# Patient Record
Sex: MALE | Race: BLACK OR AFRICAN AMERICAN | NOT HISPANIC OR LATINO | Employment: OTHER | URBAN - METROPOLITAN AREA
[De-identification: names, ages, dates, MRNs, and addresses within clinical notes are randomized per-mention and may not be internally consistent; named-entity substitution may affect disease eponyms.]

---

## 2018-05-07 ENCOUNTER — HOSPITAL ENCOUNTER (INPATIENT)
Facility: HOSPITAL | Age: 73
LOS: 7 days | Discharge: NURSING, REHAB OR LONG-TERM CARE | DRG: 066 | End: 2018-05-14
Attending: SURGERY | Admitting: INTERNAL MEDICINE
Payer: MEDICARE

## 2018-05-07 DIAGNOSIS — N40.0 BENIGN PROSTATIC HYPERPLASIA, UNSPECIFIED WHETHER LOWER URINARY TRACT SYMPTOMS PRESENT: ICD-10-CM

## 2018-05-07 DIAGNOSIS — F10.10 ETOH ABUSE: ICD-10-CM

## 2018-05-07 DIAGNOSIS — R50.9 FEVER, UNSPECIFIED FEVER CAUSE: ICD-10-CM

## 2018-05-07 DIAGNOSIS — I63.9 STROKE: ICD-10-CM

## 2018-05-07 DIAGNOSIS — D64.9 NORMOCYTIC ANEMIA: ICD-10-CM

## 2018-05-07 DIAGNOSIS — I63.9 CEREBROVASCULAR ACCIDENT (CVA): ICD-10-CM

## 2018-05-07 DIAGNOSIS — I63.9 CEREBROVASCULAR ACCIDENT (CVA), UNSPECIFIED MECHANISM: Primary | ICD-10-CM

## 2018-05-07 DIAGNOSIS — I10 ESSENTIAL HYPERTENSION: ICD-10-CM

## 2018-05-07 LAB
ALBUMIN SERPL BCP-MCNC: 4.4 G/DL
ALP SERPL-CCNC: 71 U/L
ALT SERPL W/O P-5'-P-CCNC: 27 U/L
AMMONIA BLD-SCNC: 11 UMOL/L
ANION GAP SERPL CALC-SCNC: 13 MMOL/L
AST SERPL-CCNC: 27 U/L
BASOPHILS # BLD AUTO: 0.02 K/UL
BASOPHILS NFR BLD: 0.2 %
BILIRUB SERPL-MCNC: 0.7 MG/DL
BUN SERPL-MCNC: 19 MG/DL
CALCIUM SERPL-MCNC: 9.6 MG/DL
CHLORIDE SERPL-SCNC: 99 MMOL/L
CHOLEST SERPL-MCNC: 177 MG/DL
CHOLEST/HDLC SERPL: 2.3 {RATIO}
CO2 SERPL-SCNC: 28 MMOL/L
CREAT SERPL-MCNC: 1.22 MG/DL
DIFFERENTIAL METHOD: ABNORMAL
EOSINOPHIL # BLD AUTO: 0 K/UL
EOSINOPHIL NFR BLD: 0.2 %
ERYTHROCYTE [DISTWIDTH] IN BLOOD BY AUTOMATED COUNT: 12.5 %
EST. GFR  (AFRICAN AMERICAN): >60 ML/MIN/1.73 M^2
EST. GFR  (NON AFRICAN AMERICAN): 59.7 ML/MIN/1.73 M^2
ETHANOL SERPL-MCNC: <10 MG/DL
GLUCOSE SERPL-MCNC: 113 MG/DL
HCT VFR BLD AUTO: 37.6 %
HDLC SERPL-MCNC: 76 MG/DL
HDLC SERPL: 42.9 %
HGB BLD-MCNC: 13.3 G/DL
INR PPP: 1.1
LDLC SERPL CALC-MCNC: 89 MG/DL
LYMPHOCYTES # BLD AUTO: 1.2 K/UL
LYMPHOCYTES NFR BLD: 13.3 %
MCH RBC QN AUTO: 31.7 PG
MCHC RBC AUTO-ENTMCNC: 35.4 G/DL
MCV RBC AUTO: 90 FL
MONOCYTES # BLD AUTO: 0.8 K/UL
MONOCYTES NFR BLD: 8.8 %
NEUTROPHILS # BLD AUTO: 6.8 K/UL
NEUTROPHILS NFR BLD: 77.3 %
NONHDLC SERPL-MCNC: 101 MG/DL
PLATELET # BLD AUTO: 232 K/UL
PMV BLD AUTO: 10.6 FL
POCT GLUCOSE: 108 MG/DL (ref 70–110)
POCT GLUCOSE: 98 MG/DL (ref 70–110)
POTASSIUM SERPL-SCNC: 3.7 MMOL/L
PROT SERPL-MCNC: 8.9 G/DL
PROTHROMBIN TIME: 11.7 SEC
RBC # BLD AUTO: 4.19 M/UL
SODIUM SERPL-SCNC: 140 MMOL/L
TRIGL SERPL-MCNC: 60 MG/DL
TSH SERPL DL<=0.005 MIU/L-ACNC: 0.94 UIU/ML
WBC # BLD AUTO: 8.79 K/UL

## 2018-05-07 PROCEDURE — 99285 EMERGENCY DEPT VISIT HI MDM: CPT | Mod: 25

## 2018-05-07 PROCEDURE — 82962 GLUCOSE BLOOD TEST: CPT

## 2018-05-07 PROCEDURE — 93010 ELECTROCARDIOGRAM REPORT: CPT | Mod: ,,, | Performed by: INTERNAL MEDICINE

## 2018-05-07 PROCEDURE — 25000003 PHARM REV CODE 250: Performed by: SURGERY

## 2018-05-07 PROCEDURE — 80061 LIPID PANEL: CPT

## 2018-05-07 PROCEDURE — 11000001 HC ACUTE MED/SURG PRIVATE ROOM

## 2018-05-07 PROCEDURE — 82140 ASSAY OF AMMONIA: CPT

## 2018-05-07 PROCEDURE — 27000221 HC OXYGEN, UP TO 24 HOURS

## 2018-05-07 PROCEDURE — 94760 N-INVAS EAR/PLS OXIMETRY 1: CPT

## 2018-05-07 PROCEDURE — 85025 COMPLETE CBC W/AUTO DIFF WBC: CPT

## 2018-05-07 PROCEDURE — G0378 HOSPITAL OBSERVATION PER HR: HCPCS

## 2018-05-07 PROCEDURE — 80320 DRUG SCREEN QUANTALCOHOLS: CPT

## 2018-05-07 PROCEDURE — 80053 COMPREHEN METABOLIC PANEL: CPT

## 2018-05-07 PROCEDURE — 93005 ELECTROCARDIOGRAM TRACING: CPT

## 2018-05-07 PROCEDURE — 85610 PROTHROMBIN TIME: CPT

## 2018-05-07 PROCEDURE — 25000003 PHARM REV CODE 250: Performed by: INTERNAL MEDICINE

## 2018-05-07 PROCEDURE — 84443 ASSAY THYROID STIM HORMONE: CPT

## 2018-05-07 PROCEDURE — G0425 INPT/ED TELECONSULT30: HCPCS | Mod: GT,,, | Performed by: PSYCHIATRY & NEUROLOGY

## 2018-05-07 RX ORDER — NIFEDIPINE 60 MG/1
30 TABLET, EXTENDED RELEASE ORAL DAILY
Status: ON HOLD | COMMUNITY
End: 2021-08-16 | Stop reason: SDUPTHER

## 2018-05-07 RX ORDER — HYDROCHLOROTHIAZIDE 25 MG/1
25 TABLET ORAL DAILY
Status: ON HOLD | COMMUNITY
End: 2018-05-14

## 2018-05-07 RX ORDER — CLOPIDOGREL BISULFATE 75 MG/1
300 TABLET ORAL ONCE
Status: COMPLETED | OUTPATIENT
Start: 2018-05-07 | End: 2018-05-07

## 2018-05-07 RX ORDER — TAMSULOSIN HYDROCHLORIDE 0.4 MG/1
0.4 CAPSULE ORAL 2 TIMES DAILY
COMMUNITY
End: 2022-06-20

## 2018-05-07 RX ORDER — FINASTERIDE 5 MG/1
5 TABLET, FILM COATED ORAL DAILY
COMMUNITY

## 2018-05-07 RX ORDER — LABETALOL HYDROCHLORIDE 5 MG/ML
10 INJECTION, SOLUTION INTRAVENOUS ONCE
Status: COMPLETED | OUTPATIENT
Start: 2018-05-07 | End: 2018-05-07

## 2018-05-07 RX ORDER — SODIUM CHLORIDE 9 MG/ML
INJECTION, SOLUTION INTRAVENOUS CONTINUOUS
Status: DISCONTINUED | OUTPATIENT
Start: 2018-05-07 | End: 2018-05-09

## 2018-05-07 RX ORDER — ASPIRIN 325 MG
325 TABLET, DELAYED RELEASE (ENTERIC COATED) ORAL
Status: COMPLETED | OUTPATIENT
Start: 2018-05-07 | End: 2018-05-07

## 2018-05-07 RX ORDER — GUAIFENESIN/PHENYLPROPANOLAMIN
500 EXPECTORANT ORAL DAILY
COMMUNITY
End: 2021-09-24 | Stop reason: ALTCHOICE

## 2018-05-07 RX ADMIN — LABETALOL HYDROCHLORIDE 10 MG: 5 INJECTION, SOLUTION INTRAVENOUS at 09:05

## 2018-05-07 RX ADMIN — CLOPIDOGREL BISULFATE 300 MG: 75 TABLET ORAL at 10:05

## 2018-05-07 RX ADMIN — ASPIRIN 325 MG: 325 TABLET, DELAYED RELEASE ORAL at 02:05

## 2018-05-07 RX ADMIN — SODIUM CHLORIDE: 0.9 INJECTION, SOLUTION INTRAVENOUS at 09:05

## 2018-05-07 NOTE — ED NOTES
Pt gave me permission to give his daughter, Bre Hernandez, any information over the phone. # [phone]

## 2018-05-07 NOTE — ED PROVIDER NOTES
Encounter Date: 5/7/2018       History     Chief Complaint   Patient presents with    Weakness     daughter states that yesterday evening 7pm weakness and slurred speech. pt states he feels bad    Aphasia     Patient was brought in by the daughter because he was acting inappropriate and she felt he had slurred speech.  The symptoms started at 7 PM last night.  She states that he likes to drink alcohol and she attributed some of his symptoms when she saw him last night due to alcohol impairment.  She states that he has trouble with slurred speech.  And he has trouble with balance.  He does not complain of any headaches or he is a little bit unsteady in his gait but does not have any arm or leg weakness.  He does not have a facial droop.  The symptoms still occurred this morning shows the daughter brought him to the hospital.  He is known to the Hospital system and there are no old records to review.  She does state he has high blood pressure and problems with his prostate gland and urinary retention      The history is provided by the patient.   Neurologic Problem   The primary symptoms include dizziness and speech change. The symptoms began yesterday. The episode lasted 17 hours. The symptoms are unchanged. The neurological symptoms are diffuse.   Change in speech began 12 - 24 hours ago. The speech change is unchanged.     Review of patient's allergies indicates:  No Known Allergies  No past medical history on file.  No past surgical history on file.  No family history on file.  Social History   Substance Use Topics    Smoking status: Not on file    Smokeless tobacco: Not on file    Alcohol use Not on file     Review of Systems   Constitutional: Negative.    HENT: Negative.    Eyes: Negative.    Respiratory: Negative.    Cardiovascular: Negative.  Negative for chest pain.   Gastrointestinal: Negative.    Endocrine: Negative.    Genitourinary: Negative.    Musculoskeletal: Negative.    Skin: Negative for color  change.   Allergic/Immunologic: Negative.    Neurological: Positive for dizziness, speech change, speech difficulty and light-headedness.   Psychiatric/Behavioral: Negative.    All other systems reviewed and are negative.      Physical Exam     Initial Vitals [05/07/18 1242]   BP Pulse Resp Temp SpO2   (!) 208/103 86 20 97.8 °F (36.6 °C) 100 %      MAP       138         Physical Exam    Nursing note and vitals reviewed.  Constitutional: He appears well-developed and well-nourished.   HENT:   Head: Normocephalic.   Eyes: Conjunctivae are normal.   Neck: Normal range of motion. Neck supple.   Cardiovascular: Normal rate, regular rhythm, normal heart sounds and intact distal pulses.   Pulmonary/Chest: Breath sounds normal.   Musculoskeletal: Normal range of motion.   Neurological: He is alert and oriented to person, place, and time.   No facial droop.  minimal left arm drift.  Good  no speech difficulty   Skin: Skin is warm and dry.   Psychiatric: He has a normal mood and affect.         ED Course   Procedures  Labs Reviewed   CBC W/ AUTO DIFFERENTIAL   COMPREHENSIVE METABOLIC PANEL   PROTIME-INR   TSH   LIPID PANEL   ALCOHOL,MEDICAL (ETHANOL)   AMMONIA   POCT GLUCOSE     EKG Readings: (Independently Interpreted)   Remote Q waves.  LVH          Medical Decision Making:   Initial Assessment:   Weakness and slurred speech ×18 hours  ED Management:  Patient was brought in by daughter with 18 hour history of slurred speech and a left arm drift and a code stroke was called.  The patient was given aspirin and telemetry stroke was activated CAT scan was done workup shows a small right acute internal capsule/caudate infarct with no evidence of any hemorrhage.  There is no indication for thrombolysis.  Neurology recommended observation for 24 hours and a workup.                      Clinical Impression:   The primary encounter diagnosis was Cerebrovascular accident (CVA), unspecified mechanism. Diagnoses of Stroke and  Essential hypertension were also pertinent to this visit.                           PATEL Saeed III, MD  05/07/18 0939

## 2018-05-07 NOTE — SUBJECTIVE & OBJECTIVE
Woke up with symptoms?: no  Last known normal: Last Known Normal Date: 05/06/18 Last Known Normal Time: 1900    Recent bleeding noted: no  Does the patient take any Blood Thinners? no  Medications: None      Past Medical History: hypertension and ETOH use    Past Surgical History: no major surgeries within the last 2 weeks    Family History: no relevant history    Social History: drinking    Allergies:   No known drug allergies    Review of Systems   Constitutional: Negative for chills, diaphoresis and fever.   HENT: Negative for hearing loss, tinnitus and trouble swallowing.    Eyes: Negative for photophobia and visual disturbance.   Respiratory: Negative for chest tightness and shortness of breath.    Cardiovascular: Negative for chest pain and palpitations.   Gastrointestinal: Negative for abdominal pain and vomiting.   Endocrine: Negative for cold intolerance and polyuria.   Genitourinary: Negative for hematuria.   Musculoskeletal: Negative for arthralgias, neck pain and neck stiffness.   Skin: Negative for rash.   Neurological: Positive for speech difficulty and weakness. Negative for dizziness, numbness and headaches.   Hematological: Does not bruise/bleed easily.   Psychiatric/Behavioral: Negative for agitation, behavioral problems and confusion.     Objective:   Vitals: Blood pressure (!) 208/103, pulse 80, temperature 97.8 °F (36.6 °C), resp. rate 20, weight 77.1 kg (170 lb), SpO2 99 %. BP: 175/91, Respiratory Rate: 16 and Heart Rate: 88    CT READ: Yes  Abnormal CT hypodensity right putamen consistent with acute to early subacute infarct.     Physical Exam   Constitutional: He is oriented to person, place, and time. He appears well-developed.   HENT:   Head: Normocephalic and atraumatic.   Eyes: Pupils are equal, round, and reactive to light.   Neck: Normal range of motion. Neck supple.   Cardiovascular: Normal rate and regular rhythm.    Pulmonary/Chest: Effort normal. No respiratory distress.    Abdominal: Soft. He exhibits no mass.   Genitourinary:   Genitourinary Comments: No performed   Musculoskeletal: Normal range of motion. He exhibits no deformity.   Neurological: He is alert and oriented to person, place, and time. He displays normal reflexes. No cranial nerve deficit or sensory deficit. He exhibits normal muscle tone. Coordination normal.   Skin: No rash noted. He is not diaphoretic. No erythema.   Psychiatric: He has a normal mood and affect. His behavior is normal.

## 2018-05-07 NOTE — CONSULTS
[facility]  Vascular Neurology  Comprehensive Stroke Center  Tele-Consultation Note      Consults    Consulting Provider: Spoke Physician:: dr tod sage  Current Providers  No providers found    Patient Location: [facility]  Spoke hospital nurse at bedside with patient assisting consultant.     Patient information was obtained from patient and daughter.       Assessment/Plan:   71 y/o with HTN poorly adherent to treatment, ETOH use, brought in by family due to acute onset slurred speech, poor balance leaning to the left, and behavioral changes.  NIHSS 2, CT head with hypodensity right putamen suggestive acute to early subacute infarct.  Patient is out of the window for treatment with iv alteplase. No LVO suspected.  Recommend admission to the hospital for stroke work up: MRI/MRA brain, MRA neck, TTE, lipid profile, hemoglobin A1c.   PT, speech, and neurology consult.  ASA, atorvastatin.      STROKE DOCUMENTATION     Acute Stroke Times:   Acute Stroke Times   Last Known Normal Date: 05/06/18  Last Known Normal Time: 1900  Symptom Onset Date: 05/06/18  Symptom Onset Time: 1900  Stroke Team Called Date: 05/07/18  Stroke Team Called Time: 1346  Stroke Team Arrival Date: 05/07/18  Stroke Team Arrival Time: 1346  CT Interpretation Time: 1344  Decision to Treat Time for Alteplase:  (No iv alteplase candidate)  Decision to Treat Time for IR:  (No IR candidate)    NIH Scale:  Interval: baseline (upon arrival/admit)  1a. Level Of Consciousness: 0-->Alert: keenly responsive  1b. LOC Questions: 0-->Answers both questions correctly  1c. LOC Commands: 0-->Performs both tasks correctly  2. Best Gaze: 0-->Normal  3. Visual: 0-->No visual loss  4. Facial Palsy: 0-->Normal symmetrical movements  5a. Motor Arm, Left: 1-->Drift: limb holds 90 (or 45) degrees, but drifts down before full 10 seconds: does not hit bed or other support  5b. Motor Arm, Right:  0-->No drift: limb holds 90 (or 45) degrees for full 10 secs  6a. Motor Leg, Left: 0-->No drift: leg holds 30 degree position for full 5 secs  6b. Motor Leg, Right: 0-->No drift: leg holds 30 degree position for full 5 secs  7. Limb Ataxia: 0-->Absent  8. Sensory: 0-->Normal: no sensory loss  9. Best Language: 0-->No aphasia: normal  10. Dysarthria: 1-->Mild-to-moderate dysarthria: patient slurs at least some words and, at worst, can be understood with some difficulty  11. Extinction and Inattention (formerly Neglect): 0-->No abnormality  Total (NIH Stroke Scale): 2     Modified Kathryn Score: 0  Elkin Coma Scale:15   ABCD2 Score:    [id number] Score:   HAS -BLED Score:   ICH Score:   Hunt & Hernandez Classification:       Diagnoses: acute right subcortical infarct.  No new Assessment & Plan notes have been filed under this hospital service since the last note was generated.  Service: Vascular Neurology      Blood pressure (!) 208/103, pulse 80, temperature 97.8 °F (36.6 °C), resp. rate 20, weight 77.1 kg (170 lb), SpO2 99 %.  Alteplase Eligible?: No  Alteplase Recommendation: Alteplase not recommended due to Outside of treatment window   Possible Interventional Revascularization Candidate? No; No significant neurological deficit    Disposition Recommendation: admit to inpatient      Subjective:     History of Present Illness: 73 y/o with HTN poorly adherent to treatment, ETOH use, brought in by family due to acute onset slurred speech, poor balance leaning to the left, and behavioral changes. Never had similar symptoms before.  Daughter first noticed these changes around 7 pm last night and remain unchanged.  No notes on file      Woke up with symptoms?: no  Last known normal: Last Known Normal Date: 05/06/18 Last Known Normal Time: 1900    Recent bleeding noted: no  Does the patient take any Blood Thinners? no  Medications: None      Past Medical History: hypertension and ETOH use    Past Surgical History: no major  surgeries within the last 2 weeks    Family History: no relevant history    Social History: drinking    Allergies:   No known drug allergies    Review of Systems   Constitutional: Negative for chills, diaphoresis and fever.   HENT: Negative for hearing loss, tinnitus and trouble swallowing.    Eyes: Negative for photophobia and visual disturbance.   Respiratory: Negative for chest tightness and shortness of breath.    Cardiovascular: Negative for chest pain and palpitations.   Gastrointestinal: Negative for abdominal pain and vomiting.   Endocrine: Negative for cold intolerance and polyuria.   Genitourinary: Negative for hematuria.   Musculoskeletal: Negative for arthralgias, neck pain and neck stiffness.   Skin: Negative for rash.   Neurological: Positive for speech difficulty and weakness. Negative for dizziness, numbness and headaches.   Hematological: Does not bruise/bleed easily.   Psychiatric/Behavioral: Negative for agitation, behavioral problems and confusion.     Objective:   Vitals: Blood pressure (!) 208/103, pulse 80, temperature 97.8 °F (36.6 °C), resp. rate 20, weight 77.1 kg (170 lb), SpO2 99 %. BP: 175/91, Respiratory Rate: 16 and Heart Rate: 88    CT READ: Yes  Abnormal CT hypodensity right putamen consistent with acute to early subacute infarct.     Physical Exam   Constitutional: He is oriented to person, place, and time. He appears well-developed.   HENT:   Head: Normocephalic and atraumatic.   Eyes: Pupils are equal, round, and reactive to light.   Neck: Normal range of motion. Neck supple.   Cardiovascular: Normal rate and regular rhythm.    Pulmonary/Chest: Effort normal. No respiratory distress.   Abdominal: Soft. He exhibits no mass.   Genitourinary:   Genitourinary Comments: No performed   Musculoskeletal: Normal range of motion. He exhibits no deformity.   Neurological: He is alert and oriented to person, place, and time. He displays normal reflexes. No cranial nerve deficit or sensory  deficit. He exhibits normal muscle tone. Coordination normal.   Skin: No rash noted. He is not diaphoretic. No erythema.   Psychiatric: He has a normal mood and affect. His behavior is normal.             Recommended the emergency room physician to have a brief discussion with the patient and/or family if available regarding the risks and benefits of treatment, and to briefly document the occurrence of that discussion in his clinical encounter note.     The attending portion of this evaluation, treatment, and documentation was performed per Joselito Lara MD via audiovisual.    Billing code:  (non-intervention mild to moderate stroke, TIA, some mimics)    · This patient has a critical neurological condition/illness, with some potential for high morbidity and mortality.  · There is a moderate probability for acute neurological change leading to clinical and possibly life-threatening deterioration requiring highest level of physician preparedness for urgent intervention.  · Care was coordinated with other physicians involved in the patient's care.  · Radiologic studies and laboratory data were reviewed and interpreted, and plan of care was re-assessed based on the results.  · Diagnosis, treatment options and prognosis may have been discussed with the patient and/or family members or caregiver.      Consult End Time: 1:57 pm    Joselito Lara MD  Comprehensive Stroke Center  Vascular Neurology   [facility]

## 2018-05-07 NOTE — NURSING
Received report from IGNACIA Krishnan at [facility] ED. Awaiting arrival of patient to Room 404. ETA of patient arrival is approximately an hour.

## 2018-05-08 DIAGNOSIS — I61.9 CVA (CEREBROVASCULAR ACCIDENT DUE TO INTRACEREBRAL HEMORRHAGE): Primary | ICD-10-CM

## 2018-05-08 PROBLEM — I10 HTN (HYPERTENSION): Status: ACTIVE | Noted: 2018-05-08

## 2018-05-08 PROBLEM — F10.10 ETOH ABUSE: Status: ACTIVE | Noted: 2018-05-08

## 2018-05-08 PROBLEM — N40.0 BPH (BENIGN PROSTATIC HYPERPLASIA): Status: ACTIVE | Noted: 2018-05-08

## 2018-05-08 PROBLEM — D64.9 NORMOCYTIC ANEMIA: Status: ACTIVE | Noted: 2018-05-08

## 2018-05-08 LAB
APTT BLDCRRT: 29.2 SEC
BACTERIA #/AREA URNS HPF: ABNORMAL /HPF
BILIRUB UR QL STRIP: NEGATIVE
CK MB SERPL-MCNC: 1.6 NG/ML
CK MB SERPL-RTO: 1 %
CK SERPL-CCNC: 163 U/L
CLARITY UR: CLEAR
COLOR UR: YELLOW
DIASTOLIC DYSFUNCTION: YES
ESTIMATED AVG GLUCOSE: 103 MG/DL
FERRITIN SERPL-MCNC: 887 NG/ML
FOLATE SERPL-MCNC: 6.2 NG/ML
GLUCOSE UR QL STRIP: NEGATIVE
HBA1C MFR BLD HPLC: 5.2 %
HGB UR QL STRIP: ABNORMAL
INR PPP: 1
IRON SERPL-MCNC: 67 UG/DL
KETONES UR QL STRIP: NEGATIVE
LEUKOCYTE ESTERASE UR QL STRIP: NEGATIVE
MICROSCOPIC COMMENT: ABNORMAL
NITRITE UR QL STRIP: NEGATIVE
PH UR STRIP: 5 [PH] (ref 5–8)
PROT UR QL STRIP: NEGATIVE
PROTHROMBIN TIME: 10.5 SEC
RBC #/AREA URNS HPF: 9 /HPF (ref 0–4)
RETIRED EF AND QEF - SEE NOTES: 55 (ref 55–65)
SATURATED IRON: 25 %
SP GR UR STRIP: 1.02 (ref 1–1.03)
SQUAMOUS #/AREA URNS HPF: 1 /HPF
TOTAL IRON BINDING CAPACITY: 272 UG/DL
TRANSFERRIN SERPL-MCNC: 184 MG/DL
TRICUSPID VALVE REGURGITATION: ABNORMAL
TROPONIN I SERPL DL<=0.01 NG/ML-MCNC: 0.02 NG/ML
URN SPEC COLLECT METH UR: ABNORMAL
UROBILINOGEN UR STRIP-ACNC: NEGATIVE EU/DL
VIT B12 SERPL-MCNC: 341 PG/ML
WBC #/AREA URNS HPF: 1 /HPF (ref 0–5)

## 2018-05-08 PROCEDURE — 63600175 PHARM REV CODE 636 W HCPCS: Performed by: INTERNAL MEDICINE

## 2018-05-08 PROCEDURE — A4216 STERILE WATER/SALINE, 10 ML: HCPCS | Performed by: STUDENT IN AN ORGANIZED HEALTH CARE EDUCATION/TRAINING PROGRAM

## 2018-05-08 PROCEDURE — 82746 ASSAY OF FOLIC ACID SERUM: CPT

## 2018-05-08 PROCEDURE — 25000003 PHARM REV CODE 250: Performed by: STUDENT IN AN ORGANIZED HEALTH CARE EDUCATION/TRAINING PROGRAM

## 2018-05-08 PROCEDURE — G8987 SELF CARE CURRENT STATUS: HCPCS | Mod: CK

## 2018-05-08 PROCEDURE — 83036 HEMOGLOBIN GLYCOSYLATED A1C: CPT

## 2018-05-08 PROCEDURE — 86703 HIV-1/HIV-2 1 RESULT ANTBDY: CPT

## 2018-05-08 PROCEDURE — 84165 PROTEIN E-PHORESIS SERUM: CPT

## 2018-05-08 PROCEDURE — 25000003 PHARM REV CODE 250: Performed by: INTERNAL MEDICINE

## 2018-05-08 PROCEDURE — 97161 PT EVAL LOW COMPLEX 20 MIN: CPT

## 2018-05-08 PROCEDURE — 25500020 PHARM REV CODE 255: Performed by: INTERNAL MEDICINE

## 2018-05-08 PROCEDURE — G9162 LANG EXPRESS CURRENT STATUS: HCPCS | Mod: CJ

## 2018-05-08 PROCEDURE — 83540 ASSAY OF IRON: CPT

## 2018-05-08 PROCEDURE — 86334 IMMUNOFIX E-PHORESIS SERUM: CPT

## 2018-05-08 PROCEDURE — 97802 MEDICAL NUTRITION INDIV IN: CPT

## 2018-05-08 PROCEDURE — 82728 ASSAY OF FERRITIN: CPT

## 2018-05-08 PROCEDURE — 82607 VITAMIN B-12: CPT

## 2018-05-08 PROCEDURE — 97165 OT EVAL LOW COMPLEX 30 MIN: CPT

## 2018-05-08 PROCEDURE — G9163 LANG EXPRESS GOAL STATUS: HCPCS | Mod: CI

## 2018-05-08 PROCEDURE — A4216 STERILE WATER/SALINE, 10 ML: HCPCS | Performed by: INTERNAL MEDICINE

## 2018-05-08 PROCEDURE — 96374 THER/PROPH/DIAG INJ IV PUSH: CPT

## 2018-05-08 PROCEDURE — 36415 COLL VENOUS BLD VENIPUNCTURE: CPT

## 2018-05-08 PROCEDURE — G8978 MOBILITY CURRENT STATUS: HCPCS | Mod: CK

## 2018-05-08 PROCEDURE — 82550 ASSAY OF CK (CPK): CPT

## 2018-05-08 PROCEDURE — 97530 THERAPEUTIC ACTIVITIES: CPT

## 2018-05-08 PROCEDURE — G8979 MOBILITY GOAL STATUS: HCPCS | Mod: CJ

## 2018-05-08 PROCEDURE — 82553 CREATINE MB FRACTION: CPT

## 2018-05-08 PROCEDURE — 86334 IMMUNOFIX E-PHORESIS SERUM: CPT | Mod: 26,,, | Performed by: PATHOLOGY

## 2018-05-08 PROCEDURE — 85610 PROTHROMBIN TIME: CPT

## 2018-05-08 PROCEDURE — 81000 URINALYSIS NONAUTO W/SCOPE: CPT

## 2018-05-08 PROCEDURE — 92523 SPEECH SOUND LANG COMPREHEN: CPT

## 2018-05-08 PROCEDURE — 11000001 HC ACUTE MED/SURG PRIVATE ROOM

## 2018-05-08 PROCEDURE — 84484 ASSAY OF TROPONIN QUANT: CPT

## 2018-05-08 PROCEDURE — 85730 THROMBOPLASTIN TIME PARTIAL: CPT

## 2018-05-08 PROCEDURE — 94761 N-INVAS EAR/PLS OXIMETRY MLT: CPT

## 2018-05-08 PROCEDURE — G8988 SELF CARE GOAL STATUS: HCPCS | Mod: CI

## 2018-05-08 PROCEDURE — 92610 EVALUATE SWALLOWING FUNCTION: CPT

## 2018-05-08 PROCEDURE — 80074 ACUTE HEPATITIS PANEL: CPT

## 2018-05-08 PROCEDURE — 97535 SELF CARE MNGMENT TRAINING: CPT

## 2018-05-08 PROCEDURE — 84165 PROTEIN E-PHORESIS SERUM: CPT | Mod: 26,,, | Performed by: PATHOLOGY

## 2018-05-08 RX ORDER — SODIUM CHLORIDE 0.9 % (FLUSH) 0.9 %
3 SYRINGE (ML) INJECTION EVERY 8 HOURS
Status: DISCONTINUED | OUTPATIENT
Start: 2018-05-08 | End: 2018-05-14 | Stop reason: HOSPADM

## 2018-05-08 RX ORDER — HYDROCHLOROTHIAZIDE 25 MG/1
12.5 TABLET ORAL DAILY
Qty: 30 TABLET | Refills: 5 | Status: SHIPPED | OUTPATIENT
Start: 2018-05-08 | End: 2018-05-14 | Stop reason: HOSPADM

## 2018-05-08 RX ORDER — ENOXAPARIN SODIUM 100 MG/ML
40 INJECTION SUBCUTANEOUS EVERY 24 HOURS
Status: DISCONTINUED | OUTPATIENT
Start: 2018-05-08 | End: 2018-05-14 | Stop reason: HOSPADM

## 2018-05-08 RX ORDER — LABETALOL HYDROCHLORIDE 5 MG/ML
10 INJECTION, SOLUTION INTRAVENOUS
Status: DISCONTINUED | OUTPATIENT
Start: 2018-05-08 | End: 2018-05-14 | Stop reason: HOSPADM

## 2018-05-08 RX ORDER — CLOPIDOGREL BISULFATE 75 MG/1
75 TABLET ORAL DAILY
Qty: 30 TABLET | Refills: 11 | Status: ON HOLD | OUTPATIENT
Start: 2018-05-09 | End: 2021-08-16

## 2018-05-08 RX ORDER — SODIUM CHLORIDE 0.9 % (FLUSH) 0.9 %
3 SYRINGE (ML) INJECTION EVERY 8 HOURS
Status: DISCONTINUED | OUTPATIENT
Start: 2018-05-08 | End: 2018-05-10

## 2018-05-08 RX ORDER — ATORVASTATIN CALCIUM 40 MG/1
40 TABLET, FILM COATED ORAL DAILY
Qty: 90 TABLET | Refills: 3 | Status: SHIPPED | OUTPATIENT
Start: 2018-05-09 | End: 2018-05-14 | Stop reason: HOSPADM

## 2018-05-08 RX ORDER — CLOPIDOGREL BISULFATE 75 MG/1
75 TABLET ORAL DAILY
Status: DISCONTINUED | OUTPATIENT
Start: 2018-05-08 | End: 2018-05-14 | Stop reason: HOSPADM

## 2018-05-08 RX ORDER — HYDROCHLOROTHIAZIDE 12.5 MG/1
12.5 TABLET ORAL DAILY
Status: DISCONTINUED | OUTPATIENT
Start: 2018-05-08 | End: 2018-05-14 | Stop reason: HOSPADM

## 2018-05-08 RX ORDER — ASPIRIN 81 MG/1
81 TABLET ORAL DAILY
Refills: 0 | COMMUNITY
Start: 2018-05-09 | End: 2021-09-14

## 2018-05-08 RX ORDER — ATORVASTATIN CALCIUM 40 MG/1
40 TABLET, FILM COATED ORAL DAILY
Status: DISCONTINUED | OUTPATIENT
Start: 2018-05-08 | End: 2018-05-09

## 2018-05-08 RX ORDER — LABETALOL HYDROCHLORIDE 5 MG/ML
10 INJECTION, SOLUTION INTRAVENOUS
Status: DISCONTINUED | OUTPATIENT
Start: 2018-05-08 | End: 2018-05-09

## 2018-05-08 RX ORDER — ASPIRIN 81 MG/1
81 TABLET ORAL DAILY
Status: DISCONTINUED | OUTPATIENT
Start: 2018-05-08 | End: 2018-05-14 | Stop reason: HOSPADM

## 2018-05-08 RX ADMIN — CLOPIDOGREL BISULFATE 75 MG: 75 TABLET ORAL at 08:05

## 2018-05-08 RX ADMIN — IOHEXOL 100 ML: 350 INJECTION, SOLUTION INTRAVENOUS at 10:05

## 2018-05-08 RX ADMIN — ASPIRIN 81 MG: 81 TABLET, COATED ORAL at 08:05

## 2018-05-08 RX ADMIN — ATORVASTATIN CALCIUM 40 MG: 40 TABLET, FILM COATED ORAL at 08:05

## 2018-05-08 RX ADMIN — HYDROCHLOROTHIAZIDE 12.5 MG: 12.5 TABLET ORAL at 05:05

## 2018-05-08 RX ADMIN — SODIUM CHLORIDE, PRESERVATIVE FREE 3 ML: 5 INJECTION INTRAVENOUS at 05:05

## 2018-05-08 RX ADMIN — ENOXAPARIN SODIUM 40 MG: 100 INJECTION SUBCUTANEOUS at 05:05

## 2018-05-08 NOTE — PLAN OF CARE
Problem: SLP Goal  Goal: SLP Goal  Short Term Goals:  1. Pt will participate in BSS to determine least restrictive diet.- MET 5/8  2. Pt will participate in informal speech-lang eval to r/o cognitive-linguistic deficits.- MET 5/8  3. Pt will complete mental manipulation tasks (i.e word finding, categorical, etc.) with 80% acc given min assist for 2 consecutive sessions.  4. Pt will recall 3/3 given items with 3-5 min delay with min assist for 2 consecutive sessions.  5. Pt will answer questions related to given short story with 80% acc, ind'ly for 2 consecutive session.  *Further goals pending pt's progress*  Outcome: Ongoing (interventions implemented as appropriate)  5/8:  Pt participated in clinical swallow eval and informal speech-language eval this PM. Pt tolerated thin liquids, puree, and hard solid textures with no overt s/s of aspiration, at bedside. However, pt demonstrated mild cognitive-linguistic impairment c/b word finding issues (paraphasisas noted), difficulty with short term memory, and mild-moderate dysarthria c/b slurred, quick rate of speech, judged to be ~70-75% intelligible, which is not pt's baseline. SLP recs: pt will participate in cognitive-linguistic tx to address cognitive-linguistic deficits. SLP will continue to follow. Pt would benefit from ST services at next level of care.  TORIBIO Ndiaye., CF-SLP  Speech-Language Pathologist

## 2018-05-08 NOTE — NURSING
Pt passed RO w/ flying colors.  [facility] ordered NPO for possible mooring procedures.     BP on admission 203/91, highest during assessment 235/102,  Dr Flores ordered 10 mg labetalol IVP. Initial drop 196/95 via automatic cuff.  At 2300 BP via manual was 180/90.  U medicine awear and does not want to drop pressure too low..      Tele: NSR, HR 70 80, No alarms.     Bed in lowest position, wheels clocked, non skid socks worn, bed alarm set, family at bed side. Personal items and call light with in reach.

## 2018-05-08 NOTE — PLAN OF CARE
Problem: Patient Care Overview  Goal: Plan of Care Review  Outcome: Ongoing (interventions implemented as appropriate)  Recommendation/Intervention:   1. Change diet to Cardiac low sodium/cholesterol   -If PO intake <50%, Boost BID  2. RD to monitor     Goals:  Meet 85% EEN  Nutrition Goal Status: new

## 2018-05-08 NOTE — PT/OT/SLP EVAL
Occupational Therapy   Evaluation & tx    Name: Haris Hernandez  MRN: [id number]  Admitting Diagnosis:  Cerebrovascular accident (CVA)      Recommendations:     Discharge Recommendations: home health OT  Discharge Equipment Recommendations:  bath bench  Barriers to discharge:  None    History:     Occupational Profile:  Living Environment: w/ dgtr in SSH w/ 0 ALLY; T/S combo  Previous level of function: (I) w/o DME incl standing tub t/f's & sitting tub bath  Roles and Routines: imbibes QD  Equipment Owned:  raised toilet  Assistance upon Discharge: S/A PRN    Past Medical History:   Diagnosis Date    Coronary artery disease     Enlarged prostate without lower urinary tract symptoms (luts)     BPH    Enlarged prostate without lower urinary tract symptoms (luts)     BPH    Hypertension        Past Surgical History:   Procedure Laterality Date    APPENDECTOMY      EYE SURGERY         Subjective     Chief Complaint: slurred speech & L lean  Patient/Family stated goals: return to PLOF  Communicated with: nsg prior to session.  Pain/Comfort:  · Pain Rating 1: 0/10  · Pain Rating Post-Intervention 1: 0/10    Patients cultural, spiritual, Sabianist conflicts given the current situation:      Objective:     Patient found with: peripheral IV    General Precautions: Standard, fall   Orthopedic Precautions:N/A   Braces: N/A     Occupational Performance:    Bed Mobility:    · Patient completed Supine to Sit with stand by assistance  · Patient completed Sit to Supine with stand by assistance    Functional Mobility/Transfers:  · Patient completed Bed <> Chair Transfer using Step Transfer technique with minimum assistance with rolling walker  · Functional Mobility: sidestep L via RW w/ Min A for DME manip    Activities of Daily Living:  · LB Dressing: moderate assistance overall; SBA to doff & Max A to don via LE cross at EOB    Cognitive/Visual Perceptual:  A+O x 4  Decreased proprioception L U>LE  Delayed verbal/motor  "processing    Physical Exam:  R UE WFL at 4/5  L UE slight decrease at shldr at 4-/5    Sit balance; F to F+  Stand balance;  F- to F    Patient left up in chair with nsg present    [facility] 6 Click:  [facility] Total Score: 16    Treatment & Education:  **Pt w/ reported fall from standing at toilet while turning to R w/in 30 min prior to OT eval/tx    OT eval & tx completed this date. Pt lives w/ dgtr in SSH w/ 0STE & T/S combo. PLOF: (I) w/o DME for all fxnl tasks incl standing tub showers & sitting baths. Currently, pt demo's delayed processing & decreased L U>LE proprioception, coordination, strentgth & fxnl use. Pt perf sup-->EOB w/ SBA; standing w/ RW w/ SBA; sidestepping L & t/f-->WC w/ Min A for RW manip. Edu/tx re: general safety & visual compensation techs & HEP. Pt/dgtr verbalized understanding.      Assessment:   Pt presents w/ decreased overall endurance/conditioning, balance/mobility & coordination w/ subsequent decline in (I)/safety w/ BADLs, fxnl mobility & fxnl t/f's. OT 5x/wk to increase phys/fxnl status & maximize potential to achieve established goals for d/c-->HHOT & TTB.    Haris DELICIA Hernandez is a 72 y.o. male with a medical diagnosis of Cerebrovascular accident (CVA).  He presents with ..  Performance deficits affecting function are weakness, impaired endurance, impaired sensation, gait instability, impaired functional mobilty, impaired self care skills, impaired balance, impaired cognition, decreased lower extremity function, decreased upper extremity function, decreased coordination, decreased safety awareness, impaired fine motor.      Rehab Prognosis:  good; patient would benefit from acute skilled OT services to address these deficits and reach maximum level of function.         Clinical Decision Makin.  OT Low:  "Pt evaluation falls under low complexity for evaluation coding due to performance deficits noted in 1-3 areas as stated above and 0 co-morbities affecting current functional status. " "Data obtained from problem focused assessments. No modifications or assistance was required for completion of evaluation. Only brief occupational profile and history review completed."     Plan:     Patient to be seen 5 x/week to address the above listed problems via self-care/home management, therapeutic activities, therapeutic exercises  · Plan of Care Expires: 06/08/18  · Plan of Care Reviewed with: patient, daughter    This Plan of care has been discussed with the patient who was involved in its development and understands and is in agreement with the identified goals and treatment plan    GOALS:    Occupational Therapy Goals        Problem: Occupational Therapy Goal    Goal Priority Disciplines Outcome Interventions   Occupational Therapy Goal     OT, PT/OT     Description:  Goals to be met by: 06/08/18     Patient will increase functional independence with ADLs by performing:    LE Dressing with Supervision.  Grooming while standing at sink with Supervision.  Toileting from toilet with Supervision for hygiene and clothing management.   Toilet transfer to toilet with Supervision.  Increased functional strength to WFL for ADLs.                      Time Tracking:     OT Date of Treatment: 05/08/18  OT Start Time: 0914  OT Stop Time: 0954  OT Total Time (min): 40 min    Billable Minutes:Evaluation 10  Therapeutic Activity 30  Total Time 40    LEANNE Cortez  5/8/2018    "

## 2018-05-08 NOTE — H&P
[facility] History and Physical - Resident Note    Admitting Team: Team A  Attending Physician: Dr. Gao  Resident: Dr. Ravindra Flores  Interns: Dr. Alvarez and Dr. Gracie Rivera    Date of Admit: 5/7/2018    Chief Complaint     Gait disturbance x 1 day    Subjective:      History of Present Illness:  Haris Hernandez is a 72 y.o.  male with a PMHx of BPH, HTN and Alcohol abuse who was in his USOH (independent ADLs, able to walk without difficulty) until yesterday at around 7pm his daughter noticed he was leaning towards his left side when walking and slurring his speech. She states that he is a daily drinker and thought this was attributed to his drinking although this morning when he woke up, he continued to have the same symptoms that had no improved so brought him to the ED. Per patient, his slurred speech and gait disturbance have improved. He endorses left hand numbness but denies weakness, sensation changes, headache, vision changes, CP, SOB, dizziness, weakness, fevers, sick contacts, ingestion of foreign substance, shaking body, urinary incontinence, head trauma, LOC, chills, weight loss, dysuria, abdominal pain. Denies previous episode.     ROS: 2 weeks yellow productive cough         Past Medical History:  HTN  Alcohol abuse    Past Surgical History:  Cataract surgery    Allergies:  No Known Allergies    Home Medications:  Prior to Admission medications    Medication Sig Start Date End Date Taking? Authorizing Provider   finasteride (PROSCAR) 5 mg tablet Take 5 mg by mouth once daily.   Yes Historical Provider, MD   hydroCHLOROthiazide (HYDRODIURIL) 25 MG tablet Take 25 mg by mouth once daily.   Yes Historical Provider, MD   NIFEdipine (ADALAT CC) 60 MG TbSR Take 30 mg by mouth once daily.   Yes Historical Provider, MD   saw palmetto 500 MG capsule Take 500 mg by mouth once daily.   Yes Historical Provider, MD   tamsulosin (FLOMAX) 0.4 mg Cp24 Take 0.4 mg by mouth once daily.   Yes Historical Provider,  "MD       Family History:  Dad: stroke  Mother: MI    Social History:  Social History   Substance Use Topics    Smoking status: Not on file    Smokeless tobacco: Not on file    Alcohol use Not on file     Daily drinker. Last drink 5 pm yesterday. Drinks 1/2 pint of liquor and 4 beers daily for 6 years. Denies smoking although previous 1 ppd for 10 years. Denies illicit drug use.  Denies WD or seizures.     Review of Systems:  Pertinent items are noted in HPI. All other systems are reviewed and are negative.    Health Maintaince :   Primary Care Physician: Dr. Spears  Immunizations:   TDap is not up to date, .  Influenza is not up to date, .  Pneumovax is not up to date, .  Cancer Screening:  Colonoscopy: is not up to date. Refuses     Objective:   Last 24 Hour Vital Signs:  BP  Min: 175/91  Max: 235/102  Temp  Av.3 °F (36.8 °C)  Min: 97.3 °F (36.3 °C)  Max: 99.1 °F (37.3 °C)  Pulse  Av.8  Min: 73  Max: 90  Resp  Av  Min: 12  Max: 20  SpO2  Av.4 %  Min: 97 %  Max: 100 %  Height  Av' 11.5" (181.6 cm)  Min: 5' 11.5" (181.6 cm)  Max: 5' 11.5" (181.6 cm)  Weight  Av.5 kg (162 lb 0.8 oz)  Min: 69.9 kg (154 lb 1.6 oz)  Max: 77.1 kg (170 lb)  Body mass index is 21.19 kg/m².  No intake/output data recorded.    Physical Examination:  Constitutional: He is oriented to person, place, and time. He appears well-developed.   Head: Normocephalic and atraumatic.   Eyes: Pupils are equal, round, and reactive to light.   Neck: Normal range of motion. Neck supple.   Cardiovascular: Normal rate and regular rhythm.  No murmurs, rubs or gallops   Pulmonary/Chest: Effort normal. No respiratory distress. CTA b/l. No wheezes, rhales, rhonci   Abdominal: Soft. Non-tender. BS +. He exhibits no mass.   Musculoskeletal: Normal range of motion. He exhibits no deformity.   Neurological: He is alert and oriented to person, place, and time.   Reflexes: 2+ patellar on R and 3+ L patellar.   Hypoglossal nn. Deficit with " tongue deviated to right side.   No nystagmus. Able to speak, swallow, hear equally. Able to close eyes tightly. Speech mildly slurred.  Sensory deficit with decreased sensation to light touch on Left Lower face. Smile asymmetric with left lower facial weakness present.   LE 5/5 muscle strength. UE 5/5 muscle strength although R>L bilaterally.   Gait not assessed. Pronator drift on left UE.   Skin: No rash noted. He is not diaphoretic. No erythema.   Psychiatric: He has a normal mood and affect. His behavior is normal.     Laboratory:  Most Recent Data:  CBC: Lab Results   Component Value Date    WBC 8.79 05/07/2018    HGB 13.3 (L) 05/07/2018    HCT 37.6 (L) 05/07/2018     05/07/2018    MCV 90 05/07/2018    RDW 12.5 05/07/2018      seen  BMP: Lab Results   Component Value Date     05/07/2018    K 3.7 05/07/2018    CL 99 05/07/2018    CO2 28 05/07/2018    BUN 19 05/07/2018    CREATININE 1.22 05/07/2018     (H) 05/07/2018    CALCIUM 9.6 05/07/2018     LFTs: Lab Results   Component Value Date    PROT 8.9 (H) 05/07/2018    ALBUMIN 4.4 05/07/2018    BILITOT 0.7 05/07/2018    AST 27 05/07/2018    ALKPHOS 71 05/07/2018    ALT 27 05/07/2018     Coags:   Lab Results   Component Value Date    INR 1.1 05/07/2018     FLP: Lab Results   Component Value Date    CHOL 177 05/07/2018    HDL 76 (H) 05/07/2018    LDLCALC 89.0 05/07/2018    TRIG 60 05/07/2018    CHOLHDL 42.9 05/07/2018     DM: Lab Results   Component Value Date    LDLCALC 89.0 05/07/2018    CREATININE 1.22 05/07/2018     Thyroid: Lab Results   Component Value Date    TSH 0.939 05/07/2018     Anemia: No results found for: IRON, TIBC, FERRITIN, [id number], FOLATE  Cardiac: No results found for: TROPONINI, CKTOTAL, CKMB, BNP  Urinalysis: No results found for: LABURIN, COLORU, CLARITYU, SPECGRAV, LABSPEC, NITRITE, PROTEINUR, GLUCOSEU, KETONESU, UROBILINOGEN, BILIRUBINUR, BLOODU, RBCU, WBCUA    Trended Lab Data:    Recent Labs  Lab 05/07/18  1249   WBC  8.79   HGB 13.3*   HCT 37.6*      MCV 90   RDW 12.5      K 3.7   CL 99   CO2 28   BUN 19   CREATININE 1.22   *   PROT 8.9*   ALBUMIN 4.4   BILITOT 0.7   AST 27   ALKPHOS 71   ALT 27       Trended Cardiac Data:  No results for input(s): TROPONINI, CKTOTAL, CKMB, BNP in the last 168 hours.    Microbiology Data:  Not applicable       Other Results:  EKG (my interpretation): Normal sinus rhythm, HR 80  Voltage criteria for left ventricular hypertrophy  Anteroseptal infarct ,age undetermined    Radiology:  Imaging Results          CT Head Without Contrast (Final result)  Result time 05/07/18 13:13:06    Final result by Steve Dempsey MD (05/07/18 13:13:06)                 Impression:      No acute hemorrhage.  Low-density zone right caudate nucleus/anterior internal capsule with considerations including acute to subacute lacunar type infarct.  Several chronic lacunar type infarcts.    Chronic right cerebellar encephalomalacia.    Mild atrophy with white matter degeneration.      Electronically signed by: Steve Dempsey MD  Date:    05/07/2018  Time:    13:13             Narrative:    EXAMINATION:  CT HEAD WITHOUT CONTRAST    CLINICAL HISTORY:  Stroke; acute CVA.    TECHNIQUE:  Standard noncontrast CT of the brain.    All CT scans at this facility use dose modulation, iterative reconstruction and/or weight based dosing when appropriate to reduce radiation dose to as low as reasonably achievable.    COMPARISON:  None.    FINDINGS:  The ventricles are mildly enlarged.  There are mild white matter changes consistent with small vessel ischemic degeneration.    Intracranial vascular calcification is noted.    Focal area of encephalomalacia in the right cerebellum probably represents an old infarct.    Small chronic appearing left thalamic and left basal ganglia lacunar infarcts.    Ill-defined low-density area in the right caudate and anterior internal capsule region could represent an acute to  subacute infarct.  Please correlate with clinical exam.    No acute hemorrhage.                               X-Ray Chest AP Portable (Final result)  Result time 05/07/18 12:58:47    Final result by Odin Rosario MD (05/07/18 12:58:47)                 Impression:      No acute process seen.      Electronically signed by: Odin Rosario MD  Date:    05/07/2018  Time:    12:58             Narrative:    EXAMINATION:  XR CHEST AP PORTABLE    CLINICAL HISTORY:  Stroke;    FINDINGS:  Single view of the chest.    Cardiac silhouette is normal.  The lungs demonstrate no evidence of active disease.  No evidence of pleural effusion or pneumothorax.  Bones appear intact.                                     Assessment:     Haris Hernandez is a 72 y.o. male with:  Patient Active Problem List    Diagnosis Date Noted    Cerebrovascular accident (CVA) 05/07/2018        Plan:     Acute/Subacute Right Caudate/Anterior Internal Capsule Lacunar CVA  -CT head: Low-density zone right caudate nucleus/anterior internal capsule with considerations including acute to subacute lacunar type infarct.  Several chronic lacunar type infarcts.  -NIHSS 2  -[facility] neuro Dr. Lara evaluated the patient. No tpa. No LVO suspected. Recs per note.   -EKG with NSR and HR 80s, LVH, q waves in anterior leads v1-v3  -LP WNL  -Will get a1c   -MRI/MRA brain, MRA neck, TTE pending   -PT, speech, and neurology consult.  -Begin ASA, plavix load, atorvastatin.    HTN  -BP on admission 208/103  -On home nefedipime 60 and HCTZ 25  will hold   -Will monitor and allow 24 hr HTN >185/110    Normocytic Anemia  -H/H 13.3/37.6, MCV 90  -No s/s of bleeding  -Will get iron profile     Protein Gap (4.5)  -Will need workup  -HIV/hep panel/SPEP/UPEP  -UA    Alcohol Abuse  -Daily drinker: 4 beers and 1/2 pint of liquor last drink yesterday 5pm. Denies WD or seizures  -YOSVANY <10  -Will monitor     BPH  -Cont home finasteride and tamsulosin   -Stable  -Follow up PCP      Dispo:  PT/OT/ST eval  Diet: NPO until ST  Code: Full  DVT: lovenox     Code Status:     Full     Sharmila Alvarez  [facility] Internal Medicine HO-1  [facility] Internal Medicine Service    [facility] Medicine Hospitalist Pager numbers:   [facility] Hospitalist Medicine Team A (Sima/Reed): [phone]  [facility] Hospitalist Medicine Team B (Kirt/Malaika):  [phone]

## 2018-05-08 NOTE — PLAN OF CARE
Problem: Physical Therapy Goal  Goal: Physical Therapy Goal  Goals to be met by: 2018     Patient will increase functional independence with mobility by performin) Sup<>sit mod I with HOB flat and no use of bed rails  2)Sit <>stand Mod I with RW  3) Ambulate at least 150 feet with Mod I with RW demonstrating good gait stability   4) Pt to turn during ambulation safely with RW Mod I and no gait instability.   5) Bed <>chair mod I with RW.    Outcome: Ongoing (interventions implemented as appropriate)  PT orders received, initial evaluation complete PT to follow.

## 2018-05-08 NOTE — PT/OT/SLP EVAL
Speech Language Pathology Evaluation  Cognitive/Bedside Swallow    Patient Name:  Haris Hernandez   MRN:  [id number]  Admitting Diagnosis: Cerebrovascular accident (CVA)    Recommendations:                  General Recommendations:  Speech/language therapy and Cognitive-linguistic therapy  Diet recommendations:  Regular, Thin   Aspiration Precautions: Standard aspiration precautions   General Precautions: Standard, fall  Communication strategies:  provide increased time to answer    History:     Past Medical History:   Diagnosis Date    Coronary artery disease     Enlarged prostate without lower urinary tract symptoms (luts)     BPH    Enlarged prostate without lower urinary tract symptoms (luts)     BPH    Hypertension        Past Surgical History:   Procedure Laterality Date    APPENDECTOMY      EYE SURGERY       History of Present Illness:  Haris Hernandez is a 72 y.o.  male with a PMHx of BPH, HTN and Alcohol abuse who was in his USOH (independent ADLs, able to walk without difficulty) until yesterday at around 7pm his daughter noticed he was leaning towards his left side when walking and slurring his speech. She states that he is a daily drinker and thought this was attributed to his drinking although this morning when he woke up, he continued to have the same symptoms that had no improved so brought him to the ED. Per patient, his slurred speech and gait disturbance have improved. He endorses left hand numbness but denies weakness, sensation changes, headache, vision changes, CP, SOB, dizziness, weakness, fevers, sick contacts, ingestion of foreign substance, shaking body, urinary incontinence, head trauma, LOC, chills, weight loss, dysuria, abdominal pain. Denies previous episode.      ROS: 2 weeks yellow productive cough     Social History: Patient lives with pt's son and daughter and his grand-children at his house.    Prior Intubation HX:  N/A    Modified Barium Swallow: None on file    Chest X-Rays: No  "acute process seen.    Prior diet: Per pt, regular/thin liquids po diet.    Subjective     SLP consulted for clinical swallow eval and informal speech-language eval. SLP confirmed with RN prior to entry. Pt awake, alert, and oriented x4 with pt's daughter at bedside. Pt agreed to participate in evals with SLP.     Patient goals: "I'm hungry, please get me some food" per pt     Pain/Comfort:  Pain Rating 1: 0/10    Objective:   Pt tolerated thin liquids, puree, and hard solid textures with no overt s/s of aspiration, at bedside. However, pt demonstrated mild cognitive-linguistic impairment c/b word finding issues (paraphasias noted), difficulty with short term memory, and mild-moderate dysarthria c/b slurred, quick rate of speech, judged to be ~75% intelligible, which is not pt's baseline.     Cognitive Status:    Arousal/Alertness: Appropriate response to stimuli  Attention: No obvious deficits observed- pt WFL  Orientation: Oriented x4  Memory:   -Immediate Recall - pt able to immediately recall 3-7 # digit spans, ind'ly  -Delayed Memory Recall SLP provided pt with 3 unrelated words for pt to recall during time delay tasks. During 1 min delay task, pt was able to independently recall 2/3 given items. Pt required min cuing (catergory) from SLP to recall final item. During 3 min delay task, pt was able to independently recall 2/3 given items. Pt observed with paraphasia when attempting to recall final item. Pt obsevred to self-correct and stated "wait, no". Pt able to recall final item with mod cuing (categroy and binary choice) from SLP .  -Long term recall --pt able to recall extensive autobiographical info ind'ly  Problem Solving:  -Sequencing --good, pt provided correct sequencing during meal prep task and during sequencing task for putting currency in order from least to greatest.   -Solutions-- good, pt provided appropriate solutions during simple problem solving tasks       Receptive Language:   Comprehension: "      WFL  Questions Simple yes/no -100% acc  Complex yes/no - 90% acc  Commands  One step -100% acc  two step basic commands -100% acc  multistep basic commands -100% acc  Object identifications 10 in Fo 10  Conversation - L    Pragmatics:    inconsistent eye contact -, turn taking -WFL and topic maintenance -WFL    Expressive Language:  Verbal:    Automatic Speech  Counting -good, pt stated #s1-20 timely and appropriately and Days of the week -good, pt stated DASHA timely and appropriately  Initiation -- fair, pt observed with difficulty, intermittently, when initiating responses 2/2 word finding issues  Repetition Words -pt able to repeat simple words without difficulty and Phrases -pt able to repeat simple phrases without difficulty  Naming Confrontation -- pt able to name items within room and body parts with 100% acc. However, during 1 min time constraint when told to name as many animals as possible, pt only named 10. Norm during givem task is 15-20 items given 1 min time constraint.   and Single word responsive naming -good, pt with 100% acc during limited task  Sentence formulation -pt observed with word finding difficulty, intermittently. Pt also observed with paraphasias throughout session (ex: parrot for piano, slu for shoe)        Motor Speech:  Dysarthria : mild-moderate c/b slurred, quick rate of speech  Intelligibility : judged to be ~75% intelligible    Voice:   Intensity -low volume    Visual-Spatial:  DNT    Reading:   DNT     Written Expression:   DNT    Oral Musculature Evaluation  Oral Musculature: WFL  Dentition: present and adequate  Mucosal Quality: good  Mandibular Strength and Mobility: WFL  Oral Labial Strength and Mobility: WFL  Lingual Strength and Mobility: WFL  Buccal Strength and Mobility: WFL  Volitional Swallow:  (timely upon palpation)  Voice Prior to PO Intake:  (slurred, quick rate of speech observed)    Bedside Swallow Eval:   Consistencies Assessed:  · Thin liquids -via cup x4,  straw x3  · Puree -(apple sauce) x1  · Solids -(debbie cracker) x2     Oral Phase:   · WFL    Pharyngeal Phase:   · no overt clinical signs/symptoms of aspiration  · no overt clinical signs/symptoms of pharyngeal dysphagia    Compensatory Strategies  · None    Treatment: SLP educated pt and pt's daughter on role of SLP, BSS, diet recs/swallow precautions, speech-language-cognition (SLC) eval/results and pt's POC. Pt's daughter inquired about pt's needs regarding ST services, due to SLC eval results and if pt would receive ST services at next level of care. SLP answered all questions/concerns presented by pt's daughter. Pt and pt's daughter acknowledged and confirmed understanding.     Assessment:     Haris Hernandez is a 72 y.o. male with an SLP diagnosis of Dysarthria and Cognitive-Linguistic Impairment.  He presents with mild cognitive-linguistic impairment c/b word finding issues (paraphasias noted), difficulty with short term memory, and mild-moderate dysarthria c/b slurred, quick rate of speech, judged to be ~75% intelligible, which is not pt's baseline.   SLP recs: Pt is safe for regula/thin liquids with universal swallow precautions. Pt will participate in cognitive-linguistic tx to address cognitive-linguistic deficits. SLP will continue to follow. Pt would benefit from ST services at next level of care.      Goals:    SLP Goals        Problem: SLP Goal    Goal Priority Disciplines Outcome   SLP Goal     SLP Ongoing (interventions implemented as appropriate)   Description:  Short Term Goals:  1. Pt will participate in BSS to determine least restrictive diet.- MET 5/8  2. Pt will participate in informal speech-lang eval to r/o cognitive-linguistic deficits.- MET 5/8  3. Pt will complete mental manipulation tasks (i.e word finding, categorical, etc.) with 80% acc given min assist for 2 consecutive sessions.  4. Pt will recall 3/3 given items with 3-5 min delay with min assist for 2 consecutive sessions.  5. Pt  will answer questions related to given short story with 80% acc, ind'ly for 2 consecutive session.  *Further goals pending pt's progress*                    Plan:     · Patient to be seen:  3 x/week   · Plan of Care expires:  06/07/18  · Plan of Care reviewed with:  patient, daughter (MD team and RN Safia)   · SLP Follow-Up:  Yes       Discharge recommendations:  Discharge Facility/Level Of Care Needs: home health speech therapy     Time Tracking:     SLP Treatment Date:   05/08/18  Speech Start Time:  1212  Speech Stop Time:  1246     Speech Total Time (min):  34 min    Billable Minutes: Eval 15 , Eval Swallow and Oral Function 8 and Seld Care/Home Management Training 11    TORIBIO Ndiaye., CF-SLP  Speech-Language Pathologist   5/8/2018

## 2018-05-08 NOTE — PT/OT/SLP EVAL
Physical Therapy Evaluation    Patient Name:  Haris Hernandez   MRN:  [id number]    Recommendations:     Discharge Recommendations:  nursing facility, skilled   Discharge Equipment Recommendations: Pt would benefit from SNF placement. If pt/family refuses SNF pt would require 24/7 supervision and the following DME: bedside commode, bath bench, walker, rolling, other  Barriers to discharge: Pt requires 24/7 supervision     Assessment:     Haris Hernandez is a 72 y.o. male admitted with a medical diagnosis of Cerebrovascular accident (CVA).  He presents with the following impairments/functional limitations:  weakness, impaired endurance, impaired self care skills, impaired functional mobilty, decreased coordination, impaired balance, gait instability, decreased safety awareness, impaired coordination. Pt requires increased assistance compared to baseline, pt with impaired balance requiring assistance to prevent fall. Pt would benefit from continued skilled acute care PT services as well as SNF placement upon hospital discharge to improve current impairments and return safely to a more independent functional mobility level and decrease caregiver burden of care.      Rehab Prognosis:  good; patient would benefit from acute skilled PT services to address these deficits and reach maximum level of function.      Recent Surgery: * No surgery found *      Plan:     During this hospitalization, patient to be seen 6 x/week to address the above listed problems via gait training, therapeutic activities, therapeutic exercises  · Plan of Care Expires:  06/08/18   Plan of Care Reviewed with: patient, daughter    Subjective     Communicated with IGNACIA Baig prior to session.  Patient found supine with HOB elevated and daughter in the room upon PT entry to room, agreeable to evaluation.      Chief Complaint: get up  Patient comments/goals: return to PLOF  Pain/Comfort:  · Pain Rating 1: 0/10  · Pain Rating Post-Intervention 1:  0/10    Patients cultural, spiritual, Latter-day conflicts given the current situation: None Verbalized to PT    Living Environment:  Pt's daughter lives with him in a H with NSTE, tub/shower and elevated toilet seat. Pt denies using/owning DME. Pt denies any recent falls; however, pt's daughter endorses 2 near falls over the last 4 months.  Prior to admission, patients level of function was independent, including driving.  Patient has the following equipment: raised toilet.  DME owned (not currently used): none.  Upon discharge, patient will have assistance from daughter.    Objective:     Patient found with: bed alarm, peripheral IV     General Precautions: Standard, fall   Orthopedic Precautions:N/A   Braces: N/A     Exams:  · Cognitive Exam:  Patient is oriented to Person, Place, Time and Situation and follows 100% of all  Commands. Delayed response to answer questions as well as slurred speech.  · Gross Motor Coordination:  Impaired   · Postural Exam:  Patient presented with the following abnormalities:    · -       Rounded shoulders  · -       Forward head  · Sensation:    · -       Intact  · RLE ROM: WFL except lacks 10* knee extension   · RLE Strength: Grossly 4/5  · LLE ROM: WFL except lacks 10*  · LLE Strength: 4/5, hip flexion 3+/5 knee flexion     Functional Mobility:  · Bed Mobility:     · Scooting: contact guard assistance  · Supine to Sit: contact guard assistance  · Sit to Supine: contact guard assistance  · Transfers:     · Sit to Stand:  contact guard assistance and Min A to prevent LOB once in standing  with no AD  · Gait: ~25 with RW and Min A and Max Verbal cueing   · Balance: Fair -    AM-PAC 6 CLICK MOBILITY  Total Score:17       Therapeutic Activities and Exercises:   -Sit <>stand X2 trials, pt does not require any assistance for lifting; however, when pt comes to stand pt is unsteady and requires increased assistance to prevent fall.   -PT educated pt and gave verbal cueing to decrease  gait speed while turning to prevent fall and improve gait stability.     Patient left HOB elevated with all lines intact, call button in reach, bed alarm on, IGNACIA Baig notified and daughter  present.    GOALS:    Physical Therapy Goals        Problem: Physical Therapy Goal    Goal Priority Disciplines Outcome Goal Variances Interventions   Physical Therapy Goal     PT/OT, PT Ongoing (interventions implemented as appropriate)     Description:  Goals to be met by: 2018     Patient will increase functional independence with mobility by performin) Sup<>sit mod I with HOB flat and no use of bed rails  2)Sit <>stand Mod I with RW  3) Ambulate at least 150 feet with Mod I with RW demonstrating good gait stability   4) Pt to turn during ambulation safely with RW Mod I and no gait instability.   5) Bed <>chair mod I with RW.                      History:     Past Medical History:   Diagnosis Date    Coronary artery disease     Enlarged prostate without lower urinary tract symptoms (luts)     BPH    Enlarged prostate without lower urinary tract symptoms (luts)     BPH    Hypertension        Past Surgical History:   Procedure Laterality Date    APPENDECTOMY      EYE SURGERY         Clinical Decision Making:     History  Co-morbidities and personal factors that may impact the plan of care Examination  Body Structures and Functions, activity limitations and participation restrictions that may impact the plan of care Clinical Presentation   Decision Making/ Complexity Score   Co-morbidities:   [] Time since onset of injury / illness / exacerbation  [] Status of current condition  [x]Patient's cognitive status and safety concerns    [] Multiple Medical Problems (see med hx)  Personal Factors:   [] Patient's age  [] Prior Level of function   [] Patient's home situation (environment and family support)  [] Patient's level of motivation  [] Expected progression of patient      HISTORY:(criteria)    [] 19277 - no  personal factors/history    [x] 33494 - has 1-2 personal factor/comorbidity     [] 80372 - has >3 personal factor/comorbidity     Body Regions:  [] Objective examination findings  [] Head     []  Neck  [] Trunk   [] Upper Extremity  [x] Lower Extremity    Body Systems:  [] For communication ability, affect, cognition, language, and learning style: the assessment of the ability to make needs known, consciousness, orientation (person, place, and time), expected emotional /behavioral responses, and learning preferences (eg, learning barriers, education  needs)  [] For the neuromuscular system: a general assessment of gross coordinated movement (eg, balance, gait, locomotion, transfers, and transitions) and motor function  (motor control and motor learning)  [x] For the musculoskeletal system: the assessment of gross symmetry, gross range of motion, gross strength, height, and weight  [] For the integumentary system: the assessment of pliability(texture), presence of scar formation, skin color, and skin integrity  [] For cardiovascular/pulmonary system: the assessment of heart rate, respiratory rate, blood pressure, and edema     Activity limitations:    [x] Patient's cognitive status and saf ety concerns          [] Status of current condition      [] Weight bearing restriction  [] Cardiopulmunary Restriction    Participation Restrictions:   [] Goals and goal agreement with the patient     [] Rehab potential (prognosis) and probable outcome      Examination of Body System: (criteria)    [] 69489 - addressing 1-2 elements    [x] 61285 - addressing a total of 3 or more elements     [] 48735 -  Addressing a total of 4 or more elements         Clinical Presentation: (criteria)  Stable - 73286     On examination of body system using standardized tests and measures patient presents with 1-2 elements from any of the following: body structures and functions, activity limitations, and/or participation restrictions.  Leading to  a clinical presentation that is considered stable and/or uncomplicated                              Clinical Decision Making  (Eval Complexity):  Low- 30171     Time Tracking:     PT Received On: 05/08/18  PT Start Time: 1444     PT Stop Time: 1504  PT Total Time (min): 20 min     Billable Minutes: Evaluation 20      Abelino Luevano, PT, DPT  05/08/2018

## 2018-05-08 NOTE — PLAN OF CARE
Patient was getting up from the toilet and slip and fell to the floor. His head was not hit, I was standing at an arms length and his daughter was also present. Doctors and charge nurse notified.

## 2018-05-08 NOTE — PLAN OF CARE
Problem: Occupational Therapy Goal  Goal: Occupational Therapy Goal  Goals to be met by: 06/08/18     Patient will increase functional independence with ADLs by performing:    LE Dressing with Supervision.  Grooming while standing at sink with Supervision.  Toileting from toilet with Supervision for hygiene and clothing management.   Toilet transfer to toilet with Supervision.  Increased functional strength to WFL for ADLs.    Outcome: Ongoing (interventions implemented as appropriate)  **Pt w/ reported fall from standing at toilet while turning to R w/in 30 min prior to OT eval/tx    OT eval & tx completed this date. Pt lives w/ dgtr in [place] w/ 0STE & T/S combo. PLOF: (I) w/o DME for all fxnl tasks incl standing tub showers & sitting baths. Currently, pt demo's delayed processing & decreased L U>LE proprioception, coordination, strentgth & fxnl use. Pt perf sup-->EOB w/ SBA; standing w/ RW w/ SBA; sidestepping L & t/f-->WC w/ Min A for RW manip. Edu/tx re: general safety & visual compensation techs & HEP. Pt/dgtr verbalized understanding.    Pt presents w/ decreased overall endurance/conditioning, balance/mobility & coordination w/ subsequent decline in (I)/safety w/ BADLs, fxnl mobility & fxnl t/f's. OT 5x/wk to increase phys/fxnl status & maximize potential to achieve established goals for d/c-->SNF & TTB.

## 2018-05-08 NOTE — PLAN OF CARE
Problem: Patient Care Overview  Goal: Plan of Care Review  Outcome: Ongoing (interventions implemented as appropriate)   05/08/18 1520   Coping/Psychosocial   Plan Of Care Reviewed With patient;daughter   Patient awake, alert and oriented. Daughter at the bedside and attentive to the patient. Patient has delayed responses but answers appropriately. Patient is a fall risk, bed alarm on, call light within reach and daughter at the bedside.

## 2018-05-08 NOTE — PLAN OF CARE
rounded on patient. Daughter Bre at bedside [phone]. Daughter and her 3 kids, brother, 3 dogs all live with her father.    Daughter states she does not work and will help her father at home. She will provide transportation home at discharge.     left contact info on white board and dtr given discharge brochure.

## 2018-05-08 NOTE — PLAN OF CARE
Pt AAOX3  Daughter at bedside.  Prior to admission patient Indepndent with ADL's. Lives at home with daughter  No dme or home health.    PCP is Dr. Chandler Call [phone]    PT/OT/ST pending recommendations       05/08/18 1230   Discharge Assessment   Assessment Type Discharge Planning Assessment   Confirmed/corrected address and phone number on facesheet? Yes   Assessment information obtained from? Patient;Caregiver   Prior to hospitilization cognitive status: Alert/Oriented   Prior to hospitalization functional status: Independent   Current cognitive status: Alert/Oriented   Current Functional Status: Independent;Assistive Equipment   Lives With child(fan), adult   Able to Return to Prior Arrangements unable to determine at this time (comments)   Is patient able to care for self after discharge? Unable to determine at this time (comments)   Patient's perception of discharge disposition home or selfcare   Readmission Within The Last 30 Days no previous admission in last 30 days   Patient currently being followed by outpatient case management? No   Equipment Currently Used at Home none   Do you have any problems affording any of your prescribed medications? No   Is the patient taking medications as prescribed? yes   Does the patient have transportation home? Yes   Transportation Available family or friend will provide   Discharge Plan A Home;Home with family;Home Health   Discharge Plan B Rehab   Patient/Family In Agreement With Plan yes     Suzanne Dodd, RN, CCM, CMSRN  RN Transition Navigator  [phone]

## 2018-05-08 NOTE — PT/OT/SLP PROGRESS
Physical Therapy  Eval Attempt    Patient Name:  Haris Hernandez   MRN:  [id number]    Patient not seen today secondary to pt KRISTI for testing. Will follow-up as available.    Katharina Delaney, PT   5/8/2018

## 2018-05-08 NOTE — CONSULTS
"  [facility]  Adult Nutrition  Consult Note    SUMMARY     Recommendations    Recommendation/Intervention:   1. Change diet to Cardiac low sodium/cholesterol   -If PO intake <50%, Boost BID  2. RD to monitor    Goals:  Meet 85% EEN  Nutrition Goal Status: new    Reason for Assessment  Reason for Assessment: consult  Diagnosis:  (CVA)  Relevant Medical History: HTN, CAD  Interdisciplinary Rounds: did not attend  General Information Comments: Pt caregiver reports that the pt consumed 100% of his meals and denies N/V/C/D. Pt doesn't follow any particular diet, but he takes Saw Palmetto for HTN.   Nutrition Discharge Planning: Cardiac Diet    Nutrition Risk Screen  Nutrition Risk Screen: no indicators present    Nutrition/Diet History  Do you have any cultural, spiritual, Anabaptism conflicts, given your current situation?:  (none)    Anthropometrics  Temp: 98.5 °F (36.9 °C)  Height Method: Stated  Height: 5' 11.5" (181.6 cm)  Height (inches): 71.5 in  Weight Method: Bed Scale  Weight: 69.9 kg (154 lb 1.6 oz)  Weight (lb): 154.1 lb  Ideal Body Weight (IBW), Male: 175 lb  % Ideal Body Weight, Male (lb): 88.06 lb  BMI (Calculated): 21.2  BMI Grade: 18.5-24.9 - normal    Lab/Procedures/Meds  Pertinent Labs Reviewed: reviewed  Pertinent Medications Reviewed: reviewed  Pertinent Medications Comments: Aspirin, Atorvastain    Physical Findings/Assessment  Overall Physical Appearance: nourished  Skin: intact (Scooby- 20)    Estimated/Assessed Needs  Weight Used For Calorie Calculations: 69.9 kg (154 lb 1.6 oz)  Energy Calorie Requirements (kcal): 6986-0393 kcal (21-25 kcal)  Protein Requirements: 70-84g (1-1.2g/kg)  Weight Used For Protein Calculations: 69.9 kg (154 lb 1.6 oz)  RDA Method (mL): 1500    Nutrition Prescription Ordered  Current Diet Order: Regular    Evaluation of Received Nutrient/Fluid Intake  I/O: 0/1  Energy Calories Required: meeting needs  Protein Required: meeting needs  Fluid Required: " meeting needs  Tolerance: tolerating  % Intake of Estimated Energy Needs:100  % Meal Intake:100    Nutrition Risk  1x/wk    Assessment and Plan  No nutrition dx at this time    Monitor and Evaluation  Food and Nutrient Intake: energy intake, food and beverage intake  Food and Nutrient Adminstration: diet order  Knowledge/Beliefs/Attitudes: food and nutrition knowledge/skill, beliefs and attitudes  Physical Activity and Function: nutrition-related ADLs and IADLs  Anthropometric Measurements: weight  Biochemical Data, Medical Tests and Procedures: electrolyte and renal panel  Nutrition-Focused Physical Findings: overall appearance     Nutrition Follow-Up  RD Follow-up?: Yes    I certify that I, Ramonita Tee RD, directed the dietetic intern in service delivery and guided them using my skilled judgment. As the cosigning dietitian, I have reviewed the dietetic interns documentation and am responsible for the treatment, assessment, and plan.    Naomi Lopez, Dietetic Intern

## 2018-05-08 NOTE — PROGRESS NOTES
"[facility] Internal Medicine Resident HO-1 Progress Note    Subjective:      Haris Hernandez is a 72 y.o.  male who is being followed by the [facility] IM service at [facility] for acute CVA.    Overnight, denies vision changes, CP, SOB, dizziness, weakness. HTN this am.      Objective:   Last 24 Hour Vital Signs:  BP  Min: 175/91  Max: 235/102  Temp  Av.3 °F (36.8 °C)  Min: 97.3 °F (36.3 °C)  Max: 99.1 °F (37.3 °C)  Pulse  Av.5  Min: 69  Max: 90  Resp  Av.2  Min: 12  Max: 20  SpO2  Av.1 %  Min: 97 %  Max: 100 %  Height  Av' 11.5" (181.6 cm)  Min: 5' 11.5" (181.6 cm)  Max: 5' 11.5" (181.6 cm)  Weight  Av.5 kg (162 lb 0.8 oz)  Min: 69.9 kg (154 lb 1.6 oz)  Max: 77.1 kg (170 lb)  No intake/output data recorded.    Physical Examination:  Constitutional: He is oriented to person, place, and time. He appears well-developed.   Head: Normocephalic and atraumatic.   Eyes: Pupils are equal, round, and reactive to light.   Neck: Normal range of motion. Neck supple.   Cardiovascular: Normal rate and regular rhythm.  No murmurs, rubs or gallops   Pulmonary/Chest: Effort normal. No respiratory distress. CTA b/l. No wheezes, rhales, rhonci   Abdominal: Soft. Non-tender. BS +. He exhibits no mass.   Musculoskeletal: Normal range of motion. He exhibits no deformity.   Neurological: He is alert and oriented to person, place, and time.   Reflexes: 2+ patellar on R and 3+ L patellar.   Hypoglossal nn. Deficit with tongue deviated to right side.   No nystagmus. Able to speak, swallow, hear equally. Able to close eyes tightly. Speech mildly slurred.  Sensory deficit with decreased sensation to light touch on Left Lower face. Smile asymmetric with left lower facial weakness present.   LE 5/5 muscle strength. UE 5/5 muscle strength although R>L bilaterally.   Gait not assessed. Pronator drift on left UE.   Skin: No rash noted. He is not diaphoretic. No erythema.   Psychiatric: He has a normal mood and " affect. His behavior is normal.     Laboratory:  Laboratory Data Reviewed: yes  Pertinent Findings:  CMP and CBC pending    Microbiology Data Reviewed: yes  Pertinent Findings:  n/a    Other Results:  EKG (my interpretation): no new     Radiology Data Reviewed: yes  Pertinent Findings:  Pending, no new     Current Medications:     Infusions:   sodium chloride 0.9% 50 mL/hr at 05/07/18 2138        Scheduled:   aspirin  81 mg Oral Daily    atorvastatin  40 mg Oral Daily    clopidogrel  75 mg Oral Daily    enoxaparin  40 mg Subcutaneous Daily    sodium chloride 0.9%  3 mL Intravenous Q8H        PRN:  labetalol, pneumoc 13-lorena conj-dip cr(PF)    Antibiotics and Day Number of Therapy:  None    Lines and Day Number of Therapy:  L PIV    Assessment:     Haris Hernandez is a 72 y.o.male with  Patient Active Problem List    Diagnosis Date Noted    Cerebrovascular accident (CVA) 05/07/2018        Plan:     Acute/Subacute Right Caudate/Anterior Internal Capsule Lacunar CVA  -CT head: Low-density zone right caudate nucleus/anterior internal capsule with considerations including acute to subacute lacunar type infarct.  Several chronic lacunar type infarcts.  -NIHSS 2  -[facility] neuro Dr. Lara evaluated the patient. No tpa. No LVO suspected. Recs per note.   -EKG with NSR and HR 80s, LVH, q waves in anterior leads v1-v3  -LP WNL  -a1c 5.2  -MRI/MRA brain, CTA head/ neck, TTE pending   -PT, speech, and neurology consult.  -Begin ASA, plavix load, atorvastatin.    CKD II  -gfr 60, cr 1.22     HTN  -BP on admission 208/103  -On home nefedipime 60 and HCTZ 25  will hold   -given 10mg IV labetolol   -Will monitor and allow 24 hr HTN >185/110     Normocytic Anemia  -H/H 13.3/37.6, MCV 90  -No s/s of bleeding  -Will get iron profile      Protein Gap (4.5)  -Will need workup  -HIV/hep panel/SPEP/UPEP  -UA     Alcohol Abuse  -Daily drinker: 4 beers and 1/2 pint of liquor last drink yesterday 5pm. Denies WD or seizures  -YOSVANY  <10  -Will monitor      BPH  -Cont home finasteride and tamsulosin   -Stable  -Follow up PCP        Dispo: PT/OT/ST eval  Diet: NPO until ST  Code: Full  DVT: akshat Alvarez  [facility] Internal Medicine HO-1  U IM Service Team 2    [facility] Medicine Hospitalist Pager numbers:   [facility] Hospitalist Medicine Team A (Sima/Reed): [phone]  [facility] Hospitalist Medicine Team B (Kirt/Malaika):  [phone]

## 2018-05-08 NOTE — PROGRESS NOTES
Informed by MD that patient need SNF placement and patient and daughter agrees. PASRR signed. Gave patient and daughter SNF preference list.     They chose [facility] and [facility] in [facility]. Informed SW.    Suzanne Dodd, RN, CCM, CMSRN  RN Transition Navigator  [phone]

## 2018-05-09 LAB
ALBUMIN SERPL BCP-MCNC: 3.2 G/DL
ALBUMIN SERPL BCP-MCNC: 3.2 G/DL
ALBUMIN SERPL ELPH-MCNC: 3.72 G/DL
ALP SERPL-CCNC: 56 U/L
ALP SERPL-CCNC: 56 U/L
ALPHA1 GLOB SERPL ELPH-MCNC: 0.31 G/DL
ALPHA2 GLOB SERPL ELPH-MCNC: 0.76 G/DL
ALT SERPL W/O P-5'-P-CCNC: 13 U/L
ALT SERPL W/O P-5'-P-CCNC: 13 U/L
ANION GAP SERPL CALC-SCNC: 8 MMOL/L
ANION GAP SERPL CALC-SCNC: 8 MMOL/L
APTT BLDCRRT: 32.3 SEC
AST SERPL-CCNC: 17 U/L
AST SERPL-CCNC: 17 U/L
B-GLOBULIN SERPL ELPH-MCNC: 0.77 G/DL
BASOPHILS # BLD AUTO: 0.01 K/UL
BASOPHILS # BLD AUTO: 0.01 K/UL
BASOPHILS NFR BLD: 0.1 %
BASOPHILS NFR BLD: 0.1 %
BILIRUB SERPL-MCNC: 0.6 MG/DL
BILIRUB SERPL-MCNC: 0.6 MG/DL
BUN SERPL-MCNC: 14 MG/DL
BUN SERPL-MCNC: 14 MG/DL
CALCIUM SERPL-MCNC: 9.1 MG/DL
CALCIUM SERPL-MCNC: 9.1 MG/DL
CHLORIDE SERPL-SCNC: 103 MMOL/L
CHLORIDE SERPL-SCNC: 103 MMOL/L
CK MB SERPL-MCNC: 1.6 NG/ML
CK MB SERPL-RTO: 0.9 %
CK SERPL-CCNC: 173 U/L
CO2 SERPL-SCNC: 25 MMOL/L
CO2 SERPL-SCNC: 25 MMOL/L
CREAT SERPL-MCNC: 1.2 MG/DL
CREAT SERPL-MCNC: 1.2 MG/DL
DIFFERENTIAL METHOD: ABNORMAL
DIFFERENTIAL METHOD: ABNORMAL
EOSINOPHIL # BLD AUTO: 0 K/UL
EOSINOPHIL # BLD AUTO: 0 K/UL
EOSINOPHIL NFR BLD: 0.4 %
EOSINOPHIL NFR BLD: 0.4 %
ERYTHROCYTE [DISTWIDTH] IN BLOOD BY AUTOMATED COUNT: 13 %
ERYTHROCYTE [DISTWIDTH] IN BLOOD BY AUTOMATED COUNT: 13 %
EST. GFR  (AFRICAN AMERICAN): >60 ML/MIN/1.73 M^2
EST. GFR  (AFRICAN AMERICAN): >60 ML/MIN/1.73 M^2
EST. GFR  (NON AFRICAN AMERICAN): >60 ML/MIN/1.73 M^2
EST. GFR  (NON AFRICAN AMERICAN): >60 ML/MIN/1.73 M^2
GAMMA GLOB SERPL ELPH-MCNC: 2.44 G/DL
GLUCOSE SERPL-MCNC: 104 MG/DL
GLUCOSE SERPL-MCNC: 104 MG/DL
HAV IGM SERPL QL IA: NEGATIVE
HBV CORE IGM SERPL QL IA: NEGATIVE
HBV SURFACE AG SERPL QL IA: NEGATIVE
HCT VFR BLD AUTO: 34.6 %
HCT VFR BLD AUTO: 34.6 %
HCV AB SERPL QL IA: NEGATIVE
HGB BLD-MCNC: 12.1 G/DL
HGB BLD-MCNC: 12.1 G/DL
HIV 1+2 AB+HIV1 P24 AG SERPL QL IA: NEGATIVE
INR PPP: 1
LYMPHOCYTES # BLD AUTO: 1.5 K/UL
LYMPHOCYTES # BLD AUTO: 1.5 K/UL
LYMPHOCYTES NFR BLD: 17.9 %
LYMPHOCYTES NFR BLD: 17.9 %
MAGNESIUM SERPL-MCNC: 1.7 MG/DL
MAGNESIUM SERPL-MCNC: 1.7 MG/DL
MCH RBC QN AUTO: 31.6 PG
MCH RBC QN AUTO: 31.6 PG
MCHC RBC AUTO-ENTMCNC: 35 G/DL
MCHC RBC AUTO-ENTMCNC: 35 G/DL
MCV RBC AUTO: 90 FL
MCV RBC AUTO: 90 FL
MONOCYTES # BLD AUTO: 0.8 K/UL
MONOCYTES # BLD AUTO: 0.8 K/UL
MONOCYTES NFR BLD: 9.5 %
MONOCYTES NFR BLD: 9.5 %
NEUTROPHILS # BLD AUTO: 5.9 K/UL
NEUTROPHILS # BLD AUTO: 5.9 K/UL
NEUTROPHILS NFR BLD: 71.9 %
NEUTROPHILS NFR BLD: 71.9 %
PATHOLOGIST INTERPRETATION SPE: NORMAL
PHOSPHATE SERPL-MCNC: 3 MG/DL
PHOSPHATE SERPL-MCNC: 3 MG/DL
PLATELET # BLD AUTO: 187 K/UL
PLATELET # BLD AUTO: 187 K/UL
PMV BLD AUTO: 9.8 FL
PMV BLD AUTO: 9.8 FL
POTASSIUM SERPL-SCNC: 3.6 MMOL/L
POTASSIUM SERPL-SCNC: 3.6 MMOL/L
PROT SERPL-MCNC: 7.9 G/DL
PROT SERPL-MCNC: 7.9 G/DL
PROT SERPL-MCNC: 8 G/DL
PROT UR-MCNC: 22 MG/DL
PROTHROMBIN TIME: 10.3 SEC
RBC # BLD AUTO: 3.83 M/UL
RBC # BLD AUTO: 3.83 M/UL
SODIUM SERPL-SCNC: 136 MMOL/L
SODIUM SERPL-SCNC: 136 MMOL/L
TROPONIN I SERPL DL<=0.01 NG/ML-MCNC: 0.03 NG/ML
WBC # BLD AUTO: 8.22 K/UL
WBC # BLD AUTO: 8.22 K/UL

## 2018-05-09 PROCEDURE — 97116 GAIT TRAINING THERAPY: CPT

## 2018-05-09 PROCEDURE — A4216 STERILE WATER/SALINE, 10 ML: HCPCS | Performed by: INTERNAL MEDICINE

## 2018-05-09 PROCEDURE — 84100 ASSAY OF PHOSPHORUS: CPT

## 2018-05-09 PROCEDURE — 85730 THROMBOPLASTIN TIME PARTIAL: CPT

## 2018-05-09 PROCEDURE — 84484 ASSAY OF TROPONIN QUANT: CPT

## 2018-05-09 PROCEDURE — 85025 COMPLETE CBC W/AUTO DIFF WBC: CPT

## 2018-05-09 PROCEDURE — 36415 COLL VENOUS BLD VENIPUNCTURE: CPT

## 2018-05-09 PROCEDURE — 83735 ASSAY OF MAGNESIUM: CPT

## 2018-05-09 PROCEDURE — 63600175 PHARM REV CODE 636 W HCPCS: Performed by: INTERNAL MEDICINE

## 2018-05-09 PROCEDURE — 25000003 PHARM REV CODE 250: Performed by: STUDENT IN AN ORGANIZED HEALTH CARE EDUCATION/TRAINING PROGRAM

## 2018-05-09 PROCEDURE — 86335 IMMUNFIX E-PHORSIS/URINE/CSF: CPT | Mod: 26,,, | Performed by: PATHOLOGY

## 2018-05-09 PROCEDURE — 11000001 HC ACUTE MED/SURG PRIVATE ROOM

## 2018-05-09 PROCEDURE — 97530 THERAPEUTIC ACTIVITIES: CPT

## 2018-05-09 PROCEDURE — 97110 THERAPEUTIC EXERCISES: CPT

## 2018-05-09 PROCEDURE — 82553 CREATINE MB FRACTION: CPT

## 2018-05-09 PROCEDURE — G0427 INPT/ED TELECONSULT70: HCPCS | Mod: GT,,, | Performed by: PSYCHIATRY & NEUROLOGY

## 2018-05-09 PROCEDURE — 80053 COMPREHEN METABOLIC PANEL: CPT

## 2018-05-09 PROCEDURE — 84166 PROTEIN E-PHORESIS/URINE/CSF: CPT

## 2018-05-09 PROCEDURE — 97535 SELF CARE MNGMENT TRAINING: CPT

## 2018-05-09 PROCEDURE — 84156 ASSAY OF PROTEIN URINE: CPT

## 2018-05-09 PROCEDURE — A4216 STERILE WATER/SALINE, 10 ML: HCPCS | Performed by: STUDENT IN AN ORGANIZED HEALTH CARE EDUCATION/TRAINING PROGRAM

## 2018-05-09 PROCEDURE — 94761 N-INVAS EAR/PLS OXIMETRY MLT: CPT

## 2018-05-09 PROCEDURE — 84166 PROTEIN E-PHORESIS/URINE/CSF: CPT | Mod: 26,,, | Performed by: PATHOLOGY

## 2018-05-09 PROCEDURE — 86335 IMMUNFIX E-PHORSIS/URINE/CSF: CPT

## 2018-05-09 PROCEDURE — 25000003 PHARM REV CODE 250: Performed by: INTERNAL MEDICINE

## 2018-05-09 PROCEDURE — 85610 PROTHROMBIN TIME: CPT

## 2018-05-09 PROCEDURE — 82550 ASSAY OF CK (CPK): CPT

## 2018-05-09 PROCEDURE — 25000003 PHARM REV CODE 250: Performed by: SURGERY

## 2018-05-09 RX ORDER — MAGNESIUM SULFATE 1 G/100ML
1 INJECTION INTRAVENOUS ONCE
Status: COMPLETED | OUTPATIENT
Start: 2018-05-09 | End: 2018-05-09

## 2018-05-09 RX ORDER — POTASSIUM CHLORIDE 20 MEQ/1
20 TABLET, EXTENDED RELEASE ORAL EVERY 4 HOURS
Status: COMPLETED | OUTPATIENT
Start: 2018-05-09 | End: 2018-05-09

## 2018-05-09 RX ORDER — BENZONATATE 100 MG/1
100 CAPSULE ORAL 3 TIMES DAILY PRN
Status: DISCONTINUED | OUTPATIENT
Start: 2018-05-09 | End: 2018-05-14 | Stop reason: HOSPADM

## 2018-05-09 RX ORDER — ATORVASTATIN CALCIUM 40 MG/1
80 TABLET, FILM COATED ORAL DAILY
Status: DISCONTINUED | OUTPATIENT
Start: 2018-05-10 | End: 2018-05-14 | Stop reason: HOSPADM

## 2018-05-09 RX ADMIN — BENZONATATE 100 MG: 100 CAPSULE, LIQUID FILLED ORAL at 05:05

## 2018-05-09 RX ADMIN — MAGNESIUM SULFATE 1 G: 1 INJECTION INTRAVENOUS at 05:05

## 2018-05-09 RX ADMIN — ASPIRIN 81 MG: 81 TABLET, COATED ORAL at 09:05

## 2018-05-09 RX ADMIN — ATORVASTATIN CALCIUM 40 MG: 40 TABLET, FILM COATED ORAL at 09:05

## 2018-05-09 RX ADMIN — SODIUM CHLORIDE, PRESERVATIVE FREE 3 ML: 5 INJECTION INTRAVENOUS at 09:05

## 2018-05-09 RX ADMIN — SODIUM CHLORIDE, PRESERVATIVE FREE 3 ML: 5 INJECTION INTRAVENOUS at 05:05

## 2018-05-09 RX ADMIN — POTASSIUM CHLORIDE 20 MEQ: 20 TABLET, EXTENDED RELEASE ORAL at 09:05

## 2018-05-09 RX ADMIN — POTASSIUM CHLORIDE 20 MEQ: 20 TABLET, EXTENDED RELEASE ORAL at 05:05

## 2018-05-09 RX ADMIN — SODIUM CHLORIDE: 0.9 INJECTION, SOLUTION INTRAVENOUS at 12:05

## 2018-05-09 RX ADMIN — HYDROCHLOROTHIAZIDE 12.5 MG: 12.5 TABLET ORAL at 09:05

## 2018-05-09 RX ADMIN — CLOPIDOGREL BISULFATE 75 MG: 75 TABLET ORAL at 09:05

## 2018-05-09 RX ADMIN — ENOXAPARIN SODIUM 40 MG: 100 INJECTION SUBCUTANEOUS at 05:05

## 2018-05-09 NOTE — ASSESSMENT & PLAN NOTE
Signs of intracranial atherosclerosis on CTA  Antithrombotics for secondary stroke prevention: Antiplatelets: Aspirin: 81 mg daily  Clopidogrel: 75 mg daily    Statins for secondary stroke prevention and hyperlipidemia, if present:   Statins: Atorvastatin- 80 mg daily    Aggressive risk factor modification: HTN     Rehab efforts: PT/OT/SLP to evaluate and treat    Diagnostics ordered/pending: None     VTE prophylaxis: Heparin 5000 units SQ every 8 hours    BP parameters: Infarct: Stroke is 3 days old, plan to start controlling BP better, will target SBP<200 for 24h, then SBP<180

## 2018-05-09 NOTE — PLAN OF CARE
Therapy is now recommending Inpatient Rehab.     notified patient and daughter of the update and to obtain IP Rehab preferences.  provided [facility] list of [place] radius to daughter.    Patient's daughter would like  to send info to:  1) [facility]  2) [facility]    Daughter prefers [facility] because it is closer to her father's home.

## 2018-05-09 NOTE — PLAN OF CARE
SAUNDRA working on SNF placement       05/09/18 1018   Discharge Reassessment   Assessment Type Discharge Planning Reassessment   Provided patient/caregiver education on the expected discharge date and the discharge plan Yes   Discharge Plan A Skilled Nursing Facility   Discharge Plan B Home;Home with family;Home Health     Suzanne Dodd, RN, CCM, CMSRN  RN Transition Navigator  [phone]

## 2018-05-09 NOTE — PT/OT/SLP PROGRESS
Occupational Therapy   Treatment    Name: Haris Hernandez  MRN: [id number]  Admitting Diagnosis:  Cerebrovascular accident (CVA)       Recommendations:     Discharge Recommendations: rehabilitation facility (D/C recs change discussed with LOTR; Patient better suited and appropriate for IPR)  Discharge Equipment Recommendations:   (Defer to IPR)  Barriers to discharge:  None    Subjective     Communicated with: Safia villagran prior to session.  Pain/Comfort:  · Pain Rating 1: 0/10  · Pain Rating Post-Intervention 1: 0/10    Patients cultural, spiritual, Jewish conflicts given the current situation:  (none reported)    Objective:     Patient found with: bed alarm, peripheral IV, telemetry    General Precautions: Standard, fall   Orthopedic Precautions:N/A   Braces: N/A     Bed Mobility:    · Patient completed Scooting/Bridging with stand by assistance  · Patient completed Supine to Sit with contact guard assistance  · Patient completed Sit to Supine with contact guard assistance     Functional Mobility/Transfers:  · Patient completed Sit <> Stand Transfer with contact guard assistance  with  rolling walker     Activities of Daily Living:  · Grooming: contact guard assistance and minimum assistance      Patient left HOB elevated with all lines intact, call button in reach, bed alarm on, RN notified and daughter present    [facility] 6 Click:  [facility] Total Score: 16    Treatment & Education:  Patient with bed mob as noted above. Used RW to ambulate to sink with CGA and difficulty pushing with L hand (decreased  on L side as well). Patient completed oral care and facial hygiene with CGA/Min (A) 2* increased FMC and dexterity difficulties with items. Patient did attempt to use LUE to bring cup of water to mouth, but with increased difficulty noted. From EOB, patient completed FMC/dexterity activities - some dysmetria and decreased fluidity of motion noted. Patient able to complete reaching activities crossing midline.  Denies diplopia or vision changes. Patient and family educated on post-acute therapy needs.  Education:    Assessment:   Patient presents with decreased safety awareness, minimal insight into deficits, balance deficits, FMC and dexterity deficits.  Patient veering to L side and decreased awareness of L side limbs during ambulation and activities. Despite patient's co-morbidities patient was living in community setting functioning at independent level for ADLs, and mobility prior to this event.  There is an expectation of returning to prior level of function to maintain independence thus avoiding readmission.  Patient's clinical condition meets full Inpatient Rehab (IPR) criteria; including the ability to actively participate in 3 hours of therapy.  A lower level of care(SNF) cannot provide the interdisciplinary treatment approach needed.     Haris Hernandez is a 72 y.o. male with a medical diagnosis of Cerebrovascular accident (CVA). Performance deficits affecting function are weakness, impaired endurance, impaired sensation, impaired self care skills, impaired functional mobilty, impaired balance, impaired cognition, decreased safety awareness, impaired fine motor, impaired coordination, decreased upper extremity function, decreased lower extremity function, gait instability.      Rehab Prognosis:  Excellent; patient would benefit from acute skilled OT services to address these deficits and reach maximum level of function.       Plan:     Patient to be seen 5 x/week to address the above listed problems via self-care/home management, therapeutic activities, therapeutic exercises  · Plan of Care Expires: 06/08/18  · Plan of Care Reviewed with: patient, daughter    This Plan of care has been discussed with the patient who was involved in its development and understands and is in agreement with the identified goals and treatment plan    GOALS:    Occupational Therapy Goals        Problem: Occupational Therapy Goal     Goal Priority Disciplines Outcome Interventions   Occupational Therapy Goal     OT, PT/OT Ongoing (interventions implemented as appropriate)    Description:  Goals to be met by: 06/08/18     Patient will increase functional independence with ADLs by performing:    LE Dressing with Supervision.  Grooming while standing at sink with Supervision.  Toileting from toilet with Supervision for hygiene and clothing management.   Toilet transfer to toilet with Supervision.  Increased functional strength to WFL for ADLs.                      Time Tracking:     OT Date of Treatment: 05/09/18  OT Start Time: 1504  OT Stop Time: 1545  OT Total Time (min): 41 min    Billable Minutes:Self Care/Home Management 15  Therapeutic Activity 26    HARPREET Beard/JACOB  5/9/2018

## 2018-05-09 NOTE — PLAN OF CARE
made phone contact with long term access services and completed a level of care eligibility tool (LOCET) for nursing facility admission approval. Then faxed level one pasrr screen and determination form to the office of aging and adult services for nursing facility admission approval.    Referral for SNF sent to [facility] and [facility] via right care.     left message for admission coordinator at [facility] and then spoke to Mayank admissions coordinator at [facility]. She will come out and evaluate the patient today after lunch.    1:00- Admissions coordinator Mayank here to evaluate patient for SNf placement.

## 2018-05-09 NOTE — NURSING
Stroke Vascular Consult done via Telemedicine with Dr. Encinas.  Patient's daughter at bedside as well.  See recommendations per Dr. Encinas's consult.  Suzanne Dodd notified of patient needing follow up with Vascular Neurology in 4-6 weeks.

## 2018-05-09 NOTE — SUBJECTIVE & OBJECTIVE
"  Woke up with symptoms?: no  Last known normal: Last Known Normal Date: 05/06/18 Last Known Normal Time: 1900    Recent bleeding noted: no  Does the patient take any Blood Thinners? no  Medications: No relevant medications      Past Medical History: hypertension    Past Surgical History: no relevant surgical history    Family History: no relevant history    Social History: drinking    Allergies:   No relevant allergies    Review of Systems   Constitutional: Positive for fatigue.   HENT: Negative.    Eyes: Negative.    Respiratory: Negative.    Cardiovascular: Negative.    Gastrointestinal: Negative.    Endocrine: Negative.    Genitourinary: Negative.    Musculoskeletal: Negative.    Allergic/Immunologic: Negative.    Neurological: Positive for speech difficulty, weakness and numbness.   Psychiatric/Behavioral: Negative.      Objective:   Vitals: Blood pressure (!) 194/93, pulse 95, temperature 97.6 °F (36.4 °C), temperature source Oral, resp. rate 16, height 5' 11.5" (1.816 m), weight 69.9 kg (154 lb 1.6 oz), SpO2 98 %. BP: 192/100, Respiratory Rate: 14 and Heart Rate: 78    CT READ: Yes  No hemmorhage. No mass effect. No early infarct signs.     Physical Exam   Constitutional: He is oriented to person, place, and time. He appears well-developed and well-nourished.   HENT:   Head: Normocephalic and atraumatic.   Eyes: EOM are normal. Pupils are equal, round, and reactive to light.   Neck: Normal range of motion.   Cardiovascular: Normal rate and regular rhythm.    Pulmonary/Chest: Effort normal.   Musculoskeletal: Normal range of motion.   Neurological: He is alert and oriented to person, place, and time.         "

## 2018-05-09 NOTE — HPI
72 year old male with PMH significant for HTN and alcohol abuse who was brought to the hospital because of slurred speech and vascular neurology was consulted because of acute stroke seen on MRI brain.  The patient was doing fine until Sunday evening when he started to have slurred speech that the family attributed to drinking. Next day he woke up with the same slurred speech and the family also noticed that he is leaning toward the left side.   He denied any previous similar complaints.

## 2018-05-09 NOTE — PT/OT/SLP PROGRESS
Physical Therapy Treatment    Patient Name:  Haris Hernandez   MRN:  [id number]    Recommendations:     Discharge Recommendations:   Change from SNF to In patient rehab.   Discharge Equipment Recommendations: bedside commode, bath bench, walker, rolling   Barriers to discharge: None    Assessment:     Haris Hernandez is a 72 y.o. male admitted with a medical diagnosis of Cerebrovascular accident (CVA).  He presents with the following impairments/functional limitations:  weakness, gait instability, decreased lower extremity function, decreased upper extremity function, impaired self care skills, impaired functional mobilty, impaired balance, decreased safety awareness, abnormal tone, impaired coordination  Patient able to demonstrate supine <> sit <> stand with CG assist. Gait with RW able to maintain LUE on RW and min assist/ verbal cues  with turns and step lengths.    Rehab Prognosis:  Good; patient would benefit from acute skilled PT services to address these deficits and reach maximum level of function.      Recent Surgery: * No surgery found *      Plan:     During this hospitalization, patient to be seen 6 x/week to address the above listed problems via gait training, therapeutic activities, therapeutic exercises  · Plan of Care Expires:  06/08/18   Plan of Care Reviewed with: patient, daughter    Subjective     Communicated with primary nurse prior to session.  Patient found supine upon PT entry to room, agreeable to treatment.      Chief Complaint: weakness  Patient comments/goals: go home  Pain/Comfort:  · Pain Rating 1: 0/10  · Pain Rating Post-Intervention 1: 0/10    Patients cultural, spiritual, Cheondoism conflicts given the current situation: None Verbalized to PT    Objective:     Patient found with: bed alarm, peripheral IV     General Precautions: Standard, fall   Orthopedic Precautions:N/A   Braces: N/A     Functional Mobility:  · Bed Mobility:     · Supine to Sit: stand by assistance  · Sit to  Supine: contact guard assistance  · Transfers:     · Sit to Stand:  contact guard assistance with no AD  · Gait: ~ 50 ft with min/CG assist   · Balance: fair with RW      AM-PAC 6 CLICK MOBILITY  Turning over in bed (including adjusting bedclothes, sheets and blankets)?: 4  Sitting down on and standing up from a chair with arms (e.g., wheelchair, bedside commode, etc.): 3  Moving from lying on back to sitting on the side of the bed?: 4  Moving to and from a bed to a chair (including a wheelchair)?: 3  Need to walk in hospital room?: 3  Climbing 3-5 steps with a railing?: 3  Total Score: 20       Therapeutic Activities and Exercises:   worked with patient on sit to stand from higher surfaces, rotation trunk in PNF patterns sitting EOB, and supine bridging with and without shift    Patient left HOB elevated with all lines intact, call button in reach and family present..    GOALS:    Physical Therapy Goals        Problem: Physical Therapy Goal    Goal Priority Disciplines Outcome Goal Variances Interventions   Physical Therapy Goal     PT/OT, PT Ongoing (interventions implemented as appropriate)     Description:  Goals to be met by: 2018     Patient will increase functional independence with mobility by performin) Sup<>sit mod I with HOB flat and no use of bed rails  2)Sit <>stand Mod I with RW  3) Ambulate at least 150 feet with Mod I with RW demonstrating good gait stability   4) Pt to turn during ambulation safely with RW Mod I and no gait instability.   5) Bed <>chair mod I with RW.                      Time Tracking:     PT Received On: 18  PT Start Time: 1000     PT Stop Time: 1040  PT Total Time (min): 40 min     Billable Minutes: Gait Training 15 and Therapeutic Exercise 25    Treatment Type: Treatment  PT/PTA: PT           Terrell Aguilar, PT  2018

## 2018-05-09 NOTE — PLAN OF CARE
Problem: Occupational Therapy Goal  Goal: Occupational Therapy Goal  Goals to be met by: 06/08/18     Patient will increase functional independence with ADLs by performing:    LE Dressing with Supervision.  Grooming while standing at sink with Supervision.  Toileting from toilet with Supervision for hygiene and clothing management.   Toilet transfer to toilet with Supervision.  Increased functional strength to WFL for ADLs.     Outcome: Ongoing (interventions implemented as appropriate)  Patient presents with decreased safety awareness, minimal insight into deficits, balance deficits, FMC and dexterity deficits.  Patient veering to L side and decreased awareness of L side limbs during ambulation and activities. Despite patient's co-morbidities patient was living in community setting functioning at independent level for ADLs, and mobility prior to this event.  There is an expectation of returning to prior level of function to maintain independence thus avoiding readmission.  Patient's clinical condition meets full Inpatient Rehab (IPR) criteria; including the ability to actively participate in 3 hours of therapy.  A lower level of care(SNF) cannot provide the interdisciplinary treatment approach needed.

## 2018-05-09 NOTE — PT/OT/SLP PROGRESS
Speech Language Pathology  Attempted Visits    [place] DELICIA Hernandez  MRN: [id number]    SLP made three attempts to see pt today. Patient not seen today secondary to pt unavailable. Initially, pt working with PT this AM. This afternoon pt and pt's daughter meeting with Telemedicine regarding stoke consult, and pt is now receiving bath from pt's daughter, who asked to defer ST services till later. SLP will follow-up next date.    TORIBIO Ndiaye., CF-SLP  Speech-Language Pathologist   5/9/2018

## 2018-05-09 NOTE — PLAN OF CARE
Problem: Patient Care Overview  Goal: Plan of Care Review  Outcome: Ongoing (interventions implemented as appropriate)  Plan of care reviewed with patient. Patient SR on continuous cardiac monitoring, no true red alarms noted. Neuro's checked Q4H. Patient awake, alert, orientated to person and place, but disorietated to time. Denies pain. Fall precautions explained and maintained. Patient advised to use call light for assistance, patient verbalizes understanding. Will continue to monitor.

## 2018-05-09 NOTE — CONSULTS
[facility]  Vascular Neurology  Comprehensive Stroke Center  Tele-Consultation Note      Consults    Consulting Provider: Spoke Physician:: dr tod sage  Current Providers  No providers found    Patient Location: [facility]  Spoke hospital nurse at bedside with patient assisting consultant.     Patient information was obtained from patient and relative(s).       Assessment/Plan:     STROKE DOCUMENTATION     Acute Stroke Times:   Acute Stroke Times   Last Known Normal Date: 05/06/18  Last Known Normal Time: 1900  Symptom Onset Date: 05/06/18  Symptom Onset Time: 1900  Stroke Team Called Date: 05/07/18  Stroke Team Called Time: 1346  Stroke Team Arrival Date: 05/07/18  Stroke Team Arrival Time: 1346  CT Interpretation Time: 1344  Decision to Treat Time for Alteplase:  (No iv alteplase candidate)  Decision to Treat Time for IR:  (No IR candidate)    NIH Scale:  1a. Level Of Consciousness: 0-->Alert: keenly responsive  1b. LOC Questions: 0-->Answers both questions correctly  1c. LOC Commands: 0-->Performs both tasks correctly  2. Best Gaze: 0-->Normal  3. Visual: 0-->No visual loss  4. Facial Palsy: 0-->Normal symmetrical movements  5a. Motor Arm, Left: 1-->Drift: limb holds 90 (or 45) degrees, but drifts down before full 10 seconds: does not hit bed or other support  5b. Motor Arm, Right: 0-->No drift: limb holds 90 (or 45) degrees for full 10 secs  6a. Motor Leg, Left: 1-->Drift: leg falls by the end of the 5-sec period but does not hit bed  6b. Motor Leg, Right: 0-->No drift: leg holds 30 degree position for full 5 secs  7. Limb Ataxia: 0-->Absent  8. Sensory: 1-->Mild-to-moderate sensory loss: patient feels pinprick is less sharp or is dull on the affected side: or there is a loss of superficial pain with pinprick, but patient is aware of being touched  9. Best Language: 0-->No aphasia: normal  10. Dysarthria: 1-->Mild-to-moderate dysarthria: patient  "slurs at least some words and, at worst, can be understood with some difficulty  11. Extinction and Inattention (formerly Neglect): 0-->No abnormality  Total (NIH Stroke Scale): 4     Modified [place] Score: 1  [place] Coma Scale:    ABCD2 Score:    [id number] Score:   HAS -BLED Score:   ICH Score:   Hunt & Hernandez Classification:       Diagnoses:   * Cerebrovascular accident (CVA)    Signs of intracranial atherosclerosis on CTA  Antithrombotics for secondary stroke prevention: Antiplatelets: Aspirin: 81 mg daily  Clopidogrel: 75 mg daily    Statins for secondary stroke prevention and hyperlipidemia, if present:   Statins: Atorvastatin- 80 mg daily    Aggressive risk factor modification: HTN     Rehab efforts: PT/OT/SLP to evaluate and treat    Diagnostics ordered/pending: None     VTE prophylaxis: Heparin 5000 units SQ every 8 hours    BP parameters: Infarct: Stroke is 3 days old, plan to start controlling BP better, will target SBP<200 for 24h, then SBP<180                Blood pressure (!) 194/93, pulse 95, temperature 97.6 °F (36.4 °C), temperature source Oral, resp. rate 16, height 5' 11.5" (1.816 m), weight 69.9 kg (154 lb 1.6 oz), SpO2 98 %.  Alteplase Eligible?: No  Alteplase Recommendation: Alteplase not recommended due to Outside of treatment window   Possible Interventional Revascularization Candidate? No; No large vessel occlusion    Disposition Recommendation: do not transfer      Subjective:     History of Present Illness:  72 year old male with PMH significant for HTN and alcohol abuse who was brought to the hospital because of slurred speech and vascular neurology was consulted because of acute stroke seen on MRI brain.  The patient was doing fine until Sunday evening when he started to have slurred speech that the family attributed to drinking. Next day he woke up with the same slurred speech and the family also noticed that he is leaning toward the left side.   He denied any previous similar " "complaints.       Woke up with symptoms?: no  Last known normal: Last Known Normal Date: 05/06/18 Last Known Normal Time: 1900    Recent bleeding noted: no  Does the patient take any Blood Thinners? no  Medications: No relevant medications      Past Medical History: hypertension    Past Surgical History: no relevant surgical history    Family History: no relevant history    Social History: drinking    Allergies:   No relevant allergies    Review of Systems   Constitutional: Positive for fatigue.   HENT: Negative.    Eyes: Negative.    Respiratory: Negative.    Cardiovascular: Negative.    Gastrointestinal: Negative.    Endocrine: Negative.    Genitourinary: Negative.    Musculoskeletal: Negative.    Allergic/Immunologic: Negative.    Neurological: Positive for speech difficulty, weakness and numbness.   Psychiatric/Behavioral: Negative.      Objective:   Vitals: Blood pressure (!) 194/93, pulse 95, temperature 97.6 °F (36.4 °C), temperature source Oral, resp. rate 16, height 5' 11.5" (1.816 m), weight 69.9 kg (154 lb 1.6 oz), SpO2 98 %. BP: 192/100, Respiratory Rate: 14 and Heart Rate: 78    CT READ: Yes  No hemmorhage. No mass effect. No early infarct signs.     Physical Exam   Constitutional: He is oriented to person, place, and time. He appears well-developed and well-nourished.   HENT:   Head: Normocephalic and atraumatic.   Eyes: EOM are normal. Pupils are equal, round, and reactive to light.   Neck: Normal range of motion.   Cardiovascular: Normal rate and regular rhythm.    Pulmonary/Chest: Effort normal.   Musculoskeletal: Normal range of motion.   Neurological: He is alert and oriented to person, place, and time.             Recommended the emergency room physician to have a brief discussion with the patient and/or family if available regarding the risks and benefits of treatment, and to briefly document the occurrence of that discussion in his clinical encounter note.     The attending portion of this " evaluation, treatment, and documentation was performed per Mary Encinas MD via audiovisual.    Billing code:  (moderate to severe stroke, large areas of edema, some mimics)    · This patient has a critical neurological condition/illness, with high morbidity and mortality.  · There is a high probability for acute neurological change leading to clinical and possibly life-threatening deterioration requiring highest level of physician preparedness for urgent intervention.  · Care was coordinated with other physicians involved in the patient's care.  · Radiologic studies and laboratory data were reviewed and interpreted, and plan of care was re-assessed based on the results.  · Diagnosis, treatment options and prognosis may have been discussed with the patient and/or family members or caregiver.  · Further advanced medical management and further evaluation is warranted for his care.      Consult End Time: 1:19 PM     Mary Encinas MD  Comprehensive Stroke Center  Vascular Neurology   [facility]

## 2018-05-09 NOTE — PLAN OF CARE
Problem: Physical Therapy Goal  Goal: Physical Therapy Goal  Goals to be met by: 2018     Patient will increase functional independence with mobility by performin) Sup<>sit mod I with HOB flat and no use of bed rails  2)Sit <>stand Mod I with RW  3) Ambulate at least 150 feet with Mod I with RW demonstrating good gait stability   4) Pt to turn during ambulation safely with RW Mod I and no gait instability.   5) Bed <>chair mod I with RW.     Outcome: Ongoing (interventions implemented as appropriate)  Recommendation initially SNF however wonder if patient better served by Higher level of care  DME TBD by facility

## 2018-05-09 NOTE — PROGRESS NOTES
[facility] Internal Medicine Resident HO-II Progress Note    Subjective:     Pt doing well this AM. Endorses continued weakness but no worsening weakness.  Denies vision changes, CP, SOB, dizziness, weakness     Objective:   Last 24 Hour Vital Signs:  BP  Min: 168/83  Max: 207/98  Temp  Av °F (37.2 °C)  Min: 98.4 °F (36.9 °C)  Max: 99.6 °F (37.6 °C)  Pulse  Av.8  Min: 66  Max: 93  Resp  Av.2  Min: 16  Max: 20  SpO2  Av.3 %  Min: 97 %  Max: 98 %  I/O last 3 completed shifts:  In: 1580 [P.O.:180; I.V.:1400]  Out: 793 [Urine:793]    Physical Examination:  Constitutional: He is oriented to person, place, and time. He appears well-developed.   Head: Normocephalic and atraumatic.   Eyes: Pupils are equal, round, and reactive to light.   Neck: Normal range of motion. Neck supple.   Cardiovascular: Normal rate and regular rhythm.  No murmurs, rubs or gallops   Pulmonary/Chest: Effort normal. No respiratory distress. CTA b/l. No wheezes, rhales, rhonci   Abdominal: Soft. Non-tender. BS +. He exhibits no mass.   Musculoskeletal: Normal range of motion. He exhibits no deformity.   Neurological: He is alert and oriented to person, place, and time.   Reflexes: 2+ patellar on R and 3+ L patellar.   Hypoglossal nn. Deficit with tongue deviated to right side.   No nystagmus. Able to speak, swallow, hear equally. Able to close eyes tightly. Speech mildly slurred.  Sensory deficit with decreased sensation to light touch on Left Lower face. Smile asymmetric with left lower facial weakness present.   LE 5/5 muscle strength. UE 5/5 muscle strength although R>L bilaterally.   Gait not assessed. Pronator drift on left UE.   Skin: No rash noted. He is not diaphoretic. No erythema.   Psychiatric: He has a normal mood and affect. His behavior is normal.     Laboratory:  Laboratory Data Reviewed: yes  Pertinent Findings:    Recent Labs  Lab 18  1249 18  0402   WBC 8.79 8.22  8.22   HGB 13.3* 12.1*  12.1*   HCT 37.6*  34.6*  34.6*    187  187   MCV 90 90  90   RDW 12.5 13.0  13.0    136  136   K 3.7 3.6  3.6   CL 99 103  103   CO2 28 25  25   BUN 19 14  14   CREATININE 1.22 1.2  1.2   * 104  104   PROT 8.9* 7.9  7.9   ALBUMIN 4.4 3.2*  3.2*   BILITOT 0.7 0.6  0.6   AST 27 17  17   ALKPHOS 71 56  56   ALT 27 13  13       Microbiology Data Reviewed: yes  Pertinent Findings:  n/a    Other Results:  EKG (my interpretation): no new     Radiology Data Reviewed: yes  Pertinent Findings:  MRI  1. Acute infarction involving the right basal ganglia and corona radiata.  2. Small remote lacunar infarcts involving the left basal ganglia, left thalamus, and right cerebellar hemisphere.    Current Medications:     Infusions:   sodium chloride 0.9% 50 mL/hr at 05/09/18 0006        Scheduled:   aspirin  81 mg Oral Daily    atorvastatin  40 mg Oral Daily    clopidogrel  75 mg Oral Daily    enoxaparin  40 mg Subcutaneous Daily    hydroCHLOROthiazide  12.5 mg Oral Daily    sodium chloride 0.9%  3 mL Intravenous Q8H    sodium chloride 0.9%  3 mL Intravenous Q8H        PRN:  labetalol, labetalol, pneumoc 13-lorena conj-dip cr(PF)    Antibiotics and Day Number of Therapy:  None    Lines and Day Number of Therapy:  L PIV    Assessment:     Haris Hernandez is a 72 y.o.male with  Patient Active Problem List    Diagnosis Date Noted    BPH (benign prostatic hyperplasia) 05/08/2018    ETOH abuse 05/08/2018    HTN (hypertension) 05/08/2018    Normocytic anemia 05/08/2018    Cerebrovascular accident (CVA) 05/07/2018        Plan:     Acute/Subacute Right Caudate/Anterior Internal Capsule Lacunar CVA  -CT head: Low-density zone right caudate nucleus/anterior internal capsule with considerations including acute to subacute lacunar type infarct.  Several chronic lacunar type infarcts.  -MRI confirms stroke  -[facility] neuro Dr. Lara evaluated the patient. No tpa. No LVO suspected. Recs per note.   -EKG with NSR and HR  80s, LVH, q waves in anterior leads v1-v3  -LP WNL  -a1c 5.2  -DAPT + statin  -PT/OT recommending SNF  SW consulted    CKD II  -gfr 60, cr 1.22     HTN  -BP on admission 208/103  -On home nefedipime 60 and HCTZ 25  will hold   -given 10mg IV labetolol   -Will monitor and allow 24 hr HTN >185/110     Normocytic Anemia  -H/H 13.3/37.6, MCV 90  -No s/s of bleeding  -Will get iron profile      Protein Gap (4.5)  -Will need workup  -HIV/hep panel/SPEP/UPEP  -UA     Alcohol Abuse  -Daily drinker: 4 beers and 1/2 pint of liquor last drink yesterday 5pm. Denies WD or seizures  -YOSVANY <10  -Will monitor      BPH  -Cont home finasteride and tamsulosin   -Stable  -Follow up PCP        Dispo: PT/OT/ST eval  Diet: NPO until ST  Code: Full  DVT: akshat Flores  [facility] Internal Medicine HO-II  U IM Service Team A    [facility] Medicine Hospitalist Pager numbers:   [facility] Hospitalist Medicine Team A (Sima/Reed): [phone]  [facility] Hospitalist Medicine Team B (Kirt/Malaika):  [phone]

## 2018-05-09 NOTE — PLAN OF CARE
Did assessment on patient. Went over plan of care. Bed in low position, call light in reach.Saftey measure taken.Daughter at the bedside

## 2018-05-10 LAB
ALBUMIN SERPL BCP-MCNC: 3.1 G/DL
ALBUMIN SERPL BCP-MCNC: 3.1 G/DL
ALP SERPL-CCNC: 55 U/L
ALP SERPL-CCNC: 55 U/L
ALT SERPL W/O P-5'-P-CCNC: 12 U/L
ALT SERPL W/O P-5'-P-CCNC: 12 U/L
ANION GAP SERPL CALC-SCNC: 8 MMOL/L
ANION GAP SERPL CALC-SCNC: 8 MMOL/L
AST SERPL-CCNC: 17 U/L
AST SERPL-CCNC: 17 U/L
BACTERIA #/AREA URNS HPF: NORMAL /HPF
BASOPHILS # BLD AUTO: 0.01 K/UL
BASOPHILS # BLD AUTO: 0.01 K/UL
BASOPHILS NFR BLD: 0.1 %
BASOPHILS NFR BLD: 0.1 %
BILIRUB SERPL-MCNC: 0.7 MG/DL
BILIRUB SERPL-MCNC: 0.7 MG/DL
BILIRUB UR QL STRIP: NEGATIVE
BUN SERPL-MCNC: 11 MG/DL
BUN SERPL-MCNC: 11 MG/DL
CALCIUM SERPL-MCNC: 9.3 MG/DL
CALCIUM SERPL-MCNC: 9.3 MG/DL
CHLORIDE SERPL-SCNC: 101 MMOL/L
CHLORIDE SERPL-SCNC: 101 MMOL/L
CLARITY UR: CLEAR
CO2 SERPL-SCNC: 24 MMOL/L
CO2 SERPL-SCNC: 24 MMOL/L
COLOR UR: ABNORMAL
CREAT SERPL-MCNC: 1.2 MG/DL
CREAT SERPL-MCNC: 1.2 MG/DL
DIFFERENTIAL METHOD: ABNORMAL
DIFFERENTIAL METHOD: ABNORMAL
EOSINOPHIL # BLD AUTO: 0 K/UL
EOSINOPHIL # BLD AUTO: 0 K/UL
EOSINOPHIL NFR BLD: 0.4 %
EOSINOPHIL NFR BLD: 0.4 %
ERYTHROCYTE [DISTWIDTH] IN BLOOD BY AUTOMATED COUNT: 12.9 %
ERYTHROCYTE [DISTWIDTH] IN BLOOD BY AUTOMATED COUNT: 12.9 %
EST. GFR  (AFRICAN AMERICAN): >60 ML/MIN/1.73 M^2
EST. GFR  (AFRICAN AMERICAN): >60 ML/MIN/1.73 M^2
EST. GFR  (NON AFRICAN AMERICAN): >60 ML/MIN/1.73 M^2
EST. GFR  (NON AFRICAN AMERICAN): >60 ML/MIN/1.73 M^2
GLUCOSE SERPL-MCNC: 115 MG/DL
GLUCOSE SERPL-MCNC: 115 MG/DL
GLUCOSE UR QL STRIP: NEGATIVE
HCT VFR BLD AUTO: 33.7 %
HCT VFR BLD AUTO: 33.7 %
HGB BLD-MCNC: 11.9 G/DL
HGB BLD-MCNC: 11.9 G/DL
HGB UR QL STRIP: NEGATIVE
HYALINE CASTS #/AREA URNS LPF: 0 /LPF
INTERPRETATION SERPL IFE-IMP: NORMAL
KETONES UR QL STRIP: NEGATIVE
LEUKOCYTE ESTERASE UR QL STRIP: NEGATIVE
LYMPHOCYTES # BLD AUTO: 1.4 K/UL
LYMPHOCYTES # BLD AUTO: 1.4 K/UL
LYMPHOCYTES NFR BLD: 16.2 %
LYMPHOCYTES NFR BLD: 16.2 %
MCH RBC QN AUTO: 31.9 PG
MCH RBC QN AUTO: 31.9 PG
MCHC RBC AUTO-ENTMCNC: 35.3 G/DL
MCHC RBC AUTO-ENTMCNC: 35.3 G/DL
MCV RBC AUTO: 90 FL
MCV RBC AUTO: 90 FL
MICROSCOPIC COMMENT: NORMAL
MONOCYTES # BLD AUTO: 1.1 K/UL
MONOCYTES # BLD AUTO: 1.1 K/UL
MONOCYTES NFR BLD: 13.1 %
MONOCYTES NFR BLD: 13.1 %
NEUTROPHILS # BLD AUTO: 5.8 K/UL
NEUTROPHILS # BLD AUTO: 5.8 K/UL
NEUTROPHILS NFR BLD: 70 %
NEUTROPHILS NFR BLD: 70 %
NITRITE UR QL STRIP: NEGATIVE
PATHOLOGIST INTERPRETATION IFE: NORMAL
PH UR STRIP: 6 [PH] (ref 5–8)
PLATELET # BLD AUTO: 194 K/UL
PLATELET # BLD AUTO: 194 K/UL
PMV BLD AUTO: 10.5 FL
PMV BLD AUTO: 10.5 FL
POTASSIUM SERPL-SCNC: 4.3 MMOL/L
POTASSIUM SERPL-SCNC: 4.3 MMOL/L
PROCALCITONIN SERPL IA-MCNC: 0.16 NG/ML
PROT PATTERN UR ELPH-IMP: NORMAL
PROT SERPL-MCNC: 7.8 G/DL
PROT SERPL-MCNC: 7.8 G/DL
PROT UR QL STRIP: ABNORMAL
RBC # BLD AUTO: 3.73 M/UL
RBC # BLD AUTO: 3.73 M/UL
RBC #/AREA URNS HPF: 2 /HPF (ref 0–4)
SODIUM SERPL-SCNC: 133 MMOL/L
SODIUM SERPL-SCNC: 133 MMOL/L
SP GR UR STRIP: 1.02 (ref 1–1.03)
URN SPEC COLLECT METH UR: ABNORMAL
UROBILINOGEN UR STRIP-ACNC: ABNORMAL EU/DL
WBC # BLD AUTO: 8.34 K/UL
WBC # BLD AUTO: 8.34 K/UL
WBC #/AREA URNS HPF: 3 /HPF (ref 0–5)

## 2018-05-10 PROCEDURE — 25000003 PHARM REV CODE 250: Performed by: INTERNAL MEDICINE

## 2018-05-10 PROCEDURE — 87040 BLOOD CULTURE FOR BACTERIA: CPT | Mod: 59

## 2018-05-10 PROCEDURE — 25000003 PHARM REV CODE 250: Performed by: STUDENT IN AN ORGANIZED HEALTH CARE EDUCATION/TRAINING PROGRAM

## 2018-05-10 PROCEDURE — 92507 TX SP LANG VOICE COMM INDIV: CPT

## 2018-05-10 PROCEDURE — 87086 URINE CULTURE/COLONY COUNT: CPT

## 2018-05-10 PROCEDURE — 97535 SELF CARE MNGMENT TRAINING: CPT

## 2018-05-10 PROCEDURE — 80053 COMPREHEN METABOLIC PANEL: CPT

## 2018-05-10 PROCEDURE — A4216 STERILE WATER/SALINE, 10 ML: HCPCS | Performed by: INTERNAL MEDICINE

## 2018-05-10 PROCEDURE — 85025 COMPLETE CBC W/AUTO DIFF WBC: CPT

## 2018-05-10 PROCEDURE — 94761 N-INVAS EAR/PLS OXIMETRY MLT: CPT

## 2018-05-10 PROCEDURE — 97116 GAIT TRAINING THERAPY: CPT

## 2018-05-10 PROCEDURE — 81000 URINALYSIS NONAUTO W/SCOPE: CPT

## 2018-05-10 PROCEDURE — 84145 PROCALCITONIN (PCT): CPT

## 2018-05-10 PROCEDURE — 63600175 PHARM REV CODE 636 W HCPCS: Performed by: INTERNAL MEDICINE

## 2018-05-10 PROCEDURE — 11000001 HC ACUTE MED/SURG PRIVATE ROOM

## 2018-05-10 PROCEDURE — A4216 STERILE WATER/SALINE, 10 ML: HCPCS | Performed by: STUDENT IN AN ORGANIZED HEALTH CARE EDUCATION/TRAINING PROGRAM

## 2018-05-10 PROCEDURE — 36415 COLL VENOUS BLD VENIPUNCTURE: CPT

## 2018-05-10 RX ORDER — DOCUSATE SODIUM 100 MG/1
100 CAPSULE, LIQUID FILLED ORAL 2 TIMES DAILY
Status: DISCONTINUED | OUTPATIENT
Start: 2018-05-10 | End: 2018-05-14 | Stop reason: HOSPADM

## 2018-05-10 RX ORDER — MOXIFLOXACIN HYDROCHLORIDE 400 MG/1
400 TABLET ORAL DAILY
Status: DISCONTINUED | OUTPATIENT
Start: 2018-05-10 | End: 2018-05-11

## 2018-05-10 RX ORDER — LORAZEPAM 1 MG/1
1 TABLET ORAL EVERY 4 HOURS PRN
Status: DISCONTINUED | OUTPATIENT
Start: 2018-05-10 | End: 2018-05-11

## 2018-05-10 RX ORDER — DIAZEPAM 5 MG/1
5 TABLET ORAL EVERY 6 HOURS
Status: DISCONTINUED | OUTPATIENT
Start: 2018-05-11 | End: 2018-05-10

## 2018-05-10 RX ORDER — DIAZEPAM 5 MG/1
10 TABLET ORAL EVERY 6 HOURS
Status: DISCONTINUED | OUTPATIENT
Start: 2018-05-10 | End: 2018-05-10

## 2018-05-10 RX ADMIN — SODIUM CHLORIDE, PRESERVATIVE FREE 3 ML: 5 INJECTION INTRAVENOUS at 05:05

## 2018-05-10 RX ADMIN — ENOXAPARIN SODIUM 40 MG: 100 INJECTION SUBCUTANEOUS at 04:05

## 2018-05-10 RX ADMIN — MOXIFLOXACIN HYDROCHLORIDE 400 MG: 400 TABLET, FILM COATED ORAL at 12:05

## 2018-05-10 RX ADMIN — ATORVASTATIN CALCIUM 80 MG: 40 TABLET, FILM COATED ORAL at 09:05

## 2018-05-10 RX ADMIN — LORAZEPAM 1 MG: 1 TABLET ORAL at 07:05

## 2018-05-10 RX ADMIN — BENZONATATE 100 MG: 100 CAPSULE, LIQUID FILLED ORAL at 01:05

## 2018-05-10 RX ADMIN — BENZONATATE 100 MG: 100 CAPSULE, LIQUID FILLED ORAL at 12:05

## 2018-05-10 RX ADMIN — BENZONATATE 100 MG: 100 CAPSULE, LIQUID FILLED ORAL at 09:05

## 2018-05-10 RX ADMIN — CLOPIDOGREL BISULFATE 75 MG: 75 TABLET ORAL at 09:05

## 2018-05-10 RX ADMIN — ASPIRIN 81 MG: 81 TABLET, COATED ORAL at 09:05

## 2018-05-10 RX ADMIN — DOCUSATE SODIUM 100 MG: 100 CAPSULE, LIQUID FILLED ORAL at 09:05

## 2018-05-10 RX ADMIN — SODIUM CHLORIDE, PRESERVATIVE FREE 3 ML: 5 INJECTION INTRAVENOUS at 09:05

## 2018-05-10 RX ADMIN — SODIUM CHLORIDE, PRESERVATIVE FREE 3 ML: 5 INJECTION INTRAVENOUS at 02:05

## 2018-05-10 RX ADMIN — HYDROCHLOROTHIAZIDE 12.5 MG: 12.5 TABLET ORAL at 09:05

## 2018-05-10 NOTE — PLAN OF CARE
Problem: Patient Care Overview  Goal: Plan of Care Review  Outcome: Ongoing (interventions implemented as appropriate)  Plan of care reviewed with patient. Patient SR on continuous cardiac monitoring, no true red alarms noted. Patient is AAOx3. Neuro checks monitored Q4H. Denies pain. Fall precautions explained and maintained. Patient advised to use call light for assistance, patient verbalizes complete understanding. Will continue to monitor.

## 2018-05-10 NOTE — PT/OT/SLP PROGRESS
Physical Therapy Treatment    Patient Name:  Haris Hernandez   MRN:  [id number]    Recommendations:     Discharge Recommendations:  rehabilitation facility   Discharge Equipment Recommendations: none   Barriers to discharge: None    Assessment:     Haris Hernandez is a 72 y.o. male admitted with a medical diagnosis of Cerebrovascular accident (CVA).  He presents with the following impairments/functional limitations:  weakness, gait instability, decreased upper extremity function, decreased ROM, decreased lower extremity function, impaired balance, impaired endurance, impaired coordination, impaired sensation, impaired functional mobilty, impaired self care skills, abnormal tone Patient less alert and interactive this session .    Rehab Prognosis:  good; patient would benefit from acute skilled PT services to address these deficits and reach maximum level of function.      Recent Surgery: * No surgery found *      Plan:     During this hospitalization, patient to be seen 6 x/week to address the above listed problems via gait training, therapeutic activities, therapeutic exercises  · Plan of Care Expires:  06/08/18   Plan of Care Reviewed with: patient, son    Subjective     Communicated with primary nurse prior to session.  Patient found supine upon PT entry to room, agreeable to treatment.      Chief Complaint: fatigue  Patient comments/goals: go home  Pain/Comfort:  · Pain Rating 1: 0/10  · Pain Rating Post-Intervention 1: 0/10    Patients cultural, spiritual, Spiritism conflicts given the current situation: None Verbalized to PT    Objective:     Patient found with: bed alarm, telemetry     General Precautions: Standard, fall   Orthopedic Precautions:N/A   Braces: N/A     Functional Mobility:  · Bed Mobility:     · Supine to Sit: minimum assistance and moderate assistance  · Sit to Supine: minimum assistance  · Transfers:     · Sit to Stand:  moderate assistance with no AD  · Balance: Poor static  standing      AM-PAC 6 CLICK MOBILITY  Turning over in bed (including adjusting bedclothes, sheets and blankets)?: 3  Sitting down on and standing up from a chair with arms (e.g., wheelchair, bedside commode, etc.): 3  Moving from lying on back to sitting on the side of the bed?: 3  Moving to and from a bed to a chair (including a wheelchair)?: 3  Need to walk in hospital room?: 2  Climbing 3-5 steps with a railing?: 2  Total Score: 16       Therapeutic Activities and Exercises:   Worked at EOB on sit <> stand and scooting. Standing worked on elongation of L trunk to help facilitate L knee ext    Patient left HOB elevated with bed alarm on and son present..    GOALS:    Physical Therapy Goals        Problem: Physical Therapy Goal    Goal Priority Disciplines Outcome Goal Variances Interventions   Physical Therapy Goal     PT/OT, PT Ongoing (interventions implemented as appropriate)     Description:  Goals to be met by: 2018     Patient will increase functional independence with mobility by performin) Sup<>sit mod I with HOB flat and no use of bed rails  2)Sit <>stand Mod I with RW  3) Ambulate at least 150 feet with Mod I with RW demonstrating good gait stability   4) Pt to turn during ambulation safely with RW Mod I and no gait instability.   5) Bed <>chair mod I with RW.                      Time Tracking:     PT Received On: 05/10/18  PT Start Time: 1330     PT Stop Time: 1400  PT Total Time (min): 30 min     Billable Minutes: Gait Training 15    Treatment Type: Treatment  PT/PTA: PT           Terrell Aguilar, PT  05/10/2018

## 2018-05-10 NOTE — PLAN OF CARE
Problem: Physical Therapy Goal  Goal: Physical Therapy Goal  Goals to be met by: 2018     Patient will increase functional independence with mobility by performin) Sup<>sit mod I with HOB flat and no use of bed rails  2)Sit <>stand Mod I with RW  3) Ambulate at least 150 feet with Mod I with RW demonstrating good gait stability   4) Pt to turn during ambulation safely with RW Mod I and no gait instability.   5) Bed <>chair mod I with RW.     Outcome: Ongoing (interventions implemented as appropriate)  Patient not as alert this pm nursing and physician aware attributing cause medication   Weakness LLE and lower trunk with gait unable to use RW safetly  Recommend IPR

## 2018-05-10 NOTE — PLAN OF CARE
made phone contact with admissions coordinator at [facility] Hermelinda [phone]. She informed the patient's info was reviewed with her medical director and she will submit to [facility] for authorization.    12:25-  received call from Elaina- [facility] liason to inform she met with patient and his daughter and their first preference is now [facility]. Elaina stated she will submit to [facility] for insurance auth.     placed call to pt's daughter Bre to confirm this information. She stated after speaking with her brother, they have now decided [facility] as first preference to keep with within the [facility] system.  will continue to follow.

## 2018-05-10 NOTE — PROGRESS NOTES
[facility] Internal Medicine Resident HO-II Progress Note    Subjective:     Low grade fever overnight. Diaphoresis with reported mild tremors. Concern for EtOH as last drink ~72 hours prior. Ativan given.  Denies vision changes, CP, SOB, dizziness, weakness     Objective:   Last 24 Hour Vital Signs:  BP  Min: 165/84  Max: 194/93  Temp  Av.2 °F (37.3 °C)  Min: 97.6 °F (36.4 °C)  Max: 100.7 °F (38.2 °C)  Pulse  Av.6  Min: 73  Max: 97  Resp  Av.7  Min: 16  Max: 20  SpO2  Av.8 %  Min: 97 %  Max: 99 %  I/O last 3 completed shifts:  In: 1255 [P.O.:855; I.V.:400]  Out: 743 [Urine:743]    Physical Examination:  Constitutional: He is oriented to person, place, and time. He appears well-developed.   Head: Normocephalic and atraumatic.   Eyes: Pupils are equal, round, and reactive to light.   Neck: Normal range of motion. Neck supple.   Cardiovascular: Normal rate and regular rhythm.  No murmurs, rubs or gallops   Pulmonary/Chest: Effort normal. No respiratory distress. CTA b/l. No wheezes, rhales, rhonci   Abdominal: Soft. Non-tender. BS +. He exhibits no mass.   Musculoskeletal: Normal range of motion. He exhibits no deformity.   Neurological: He is alert and oriented to person, place, and time.   Reflexes: 2+ patellar on R and 3+ L patellar.   Hypoglossal nn. Deficit with tongue deviated to right side.   No nystagmus. Able to speak, swallow, hear equally. Able to close eyes tightly. Speech mildly slurred.  Sensory deficit with decreased sensation to light touch on Left Lower face. Smile asymmetric with left lower facial weakness present.   LE 5/5 muscle strength. UE 5/5 muscle strength although R>L bilaterally.   Gait not assessed. Pronator drift on left UE.   Skin: No rash noted. He is not diaphoretic. No erythema.   Psychiatric: He has a normal mood and affect. His behavior is normal.     Laboratory:  Laboratory Data Reviewed: yes  Pertinent Findings:    Recent Labs  Lab 18  1249 18  3412  05/10/18  0328   WBC 8.79 8.22  8.22 8.34  8.34   HGB 13.3* 12.1*  12.1* 11.9*  11.9*   HCT 37.6* 34.6*  34.6* 33.7*  33.7*    187  187 194  194   MCV 90 90  90 90  90   RDW 12.5 13.0  13.0 12.9  12.9    136  136 133*  133*   K 3.7 3.6  3.6 4.3  4.3   CL 99 103  103 101  101   CO2 28 25  25 24  24   BUN 19 14  14 11  11   CREATININE 1.22 1.2  1.2 1.2  1.2   * 104  104 115*  115*   PROT 8.9* 7.9  7.9 7.8  7.8   ALBUMIN 4.4 3.2*  3.2* 3.1*  3.1*   BILITOT 0.7 0.6  0.6 0.7  0.7   AST 27 17  17 17  17   ALKPHOS 71 56  56 55  55   ALT 27 13  13 12  12       Microbiology Data Reviewed: yes  Pertinent Findings:  n/a    Other Results:  EKG (my interpretation): no new     Radiology Data Reviewed: yes  Pertinent Findings:  MRI  1. Acute infarction involving the right basal ganglia and corona radiata.  2. Small remote lacunar infarcts involving the left basal ganglia, left thalamus, and right cerebellar hemisphere.    Current Medications:     Infusions:       Scheduled:   aspirin  81 mg Oral Daily    atorvastatin  80 mg Oral Daily    cefTRIAXone (ROCEPHIN) IM WITH LIDOCAINE  1 g Intramuscular Once    clopidogrel  75 mg Oral Daily    diazePAM  10 mg Oral Q6H    [START ON 5/11/2018] diazePAM  5 mg Oral Q6H    enoxaparin  40 mg Subcutaneous Daily    hydroCHLOROthiazide  12.5 mg Oral Daily    sodium chloride 0.9%  3 mL Intravenous Q8H    sodium chloride 0.9%  3 mL Intravenous Q8H        PRN:  benzonatate, labetalol, LORazepam, pneumoc 13-lorena conj-dip cr(PF)    Antibiotics and Day Number of Therapy:  None    Lines and Day Number of Therapy:  L PIV    Assessment:     Haris Hernandez is a 72 y.o.male with  Patient Active Problem List    Diagnosis Date Noted    BPH (benign prostatic hyperplasia) 05/08/2018    ETOH abuse 05/08/2018    HTN (hypertension) 05/08/2018    Normocytic anemia 05/08/2018    Cerebrovascular accident (CVA) 05/07/2018        Plan:      Acute/Subacute Right Caudate/Anterior Internal Capsule Lacunar CVA  -CT head: Low-density zone right caudate nucleus/anterior internal capsule with considerations including acute to subacute lacunar type infarct.  Several chronic lacunar type infarcts.  -MRI confirms stroke  -[facility] neuro Dr. Lara evaluated the patient. No tpa. No LVO suspected. Recs per note.   -EKG with NSR and HR 80s, LVH, q waves in anterior leads v1-v3  -LP WNL  -a1c 5.2  -DAPT + statin  -PT/OT recommending IPR  SW consulted    CAP  -pt with productive cough and low-grade fever  -procal pending  -moxi    CKD II  -gfr 60, cr 1.22  -stable     HTN  -BP on admission 208/103  -On home nefedipime 60 and HCTZ 25  will hold   -given 10mg IV labetolol   -Will monitor and allow 24 hr HTN >185/110     Normocytic Anemia  -H/H 13.3/37.6, MCV 90  -No s/s of bleeding  -Will get iron profile      Protein Gap (4.5)  -Will need workup  -HIV/hep panel/SPEP/UPEP  -UA     Alcohol Abuse  -Daily drinker: 4 beers and 1/2 pint of liquor last drink yesterday 5pm. Denies WD or seizures  -YOSVANY <10  -concern for withdrawal on 5/10  started valium taper     BPH  -Cont home finasteride and tamsulosin   -Stable  -Follow up PCP        Dispo: PT/OT/ST eval  Diet: NPO until ST  Code: Full  DVT: lovenox     Kartik Flores  \[facility]\"" Internal Medicine HO-II  [facility] IM Service Team A    \[facility]\"" Medicine Hospitalist Pager numbers:   [facility] Hospitalist Medicine Team A (Sima/Reed): [phone]  [facility] Hospitalist Medicine Team B (Kirt/Malaika):  [phone]

## 2018-05-10 NOTE — PT/OT/SLP PROGRESS
"Speech Language Pathology Treatment    Patient Name:  Haris Hernandez   MRN:  [id number]  Admitting Diagnosis: Cerebrovascular accident (CVA)    Recommendations:                 General Recommendations:  Speech/language therapy and Cognitive-linguistic therapy  Diet recommendations:  Regular, Liquid Diet Level: Thin   Aspiration Precautions: Standard aspiration precautions   General Precautions: Standard, fall  Communication strategies:  provide increased time to answer    Subjective     Pt seen this AM for cognitive-linguistic tx. SLP confirmed with RN prior to entry. Pt asleep upon SLP entry. Pt woke with min-mod cuing from SLP. However, pt observed to be lethargic throughout session and required mod-max cuing from SLP to attend to given tasks. RN reported to SLP after session that pt was given ativan at 7 AM.     Patient goals: Pt's daughter stated "He didn't sleep well last night, he was up all night"     Pain/Comfort:  · Pain Rating 1: 0/10    Objective:     Has the patient been evaluated by SLP for swallowing?   Yes  Keep patient NPO? No   Current Respiratory Status: room air      Pt participated cognitive-linguistic exercises. Pt completed convergent naming task with 70% acc and required mod cuing (repetitions and binary choices) from SLP. Pt completed simple categorical naming task with 100% acc with min cuing (repetitions). Pt also answered related questions from given short story with 90% acc, however pt required mod cuing (binary choices and repetitions). During delayed memory task, pt given 3 items to recall after 3 min time delay. Pt required mod-max (multiple category cues and binary choice) cuing from SLP to recall 3/3 items. Pt also demonstrated reduced eye contact and a greater delay in initiating responses today.    Pt demonstrated moderate-severe dysarthria, intelligibility judged to be ~50-60% during today's session, as compared to previous session. SLP required multiple repetitions from pt to " understand pt's wants/needs. Pt observed to be more lethargic as compared to previous session too, SLP suspects recent dose of Ativan impacted pt's performance today. Pt also observed with L corner drooling and L sided facial weakness observed during cued smile.     Assessment:     Haris Hernandez is a 72 y.o. male with an SLP diagnosis of Dysarthria and Cognitive-Linguistic Impairment.  He presents with a greater delay in initiation to responses, reduced eye contact, and required increased cuing from SLP as compared to initial session, as well moderate-severe dysarthria during today's session. SLP suspects recent dose of Ativan impacted pt's performance today. SLP will continue to follow. SLP notified IGNACIA Crump of results/recs.  SLP recs: Pt would benefit from skilled ST services at next level of care.        Goals:    SLP Goals        Problem: SLP Goal    Goal Priority Disciplines Outcome   SLP Goal     SLP Ongoing (interventions implemented as appropriate)   Description:  Short Term Goals:  1. Pt will participate in BSS to determine least restrictive diet.- MET 5/8  2. Pt will participate in informal speech-lang eval to r/o cognitive-linguistic deficits.- MET 5/8  3. Pt will complete mental manipulation tasks (i.e word finding, categorical, etc.) with 80% acc given min assist for 2 consecutive sessions.  4. Pt will recall 3/3 given items with 3-5 min delay with min assist for 2 consecutive sessions.  5. Pt will answer questions related to given short story with 80% acc, ind'ly for 2 consecutive session.  *Further goals pending pt's progress*                    Plan:     · Patient to be seen:  3 x/week   · Plan of Care expires:  06/07/18  · Plan of Care reviewed with:  patient, daughter (IGNACIA Crump)   · SLP Follow-Up:  Yes       Discharge recommendations:  rehabilitation facility     Time Tracking:     SLP Treatment Date:   05/10/18  Speech Start Time:  0845  Speech Stop Time:  0903     Speech Total Time (min):  18  min    Billable Minutes: Speech Therapy Individual 18    ANIYA Ndiaye, -SLP  Speech-Language Pathologist   5/10/2018

## 2018-05-10 NOTE — PLAN OF CARE
Patient spiked another fever and urine cultures and CXR was ordered. Patient's sister is at the bedside and mentioned that Mr. Hernandez is a frequent drinker, drinking at least a six pack a day along with hard alcohol. Patient is diaphoretic, although exhibits no tremors and denies any complaints. Will start Ativan 1mg q4hPRN. If patient has a fever on next vital check, will continue with dose of Rocephin.    Charlotte Carlin MD  [facility] Internal Medicine, PGY-1

## 2018-05-10 NOTE — PLAN OF CARE
Problem: Occupational Therapy Goal  Goal: Occupational Therapy Goal  Goals to be met by: 06/08/18     Patient will increase functional independence with ADLs by performing:    LE Dressing with Supervision.  Grooming while standing at sink with Supervision.  Toileting from toilet with Supervision for hygiene and clothing management.   Toilet transfer to toilet with Supervision.  Increased functional strength to WFL for ADLs.     Outcome: Ongoing (interventions implemented as appropriate)  Pt requiring increased assist this date from previous sessions. Nsg aware and attributes to medications. Heavily L lateral lean with decreased strength in LUE/LE and postural control requiring increased assist. Pt unable to safely ambulate this date with RW. Requiring maximal assist for participation in ADLs as well. Pt will require IP rehab at d/c

## 2018-05-10 NOTE — PLAN OF CARE
received call from [employer] reviewer Felisa. She will void SNF request and now submit this case to her medical director and request for IP Rehab auth. STACEY Palacios informed.

## 2018-05-10 NOTE — PLAN OF CARE
Problem: SLP Goal  Goal: SLP Goal  Short Term Goals:  1. Pt will participate in BSS to determine least restrictive diet.- MET 5/8  2. Pt will participate in informal speech-lang eval to r/o cognitive-linguistic deficits.- MET 5/8  3. Pt will complete mental manipulation tasks (i.e word finding, categorical, etc.) with 80% acc given min assist for 2 consecutive sessions.  4. Pt will recall 3/3 given items with 3-5 min delay with min assist for 2 consecutive sessions.  5. Pt will answer questions related to given short story with 80% acc, ind'ly for 2 consecutive session.  *Further goals pending pt's progress*   Outcome: Ongoing (interventions implemented as appropriate)  Pt seen this AM for cognitive-linguistic tx. Pt observed to be lethargic throughout session and required mod-max cuing from SLP. RN reported to SLP after session that pt was given ativan at 7 AM. SLP recs: Pt would benefit from skilled ST services at next level of care.  TORIBIO Ndiaye., CF-SLP  Speech-Language Pathologist

## 2018-05-10 NOTE — PLAN OF CARE
PT/OT evaluated patient yesterday and recommended IP rehab- MD put in IP rehab consult yesterday and SW spoke with family and obtained IP rehab preferences.    Per telestroke consult- patient will need vascular neurology followup in 4-6 weeks. Will obtain followup appt.       05/10/18 1212   Discharge Reassessment   Assessment Type Discharge Planning Reassessment   Provided patient/caregiver education on the expected discharge date and the discharge plan Yes   Discharge Plan A Rehab   Discharge Plan B Skilled Nursing Facility     Suzanne Dodd RN, CCM, CMSRN  RN Transition Navigator  [phone]

## 2018-05-10 NOTE — PLAN OF CARE
Temperature 100.5 F noted, patient still in NAD without any complaints. Blood cultures ordered x2. Will continue to monitor.    Charlotte Carlin MD  [facility] Internal Medicine, PGY-1

## 2018-05-10 NOTE — PT/OT/SLP PROGRESS
Occupational Therapy   Treatment    Name: Haris Hernandez  MRN: [id number]  Admitting Diagnosis:  Cerebrovascular accident (CVA)       Recommendations:     Discharge Recommendations: rehabilitation facility  Discharge Equipment Recommendations:   (per rehab)  Barriers to discharge:  None    Subjective     Communicated with: nsg prior to session.  Pain/Comfort:  · Pain Rating 1: 0/10    Patients cultural, spiritual, Druze conflicts given the current situation:  (none reported)    Objective:     Patient found with:      General Precautions: Standard, fall   Orthopedic Precautions:N/A   Braces: N/A     Occupational Performance:    Bed Mobility:    · Patient completed Scooting/Bridging with contact guard assistance and moderate assistance  · Patient completed Sit to Supine with minimum assistance     Functional Mobility/Transfers:  · Patient completed Sit <> Stand Transfer with moderate assistance  with  rolling walker   · Functional Mobility: Mod A/ Max A with RW    Activities of Daily Living:  · LB Dressing: maximal assistance    · Toileting: maximal assistance      Patient left supine with all lines intact, call button in reach, bed alarm on, nsg notified and son  present    [facility] 6 Click:  [facility] Total Score: 13    Treatment & Education:  OT entering session this date 2/2 pt's increased lethargy/decreased strength and coordination this date 2/2 medications   Pt with decreased strength on L side demonstrating significant L lateral leaning/trunk flexion/knee flexion/and decreased UE use   Pt unable to safely ambulate to restroom and/or perform functional mobility this date  Requiring maximal verbal and tactile/facilitation cues on L side this date  Standing toileting performed with urinal requiring maximal/total assist as pt requiring max a for balance as well as clothing management, placement of urinal and hygiene- maximal L lateral leaning during standing- unable to hold  of L hand on RW and L knee buckling    Static standing performed EOB post toileting with PT performing tactile facilitation to trunk for extension and midline positioning   WB'ing and step facilitation/pre gait trng while EOB   Seated scooting EOB to HOB with WB'ing to BLE/UEs - initially mod A (x 4) progressing to CGA- Min A x 1 trial   Assist to doff shoes- max A   Education:    Assessment:     Haris Hernandez is a 72 y.o. male with a medical diagnosis of Cerebrovascular accident (CVA).  He presents with L sided deficits .  Performance deficits affecting function are weakness, gait instability, impaired balance, impaired endurance, decreased lower extremity function, impaired coordination, decreased upper extremity function, impaired self care skills, impaired functional mobilty, decreased coordination, decreased safety awareness, impaired fine motor.  Pt requiring increased assist this date from previous sessions. Nsg aware and attributes to medications. Heavily L lateral lean with decreased strength in LUE/LE and postural control requiring increased assist. Pt unable to safely ambulate this date with RW. Requiring maximal assist for participation in ADLs as well. Pt will require IP rehab at d/c     Rehab Prognosis:  good; patient would benefit from acute skilled OT services to address these deficits and reach maximum level of function.       Plan:     Patient to be seen 5 x/week to address the above listed problems via self-care/home management, therapeutic activities, therapeutic exercises  · Plan of Care Expires: 06/08/18  · Plan of Care Reviewed with: patient, son    This Plan of care has been discussed with the patient who was involved in its development and understands and is in agreement with the identified goals and treatment plan    GOALS:    Occupational Therapy Goals        Problem: Occupational Therapy Goal    Goal Priority Disciplines Outcome Interventions   Occupational Therapy Goal     OT, PT/OT Ongoing (interventions implemented as  appropriate)    Description:  Goals to be met by: 06/08/18     Patient will increase functional independence with ADLs by performing:    LE Dressing with Supervision.  Grooming while standing at sink with Supervision.  Toileting from toilet with Supervision for hygiene and clothing management.   Toilet transfer to toilet with Supervision.  Increased functional strength to WFL for ADLs.                      Time Tracking:     OT Date of Treatment: 05/10/18  OT Start Time: 1342  OT Stop Time: 1400  OT Total Time (min): 18 min    Billable Minutes:Therapeutic Activity 15 min co treatment with PT     Jeniffer Deshpande, OT  5/10/2018

## 2018-05-10 NOTE — PLAN OF CARE
Problem: Patient Care Overview  Goal: Plan of Care Review  Outcome: Ongoing (interventions implemented as appropriate)  Safety maintained throughout this shift, saline lock to left hand, tele monitor, slurred speech with delayed response, left sided weakness throughout the day but improvement in the late evening with lifting the left arm, afebrile, urine collected straight cath, Lovenox given SQ, no signs of actue distress @ present, pt in stable condition, will continue to monitor

## 2018-05-10 NOTE — PLAN OF CARE
Temperature of 100.6 F noted, patient in NAD without any complaints. Will continue to monitor at this time.    Charlotte Carlin MD  [facility], HO-1

## 2018-05-11 PROCEDURE — 11000001 HC ACUTE MED/SURG PRIVATE ROOM

## 2018-05-11 PROCEDURE — 25000003 PHARM REV CODE 250: Performed by: INTERNAL MEDICINE

## 2018-05-11 PROCEDURE — 92526 ORAL FUNCTION THERAPY: CPT

## 2018-05-11 PROCEDURE — 97112 NEUROMUSCULAR REEDUCATION: CPT

## 2018-05-11 PROCEDURE — 94761 N-INVAS EAR/PLS OXIMETRY MLT: CPT

## 2018-05-11 PROCEDURE — 25000003 PHARM REV CODE 250: Performed by: STUDENT IN AN ORGANIZED HEALTH CARE EDUCATION/TRAINING PROGRAM

## 2018-05-11 PROCEDURE — 97535 SELF CARE MNGMENT TRAINING: CPT

## 2018-05-11 PROCEDURE — A4216 STERILE WATER/SALINE, 10 ML: HCPCS | Performed by: INTERNAL MEDICINE

## 2018-05-11 PROCEDURE — 97530 THERAPEUTIC ACTIVITIES: CPT

## 2018-05-11 PROCEDURE — 63600175 PHARM REV CODE 636 W HCPCS: Performed by: INTERNAL MEDICINE

## 2018-05-11 PROCEDURE — 92611 MOTION FLUOROSCOPY/SWALLOW: CPT

## 2018-05-11 RX ORDER — LACTULOSE 10 G/15ML
20 SOLUTION ORAL EVERY 6 HOURS PRN
Status: COMPLETED | OUTPATIENT
Start: 2018-05-11 | End: 2018-05-12

## 2018-05-11 RX ORDER — MOXIFLOXACIN HYDROCHLORIDE 400 MG/1
400 TABLET ORAL DAILY
Status: DISCONTINUED | OUTPATIENT
Start: 2018-05-11 | End: 2018-05-12

## 2018-05-11 RX ORDER — NIFEDIPINE 30 MG/1
30 TABLET, EXTENDED RELEASE ORAL DAILY
Status: DISCONTINUED | OUTPATIENT
Start: 2018-05-11 | End: 2018-05-14 | Stop reason: HOSPADM

## 2018-05-11 RX ADMIN — DOCUSATE SODIUM 100 MG: 100 CAPSULE, LIQUID FILLED ORAL at 08:05

## 2018-05-11 RX ADMIN — ASPIRIN 81 MG: 81 TABLET, COATED ORAL at 08:05

## 2018-05-11 RX ADMIN — SODIUM CHLORIDE, PRESERVATIVE FREE 3 ML: 5 INJECTION INTRAVENOUS at 05:05

## 2018-05-11 RX ADMIN — SODIUM CHLORIDE, PRESERVATIVE FREE 3 ML: 5 INJECTION INTRAVENOUS at 09:05

## 2018-05-11 RX ADMIN — LACTULOSE 20 G: 20 SOLUTION ORAL at 09:05

## 2018-05-11 RX ADMIN — NIFEDIPINE 30 MG: 30 TABLET, FILM COATED, EXTENDED RELEASE ORAL at 08:05

## 2018-05-11 RX ADMIN — ENOXAPARIN SODIUM 40 MG: 100 INJECTION SUBCUTANEOUS at 05:05

## 2018-05-11 RX ADMIN — ATORVASTATIN CALCIUM 80 MG: 40 TABLET, FILM COATED ORAL at 08:05

## 2018-05-11 RX ADMIN — HYDROCHLOROTHIAZIDE 12.5 MG: 12.5 TABLET ORAL at 08:05

## 2018-05-11 RX ADMIN — DOCUSATE SODIUM 100 MG: 100 CAPSULE, LIQUID FILLED ORAL at 09:05

## 2018-05-11 RX ADMIN — MOXIFLOXACIN HYDROCHLORIDE 400 MG: 400 TABLET, FILM COATED ORAL at 08:05

## 2018-05-11 RX ADMIN — CLOPIDOGREL BISULFATE 75 MG: 75 TABLET ORAL at 08:05

## 2018-05-11 NOTE — PLAN OF CARE
[facility] denied IP Rehab.  informed Dr. Rivera and pt's daughter. Copy of denial letter placed in his chart and provided to daughter.    Dr. Gao and team in room patient speaking to patient's daughter. Fast Appeal initiated by pt's daughter. Dr. Gao will contact [facility] to complete appeal.

## 2018-05-11 NOTE — PT/OT/SLP PROGRESS
Speech Language Pathology Treatment    Patient Name:  Haris Hernandez   MRN:  [id number]   [id number]/[id number] A    Admitting Diagnosis: Cerebrovascular accident (CVA)     Recommendations:                 General Recommendations:  Dysphagia therapy, Speech language evaluation, Cognitive-linguistic evaluation and Modified barium swallow study  Diet recommendations:  NPO, Liquid Diet Level: NPO MBSS completed. See report for recommendations  Aspiration Precautions: Alternate means of nutrition/hydration and Strict aspiration precautions   General Precautions: Standard, fall, aphasia, aspiration  Communication strategies:  none    Subjective     Patient awake and cooperative. Daughter present in room.   Patient goals: to leave hospital    CXR 5/10: Interval left lower lobe airspace disease concerning for developing aspiration or pneumonia.    Pain/Comfort:  ·  no pain reported    Objective:     Has the patient been evaluated by SLP for swallowing?   Yes  Keep patient NPO? Yes   Current Respiratory Status: room air      Upon SLP entry, patient presents with a productive cough. Patient's daughter reports decreased speech intelligibility. Given reports of increased overall weakness, chest xray with concern for developing aspiration, and increased word finding difficulty, SLP reassessed swallowing. Patient assessed with: tsp sips of water x2, cup sips of water x3, cup sips of nectar thick water x5, tsp bites of pudding x2, bites of debbie cracker x2. Patient with an immediate cough and multiple effortful swallows with thin liquids. No noted overt s/s of aspiration with nectar thick liquids, puree, or solids. Patient impulsive with solid trials taking large bites at a time. Transport entered room to bring patient to CT. Discussed MBSS with MD, and MD in agreement with MBSS. MBSS completed. See full report for results and recommendations.       Assessment:     Haris Hernandez is a 72 y.o. male with an SLP diagnosis of Dysphagia and  Dysarthria.  MBSS completed. See MBSS report for findings and recommendations.     Goals:    SLP Goals        Problem: SLP Goal    Goal Priority Disciplines Outcome   SLP Goal     SLP Ongoing (interventions implemented as appropriate)   Description:  Short Term Goals:  1. Pt will participate in BSS to determine least restrictive diet.- MET 5/8  2. Pt will participate in informal speech-lang eval to r/o cognitive-linguistic deficits.- MET 5/8  3. Pt will complete mental manipulation tasks (i.e word finding, categorical, etc.) with 80% acc given min assist for 2 consecutive sessions.  4. Pt will recall 3/3 given items with 3-5 min delay with min assist for 2 consecutive sessions.  5. Pt will answer questions related to given short story with 80% acc, ind'ly for 2 consecutive session.  *Further goals pending pt's progress*                    Plan:     · Patient to be seen:  3 x/week   · Plan of Care expires:  06/07/18  · Plan of Care reviewed with:  patient, daughter (IGNACIA Crump)   · SLP Follow-Up:  Yes       Discharge recommendations:  rehabilitation facility     Time Tracking:     SLP Treatment Date:   05/11/18  Speech Start Time:  1312  Speech Stop Time:  1335     Speech Total Time (min):  23 min    Billable Minutes: Treatment Swallowing Dysfunction 23    SANTOS Szymanski, CCC-SLP   Spectra: [phone]  05/11/2018

## 2018-05-11 NOTE — PLAN OF CARE
Problem: Occupational Therapy Goal  Goal: Occupational Therapy Goal  Goals to be met by: 06/08/18     Patient will increase functional independence with ADLs by performing:    LE Dressing with Supervision.  Grooming while standing at sink with Supervision.  Toileting from toilet with Supervision for hygiene and clothing management.   Toilet transfer to toilet with Supervision.  Increased functional strength to WFL for ADLs.     Outcome: Ongoing (interventions implemented as appropriate)  Patient with worsening balance, increased unilateral (L) weakness, increased L facial droop, and slight tone noted in L bicep. PTA, patient was living in community setting functioning at independent level for ADLs, and mobility prior to this event.  There is an expectation of returning to prior level of function to maintain independence thus avoiding readmission.  Patient's clinical condition meets full Inpatient Rehab (IPR) criteria; including the ability to actively participate in 3 hours of therapy.  A lower level of care(SNF) cannot provide the interdisciplinary treatment approach needed.

## 2018-05-11 NOTE — PLAN OF CARE
Problem: Patient Care Overview  Goal: Plan of Care Review  Outcome: Ongoing (interventions implemented as appropriate)  AAO x 4. NaD. No true red alarms on tele. No c/o pain. Patient verbalized understanding the plan of care. Fall precautions maintained. Room near nursing station. Daughter remains at bed side. Bed alarm set, bed in low and locked position, side rails up x 3, call light within reach. Continue to monitor

## 2018-05-11 NOTE — PROGRESS NOTES
[facility] Resident HO-I Progress Note    Subjective:     Afebrile. Pending inpatient rehab placement. Denies vision changes, CP, SOB, dizziness.     Objective:   Last 24 Hour Vital Signs:  BP  Min: 146/75  Max: 179/80  Temp  Av.7 °F (36.5 °C)  Min: 96.2 °F (35.7 °C)  Max: 98.5 °F (36.9 °C)  Pulse  Av.3  Min: 71  Max: 108  Resp  Av  Min: 18  Max: 20  SpO2  Av.1 %  Min: 95 %  Max: 98 %  I/O last 3 completed shifts:  In: 605 [P.O.:605]  Out: 1001 [Urine:1001]    Physical Examination:  Constitutional: He is oriented to person, place, and time. He appears well-developed.   Head: Normocephalic and atraumatic.   Eyes: Pupils are equal, round, and reactive to light.   Neck: Normal range of motion. Neck supple.   Cardiovascular: Normal rate and regular rhythm.  No murmurs, rubs or gallops   Pulmonary/Chest: Effort normal. No respiratory distress. CTA b/l. No wheezes, rhales, rhonci   Abdominal: Soft. Non-tender. BS +. He exhibits no mass.   Musculoskeletal: Normal range of motion. He exhibits no deformity.   Neurological: He is alert and oriented to person, place, and time.   Reflexes: 2+ patellar on R and 3+ L patellar.   Hypoglossal nn. Deficit with tongue deviated to right side.   No nystagmus. Able to speak, swallow, hear equally. Able to close eyes tightly. Speech mildly slurred.  Sensory deficit with decreased sensation to light touch on Left Lower face. Smile asymmetric with left lower facial weakness present.   LE 5/5 muscle strength. UE 5/5 muscle strength although R>L bilaterally.   Gait not assessed. Pronator drift on left UE.   Skin: No rash noted. He is not diaphoretic. No erythema.   Psychiatric: He has a normal mood and affect. His behavior is normal.     Laboratory:  Laboratory Data Reviewed: yes  Pertinent Findings:    Recent Labs  Lab 18  1249 18  0402 05/10/18  0328   WBC 8.79 8.22  8.22 8.34  8.34   HGB 13.3* 12.1*  12.1* 11.9*  11.9*   HCT 37.6* 34.6*   34.6* 33.7*  33.7*    187  187 194  194   MCV 90 90  90 90  90   RDW 12.5 13.0  13.0 12.9  12.9    136  136 133*  133*   K 3.7 3.6  3.6 4.3  4.3   CL 99 103  103 101  101   CO2 28 25  25 24  24   BUN 19 14  14 11  11   CREATININE 1.22 1.2  1.2 1.2  1.2   * 104  104 115*  115*   PROT 8.9* 7.9  7.9 7.8  7.8   ALBUMIN 4.4 3.2*  3.2* 3.1*  3.1*   BILITOT 0.7 0.6  0.6 0.7  0.7   AST 27 17  17 17  17   ALKPHOS 71 56  56 55  55   ALT 27 13  13 12  12       Microbiology Data Reviewed: yes  Pertinent Findings:  n/a    Other Results:  EKG (my interpretation): no new     Radiology Data Reviewed: yes  Pertinent Findings:  MRI  1. Acute infarction involving the right basal ganglia and corona radiata.  2. Small remote lacunar infarcts involving the left basal ganglia, left thalamus, and right cerebellar hemisphere.    Current Medications:     Infusions:       Scheduled:   aspirin  81 mg Oral Daily    atorvastatin  80 mg Oral Daily    cefTRIAXone (ROCEPHIN) IM WITH LIDOCAINE  1 g Intramuscular Once    clopidogrel  75 mg Oral Daily    docusate sodium  100 mg Oral BID    enoxaparin  40 mg Subcutaneous Daily    hydroCHLOROthiazide  12.5 mg Oral Daily    moxifloxacin  400 mg Oral Daily    sodium chloride 0.9%  3 mL Intravenous Q8H        PRN:  benzonatate, labetalol, LORazepam, pneumoc 13-lorena conj-dip cr(PF)    Antibiotics and Day Number of Therapy:  Rec'd ceftriaxone X 1   Moxi started 5/10 -present     Lines and Day Number of Therapy:  L PIV    Assessment:     Haris Hernandez is a 72 y.o.male with  Patient Active Problem List    Diagnosis Date Noted    BPH (benign prostatic hyperplasia) 05/08/2018    ETOH abuse 05/08/2018    HTN (hypertension) 05/08/2018    Normocytic anemia 05/08/2018    Cerebrovascular accident (CVA) 05/07/2018        Plan:     Acute/Subacute Right Caudate/Anterior Internal Capsule Lacunar CVA  -CT head: Low-density zone right caudate  nucleus/anterior internal capsule with considerations including acute to subacute lacunar type infarct.  Several chronic lacunar type infarcts.  -MRI confirms stroke  -[facility] neuro Dr. Lara evaluated the patient. No tpa. No LVO suspected. Recs per note.   -EKG with NSR and HR 80s, LVH, q waves in anterior leads v1-v3  -LP WNL  -a1c 5.2  -DAPT + statin, BP allowed 160-180, need tight BP control once discharged.  -PT/OT recommending IPR  SW consulted    CAP  -pt with productive cough and low-grade fever, new airspace disease LLL  -procal wnl, however will continue abx with new CXR findings, Moxifloxacin 400 qd for 7 days.     CKD II  -gfr 60, cr 1.22  -stable     HTN  -BP on admission 208/103  -On home nefedipime 60 and HCTZ 25  initially held  -given 10mg IV labetolol for high bp,  24 hr permissive HTN   - adding back home BP meds for tighter control now out from acute cva.      Anemia of chronic disease   -H/H 13.3/37.6, MCV 90  -No s/s of bleeding  -Iron profile/ ferritin/ vitamin studies c/w AoCD.      Protein Gap (4.5)  -HIV/hep panel/SPEP/UPEP significant only for paraproteinemia  -UA with slight amnt protein, no m-spike or abnormal ratios      Alcohol Abuse  -Daily drinker: 4 beers and 1/2 pint of liquor last drink yesterday 5pm. Denies WD or seizures  -YOSVANY <10  -concern for withdrawal on 5/10  overnight team started valium taper, but this was stopped after fever and diaphoresis attributed to pneumonia. No signs withdrawal since.      BPH  -Cont home finasteride and tamsulosin   -Stable  -Follow up PCP        Dispo: inpatient rehab.   Diet: soft, cardiac   Code: Full  DVT: lovenox     Gracie Rivera  [facility] HO-I  U IM Service Team A    [facility] Medicine Hospitalist Pager numbers:   [facility] Hospitalist Medicine Team A (Sima/Reed): [phone]  [facility] Hospitalist Medicine Team B (Kirt/Malaika):  [phone]

## 2018-05-11 NOTE — PT/OT/SLP PROGRESS
Occupational Therapy   Treatment    Name: Haris Hernandez  MRN: [id number]  Admitting Diagnosis:  Cerebrovascular accident (CVA)       Recommendations:     Discharge Recommendations: rehabilitation facility  Discharge Equipment Recommendations:   (Defer to IPR)  Barriers to discharge:  None    Subjective     Communicated with: nurseAlisia prior to session.  Pain/Comfort:  · Pain Rating 1: 0/10  · Pain Rating Post-Intervention 1: 0/10    Patients cultural, spiritual, Hoahaoism conflicts given the current situation:  (none reported)    Objective:     Patient found with: bed alarm, telemetry    General Precautions: Standard, fall, aphasia   Orthopedic Precautions:N/A   Braces: N/A     Bed Mobility:    · Patient completed Scooting/Bridging with contact guard assistance  · Patient completed Supine to Sit with contact guard assistance and with side rail  · Patient completed Sit to Supine with contact guard assistance and for LLE     Functional Mobility/Transfers:  · Patient completed Sit <> Stand Transfer with minimum assistance  with  rolling walker     Activities of Daily Living:  · N/A    Patient left HOB elevated with all lines intact, call button in reach, bed alarm on, RN and MD team notified and daughter present    [facility] 6 Click:  [facility] Total Score: 14    Treatment & Education:  Bed mob as noted above. Patient demonstrated increased L lateral lean that improved slightly with WBing into R palm and forearm, with dynamic reaching with LUE across midline. Patient noted to have increased facial droop on L side, with increased drooling as well. Patient required Min (A) to stand from EOB and (A) to place LUE on RW . Patient with narrow DAVION during stance and required (A) to move L foot laterally to widen DAVION. Patient with increased difficulty picking up L foot to step forward - able to ambulate ~2-3 ft FWD/BWD using RW with Min-Mod (A) to facilitate upright and decrease L lateral lean. Patient with increased unilateral  weakness noted to L side (compared to Wednesday's date). Decreased fluidity, coordination and strength in LUE during reaching tasks. Patient was able to scoot laterally to HOB from seated position with CGA and VCs. Patient educated on looking to L side and increasing attention to person's face when speaking to someone (during conversation, patient was speaking to MULLINS on L side, but looking to R side).   Education:    Assessment:   Patient with worsening balance, increased unilateral (L) weakness, increased L facial droop, and slight tone noted in L bicep. PTA, patient was living in community setting functioning at independent level for ADLs, and mobility prior to this event.  There is an expectation of returning to prior level of function to maintain independence thus avoiding readmission.  Patient's clinical condition meets full Inpatient Rehab (IPR) criteria; including the ability to actively participate in 3 hours of therapy.  A lower level of care(SNF) cannot provide the interdisciplinary treatment approach needed.     Haris Hernandez is a 72 y.o. male with a medical diagnosis of Cerebrovascular accident (CVA).  Performance deficits affecting function are weakness, impaired endurance, impaired self care skills, impaired functional mobilty, gait instability, impaired balance, decreased upper extremity function, decreased lower extremity function, decreased safety awareness, impaired cognition, decreased coordination, impaired fine motor, impaired coordination, abnormal tone.      Rehab Prognosis:  Good; patient would benefit from acute skilled OT services to address these deficits and reach maximum level of function.       Plan:     Patient to be seen 5 x/week to address the above listed problems via self-care/home management, therapeutic activities, therapeutic exercises  · Plan of Care Expires: 06/08/18  · Plan of Care Reviewed with: patient, daughter    This Plan of care has been discussed with the patient who  was involved in its development and understands and is in agreement with the identified goals and treatment plan    GOALS:    Occupational Therapy Goals        Problem: Occupational Therapy Goal    Goal Priority Disciplines Outcome Interventions   Occupational Therapy Goal     OT, PT/OT Ongoing (interventions implemented as appropriate)    Description:  Goals to be met by: 06/08/18     Patient will increase functional independence with ADLs by performing:    LE Dressing with Supervision.  Grooming while standing at sink with Supervision.  Toileting from toilet with Supervision for hygiene and clothing management.   Toilet transfer to toilet with Supervision.  Increased functional strength to WFL for ADLs.                      Time Tracking:     OT Date of Treatment: 05/11/18  OT Start Time: 1008  OT Stop Time: 1101  OT Total Time (min): 53 min    Billable Minutes:Therapeutic Activity 23  Neuromuscular Re-education 30    HARPREET Beard/JACOB  5/11/2018

## 2018-05-11 NOTE — PLAN OF CARE
placed call to Kristin- [facility] liason to find out if patient can admit to rehab on tomorrow (Saturday). Elaina reported she had to check with her admissions director and follow up.    Elaina had to check with [employer] reviewer Felisa and find out if a single case agreement can be done in order to admit this patient.   asked Elaina to follow up with  because if their facility cannot get agreement, we would have to proceed with family's second choice ([facility]).

## 2018-05-11 NOTE — PLAN OF CARE
Problem: Patient Care Overview  Goal: Plan of Care Review  Outcome: Ongoing (interventions implemented as appropriate)  Pt safety maintained. Bed in low and locked position. NSR on monitor. Neuro checks complete q4 hours. L side facial droop and L sided weakness remains present. CT of head done. Barium  swallow done. Pt no on a puree diet with nectar think liquids. No acute distress noted. Will continue to monitor.

## 2018-05-11 NOTE — PROCEDURES
Modified Barium Swallow    Patient Name:  Haris Hernandez   MRN:  [id number]   [id number]/[id number] A    Recommendations:     Recommendations:                General Recommendations:  Dysphagia therapy, Speech/language therapy and Cognitive-linguistic therapy  Diet recommendations:  NPO, NPO consider alterative means of nutrition/hydration/medication  If team/patient/family wishes to continue with oral diet, this SLP feels safest possible diet to be Puree textures/ NECTAR Thick Liquids and strict aspiration precautions with p.o. intake. Please note even with these altered consistencies and precautions, pt does remain at moderate-high risk for aspiration  · Aspiration Precautions:   1 bite/sip at a time, Alternating bites/sips, Assistance with meals, Assistance with thickening liquids, Check for pocketing/oral residue, Frequent oral care, HOB to 90 degrees, Meds crushed in puree, Monitor for s/s of  aspiration, No straws, Remain upright 30 minutes post meal, Small bites/sips and Strict aspiration precautions   General Precautions: Standard, fall, aspiration  Communication strategies:  none    Referral     Reason for Referral  Patient was referred for a Modified Barium Swallow Study to assess the efficiency of his/her swallow function, rule out aspiration and make recommendations regarding safe dietary consistencies, effective compensatory strategies, and safe eating environment.     Diagnosis: Cerebrovascular accident (CVA)       History:     Past Medical History:   Diagnosis Date    Coronary artery disease     Enlarged prostate without lower urinary tract symptoms (luts)     BPH    Enlarged prostate without lower urinary tract symptoms (luts)     BPH    Hypertension      CXR 5/10/2018: Interval left lower lobe airspace disease concerning for developing aspiration or pneumonia.    Communicated with RN prior to MBSS who reported noted choking/coughing with medications.RN reported daughter insisting cough was unrelated to  "swallowing.     Patient's daughter reporting noted difficulties at home with swallow, frequent choking episodes, and noted increased coughing since being in the hospital. She reported the patient often gets choked, however, she believes it is 2/2 eating too much too quickly.      Objective:     Current Respiratory Status: 05/11/18    Alert: yes    Cooperative: yes    Follows Directions: yes    Visualization  · Patient was seen in the lateral view    Oral Peripheral Examination  · Oral Musculature: Left side asymmetry present  · Dentition: present and adequate  · Mucosal Quality: good  · Mandibular Strength and Mobility: WFL  · Oral Labial Strength and Mobility: WFL  · Lingual Strength and Mobility: WFL  · Buccal Strength and Mobility: WFL  · Volitional Swallow:  Decreased laryngeal elevation upon palpation  · Voice Prior to PO Intake: slurred speech    Consistencies Assessed  · Thin tsp x1, cup sip x3  · Nectar thick cup sip x3  · Honey thick cup sip x1, tsp sip x1  · Puree tsp bite x3     Oral Preparation/Oral Phase  · prolonged A-P transfer  · Decreased base of tongue mobility  · Premature spillage       Pharyngeal Phase   Patient presents with severe pharyngeal dysphagia c/b decreased BOT retraction, pharyngeal wall constriction, and hyolaryngeal elevation/excursion resulting in penetration to the chords with thin liquids with an inconsistent cough reflex and silent consistent penetration above the chords with nectar, honey, and puree consistencies with likely aspiration after the swallow of penetrated material. Shoulders obstructing adequate view of trachea.     Head turn to the left, multiple swallows, chin tuck, and small sips were not effective in preventing penetration.     Thin:   · Premature spillage to the vallecula prior to the initiation of the swallow. (Patient's daughter states, "Is it supposed to pool in his throat before he swallows?)  · Severe piriform sinus residue after the swallow which patient " is unable to clear with cued swallows.   · Penetration to the chords with very small cup sip of thin liquid with a cough which reduced but did not clear penetrated material from airway, cued coughs elicited  · Continued consistent penetration with all further trials despite swallowing strategies    Nectar:   · Premature spillage to the vallecula prior to the initiation of the swallow  · Mild-moderate piriform sinus residue after the swallow which patient is unable to clear with cued swallows   · Consistent silent penetration with all nectar thick liquid trials with possible aspiration, however, shoulders obstructing view    Honey:   · Noted lingual pumping  · Premature spillage along BOT and to the vallecula prior to the initiation of the swallow  · Mild piriform sinus residue after the swallow  · Consistent silent penetration with all trials with possible aspiration, however, shoulders obstructing view    Puree:  · Noted lingual pumping  · Premature spillage along BOT and to the vallecula prior to the initiation of the swallow  · Mild piriform sinus residue after the swallow  · Consistent silent penetration with all trials with possible aspiration, however, shoulders obstructing view    Cervical Esophageal Phase  · UES appeared to accommodate all bolus types without stasis or retrograde movement observed     Assessment:     Impressions  ·  Patient presents with severe oral pharyngeal dysphagia c/b impaired oral motor strength and coordination, BOT retraction, pharyngeal wall constriction, and hyolaryngeal elevation/excursion resulting in penetration to the chords with thin liquids with an inconsistent cough reflex and silent consistent penetration above the chords with nectar, honey, and puree consistencies with likely aspiration after the swallow of penetrated material. Shoulders obstructing adequate view of trachea.     Prognosis: Good    Barriers:  · None    Plan  Patient would benefit from continued Dysphagia  "therapy in Inpatient Rehabilitation.     Education  Results were discussed with patient.  SLP provided extensive patient and family (daughter) education on MBSS results, swallowing anatomy, SLP recommendations, risk to aspirate all po intake, recommendation for alternative means vs. safest diet with continued risk, SLP role, s/s and risks of aspiraiton, safe swallow precautions, and POC. All questions and concerns addressed. Patient's daughter agitated with MBSS being completed right before discharge. SLP provided education on recommendation 2/2 concern for silent aspiration given recent chest x-ray, noted decreased speech intelligibility, increased overall weakness, and noted difficulties with liquids at the bedside. Patient's daughter verbalized understanding of all discussed. Patient states, "Just explain it to [my daughter]."  SLP communicated results and recommendations with team. MD/patient/family requesting safest diet recommendations.         Goals:    SLP Goals        Problem: SLP Goal    Goal Priority Disciplines Outcome   SLP Goal     SLP Ongoing (interventions implemented as appropriate)   Description:  Updated Short Term Goals:   1. Pt will tolerate pureed diet and nectar thick liquids with no overt s/s of aspiration.   2. Pt will demonstrate/verbalize 3 safe swallowing precautions with no cues.   3. Pt will complete dysphagia therapy exercises x15 each min cues.   4. Pt will participate in a repeat MBSS in 2-4 weeks as warranted per MD and SLP.   5. Pt will complete mental manipulation tasks (i.e word finding, categorical, etc.) with 80% acc given min assist for 2 consecutive sessions.  6. Pt will recall 3/3 given items with 3-5 min delay with min assist for 2 consecutive sessions.  7. Pt will answer questions related to given short story with 80% acc, ind'ly for 2 consecutive session.    Short Term Goals:  1. Pt will participate in BSS to determine least restrictive diet.- MET 5/8  2. Pt will " participate in informal speech-lang eval to r/o cognitive-linguistic deficits.- MET 5/8  3. Pt will complete mental manipulation tasks (i.e word finding, categorical, etc.) with 80% acc given min assist for 2 consecutive sessions. -ongoing  4. Pt will recall 3/3 given items with 3-5 min delay with min assist for 2 consecutive sessions. -ongoing  5. Pt will answer questions related to given short story with 80% acc, ind'ly for 2 consecutive session. -ongoing                      Plan:   · Patient to be seen:  Therapy Frequency: 3 x/week   · Plan of Care expires:  06/07/18  · Plan of Care reviewed with:  patient, daughter (IGNACIA Crump)        Discharge recommendations:  rehabilitation facility       Time Tracking:   SLP Treatment Date:   05/11/18  Speech Start Time:  1350  Speech Stop Time:  1420     Speech Total Time (min):  30 min    SANTOS Szymanski, CCC-SLP  05/11/2018

## 2018-05-11 NOTE — PT/OT/SLP PROGRESS
Physical Therapy      Patient Name:  Haris Hernandez   MRN:  [id number]    2 attempts made.  Pt was with speech therapy on first attempt and off unit in a CAT scan on second attempt.    Nimo Crane, PTA

## 2018-05-11 NOTE — PLAN OF CARE
spoke with Felisa Ventura reviewer she reported Peer to peer was completed and approval for  Rehab was pending results of CT scan. Felisa will follow up with  once she hears from her medical director.     spoke with Hermelinda ([phone])- admissions coordinator for [facility] to find out if medically ready could patient can admit to their facility on tomorrow (Saturday). Hermelinda reported patient would not be able to admit tomorrow, but would be able to admit on Sunday 5/13. Due to the holiday, a doctor would not be available until then.    Hermelinda reported the on call clinical person (Vlad Crow [phone]) working this weekend will be available.    Update: Hermelinda admissions coordinator at [facility] called to inform  patient cannot admit on Sunday as originally planned. Pending medical clearance and insurance approval patient will be able to admit  on Monday 5/14/18.

## 2018-05-11 NOTE — PLAN OF CARE
Problem: SLP Goal  Goal: SLP Goal  Updated Short Term Goals:   1. Pt will tolerate pureed diet and nectar thick liquids with no overt s/s of aspiration.   2. Pt will demonstrate/verbalize 3 safe swallowing precautions with no cues.   3. Pt will complete dysphagia therapy exercises x15 each min cues.   4. Pt will participate in a repeat MBSS in 2-4 weeks as warranted per MD and SLP.   5. Pt will complete mental manipulation tasks (i.e word finding, categorical, etc.) with 80% acc given min assist for 2 consecutive sessions.  6. Pt will recall 3/3 given items with 3-5 min delay with min assist for 2 consecutive sessions.  7. Pt will answer questions related to given short story with 80% acc, ind'ly for 2 consecutive session.    Short Term Goals:  1. Pt will participate in BSS to determine least restrictive diet.- MET 5/8  2. Pt will participate in informal speech-lang eval to r/o cognitive-linguistic deficits.- MET 5/8  3. Pt will complete mental manipulation tasks (i.e word finding, categorical, etc.) with 80% acc given min assist for 2 consecutive sessions. -ongoing  4. Pt will recall 3/3 given items with 3-5 min delay with min assist for 2 consecutive sessions. -ongoing  5. Pt will answer questions related to given short story with 80% acc, ind'ly for 2 consecutive session. -ongoing    Outcome: Ongoing (interventions implemented as appropriate)  MBSS completed. Patient presented with silent penetration with all consistencies trialed with likely aspiration after the swallow. Patient's shoulders obstructing adequate view of trachea. Inconsistent cough reflex. Patient is at a moderate-high risk to aspirate all po intake. Recommendations: NPO with consideration for alternative means of nutrition/hydration/medication vs. safest diet with risk to aspirate. Safest diet recommendations: Pureed diet, nectar thick liquids, small bites/sips 1 at a time, excellent and frequent oral care, alternate bites and sips, upright for  all po intake and 30 minutes after po intake, strict precautions. ST will continue to follow.   ANIYA Iniguez, CCC-SLP  Spectra: [phone]  05/11/2018

## 2018-05-12 LAB
BACTERIA UR CULT: NO GROWTH
PROCALCITONIN SERPL IA-MCNC: 0.2 NG/ML

## 2018-05-12 PROCEDURE — A4216 STERILE WATER/SALINE, 10 ML: HCPCS | Performed by: INTERNAL MEDICINE

## 2018-05-12 PROCEDURE — 11000001 HC ACUTE MED/SURG PRIVATE ROOM

## 2018-05-12 PROCEDURE — 97116 GAIT TRAINING THERAPY: CPT

## 2018-05-12 PROCEDURE — 25000003 PHARM REV CODE 250: Performed by: INTERNAL MEDICINE

## 2018-05-12 PROCEDURE — 84145 PROCALCITONIN (PCT): CPT

## 2018-05-12 PROCEDURE — 94761 N-INVAS EAR/PLS OXIMETRY MLT: CPT

## 2018-05-12 PROCEDURE — 97535 SELF CARE MNGMENT TRAINING: CPT

## 2018-05-12 PROCEDURE — 92526 ORAL FUNCTION THERAPY: CPT

## 2018-05-12 PROCEDURE — 25000003 PHARM REV CODE 250: Performed by: STUDENT IN AN ORGANIZED HEALTH CARE EDUCATION/TRAINING PROGRAM

## 2018-05-12 PROCEDURE — 97530 THERAPEUTIC ACTIVITIES: CPT

## 2018-05-12 PROCEDURE — 63600175 PHARM REV CODE 636 W HCPCS: Performed by: INTERNAL MEDICINE

## 2018-05-12 PROCEDURE — 36415 COLL VENOUS BLD VENIPUNCTURE: CPT

## 2018-05-12 RX ADMIN — ENOXAPARIN SODIUM 40 MG: 100 INJECTION SUBCUTANEOUS at 05:05

## 2018-05-12 RX ADMIN — SODIUM CHLORIDE, PRESERVATIVE FREE 3 ML: 5 INJECTION INTRAVENOUS at 10:05

## 2018-05-12 RX ADMIN — LACTULOSE 20 G: 20 SOLUTION ORAL at 08:05

## 2018-05-12 RX ADMIN — MOXIFLOXACIN HYDROCHLORIDE 400 MG: 400 TABLET, FILM COATED ORAL at 08:05

## 2018-05-12 RX ADMIN — DOCUSATE SODIUM 100 MG: 100 CAPSULE, LIQUID FILLED ORAL at 08:05

## 2018-05-12 RX ADMIN — SODIUM CHLORIDE, PRESERVATIVE FREE 3 ML: 5 INJECTION INTRAVENOUS at 06:05

## 2018-05-12 RX ADMIN — ATORVASTATIN CALCIUM 80 MG: 40 TABLET, FILM COATED ORAL at 08:05

## 2018-05-12 RX ADMIN — CLOPIDOGREL BISULFATE 75 MG: 75 TABLET ORAL at 08:05

## 2018-05-12 RX ADMIN — NIFEDIPINE 30 MG: 30 TABLET, FILM COATED, EXTENDED RELEASE ORAL at 08:05

## 2018-05-12 RX ADMIN — ASPIRIN 81 MG: 81 TABLET, COATED ORAL at 08:05

## 2018-05-12 RX ADMIN — HYDROCHLOROTHIAZIDE 12.5 MG: 12.5 TABLET ORAL at 08:05

## 2018-05-12 RX ADMIN — SODIUM CHLORIDE, PRESERVATIVE FREE 3 ML: 5 INJECTION INTRAVENOUS at 02:05

## 2018-05-12 NOTE — PROGRESS NOTES
[place] contacted Vlad Crow [phone], [facility]    Ms Crow stated that they were not expecting pt to discharge until Monday and their MDs will not be available to admit pt until then. TN informed Dr. Gao's team

## 2018-05-12 NOTE — PLAN OF CARE
Problem: Physical Therapy Goal  Goal: Physical Therapy Goal  Goals to be met by: 2018     Patient will increase functional independence with mobility by performin) Sup<>sit mod I with HOB flat and no use of bed rails  2)Sit <>stand Mod I with RW  3) Ambulate at least 150 feet with Mod I with RW demonstrating good gait stability   4) Pt to turn during ambulation safely with RW Mod I and no gait instability.   5) Bed <>chair mod I with RW.     Outcome: Ongoing (interventions implemented as appropriate)  Patient progressing.  Remains appropriate for inpatient rehab.

## 2018-05-12 NOTE — PROGRESS NOTES
[facility] Internal Medicine Resident HO-II Progress Note    Subjective:     Failed MBSS yesterday but worked with ST and cleared for pureed diet. Pt without complaints this AM and tolerating pureed diet. Denies cp, sob, abd pain, n/v. Voiding without difficulty.    Objective:   Last 24 Hour Vital Signs:  BP  Min: 132/69  Max: 157/75  Temp  Av.3 °F (36.8 °C)  Min: 97.7 °F (36.5 °C)  Max: 98.8 °F (37.1 °C)  Pulse  Av.1  Min: 66  Max: 89  Resp  Av.3  Min: 16  Max: 18  SpO2  Av.7 %  Min: 93 %  Max: 98 %  I/O last 3 completed shifts:  In: 690 [P.O.:690]  Out: 900 [Urine:900]    Physical Examination:  Constitutional: He is oriented to person, place, and time. He appears well-developed.   Head: Normocephalic and atraumatic.   Eyes: Pupils are equal, round, and reactive to light.   Neck: Normal range of motion. Neck supple.   Cardiovascular: Normal rate and regular rhythm.  No murmurs, rubs or gallops   Pulmonary/Chest: Effort normal. No respiratory distress. CTA b/l. No wheezes, rhales, rhonci   Abdominal: Soft. Non-tender. BS +. He exhibits no mass.   Musculoskeletal: Normal range of motion. He exhibits no deformity.   Neurological: He is alert and oriented to person, place, and time.   Reflexes: 2+ patellar on R and 3+ L patellar.   Hypoglossal nn. Deficit with tongue deviated to right side.   No nystagmus. Able to speak, swallow, hear equally. Able to close eyes tightly. Speech mildly slurred.  Sensory deficit with decreased sensation to light touch on Left Lower face. Smile asymmetric with left lower facial weakness present.   LLE and LUE 4-/5 muscle strength. RUE and RLE 5/5 muscle strength  Gait not assessed. Pronator drift on left UE.   Skin: No rash noted. He is not diaphoretic. No erythema.   Psychiatric: He has a normal mood and affect. His behavior is normal.     Laboratory:  Laboratory Data Reviewed: yes  Pertinent Findings:    Recent Labs  Lab 18  1249 18  0402 05/10/18  0328   WBC  8.79 8.22  8.22 8.34  8.34   HGB 13.3* 12.1*  12.1* 11.9*  11.9*   HCT 37.6* 34.6*  34.6* 33.7*  33.7*    187  187 194  194   MCV 90 90  90 90  90   RDW 12.5 13.0  13.0 12.9  12.9    136  136 133*  133*   K 3.7 3.6  3.6 4.3  4.3   CL 99 103  103 101  101   CO2 28 25  25 24  24   BUN 19 14  14 11  11   CREATININE 1.22 1.2  1.2 1.2  1.2   * 104  104 115*  115*   PROT 8.9* 7.9  7.9 7.8  7.8   ALBUMIN 4.4 3.2*  3.2* 3.1*  3.1*   BILITOT 0.7 0.6  0.6 0.7  0.7   AST 27 17  17 17  17   ALKPHOS 71 56  56 55  55   ALT 27 13  13 12  12       Microbiology Data Reviewed: yes  Pertinent Findings:  n/a    Other Results:  EKG (my interpretation): no new     Radiology Data Reviewed: yes  Pertinent Findings:  MRI  1. Acute infarction involving the right basal ganglia and corona radiata.  2. Small remote lacunar infarcts involving the left basal ganglia, left thalamus, and right cerebellar hemisphere.    Current Medications:     Infusions:       Scheduled:   aspirin  81 mg Oral Daily    atorvastatin  80 mg Oral Daily    cefTRIAXone (ROCEPHIN) IM WITH LIDOCAINE  1 g Intramuscular Once    clopidogrel  75 mg Oral Daily    docusate sodium  100 mg Oral BID    enoxaparin  40 mg Subcutaneous Daily    hydroCHLOROthiazide  12.5 mg Oral Daily    moxifloxacin  400 mg Oral Daily    NIFEdipine  30 mg Oral Daily    sodium chloride 0.9%  3 mL Intravenous Q8H        PRN:  benzonatate, labetalol, lactulose, pneumoc 13-lorena conj-dip cr(PF)    Antibiotics and Day Number of Therapy:  Rec'd ceftriaxone X 1   Moxi started 5/10 -present     Lines and Day Number of Therapy:  L PIV    Assessment:     Haris Hernandez is a 72 y.o.male with  Patient Active Problem List    Diagnosis Date Noted    BPH (benign prostatic hyperplasia) 05/08/2018    ETOH abuse 05/08/2018    HTN (hypertension) 05/08/2018    Normocytic anemia 05/08/2018    Cerebrovascular accident (CVA) 05/07/2018        Plan:      Acute/Subacute Right Caudate/Anterior Internal Capsule Lacunar CVA  -CT head: Low-density zone right caudate nucleus/anterior internal capsule with considerations including acute to subacute lacunar type infarct.  Several chronic lacunar type infarcts.  -MRI confirms stroke  -[facility] neuro Dr. Lara evaluated the patient. No tpa. No LVO suspected. Recs per note.   -EKG with NSR and HR 80s, LVH, q waves in anterior leads v1-v3  -LP WNL  -a1c 5.2  -DAPT + statin, BP allowed 160-180, need tight BP control once discharged.  -PT/OT recommending IPR and approved  plan for transfer on 5/14    CAP  -pt with productive cough and low-grade fever, new airspace disease LLL  -procal wnl, however will continue abx with new CXR findings, Moxifloxacin 400 qd for 7 days.     CKD II  -gfr 60, cr 1.22  -stable     HTN  -BP on admission 208/103  -On home nefedipime 60 and HCTZ 25  initially held  -given 10mg IV labetolol for high bp,  24 hr permissive HTN   - adding back home BP meds for tighter control now out from acute cva.      Anemia of chronic disease   -H/H 13.3/37.6, MCV 90  -No s/s of bleeding  -Iron profile/ ferritin/ vitamin studies c/w AoCD.      Protein Gap (4.5)  -HIV/hep panel/SPEP/UPEP significant only for paraproteinemia  -UA with slight amnt protein, no m-spike or abnormal ratios      Alcohol Abuse  -Daily drinker: 4 beers and 1/2 pint of liquor last drink yesterday 5pm. Denies WD or seizures  -YOSVANY <10  -concern for withdrawal on 5/10  overnight team started valium taper, but this was stopped after fever and diaphoresis attributed to pneumonia. No signs withdrawal since.      BPH  -Cont home finasteride and tamsulosin   -Stable  -Follow up PCP        Dispo: inpatient rehab on 5/14  Diet: soft, cardiac   Code: Full  DVT: akshat Flores  [facility] Internal Medicine HO-II  [facility] IM Service Team A    [facility] Medicine Hospitalist Pager numbers:   [facility] Hospitalist Medicine Team A (Sima/Reed): [phone]  [facility]  Hospitalist Medicine Team B (Kirt/Malaika):  [phone]

## 2018-05-12 NOTE — PT/OT/SLP PROGRESS
Speech Language Pathology Treatment    Patient Name:  Haris Hernandez   MRN:  [id number]  Admitting Diagnosis: Cerebrovascular accident (CVA)    Recommendations:                 General Recommendations:  Dysphagia therapy, Speech/language therapy and Cognitive-linguistic therapy  Diet recommendations:  Puree, Liquid Diet Level: Nectar Thick   Aspiration Precautions: 1 bite/sip at a time, Alternating bites/sips, Assistance with meals and Assistance with thickening liquids, Feed only when awake/alert, HOB to 90 degrees, Meds crushed in puree, Monitor for s/s of aspiration, Remain upright 30 minutes post meal, Small bites/sips and Strict aspiration precautions   General Precautions: Standard, aspiration, fall    Subjective     Pt awake with dtr at bedside--pureed tray arriving as SLP entered room. Pt exhibited severe oral-pharyngeal dysphagia c/b impaired oral motor strength and coordination, BOT retraction, pharyngeal wall constriction, and hyolaryngeal elevation/excursion resulting in penetration to the chords with thin liquids with an inconsistent cough reflex and silent consistent penetration above the chords with nectar, honey, and puree consistencies with likely aspiration after the swallow of penetrated material. Shoulders obstructing adequate view of trachea. It was recommend that pt remain NPO with alternative means of nutrition/hydration/medication 2/2 HIGH risk of aspiration. Pt and pt's family refusing alternative means of nutrition and insisting pt be placed on diet despite high risk of aspiration.    Pain/Comfort:  · Pain Rating 1: 0/10    Objective:     Has the patient been evaluated by SLP for swallowing?   Yes  Keep patient NPO? No   Current Respiratory Status: room air      SLP provided extensive education to pt and pt's dtr re: results of MBSS, pt's HIGH risk of aspiration, diet recs, swallow precautions, and thickening liquids. Pt and pt's dtr requiring max cues/encouragement to understand pt's risk of  aspiration and dysphagia. SLP stressed importance of swallow precautions to be utilized during meals to optimize safety with PO intake.    SLP introduced dysphagia exercises targeting BOT retraction (effortful swallow, articulation exercises including word-ending /k/ and /g/ sounds) and hyolaryngeal elevation/excursion exercises (falsetto and mendelsohn maneuver). Printed handouts provided to pt and pt's dtr re: dysphagia exercises and instructions. Pt required MAX cues and encouragement to participate in indirect dysphagia tx. Notable difficulty noted with all exercises 2/2 dcr oral musculature and laryngeal musculature strength.    Assessment:     Haris Hernandez is a 72 y.o. male presents with severe oral pharyngeal dysphagia c/b impaired oral motor strength and coordination, BOT retraction, pharyngeal wall constriction, and hyolaryngeal elevation/excursion resulting in penetration to the chords with thin liquids with an inconsistent cough reflex and silent consistent penetration above the chords with nectar, honey, and puree consistencies with likely aspiration after the swallow of penetrated material. Pt may have small bites of puree consistencies and small sips of nectar thick liquids with 1:1 assist and STRICT adherence to standard swallow precautions. ST will f/u for ongoing direct and indirect dysphagia tx and for ongoing education to pt and pt's family.    Goals:    SLP Goals        Problem: SLP Goal    Goal Priority Disciplines Outcome   SLP Goal     SLP Ongoing (interventions implemented as appropriate)   Description:  Updated Short Term Goals:   1. Pt will tolerate pureed diet and nectar thick liquids with no overt s/s of aspiration. --ongoing  2. Pt will demonstrate/verbalize 3 safe swallowing precautions with no cues.   3. Pt will complete dysphagia therapy exercises x15 each min cues. --ongoing  4. Pt will participate in a repeat MBSS in 2-4 weeks as warranted per MD and SLP. --MET  5. Pt will  complete mental manipulation tasks (i.e word finding, categorical, etc.) with 80% acc given min assist for 2 consecutive sessions.  6. Pt will recall 3/3 given items with 3-5 min delay with min assist for 2 consecutive sessions.  7. Pt will answer questions related to given short story with 80% acc, ind'ly for 2 consecutive session.    Short Term Goals:  1. Pt will participate in BSS to determine least restrictive diet.- MET 5/8  2. Pt will participate in informal speech-lang eval to r/o cognitive-linguistic deficits.- MET 5/8  3. Pt will complete mental manipulation tasks (i.e word finding, categorical, etc.) with 80% acc given min assist for 2 consecutive sessions. -ongoing  4. Pt will recall 3/3 given items with 3-5 min delay with min assist for 2 consecutive sessions. -ongoing  5. Pt will answer questions related to given short story with 80% acc, ind'ly for 2 consecutive session. -ongoing                       Plan:     · Patient to be seen:  3 x/week   · Plan of Care expires:  06/07/18  · Plan of Care reviewed with:  patient, daughter, other (see comments) (IGNACIA Cain)   · SLP Follow-Up:  Yes       Discharge recommendations:  rehabilitation facility   Barriers to Discharge:  None    Time Tracking:     SLP Treatment Date:   05/12/18  Speech Start Time:  1125  Speech Stop Time:  1145     Speech Total Time (min):  20 min    Billable Minutes: Treatment Swallowing Dysfunction 10 minutes and Seld Care/Home Management Training 10 minutes    Carine Acosta CCC-SLP  05/12/2018

## 2018-05-12 NOTE — PLAN OF CARE
Problem: SLP Goal  Goal: SLP Goal  Updated Short Term Goals:   1. Pt will tolerate pureed diet and nectar thick liquids with no overt s/s of aspiration. --ongoing  2. Pt will demonstrate/verbalize 3 safe swallowing precautions with no cues.   3. Pt will complete dysphagia therapy exercises x15 each min cues. --ongoing  4. Pt will participate in a repeat MBSS in 2-4 weeks as warranted per MD and SLP. --MET  5. Pt will complete mental manipulation tasks (i.e word finding, categorical, etc.) with 80% acc given min assist for 2 consecutive sessions.  6. Pt will recall 3/3 given items with 3-5 min delay with min assist for 2 consecutive sessions.  7. Pt will answer questions related to given short story with 80% acc, ind'ly for 2 consecutive session.    Short Term Goals:  1. Pt will participate in BSS to determine least restrictive diet.- MET 5/8  2. Pt will participate in informal speech-lang eval to r/o cognitive-linguistic deficits.- MET 5/8  3. Pt will complete mental manipulation tasks (i.e word finding, categorical, etc.) with 80% acc given min assist for 2 consecutive sessions. -ongoing  4. Pt will recall 3/3 given items with 3-5 min delay with min assist for 2 consecutive sessions. -ongoing  5. Pt will answer questions related to given short story with 80% acc, ind'ly for 2 consecutive session. -ongoing     Outcome: Ongoing (interventions implemented as appropriate)  Progressing towards goals. Pt and pt's family require ONGOING EXTENSIVE education re: risk of aspiration, pt's dysphagia, swallow precautions, and dysphagia exercises. ST to follow.

## 2018-05-12 NOTE — PLAN OF CARE
Problem: Patient Care Overview  Goal: Plan of Care Review  Outcome: Ongoing (interventions implemented as appropriate)  No fevers. Remains with left sided weakness.  Neuro checks unchanged.  Fall precautions maintained.  Bed alarm on.  Telemetry with NSR/ST.  Family at bedside.  Will continue with plan of care.

## 2018-05-12 NOTE — PT/OT/SLP PROGRESS
Physical Therapy Treatment    Patient Name:  Haris Hernandez   MRN:  [id number]    Recommendations:     Discharge Recommendations:  rehabilitation facility   Discharge Equipment Recommendations:  (defer to IPR)   Barriers to discharge: Decreased caregiver support    Assessment:     Haris Hernandez is a 72 y.o. male admitted with a medical diagnosis of Cerebrovascular accident (CVA).  He presents with the following impairments/functional limitations:  weakness, impaired endurance, gait instability, impaired functional mobilty, impaired self care skills, impaired balance, decreased upper extremity function, decreased lower extremity function, decreased coordination, decreased safety awareness, impaired fine motor, impaired coordination pt is making good progress and should continue to do so .    Rehab Prognosis:  Good; patient would benefit from acute skilled PT services to address these deficits and reach maximum level of function.      Recent Surgery: * No surgery found *      Plan:     During this hospitalization, patient to be seen 6 x/week to address the above listed problems via gait training, therapeutic activities, therapeutic exercises  · Plan of Care Expires:  06/08/18   Plan of Care Reviewed with: patient, family    Subjective     Communicated with nsg prior to session.  Patient found supine in bed upon PT entry to room, agreeable to treatment.      Chief Complaint: no c/o  Patient comments/goals: IPR  Pain/Comfort:  · Pain Rating 1: 0/10    Patients cultural, spiritual, Episcopal conflicts given the current situation: none    Objective:     Patient found with: bed alarm, telemetry     General Precautions: Standard, aspiration, fall   Orthopedic Precautions:N/A   Braces: N/A     Functional Mobility:  · Bed Mobility:     · Rolling Left:  stand by assistance with VC for reaching for BR  · Scooting: stand by assistance to EOB in sitting and to HOB in supine  · Bridging: stand by assistance  · Supine to Sit:  minimum assistance with VC for use of BR and bodymechanics  · Sit to Supine: contact guard assistance  · Transfers:     · Sit to Stand:  contact guard assistance with RW and VC for hand placemetn and safety and forward weight shift over DAVION  · Gait: 100' with RW and CGA/Min A.  Pt requires max VC for increased step length and sequencing with RW.  requires VC/TC for increased trunk ext and Min A with weight shift R  · Balance: Fair+/FAir sit, Fair/Fair- stand      AM-PAC 6 CLICK MOBILITY  Turning over in bed (including adjusting bedclothes, sheets and blankets)?: 3  Sitting down on and standing up from a chair with arms (e.g., wheelchair, bedside commode, etc.): 3  Moving from lying on back to sitting on the side of the bed?: 3  Moving to and from a bed to a chair (including a wheelchair)?: 3  Need to walk in hospital room?: 3  Climbing 3-5 steps with a railing?: 2  Total Score: 17       Therapeutic Activities and Exercises:   Bed mobility as above.  Pt sat at EOB with SBA and doffed 2nd gown with Max A.  Gait tr as above. Pt performed Press-ups from EOB with SBA and VC for forward weight shift over DAVION x 10 reps.  Pt doffed socks and shoes with min A and VC and Min A for balance.  max A to doff gown with SBA for balance.      Patient left supine with call button in reach, bed alarm on, nsg notified and family present..    GOALS:    Physical Therapy Goals        Problem: Physical Therapy Goal    Goal Priority Disciplines Outcome Goal Variances Interventions   Physical Therapy Goal     PT/OT, PT Ongoing (interventions implemented as appropriate)     Description:  Goals to be met by: 2018     Patient will increase functional independence with mobility by performin) Sup<>sit mod I with HOB flat and no use of bed rails  2)Sit <>stand Mod I with RW  3) Ambulate at least 150 feet with Mod I with RW demonstrating good gait stability   4) Pt to turn during ambulation safely with RW Mod I and no gait instability.    5) Bed <>chair mod I with RW.                      Time Tracking:     PT Received On: 05/12/18  PT Start Time: 1309     PT Stop Time: 1339  PT Total Time (min): 30 min     Billable Minutes: Gait Training 15 and Therapeutic Activity 15    Treatment Type: Treatment  PT/PTA: PT     PTA Visit Number: 0     Betzy Buckner, PT  05/12/2018

## 2018-05-13 PROCEDURE — A4216 STERILE WATER/SALINE, 10 ML: HCPCS | Performed by: INTERNAL MEDICINE

## 2018-05-13 PROCEDURE — 25000003 PHARM REV CODE 250: Performed by: INTERNAL MEDICINE

## 2018-05-13 PROCEDURE — 63600175 PHARM REV CODE 636 W HCPCS: Performed by: INTERNAL MEDICINE

## 2018-05-13 PROCEDURE — 92526 ORAL FUNCTION THERAPY: CPT

## 2018-05-13 PROCEDURE — 11000001 HC ACUTE MED/SURG PRIVATE ROOM

## 2018-05-13 PROCEDURE — 25000003 PHARM REV CODE 250: Performed by: STUDENT IN AN ORGANIZED HEALTH CARE EDUCATION/TRAINING PROGRAM

## 2018-05-13 PROCEDURE — 94761 N-INVAS EAR/PLS OXIMETRY MLT: CPT

## 2018-05-13 RX ADMIN — HYDROCHLOROTHIAZIDE 12.5 MG: 12.5 TABLET ORAL at 08:05

## 2018-05-13 RX ADMIN — SODIUM CHLORIDE, PRESERVATIVE FREE 3 ML: 5 INJECTION INTRAVENOUS at 09:05

## 2018-05-13 RX ADMIN — SODIUM CHLORIDE, PRESERVATIVE FREE 3 ML: 5 INJECTION INTRAVENOUS at 05:05

## 2018-05-13 RX ADMIN — CLOPIDOGREL BISULFATE 75 MG: 75 TABLET ORAL at 08:05

## 2018-05-13 RX ADMIN — NIFEDIPINE 30 MG: 30 TABLET, FILM COATED, EXTENDED RELEASE ORAL at 08:05

## 2018-05-13 RX ADMIN — SODIUM CHLORIDE, PRESERVATIVE FREE 3 ML: 5 INJECTION INTRAVENOUS at 02:05

## 2018-05-13 RX ADMIN — ATORVASTATIN CALCIUM 80 MG: 40 TABLET, FILM COATED ORAL at 08:05

## 2018-05-13 RX ADMIN — DOCUSATE SODIUM 100 MG: 100 CAPSULE, LIQUID FILLED ORAL at 08:05

## 2018-05-13 RX ADMIN — ASPIRIN 81 MG: 81 TABLET, COATED ORAL at 08:05

## 2018-05-13 RX ADMIN — ENOXAPARIN SODIUM 40 MG: 100 INJECTION SUBCUTANEOUS at 05:05

## 2018-05-13 NOTE — PLAN OF CARE
Problem: Patient Care Overview  Goal: Plan of Care Review  Outcome: Ongoing (interventions implemented as appropriate)  Neuro checks no change. Pt on tele monitor SR HR 70's no red alarms noted. Safety measures maintained. Bed is in the lowest position and locked. Call bell is within reach. Instructed pt to call for assistance. Pt verbalized understanding. Will continue to monitor.

## 2018-05-13 NOTE — PT/OT/SLP PROGRESS
"Speech Language Pathology Treatment    Patient Name:  Haris Hernandez   MRN:  [id number]  Admitting Diagnosis: Cerebrovascular accident (CVA)    Recommendations:                 General Recommendations:  Dysphagia therapy and Cognitive-linguistic therapy  Diet recommendations:  Puree, Liquid Diet Level: Nectar Thick   Aspiration Precautions: 1 bite/sip at a time, Alternating bites/sips, Assistance with meals and Assistance with thickening liquids, HOB to 90 degrees and Meds crushed in puree   General Precautions: Standard, aspiration, fall  Communication strategies:  none    Subjective     "I'm all right."   Patient goals: to eat safely    Pain/Comfort:  · Pain Rating 1: 0/10    Objective:     Has the patient been evaluated by SLP for swallowing?   Yes  Keep patient NPO? No   Current Respiratory Status: room air      Pt seen bedside for direct dysphagia therapy.     Assessment:     Haris Hernandez is a 72 y.o. male with an SLP diagnosis of Dysphagia.  He presents with severe oropharyngeal dysphagia. Per MBSS on 5/11/18, pt exhibited severe oral-pharyngeal dysphagia c/b impaired oral motor strength and coordination, BOT retraction, pharyngeal wall constriction, and hyolaryngeal elevation/excursion resulting in penetration to the chords with thin liquids with an inconsistent cough reflex and silent consistent penetration above the chords with nectar, honey, and puree consistencies with likely aspiration after the swallow of penetrated material. Shoulders obstructing adequate view of trachea. It was recommend that pt remain NPO with alternative means of nutrition/hydration/medication 2/2 HIGH risk of aspiration. Pt and pt's family refusing alternative means of nutrition and insisting pt be placed on diet despite high risk of aspiration. Puree with nectar thick was recommended as pt and family refusing alternate means of nutrition. This session ST assisted pt with lunch tray of puree with nectar thick. ST placed puree on " utensil and pt fed self while alternating bites with sips of liquid thickened to nectar consistency by ST. Pt utilized compensatory strategies of small bites/sips with min verbal cues from ST. ST modeled 3 therapeutic exercises for improved BOT strength. Pt returned demonstration at 15 reps each with mod cues.     Goals:    SLP Goals        Problem: SLP Goal    Goal Priority Disciplines Outcome   SLP Goal     SLP Ongoing (interventions implemented as appropriate)   Description:  Updated Short Term Goals:   1. Pt will tolerate pureed diet and nectar thick liquids with no overt s/s of aspiration. --ongoing 5/13/18  2. Pt will demonstrate/verbalize 3 safe swallowing precautions with no cues.   3. Pt will complete dysphagia therapy exercises x15 each min cues. --ongoing 5/13/18  4. Pt will participate in a repeat MBSS in 2-4 weeks as warranted per MD and SLP. --MET  5. Pt will complete mental manipulation tasks (i.e word finding, categorical, etc.) with 80% acc given min assist for 2 consecutive sessions.  6. Pt will recall 3/3 given items with 3-5 min delay with min assist for 2 consecutive sessions.  7. Pt will answer questions related to given short story with 80% acc, ind'ly for 2 consecutive session.    Short Term Goals:  1. Pt will participate in BSS to determine least restrictive diet.- MET 5/8  2. Pt will participate in informal speech-lang eval to r/o cognitive-linguistic deficits.- MET 5/8  3. Pt will complete mental manipulation tasks (i.e word finding, categorical, etc.) with 80% acc given min assist for 2 consecutive sessions. -ongoing  4. Pt will recall 3/3 given items with 3-5 min delay with min assist for 2 consecutive sessions. -ongoing  5. Pt will answer questions related to given short story with 80% acc, ind'ly for 2 consecutive session. -ongoing                        Plan:     · Patient to be seen:  3 x/week   · Plan of Care expires:  06/07/18  · Plan of Care reviewed with:  patient, daughter,  other (see comments) (IGNACIA Cain)   · SLP Follow-Up:  Yes       Discharge recommendations:  rehabilitation facility   Barriers to Discharge:  None    Time Tracking:     SLP Treatment Date:   05/13/18  Speech Start Time:  1155  Speech Stop Time:  1220     Speech Total Time (min):  25 min    Billable Minutes: Treatment Swallowing Dysfunction 25    Amada Mo CCC-SLP  05/13/2018

## 2018-05-13 NOTE — PLAN OF CARE
Problem: Patient Care Overview  Goal: Plan of Care Review  Outcome: Revised  Pt stable. NO distress noted. POC reviewed with pt. Pt verbalized understanding. Pt remains SR on the monitor. NO true red alarms noted. Pt denied pain. Neuro checks Q4. Po Lactulose administered for constipation. 1 BM noted throughout the night. Fall precautions maintained. Bed in lowest position, call light in reach and bed alarm on.

## 2018-05-13 NOTE — PROGRESS NOTES
[facility] Internal Medicine Resident [facility] Progress Note    Subjective:     No complaints this morning. Tolerating pureed diet. No chest pain, shortness of breath, nausea/vomiting, fever/chills or lightheadeness.    Objective:   Last 24 Hour Vital Signs:  BP  Min: 134/67  Max: 177/83  Temp  Av.9 °F (36.6 °C)  Min: 96.7 °F (35.9 °C)  Max: 98.4 °F (36.9 °C)  Pulse  Av.4  Min: 64  Max: 97  Resp  Av.7  Min: 16  Max: 18  SpO2  Av.6 %  Min: 97 %  Max: 98 %  I/O last 3 completed shifts:  In: 885 [P.O.:885]  Out: -     Physical Examination:  Constitutional: He is oriented to person, place, and time. He appears well-developed.   Head: Normocephalic and atraumatic.   Eyes: Pupils are equal, round, and reactive to light.   Neck: Normal range of motion. Neck supple.   Cardiovascular: Normal rate and regular rhythm.  No murmurs, rubs or gallops   Pulmonary/Chest: Effort normal. No respiratory distress. CTA b/l. No wheezes, rhales, rhonci   Abdominal: Soft. Non-tender. BS +. He exhibits no mass.   Musculoskeletal: Normal range of motion. He exhibits no deformity.   Neurological: He is alert and oriented to person, place, and time.   Reflexes: 2+ patellar on R and 3+ L patellar.   Hypoglossal nn. Deficit with tongue deviated to right side.   No nystagmus. Able to speak, swallow, hear equally. Able to close eyes tightly. Speech mildly slurred.  Sensory deficit with decreased sensation to light touch on Left Lower face. Smile asymmetric with left lower facial weakness present.   LLE and LUE 4-/5 muscle strength. RUE and RLE 5/5 muscle strength  Gait not assessed. Pronator drift on left UE.   Skin: No rash noted. He is not diaphoretic. No erythema.   Psychiatric: He has a normal mood and affect. His behavior is normal.     Laboratory:  Laboratory Data Reviewed: yes  Pertinent Findings:    Recent Labs  Lab 18  1249 18  0402 05/10/18  0328   WBC 8.79 8.22  8.22 8.34  8.34   HGB 13.3* 12.1*  12.1* 11.9*   11.9*   HCT 37.6* 34.6*  34.6* 33.7*  33.7*    187  187 194  194   MCV 90 90  90 90  90   RDW 12.5 13.0  13.0 12.9  12.9    136  136 133*  133*   K 3.7 3.6  3.6 4.3  4.3   CL 99 103  103 101  101   CO2 28 25  25 24  24   BUN 19 14  14 11  11   CREATININE 1.22 1.2  1.2 1.2  1.2   * 104  104 115*  115*   PROT 8.9* 7.9  7.9 7.8  7.8   ALBUMIN 4.4 3.2*  3.2* 3.1*  3.1*   BILITOT 0.7 0.6  0.6 0.7  0.7   AST 27 17  17 17  17   ALKPHOS 71 56  56 55  55   ALT 27 13  13 12  12       Microbiology Data Reviewed: yes  Pertinent Findings:  n/a    Other Results:  EKG (my interpretation): no new     Radiology Data Reviewed: yes  Pertinent Findings:  MRI  1. Acute infarction involving the right basal ganglia and corona radiata.  2. Small remote lacunar infarcts involving the left basal ganglia, left thalamus, and right cerebellar hemisphere.    Current Medications:     Infusions:       Scheduled:   aspirin  81 mg Oral Daily    atorvastatin  80 mg Oral Daily    cefTRIAXone (ROCEPHIN) IM WITH LIDOCAINE  1 g Intramuscular Once    clopidogrel  75 mg Oral Daily    docusate sodium  100 mg Oral BID    enoxaparin  40 mg Subcutaneous Daily    hydroCHLOROthiazide  12.5 mg Oral Daily    NIFEdipine  30 mg Oral Daily    sodium chloride 0.9%  3 mL Intravenous Q8H        PRN:  benzonatate, labetalol, pneumoc 13-lorena conj-dip cr(PF)    Antibiotics and Day Number of Therapy:  Rec'd ceftriaxone X 1   Moxi started 5/10 -5/12    Lines and Day Number of Therapy:  L PIV    Assessment:     Haris Hernandez is a 72 y.o.male with  Patient Active Problem List    Diagnosis Date Noted    BPH (benign prostatic hyperplasia) 05/08/2018    ETOH abuse 05/08/2018    HTN (hypertension) 05/08/2018    Normocytic anemia 05/08/2018    Cerebrovascular accident (CVA) 05/07/2018        Plan:     Acute/Subacute Right Caudate/Anterior Internal Capsule Lacunar CVA  -CT head: Low-density zone right caudate  nucleus/anterior internal capsule with considerations including acute to subacute lacunar type infarct.  Several chronic lacunar type infarcts.  -MRI confirms stroke  -[facility] neuro Dr. Lara evaluated the patient. No tpa. No LVO suspected. Recs per note.   -EKG with NSR and HR 80s, LVH, q waves in anterior leads v1-v3  -LP WNL  -a1c 5.2  -DAPT + statin, BP allowed 160-180, need tight BP control once discharged.  -PT/OT recommending IPR and approved  plan for transfer on 5/14    CAP  -pt with productive cough and low-grade fever, new airspace disease LLL  -procal wnl, however started on moxifloxacin due to CXR findings. Repeat procal 48hrs later also negative. Moxiflox discontinued.      CKD II  -gfr 60, cr 1.22  -stable     HTN  -BP on admission 208/103  -On home nefedipime 60 and HCTZ 25  initially held  -given 10mg IV labetolol for high bp,  24 hr permissive HTN   - adding back home BP meds for tighter control now out from acute cva.      Anemia of chronic disease   -H/H 13.3/37.6, MCV 90  -No s/s of bleeding  -Iron profile/ ferritin/ vitamin studies c/w AoCD.      Protein Gap (4.5)  -HIV/hep panel/SPEP/UPEP significant only for paraproteinemia  -UA with slight amnt protein, no m-spike or abnormal ratios      Alcohol Abuse  -Daily drinker: 4 beers and 1/2 pint of liquor last drink yesterday 5pm. Denies WD or seizures  -YOSVANY <10  -concern for withdrawal on 5/10  overnight team started valium taper, but this was stopped after fever and diaphoresis attributed to likely aspiration pneumonitis. No signs withdrawal since.      BPH  -Cont home finasteride and tamsulosin   -Stable  -Follow up PCP        Dispo: inpatient rehab on 5/14  Diet: soft, cardiac   Code: Full  DVT: carolx     Larry Newby  [facility] Internal Medicine HO-1  [facility] IM Service Team A    [facility] Medicine Hospitalist Pager numbers:   [facility] Hospitalist Medicine Team A (Sima/Reed): [phone]  [facility] Hospitalist Medicine Team B (Kirt/Malaika):   [phone]

## 2018-05-14 VITALS
BODY MASS INDEX: 20.88 KG/M2 | DIASTOLIC BLOOD PRESSURE: 76 MMHG | HEIGHT: 72 IN | WEIGHT: 154.13 LBS | RESPIRATION RATE: 16 BRPM | SYSTOLIC BLOOD PRESSURE: 143 MMHG | TEMPERATURE: 98 F | HEART RATE: 79 BPM | OXYGEN SATURATION: 95 %

## 2018-05-14 LAB
INTERPRETATION UR IFE-IMP: NORMAL
PATHOLOGIST INTERPRETATION UIFE: NORMAL
PATHOLOGIST INTERPRETATION UPE: NORMAL

## 2018-05-14 PROCEDURE — 94761 N-INVAS EAR/PLS OXIMETRY MLT: CPT

## 2018-05-14 PROCEDURE — 92526 ORAL FUNCTION THERAPY: CPT

## 2018-05-14 PROCEDURE — 25000003 PHARM REV CODE 250: Performed by: INTERNAL MEDICINE

## 2018-05-14 PROCEDURE — 25000003 PHARM REV CODE 250: Performed by: STUDENT IN AN ORGANIZED HEALTH CARE EDUCATION/TRAINING PROGRAM

## 2018-05-14 PROCEDURE — 92507 TX SP LANG VOICE COMM INDIV: CPT

## 2018-05-14 PROCEDURE — 97535 SELF CARE MNGMENT TRAINING: CPT

## 2018-05-14 PROCEDURE — 97530 THERAPEUTIC ACTIVITIES: CPT

## 2018-05-14 RX ORDER — BENZONATATE 100 MG/1
100 CAPSULE ORAL 3 TIMES DAILY PRN
Qty: 30 CAPSULE | Refills: 0 | Status: SHIPPED | OUTPATIENT
Start: 2018-05-14 | End: 2018-05-24

## 2018-05-14 RX ORDER — ATORVASTATIN CALCIUM 80 MG/1
80 TABLET, FILM COATED ORAL DAILY
Qty: 90 TABLET | Refills: 3 | Status: SHIPPED | OUTPATIENT
Start: 2018-05-15 | End: 2023-06-07 | Stop reason: SDUPTHER

## 2018-05-14 RX ORDER — DOCUSATE SODIUM 100 MG/1
100 CAPSULE, LIQUID FILLED ORAL 2 TIMES DAILY
Refills: 0 | COMMUNITY
Start: 2018-05-14

## 2018-05-14 RX ORDER — HYDROCHLOROTHIAZIDE 25 MG/1
12.5 TABLET ORAL DAILY
Qty: 30 TABLET | Refills: 6 | Status: ON HOLD | OUTPATIENT
Start: 2018-05-14 | End: 2021-08-16 | Stop reason: HOSPADM

## 2018-05-14 RX ORDER — FOLIC ACID 1 MG/1
1 TABLET ORAL DAILY
Qty: 90 TABLET | Refills: 3 | Status: SHIPPED | OUTPATIENT
Start: 2018-05-14 | End: 2022-12-22 | Stop reason: SDUPTHER

## 2018-05-14 RX ORDER — LANOLIN ALCOHOL/MO/W.PET/CERES
100 CREAM (GRAM) TOPICAL DAILY
Status: ON HOLD | COMMUNITY
Start: 2018-05-14 | End: 2021-08-23 | Stop reason: HOSPADM

## 2018-05-14 RX ADMIN — ASPIRIN 81 MG: 81 TABLET, COATED ORAL at 08:05

## 2018-05-14 RX ADMIN — NIFEDIPINE 30 MG: 30 TABLET, FILM COATED, EXTENDED RELEASE ORAL at 08:05

## 2018-05-14 RX ADMIN — CLOPIDOGREL BISULFATE 75 MG: 75 TABLET ORAL at 08:05

## 2018-05-14 RX ADMIN — HYDROCHLOROTHIAZIDE 12.5 MG: 12.5 TABLET ORAL at 08:05

## 2018-05-14 RX ADMIN — ATORVASTATIN CALCIUM 80 MG: 40 TABLET, FILM COATED ORAL at 08:05

## 2018-05-14 RX ADMIN — DOCUSATE SODIUM 100 MG: 100 CAPSULE, LIQUID FILLED ORAL at 08:05

## 2018-05-14 NOTE — PLAN OF CARE
SW making dc arrangements    Went over followup appts with daughter.    Discharge rounds on patient. Discussed followup appointments, blue discharge folder, discharge nurse will go over home medications and reasons for medications and final discharge instructions. All patient/caregiver questions answered. Patient verbalized understanding.         05/14/18 1511   Final Note   Assessment Type Final Discharge Note   Discharge Disposition Rehab   Hospital Follow Up  Appt(s) scheduled? Yes   Discharge plans and expectations educations in teach back method with documentation complete? Yes   Right Care Referral Info   Post Acute Recommendation IRF   Referral Type IRF   Facility Name [facility] rehab     Suzanne Dodd, RN, CCM, CMSRN  RN Transition Navigator  [phone]

## 2018-05-14 NOTE — PT/OT/SLP PROGRESS
Occupational Therapy   Treatment    Name: Haris Hernandez  MRN: [id number]  Admitting Diagnosis:  Cerebrovascular accident (CVA)       Recommendations:     Discharge Recommendations: rehabilitation facility  Discharge Equipment Recommendations:   (Defer to IPR)  Barriers to discharge:  None    Subjective     Communicated with: Syl villagran prior to session.  Pain/Comfort:  · Pain Rating 1: 0/10  · Pain Rating Post-Intervention 1: 0/10    Patients cultural, spiritual, Confucianism conflicts given the current situation:  (none reported)    Objective:     Patient found with: bed alarm, telemetry, SCD (knee-high KARY hose)    General Precautions: Standard, aspiration, fall, pureed diet, nectar thick   Orthopedic Precautions:N/A   Braces: N/A     Bed Mobility:    · Patient completed Scooting/Bridging with stand by assistance  · Patient completed Supine to Sit with contact guard assistance, with side rail and mod VCs for technique  · Patient completed Sit to Supine with stand by assistance     Functional Mobility/Transfers:  · Patient completed Sit <> Stand Transfer with contact guard assistance and minimum assistance  with  rolling walker     Activities of Daily Living:  · Grooming: contact guard assistance      Patient left HOB elevated with all lines intact, call button in reach, bed alarm on, RN notified and daughter present    [facility] 6 Click:  [facility] Total Score: 16    Treatment & Education:  Patient with bed mob as noted above - requires VCs for technique. Patient able to stand and ambulate to sink using RW with CGA-Min (A). Requires increased VCs to manage L hand on RW. At sink, patient able to stand statically with CGA and VCs occasionally to reorient to midline. Using B hands, but especially L to wring washcloths for strengthening. Patient able to use LUE to wash face, head, neck and ears. Patient returned to EOB and completed FMC/dexterity activities.   Education:    Assessment:   Patient with improved mobility and  balance this date (as compared to JAMES's previous visit on Friday). Despite patient's co-morbidities, patient was living in community setting functioning at independent level for ADLs, and mobility prior to this event.  There is an expectation of returning to prior level of function to maintain independence thus avoiding readmission.  Patient's clinical condition meets full Inpatient Rehab (IPR) criteria; including the ability to actively participate in 3 hours of therapy.  A lower level of care(SNF) cannot provide the interdisciplinary treatment approach needed.     Haris Hernandez is a 72 y.o. male with a medical diagnosis of Cerebrovascular accident (CVA).  Performance deficits affecting function are weakness, impaired endurance, impaired self care skills, impaired functional mobilty, gait instability, impaired balance, decreased coordination, impaired cognition, decreased safety awareness, impaired fine motor, impaired coordination, decreased upper extremity function, decreased lower extremity function, abnormal tone.      Rehab Prognosis:  Good+; patient would benefit from acute skilled OT services to address these deficits and reach maximum level of function.       Plan:     Patient to be seen 5 x/week to address the above listed problems via self-care/home management, therapeutic activities, therapeutic exercises  · Plan of Care Expires: 06/08/18  · Plan of Care Reviewed with: patient, daughter    This Plan of care has been discussed with the patient who was involved in its development and understands and is in agreement with the identified goals and treatment plan    GOALS:    Occupational Therapy Goals        Problem: Occupational Therapy Goal    Goal Priority Disciplines Outcome Interventions   Occupational Therapy Goal     OT, PT/OT Ongoing (interventions implemented as appropriate)    Description:  Goals to be met by: 06/08/18     Patient will increase functional independence with ADLs by performing:    JESUS  Dressing with Supervision.  Grooming while standing at sink with Supervision.  Toileting from toilet with Supervision for hygiene and clothing management.   Toilet transfer to toilet with Supervision.  Increased functional strength to WFL for ADLs.                      Time Tracking:     OT Date of Treatment: 05/14/18  OT Start Time: 0900  OT Stop Time: 0940  OT Total Time (min): 40 min    Billable Minutes:Self Care/Home Management 25  Therapeutic Activity 15    BRYCE Beard  5/14/2018

## 2018-05-14 NOTE — PT/OT/SLP PROGRESS
"Speech Language Pathology Treatment    Patient Name:  Haris Hernandez   MRN:  [id number]  Admitting Diagnosis: Cerebrovascular accident (CVA)    Recommendations:                 General Recommendations:  Dysphagia therapy and Speech/language therapy  Diet recommendations:  Puree, Liquid Diet Level: Nectar Thick   Aspiration Precautions: 1 bite/sip at a time, Alternating bites/sips, Assistance with thickening liquids, Assistance with meals and Assistance with thickening liquids, Feed only when awake/alert, Frequent oral care, HOB to 90 degrees, Meds crushed in puree, No straws, Remain upright 30 minutes post meal, Small bites/sips and Standard aspiration precautions   General Precautions: Standard, fall, aspiration, nectar thick, pureed diet  Communication strategies:  Pt with dysarthric speech, encourage him to over articulate and talk slowly    Subjective     SLP communicated with RN re: treatment. Pt reported no issues. He states his daughter is at her doctor's appt.   Patient goals: "i ate ok this am."     Pain/Comfort:  · Pain Rating 1: 0/10    Objective:     Has the patient been evaluated by SLP for swallowing?   Yes  Keep patient NPO? No   Current Respiratory Status: room air      SWALLOWING: Pt seen at bedside for dysphagia and dysarthria remediation.   Pt is alert and awake, oriented x4. Pt reports no issues except awaiting DC to rehab facility. He reports he dislikes pudding texture and is asking for some other food like applesauce.   Pt is able to recall swallow precautions, aware that he needs thickened liquids at bedside.   SLP updated whiteboard with swallow precautions to include strict diet modification.   Pt completed BOT, laryngeal lift, /k,g/ word drills, and lingual resistance ex with tongue depressor x15 with mod ability. Pt does not appear to be aware of occasional wet voice present after trials of nectar thick liquids.   Pt completed OME x20 targeting oral motor weakness with mod ability. Pt " benefitted from visual and verbal cues to increase accuracy of oral motor movements. Pt did utilize handouts to help him with accuracy and increase comprehension of exercises.       SPEECH PRODUCTION: Pt completed speech intell drills with fair accuracy, pt requires education to continue to utilize SOS strategies during connected speech tasks. Pt with approx. 75% speech intell to a known listener.        Assessment:     Haris Hernandez is a 72 y.o. 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  He is making progress with all goals. Cont to encourage aggressive PT/OT and ST services to address all deficits. Pt is a good candidate for inpatient rehab.     Goals:    SLP Goals        Problem: SLP Goal    Goal Priority Disciplines Outcome   SLP Goal     SLP Ongoing (interventions implemented as appropriate)   Description:  Updated Short Term Goals:   1. Pt will tolerate pureed diet and nectar thick liquids with no overt s/s of aspiration. --ongoing 5/13/18  2. Pt will demonstrate/verbalize 3 safe swallowing precautions with no cues.   3. Pt will complete dysphagia therapy exercises x15 each min cues. --ongoing 5/13/18  4. Pt will participate in a repeat MBSS in 2-4 weeks as warranted per MD and SLP. --MET  5. Pt will complete mental manipulation tasks (i.e word finding, categorical, etc.) with 80% acc given min assist for 2 consecutive sessions.  6. Pt will recall 3/3 given items with 3-5 min delay with min assist for 2 consecutive sessions.  7. Pt will answer questions related to given short story with 80% acc, ind'ly for 2 consecutive session.    Short Term Goals:  1. Pt will participate in BSS to determine least restrictive diet.- MET 5/8  2. Pt will participate in informal speech-lang eval to r/o cognitive-linguistic deficits.- MET 5/8  3. Pt will complete mental manipulation tasks (i.e word finding, categorical, etc.) with 80% acc given min assist for 2 consecutive sessions.  -ongoing  4. Pt will recall 3/3 given items with 3-5 min delay with min assist for 2 consecutive sessions. -ongoing  5. Pt will answer questions related to given short story with 80% acc, ind'ly for 2 consecutive session. -ongoing                        Plan:     · Patient to be seen:  3 x/week   · Plan of Care expires:  06/07/18  · Plan of Care reviewed with:  patient (RN)   · SLP Follow-Up:  Yes       Discharge recommendations:  rehabilitation facility       Time Tracking:     SLP Treatment Date:   05/14/18  Speech Start Time:  1020  Speech Stop Time:  1040     Speech Total Time (min):  20 min    Billable Minutes: Speech Therapy Individual 10 and Treatment Swallowing Dysfunction 10    SANTOS Degroot, CCC-SLP  05/14/2018

## 2018-05-14 NOTE — PLAN OF CARE
Problem: SLP Goal  Goal: SLP Goal  Updated Short Term Goals:   1. Pt will tolerate pureed diet and nectar thick liquids with no overt s/s of aspiration. --ongoing 5/13/18  2. Pt will demonstrate/verbalize 3 safe swallowing precautions with no cues.   3. Pt will complete dysphagia therapy exercises x15 each min cues. --ongoing 5/13/18  4. Pt will participate in a repeat MBSS in 2-4 weeks as warranted per MD and SLP. --MET  5. Pt will complete mental manipulation tasks (i.e word finding, categorical, etc.) with 80% acc given min assist for 2 consecutive sessions.  6. Pt will recall 3/3 given items with 3-5 min delay with min assist for 2 consecutive sessions.  7. Pt will answer questions related to given short story with 80% acc, ind'ly for 2 consecutive session.    Short Term Goals:  1. Pt will participate in BSS to determine least restrictive diet.- MET 5/8  2. Pt will participate in informal speech-lang eval to r/o cognitive-linguistic deficits.- MET 5/8  3. Pt will complete mental manipulation tasks (i.e word finding, categorical, etc.) with 80% acc given min assist for 2 consecutive sessions. -ongoing  4. Pt will recall 3/3 given items with 3-5 min delay with min assist for 2 consecutive sessions. -ongoing  5. Pt will answer questions related to given short story with 80% acc, ind'ly for 2 consecutive session. -ongoing       Outcome: Ongoing (interventions implemented as appropriate)  Pt progressing in therapy. Pt participated in PO trials of pureed and nectar thick liquids with SLP supervision. Pt required mod cues to completed indirect dysphagia tx. Pt will benefit from continued ST services to address dysarthria and dysphagia remediation.

## 2018-05-14 NOTE — PLAN OF CARE
sent facility transfer order and medication list to Hermelinda in admissions at [facility].     informed bedside nurse Syl patient will be discharging today to IP Rehab. Report can be called to [phone].     arranged wheelchair van transportation with [facility]  scheduled to arrive within the hour.    Packet placed at nurse's station.

## 2018-05-14 NOTE — DISCHARGE SUMMARY
"[facility] Internal Medicine Discharge Summary    Primary Team: [facility] IM Team A  Attending Physician: Dr. Gao  Resident: Dr. Flores  Intern: Dr. Rivera    Date of Admit: 5/7/2018  Date of Discharge: 5/14/2018    Discharge to: [facility] inpatient rehab  Condition: stable     Discharge Diagnoses     Patient Active Problem List   Diagnosis    Cerebrovascular accident (CVA)    BPH (benign prostatic hyperplasia)    ETOH abuse    HTN (hypertension)    Normocytic anemia       Consultants and Procedures     Consultants:  Vascular neurology    Procedures:   Modified Barium Swallow     Brief History of Present Illness   Haris Hernandez is a 72 y.o.  male with a PMHx of BPH, HTN and Alcohol abuse who was in his USOH (independent ADLs, able to walk without difficulty) until yesterday at around 7pm his daughter noticed he was leaning towards his left side when walking and slurring his speech. She states that he is a daily drinker and thought this was attributed to his drinking although this morning when he woke up, he continued to have the same symptoms that had no improved so brought him to the ED. Per patient, his slurred speech and gait disturbance have improved. He endorses left hand numbness but denies weakness, sensation changes, headache, vision changes, CP, SOB, dizziness, weakness, fevers, sick contacts, ingestion of foreign substance, shaking body, urinary incontinence, head trauma, LOC, chills, weight loss, dysuria, abdominal pain. Denies previous episode.      ROS: 2 weeks yellow productive cough     For the full HPI please refer to the History & Physical from this admission.    Physical exam   /71 (BP Location: Left arm, Patient Position: Lying)   Pulse 86   Temp 98 °F (36.7 °C) (Oral)   Resp 16   Ht 5' 11.5" (1.816 m)   Wt 69.9 kg (154 lb 1.6 oz)   SpO2 98%   BMI 21.19 kg/m²     Constitutional: He is oriented to person, place, and time. He appears well-developed.   Head: Normocephalic and atraumatic. "   Eyes: Pupils are equal, round, and reactive to light.   Neck: Normal range of motion. Neck supple.   Cardiovascular: Normal rate and regular rhythm.  No murmurs, rubs or gallops   Pulmonary/Chest: Effort normal. No respiratory distress. CTA b/l. No wheezes, rhales, rhonci   Abdominal: Soft. Non-tender. BS +. He exhibits no mass.   Musculoskeletal: Normal range of motion. He exhibits no deformity.   Neurological: He is alert and oriented to person, place, and time.   Reflexes: 2+ patellar on R and 3+ L patellar.   Hypoglossal nn. Deficit with tongue deviated to right side.   No nystagmus. Able to speak, swallow, hear equally. Able to close eyes tightly. Speech mildly slurred.  Sensory deficit with decreased sensation to light touch on Left Lower face. Smile asymmetric with left lower facial weakness present.   LLE and LUE 4-/5 muscle strength. RUE and RLE 5/5 muscle strength  Gait not assessed. Pronator drift on left UE.   Skin: No rash noted. He is not diaphoretic. No erythema.   Psychiatric: He has a normal mood and affect. His behavior is normal.   Hospital Course By Problem with Pertinent Findings     Acute/Subacute Right Caudate/Anterior Internal Capsule Lacunar CVA  -CT head: Low-density zone right caudate nucleus/anterior internal capsule with considerations including acute to subacute lacunar type infarct.  Several chronic lacunar type infarcts. MRI confirms stroke  -[facility] neuro Dr. Lara evaluated the patient. No tpa. No LVO suspected. Recs per note.   -EKG with NSR and HR 80s, LVH, q waves in anterior leads v1-v3  -LP WNL, a1c 5.2, DAPT for 21 days, then plavix daily, high dose statin, need tight BP control <140 systolic once discharged.  -PT/OT recommending IPR and approved  plan for transfer on 5/14  -Speech recommends NPO, patient/family desire to continue PO diet, so recommend puree textures/ NECTAR Thick Liquids and strict aspiration precautions with p.o. Intake, continue speech therapy for improved  swallowing.     CAP  -pt with productive cough and low-grade fever, new airspace disease LLL  -procal wnl, however started on moxifloxacin due to CXR findings. Repeat procal 48hrs later also negative. Moxiflox discontinued.       CKD II  -gfr 60, cr 1.22  -stable     HTN  -BP on admission 208/103  -On home nefedipime 60 and HCTZ 25  initially held  -given 10mg IV labetolol for high bp,  24 hr permissive HTN   - adding back home BP meds for tighter control now out from acute cva.      Anemia of chronic disease   -H/H 13.3/37.6, MCV 90  -No s/s of bleeding  -Iron profile/ ferritin/ vitamin studies c/w AoCD.      Protein Gap (4.5)  -HIV/hep panel/SPEP/UPEP significant only for paraproteinemia  -UA with slight amnt protein, no m-spike or abnormal ratios      Alcohol Abuse  -Daily drinker: 4 beers and 1/2 pint of liquor last drink yesterday 5pm. Denies WD or seizures  -BAC <10  -concern for withdrawal on 5/10  overnight team started valium taper, but this was stopped after fever and diaphoresis attributed to likely aspiration pneumonitis. No signs withdrawal since.      BPH  -Cont home finasteride and tamsulosin   -Stable  -Follow up PCP    Discharge Medications        START taking these medications     Details   aspirin (ECOTRIN) 81 MG EC tablet Take 1 tablet (81 mg total) by mouth once daily.  Refills: 0       atorvastatin (LIPITOR) 80 MG tablet Take 1 tablet (80 mg total) by mouth once daily.  Qty: 90 tablet, Refills: 3       benzonatate (TESSALON) 100 MG capsule Take 1 capsule (100 mg total) by mouth 3 (three) times daily as needed for Cough.  Qty: 30 capsule, Refills: 0       clopidogrel (PLAVIX) 75 mg tablet Take 1 tablet (75 mg total) by mouth once daily.  Qty: 30 tablet, Refills: 11       docusate sodium (COLACE) 100 MG capsule Take 1 capsule (100 mg total) by mouth 2 (two) times daily.  Refills: 0               CONTINUE these medications which have CHANGED     Details   hydroCHLOROthiazide (HYDRODIURIL) 25 MG  tablet Take 0.5 tablets (12.5 mg total) by mouth once daily.  Qty: 30 tablet, Refills: 6           CONTINUE these medications which have NOT CHANGED     Details   finasteride (PROSCAR) 5 mg tablet Take 5 mg by mouth once daily.       NIFEdipine (ADALAT CC) 60 MG TbSR Take 30 mg by mouth once daily.       saw palmetto 500 MG capsule Take 500 mg by mouth once daily.       tamsulosin (FLOMAX) 0.4 mg Cp24 Take 0.4 mg by mouth once daily.               Discharge Information:   Diet:  Cardiac, puree, nectar thick     Physical Activity:  As tolerated     Instructions:  1. Take all medications as prescribed  2. Keep all follow-up appointments  3. Return to the hospital or call your primary care physicians if any worsening symptoms such as new or worsening focal weakness occur.    Follow-Up Appointments:  PCP, Neurology.    Gracie Rivera M.D.  [facility], HO-[facility]

## 2018-05-14 NOTE — PLAN OF CARE
05/14/18 0956   Medicare Message   Important Message from Medicare regarding Discharge Appeal Rights Given to patient/caregiver;Explained to patient/caregiver;Signed/date by patient/caregiver   Date IMM was signed 05/14/18   Time IMM was signed 0956

## 2018-05-14 NOTE — PLAN OF CARE
Problem: Occupational Therapy Goal  Goal: Occupational Therapy Goal  Goals to be met by: 06/08/18     Patient will increase functional independence with ADLs by performing:    LE Dressing with Supervision.  Grooming while standing at sink with Supervision.  Toileting from toilet with Supervision for hygiene and clothing management.   Toilet transfer to toilet with Supervision.  Increased functional strength to WFL for ADLs.     Outcome: Ongoing (interventions implemented as appropriate)  Patient with improved mobility and balance this date (as compared to JAMES's previous visit on Friday). Despite patient's co-morbidities, patient was living in community setting functioning at independent level for ADLs, and mobility prior to this event.  There is an expectation of returning to prior level of function to maintain independence thus avoiding readmission.  Patient's clinical condition meets full Inpatient Rehab (IPR) criteria; including the ability to actively participate in 3 hours of therapy.  A lower level of care(SNF) cannot provide the interdisciplinary treatment approach needed.

## 2018-05-14 NOTE — PROGRESS NOTES
Follow-up With  Details  Why  Contact Info   KIANA Call MD      [address]  [place] [address]  [phone]   [facility]  Go on 5/14/2018  IP Rehab  [address] [facility] [address]  [phone]   Larry Thornton MD  On 6/20/2018  320pm  [address]  [facility] [address]  [phone]

## 2018-05-14 NOTE — PLAN OF CARE
[facility]    FACILITY TRANSFER ORDERS      Patient Name: Haris Hernandez  YOB: 1945    PCP: CHE Call MD   PCP Address: [address] [place] [address] [address] / [place] [address]  PCP Phone Number: [phone]  PCP Fax: [phone]    Encounter Date: 05/14/2018    [place]    Vital Signs:  Routine    Diagnoses:   Active Hospital Problems    Diagnosis  POA    *Cerebrovascular accident (CVA) [I63.9]  Yes    BPH (benign prostatic hyperplasia) [N40.0]  Yes    ETOH abuse [F10.10]  Yes    HTN (hypertension) [I10]  Yes    Normocytic anemia [D64.9]  Yes      Resolved Hospital Problems    Diagnosis Date Resolved POA   No resolved problems to display.       Allergies:Review of patient's allergies indicates:  No Known Allergies    Diet: pureed diet nectar thick    Activities: Activity as tolerated    Nursing: per unit protocol      Labs: routine    CONSULTS:    Physical Therapy to evaluate and treat. , Occupational Therapy to evaluate and treat. and Speech Therapy to evaluate and treat for Swallowing.    MISCELLANEOUS CARE:  n/A    WOUND CARE ORDERS  None    Medications: Review discharge medications with patient and family and provide education.      Current Discharge Medication List      START taking these medications    Details   aspirin (ECOTRIN) 81 MG EC tablet Take 1 tablet (81 mg total) by mouth once daily.  Refills: 0      atorvastatin (LIPITOR) 80 MG tablet Take 1 tablet (80 mg total) by mouth once daily.  Qty: 90 tablet, Refills: 3      benzonatate (TESSALON) 100 MG capsule Take 1 capsule (100 mg total) by mouth 3 (three) times daily as needed for Cough.  Qty: 30 capsule, Refills: 0      clopidogrel (PLAVIX) 75 mg tablet Take 1 tablet (75 mg total) by mouth once daily.  Qty: 30 tablet, Refills: 11      docusate sodium (COLACE) 100 MG capsule Take 1 capsule (100 mg total) by mouth 2 (two) times daily.  Refills: 0      folic acid (FOLVITE) 1 MG tablet Take 1 tablet (1 mg total)  by mouth once daily.  Qty: 90 tablet, Refills: 3      thiamine 100 MG tablet Take 1 tablet (100 mg total) by mouth once daily.         CONTINUE these medications which have CHANGED    Details   hydroCHLOROthiazide (HYDRODIURIL) 25 MG tablet Take 0.5 tablets (12.5 mg total) by mouth once daily.  Qty: 30 tablet, Refills: 6         CONTINUE these medications which have NOT CHANGED    Details   finasteride (PROSCAR) 5 mg tablet Take 5 mg by mouth once daily.      NIFEdipine (ADALAT CC) 60 MG TbSR Take 30 mg by mouth once daily.      saw palmetto 500 MG capsule Take 500 mg by mouth once daily.      tamsulosin (FLOMAX) 0.4 mg Cp24 Take 0.4 mg by mouth once daily.          _________________________________  Gracie Rivera MD  05/14/2018

## 2018-05-14 NOTE — PLAN OF CARE
Problem: Stroke (Ischemic) (Adult)  Intervention: Promote/Optimize Sensory/Motor Ability  Calm/quiet environment promoted for patient relaxation

## 2018-05-15 LAB
BACTERIA BLD CULT: NORMAL
BACTERIA BLD CULT: NORMAL

## 2018-05-15 NOTE — PT/OT/SLP DISCHARGE
Physical Therapy Discharge Summary    Name: Haris Hernandez  MRN: [id number]   Principal Problem: Cerebrovascular accident (CVA)     Patient Discharged from acute Physical Therapy on 2018.  Please refer to prior PT noted date on 2018 for functional status.     Assessment:     Patient appropriate for care in another setting.    Objective:     GOALS:    Physical Therapy Goals     Not on file          Multidisciplinary Problems (Resolved)        Problem: Physical Therapy Goal    Goal Priority Disciplines Outcome Goal Variances Interventions   Physical Therapy Goal   (Resolved)     PT/OT, PT Outcome(s) achieved     Description:  Goals to be met by: 2018     Patient will increase functional independence with mobility by performin) Sup<>sit mod I with HOB flat and no use of bed rails  2)Sit <>stand Mod I with RW  3) Ambulate at least 150 feet with Mod I with RW demonstrating good gait stability   4) Pt to turn during ambulation safely with RW Mod I and no gait instability.   5) Bed <>chair mod I with RW.                      Reasons for Discontinuation of Therapy Services  Transfer to alternate level of care.      Plan:     Patient Discharged to: Inpatient Rehab.    Terrell Aguilar, PT  5/15/2018

## 2018-05-16 NOTE — PHYSICIAN QUERY
PT Name: Haris Hernandez  MR #: [id number]     Physician Query Form - Documentation Clarification      CDS: Belle Paulson RN, CCDS       Contact information: [email or web address]  This form is a permanent document in the medical record.     Query Date: May 16, 2018    By submitting this query, we are merely seeking further clarification of documentation. Please utilize your independent clinical judgment when addressing the question(s) below.    The Medical record reflects the following:    Supporting Clinical Findings Location in Medical Record   Concern for withdrawal on 5/10  overnight team started valium taper, but this was stopped after fever and diaphoresis attributed to likely aspiration pneumonitis.    Severe Oropharyngeal Dysphagia.  ...likely aspiration after the swallow of penetrated material...It was recommend that pt remain NPO with alternative means of nutrition/hydration/medication 2/2 HIGH risk of aspiration    Interval left lower lobe airspace disease concerning for developing aspiration or pneumonia.   5/14-D/C Summary        5/13-SLP PN          5/10-CXR   CAP  -pt with productive cough and low-grade fever, new airspace disease LLL  -procal wnl, however started on moxifloxacin due to CXR findings. Repeat procal 48hrs later also negative. Moxiflox discontinued.      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 Intake, continue speech therapy for improved swallowing.   ETOH abuse   5/14-D/C Summary            5/14-D/C Summary                                                                    Doctor, Please specify diagnosis or diagnoses associated with above clinical findings.    Please clarify Pneumonia diagnosis.    Provider Use Only     [   ] CAP     [   ] Aspiration Pneumonia     [ X  ] Pneumonia ruled out     [    ] Other clarification (please specify)____________________________                                                                                                          [   ] Clinically undetermined

## 2018-05-30 DIAGNOSIS — I63.9 CVA (CEREBRAL VASCULAR ACCIDENT): Primary | ICD-10-CM

## 2018-06-05 ENCOUNTER — CLINICAL SUPPORT (OUTPATIENT)
Dept: REHABILITATION | Facility: HOSPITAL | Age: 73
End: 2018-06-05
Payer: MEDICARE

## 2018-06-05 DIAGNOSIS — R47.1 DYSARTHRIA: ICD-10-CM

## 2018-06-05 DIAGNOSIS — R13.12 OROPHARYNGEAL DYSPHAGIA: ICD-10-CM

## 2018-06-05 PROCEDURE — G9158 MOTOR SPEECH D/C STATUS: HCPCS | Mod: CK,PO

## 2018-06-05 PROCEDURE — 92522 EVALUATE SPEECH PRODUCTION: CPT | Mod: PO

## 2018-06-05 PROCEDURE — G8999 MOTOR SPEECH CURRENT STATUS: HCPCS | Mod: CK,PO

## 2018-06-05 PROCEDURE — G8997 SWALLOW GOAL STATUS: HCPCS | Mod: CJ,PO

## 2018-06-05 PROCEDURE — G9186 MOTOR SPEECH GOAL STATUS: HCPCS | Mod: CJ,PO

## 2018-06-05 PROCEDURE — 92610 EVALUATE SWALLOWING FUNCTION: CPT | Mod: PO

## 2018-06-05 PROCEDURE — G8996 SWALLOW CURRENT STATUS: HCPCS | Mod: CK,PO

## 2018-06-05 NOTE — PLAN OF CARE
"Outpatient Neurological Rehabilitation  Speech and Language Therapy Evaluation    Date: 6/5/2018   Start Time:  1000  Stop Time:  1100    Name: Haris Hernandez   MRN: [id number]    Therapy Diagnosis:    Physician: Edilson Bishop MD  Physician Orders: amb referral speech therapy  Medical Diagnosis: CVA, brainstem    Visit #:  1  Visits Authorized: 50  Date of Evaluation:  06/05/2018  Insurance Authorization Period: 05/30/2018-12/31/2018  Plan of Care Certification:    06/05/2018-07/31/2018     Procedure Min.   Motor Speech Evaluation    20    Swallowing and Oral Function Evaluation    40   Total Minutes: 60  Total Untimed Units: 2  Charges Billed/# of units: 2    Precautions:Standard and aspiration  Subjective   Date of Onset: 05/07/2018  History of Current Condition:   Pt reported limited information regarding neurological incident. He was able to report he was not taking his medication as prescribed. His daughter was present to provide all necessary information. Pt's daughter stated she noticed late in the evening that her father was "not looking good." She stated he demonstrated slurred speech and a slight stutter when talking, however, her brother convinced her that it was due to their father's "drinking," so she did not bring him to the ED. When symptoms lasted until the next morning, she brought him to the [facility] ED in [place]. He was transferred to [facility]. There he was dx with a CVA in the brainstem. He had a MBSS completed on 05/11/18, which recommended NPO with alternate means of nutrition, but if family did not agree to alternate means the safest diet would be pureeds with NTL. Pt was d/c to [email or web address] on this diet. Pt's daughter stated he was in IP for 2 weeks before d/c. Pt began using chin tuck with head turn to the left maneuver during po trials in IP.   Past Medical History: Haris Hernandez  has a past medical history of Coronary artery disease; Enlarged prostate " "without lower urinary tract symptoms (luts); Enlarged prostate without lower urinary tract symptoms (luts); and Hypertension.  Haris Hernandez  has a past surgical history that includes Appendectomy and Eye surgery.  Medical Hx and Allergies:  Haris has a current medication list which includes the following prescription(s): aspirin, atorvastatin, clopidogrel, docusate sodium, finasteride, folic acid, hydrochlorothiazide, nifedipine, saw palmetto, tamsulosin, and thiamine. Review of patient's allergies indicates:  No Known Allergies  Imaging: MBSS completed 05/11/18, CT 05/07/18, MRI 05/8/18, CT 05/11/18  Prior Therapy:  Acute ST, OT, PT and IP ST, OT PT at [facility]  Social History:   Pt lives with son daughter and grandchildren in his home.  Prior Level of Function: Independent  Current Level of Function: Assistance - with meals and ADLs   Pain:   0/10  Pain Location / Description: n/a  Nutrition:  Pureed, Nectar Thick Liquids  Patient Therapy Goals:  "better my speech" " my eating habits"  Objective   Oral Motor Exam:    Oral Musculature: Overall mild-moderately impaired  Structure Abnormalities: WFL  Dentition: Present and adequate   Secretion Management: WFL  Mucosal Quality: WFL   Mandibular Strength and Mobility: WFL  Rest: WFL   Lateralization: WFL  Protrusion: WFL  Retraction:  WFL  Involuntary Movement: absent   Oral Labial Strength and Mobility: mild, Pt.'s ability to produce plosives was minimally impaired. Left orofacial weakness noted.    Rest: Mild left labial weakness noted during at rest   Pucker: WFL  Retraction: Mod left labial weakness noted   Alternating Pucker / Retraction: coordination appeared labored and slow  Involuntary Movement: absent   Lingual Strength and Mobility: moderate, left side lingual weakness noted by deviation   Rest: WFL, no involuntary movements or fasciculations seen   Protrustion: Deviation to the right despite noted left sided weakness during tongue " resistance  Retraction: decreased base of tongue retraction  Lateralization: labored and slow, jaw movements noted to compensate for decreased lingual ROM  Involuntary Movement: absent   Velar Elevation: WFL   Rest: WFL  Symmetry: WFL  Elevation: WFL  Sustained Elevation: WFL  Involuntary Movement: absent   Buccal Strength and Mobility: WFL  Volitional Cough: WFL  Volitional Swallow: upon palpation of pt. Neck, decreased hyolaryngeal elevation/excursion noted   Voice Prior to PO Intake: clear    Speech Intelligibility:   The French[place] Dysarthria Assessment-2nd Edition was administered to Mr. Hernandez to assess his motor speech skills. This test assesses the patient's reflexes, respiration, lips, palate, laryngeal function, tongue, and overall intelligibility. Results are described in the following tables:    Reflexes Respiration Lips Palate Laryngeal Tongue Intelligibility   Normal for Age  X  X      Mild Abnormality     X     Abnormality obvious but can perform task with reasonable approximation X  X   X X   Some production of task poor in quality, unable to sustain, or extremely labored          Unable to undertake task/ movement/ sound            Description: Mr. Hernandez presents with moderate dysarthria c/b the following characteristics. Pt. Presented with left orofacial weakness with noted asymmetrical lips at rest, and increased asymmetry during retraction. Pt was unable to perform adequate lip seal consistently for plosives. Decreased coordination seen with alternating lip movements during structured tasks and conversational speech. Mr. Hernandez was able to sustain vowel phonation for ~10-15 sec with interrupted phonation by intermittent breaks in phonation. He demonstrated difficulty completing changes in volume and even progression. Pt's voice is mostly effective however pitch breaks observed at times with low vocal quality. Minimal lingual deviation seen at rest however when protruded, lingual deviation to the  right was clearly present. Lingual ROM and coordination was decreased. During spontaneous speech occasional mispronunciation of consonants was present due to inaccurate lingual movements. No abnormalities or difficulty was noted with respiration or palate ROM in all contexts. At the word level pt was judged to be 70% intelligible to an unfamiliar listener. At the sentence level pt was judged to be 50% intelligible. During conversational speech pt was judged to be 75% intelligible. Increase in intelligibility at conversational level possibility of listener to  on context clues.     Awareness / Strategy Use:   Pt demonstrates adequate awareness of motor speech impairment. Pt was able to use strategies to improve intelligibility with moderate cues. Strategies introduced included: SOS, speak slowly, over articulate, and speak loud.     Impact of Motor Speech Impairment on Functioning:   Due to mod motor speech impairments, pt is unable to participate in social interactions as previously. He also is required to repeat himself frequently which causes much frustration.   Safety Risks: Pt's speech is not clear enough to understand by untrained listeners during urgent events.     Diadochokinetic (DDK) rates for rapid repetition of 1 - 3 syllable utterances were not WNL.     Swallowing:   Pt seen for a bedside swallow study. Pt presents with     THIN:-ice chip x1, 1/2 tsp    NECTAR: 2 self regulated cup sips with no compensatory strategies, 4 self regulated cup sips utilizing chin tuck and head turned to the left     PUREE:- 1 small tsp bite of applesauce, 1 regular tsp of applesauce (chin tuck and head turn utilized for trials)     DENTAL SOFT:- tsp bites of drained peaches x3 (chin tuck and head turn utilized for trials)     Oral Phase: No anterior spillage noted, however, decreased strength and coordination noted to control bolus in oral cavity to complete compensatory strategies. Daughter stated pt exhibits  "pocketing of food in left buccal cavity but was not seen with PO trials. Decreased A-P transport time with increased mastication observed with drained peaches.   Pharyngeal Phase: No overt signs or symptoms of aspiration observed with trials of ice chip or 1/2 tsp of thin. Pt overtly coughed 2/4 trials of nectar thick liquids with delayed throat clear noted other 2/4 trials. No signs or symptoms of aspiration observed with any trials of puree or mechanical soft. SLP unclear whether throat clears are due to immediate swallow of NTL or residue from previous trials. MBSS completed 5/11/18 stated the following: "inconsistent cough reflex and silent consistent penetration above the chords with thin, nectar, honey, and puree consistencies with likely aspiration after the swallow of penetrated material," therefore, aspiration at the bed side cannot be ruled out. Head turn and chin tuck strategies appear to be difficult for pt to coordinate prior to swallow reflex. SLP to continue trials of techniques in therapy.     Impressions: Pt presents with severe oropharyngeal dysphagia. Recommendations include 1 bite/sip at a time, Alternating bites/sips, Assistance with meals, Assistance with thickening liquids, Check for pocketing/oral residue, Frequent oral care, eat at 90 degrees, Meds crushed in puree, Monitor for s/s of aspiration, No straws, Remain upright 30 minutes post meal, Small bites/sips and Strict aspiration precautions. Chin tuck with head turn maneuver for all PO trials.       Functional Communication Measure (FCM):   Severity Modifier for Medicare G-Code:  Swallowing  Current status:  FCM:  Level 4   - CK  Projected status:  FCM:   Level 5   - CJ  Discharge status:  n/a     Motor Speech  Current status: FCM:  Level 4   - CK   Projected status:  FCM:   Level 5   - CJ   Discharge status:  FCM:   Level 4   - CK       Assessment   Wishram presents to [facility] s/p " medical diagnosis of  CVA of brainstem.  Demonstrates impairments including limitations as described in the problem list. He presents with moderate dysarthria c/b left orofacial weakness, decreased lingual ROM and strength, and inconsistent consonant production. Pt also presents with severe oropharyngeal dysphagia c/b the above impairments. Positive prognostic factors include family support and length of time since incident. Negative prognostic factors include patient motivation and insight to dysphagia deficits. Barriers to progress include his insight to dysphagia deficits. Patient will benefit from skilled, outpatient neurological rehabilitation speech therapy.  Rehab Potential: good  Environmental Concerns/Cultural/Spiritual/Developmental/Educational Needs: Pt's spiritual, cultural and educational needs considered and pt agreeable to plan of care and goals.    Short Term Goals (4 weeks):   1. Pt will complete OMEs 10-15x each 2-3x per session to increase lingual, labial, and buccal strength and coordination given supervision.  2. Pt will utilize motor speech strategies given min A during speech activities.   3. Pt will read bi-syllabic words with 90% acc given min cues to utilize clear speech strategies across 2 consecutive sessions.   4. Pt will repeat 5-7 word sentences given min cues to utilize clear speech strategies across 2 consecutive sessions.   5. Patient will independently demonstrate adequate use of chin tuck, head turn to L compensatory strategies to eliminate s/s of aspiration with consistency on 8/10 NTL trials.   6. Pt will perform effortful swallow maneuver to improve pharyngeal clearance and tongue base retraction 45-60x per session with pureed.   7. Pt will perform modified mendhelson maneuver to improve hyolaryngeal elevation and excursion 45-60x per session with pureed followed by an effortful, dry swallow.   8. Pt will complete tongue pull backs with resistance to improve tongue base  retraction 45-60x per session.   9. Pt will complete chin tuck against resistance (CTAR) repetitions with 60-90x per session.  10. Pt will complete CTAR hold for 1 minute, 5x per session.    Long Term Goals (8 weeks):   1.Pt will maintain adequate hydration/nutrition with optimum safety and efficiency of swallowing function on PO intake without overt s/s of aspiration for the highest appropriate diet level.   2. Pt will utilize compensatory strategies with optimum safety and efficiency of swallowing function on PO intake without overt s/s of aspiration for the highest diet level.  3. Pt will develop functional and intelligible speech and utilize compensatory strategies through the use of adequate labial and lingual function, increased articulatory precision and speech prosody.       Plan   Plan of Care Certification Period: 06/05/2018-07/31/2018  Recommended Treatment Plan:  Patient will participate in the [facility] rehabilitation program for speech therapy 2 times per week to address his  Motor Speech and Swallow deficits, to educate patient and their family, and to participate in a home exercise program.    Other Recommendations: Follow PCP    Therapist's Name:   TORIBIO Lomax., CCC-SLP  Speech-Language Pathologist    Date: 6/5/2018

## 2018-06-06 ENCOUNTER — CLINICAL SUPPORT (OUTPATIENT)
Dept: REHABILITATION | Facility: HOSPITAL | Age: 73
End: 2018-06-06
Payer: MEDICARE

## 2018-06-06 DIAGNOSIS — R29.898 WEAKNESS OF LEFT UPPER EXTREMITY: ICD-10-CM

## 2018-06-06 DIAGNOSIS — I69.398 LACK OF COORDINATION AS LATE EFFECT OF CEREBROVASCULAR ACCIDENT (CVA): ICD-10-CM

## 2018-06-06 DIAGNOSIS — R27.9 LACK OF COORDINATION AS LATE EFFECT OF CEREBROVASCULAR ACCIDENT (CVA): ICD-10-CM

## 2018-06-06 PROCEDURE — 97110 THERAPEUTIC EXERCISES: CPT | Mod: PO

## 2018-06-06 PROCEDURE — G8988 SELF CARE GOAL STATUS: HCPCS | Mod: CJ,PO

## 2018-06-06 PROCEDURE — 97166 OT EVAL MOD COMPLEX 45 MIN: CPT | Mod: PO

## 2018-06-06 PROCEDURE — G8987 SELF CARE CURRENT STATUS: HCPCS | Mod: CK,PO

## 2018-06-06 NOTE — PATIENT INSTRUCTIONS
[facility]  OCCUPATIONAL THERAPY  HOME EXERCISE PROGRAM       Perform the following 10 repetitions, 1-2x/day    Resisted    Squeeze putty using thumb and all fingers.        Resisted Digit Extension   With thumb and all fingers in center of putty donut, stretch out.      Resisted Lateral/Key Pinch  Squeeze between thumb and side of index finger.      Resisted 3-Jaw and 2pt Pinch   Pinch putty between tip of thumb and tip of index/long fingers. Pinch putty between tip of thumb and tip of index.     LEANNE Gong  Occupational Therapist

## 2018-06-06 NOTE — PLAN OF CARE
"TIME RECORD    Date: 06/12/2018    Start Time:  3:00  Stop Time:  3:55    PROCEDURES:    TIMED  Procedure Min.   TE 10             UNTIMED  Procedure Min.   IE 45         Total Timed Minutes:  10  Total Timed Units:  1  Total Untimed Units:  1  Charges Billed/# of units:  1IE, 1TE    Medicare:  Today: 119.70  Total: 119.70    OCCUPATIONAL THERAPY INITIAL EVALUATION & PLAN OF TREATMENT    Patient Name: Haris Hernandez  Physician Name:  Dr. Edilson Bishop  Primary Diagnosis:  CVA  Treatment Diagnosis:  CVA, L sided weakness  Onset Date:  5/7/18  Eval Date:  6/6/18  Certification Period:  6/6/18 to 8/6/18  Past Medical History:   Past Medical History:   Diagnosis Date    Coronary artery disease     Enlarged prostate without lower urinary tract symptoms (luts)     BPH    Enlarged prostate without lower urinary tract symptoms (luts)     BPH    Hypertension      Past Surgical History:   Procedure Laterality Date    APPENDECTOMY      EYE SURGERY         Precautions:  Aspiration, fall risk  History of Current Condition:   Pt reported limited information regarding neurological incident. He was able to report he was not taking his medication as prescribed. His daughter was present to provide all necessary information. Pt's daughter stated she noticed late in the evening that her father was "not looking good." She stated he demonstrated slurred speech and a slight stutter when talking, however, her brother convinced her that it was due to their father's "drinking," so she did not bring him to the ED. When symptoms lasted until the next morning, she brought him to the [facility] ED in [place]. He was transferred to [facility]. There he was dx with a CVA in the brainstem.  Pt's daughter stated he was in IP for 2 weeks before d/c.     Prior Therapy:  Acute ST, OT, PT and IP ST, OT PT at [facility]  Signs of Abuse: no  Medications: Haris Hernandez has a current medication list which includes the following " "prescription(s): aspirin, atorvastatin, clopidogrel, docusate sodium, finasteride, folic acid, hydrochlorothiazide, nifedipine, saw palmetto, tamsulosin, and thiamine.  Nutrition:  On mechanical soft, nectar thick liquids  Prior Level of Function: Independent  Social History:  Pt lives with son, daughter, and grandchildren in his home  Place of Residence (steps/adaptations/DME):  Elevated toilet, BSC, TTB, grab bars in tub, RW  Functional Deficits Leading to Referral/Nature of Injury:  CVA, decreased independence with ADLs/IADLs  Patient Therapy Goals:  To become stronger and more independent  Hand dominance: Right  X-Rays/Tests: MRI on 5/8/18 indicates: 1. Acute infarction involving the right basal ganglia and corona radiata.  2. Small remote lacunar infarcts involving the left basal ganglia, left thalamus, and right cerebellar hemisphere.    CT on 5/11/18 indicates: Evolving right basal ganglia infarct appears grossly stable from MRI 05/08/2018 allowing for variation in modality.  Multiple small scattered remote infarcts.    Subjective:  Pt daughter present for portion of eval. She reports he fell yesterday while in the bathroom and trying to transfer. Pt states "I would like to do things myself." Pt daughter states "can you show him how to get dressed."     Pain:  Pt denied pain this date.     Objective:    Cognitive Exam  Oriented: Person, Place, Time and Situation  Behaviors: Follows one-step commands  Follows Commands/attention: Follows one-step commands  Communication: dysarthria  Memory: No Deficits noted  Safety awareness/insight to disability: impaired  Coping skills/emotional control: Appropriate to situation, fairly blunt affect    Visual/perceptual:  Tracking: intact  Saccades: intact  Acuity: impaired, wears reading glasses, daughter reports pt may be having difficulty with vision; cataracts in R eye  R/L discrimination: intact  Visual field: appears grossly intact  Motor Planning Praxis: " "intact  Comments:     Physical Exam:    Postural examination/scapula alignment: Affected scapula depressed and Slouched posture  Joint integrity: no subluxation noted  Skin integrity: warm, intact  Edema: no edema noted    Palpation: denied pain upon palpation of shoulder    Sensation: [place] denies numbness and tingling in L UE, light touch intact    Range of Motion:   Shoulder Right  Left     AROM MMT AROM MMT   flexion WFL 5/5 130 5/5   extension WFL 5/5 WFL 5/5   abduction WFL 5/5 135 4/5   adduction WFL 5/5 WFL NT   Internal rotation WFL 5/5 WFL 3+/5   ER at 90° abd WFL 5/5 WFL 3+/5   ER at 0° abd WFL 5/5 WFL 5/5   Elbow Flex WFL 5/5 WFL 5/5   Elbow Ext WFL 5/5 WFL 5/5   Pronation WFL 5/5 WFL 5/5   Supination WFL 5/5 WFL 5/5   Wrist Flex WFL 5/5 WFL 5/5   Wrist Ext WFL 5/5 WFL 5/5   RD WFL 5/5 WFL 5/5   UD WFL 5/5 WFL 5/5       Fist: full composite fist and finger ext; full opposition noted     Strength in lbs (position II):  R) 80  L) 35    Fine motor coordination: opposition and finger to nose test L delayed and impaired   9 hole peg test: (all in hand)   R) 1'02" with 8 drops   L) 2'44" with 8 drops and trying to assist with R hand    Gross motor coordination: impaired    Tone:  Modified Krishna Scale:   0 - No increase in muscle tone      Functional Mobility:  Supine to sit: Mod I  Sit to supine: Mod I  Transfers to bed: supervision  Transfers to toilet: supervision   Car transfers: supervision    ADL's:  Feeding: set-up, assist with cutting food  Grooming: Mod I  Hygiene: Mod I  UB Dressing: mod A  LB Dressing: min to mod A depending  Toileting: Mod I  Bathing: supervision, min A with drying back    IADL's:  Homecare: D  Cooking: D  Laundry: D  Yard work: D  Use of telephone: did not report  Money management: D  Medication management: D      Test: Patient scored 54% impairment on FOTO demonstrating Pt's functional ability with upper extremity.     Treatment included: OT evaluation, the following " exercises (HEP) were instructed and Haris was able to demonstrate them prior to the end of the session. HEP are as follows: see patient instructions in chart. Educated on safety, light hand strengthening with red theraputty, and FM coordination activities to practice at home.       Assessment  This 72 y.o. male referred to Outpatient Occupational Therapy with diagnosis of CVA  Encounter Diagnoses   Name Primary?   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 Patient can benefit from Occupational Therapy services for PROM, AAROM, AROM, Theraputic exercises, joint mobs, home exercise program provied with written instructions, ice, Ice massage, strengthening, Theraband Ex, UBE and pulley ex, ther activitiies in order to maximize painfree functional use of  left UE. . The following goals were discussed with the patient and he is in agreement with them as to be addressed in the treatment plan.       History Examination Decision Making Complexity Score   Occupational Profile: Did an expanded chart review. Pt lives with his family. PLOF independent       Medical and Therapy History:     Comorbidities that may affect POC include: hx of ETOH abuse, impaired mobility          Moderate   Performance Deficits   Nondominant UE affected    Physical  Decreased function of Left UE, Decreased ROM, Decreased strength, Inability to perform work/tasks, Inability to perform leisure activiites and Inability to perform self care tasks     Cognitive  Impaired safety      Psychosocial:    Pt unable to perform the following ADLs/IADLs, fishing all of which were a part of pt's habits, roles, routines, interpersonal interactions prior to injury/surgey     high Foto score:54% impairment    Pt has several treatment options including  IASTM, cupping, soft tissue mobs, joint mobs, therex, therapeutic activity, education,  "endurance training, modalities etc.      Discussed goals and Pt in agreement with goals.    Issued HEP at eval and pt able to perform all with minimal verbal and tactile cues provided by OT. Pt able to complete all without c/o and demonstrating good technique    Moderate Moderate     Rehab Potential: good    Goals to be met in 4 weeks: (7/6/18)  1) Initiate Hep   2) Pt will require no more than min A for UB dressing  3) Pt will require no more than min A with LE dressing  4) Pt will require no more than mod I with bathing  5) Pt will demonstrate improved strength by at least 1/2 grade MMT of shoulder    Goals to be met by discharge:  1) Independent with HEP  2) Pt will require no more than supervision for UB dressing  3) Pt will require no more than supervision with LE dressing  4) Pt will demonstrate L UE strength WFL for performing ADLs/IADLs  5) Pt will demonstrate improved FM skills AEB performing 9 hole peg test in less than 2" with no more than 2 drops  5) Patient will be able to achieve less than or equal to 30% impairment on FOTO  demonstrating overall improved functional ability with upper extremity.     Plan  Recommended Treatment Plan (2 times per week for 8 weeks): Therapeutic Exercise, Functional Activities, Patient Education, Home Exercise Program, ADL Training, Electrical Stimulation/TENS/Interferential, Moist Heat/Ice, Sensory/Neuromuscular Reeducation and Manual Therapy      Therapist's Name: LEANNE Gong   Date: 06/6/2018    I CERTIFY THE NEED FOR THESE SERVICES FURNISHED UNDER THIS PLAN OF TREATMENT AND WHILE UNDER MY CARE    Physician's comments: ________________________________________________________________________________________________________________________________________________      Physician's Name: ___________________________________    "

## 2018-06-11 PROBLEM — R27.9 LACK OF COORDINATION AS LATE EFFECT OF CEREBROVASCULAR ACCIDENT (CVA): Status: ACTIVE | Noted: 2018-06-11

## 2018-06-11 PROBLEM — I69.398 LACK OF COORDINATION AS LATE EFFECT OF CEREBROVASCULAR ACCIDENT (CVA): Status: ACTIVE | Noted: 2018-06-11

## 2018-06-12 ENCOUNTER — CLINICAL SUPPORT (OUTPATIENT)
Dept: REHABILITATION | Facility: HOSPITAL | Age: 73
End: 2018-06-12
Attending: INTERNAL MEDICINE
Payer: MEDICARE

## 2018-06-12 DIAGNOSIS — R26.89 BALANCE PROBLEM: ICD-10-CM

## 2018-06-12 DIAGNOSIS — I69.398 LACK OF COORDINATION AS LATE EFFECT OF CEREBROVASCULAR ACCIDENT (CVA): ICD-10-CM

## 2018-06-12 DIAGNOSIS — R29.898 WEAKNESS OF BOTH LOWER EXTREMITIES: ICD-10-CM

## 2018-06-12 DIAGNOSIS — R29.898 WEAKNESS OF LEFT UPPER EXTREMITY: ICD-10-CM

## 2018-06-12 DIAGNOSIS — M62.9 HAMSTRING TIGHTNESS OF BOTH LOWER EXTREMITIES: ICD-10-CM

## 2018-06-12 DIAGNOSIS — R26.81 UNSTEADY GAIT: ICD-10-CM

## 2018-06-12 DIAGNOSIS — R27.9 LACK OF COORDINATION AS LATE EFFECT OF CEREBROVASCULAR ACCIDENT (CVA): ICD-10-CM

## 2018-06-12 PROCEDURE — G8978 MOBILITY CURRENT STATUS: HCPCS | Mod: CK,PO

## 2018-06-12 PROCEDURE — G8979 MOBILITY GOAL STATUS: HCPCS | Mod: CJ,PO

## 2018-06-12 PROCEDURE — 97112 NEUROMUSCULAR REEDUCATION: CPT | Mod: PO

## 2018-06-12 PROCEDURE — 97530 THERAPEUTIC ACTIVITIES: CPT | Mod: PO

## 2018-06-12 PROCEDURE — 97161 PT EVAL LOW COMPLEX 20 MIN: CPT | Mod: PO

## 2018-06-12 PROCEDURE — 97116 GAIT TRAINING THERAPY: CPT | Mod: PO,59

## 2018-06-12 NOTE — PROGRESS NOTES
"DAILY TREATMENT NOTE    DATE: 6/12/2018    Start Time:  2:00  Stop Time:  2:50  Visit: 2    PROCEDURES:    TIMED  Procedure Min.   NMR 35   TE    TA 15             UNTIMED  Procedure Min.             Total Timed Minutes:  50  Total Timed Units:  3  Total Untimed Units:  0  Charges Billed/# of units:  2NMR, 1TA      Progress/Current Status    Subjective:     Patient ID: Haris Hernandez is a 72 y.o. male.  Diagnosis:   1. 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 Weakness of left upper extremity       Pain: 0 /10  Pt states "I'm ok. "  Pt with no new c/o. Reports compliance with HEP.     Objective:     Pt seen by OT this session. Treatment consisted of the following:      Date: 6/12/18    Visit: 2    g-code: 2/10   UBE L2.5, 3' forward, 2' back   Tband- red    -rows 2 x 10 reps   -biceps curls 2 x 10 reps   -triceps ext 2 x 10 reps   Wall slides 10 reps with SBA   Theraputty - red    -gross  20 reps   -molding 2 min   -log rolls with pinching 2 sets   -lateral key pinch 10 reps   Digiflex, red, 3# 15 reps   FM coordination, task addressing FM skills, translation, and manual dexterity with pompoms 2 sets, 10 pom poms   Pinch strengthening with red clothespin, picking up pom poms 10 reps   FM coordination task with coins and inserting them into slot, addressing in hand manipulation, translation, and FM skills 10 coins, 6 drops       Assessment:     Pt tolerated tx fairly well this date. Pt denied pain throughout session. Cont impaired use of L hand. Cont to present with impaired GM and FM coordination, L UE weakness, decreased independence with ADLs, and decreased functional use of L UE. Pt progressing fairly well towards goals. Pt would cont to benefit from skilled OT services to maximize functional use of L UE.     Patient Education/Response:     Cont HEP and FM coordination practice activities.    Plans and Goals:     Cont OT poc 2x/week for 8 weeks during certification period " "6/6/18 to 8/6/18 in pursuit of established goals.    Goals to be met in 4 weeks: (7/6/18)  1) Initiate Hep   2) Pt will require no more than min A for UB dressing  3) Pt will require no more than min A with LE dressing  4) Pt will require no more than mod I with bathing  5) Pt will demonstrate improved strength by at least 1/2 grade MMT of shoulder     Goals to be met by discharge:  1) Independent with HEP  2) Pt will require no more than supervision for UB dressing  3) Pt will require no more than supervision with LE dressing  4) Pt will demonstrate L UE strength WFL for performing ADLs/IADLs  5) Pt will demonstrate improved FM skills AEB performing 9 hole peg test in less than 2" with no more than 2 drops  5) Patient will be able to achieve less than or equal to 30% impairment on FOTO  demonstrating overall improved functional ability with upper extremity.      LEANNE Gong    "

## 2018-06-12 NOTE — PLAN OF CARE
[facility]  Physical Therapy Initial Evaluation    Name: Haris Hernandez  Clinic Number: [id number]    Therapy Diagnosis:   Encounter Diagnoses   Name Primary?    Unsteady gait     Weakness of both lower extremities     Hamstring tightness of both lower extremities     Balance problem      Physician: Edilson Bishop MD    Physician Orders: PT Eval and Treat   Medical Diagnosis: CVA  Evaluation Date: 6/12/2018  Authorization Period Expiration: 12/31/18  Plan of Care Certification Period: 06/12/18 to 08/12/18  Visit # / Visits authorized: 1/ 50    Time In: 1:00  Time Out: 2:00  Total Billable Time: 60 minutes    Precautions: Standard and Fall    Subjective   Date of onset: 05/07/18  History of current condition - Haris reports: that since his stroke he is having trouble tying his shoes, having trouble with balance one fall and two almost falls getting up from a chair, trouble with getting up from his bed, difficulty getting up from his favorite chair(recliner), and feels his leg gets tired with prolonged standing. His daughterer reports that he often leaves the walker behind while moving around the home and he will hold it off the ground when walking with it.       Past Medical History:   Diagnosis Date    Coronary artery disease     Enlarged prostate without lower urinary tract symptoms (luts)     BPH    Enlarged prostate without lower urinary tract symptoms (luts)     BPH    Hypertension      Haris Hernandez  has a past surgical history that includes Appendectomy and Eye surgery.    Haris has a current medication list which includes the following prescription(s): aspirin, atorvastatin, clopidogrel, docusate sodium, finasteride, folic acid, hydrochlorothiazide, nifedipine, saw palmetto, tamsulosin, and thiamine.    Review of patient's allergies indicates:  No Known Allergies     Imaging, MRI studies, CT scan films:   COMPARISON:  MRI 05/08/2018.    FINDINGS:  Intracranial  compartment:    Ventricles and sulci are stable in size, without evidence of hydrocephalus.    There is a known acute infarct in the right basal ganglia.  This appears grossly stable in distribution from the recent MRI, allowing for variation in modality.  No significant mass effect or associated hemorrhage.    Small remote infarcts in the left basal ganglia, left thalamus, hermann and right cerebellum.  No new infarct identified.  No parenchymal mass or hemorrhage.    No new extra-axial blood or fluid collections.    Skull/extracranial contents (limited evaluation):    No fracture. Mastoid air cells and paranasal sinuses are essentially clear.   Impression       Evolving right basal ganglia infarct appears grossly stable from MRI 05/08/2018 allowing for variation in modality.    Multiple small scattered remote infarcts.    No new infarct or hemorrhage.     Prior Therapy: Inpatient rehab  Social History: single story home, lives with their family  Occupation: retired  Prior Level of Function: independent  Current Level of Function: independent    Pain:  Patient denies pain at this time.     Pts goals: To be completely independently have better control over balance improved quality of life    Objective     Observation: Patient is an adult male in no apparent distress at this time. He is ambulating with RW, often holding the back of it off the ground with a shuffling gait pattern. Increased reliance on UEs for sit to stand transfers from various surfaces around the home.       Posture: rounded shoulders and forward head      Gait: Patient ambulating with RW, holding walker in the air using shuffling gait pattern.     Strength:  Hip Left Right   Flexion 3+/5 4+/5   Abduction 3/5 4+/5   Extension 3-/5 3/5     Knee Left Right   Extension 4/5 5/5   Flexion 4/5 5/5     Ankle Left Right   Dorsiflexion 5/5 5/5   Plantarflexion 5/5 5/5       Special Tests:  Chair to stand 10 reps with hands, 0 reps without hands. (age group  norm 12-17)  TUG 17.95 seconds (9.2 seconds age group norm)  Dynamic Gait Index - assessed 06/12/18  1. Gait level surface -  2  2. Change in gait speed - 1  3. Gait with horizontal head turns - 2  4. Gait with vertical head turns - 2  5. Gait and pivot turn - 2  6. Step over obstacle - 1  7. Step around obstacle - 3  8. Steps - 1  Total 14/24 (increased risk for falls)    Joint Mobility: N/A    Palpation: N/A    Sensation: light touch intact    Flexibility: SLR L 50*, R 50*      CMS Impairment/Limitation/Restriction for FOTO CVA Survey    Therapist reviewed FOTO scores for Haris Hernandez on 6/12/2018.   FOTO documents entered into [employer] - see Media section.    Limitation Score: 54%  Category: Mobility    Current : CK = at least 40% but < 60% impaired, limited or restricted  Goal: CJ = at least 20% but < 40% impaired, limited or restricted  Discharge: N/A at this time         TREATMENT   Treatment Time In: 1:50  Treatment Time Out: 2:00  Total Treatment time separate from Evaluation time:10    Haris participated in gait training to improve functional mobility and safety for 10  minutes, including:  Use of NBQC with SBA, verbal and tactile     Home Exercises and Patient Education Provided    Education provided re: Patient and daughter were educated that he is to continue to use the RW outside of the clinic but PT would begin working with him on using the cane to improve his functional mobility.    Written Home Exercises Provided: None at this time.   Haris demonstrated fair  understanding of the education provided.     See EMR under patient instructions for exercises given.   Assessment   Haris is a 72 y.o. male referred to outpatient Physical Therapy with a medical diagnosis of CVA. Pt presents with an unsteady gait, balance issues, and lower extremity weakness that limits his ability to perform his usual ADLs, ambulate without an AD, ambulate for a prolonged distance, and increases his reliance on UE leverage for  transfers. When ambulating with his RW he has a shuffling gait pattern and holds the back of the walker off the ground. His scores on DGI, Chair to stand, and TUG all indicate he is at risk for falls. His current score on FOTO places him in the 40%<60% impaired, limited, or restricted category.     Pt prognosis is Fair.   Pt will benefit from skilled outpatient Physical Therapy to address the deficits stated above and in the chart below, provide pt/family education, and to maximize pt's level of independence.     Plan of care discussed with patient: Yes  Pt's spiritual, cultural and educational needs considered and patient is agreeable to the plan of care and goals as stated below:     Anticipated Barriers for therapy: Patient's questionable compliance with medical advice    Medical Necessity is demonstrated by the following  History  Co-morbidities and personal factors that may impact the plan of care Co-morbidities:   history of CVA and history of ETOH abuse    Personal Factors:   no deficits     moderate   Examination  Body Structures and Functions, activity limitations and participation restrictions that may impact the plan of care Body Regions:   lower extremities    Body Systems:    strength  gait  transfers  flexibility    Participation Restrictions:   Difficulty with transfers, balance, prolonged ambulation, and performing usual ADLs.     Activity limitations:   Learning and applying knowledge  listening    General Tasks and Commands  no deficits    Communication  no deficits    Mobility  walking    Self care  dressing    Domestic Life  doing house work (cleaning house, washing dishes, laundry)    Interactions/Relationships  no deficits    Life Areas  no deficits    Community and Social Life  recreation and leisure         low   Clinical Presentation evolving clinical presentation with changing clinical characteristics low   Decision Making/ Complexity Score: low     Goals:  Short Term Goals: 4 weeks   1.  This patient will be independent with a basic HEP.   2. This patient will increase B LE strength by 1 grade in order to perform sit to stand without UE leverage.   3. This patient will be able to ambulate 100 feet with least restrictive AD with no LOB using a normal gait pattern.   4. This patient will score 12 seconds or less on TUG to decrease his fall risk with ADLs.   5. Patient will be able to achieve greater than or equal to 60 on the FOTO CVA placing patient in 40%<60% impaired, limited, or restricted category demonstrating overall improved functional ability with lower extremity.   Long Term Goals: 8 weeks   1. This patient will be independent with an updated HEP.   2. This patient will increase B LE strength to 5/5 in order to ascend/descend steps with a reciprocal gait.   3. This patient will be able to ambulate 300 feet with least restrictive AD with no LOB using a normal gait pattern.   4. This patient will score 9 seconds or less on TUG to decrease his fall risk with ADLs.   5. Patient will be able to achieve greater than or equal to 75 on the FOTO CVA placing patient in 20%<40% impaired, limited, or restricted category demonstrating overall improved functional ability with lower extremity.     Plan   Certification Period/Plan of care expiration: 6/12/2018 to 08/12/18.    Outpatient Physical Therapy 2 times weekly for 8 weeks to include the following interventions: Gait Training, Manual Therapy, Moist Heat/ Ice, Neuromuscular Re-ed, Patient Education and Therapeutic Exercise.     Esthela Robles, PT

## 2018-06-14 ENCOUNTER — CLINICAL SUPPORT (OUTPATIENT)
Dept: REHABILITATION | Facility: HOSPITAL | Age: 73
End: 2018-06-14
Attending: INTERNAL MEDICINE
Payer: MEDICARE

## 2018-06-14 DIAGNOSIS — I69.398 LACK OF COORDINATION AS LATE EFFECT OF CEREBROVASCULAR ACCIDENT (CVA): ICD-10-CM

## 2018-06-14 DIAGNOSIS — R29.898 WEAKNESS OF LEFT UPPER EXTREMITY: ICD-10-CM

## 2018-06-14 DIAGNOSIS — R27.9 LACK OF COORDINATION AS LATE EFFECT OF CEREBROVASCULAR ACCIDENT (CVA): ICD-10-CM

## 2018-06-14 DIAGNOSIS — R26.89 BALANCE PROBLEM: ICD-10-CM

## 2018-06-14 DIAGNOSIS — R29.898 WEAKNESS OF BOTH LOWER EXTREMITIES: ICD-10-CM

## 2018-06-14 DIAGNOSIS — R26.81 UNSTEADY GAIT: ICD-10-CM

## 2018-06-14 DIAGNOSIS — M62.9 HAMSTRING TIGHTNESS OF BOTH LOWER EXTREMITIES: ICD-10-CM

## 2018-06-14 PROCEDURE — 97530 THERAPEUTIC ACTIVITIES: CPT | Mod: PO

## 2018-06-14 PROCEDURE — 97110 THERAPEUTIC EXERCISES: CPT | Mod: PO

## 2018-06-14 PROCEDURE — 97116 GAIT TRAINING THERAPY: CPT | Mod: PO

## 2018-06-14 PROCEDURE — 97112 NEUROMUSCULAR REEDUCATION: CPT | Mod: PO

## 2018-06-14 NOTE — PROGRESS NOTES
"  Physical Therapy Daily Treatment Note     Name: Haris Hernandez  Clinic Number: [id number]    Therapy Diagnosis:   Encounter Diagnoses   Name Primary?    Unsteady gait     Weakness of both lower extremities     Hamstring tightness of both lower extremities     Balance problem      Physician: Edilson Bishop MD    Visit Date: 6/14/2018  Physician Orders: PT Eval and Treat   Medical Diagnosis: CVA  Evaluation Date: 6/12/2018  Authorization Period Expiration: 12/31/18  Plan of Care Certification Period: 06/12/18 to 08/12/18  Visit # / Visits authorized: 2/ 50  Time In: 4:00  Time Out: 4:50  TIMED  Procedure Min.   TE 30   TE sup 10NC   GT 10             UNTIMED  Procedure Min.             Total Billable Time: 40 minutes    Precautions: Standard and Fall    Subjective      Pt reports: he was compliant with home exercise program given last session. Patient's daughter reports he has not been using the RW at home and has been walking without it.   Response to previous treatment:No change   Functional change: N/A at this time.     Pain: 0/10  Location: N/A    Objective     Haris received therapeutic exercises to develop strength and flexibility for 10 minutes supervised by PT and 30 minutes 1:1 with PT including:    Date  06/14/18   VISIT 2/50   Gcode 07/12/18  2/10   FOTO 2/5   Cap Visit   Cap Total 90.62  203.48   POC exp 08/12/18   FTF 07/12/18   MT    Long Sit HS 10 x 10"   Gastroc Str. --   Bike --   Bridge 1 x 10   SAQ 1 x 10   SLR 1 x 15   Hip Abd 2 x 10 RTB   Hip Add 10 x 3" w/ balll       LAQs 1 x 10 x 1#   Seated Hip Flex 1 x 10 x 1#   HS curl 2 x 10 RTB   Min squat 1 x 10   Calf raise 1 x 10   Hip Abd --   Hip Flex --   Hip Ext --   HS Curls --   Knee Ext --   Step Ups --   Step Downs --   Gait See below   CP --   Initials DG     Haris participated in gait training to improve functional mobility and safety for 10  minutes, including:  SBA with verbal and tactile cues with NBQC indoors 120 feet 2x.    Home " Exercises Provided and Patient Education Provided     Education provided:   - Patient educated again on importance of him using his RW at home until his balance improves. He was also educated to perform his HEP twice a day to his tolerance.     Written Home Exercises Provided: Handouts of today's new exercises for his HEP.  Exercises were reviewed and Haris was able to demonstrate them prior to the end of the session.  Haris demonstrated fair  understanding of the education provided.     Pt received a written copy of exercises to perform at home.   See EMR under patient instructions for exercises given.     Haris demonstrated fair  understanding of the education provided.     Assessment     Patient was able to begin making progress towards his goals as he was able to perform all of today's activities with no increase in symptoms prior to leaving the clinic. He required frequent cues to move through his full available ROM and to keep the NBQC closer to his body during gait training. He did have 2 small LOB while ambulating with NBQC that he used a stepping strategy to maintain his balance.   Haris is progressing well towards his goals.   Pt prognosis is Fair.     Pt will continue to benefit from skilled outpatient physical therapy to address the deficits listed in the problem list box on initial evaluation, provide pt/family education and to maximize pt's level of independence in the home and community environment.     Pt's spiritual, cultural and educational needs considered and pt agreeable to plan of care and goals.    Anticipated barriers to physical therapy: Patient's compliance with using the RW at home.     Goals:   Short Term Goals: 4 weeks   1. This patient will be independent with a basic HEP.   2. This patient will increase B LE strength by 1 grade in order to perform sit to stand without UE leverage.   3. This patient will be able to ambulate 100 feet with least restrictive AD with no LOB using a  normal gait pattern.   4. This patient will score 12 seconds or less on TUG to decrease his fall risk with ADLs.   5. Patient will be able to achieve greater than or equal to 60 on the FOTO CVA placing patient in 40%<60% impaired, limited, or restricted category demonstrating overall improved functional ability with lower extremity.   Long Term Goals: 8 weeks   1. This patient will be independent with an updated HEP.   2. This patient will increase B LE strength to 5/5 in order to ascend/descend steps with a reciprocal gait.   3. This patient will be able to ambulate 300 feet with least restrictive AD with no LOB using a normal gait pattern.   4. This patient will score 9 seconds or less on TUG to decrease his fall risk with ADLs.   5. Patient will be able to achieve greater than or equal to 75 on the FOTO CVA placing patient in 20%<40% impaired, limited, or restricted category demonstrating overall improved functional ability with lower extremity.        Plan     Continue with the plan of care and progress as the patient tolerates.     Esthela Robles, PT

## 2018-06-14 NOTE — PATIENT INSTRUCTIONS
Supine: Leg Stretch With Strap (Intermediate)        Lie on back with one knee bent, foot flat on floor. Hook strap around other foot. Straighten knee. Raise leg further toward its maximal range. Hold 10 seconds. Relax leg completely down to floor.   Repeat 10 times per session. Do 2 sessions per day.    Strengthening: Hip Adduction - Isometric        With ball or folded pillow between knees, squeeze knees together. Hold 3 seconds.  Repeat 10 times per set. Do 3 sets per session. Do 2 sessions per day.     Strengthening: Straight Leg Raise (Phase 1)        Tighten muscles on front of right thigh, then lift leg, keeping knee locked.   Repeat 10 times per set. Do 3 sets per session. Do 2 sessions per day.      Strengthening: Hip Abduction (Side-Lying)        Tighten muscles on front of left thigh, then lift leg, keeping knee locked.   Repeat 10 times per set. Do 3 sets per session. Do 2 sessions per day.       Hamstring Curl: Resisted (Sitting)        Facing anchor with tubing on right ankle, leg straight out, bend knee.  Repeat 10 times per set. Do 3 sets per session. Do 2 sessions per day.      Strengthening: Hip Abductor - Resisted        With band looped around both legs above knees, push thighs apart.  Repeat 10 times per set. Do 3 sets per session. Do 2 sessions per day.       Knee Extension: Resisted (Sitting)        With band looped around right ankle and under other foot, straighten leg with ankle loop. Keep other leg bent to increase resistance.  Repeat 10 times per set. Do 3 sets per session. Do 2 sessions per day.     Functional Quadriceps: Chair Squat        Keeping feet flat on floor, shoulder width apart, squat as low as is comfortable. Use support as necessary.  Repeat 10 times per set. Do 3 sets per session. Do 2 sessions per day.

## 2018-06-14 NOTE — PROGRESS NOTES
"DAILY TREATMENT NOTE    DATE: 6/14/2018    Start Time:  3:05  Stop Time:  3:55  Visit: 3    PROCEDURES:    TIMED  Procedure Min.   NMR 35   TE    TA 15             UNTIMED  Procedure Min.             Total Timed Minutes:  50  Total Timed Units:  3  Total Untimed Units:  0  Charges Billed/# of units:  2NMR, 1TA    Medicare:   Today: 108.84      Progress/Current Status    Subjective:     Patient ID: Haris Hernandez is a 72 y.o. male.  Diagnosis:   1. 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 Weakness of left upper extremity       Pain: 0 /10  Pt states "I'm ok. No pain "  Pt with no new c/o. Reports compliance with HEP.     Objective:     Pt seen by OT this session. Treatment consisted of the following:      Date: 6/14/18    Visit: 3    g-code: 3/10   UBE L3.5, 3' forward, 2' back   Supine dowel FF 10 reps   Scapular stabilization rotations in supine, shld @90* FF 12 reps, CW/CCW   Tband- red    -rows 2 x 10 reps   -ext 2 x 10 reps   -biceps curls 2 x 10 reps   -triceps ext 2 x 10 reps   Wall slides 10 reps with SBA   Theraputty - red    -gross  20 reps   -molding 2 min   -log rolls with pinching 2 sets   -lateral key pinch 10 reps   Digiflex, red, 3# 15 reps   FM coordination, task addressing FM skills, translation, and manual dexterity with pompoms 2 sets, 10 pom poms   Pinch strengthening with red clothespin, picking up pom poms 10 reps   FM coordination task with coins and inserting them into slot, addressing in hand manipulation, translation, and FM skills 10 coins, 6 drops       Assessment:     Pt tolerated tx fairly well this date. Pt denied pain throughout session. Cont impaired use of L hand. Cont to present with impaired GM and FM coordination, L UE weakness, decreased independence with ADLs, and decreased functional use of L UE. Pt progressing fairly well towards goals. Pt would cont to benefit from skilled OT services to maximize functional use of L UE.     Patient " "Education/Response:     Cont HEP and FM coordination practice activities.    Plans and Goals:     Cont OT poc 2x/week for 8 weeks during certification period 6/6/18 to 8/6/18 in pursuit of established goals.    Goals to be met in 4 weeks: (7/6/18)  1) Initiate Hep   2) Pt will require no more than min A for UB dressing  3) Pt will require no more than min A with LE dressing  4) Pt will require no more than mod I with bathing  5) Pt will demonstrate improved strength by at least 1/2 grade MMT of shoulder     Goals to be met by discharge:  1) Independent with HEP  2) Pt will require no more than supervision for UB dressing  3) Pt will require no more than supervision with LE dressing  4) Pt will demonstrate L UE strength WFL for performing ADLs/IADLs  5) Pt will demonstrate improved FM skills AEB performing 9 hole peg test in less than 2" with no more than 2 drops  5) Patient will be able to achieve less than or equal to 30% impairment on FOTO  demonstrating overall improved functional ability with upper extremity.      LEANNE Gong    "

## 2018-06-18 PROBLEM — M62.9 HAMSTRING TIGHTNESS OF BOTH LOWER EXTREMITIES: Status: ACTIVE | Noted: 2018-06-18

## 2018-06-18 PROBLEM — R29.898 WEAKNESS OF BOTH LOWER EXTREMITIES: Status: ACTIVE | Noted: 2018-06-18

## 2018-06-18 PROBLEM — R26.89 BALANCE PROBLEM: Status: ACTIVE | Noted: 2018-06-18

## 2018-06-18 PROBLEM — R26.81 UNSTEADY GAIT: Status: ACTIVE | Noted: 2018-06-18

## 2018-06-19 ENCOUNTER — CLINICAL SUPPORT (OUTPATIENT)
Dept: REHABILITATION | Facility: HOSPITAL | Age: 73
End: 2018-06-19
Attending: INTERNAL MEDICINE
Payer: MEDICARE

## 2018-06-19 DIAGNOSIS — R13.12 OROPHARYNGEAL DYSPHAGIA: ICD-10-CM

## 2018-06-19 DIAGNOSIS — R47.1 DYSARTHRIA: ICD-10-CM

## 2018-06-19 PROCEDURE — 92526 ORAL FUNCTION THERAPY: CPT | Mod: PO

## 2018-06-19 NOTE — PATIENT INSTRUCTIONS
Darvin:  Goal: The goal of this activity is to keep the opening of your esophagus open longer by holding your larynx, or voice box, in a raised position.     Directions  · Place your finger on your larynx.  · Swallow normally, and feel your larynx rise up during the swallow.  · On your next swallow, feel your larynx rise up and hold it at its highest point for 3 seconds. Release and repeat as instructed by your therapist.  · Keep your larynx in a raised position by squeezing the muscles of your throat and tongue.  ·   Explanation: During the swallowing process, your larynx rises up while your epiglottis folds down to keep food or fluid moving toward the esophagus. During this activity, the larynx rises up to keep the epiglottis closed over the entrance to your airway. When you hold your larynx in a raised position, it allows the esophagus to stay open longer to let food or fluid pass through to the stomach.      Chin Tuck Against Resistance (CTAR):  Goal: to strengthen the suprahyoid muscles  Directions:   · Place small resistance ball between chin and chest  · Press chin into ball on chest for 30 repetitions  · Press chin into ball on chest and hold for 1 minute      Tongue Pull Backs / Yawn-Sign  Goal: to strengthen the base of tongue muscles  Directions:   · Pull tongue back as far as possible with tongue tip down  · Sigh immediately after pulling tongue back      Complete each exercise 45x unless otherwise stated 3x per day.     Exercise descriptions from:   Home Exercise Program. (n.d.). Retrieved October 10, 2017, from [email or web address]

## 2018-06-19 NOTE — PROGRESS NOTES
[facility]patient [facility]   Speech and Language Therapy Daily Note  Date:  6/19/2018     Start Time:  1600  Stop Time:  1645    Name: Haris Hernandez   MRN: [id number]   Therapy Diagnosis: No diagnosis found.Physician: Edilson Bishop MD  Physician Orders: amb referral to speech therapy  Medical Diagnosis: CVA    Visit #:  2  Visits Authorized: 50  Date of Evaluation:  06/05/2018  Insurance Authorization Period: 05/30/2018-12/31/2018  Plan of Care Certification:    06/05/2018-07/31/2018    G-CODE   3 /10    Precautions: Standard and aspiration  History:   Current Medical History: Pt presents to the [facility] with markedly impaired swallowing and moderately impaired motor speech skills secondary to the medical diagnosis of CVA.  Subjective:   Pt reports: He has been well at home and using head turn and chin tuck strategies at home to improve swallowing.   Response to previous treatment: first follow-up session   Pain Scale:  0/10 on VAS currently.   Pain Location: n/a  Objective:     UNTIMED  Procedure Min.   Dysphagia Therapy    45   Total Minutes: 45  Total Untimed Units: 1  Charges Billed/# of units: 1    Short Term Goals: (4 weeks) Current Progress:   1. Pt will complete OMEs 10-15x each 2-3x per session to increase lingual, labial, and buccal strength and coordination given supervision. Not assessed during today's session.   2. Pt will utilize motor speech strategies given min A during speech activities. Motor speech strategies (SOS) were discussed during today's session.   3. Pt will read bi-syllabic words with 90% acc given min cues to utilize clear speech strategies across 2 consecutive sessions.  Not assessed during today's session.   4. Pt will repeat 5-7 word sentences given min cues to utilize clear speech strategies across 2 consecutive sessions.  Not assessed during today's session.   5. Patient will independently demonstrate adequate use of  chin tuck, head turn to L compensatory strategies to eliminate s/s of aspiration with consistency on 8/10 NTL trials.  -Pt indly demonstrated adequate use of chin tuck, head turn L with thinner NTL 6/10 trials.  -Pt with coughing noted 4/10 trials with thinner NTL.  -Pt indly demonstrated adequate use of chin tuck, head turn L with a true NTL 5/5 trials with no overt s/s of aspiration or penetration.    6. Pt will perform effortful swallow maneuver to improve pharyngeal clearance and tongue base retraction 45-60x per session with pureed.  Pt performed effortful swallow maneuver x15 with NTL.   7. Pt will perform modified mendhelson maneuver to improve hyolaryngeal elevation and excursion 45-60x per session with pureed followed by an effortful, dry swallow.  Pt performed modified mendhelson maneuver x20 with a dry, effortful swallow.   8. Pt will complete tongue pull backs with resistance and lingual resistance exercises to improve tongue base retraction 45-60x per session.  Pt completed lingual resistance exercises anterior x25, laterally x30.   9. Pt will complete chin tuck against resistance (CTAR) repetitions with 60-90x per session. Pt completed CTAR repetitions x60.   10. Pt will complete CTAR hold for 1 minute, 5x per session. Pt completed CTAR hold for 1 minutes, 2x.       Patient Education/Response:   Pt educated on swallowing mechanisms and function and what each exercise is targeting. Pt verbalized understanding.   Home Program: Pt given swallowing exercises to complete at home  Assessment:   Haris is progressing well towards his goals. Pt presented with increased speech clarity. Current goals remain appropriate. Goals to be updated as necessary.   Pt prognosis is Good. Pt will continue to benefit from skilled outpatient speech and language therapy to address the deficits listed in the problem list on initial evaluation, provide pt/family education and to maximize pt's level of independence in the home  "and community environment.     Medical necessity is demonstrated by the following IMPAIRMENTS:  Improve swallowing to provide adequate nutrition and hydration at the least constrictive diet.   Barriers to Therapy: insight  Pt's spiritual, cultural and educational needs considered and pt agreeable to plan of care and goals.    Functional Communication Measure (FCM):   Severity Modifier for Medicare G-Code:  Swallowing  Current status:  FCM:  Level 4   - CK  Projected status:  FCM:   Level 5   - CJ  Discharge status:  n/a      Motor Speech  Current status: FCM:  Level 4   - CK   Projected status:  FCM:   Level 5   - CJ   Discharge status:  FCM:   Level 4   - CK   Plan:   Continue POC with focus on dysphagia and motor speech.     DYLON Carr.    Clinician  Speech-Language Pathology    "I TORIBIO Lomax., CCC-SLP certify that I was present in the room directing the student and service delivery and guiding them using my skilled judgement. As the co-signing therapist, I have reviewed the student's documentation, and am responsible for the treatment, assessment and plan."     TORIBIO Lomax., CCC-SLP  Speech-Language Pathologist  [phone]  6/19/2018           "

## 2018-06-21 ENCOUNTER — CLINICAL SUPPORT (OUTPATIENT)
Dept: REHABILITATION | Facility: HOSPITAL | Age: 73
End: 2018-06-21
Attending: INTERNAL MEDICINE
Payer: MEDICARE

## 2018-06-21 DIAGNOSIS — M62.9 HAMSTRING TIGHTNESS OF BOTH LOWER EXTREMITIES: ICD-10-CM

## 2018-06-21 DIAGNOSIS — I69.398 LACK OF COORDINATION AS LATE EFFECT OF CEREBROVASCULAR ACCIDENT (CVA): ICD-10-CM

## 2018-06-21 DIAGNOSIS — R29.898 WEAKNESS OF LEFT UPPER EXTREMITY: ICD-10-CM

## 2018-06-21 DIAGNOSIS — R47.1 DYSARTHRIA: ICD-10-CM

## 2018-06-21 DIAGNOSIS — R29.898 WEAKNESS OF BOTH LOWER EXTREMITIES: ICD-10-CM

## 2018-06-21 DIAGNOSIS — R26.89 BALANCE PROBLEM: ICD-10-CM

## 2018-06-21 DIAGNOSIS — R13.12 OROPHARYNGEAL DYSPHAGIA: ICD-10-CM

## 2018-06-21 DIAGNOSIS — R26.81 UNSTEADY GAIT: ICD-10-CM

## 2018-06-21 DIAGNOSIS — R27.9 LACK OF COORDINATION AS LATE EFFECT OF CEREBROVASCULAR ACCIDENT (CVA): ICD-10-CM

## 2018-06-21 PROCEDURE — 97116 GAIT TRAINING THERAPY: CPT | Mod: PO,59

## 2018-06-21 PROCEDURE — 92526 ORAL FUNCTION THERAPY: CPT | Mod: PO

## 2018-06-21 PROCEDURE — 97110 THERAPEUTIC EXERCISES: CPT | Mod: PO,59

## 2018-06-21 PROCEDURE — 97530 THERAPEUTIC ACTIVITIES: CPT | Mod: 59,PO

## 2018-06-21 PROCEDURE — 97112 NEUROMUSCULAR REEDUCATION: CPT | Mod: PO

## 2018-06-21 PROCEDURE — 97110 THERAPEUTIC EXERCISES: CPT | Mod: PO

## 2018-06-21 NOTE — PROGRESS NOTES
Outpatient [facility] Rehabilitation   Speech and Language Therapy Daily Note  Date:  6/21/2018     Start Time:  0800  Stop Time:  0845    Name: Haris Hernandez   MRN: [id number]   Therapy Diagnosis: No diagnosis found.Physician: Edilson Bishop MD  Physician Orders: amb referral to speech therapy  Medical Diagnosis: CVA    Visit #:  3  Visits Authorized: 50  Date of Evaluation:  06/05/2018  Insurance Authorization Period: 05/30/2018-12/31/2018  Plan of Care Certification:    06/05/2018-07/31/2018    G-CODE   3 /10    Precautions: Standard and aspiration  History:   Current Medical History: Pt presents to the [facility] with markedly impaired swallowing and moderately impaired motor speech skills secondary to the medical diagnosis of CVA.  Subjective:   Pt reports: He has been well at home and using head turn and chin tuck strategies at home to improve swallowing. He feels that he has been coughing less often.   Response to previous treatment: first follow-up session   Pain Scale:  0/10 on VAS currently.   Pain Location: n/a  Objective:     UNTIMED  Procedure Min.   Dysphagia Therapy    45   Total Minutes: 45  Total Untimed Units: 1  Charges Billed/# of units: 1    Short Term Goals: (4 weeks) Current Progress:   1. Pt will complete OMEs 10-15x each 2-3x per session to increase lingual, labial, and buccal strength and coordination given supervision. Not assessed during today's session.   2. Pt will utilize motor speech strategies given min A during speech activities. Not assessed during today's session.   3. Pt will read bi-syllabic words with 90% acc given min cues to utilize clear speech strategies across 2 consecutive sessions.  Not assessed during today's session.   4. Pt will repeat 5-7 word sentences given min cues to utilize clear speech strategies across 2 consecutive sessions.  Not assessed during today's session.   5. Patient will independently demonstrate  adequate use of chin tuck, head turn to L compensatory strategies to eliminate s/s of aspiration with consistency on 8/10 NTL trials.  -Pt indly demonstrated adequate use of chin tuck, head turn L with true NTL 10/10 trials.  -Pt with coughing noted 0/10 trials with  NTL.  -Pt indly demonstrated adequate use of chin tuck, head turn L with no overt s/s of aspiration or penetration.    6. Pt will perform effortful swallow maneuver to improve pharyngeal clearance and tongue base retraction 45-60x per session with pureed.  Pt performed effortful swallow maneuver x10 with thick puree.   7. Pt will perform modified mendhelson maneuver to improve hyolaryngeal elevation and excursion 45-60x per session with pureed followed by an effortful, dry swallow.  Not assessed during today's session.   8. Pt will complete tongue pull backs with resistance and lingual resistance exercises to improve tongue base retraction 45-60x per session.  Pt completed lingual resistance exercises anterior x30, laterally x30.  Pt completed tongue pull backs against resistance x20.   9. Pt will complete chin tuck against resistance (CTAR) repetitions with 60-90x per session. Pt completed CTAR repetitions x60.   10. Pt will complete CTAR hold for 1 minute, 5x per session. Pt completed CTAR hold for 1 minutes, 1x.       Patient Education/Response:   Pt educated on swallowing mechanisms and function and what each exercise is targeting. Pt verbalized understanding.   Home Program: Pt given swallowing exercises to complete at home  Assessment:   Haris is progressing well towards his goals. Pt presented with increased speech clarity. Pt utilized swallow exercises independently after review from SLP at beginning of session. Current goals remain appropriate. Goals to be updated as necessary.   Pt prognosis is Good. Pt will continue to benefit from skilled outpatient speech and language therapy to address the deficits listed in the problem list on initial  "evaluation, provide pt/family education and to maximize pt's level of independence in the home and community environment.     Medical necessity is demonstrated by the following IMPAIRMENTS:  Improve swallowing to provide adequate nutrition and hydration at the least constrictive diet.   Barriers to Therapy: insight  Pt's spiritual, cultural and educational needs considered and pt agreeable to plan of care and goals.    Functional Communication Measure (FCM):   Severity Modifier for Medicare G-Code:  Swallowing  Current status:  FCM:  Level 4   - CK  Projected status:  FCM:   Level 5   - CJ  Discharge status:  n/a      Motor Speech  Current status: FCM:  Level 4   - CK   Projected status:  FCM:   Level 5   - CJ   Discharge status:  FCM:   Level 4   - CK   Plan:   Continue POC with focus on dysphagia and motor speech.     DYLON Carr.    Clinician  Speech-Language Pathology    "TORIBIO Gustafson., CCC-SLP certify that I was present in the room directing the student and service delivery and guiding them using my skilled judgement. As the co-signing therapist, I have reviewed the student's documentation, and am responsible for the treatment, assessment and plan."     TORIBIO Lomax., CCC-SLP  Speech-Language Pathologist  [phone]  6/21/2018         "

## 2018-06-21 NOTE — PROGRESS NOTES
"  Physical Therapy Daily Treatment Note     Name: Haris Hernandez  Clinic Number: [id number]    Therapy Diagnosis:   Encounter Diagnoses   Name Primary?    Unsteady gait     Weakness of both lower extremities     Hamstring tightness of both lower extremities     Balance problem      Physician: Edilson Bishop MD    Visit Date: 6/21/2018  Physician Orders: PT Eval and Treat   Medical Diagnosis: CVA  Evaluation Date: 6/12/2018  Authorization Period Expiration: 12/31/18  Plan of Care Certification Period: 06/12/18 to 08/12/18  Visit # / Visits authorized: 3/ 50    Time In: 10:00  Time Out: 10:45    TIMED    Procedure Min.   TE 30       GT 15             UNTIMED  Procedure Min.             Total Billable Time: 45 minutes    Precautions: Standard and Fall    Subjective      Pt reports: he was compliant with home exercise program given last session.    Response to previous treatment: No change   Functional change: N/A at this time.     Pain: 0/10  Location: N/A    Objective     Haris received therapeutic exercises to develop strength and flexibility for 30 minutes including:    Date  06/21/18 06/14/18   VISIT 3/50 2/50   Gcode 07/12/18  3/10 07/12/18  2/10   FOTO 3/5 2/5   Cap Visit   Cap Total 90.62  294.10 90.62  203.48   POC exp 08/12/18 08/12/18   FTF 07/12/18 07/12/18        MT     Table:     Long Sit HS 10 x 10" 10 x 10"   Gastroc Str. -- --   Bike -- --   Bridge 1 x 15 1 x 10   SAQ 1 x 15 1 x 10   SLR 2 x 10 1 x 15   Hip Abd 2 x 10 RTB 2 x 10 RTB   Hip Add 15 x 3" w/ ball 10 x 3" w/ balll   Seated:     LAQs 2 x 10 x 1# 1 x 10 x 1#   Seated Hip Flex 2 x 10 x 1# 1 x 10 x 1#   HS curl 2 x 10 RTB 2 x 10 RTB   Standing:     Min squat 1 x 10 1 x 10   Calf raise 1 x 10 1 x 10   Hip Abd -- --   Hip Flex -- --   Hip Ext -- --   HS Curls -- --   Knee Ext -- --   Step Ups -- --   Step Downs -- --   Gait See below See below   CP -- --        Initials GWA 1/6 DG     Haris participated in gait training to improve functional " mobility and safety for 15 minutes, including:  SBA with verbal and tactile cues with NBQC indoors 120 feet 2x and outdoors 194 feet x 1.    Home Exercises Provided and Patient Education Provided     Education provided:   - Patient educated again on importance of him using his RW at home until his balance improves. He was also educated to perform his HEP twice a day to his tolerance.     Written Home Exercises Provided: continue with his HEP.  Exercises were reviewed and Haris was able to demonstrate them prior to the end of the session.  Haris demonstrated fair  understanding of the education provided.     Pt received a written copy of exercises to perform at home.   See EMR under patient instructions for exercises given.     Haris demonstrated fair  understanding of the education provided.     Assessment     Patient requires frequent cues to move through his full available ROM and to keep the NBQC closer to his body during gait training. He did have 1 small LOB while ambulating with NBQC that he used a stepping strategy to maintain his balance.  Patient completed his therapy along with today's progressions with no increase in symptoms prior to leaving the clinic.     Haris is progressing well towards his goals.   Pt prognosis is Fair.     Pt will continue to benefit from skilled outpatient physical therapy to address the deficits listed in the problem list box on initial evaluation, provide pt/family education and to maximize pt's level of independence in the home and community environment.     Pt's spiritual, cultural and educational needs considered and pt agreeable to plan of care and goals.    Anticipated barriers to physical therapy: Patient's compliance with using the RW at home.     Goals:   Short Term Goals: 4 weeks   1. This patient will be independent with a basic HEP.   2. This patient will increase B LE strength by 1 grade in order to perform sit to stand without UE leverage.   3. This patient will  be able to ambulate 100 feet with least restrictive AD with no LOB using a normal gait pattern.   4. This patient will score 12 seconds or less on TUG to decrease his fall risk with ADLs.   5. Patient will be able to achieve greater than or equal to 60 on the FOTO CVA placing patient in 40%<60% impaired, limited, or restricted category demonstrating overall improved functional ability with lower extremity.     Long Term Goals: 8 weeks   1. This patient will be independent with an updated HEP.   2. This patient will increase B LE strength to 5/5 in order to ascend/descend steps with a reciprocal gait.   3. This patient will be able to ambulate 300 feet with least restrictive AD with no LOB using a normal gait pattern.   4. This patient will score 9 seconds or less on TUG to decrease his fall risk with ADLs.   5. Patient will be able to achieve greater than or equal to 75 on the FOTO CVA placing patient in 20%<40% impaired, limited, or restricted category demonstrating overall improved functional ability with lower extremity.        Plan     Continue with the plan of care and progress as the patient tolerates.     Evan Cantrell, PTA

## 2018-06-21 NOTE — PROGRESS NOTES
"DAILY TREATMENT NOTE    DATE: 6/21/2018    Start Time:  9:00  Stop Time:  9:55  Visit: 4    PROCEDURES:    TIMED  Procedure Min.   NMR 30   TE 10   TA 15             UNTIMED  Procedure Min.             Total Timed Minutes:  55  Total Timed Units:  4  Total Untimed Units:  0  Charges Billed/# of units:  2NMR, 1TA, 1TE    Medicare:   Today: 139.16  Total: 476.54      Progress/Current Status    Subjective:     Patient ID: Haris Hernandez is a 72 y.o. male.  Diagnosis:   1. 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 Weakness of left upper extremity       Pain: 0 /10  Pt states "I feel pretty good today"  Pt with no new c/o. Reports compliance with HEP.     Objective:     Pt seen by OT this session. Treatment consisted of the following:      Date: 6/21/18    Visit: 4    g-code: 4/10   UBE L4, 3' forward, 2' back   Supine dowel FF, 3# 2 x 10 reps   Spine, dowel, chest press, 3# 2 x 10 reps   Scapular stabilization rotations in supine, shld @90* FF 12 reps, CW/CCW   Tband- red    -rows, standing 2 x 10 reps   -ext, standing 2 x 10 reps   -biceps curls 2 x 10 reps   -triceps ext 2 x 10 reps   Wall slides 10 reps with SBA   Theraputty - red    -gross  10 reps   -molding 2 min   -log rolls with pinching 2 sets   -lateral key pinch 10 reps   PHG, 25# 20 reps   Pinch strengthening with green, 4# clothespin, picking up pom poms 20 reps   FM coordination task with coins and inserting them into slot, addressing in hand manipulation, translation, and FM skills 10 coins, 2 drops   Flexbar, red, frowns and smiles 10 reps each   Wrist dexerciser, with 1# wrist weight 3 reps, performed slowly and required mod cues       Assessment:     Pt tolerated tx fairly well this date. Pt denied pain throughout session. Cont impaired use of L hand, but improved FM skills noted with tasks this date. Good tolerance of progression of ex. Cont to present with impaired GM and FM coordination, L UE weakness, " "decreased independence with ADLs, and decreased functional use of L UE. Pt progressing fairly well towards goals. Pt would cont to benefit from skilled OT services to maximize functional use of L UE.     Patient Education/Response:     Cont HEP and FM coordination practice activities.    Plans and Goals:     Cont OT poc 2x/week for 8 weeks during certification period 6/6/18 to 8/6/18 in pursuit of established goals.    Goals to be met in 4 weeks: (7/6/18)  1) Initiate Hep   2) Pt will require no more than min A for UB dressing  3) Pt will require no more than min A with LE dressing  4) Pt will require no more than mod I with bathing  5) Pt will demonstrate improved strength by at least 1/2 grade MMT of shoulder     Goals to be met by discharge:  1) Independent with HEP  2) Pt will require no more than supervision for UB dressing  3) Pt will require no more than supervision with LE dressing  4) Pt will demonstrate L UE strength WFL for performing ADLs/IADLs  5) Pt will demonstrate improved FM skills AEB performing 9 hole peg test in less than 2" with no more than 2 drops  5) Patient will be able to achieve less than or equal to 30% impairment on FOTO  demonstrating overall improved functional ability with upper extremity.      LEANNE Gong    "

## 2018-06-26 ENCOUNTER — CLINICAL SUPPORT (OUTPATIENT)
Dept: REHABILITATION | Facility: HOSPITAL | Age: 73
End: 2018-06-26
Attending: INTERNAL MEDICINE
Payer: MEDICARE

## 2018-06-26 DIAGNOSIS — R26.81 UNSTEADY GAIT: ICD-10-CM

## 2018-06-26 DIAGNOSIS — R27.9 LACK OF COORDINATION AS LATE EFFECT OF CEREBROVASCULAR ACCIDENT (CVA): ICD-10-CM

## 2018-06-26 DIAGNOSIS — R47.1 DYSARTHRIA: ICD-10-CM

## 2018-06-26 DIAGNOSIS — R29.898 WEAKNESS OF LEFT UPPER EXTREMITY: ICD-10-CM

## 2018-06-26 DIAGNOSIS — I69.398 LACK OF COORDINATION AS LATE EFFECT OF CEREBROVASCULAR ACCIDENT (CVA): ICD-10-CM

## 2018-06-26 DIAGNOSIS — R29.898 WEAKNESS OF BOTH LOWER EXTREMITIES: ICD-10-CM

## 2018-06-26 DIAGNOSIS — M62.9 HAMSTRING TIGHTNESS OF BOTH LOWER EXTREMITIES: ICD-10-CM

## 2018-06-26 DIAGNOSIS — R26.89 BALANCE PROBLEM: ICD-10-CM

## 2018-06-26 DIAGNOSIS — R13.12 OROPHARYNGEAL DYSPHAGIA: ICD-10-CM

## 2018-06-26 PROCEDURE — 97110 THERAPEUTIC EXERCISES: CPT | Mod: PO

## 2018-06-26 PROCEDURE — 97112 NEUROMUSCULAR REEDUCATION: CPT | Mod: PO,59

## 2018-06-26 PROCEDURE — 92526 ORAL FUNCTION THERAPY: CPT | Mod: PO

## 2018-06-26 PROCEDURE — 97116 GAIT TRAINING THERAPY: CPT | Mod: PO

## 2018-06-26 NOTE — PROGRESS NOTES
"  Physical Therapy Daily Treatment Note     Name: Haris Hernandez  Clinic Number: [id number]    Therapy Diagnosis:   Encounter Diagnoses   Name Primary?    Unsteady gait     Weakness of both lower extremities     Hamstring tightness of both lower extremities     Balance problem      Physician: Edilson Bishop MD    Visit Date: 6/26/2018  Physician Orders: PT Eval and Treat   Medical Diagnosis: CVA  Evaluation Date: 6/12/2018  Authorization Period Expiration: 12/31/18  Plan of Care Certification Period: 06/12/18 to 08/12/18  Visit # / Visits authorized: 4/ 50    Time In: 2:55  Time Out: 3:40    TIMED    Procedure Min.   TE 30       GT 15             UNTIMED  Procedure Min.             Total Billable Time: 45 minutes    Precautions: Standard and Fall    Subjective      Pt reports: he was compliant with home exercise program given last session.    Response to previous treatment: No change   Functional change: N/A at this time.     Pain: 0/10  Location: N/A    Objective     Haris received therapeutic exercises to develop strength and flexibility for 30 minutes including:    Date  06/26/18 06/21/18 06/14/18   VISIT 4/50 3/50 2/50   Gcode 07/12/18  4/10 07/12/18  3/10 07/12/18  2/10   FOTO 4/5 3/5 2/5   Cap Visit   Cap Total 90.62  384.72 90.62  294.10 90.62  203.48   POC exp 08/12/18 08/12/18 08/12/18   FTF 07/12/18 07/12/18 07/12/18         MT --     Table:      Long Sit HS 10 x 10" 10 x 10" 10 x 10"   Gastroc Str. -- -- --   Bike -- -- --   Bridge 1 x 20 1 x 15 1 x 10   SAQ 1 x 20 1 x 15 1 x 10   SLR 2 x 10 2 x 10 1 x 15   Hip Abd 3 x 10 RTB 2 x 10 RTB 2 x 10 RTB   Hip Add 20 x 3" w/ ball 15 x 3" w/ ball 10 x 3" w/ balll   Seated:      LAQs 3 x 10 x 1# 2 x 10 x 1# 1 x 10 x 1#   Seated Hip Flex 2 x 10 x 1# 2 x 10 x 1# 1 x 10 x 1#   HS curl 3 x 10 RTB 2 x 10 RTB 2 x 10 RTB   Standing:      Min squat 1 x 15 1 x 10 1 x 10   Calf raise 1 x 15 1 x 10 1 x 10   Hip Abd -- -- --   Hip Flex -- -- --   Hip Ext -- -- --   HS " Curls -- -- --   Knee Ext -- -- --   Step Ups L1  1 x 10 ea -- --   Step Downs -- -- --   Gait See below See below See below   CP -- -- --         Initials GWA 2/6 GWA 1/6 DG     Haris participated in gait training to improve functional mobility and safety for 15 minutes, including:  SBA with verbal and tactile cues with NBQC indoors 121 feet 2 x and outdoors 200 feet x 1.  Side stepping in parallel bars x 4 and tandem walking in parallel bars x 4 with SBA.    Home Exercises Provided and Patient Education Provided     Education provided:   - Patient educated again on importance of him using his RW at home until his balance improves. He was also educated to perform his HEP twice a day to his tolerance.     Written Home Exercises Provided: continue with his HEP.  Exercises were reviewed and Haris was able to demonstrate them prior to the end of the session.  Haris demonstrated fair  understanding of the education provided.     Pt received a written copy of exercises to perform at home.   See EMR under patient instructions for exercises given.     Haris demonstrated fair  understanding of the education provided.     Assessment     Patient requires frequent cues to move through his full available ROM and to keep the NBQC closer to his body during gait training. He did have 1 small LOB while ambulating with NBQC that he used a stepping strategy to maintain his balance.  Patient completed his therapy along with new exercises added and today's progressions with no increase in symptoms prior to leaving the clinic.     Haris is progressing well towards his goals.   Pt prognosis is Fair.     Pt will continue to benefit from skilled outpatient physical therapy to address the deficits listed in the problem list box on initial evaluation, provide pt/family education and to maximize pt's level of independence in the home and community environment.     Pt's spiritual, cultural and educational needs considered and pt agreeable  to plan of care and goals.    Anticipated barriers to physical therapy: Patient's compliance with using the RW at home.     Goals:   Short Term Goals: 4 weeks   1. This patient will be independent with a basic HEP.   2. This patient will increase B LE strength by 1 grade in order to perform sit to stand without UE leverage.   3. This patient will be able to ambulate 100 feet with least restrictive AD with no LOB using a normal gait pattern.   4. This patient will score 12 seconds or less on TUG to decrease his fall risk with ADLs.   5. Patient will be able to achieve greater than or equal to 60 on the FOTO CVA placing patient in 40%<60% impaired, limited, or restricted category demonstrating overall improved functional ability with lower extremity.     Long Term Goals: 8 weeks   1. This patient will be independent with an updated HEP.   2. This patient will increase B LE strength to 5/5 in order to ascend/descend steps with a reciprocal gait.   3. This patient will be able to ambulate 300 feet with least restrictive AD with no LOB using a normal gait pattern.   4. This patient will score 9 seconds or less on TUG to decrease his fall risk with ADLs.   5. Patient will be able to achieve greater than or equal to 75 on the FOTO CVA placing patient in 20%<40% impaired, limited, or restricted category demonstrating overall improved functional ability with lower extremity.        Plan     Continue with the plan of care and progress as the patient tolerates.     Evan Cantrell, PTA

## 2018-06-26 NOTE — PROGRESS NOTES
[facility]   Speech and Language Therapy Daily Note  Date:  6/26/2018     Start Time:  1600  Stop Time:  1645    Name: Haris Hernandez   MRN: [id number]   Therapy Diagnosis: No diagnosis found.Physician: Edilson Bishop MD  Physician Orders: amb referral to speech therapy  Medical Diagnosis: CVA    Visit #:  4  Visits Authorized: 50  Date of Evaluation:  06/05/2018  Insurance Authorization Period: 05/30/2018-12/31/2018  Plan of Care Certification:    06/05/2018-07/31/2018    G-CODE   4 /10    Precautions: Standard and aspiration  History:   Current Medical History: Pt presents to the [facility] with markedly impaired swallowing and moderately impaired motor speech skills secondary to the medical diagnosis of CVA.  Subjective:   Pt reports: He has been well at home and using head turn and chin tuck strategies at home to improve swallowing. He feels that he has been coughing less often when using NTL. Pt reports that when using thin liquid (i.e., water) due to his mouth feeling dry, he will cough on occasion.   Response to previous treatment: first follow-up session   Pain Scale:  0/10 on VAS currently.   Pain Location: n/a  Objective:     UNTIMED  Procedure Min.   Dysphagia Therapy    45   Total Minutes: 45  Total Untimed Units: 1  Charges Billed/# of units: 1    Short Term Goals: (4 weeks) Current Progress:   1. Pt will complete OMEs 10-15x each 2-3x per session to increase lingual, labial, and buccal strength and coordination given supervision. Not assessed during today's session.   2. Pt will utilize motor speech strategies given min A during speech activities. Not assessed during today's session.   3. Pt will read bi-syllabic words with 90% acc given min cues to utilize clear speech strategies across 2 consecutive sessions.  Not assessed during today's session.   4. Pt will repeat 5-7 word sentences given min cues to utilize clear speech  strategies across 2 consecutive sessions.  Not assessed during today's session.   5. Patient will independently demonstrate adequate use of chin tuck, head turn to L compensatory strategies to eliminate s/s of aspiration with consistency on 8/10 NTL trials.  -Pt indly demonstrated adequate use of chin tuck, head turn L with true NTL 22/25 trials.  -Pt with coughing noted 8/25 trials with  NTL.  -Pt indly demonstrated adequate use of chin tuck, head turn L, however overt s/s of aspiration or penetration (delayed cough and wet vocal quality) were noted. Pt demonstrated additional dry swallow or cough after elicitation from SLP to clear wet vocal quality.   6. Pt will perform effortful swallow maneuver to improve pharyngeal clearance and tongue base retraction 45-60x per session with pureed.  Not assessed during today's session.   7. Pt will perform modified mendhelson maneuver to improve hyolaryngeal elevation and excursion 45-60x per session with pureed followed by an effortful, dry swallow.  Not assessed during today's session.   8. Pt will complete tongue pull backs with resistance and lingual resistance exercises to improve tongue base retraction 45-60x per session.  Pt completed lingual resistance exercises anterior x30, laterally x30.  Pt completed tongue pull backs against resistance x20.   9. Pt will complete chin tuck against resistance (CTAR) repetitions with 60-90x per session. Pt completed CTAR repetitions x90.   10. Pt will complete CTAR hold for 1 minute, 5x per session. Pt completed CTAR hold for 1 minutes, x4.       Patient Education/Response:   Pt educated on swallowing mechanisms and function and what each exercise is targeting. Pt educated on good oral care. Pt verbalized understanding.   Home Program: Pt given swallowing exercises to complete at home  Assessment:   Haris is progressing well towards his goals. Pt presented with increased speech clarity. Pt utilized swallow exercises independently  after review from SLP at beginning of session. Current goals remain appropriate. Goals to be updated as necessary.   Pt prognosis is Good. Pt will continue to benefit from skilled outpatient speech and language therapy to address the deficits listed in the problem list on initial evaluation, provide pt/family education and to maximize pt's level of independence in the home and community environment.     Medical necessity is demonstrated by the following IMPAIRMENTS:  Improve swallowing to provide adequate nutrition and hydration at the least constrictive diet.   Barriers to Therapy: insight  Pt's spiritual, cultural and educational needs considered and pt agreeable to plan of care and goals.    Functional Communication Measure (FCM):   Severity Modifier for Medicare G-Code:  Swallowing  Current status:  FCM:  Level 4   - CK  Projected status:  FCM:   Level 5   - CJ  Discharge status:  n/a      Motor Speech  Current status: FCM:  Level 4   - CK   Projected status:  FCM:   Level 5   - CJ   Discharge status:  FCM:   Level 4   - CK   Plan:   Continue POC with focus on dysphagia and motor speech.     DYLON Carr.    Clinician  Speech-Language Pathology  6/26/2018

## 2018-06-26 NOTE — PROGRESS NOTES
"DAILY TREATMENT NOTE    DATE: 6/26/2018    Start Time:  2:00  Stop Time:  2:50  Visit: 5    PROCEDURES:    TIMED  Procedure Min.   NMR 35 (20 min 1:1, 15 min supervised)   TE 15   TA              UNTIMED  Procedure Min.             Total Timed Minutes:  55  Total Timed Units:  4  Total Untimed Units:  0  Charges Billed/# of units:  1NMR, 1TE    Medicare:   Today: 64.79  Total: 541.33      Progress/Current Status    Subjective:     Patient ID: Haris Hernandez is a 72 y.o. male.  Diagnosis:   1. 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 Weakness of left upper extremity       Pain: 0 /10  Pt states "I feel good."  Pt with no new c/o. Reports compliance with HEP.     Objective:     Pt seen by OT this session. Treatment consisted of the following:      Date: 6/26/18    Visit: 5    g-code: 5/10   UBE L4, 3' forward, 2' back   Supine dowel FF, 3# 2 x 10 reps   Spine, dowel, chest press, 3# 2 x 10 reps   Scapular stabilization rotations in supine, shld @90* FF 12 reps, CW/CCW   Tband- red    -rows, standing 2 x 10 reps   -ext, standing 2 x 10 reps   -biceps curls 2 x 10 reps   -triceps ext 2 x 10 reps   Wall slides 10 reps with SBA (deferred today)   Theraputty - red    -gross  10 reps   -molding 2 min   -log rolls with pinching 2 sets   -lateral key pinch 10 reps   PHG, 35# 20 reps   Pinch strengthening with green, 4# clothespin, picking up pom poms 30 reps   FM coordination task with coins and inserting them into slot, addressing in hand manipulation, translation, and FM skills 10 coins, 2 drops   Flexbar, red, frowns and smiles 10 reps each   Wrist dexerciser, with 1# wrist weight Not today       Assessment:     Pt tolerated tx fairly well this date. Pt denied pain throughout session. Cont impaired use of L hand, but improved FM skills noted with tasks this date.  Cont to present with impaired GM and FM coordination, L UE weakness, decreased independence with ADLs, and decreased " "functional use of L UE. Pt progressing fairly well towards goals. Pt would cont to benefit from skilled OT services to maximize functional use of L UE.     Patient Education/Response:     Cont HEP and FM coordination practice activities.    Plans and Goals:     Cont OT poc 2x/week for 8 weeks during certification period 6/6/18 to 8/6/18 in pursuit of established goals. FOTO next visit    Goals to be met in 4 weeks: (7/6/18)  1) Initiate Hep   2) Pt will require no more than min A for UB dressing  3) Pt will require no more than min A with LE dressing  4) Pt will require no more than mod I with bathing  5) Pt will demonstrate improved strength by at least 1/2 grade MMT of shoulder     Goals to be met by discharge:  1) Independent with HEP  2) Pt will require no more than supervision for UB dressing  3) Pt will require no more than supervision with LE dressing  4) Pt will demonstrate L UE strength WFL for performing ADLs/IADLs  5) Pt will demonstrate improved FM skills AEB performing 9 hole peg test in less than 2" with no more than 2 drops  5) Patient will be able to achieve less than or equal to 30% impairment on FOTO  demonstrating overall improved functional ability with upper extremity.      LEANNE Gong    "

## 2018-06-28 ENCOUNTER — CLINICAL SUPPORT (OUTPATIENT)
Dept: REHABILITATION | Facility: HOSPITAL | Age: 73
End: 2018-06-28
Attending: INTERNAL MEDICINE
Payer: MEDICARE

## 2018-06-28 DIAGNOSIS — R27.9 LACK OF COORDINATION AS LATE EFFECT OF CEREBROVASCULAR ACCIDENT (CVA): ICD-10-CM

## 2018-06-28 DIAGNOSIS — R29.898 WEAKNESS OF BOTH LOWER EXTREMITIES: ICD-10-CM

## 2018-06-28 DIAGNOSIS — R29.898 WEAKNESS OF LEFT UPPER EXTREMITY: ICD-10-CM

## 2018-06-28 DIAGNOSIS — R47.1 DYSARTHRIA: ICD-10-CM

## 2018-06-28 DIAGNOSIS — R26.81 UNSTEADY GAIT: ICD-10-CM

## 2018-06-28 DIAGNOSIS — M62.9 HAMSTRING TIGHTNESS OF BOTH LOWER EXTREMITIES: ICD-10-CM

## 2018-06-28 DIAGNOSIS — I69.398 LACK OF COORDINATION AS LATE EFFECT OF CEREBROVASCULAR ACCIDENT (CVA): ICD-10-CM

## 2018-06-28 DIAGNOSIS — R26.89 BALANCE PROBLEM: ICD-10-CM

## 2018-06-28 DIAGNOSIS — R13.12 OROPHARYNGEAL DYSPHAGIA: ICD-10-CM

## 2018-06-28 PROCEDURE — 92526 ORAL FUNCTION THERAPY: CPT | Mod: PO

## 2018-06-28 PROCEDURE — 97116 GAIT TRAINING THERAPY: CPT | Mod: PO

## 2018-06-28 PROCEDURE — 97110 THERAPEUTIC EXERCISES: CPT | Mod: PO

## 2018-06-28 PROCEDURE — 97110 THERAPEUTIC EXERCISES: CPT | Mod: PO,59

## 2018-06-28 PROCEDURE — 97112 NEUROMUSCULAR REEDUCATION: CPT | Mod: PO,59

## 2018-06-28 NOTE — PROGRESS NOTES
"  Physical Therapy Daily Treatment Note     Name: Haris Hernandez  Clinic Number: [id number]    Therapy Diagnosis:   Encounter Diagnoses   Name Primary?    Unsteady gait     Weakness of both lower extremities     Hamstring tightness of both lower extremities     Balance problem      Physician: Edilson Bishop MD    Visit Date: 6/28/2018  Physician Orders: PT Eval and Treat   Medical Diagnosis: CVA  Evaluation Date: 6/12/2018  Authorization Period Expiration: 12/31/18  Plan of Care Certification Period: 06/12/18 to 08/12/18  Visit # / Visits authorized: 5/ 50    Time In: 2:00  Time Out: 2:45    TIMED    Procedure Min.   TE 35       GT 10             UNTIMED  Procedure Min.             Total Billable Time: 45 minutes    Precautions: Standard and Fall    Subjective      Pt reports: he was compliant with home exercise program somewhat, he doesn't do them every day.   Response to previous treatment: No change   Functional change: N/A at this time.     Pain: 0/10  Location: N/A    Objective     Haris received therapeutic exercises to develop strength and flexibility for 30 minutes including:    Date  06/28/18 06/26/18 06/21/18 06/14/18   VISIT 5/50 4/50 3/50 2/50   Gcode 07/12/18  5/10 07/12/18  4/10 07/12/18  3/10 07/12/18  2/10   FOTO 5/5 4/5 3/5 2/5   Cap Visit   Cap Total 90.62  475.34 90.62  384.72 90.62  294.10 90.62  203.48   POC exp 08/12/18 08/12/18 08/12/18 08/12/18   FTF 07/12/18 07/12/18 07/12/18 07/12/18          MT -- --     Table:       Long Sit HS 10 x 10" 10 x 10" 10 x 10" 10 x 10"   Gastroc Str. -- -- -- --   Bike -- -- -- --   Bridge 1 x 30 1 x 20 1 x 15 1 x 10   SAQ 2 x 10 x 1# 1 x 20 1 x 15 1 x 10   SLR 2 x 10 x 1# 2 x 10 2 x 10 1 x 15   Hip Abd SL 1 x 10 3 x 10 RTB 2 x 10 RTB 2 x 10 RTB   Hip Add SL 1 x 10 20 x 3" w/ ball 15 x 3" w/ ball 10 x 3" w/ balll   Seated:       LAQs 2 x 10 x 1.5# 3 x 10 x 1# 2 x 10 x 1# 1 x 10 x 1#   Seated Hip Flex 2 x 10 x 1.5# 2 x 10 x 1# 2 x 10 x 1# 1 x 10 x 1#   HS " curl 2 x 10 GTB 3 x 10 RTB 2 x 10 RTB 2 x 10 RTB   Standing:       Min squat 1 x 20 1 x 15 1 x 10 1 x 10   Calf raise 1 x 20 1 x 15 1 x 10 1 x 10   Hip Abd 1 x 15 -- -- --   Hip Flex 1 x 15 -- -- --   Hip Ext 1 x 15 -- -- --   HS Curls -- -- -- --          Step Ups L1 1 x 15 L1  1 x 10 ea -- --   Step Downs  -- -- --   Gait See below See below See below See below   CP  -- -- --          Initials DG GWA 2/6 GWA 1/6 DG     FOTO CVA 62, G code CJ, 20%<40%    Haris participated in gait training to improve functional mobility and safety for 15 minutes, including:  SBA with verbal and tactile cues with NBQC outdoors 363 feet x 1.  Side stepping in parallel bars x 4, crossovers in parallel bars x 4, and tandem walking in parallel bars x 4 with SBA.    Home Exercises Provided and Patient Education Provided     Education provided:   - Patient educated again on importance of him using his RW at home until his balance improves. He was also educated to perform his HEP twice a day to his tolerance.     Written Home Exercises Provided: continue with his HEP.  Exercises were reviewed and Haris was able to demonstrate them prior to the end of the session.  Haris demonstrated fair  understanding of the education provided.     Pt received a written copy of exercises to perform at home.   See EMR under patient instructions for exercises given.     Haris demonstrated fair  understanding of the education provided.     Assessment   Patient required two cues for placement of NBQC when ambulating. He also required frequent cues for correct technique with side lying exercises. He was able to ambulate outdoors with NBQC and SBA, he did have one small LOB that he corrected through ankle strategies. He performed all of today's progressions and new exercises with no increase in symptoms prior to leaving the clinic. His current score on FOTO CVA places him in the 20%<40% impaired, limited, or restricted category.     Haris is progressing  well towards his goals.   Pt prognosis is Fair.     Pt will continue to benefit from skilled outpatient physical therapy to address the deficits listed in the problem list box on initial evaluation, provide pt/family education and to maximize pt's level of independence in the home and community environment.     Pt's spiritual, cultural and educational needs considered and pt agreeable to plan of care and goals.    Anticipated barriers to physical therapy: Patient's compliance with using the RW at home.     Goals:   Short Term Goals: 4 weeks   1. This patient will be independent with a basic HEP.   2. This patient will increase B LE strength by 1 grade in order to perform sit to stand without UE leverage.   3. This patient will be able to ambulate 100 feet with least restrictive AD with no LOB using a normal gait pattern.   4. This patient will score 12 seconds or less on TUG to decrease his fall risk with ADLs.   5. Patient will be able to achieve greater than or equal to 60 on the FOTO CVA placing patient in 40%<60% impaired, limited, or restricted category demonstrating overall improved functional ability with lower extremity.     Long Term Goals: 8 weeks   1. This patient will be independent with an updated HEP.   2. This patient will increase B LE strength to 5/5 in order to ascend/descend steps with a reciprocal gait.   3. This patient will be able to ambulate 300 feet with least restrictive AD with no LOB using a normal gait pattern.   4. This patient will score 9 seconds or less on TUG to decrease his fall risk with ADLs.   5. Patient will be able to achieve greater than or equal to 75 on the FOTO CVA placing patient in 20%<40% impaired, limited, or restricted category demonstrating overall improved functional ability with lower extremity.        Plan     Continue with the plan of care and progress as the patient tolerates.     Esthela Robles, PT

## 2018-06-28 NOTE — PROGRESS NOTES
"DAILY TREATMENT NOTE    DATE: 6/28/2018    Start Time:  3:00  Stop Time:  3;55  Visit: 6    PROCEDURES:    TIMED  Procedure Min.   NMR 35 (15 min 1:1, 20 min supervised)   TE 20   TA              UNTIMED  Procedure Min.             Total Timed Minutes:  55  Total Timed Units:  4  Total Untimed Units:  0  Charges Billed/# of units:  1NMR, 1TE    Medicare:   Today: 64.79  Total: 606.12      Progress/Current Status    Subjective:     Patient ID: Haris Hernandez is a 72 y.o. male.  Diagnosis:   No diagnosis found.  Pain: 0 /10  Pt states "I feel good."  Pt with no new c/o. Reports compliance with HEP.     Objective:     Pt seen by OT this session. Treatment consisted of the following:      Date: 6/28/18    Visit: 6    g-code: 6/10   UBE L5, 3' forward, 2' back   Supine dowel FF, 3# 2 x 10 reps   Spine, dowel, chest press, 3# 2 x 10 reps   Scapular stabilization rotations in supine, shld @90* FF 12 reps, CW/CCW (not today)   Tband- red    -rows, seated 2 x 10 reps   -ext, seated 2 x 10 reps   -biceps curls 2 x 10 reps   -triceps ext 2 x 10 reps   Wall slides 10 reps with SBA (deferred today, pt unable to fully follow directions for task)   Theraputty - red    -gross  10 reps   -molding 2 min   -log rolls with pinching 2 sets   -lateral key pinch 10 reps   PHG, 35# 20 reps   Pinch strengthening with green, 4# clothespin, picking up pom poms 30 reps   FM coordination task with coins and inserting them into slot, addressing in hand manipulation, translation, and FM skills 10 coins, multiple drops   Flexbar, red, frowns and smiles (sup/pro) 10 reps each   Wrist dexerciser, with 1# wrist weight 5 reps       Assessment:     Pt tolerated tx well this date. Pt denied pain throughout session. Cont impaired use of L hand.  GM coordination cont to be impaired and pt requires mod cuing to slow down with ex. Cont to present with impaired GM and FM coordination, L UE weakness, decreased independence with ADLs, and decreased " "functional use of L UE. Pt progressing fairly well towards goals. Pt would cont to benefit from skilled OT services to maximize functional use of L UE.     Patient Education/Response:     Cont HEP and FM coordination practice activities.    Plans and Goals:     Cont OT poc 2x/week for 8 weeks during certification period 6/6/18 to 8/6/18 in pursuit of established goals.     Goals to be met in 4 weeks: (7/6/18)  1) Initiate Hep   2) Pt will require no more than min A for UB dressing  3) Pt will require no more than min A with LE dressing  4) Pt will require no more than mod I with bathing  5) Pt will demonstrate improved strength by at least 1/2 grade MMT of shoulder     Goals to be met by discharge:  1) Independent with HEP  2) Pt will require no more than supervision for UB dressing  3) Pt will require no more than supervision with LE dressing  4) Pt will demonstrate L UE strength WFL for performing ADLs/IADLs  5) Pt will demonstrate improved FM skills AEB performing 9 hole peg test in less than 2" with no more than 2 drops  5) Patient will be able to achieve less than or equal to 30% impairment on FOTO  demonstrating overall improved functional ability with upper extremity.      LEANNE Gong    "

## 2018-06-28 NOTE — PROGRESS NOTES
"Outpatient [facility]rological Rehabilitation   Speech and Language Therapy Daily Note  Date:  6/28/2018     Start Time:  1555  Stop Time:  1640    Name: Haris Hernandez   MRN: [id number]   Therapy Diagnosis: No diagnosis found.Physician: Edilson Bishop MD  Physician Orders: amb referral to speech therapy  Medical Diagnosis: CVA    Visit #:  5  Visits Authorized: 50  Date of Evaluation:  06/05/2018  Insurance Authorization Period: 05/30/2018-12/31/2018  Plan of Care Certification:    06/05/2018-07/31/2018    G-CODE  5 /10    Precautions: Standard and aspiration  History:   Current Medical History: Pt presents to the [facility] with markedly impaired swallowing and moderately impaired motor speech skills secondary to the medical diagnosis of CVA.  Subjective:   Pt reports: He has been well at home and using head turn and chin tuck strategies at home to improve swallowing. He feels that he has been coughing less often when using NTL and the chin tuck, head turn maneuver. Pt reports that when using thin liquid (i.e., water) due to his mouth feeling dry, he will cough on occasion.   Response to previous treatment: good; "I've been doing my exercises"  Pain Scale:  0/10 on VAS currently.   Pain Location: n/a  Objective:     UNTIMED  Procedure Min.   Dysphagia Therapy    45   Total Minutes: 45  Total Untimed Units: 1  Charges Billed/# of units: 1    Short Term Goals: (4 weeks) Current Progress:   1. Pt will complete OMEs 10-15x each 2-3x per session to increase lingual, labial, and buccal strength and coordination given supervision. Pt completed OMEs 10x each 2x in today's session to increase lingual, labial, and buccal strength and coordination given supervision.    2. Pt will utilize motor speech strategies given min A during speech activities. Not formally addressed.   3. Pt will read bi-syllabic words with 90% acc given min cues to utilize clear speech strategies across 2 " consecutive sessions.  Not formally addressed.   4. Pt will repeat 5-7 word sentences given min cues to utilize clear speech strategies across 2 consecutive sessions.  Not formally addressed.   5. Patient will independently demonstrate adequate use of chin tuck, head turn to L compensatory strategies to eliminate s/s of aspiration with consistency on 8/10 NTL trials.  -Pt indly demonstrated adequate use of chin tuck, head turn L with true NTL 45/50 trials.  -Pt with coughing noted 13/45 trials with  NTL.  -Pt indly demonstrated adequate use of chin tuck, head turn L, however overt s/s of aspiration or penetration (delayed cough, wet vocal quality, and delayed throat clearing) were noted. Pt demonstrated additional dry swallow or cough after elicitation from SLP to clear wet vocal quality.   -Pt demonstrated increased difficulty when eliciting secondary swallow.   6. Pt will perform effortful swallow maneuver to improve pharyngeal clearance and tongue base retraction 45-60x per session with pureed.  Not formally addressed.   7. Pt will perform modified mendhelson maneuver to improve hyolaryngeal elevation and excursion 45-60x per session with pureed followed by an effortful, dry swallow.  Not formally addressed.   8. Pt will complete tongue pull backs with resistance and lingual resistance exercises to improve tongue base retraction 45-60x per session.  Not formally addressed.   9. Pt will complete chin tuck against resistance (CTAR) repetitions with 60-90x per session. Pt completed CTAR repetitions x60.   10. Pt will complete CTAR hold for 1 minute, 5x per session. Pt completed CTAR hold for 1 minutes, x3.       Patient Education/Response:   Pt educated on swallowing mechanisms and function and what each exercise is targeting. Pt educated on good oral care. Pt verbalized understanding.   Home Program: Pt given swallowing exercises to complete at home  Assessment:   Haris is progressing well towards his goals. Pt  presented with increased speech clarity. Pt utilized swallow exercises independently after review from SLP at beginning of session. Pt demonstrated difficulty eliciting a secondary, dry swallow. Increased s/s of aspiration or penetration were noted on NLT today by SLP including throat clears and delayed cough. Current goals remain appropriate. Goals to be updated as necessary.   Pt prognosis is Good. Pt will continue to benefit from skilled outpatient speech and language therapy to address the deficits listed in the problem list on initial evaluation, provide pt/family education and to maximize pt's level of independence in the home and community environment.     Medical necessity is demonstrated by the following IMPAIRMENTS:  Improve swallowing to provide adequate nutrition and hydration at the least constrictive diet.   Barriers to Therapy: insight  Pt's spiritual, cultural and educational needs considered and pt agreeable to plan of care and goals.    Functional Communication Measure (FCM):   Severity Modifier for Medicare G-Code:  Swallowing  Current status:  FCM:  Level 4   - CK  Projected status:  FCM:   Level 5   - CJ  Discharge status:  n/a      Motor Speech  Current status: FCM:  Level 4   - CK   Projected status:  FCM:   Level 5   - CJ   Discharge status:  FCM:   Level 4   - CK   Plan:   Continue POC with focus on dysphagia and motor speech.     DYLON Carr.    Clinician  Speech-Language Pathology  6/28/2018

## 2018-07-03 ENCOUNTER — CLINICAL SUPPORT (OUTPATIENT)
Dept: REHABILITATION | Facility: HOSPITAL | Age: 73
End: 2018-07-03
Attending: INTERNAL MEDICINE
Payer: MEDICARE

## 2018-07-03 DIAGNOSIS — R29.898 WEAKNESS OF LEFT UPPER EXTREMITY: ICD-10-CM

## 2018-07-03 DIAGNOSIS — R27.9 LACK OF COORDINATION AS LATE EFFECT OF CEREBROVASCULAR ACCIDENT (CVA): ICD-10-CM

## 2018-07-03 DIAGNOSIS — R13.12 OROPHARYNGEAL DYSPHAGIA: ICD-10-CM

## 2018-07-03 DIAGNOSIS — R26.89 BALANCE PROBLEM: ICD-10-CM

## 2018-07-03 DIAGNOSIS — R29.898 WEAKNESS OF BOTH LOWER EXTREMITIES: ICD-10-CM

## 2018-07-03 DIAGNOSIS — R26.81 UNSTEADY GAIT: ICD-10-CM

## 2018-07-03 DIAGNOSIS — I69.398 LACK OF COORDINATION AS LATE EFFECT OF CEREBROVASCULAR ACCIDENT (CVA): ICD-10-CM

## 2018-07-03 DIAGNOSIS — M62.9 HAMSTRING TIGHTNESS OF BOTH LOWER EXTREMITIES: ICD-10-CM

## 2018-07-03 DIAGNOSIS — R47.1 DYSARTHRIA: ICD-10-CM

## 2018-07-03 PROCEDURE — 97112 NEUROMUSCULAR REEDUCATION: CPT | Mod: PO

## 2018-07-03 PROCEDURE — 97530 THERAPEUTIC ACTIVITIES: CPT | Mod: PO

## 2018-07-03 PROCEDURE — 92526 ORAL FUNCTION THERAPY: CPT | Mod: PO

## 2018-07-03 PROCEDURE — 97112 NEUROMUSCULAR REEDUCATION: CPT | Mod: PO,59

## 2018-07-03 PROCEDURE — 97110 THERAPEUTIC EXERCISES: CPT | Mod: PO,59

## 2018-07-03 NOTE — PROGRESS NOTES
"  Physical Therapy Daily Treatment Note     Name: Haris Hernandez  Clinic Number: [id number]    Therapy Diagnosis:   Encounter Diagnoses   Name Primary?    Unsteady gait     Weakness of both lower extremities     Hamstring tightness of both lower extremities     Balance problem      Physician: Edilson Bishop MD    Visit Date: 7/3/2018  Physician Orders: PT Eval and Treat   Medical Diagnosis: CVA  Evaluation Date: 6/12/2018  Authorization Period Expiration: 12/31/18  Plan of Care Certification Period: 06/12/18 to 08/12/18  Visit # / Visits authorized: 5/ 50    Time In: 2:00  Time Out: 3:00    TIMED    Procedure Min.   TE 15   TE sup 30NC   NMR 15             UNTIMED  Procedure Min.             Total Billable Time: 30 minutes    Precautions: Standard and Fall    Subjective      Pt reports: he was compliant with home exercise program, he doesn't have his walker today because he thought his granddalloydher meng tit in the car with them but she didn't. He has been walking without it at home. Sometimes gets off balance when going too fast.   Response to previous treatment: No change   Functional change: Has been walking without the walker at home      Pain: 0/10  Location: N/A    Objective     Haris received therapeutic exercises to develop strength and flexibility for 30 minutes supervised by PT and 15 minutes 1:1 with PT including:    Date  07/03/18 06/28/18 06/26/18 06/21/18 06/14/18   VISIT 6/50 5/50 4/50 3/50 2/50   Gcode 07/12/18  6/10 07/12/18  5/10 07/12/18  4/10 07/12/18  3/10 07/12/18  2/10   FOTO -- 5/5 4/5 3/5 2/5   Cap Visit   Cap Total  90.62  475.34 90.62  384.72 90.62  294.10 90.62  203.48   POC exp 08/12/18 08/12/18 08/12/18 08/12/18 08/12/18   FTF 07/12/18 07/12/18 07/12/18 07/12/18 07/12/18           MT -- -- --     Table:        Long Sit HS 10 x 10" 10 x 10" 10 x 10" 10 x 10" 10 x 10"   Gastroc Str.  -- -- -- --   Bike  -- -- -- --   Bridge SL 1 x 10 ea 1 x 30 1 x 20 1 x 15 1 x 10   SAQ 2 x 10 x " "1.5# 2 x 10 x 1# 1 x 20 1 x 15 1 x 10   SLR 2 x 10 x 1.5# 2 x 10 x 1# 2 x 10 2 x 10 1 x 15   Hip Abd SL 2 x 10 SL 1 x 10 3 x 10 RTB 2 x 10 RTB 2 x 10 RTB   Hip Add SL 2 x 10 SL 1 x 10 20 x 3" w/ ball 15 x 3" w/ ball 10 x 3" w/ balll   Seated:        LAQs 2 x 10 x 1.5# 2 x 10 x 1.5# 3 x 10 x 1# 2 x 10 x 1# 1 x 10 x 1#   Seated Hip Flex 2 x `10 x 1.5# 2 x 10 x 1.5# 2 x 10 x 1# 2 x 10 x 1# 1 x 10 x 1#   HS curl 2 x 10 GTB 2 x 10 GTB 3 x 10 RTB 2 x 10 RTB 2 x 10 RTB   Standing:        Min squat 1 x 25 1 x 20 1 x 15 1 x 10 1 x 10   Calf raise 1 x 25 1 x 20 1 x 15 1 x 10 1 x 10   Hip Abd oot 1 x 15 -- -- --   Hip Flex oot 1 x 15 -- -- --   Hip Ext oot 1 x 15 -- -- --   HS Curls -- -- -- -- --           Step Ups L1 2 x 10 L1 1 x 15 L1  1 x 10 ea -- --   Step Downs --  -- -- --   Gait Held today See below See below See below See below   CP   -- -- --           Initials DG DG GWA 2/6 GWA 1/6 DG     FOTO CVA 62, G code CJ, 20%<40%    [place] participated in neuromuscular re-education activities to improve: Balance for 15 minutes. The following activities were included:  Side stepping outside parallel bars 4 x 15 ft, crossovers outside parallel bars 4 x 15 ft, and tandem walking in parallel bars x 4 with SBA, single leg balance 2 x 30" inside parallel bars, ambulating with head turns 2 x 30 feet, and static standing with feet together and turning head vertical and horizontal 2 x 30" each.    Home Exercises Provided and Patient Education Provided     Education provided:   - Patient educated again on importance of him using his RW at home until his balance improves. He was also educated to perform his HEP twice a day to his tolerance.     Written Home Exercises Provided: continue with his HEP.  Exercises were reviewed and Haris was able to demonstrate them prior to the end of the session.  Haris demonstrated fair  understanding of the education provided.     Pt received a written copy of exercises to perform at home.   See EMR " under patient instructions for exercises given.     Haris demonstrated fair  understanding of the education provided.     Assessment   Patient continues to require frequent cues with side lying exercises for correct technique and to avoid using momentum. Patient was able to ambulate inside the clinic without an AD with no LOB. He performed all of today's progressions and new exercises with no increase in symptoms prior to leaving the clinic.     Haris is progressing well towards his goals.   Pt prognosis is Fair.     Pt will continue to benefit from skilled outpatient physical therapy to address the deficits listed in the problem list box on initial evaluation, provide pt/family education and to maximize pt's level of independence in the home and community environment.     Pt's spiritual, cultural and educational needs considered and pt agreeable to plan of care and goals.    Anticipated barriers to physical therapy: Patient's compliance with using the RW at home.     Goals:   Short Term Goals: 4 weeks   1. This patient will be independent with a basic HEP.   2. This patient will increase B LE strength by 1 grade in order to perform sit to stand without UE leverage.   3. This patient will be able to ambulate 100 feet with least restrictive AD with no LOB using a normal gait pattern.   4. This patient will score 12 seconds or less on TUG to decrease his fall risk with ADLs.   5. Patient will be able to achieve greater than or equal to 60 on the FOTO CVA placing patient in 40%<60% impaired, limited, or restricted category demonstrating overall improved functional ability with lower extremity.     Long Term Goals: 8 weeks   1. This patient will be independent with an updated HEP.   2. This patient will increase B LE strength to 5/5 in order to ascend/descend steps with a reciprocal gait.   3. This patient will be able to ambulate 300 feet with least restrictive AD with no LOB using a normal gait pattern.   4. This  patient will score 9 seconds or less on TUG to decrease his fall risk with ADLs.   5. Patient will be able to achieve greater than or equal to 75 on the FOTO CVA placing patient in 20%<40% impaired, limited, or restricted category demonstrating overall improved functional ability with lower extremity.        Plan     Continue with the plan of care and progress as the patient tolerates.     Esthela Robles, PT

## 2018-07-03 NOTE — PROGRESS NOTES
"DAILY TREATMENT NOTE    DATE: 7/3/2018    Start Time:  1:10  Stop Time:  2:00  Visit: 7    PROCEDURES:    TIMED  Procedure Min.   NMR 15    TE 20 (supervised)   TA 15             UNTIMED  Procedure Min.             Total Timed Minutes:  50  Total Timed Units:  3  Total Untimed Units:  0  Charges Billed/# of units:  1NMR, 1TA    Medicare:   Today: 74.37  Total: 680.49      Progress/Current Status    Subjective:     Patient ID: Haris Hernandez is a 72 y.o. male.  Diagnosis:   1. 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 Weakness of left upper extremity       Pain: 0 /10  Pt states "I feel good."  Pt with no new c/o. Reports compliance with HEP. Pt arrived today without cane or walker. Educated pt on safety with ambulating and encouraged use of AD if recommended by the PT.    Objective:     Pt seen by OT this session. Treatment consisted of the following:      Date: 7/3/18    Visit: 7    g-code: 7/10   UBE L5.5, 3' forward, 2' back   Supine dowel FF, 3# 2 x 10 reps   Spine, dowel, chest press, 3# 2 x 10 reps   Scapular stabilization rotations in supine, shld @90* FF 12 reps, CW/CCW (not today)   Tband- red    -rows, seated 2 x 10 reps   -ext, seated 2 x 10 reps   -biceps curls 2 x 10 reps   -triceps ext 2 x 10 reps   Wall slides 10 reps with SBA (deferred today)   Theraputty - red    -gross  10 reps   -molding 2 min   -log rolls with pinching 2 sets   -lateral key pinch 10 reps   PHG, 35# 20 reps   Pinch strengthening with green, 4# clothespin, picking up pom poms 30 reps   FM coordination task with coins and inserting them into slot, addressing in hand manipulation, translation, and FM skills 10 coins,    Flexbar, red, frowns and smiles (sup/pro) 10 reps each   Wrist dexerciser, with 1# wrist weight 5 reps       Assessment:     Pt tolerated tx well this date. Pt denied pain throughout session.  GM coordination cont to be impaired and pt requires mod cuing to slow down with ex. " "Cont to present with impaired GM and FM coordination, L UE weakness, decreased independence with ADLs, and decreased functional use of L UE. Pt progressing fairly well towards goals. Pt would cont to benefit from skilled OT services to maximize functional use of L UE.     Patient Education/Response:     Cont HEP and FM coordination practice activities.    Plans and Goals:     Cont OT poc 2x/week for 8 weeks during certification period 6/6/18 to 8/6/18 in pursuit of established goals. Take measurements next session.     Goals to be met in 4 weeks: (7/6/18)  1) Initiate Hep   2) Pt will require no more than min A for UB dressing  3) Pt will require no more than min A with LE dressing  4) Pt will require no more than mod I with bathing  5) Pt will demonstrate improved strength by at least 1/2 grade MMT of shoulder     Goals to be met by discharge:  1) Independent with HEP  2) Pt will require no more than supervision for UB dressing  3) Pt will require no more than supervision with LE dressing  4) Pt will demonstrate L UE strength WFL for performing ADLs/IADLs  5) Pt will demonstrate improved FM skills AEB performing 9 hole peg test in less than 2" with no more than 2 drops  5) Patient will be able to achieve less than or equal to 30% impairment on FOTO  demonstrating overall improved functional ability with upper extremity.      LEANNE Gong    "

## 2018-07-03 NOTE — PROGRESS NOTES
Outpatient Neurological Rehabilitation   Speech and Language Therapy Daily Note  Date:  7/3/2018     Start Time:  1455  Stop Time:  1545    Name: Haris eHrnandez   MRN: [id number]   Therapy Diagnosis: No diagnosis found.Physician: Edilson Bishop MD  Physician Orders: amb referral to speech therapy  Medical Diagnosis: CVA    Visit #:  6  Visits Authorized: 50  Date of Evaluation:  06/05/2018  Insurance Authorization Period: 05/30/2018-12/31/2018  Plan of Care Certification:    06/05/2018-07/31/2018    G-CODE  6 /10    Precautions: Standard and aspiration  History:   Current Medical History: Pt presents to the [facility] with markedly impaired swallowing and moderately impaired motor speech skills secondary to the medical diagnosis of CVA.  Subjective:   Pt reports: He has been doing his exercises at home. Pt reports that he feels that the exercises are helping.  Response to previous treatment: good  Pain Scale:  0/10 on VAS currently.   Pain Location: n/a  Objective:     UNTIMED  Procedure Min.   Dysphagia Therapy    50   Total Minutes: 50  Total Untimed Units: 1  Charges Billed/# of units: 1    Short Term Goals: (4 weeks) Current Progress:   1. Pt will complete OMEs 10-15x each 2-3x per session to increase lingual, labial, and buccal strength and coordination given supervision. Not formally addressed.   2. Pt will utilize motor speech strategies given min A during speech activities. Not formally addressed.   3. Pt will read bi-syllabic words with 90% acc given min cues to utilize clear speech strategies across 2 consecutive sessions.  Not formally addressed.   4. Pt will repeat 5-7 word sentences given min cues to utilize clear speech strategies across 2 consecutive sessions.  Not formally addressed.   5. Patient will independently demonstrate adequate use of chin tuck, head turn to L compensatory strategies to eliminate s/s of aspiration with consistency on 8/10 NTL  trials.  -Pt indly demonstrated adequate use of  head turn L with true NTL 40/40 trials.  -Pt with coughing noted 4/40 trials with  NTL.  -Pt indly demonstrated adequate use of chin tuck, head turn L, however overt s/s of aspiration or penetration (delayed cough, wet vocal quality, and delayed throat clearing) were noted. Pt demonstrated 2 additional dry swallows after elicitation from SLP to clear wet vocal quality.    6. Pt will perform effortful swallow maneuver to improve pharyngeal clearance and tongue base retraction 45-60x per session with pureed.  Pt performed effortful swallow maneuver to improve pharyngeal clearance and tongue base retraction 10x with puree.   Pt demonstrated throat clear x2 and wet vocal quality x3 after initial swallow and secondary, dry swallow. SLP elicited second dry swallow to clear wet vocal quality.   7. Pt will perform modified mendhelson maneuver to improve hyolaryngeal elevation and excursion 45-60x per session with pureed followed by an effortful, dry swallow.  Not formally addressed.   8. Pt will complete tongue pull backs with resistance and lingual resistance exercises to improve tongue base retraction 45-60x per session.  Pt completed tongue pull backs with resistance and lingual resistance exercises to improve tongue base retraction 15x.   9. Pt will complete chin tuck against resistance (CTAR) repetitions with 60-90x per session. Pt completed CTAR repetitions x90.   10. Pt will complete CTAR hold for 1 minute, 5x per session. Pt completed CTAR hold for 1 minutes, x3.       Patient Education/Response:   Pt educated on swallowing mechanisms and function and what each exercise is targeting. Pt educated on good oral care. Pt verbalized understanding.   Home Program: Pt given swallowing exercises to complete at home  Assessment:   Haris is progressing well towards his goals. Pt presented with increased speech clarity. Pt utilized swallow exercises independently after review  from SLP at beginning of session. Pt demonstrated difficulty eliciting a secondary, dry swallow. Increased s/s of aspiration or penetration were noted on NLT today by SLP including throat clears and delayed cough. Pt demonstrated decreased overt s/s of aspiration when utilizing L head turn without chin tuck maneuver. Current goals remain appropriate. Goals to be updated as necessary.   Pt prognosis is Good. Pt will continue to benefit from skilled outpatient speech and language therapy to address the deficits listed in the problem list on initial evaluation, provide pt/family education and to maximize pt's level of independence in the home and community environment.     Medical necessity is demonstrated by the following IMPAIRMENTS:  Improve swallowing to provide adequate nutrition and hydration at the least constrictive diet.   Barriers to Therapy: insight  Pt's spiritual, cultural and educational needs considered and pt agreeable to plan of care and goals.    Functional Communication Measure (FCM):   Severity Modifier for Medicare G-Code:  Swallowing  Current status:  FCM:  Level 4   - CK  Projected status:  FCM:   Level 5   - CJ  Discharge status:  n/a      Motor Speech  Current status: FCM:  Level 4   - CK   Projected status:  FCM:   Level 5   - CJ   Discharge status:  FCM:   Level 4   - CK   Plan:   Continue POC with focus on dysphagia and motor speech.     DYLON Carr.    Clinician  Speech-Language Pathology  7/3/2018

## 2018-07-05 ENCOUNTER — TELEPHONE (OUTPATIENT)
Dept: REHABILITATION | Facility: HOSPITAL | Age: 73
End: 2018-07-05

## 2018-07-10 ENCOUNTER — TELEPHONE (OUTPATIENT)
Dept: REHABILITATION | Facility: HOSPITAL | Age: 73
End: 2018-07-10

## 2018-07-17 ENCOUNTER — DOCUMENTATION ONLY (OUTPATIENT)
Dept: REHABILITATION | Facility: HOSPITAL | Age: 73
End: 2018-07-17

## 2018-07-17 ENCOUNTER — OFFICE VISIT (OUTPATIENT)
Dept: NEUROLOGY | Facility: CLINIC | Age: 73
End: 2018-07-17
Payer: MEDICARE

## 2018-07-17 VITALS
HEIGHT: 72 IN | WEIGHT: 144.81 LBS | SYSTOLIC BLOOD PRESSURE: 172 MMHG | HEART RATE: 58 BPM | BODY MASS INDEX: 19.61 KG/M2 | DIASTOLIC BLOOD PRESSURE: 75 MMHG

## 2018-07-17 DIAGNOSIS — R26.81 UNSTEADY GAIT: ICD-10-CM

## 2018-07-17 DIAGNOSIS — R29.898 WEAKNESS OF BOTH LOWER EXTREMITIES: ICD-10-CM

## 2018-07-17 DIAGNOSIS — R26.89 BALANCE PROBLEM: ICD-10-CM

## 2018-07-17 DIAGNOSIS — I63.311 CEREBROVASCULAR ACCIDENT (CVA) DUE TO THROMBOSIS OF RIGHT MIDDLE CEREBRAL ARTERY: Primary | ICD-10-CM

## 2018-07-17 DIAGNOSIS — I67.848 OTHER CEREBROVASCULAR VASOSPASM AND VASOCONSTRICTION: ICD-10-CM

## 2018-07-17 DIAGNOSIS — M62.9 HAMSTRING TIGHTNESS OF BOTH LOWER EXTREMITIES: ICD-10-CM

## 2018-07-17 PROCEDURE — 99999 PR PBB SHADOW E&M-EST. PATIENT-LVL IV: CPT | Mod: PBBFAC,,, | Performed by: PSYCHIATRY & NEUROLOGY

## 2018-07-17 PROCEDURE — 99214 OFFICE O/P EST MOD 30 MIN: CPT | Mod: S$GLB,,, | Performed by: PSYCHIATRY & NEUROLOGY

## 2018-07-17 PROCEDURE — 3077F SYST BP >= 140 MM HG: CPT | Mod: CPTII,S$GLB,, | Performed by: PSYCHIATRY & NEUROLOGY

## 2018-07-17 PROCEDURE — 3078F DIAST BP <80 MM HG: CPT | Mod: CPTII,S$GLB,, | Performed by: PSYCHIATRY & NEUROLOGY

## 2018-07-17 NOTE — PROGRESS NOTES
Face to Face PTA Conference performed with Evan Cantrell, PTA regarding patient's current status, overall progress, and plan of care    Face to Face PTA Conference performed with Esthela Robles, PT regarding patient's current status, overall progress, and plan of care    Evan Cantrell  7/17/2018

## 2018-07-17 NOTE — PROGRESS NOTES
"  Haris Hernandez is a 72 y.o. year old male that  presents for stroke follow up. He is accompany by his family.    HPI:      73 y/o with HTN poorly adherent to treatment, CAD, ETOH use, enlarged prostate, and history of R BG and CR infarct.  As per tele stroke consultation performed by myself, patient was " brought in by family due to acute onset slurred speech, poor balance leaning to the left, and behavioral changes.  NIHSS 2, CT head with hypodensity right putamen suggestive acute to early subacute infarct. Patient is out of the window for treatment with iv alteplase. No LVO suspected".  Patient was transferred to [facility] where he had a brain MRI that demonstrated an acute infarction involving the right basal ganglia and corona radiata.  Remaining of his stroke work up consisted of MRA brain that showed irregularity of the basilar artery compatible with atherosclerotic disease with mild moderate narrowing proximally, high-grade stenosis or occlusion left mid P2 segment of the PCA, high-grade stenosis origin left superior cerebellar artery, as well as moderate narrowing in the right mid V4 segment of the vertebral artery.  CTA neck: atherosclerotic plaquing of the carotid bifurcations and proximal ICAs with less than 50% proximal ICA stenosis by NASCET criteria.  There is questioned carotid web within the left proximal ICA versus artifact from vessel tortuosity.  TTE: normal EF, no wall motion abnormality, LA normal size.  Lipid panel: cholesterol 177. Triglycerides 60, HDL 76, LDL 89  He is getting PT/OT/speech therapy twice a week and feels that he is still making gradual progress and for the most part is able to function independently.  No recurrence of stroke like symptoms, post stroke seizures, fatigue, depression, anxiety, or cognitive impairment, although daughter reports some personality changes.   Decreased appetite with resulting significant weight loss.  Some imbalance has cause few falls.  Denies " HA, vertigo, double vision, visual disturbances, or trouble swallowing.  On aspirin 81 mg , Plavix 75 mg, and atorvastatin 40 mg daily without reported side effects.    Past Medical History:   Diagnosis Date    Coronary artery disease     Enlarged prostate without lower urinary tract symptoms (luts)     BPH    Enlarged prostate without lower urinary tract symptoms (luts)     BPH    Hypertension      Social History     Social History    Marital status:      Spouse name: N/A    Number of children: N/A    Years of education: N/A     Occupational History    Not on file.     Social History Main Topics    Smoking status: Never Smoker    Smokeless tobacco: Not on file    Alcohol use 61.2 oz/week     50 Cans of beer, 52 Shots of liquor per week      Comment: drinks a six pk a day & 1/2 pint of crown q Day    Drug use: No    Sexual activity: No     Other Topics Concern    Not on file     Social History Narrative    No narrative on file     Past Surgical History:   Procedure Laterality Date    APPENDECTOMY      EYE SURGERY       No family history on file.        Review of Systems  General ROS: negative for chills, fever or weight loss  Psychological ROS: negative for hallucination, depression or suicidal ideation  Ophthalmic ROS: negative for blurry vision, photophobia or eye pain  ENT ROS: negative for epistaxis, sore throat or rhinorrhea  Respiratory ROS: no cough, shortness of breath, or wheezing  Cardiovascular ROS: no chest pain or dyspnea on exertion  Gastrointestinal ROS: no abdominal pain, change in bowel habits, or black/ bloody stools  Genito-Urinary ROS: no dysuria, trouble voiding, or hematuria  Musculoskeletal ROS: negative for gait disturbance or muscular weakness  Neurological ROS: no syncope or seizures; no ataxia  Dermatological ROS: negative for pruritis, rash and jaundice      Physical Exam:  There were no vitals taken for this visit.  General appearance: alert, cooperative, no  distress  Constitutional:Oriented to person, place, and time.appears well-developed and well-nourished.   HEENT: Normocephalic, atraumatic, neck symmetrical, no nasal discharge   Eyes: conjunctivae/corneas clear, PERRL, EOM's intact  Lungs: clear to auscultation bilaterally, no dullness to percussion bilaterally  Heart: regular rate and rhythm without rub; no displacement of the PMI   Abdomen: soft, non-tender; bowel sounds normoactive; no organomegaly  Extremities: extremities symmetric; no clubbing, cyanosis, or edema  Integument: Skin color, texture, turgor normal; no rashes; hair distrubution normal  Neurologic:   Mental status: alert and awake, oriented x 4, comprehension, naming, and repetition intact. No right to left confusion. Performs serial 7's without difficulty .No dysarthria.  CN 2-12: pupils 4 mm bilaterally, reactive to light. Fundi without papilledema. Visual fields full to confrontation. EOM full without nystagmus. Face sensation normal in all distributions. Face symmetric. Hearing grossly intact. Palate elevates well. Tongue midline without atrophy or fasciculations.  Motor: mild R sided weakness  Sensory: slightly diminished L side  DTR's: 2+ all over.  Plantars: no tested.  Coordination: finger to nose and heel-knee-shin intact.  Gait: no ataxia or bradykinesia but circumduct L leg.  Psychiatric: no pressured speech; normal affect; no evidence of impaired cognition     LABS:    Complete Blood Count  Lab Results   Component Value Date    RBC 3.73 (L) 05/10/2018    RBC 3.73 (L) 05/10/2018    HGB 11.9 (L) 05/10/2018    HGB 11.9 (L) 05/10/2018    HCT 33.7 (L) 05/10/2018    HCT 33.7 (L) 05/10/2018    MCV 90 05/10/2018    MCV 90 05/10/2018    MCH 31.9 (H) 05/10/2018    MCH 31.9 (H) 05/10/2018    MCHC 35.3 05/10/2018    MCHC 35.3 05/10/2018    RDW 12.9 05/10/2018    RDW 12.9 05/10/2018     05/10/2018     05/10/2018    MPV 10.5 05/10/2018    MPV 10.5 05/10/2018    GRAN 5.8 05/10/2018     GRAN 70.0 05/10/2018    GRAN 5.8 05/10/2018    GRAN 70.0 05/10/2018    LYMPH 1.4 05/10/2018    LYMPH 16.2 (L) 05/10/2018    LYMPH 1.4 05/10/2018    LYMPH 16.2 (L) 05/10/2018    MONO 1.1 (H) 05/10/2018    MONO 13.1 05/10/2018    MONO 1.1 (H) 05/10/2018    MONO 13.1 05/10/2018    EOS 0.0 05/10/2018    EOS 0.0 05/10/2018    BASO 0.01 05/10/2018    BASO 0.01 05/10/2018    EOSINOPHIL 0.4 05/10/2018    EOSINOPHIL 0.4 05/10/2018    BASOPHIL 0.1 05/10/2018    BASOPHIL 0.1 05/10/2018    DIFFMETHOD Automated 05/10/2018    DIFFMETHOD Automated 05/10/2018       Comprehensive Metabolic Panel  Lab Results   Component Value Date     (H) 05/10/2018     (H) 05/10/2018    BUN 11 05/10/2018    BUN 11 05/10/2018    CREATININE 1.2 05/10/2018    CREATININE 1.2 05/10/2018     (L) 05/10/2018     (L) 05/10/2018    K 4.3 05/10/2018    K 4.3 05/10/2018     05/10/2018     05/10/2018    PROT 7.8 05/10/2018    PROT 7.8 05/10/2018    ALBUMIN 3.1 (L) 05/10/2018    ALBUMIN 3.1 (L) 05/10/2018    BILITOT 0.7 05/10/2018    BILITOT 0.7 05/10/2018    AST 17 05/10/2018    AST 17 05/10/2018    ALKPHOS 55 05/10/2018    ALKPHOS 55 05/10/2018    CO2 24 05/10/2018    CO2 24 05/10/2018    ALT 12 05/10/2018    ALT 12 05/10/2018    ANIONGAP 8 05/10/2018    ANIONGAP 8 05/10/2018    EGFRNONAA >60 05/10/2018    EGFRNONAA >60 05/10/2018    ESTGFRAFRICA >60 05/10/2018    ESTGFRAFRICA >60 05/10/2018       TSH  Lab Results   Component Value Date    TSH 0.939 05/07/2018         Assessment: 73 y/o with HTN poorly adherent to treatment, CAD, ETOH use, enlarged prostate, and history of R BG and CR infarct.  Doing relatively well, able to function independently and perform all his ADL without assistance.  CTA head with high-grade stenosis or occlusion left mid P2 segment of the PCA, high-grade stenosis origin left superior cerebellar artery, as well as moderate narrowing in the right mid V4 segment of the vertebral artery.  Location of  the stroke suggestive of likely small vessel disease, however size of the stroke no quite typical for small vessel. No LA enlargement on TTE, but wonder if embolism could be a potential etiology.  Will request a 30 day event monitor.  In the meantime, continue DAPT and atorvastatin.    No diagnosis found.  There were no encounter diagnoses.      Plan:  1)  R BG and CR infarct: continue DAPT, atorvastatin, strict stroke risk factors control.  Requested 30 day event monitor.  2) High grade intracranial stenosis, continue DAPT and high intensity statin.  3) HTN, managed by IM, goal /80  4) CAD, as per IM and cardiology  5) ETOH use  6) Enlarged prostate, as per PCP and urology.  7) Imbalance, falls: continue PT  No orders of the defined types were placed in this encounter.          Joselito Lara MD

## 2018-07-19 ENCOUNTER — CLINICAL SUPPORT (OUTPATIENT)
Dept: REHABILITATION | Facility: HOSPITAL | Age: 73
End: 2018-07-19
Attending: INTERNAL MEDICINE
Payer: MEDICARE

## 2018-07-19 DIAGNOSIS — R29.898 WEAKNESS OF BOTH LOWER EXTREMITIES: ICD-10-CM

## 2018-07-19 DIAGNOSIS — M62.9 HAMSTRING TIGHTNESS OF BOTH LOWER EXTREMITIES: ICD-10-CM

## 2018-07-19 DIAGNOSIS — R47.1 DYSARTHRIA: ICD-10-CM

## 2018-07-19 DIAGNOSIS — R26.89 BALANCE PROBLEM: ICD-10-CM

## 2018-07-19 DIAGNOSIS — R26.81 UNSTEADY GAIT: ICD-10-CM

## 2018-07-19 DIAGNOSIS — R13.12 OROPHARYNGEAL DYSPHAGIA: ICD-10-CM

## 2018-07-19 PROCEDURE — 97110 THERAPEUTIC EXERCISES: CPT | Mod: PO,59

## 2018-07-19 PROCEDURE — 97112 NEUROMUSCULAR REEDUCATION: CPT | Mod: PO,59

## 2018-07-19 PROCEDURE — 92507 TX SP LANG VOICE COMM INDIV: CPT | Mod: PO

## 2018-07-19 NOTE — PROGRESS NOTES
"  Physical Therapy Daily Treatment Note     Name: Haris Hernandez  Clinic Number: [id number]    Therapy Diagnosis:   Encounter Diagnoses   Name Primary?    Unsteady gait     Weakness of both lower extremities     Hamstring tightness of both lower extremities     Balance problem      Physician: Edilson Bishop MD    Visit Date: 7/19/2018  Physician Orders: PT Eval and Treat   Medical Diagnosis: CVA  Evaluation Date: 6/12/2018  Authorization Period Expiration: 12/31/18  Plan of Care Certification Period: 06/12/18 to 08/12/18  Visit # / Visits authorized: 7/ 50    Time In: 2:00  Time Out: 2:50    TIMED    Procedure Min.   TE 40       NMR 10             UNTIMED  Procedure Min.             Total Billable Time: 50 minutes    Precautions: Standard and Fall    Subjective      Pt reports: he was not compliant with home exercise program for PT but has been doing his HEP for ST. He denies any falls at this time. Patient's daughter reported after his visit that he has had two falls in the bathroom over the past few weeks.   Response to previous treatment: No change   Functional change: Has been walking without the walker at home, has been going for walks in the neighborhood with and without the walker.    Pain: 0/10  Location: N/A    Objective     Haris received therapeutic exercises to develop strength and flexibility for 40 minutes 1:1 with PT including:    Date  07/19/18 07/03/18 06/28/18 06/26/18 06/21/18 06/14/18   VISIT 7/50 6/50 5/50 4/50 3/50 2/50   Gcode 07/12/18  1/10 07/12/18  6/10 07/12/18  5/10 07/12/18  4/10 07/12/18  3/10 07/12/18  2/10   FOTO -- -- 5/5 4/5 3/5 2/5   Cap Visit   Cap Total 125.43  665.56 64.79  540.13 90.62  475.34 90.62  384.72 90.62  294.10 90.62  203.48   POC exp 08/12/18 08/12/18 08/12/18 08/12/18 08/12/18 08/12/18   FTF 08/17/18 07/12/18 07/12/18 07/12/18 07/12/18 07/12/18            MT -- -- -- --     Table:         Long Sit HS 10 x 10" 10 x 10" 10 x 10" 10 x 10" 10 x 10" 10 x 10" " "  Gastroc Str.   -- -- -- --   Bike   -- -- -- --   Bridge SL 1 x 15 SL 1 x 10 ea 1 x 30 1 x 20 1 x 15 1 x 10   SAQ 3 x 10 x 1.5# 2 x 10 x 1.5# 2 x 10 x 1# 1 x 20 1 x 15 1 x 10   SLR 3 x 10 x 1.5# 2 x 10 x 1.5# 2 x 10 x 1# 2 x 10 2 x 10 1 x 15   Hip Abd 3 x 10 SL SL 2 x 10 SL 1 x 10 3 x 10 RTB 2 x 10 RTB 2 x 10 RTB   Hip Add 3 x 10 SL SL 2 x 10 SL 1 x 10 20 x 3" w/ ball 15 x 3" w/ ball 10 x 3" w/ balll   Seated:         LAQs 3 x 10 x 1.5# 2 x 10 x 1.5# 2 x 10 x 1.5# 3 x 10 x 1# 2 x 10 x 1# 1 x 10 x 1#   Seated Hip Flex 3 x 10 x 1.5# 2 x 10 x 1.5# 2 x 10 x 1.5# 2 x 10 x 1# 2 x 10 x 1# 1 x 10 x 1#   HS curl 3 x 10 GTB 2 x 10 GTB 2 x 10 GTB 3 x 10 RTB 2 x 10 RTB 2 x 10 RTB   Standing:         Min squat 1 x 10 on airex 1 x 25 1 x 20 1 x 15 1 x 10 1 x 10   Calf raise oot 1 x 25 1 x 20 1 x 15 1 x 10 1 x 10   Hip Abd oot oot 1 x 15 -- -- --   Hip Flex oot oot 1 x 15 -- -- --   Hip Ext oot oot 1 x 15 -- -- --   HS Curls -- -- -- -- -- --            Step Ups oot L1 2 x 10 L1 1 x 15 L1  1 x 10 ea -- --   Step Downs -- --  -- -- --   Gait Held today Held today See below See below See below See below   CP    -- -- --            Initials DG DG DG GWA 2/6 GWA 1/6 DG     FOTO CVA 79, G code CJ, 20%<40%    [place] participated in neuromuscular re-education activities to improve: Balance for 10 minutes. The following activities were included:  Side stepping outside parallel bars 4 x 15 ft, crossovers outside parallel bars 4 x 15 ft, and tandem walking outside parallel bars x 4 with SBA, single leg balance 2 x 30" inside parallel bars, static standing on airex pad feet hip width apart 2 x 30".    Home Exercises Provided and Patient Education Provided     Education provided:   - Patient educated again on importance of him using his RW at home until his balance improves. He was also educated to perform his HEP twice a day to his tolerance.   - patient's daughter was educated that the patient's insurance will not pay for a cane since the " insurance company paid for the walker.     Written Home Exercises Provided: continue with his HEP.  Exercises were reviewed and Haris was able to demonstrate them prior to the end of the session.  Haris demonstrated fair  understanding of the education provided.     Pt received a written copy of exercises to perform at home.   See EMR under patient instructions for exercises given.     Hairs demonstrated fair  understanding of the education provided.     Assessment   Patient continues to require frequent cues with all exercises to avoid substitutions and to avoid using momentum with his exercises. When performing static balance activities he demonstrated ankle strategies but has to use a stepping strategy twice with dynamic balance activities. He performed all of today's progressions and new exercises with no increase in symptoms prior to leaving the clinic.     Haris is progressing well towards his goals.   Pt prognosis is Fair.     Pt will continue to benefit from skilled outpatient physical therapy to address the deficits listed in the problem list box on initial evaluation, provide pt/family education and to maximize pt's level of independence in the home and community environment.     Pt's spiritual, cultural and educational needs considered and pt agreeable to plan of care and goals.    Anticipated barriers to physical therapy: Patient's compliance with using the RW at home.     Goals:   Short Term Goals: 4 weeks   1. This patient will be independent with a basic HEP.   2. This patient will increase B LE strength by 1 grade in order to perform sit to stand without UE leverage.   3. This patient will be able to ambulate 100 feet with least restrictive AD with no LOB using a normal gait pattern.   4. This patient will score 12 seconds or less on TUG to decrease his fall risk with ADLs.   5. Patient will be able to achieve greater than or equal to 60 on the FOTO CVA placing patient in 40%<60% impaired,  limited, or restricted category demonstrating overall improved functional ability with lower extremity.     Long Term Goals: 8 weeks   1. This patient will be independent with an updated HEP.   2. This patient will increase B LE strength to 5/5 in order to ascend/descend steps with a reciprocal gait.   3. This patient will be able to ambulate 300 feet with least restrictive AD with no LOB using a normal gait pattern.   4. This patient will score 9 seconds or less on TUG to decrease his fall risk with ADLs.   5. Patient will be able to achieve greater than or equal to 75 on the FOTO CVA placing patient in 20%<40% impaired, limited, or restricted category demonstrating overall improved functional ability with lower extremity.        Plan     Continue with the plan of care and progress as the patient tolerates.     Esthela Robles, PT

## 2018-07-19 NOTE — PROGRESS NOTES
[facility]   Speech and Language Therapy Daily Note  Date:  7/19/2018     Start Time:  1500  Stop Time:  1545    Name: Haris Hernandez   MRN: [id number]   Therapy Diagnosis: No diagnosis found.Physician: Edilson Bishop MD  Physician Orders: amb referral to speech therapy  Medical Diagnosis: CVA    Visit #:  7  Visits Authorized: 50  Date of Evaluation:  06/05/2018  Insurance Authorization Period: 05/30/2018-12/31/2018  Plan of Care Certification:    06/05/2018-07/31/2018    G-CODE  7 /10    Precautions: Standard and aspiration  History:   Current Medical History: Pt presents to the [facility] with markedly impaired swallowing and moderately impaired motor speech skills secondary to the medical diagnosis of CVA.  Subjective:   Pt reports: He has been doing his exercises at home. Pt reports that he feels that the exercises are helping.  Response to previous treatment: good  Pain Scale:  0/10 on VAS currently.   Pain Location: n/a  Objective:     UNTIMED  Procedure Min.   Dysphagia Therapy    45   Total Minutes: 45  Total Untimed Units: 1  Charges Billed/# of units: 1    Short Term Goals: (4 weeks) Current Progress:   1. Pt will complete OMEs 10-15x each 2-3x per session to increase lingual, labial, and buccal strength and coordination given supervision. Not formally addressed.   2. Pt will utilize motor speech strategies given min A during speech activities. Not formally addressed.   3. Pt will read bi-syllabic words with 90% acc given min cues to utilize clear speech strategies across 2 consecutive sessions.  Not formally addressed.   4. Pt will repeat 5-7 word sentences given min cues to utilize clear speech strategies across 2 consecutive sessions.  Not formally addressed.   5. Patient will independently demonstrate adequate use of chin tuck, head turn to L compensatory strategies to eliminate s/s of aspiration with consistency on 8/10 NTL  trials.  -Pt indly demonstrated adequate use of  head turn L with true NTL 25/30 trials.  -Pt with coughing noted 9x trials with  NTL.  -Pt indly demonstrated adequate use of head turn L, however overt s/s of aspiration or penetration (delayed cough, wet vocal quality, and delayed throat clearing) were noted. Pt demonstrated 2 additional dry swallows after elicitation from SLP to clear wet vocal quality.    6. Pt will perform effortful swallow maneuver to improve pharyngeal clearance and tongue base retraction 45-60x per session with pureed.  Pt performed effortful swallow maneuver to improve pharyngeal clearance and tongue base retraction 20x with NTL.   Pt demonstrated throat clear x2 and wet vocal quality x3 after initial swallow and secondary, dry swallow. SLP elicited second dry swallow to clear wet vocal quality.   7. Pt will perform modified mendhelson maneuver to improve hyolaryngeal elevation and excursion 45-60x per session with pureed followed by an effortful, dry swallow.  Pt performed modified mendhelson maneuver 15x.    8. Pt will complete tongue pull backs with resistance and lingual resistance exercises to improve tongue base retraction 45-60x per session.  Pt completed tongue pull backs with resistance and lingual resistance exercises to improve tongue base retraction 15x.   9. Pt will complete chin tuck against resistance (CTAR) repetitions with 60-90x per session. Pt completed CTAR repetitions x60.   10. Pt will complete CTAR hold for 1 minute, 5x per session. Pt completed CTAR hold for 1 minutes, x3.       Patient Education/Response:   Pt educated on swallowing mechanisms and function and what each exercise is targeting. Pt educated on good oral care. Pt verbalized understanding.   Home Program: Pt given swallowing exercises to complete at home  Assessment:   Haris is progressing well towards his goals. Pt presented with increased speech clarity. Pt demonstrated difficulty eliciting a secondary,  "dry swallow. Increased s/s of aspiration or penetration were noted on NLT today by SLP including throat clears and delayed cough. Pt demonstrated decreased overt s/s of aspiration when utilizing L head turn without chin tuck maneuver. SLP observed "nose running" during trials of NTL. Current goals remain appropriate. Goals to be updated as necessary.   Pt prognosis is Good. Pt will continue to benefit from skilled outpatient speech and language therapy to address the deficits listed in the problem list on initial evaluation, provide pt/family education and to maximize pt's level of independence in the home and community environment.     Medical necessity is demonstrated by the following IMPAIRMENTS:  Improve swallowing to provide adequate nutrition and hydration at the least constrictive diet.   Barriers to Therapy: insight  Pt's spiritual, cultural and educational needs considered and pt agreeable to plan of care and goals.    Functional Communication Measure (FCM):   Severity Modifier for Medicare G-Code:  Swallowing  Current status:  FCM:  Level 4   - CK  Projected status:  FCM:   Level 5   - CJ  Discharge status:  n/a      Motor Speech  Current status: FCM:  Level 4   - CK   Projected status:  FCM:   Level 5   - CJ   Discharge status:  FCM:   Level 4   - CK   Plan:   Continue POC with focus on dysphagia and motor speech.     DYLON Carr.    Clinician  Speech-Language Pathology  7/19/2018     "I TORIBIO Lomax., CCC-SLPcertify that I was present in the room directing the student and service delivery and guiding them using my skilled judgement. As the co-signing therapist, I have reviewed the student's documentation, and am responsible for the treatment, assessment and plan."     ANIYA Lomax, CCC-SLP  Speech-Language Pathologist  07/19/2018    "

## 2018-07-24 ENCOUNTER — CLINICAL SUPPORT (OUTPATIENT)
Dept: REHABILITATION | Facility: HOSPITAL | Age: 73
End: 2018-07-24
Attending: INTERNAL MEDICINE
Payer: MEDICARE

## 2018-07-24 DIAGNOSIS — R47.1 DYSARTHRIA: ICD-10-CM

## 2018-07-24 DIAGNOSIS — R13.12 OROPHARYNGEAL DYSPHAGIA: ICD-10-CM

## 2018-07-24 DIAGNOSIS — M62.9 HAMSTRING TIGHTNESS OF BOTH LOWER EXTREMITIES: ICD-10-CM

## 2018-07-24 DIAGNOSIS — R26.81 UNSTEADY GAIT: ICD-10-CM

## 2018-07-24 DIAGNOSIS — R29.898 WEAKNESS OF BOTH LOWER EXTREMITIES: ICD-10-CM

## 2018-07-24 DIAGNOSIS — R26.89 BALANCE PROBLEM: ICD-10-CM

## 2018-07-24 PROCEDURE — 92526 ORAL FUNCTION THERAPY: CPT | Mod: PO

## 2018-07-24 PROCEDURE — 97110 THERAPEUTIC EXERCISES: CPT | Mod: PO

## 2018-07-24 PROCEDURE — 97112 NEUROMUSCULAR REEDUCATION: CPT | Mod: PO

## 2018-07-24 NOTE — PROGRESS NOTES
"  Physical Therapy Daily Treatment Note     Name: Haris Hernandez  Clinic Number: [id number]    Therapy Diagnosis:   Encounter Diagnoses   Name Primary?    Unsteady gait     Weakness of both lower extremities     Hamstring tightness of both lower extremities     Balance problem      Physician: Edilson Bishop MD    Visit Date: 7/24/2018  Physician Orders: PT Eval and Treat   Medical Diagnosis: CVA  Evaluation Date: 6/12/2018  Authorization Period Expiration: 12/31/18  Plan of Care Certification Period: 06/12/18 to 08/12/18  Visit # / Visits authorized: 7/ 50    Time In: 2:00  Time Out: 2:50    TIMED    Procedure Min.   TE 15   TE sup 25 NC   NMR 10             UNTIMED  Procedure Min.             Total Billable Time: 25 minutes    Precautions: Standard and Fall    Subjective      Pt reports: he was not compliant with home exercise program for PT but has been doing his HEP for ST. He denies any falls at this time.   Response to previous treatment: No change   Functional change: Has been walking without the walker at home, has been going for walks in the neighborhood with and without the walker.    Pain: 0/10  Location: N/A    Objective     Haris received therapeutic exercises to develop strength and flexibility for 15 minutes 1:1 with PTA including:    Date  07/24/18 07/19/18 07/03/18 06/28/18 06/26/18 06/21/18 06/14/18   VISIT 8/50 7/50 6/50 5/50 4/50 3/50 2/50   Gcode 08/19/18  2/10 07/12/18  1/10 07/12/18  6/10 07/12/18  5/10 07/12/18  4/10 07/12/18  3/10 07/12/18  2/10   FOTO -- -- -- 5/5 4/5 3/5 2/5   Cap Visit   Cap Total 64.79  721.35 125.43  665.56 64.79  540.13 90.62  475.34 90.62  384.72 90.62  294.10 90.62  203.48   POC exp 08/12/18 08/12/18 08/12/18 08/12/18 08/12/18 08/12/18 08/12/18   FTF 08/17/18 08/17/18 07/12/18 07/12/18 07/12/18 07/12/18 07/12/18             MT -- -- -- -- --     Table:          Long Sit HS 10 x 10" 10 x 10" 10 x 10" 10 x 10" 10 x 10" 10 x 10" 10 x 10"   Gastroc Str. --   -- -- -- " "--   Bike --   -- -- -- --   Bridge SL  1 x 15 SL 1 x 15 SL 1 x 10 ea 1 x 30 1 x 20 1 x 15 1 x 10   SAQ 3 x 10 x 1.5# 3 x 10 x 1.5# 2 x 10 x 1.5# 2 x 10 x 1# 1 x 20 1 x 15 1 x 10   SLR 3 x 10 x 1.5# 3 x 10 x 1.5# 2 x 10 x 1.5# 2 x 10 x 1# 2 x 10 2 x 10 1 x 15   Hip Abd 2 x 10 x 1# 3 x 10 SL SL 2 x 10 SL 1 x 10 3 x 10 RTB 2 x 10 RTB 2 x 10 RTB   Hip Add 2 x 10 x 1# 3 x 10 SL SL 2 x 10 SL 1 x 10 20 x 3" w/ ball 15 x 3" w/ ball 10 x 3" w/ balll   Seated:          LAQs 3 x 10 x 1.5# 3 x 10 x 1.5# 2 x 10 x 1.5# 2 x 10 x 1.5# 3 x 10 x 1# 2 x 10 x 1# 1 x 10 x 1#   Seated Hip Flex 3 x 10 x 1.5# 3 x 10 x 1.5# 2 x 10 x 1.5# 2 x 10 x 1.5# 2 x 10 x 1# 2 x 10 x 1# 1 x 10 x 1#   HS curl 3 x 10 GTB 3 x 10 GTB 2 x 10 GTB 2 x 10 GTB 3 x 10 RTB 2 x 10 RTB 2 x 10 RTB   Standing:          Min squat 1 x 15 on airex 1 x 10 on airex 1 x 25 1 x 20 1 x 15 1 x 10 1 x 10   Calf raise 3 x 10 oot 1 x 25 1 x 20 1 x 15 1 x 10 1 x 10   Hip Abd 2 x 10 oot oot 1 x 15 -- -- --   Hip Flex 2 x 10 oot oot 1 x 15 -- -- --   Hip Ext 2 x 10 oot oot 1 x 15 -- -- --   HS Curls -- -- -- -- -- -- --             Step Ups L2  1 x 10 ea oot L1 2 x 10 L1 1 x 15 L1  1 x 10 ea -- --   Step Downs -- -- --  -- -- --   Gait See below Held today Held today See below See below See below See below   CP     -- -- --             Initials GWA 1/6 FLORA HINES A 2/6 GWA 1/6 FLORA Carballo participated in neuromuscular re-education activities to improve: Balance for 10 minutes. The following activities were included:  Side stepping outside parallel bars 4 x 15 ft, crossovers outside parallel bars 4 x 15 ft, and tandem walking outside parallel bars x 4 with SBA, single leg balance 2 x 30" inside parallel bars, static standing on airex pad feet hip width apart 2 x 30".    Home Exercises Provided and Patient Education Provided     Education provided:   - Patient educated again on importance of him using his RW at home until his balance improves. He was also educated to perform his " HEP twice a day to his tolerance.   - patient's daughter was educated that the patient's insurance will not pay for a cane since the insurance company paid for the walker.     Written Home Exercises Provided: continue with his HEP.  Exercises were reviewed and Haris was able to demonstrate them prior to the end of the session.  Haris demonstrated fair  understanding of the education provided.     Pt received a written copy of exercises to perform at home.   See EMR under patient instructions for exercises given.     Haris demonstrated fair  understanding of the education provided.     Assessment   Patient continues to require frequent cues with all exercises to avoid substitutions and to avoid using momentum with his exercises.  When performing static balance activities he demonstrated ankle strategies but has to use a stepping strategy twice with dynamic balance activities.  Patient completed his therapy along with today's progressions and addition of 1# to hip abduction and hip adduction with no increase in symptoms prior to leaving the clinic.     Haris is progressing well towards his goals.   Pt prognosis is Fair.     Pt will continue to benefit from skilled outpatient physical therapy to address the deficits listed in the problem list box on initial evaluation, provide pt/family education and to maximize pt's level of independence in the home and community environment.     Pt's spiritual, cultural and educational needs considered and pt agreeable to plan of care and goals.    Anticipated barriers to physical therapy: Patient's compliance with using the RW at home.     Goals:   Short Term Goals: 4 weeks   1. This patient will be independent with a basic HEP.   2. This patient will increase B LE strength by 1 grade in order to perform sit to stand without UE leverage.   3. This patient will be able to ambulate 100 feet with least restrictive AD with no LOB using a normal gait pattern.   4. This patient will  score 12 seconds or less on TUG to decrease his fall risk with ADLs.   5. Patient will be able to achieve greater than or equal to 60 on the FOTO CVA placing patient in 40%<60% impaired, limited, or restricted category demonstrating overall improved functional ability with lower extremity.     Long Term Goals: 8 weeks   1. This patient will be independent with an updated HEP.   2. This patient will increase B LE strength to 5/5 in order to ascend/descend steps with a reciprocal gait.   3. This patient will be able to ambulate 300 feet with least restrictive AD with no LOB using a normal gait pattern.   4. This patient will score 9 seconds or less on TUG to decrease his fall risk with ADLs.   5. Patient will be able to achieve greater than or equal to 75 on the FOTO CVA placing patient in 20%<40% impaired, limited, or restricted category demonstrating overall improved functional ability with lower extremity.        Plan     Continue with the plan of care and progress as the patient tolerates.     Evan Cantrell, PTA

## 2018-07-24 NOTE — PROGRESS NOTES
Outpatient Neurological Rehabilitation   Speech and Language Therapy Daily Note  Date:  7/24/2018     Start Time:  1500  Stop Time:  1545    Name: Haris Hernandez   MRN: [id number]   Therapy Diagnosis:   Encounter Diagnoses   Name Primary?    Dysarthria     Oropharyngeal dysphagia    Physician: Edilson Bishop MD  Physician Orders: amb referral to speech therapy  Medical Diagnosis: CVA    Visit #:  8  Visits Authorized: 50  Date of Evaluation:  06/05/2018  Insurance Authorization Period: 05/30/2018-12/31/2018  Plan of Care Certification:    06/05/2018-07/31/2018    G-CODE  8 /10    Precautions: Standard and aspiration  History:   Current Medical History: Pt presents to the [facility] with markedly impaired swallowing and moderately impaired motor speech skills secondary to the medical diagnosis of CVA.  Subjective:   Pt reports: He has been doing his exercises at home. Pt stated he does well with drinking liquids when he remembers to utilize L head turn.  Response to previous treatment: good  Pain Scale:  0/10 on VAS currently.   Pain Location: n/a  Objective:     UNTIMED  Procedure Min.   Dysphagia Therapy    45   Total Minutes: 45  Total Untimed Units: 1  Charges Billed/# of units: 1    Short Term Goals: (4 weeks) Current Progress:   1. Pt will complete OMEs 10-15x each 2-3x per session to increase lingual, labial, and buccal strength and coordination given supervision. Not formally addressed.   2. Pt will utilize motor speech strategies given min A during speech activities. Not formally addressed.   3. Pt will read bi-syllabic words with 90% acc given min cues to utilize clear speech strategies across 2 consecutive sessions.  Not formally addressed.   4. Pt will repeat 5-7 word sentences given min cues to utilize clear speech strategies across 2 consecutive sessions.  Not formally addressed.   5. Patient will independently demonstrate adequate use of chin tuck, head  turn to L compensatory strategies to eliminate s/s of aspiration with consistency on 8/10 NTL trials.  -Pt indly demonstrated adequate use of  head turn L with true NTL 28/30 trials.  -Pt with coughing noted 2x trials with  NTL.  -Pt indly demonstrated adequate use of head turn L, however overt s/s of aspiration or penetration (immediate cough, wet vocal quality). Pt demonstrated 2 additional dry swallows after elicitation from SLP to suspected residue.   6. Pt will perform effortful swallow maneuver to improve pharyngeal clearance and tongue base retraction 45-60x per session with pureed.  Pt performed effortful swallow maneuver to improve pharyngeal clearance and tongue base retraction 30x with NTL.   Pt demonstrated immediate cough after swallow x1 and wet vocal qulaity x1.   Pt completed 2 dry swallows following po trials ind'ly.    7. Pt will perform modified mendhelson maneuver to improve hyolaryngeal elevation and excursion 45-60x per session with pureed followed by an effortful, dry swallow.  Pt performed modified mendhelson maneuver 15x.    8. Pt will complete tongue pull backs with resistance and lingual resistance exercises to improve tongue base retraction 45-60x per session.  Pt completed tongue pull backs with resistance and lingual resistance exercises to improve tongue base retraction 15x.   9. Pt will complete chin tuck against resistance (CTAR) repetitions with 60-90x per session. Pt completed CTAR repetitions x60.   10. Pt will complete CTAR hold for 1 minute, 5x per session. Pt completed CTAR hold for 1 minutes, x2.       Patient Education/Response:   Pt educated on swallowing mechanisms and function and what each exercise is targeting. Pt educated on good oral care. Pt verbalized understanding.   Home Program: Pt given swallowing exercises to complete at home  Assessment:   Haris is progressing well towards his goals. Pt presented with increased speech clarity. Pt demonstrated difficulty  "eliciting a secondary, dry swallow. Increased s/s of aspiration or penetration were noted on NLT today by SLP including throat clears and delayed cough. Pt demonstrated decreased overt s/s of aspiration when utilizing L head turn without chin tuck maneuver. SLP observed "nose running" during trials of NTL. Current goals remain appropriate. Goals to be updated as necessary.   Pt prognosis is Good. Pt will continue to benefit from skilled outpatient speech and language therapy to address the deficits listed in the problem list on initial evaluation, provide pt/family education and to maximize pt's level of independence in the home and community environment.     Medical necessity is demonstrated by the following IMPAIRMENTS:  Improve swallowing to provide adequate nutrition and hydration at the least constrictive diet.   Barriers to Therapy: insight  Pt's spiritual, cultural and educational needs considered and pt agreeable to plan of care and goals.    Functional Communication Measure (FCM):   Severity Modifier for Medicare G-Code:  Swallowing  Current status:  FCM:  Level 4   - CK  Projected status:  FCM:   Level 5   - CJ  Discharge status:  n/a      Motor Speech  Current status: FCM:  Level 4   - CK   Projected status:  FCM:   Level 5   - CJ   Discharge status:  FCM:   Level 4   - CK   Plan:   Continue POC with focus on dysphagia and motor speech.       TORIBIO Lomax., CCC-SLP  Speech-Language Pathologist  07/24/2018  "

## 2018-07-26 ENCOUNTER — CLINICAL SUPPORT (OUTPATIENT)
Dept: REHABILITATION | Facility: HOSPITAL | Age: 73
End: 2018-07-26
Attending: INTERNAL MEDICINE
Payer: MEDICARE

## 2018-07-26 DIAGNOSIS — I69.398 LACK OF COORDINATION AS LATE EFFECT OF CEREBROVASCULAR ACCIDENT (CVA): ICD-10-CM

## 2018-07-26 DIAGNOSIS — R27.9 LACK OF COORDINATION AS LATE EFFECT OF CEREBROVASCULAR ACCIDENT (CVA): ICD-10-CM

## 2018-07-26 DIAGNOSIS — M62.9 HAMSTRING TIGHTNESS OF BOTH LOWER EXTREMITIES: ICD-10-CM

## 2018-07-26 DIAGNOSIS — R29.898 WEAKNESS OF LEFT UPPER EXTREMITY: ICD-10-CM

## 2018-07-26 DIAGNOSIS — R26.81 UNSTEADY GAIT: ICD-10-CM

## 2018-07-26 DIAGNOSIS — R29.898 WEAKNESS OF BOTH LOWER EXTREMITIES: ICD-10-CM

## 2018-07-26 DIAGNOSIS — R26.89 BALANCE PROBLEM: ICD-10-CM

## 2018-07-26 PROCEDURE — 97530 THERAPEUTIC ACTIVITIES: CPT | Mod: PO

## 2018-07-26 PROCEDURE — 97110 THERAPEUTIC EXERCISES: CPT | Mod: PO

## 2018-07-26 PROCEDURE — 97112 NEUROMUSCULAR REEDUCATION: CPT | Mod: PO

## 2018-07-26 NOTE — PROGRESS NOTES
"  Physical Therapy Daily Treatment Note     Name: Haris Hernandez  Clinic Number: [id number]    Therapy Diagnosis:   Encounter Diagnoses   Name Primary?    Unsteady gait     Weakness of both lower extremities     Hamstring tightness of both lower extremities     Balance problem      Physician: Edilson Bishop MD    Visit Date: 7/26/2018  Physician Orders: PT Eval and Treat   Medical Diagnosis: CVA  Evaluation Date: 6/12/2018  Authorization Period Expiration: 12/31/18  Plan of Care Certification Period: 06/12/18 to 08/12/18  Visit # / Visits authorized: 9/ 50    Time In: 2:00  Time Out: 2:55    TIMED    Procedure Min.   TE 40       NMR 15             UNTIMED  Procedure Min.             Total Billable Time: 55 minutes    Precautions: Standard and Fall    Subjective      Pt reports: he was compliant with home exercise program for PT and he is trying to walk more during his day.    Response to previous treatment: No change   Functional change: Has been walking without the walker at home, has been going for walks in the neighborhood with and without the walker.    Pain: 0/10  Location: N/A    Objective     Haris received therapeutic exercises to develop strength and flexibility for 140minutes 1:1 with PT including:    Date  07/26/18 07/24/18 07/19/18 07/03/18 06/28/18 06/26/18 06/21/18 06/14/18   VISIT 9/50 8/50 7/50 6/50 5/50 4/50 3/50 2/50   Gcode 08/19/18  3/10 08/19/18  2/10 07/12/18  1/10 07/12/18  6/10 07/12/18  5/10 07/12/18  4/10 07/12/18  3/10 07/12/18  2/10   FOTO -- -- -- -- 5/5 4/5 3/5 2/5   Cap Visit   Cap Total 125.43  846.78 64.79  721.35 125.43  665.56 64.79  540.13 90.62  475.34 90.62  384.72 90.62  294.10 90.62  203.48   POC exp 08/12/18 08/12/18 08/12/18 08/12/18 08/12/18 08/12/18 08/12/18 08/12/18   FTF 08/17/18 08/17/18 08/17/18 07/12/18 07/12/18 07/12/18 07/12/18 07/12/18              MT  -- -- -- -- --     Table:           Long Sit HS 10 x 10" 10 x 10" 10 x 10" 10 x 10" 10 x 10" 10 x 10" 10 x " "10" 10 x 10"   Gastroc Str.  --   -- -- -- --   Bike  --   -- -- -- --   Bridge SL 1 x 15 SL  1 x 15 SL 1 x 15 SL 1 x 10 ea 1 x 30 1 x 20 1 x 15 1 x 10   SAQ 2 x 10 x 2# 3 x 10 x 1.5# 3 x 10 x 1.5# 2 x 10 x 1.5# 2 x 10 x 1# 1 x 20 1 x 15 1 x 10   SLR 3 x 10 x 1.5# 3 x 10 x 1.5# 3 x 10 x 1.5# 2 x 10 x 1.5# 2 x 10 x 1# 2 x 10 2 x 10 1 x 15   Hip Abd 3 x 10 x 1# 2 x 10 x 1# 3 x 10 SL SL 2 x 10 SL 1 x 10 3 x 10 RTB 2 x 10 RTB 2 x 10 RTB   Hip Add 3 x 10 x 1# 2 x 10 x 1# 3 x 10 SL SL 2 x 10 SL 1 x 10 20 x 3" w/ ball 15 x 3" w/ ball 10 x 3" w/ balll   Prone HS curl 1 x 10 x 2#          Seated:           LAQs 3 x 10 x 2# 3 x 10 x 1.5# 3 x 10 x 1.5# 2 x 10 x 1.5# 2 x 10 x 1.5# 3 x 10 x 1# 2 x 10 x 1# 1 x 10 x 1#   Seated Hip Flex 3 x 10 x 2# 3 x 10 x 1.5# 3 x 10 x 1.5# 2 x 10 x 1.5# 2 x 10 x 1.5# 2 x 10 x 1# 2 x 10 x 1# 1 x 10 x 1#   HS curl -- 3 x 10 GTB 3 x 10 GTB 2 x 10 GTB 2 x 10 GTB 3 x 10 RTB 2 x 10 RTB 2 x 10 RTB   Standing:           Min squat 1 x 15 on airex 1 x 15 on airex 1 x 10 on airex 1 x 25 1 x 20 1 x 15 1 x 10 1 x 10   Calf raise 1 x 15 on airex 3 x 10 oot 1 x 25 1 x 20 1 x 15 1 x 10 1 x 10   Hip Abd 2 x 10 2 x 10 oot oot 1 x 15 -- -- --   Hip Flex 2 x 10 2 x 10 oot oot 1 x 15 -- -- --   Hip Ext 2  x10 2 x 10 oot oot 1 x 15 -- -- --   HS Curls -- -- -- -- -- -- -- --   lunges 1 x 5          Step Ups L2 1 x 10 L2  1 x 10 ea oot L1 2 x 10 L1 1 x 15 L1  1 x 10 ea -- --   Step Downs L1 1 x 10 -- -- --  -- -- --   Gait See below See below Held today Held today See below See below See below See below   CP      -- -- --              Initials  [facility] 1/6 [facility] 2/6 [facility] 1/6 [facility]       [place] participated in neuromuscular re-education activities to improve: Balance for 15 minutes. The following activities were included:  Side stepping outside parallel bars 4 x 15 ft, crossovers outside parallel bars 4 x 15 ft, and tandem walking outside parallel bars x 4 with SBA, weaving around 6 cones various distances apart " "forward and lateral 4x, single leg balance 2 x 30" inside parallel bars, static standing on airex pad feet hip width apart 2 x 30", ball toss walking x 10 tosses 2x, .    Home Exercises Provided and Patient Education Provided     Education provided:   - Patient educated again on importance of him using his RW at home until his balance improves. He was also educated to perform his HEP twice a day to his tolerance.   - patient's daughter was educated that the patient's insurance will not pay for a cane since the insurance company paid for the walker.     Written Home Exercises Provided: continue with his HEP.  Exercises were reviewed and Haris was able to demonstrate them prior to the end of the session.  Haris demonstrated fair  understanding of the education provided.     Pt received a written copy of exercises to perform at home.   See EMR under patient instructions for exercises given.     Haris demonstrated fair  understanding of the education provided.     Assessment   Patient continues to require cues to perform his exercises correctly and to avoid substitutions with all exercises. When performing dynamic balance activities he had to use a stepping strategy once to maintain his balance with crossovers, otherwise he demonstrates ankle strategies. He was able to perform all of today's progressions with no increase in symptoms prior to leaving the clinic.      Haris is progressing well towards his goals.   Pt prognosis is Fair.     Pt will continue to benefit from skilled outpatient physical therapy to address the deficits listed in the problem list box on initial evaluation, provide pt/family education and to maximize pt's level of independence in the home and community environment.     Pt's spiritual, cultural and educational needs considered and pt agreeable to plan of care and goals.    Anticipated barriers to physical therapy: Patient's compliance with using the RW at home.     Goals:   Short Term Goals: " 4 weeks   1. This patient will be independent with a basic HEP.   2. This patient will increase B LE strength by 1 grade in order to perform sit to stand without UE leverage.   3. This patient will be able to ambulate 100 feet with least restrictive AD with no LOB using a normal gait pattern.   4. This patient will score 12 seconds or less on TUG to decrease his fall risk with ADLs.   5. Patient will be able to achieve greater than or equal to 60 on the FOTO CVA placing patient in 40%<60% impaired, limited, or restricted category demonstrating overall improved functional ability with lower extremity.     Long Term Goals: 8 weeks   1. This patient will be independent with an updated HEP.   2. This patient will increase B LE strength to 5/5 in order to ascend/descend steps with a reciprocal gait.   3. This patient will be able to ambulate 300 feet with least restrictive AD with no LOB using a normal gait pattern.   4. This patient will score 9 seconds or less on TUG to decrease his fall risk with ADLs.   5. Patient will be able to achieve greater than or equal to 75 on the FOTO CVA placing patient in 20%<40% impaired, limited, or restricted category demonstrating overall improved functional ability with lower extremity.        Plan     Continue with the plan of care and progress as the patient tolerates.     Esthela Robles, PT

## 2018-07-26 NOTE — PROGRESS NOTES
"DAILY TREATMENT NOTE    DATE: 7/26/2018    Start Time:  1:10  Stop Time:  1:55  Visit: 8    PROCEDURES:    TIMED  Procedure Min.   NMR 10   TE 20    TA 15             UNTIMED  Procedure Min.             Total Timed Minutes:  45  Total Timed Units:  3  Total Untimed Units:  0  Charges Billed/# of units:  1NMR, 1TA, 1TE    Medicare:   Today: 104.69  Total: 785.18      Progress/Current Status    Subjective:     Patient ID: Haris Hernandez is a 72 y.o. male.  Diagnosis:   1. 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 Weakness of left upper extremity       Pain: 0 /10  Pt states "I feel good."  Pt with no new c/o. Reports compliance with HEP. Pt arrived today with walker.     Objective:     Pt seen by OT this session. Treatment consisted of the following:      Date: 7/26/18    Visit: 8    g-code: 8/10   UBE L5.5, 3' forward   Supine dowel FF, 3# 2 x 10 reps (not today)   Spine, dowel, chest press, 3# 2 x 10 reps (not today)   Scapular stabilization rotations in supine, shld @90* FF 12 reps, CW/CCW (not today)   Tband- red    -rows, seated 2 x 10 reps   -ext, seated 2 x 10 reps   -biceps curls 2 x 10 reps   -triceps ext 2 x 10 reps   Wall slides 10 reps with SBA (deferred today)   Theraputty - red    -gross  10 reps   -molding 2 min   -log rolls with pinching 2 sets   -lateral key pinch 10 reps   PHG, 55# 20 reps   Pinch strengthening with green, 4# clothespin, picking up pom poms 30 reps   FM coordination task with coins and inserting them into slot, addressing in hand manipulation, translation, and FM skills 10 coins,    Flexbar, green, frowns and smiles (sup/pro) 10 reps each   Wrist dexerciser, with 1# wrist weight 5 reps       REassessed as follows:  Range of Motion:   Shoulder Left       AROM MMT   flexion 130 (=) 5/5   extension WFL 5/5   abduction 135 (=) 4/5 (=)   adduction WFL NT   Internal rotation WFL 5/5 (=)   ER at 90° abd WFL 5/5 (=)   ER at 0° abd WFL 5/5   Elbow Flex " "WFL 5/5   Elbow Ext WFL 5/5   Pronation WFL 5/5   Supination WFL 5/5   Wrist Flex WFL 5/5   Wrist Ext WFL 5/5   RD WFL 5/5   UD WFL 5/5          Strength in lbs (position II):  R) 80  L) 40 (+5)     Fine motor coordination: opposition and finger to nose test L delayed and impaired              9 hole peg test: (all in hand)              R) 1'02" with 8 drops              L) 2'08" with 3 drops (36" faster and 5 fewer drops than at eval)      Assessment:     Pt tolerated tx well this date. Pt has not been seen since 7/3/18. Measurements taken this date, and demonstrates improvement in UE strength,  strength, and FM coordination.  Pt denied pain throughout session.  GM coordination cont to be impaired and pt requires mod cuing to slow down with ex. Cont to present with impaired GM and FM coordination, L UE weakness, decreased independence with ADLs, and decreased functional use of L UE. Pt progressing fairly well towards goals. Pt would cont to benefit from skilled OT services to maximize functional use of L UE.     Patient Education/Response:     Cont HEP and FM coordination practice activities.    Plans and Goals:     Cont OT poc 2x/week for 8 weeks during certification period 6/6/18 to 8/6/18 in pursuit of established goals.    Goals to be met in 4 weeks: (7/6/18)  1) Initiate Hep   2) Pt will require no more than min A for UB dressing  3) Pt will require no more than min A with LE dressing  4) Pt will require no more than mod I with bathing  5) Pt will demonstrate improved strength by at least 1/2 grade MMT of shoulder     Goals to be met by discharge:  1) Independent with HEP  2) Pt will require no more than supervision for UB dressing  3) Pt will require no more than supervision with LE dressing  4) Pt will demonstrate L UE strength WFL for performing ADLs/IADLs  5) Pt will demonstrate improved FM skills AEB performing 9 hole peg test in less than 2" with no more than 2 drops  5) Patient will be able to " achieve less than or equal to 30% impairment on FOTO  demonstrating overall improved functional ability with upper extremity.      LEANNE Gong

## 2020-08-12 ENCOUNTER — OFFICE VISIT (OUTPATIENT)
Dept: NEUROLOGY | Facility: CLINIC | Age: 75
End: 2020-08-12
Payer: MEDICARE

## 2020-08-12 VITALS
BODY MASS INDEX: 22.78 KG/M2 | DIASTOLIC BLOOD PRESSURE: 77 MMHG | WEIGHT: 168.19 LBS | SYSTOLIC BLOOD PRESSURE: 192 MMHG | HEIGHT: 72 IN | HEART RATE: 74 BPM

## 2020-08-12 DIAGNOSIS — I63.89 OTHER CEREBRAL INFARCTION: ICD-10-CM

## 2020-08-12 PROCEDURE — 1101F PT FALLS ASSESS-DOCD LE1/YR: CPT | Mod: CPTII,S$GLB,, | Performed by: PSYCHIATRY & NEUROLOGY

## 2020-08-12 PROCEDURE — 1126F AMNT PAIN NOTED NONE PRSNT: CPT | Mod: S$GLB,,, | Performed by: PSYCHIATRY & NEUROLOGY

## 2020-08-12 PROCEDURE — 3078F PR MOST RECENT DIASTOLIC BLOOD PRESSURE < 80 MM HG: ICD-10-PCS | Mod: CPTII,S$GLB,, | Performed by: PSYCHIATRY & NEUROLOGY

## 2020-08-12 PROCEDURE — 3008F PR BODY MASS INDEX (BMI) DOCUMENTED: ICD-10-PCS | Mod: CPTII,S$GLB,, | Performed by: PSYCHIATRY & NEUROLOGY

## 2020-08-12 PROCEDURE — 1101F PR PT FALLS ASSESS DOC 0-1 FALLS W/OUT INJ PAST YR: ICD-10-PCS | Mod: CPTII,S$GLB,, | Performed by: PSYCHIATRY & NEUROLOGY

## 2020-08-12 PROCEDURE — 1159F MED LIST DOCD IN RCRD: CPT | Mod: S$GLB,,, | Performed by: PSYCHIATRY & NEUROLOGY

## 2020-08-12 PROCEDURE — 3077F PR MOST RECENT SYSTOLIC BLOOD PRESSURE >= 140 MM HG: ICD-10-PCS | Mod: CPTII,S$GLB,, | Performed by: PSYCHIATRY & NEUROLOGY

## 2020-08-12 PROCEDURE — 1126F PR PAIN SEVERITY QUANTIFIED, NO PAIN PRESENT: ICD-10-PCS | Mod: S$GLB,,, | Performed by: PSYCHIATRY & NEUROLOGY

## 2020-08-12 PROCEDURE — 3008F BODY MASS INDEX DOCD: CPT | Mod: CPTII,S$GLB,, | Performed by: PSYCHIATRY & NEUROLOGY

## 2020-08-12 PROCEDURE — 3077F SYST BP >= 140 MM HG: CPT | Mod: CPTII,S$GLB,, | Performed by: PSYCHIATRY & NEUROLOGY

## 2020-08-12 PROCEDURE — 99214 PR OFFICE/OUTPT VISIT, EST, LEVL IV, 30-39 MIN: ICD-10-PCS | Mod: S$GLB,,, | Performed by: PSYCHIATRY & NEUROLOGY

## 2020-08-12 PROCEDURE — 99214 OFFICE O/P EST MOD 30 MIN: CPT | Mod: S$GLB,,, | Performed by: PSYCHIATRY & NEUROLOGY

## 2020-08-12 PROCEDURE — 3078F DIAST BP <80 MM HG: CPT | Mod: CPTII,S$GLB,, | Performed by: PSYCHIATRY & NEUROLOGY

## 2020-08-12 PROCEDURE — 99999 PR PBB SHADOW E&M-EST. PATIENT-LVL III: ICD-10-PCS | Mod: PBBFAC,,, | Performed by: PSYCHIATRY & NEUROLOGY

## 2020-08-12 PROCEDURE — 1159F PR MEDICATION LIST DOCUMENTED IN MEDICAL RECORD: ICD-10-PCS | Mod: S$GLB,,, | Performed by: PSYCHIATRY & NEUROLOGY

## 2020-08-12 PROCEDURE — 99999 PR PBB SHADOW E&M-EST. PATIENT-LVL III: CPT | Mod: PBBFAC,,, | Performed by: PSYCHIATRY & NEUROLOGY

## 2020-08-12 NOTE — PROGRESS NOTES
"Haris Hernandez is a 74 y.o. year old male that  presents for stroke follow up. He is accompany by his daughter.  Chief Complaint   Patient presents with    Follow-up   .     HPI:  Mr Hernandez returns to the office for follow up. He was last seen 7/17/2018.  He is a 75 y/o with HTN poorly adherent to treatment, CAD, ETOH abuse, enlarged prostate, and history of R BG and CR infarct without residual deficits.  Patient reports no recurrence of stroke like symptoms and indicated that at this time he is fully functional and independent.  Daughter said that she has been noticing some changes in personality and " slowness" processing important information. Likewise, she is concerned with the fact that her father is drinking excessively and wants to resume driving.  Of importance, she denies observing cognitive changes, gait impairment, falls, or spells concerning for seizures.  He remains on ASA + Plavix and atorvastatin.  It is worth noting that he did not complete 30 day event monitor requested at the time of his initial visit.  Today, he denies HA, vertigo, double vision, falls, slurred speech, language or vision impairment. Nonetheless, he said that he sometimes drags his L leg and still has mild trouble swallowing.         Past Medical History:   Diagnosis Date    Coronary artery disease     Enlarged prostate without lower urinary tract symptoms (luts)     BPH    Enlarged prostate without lower urinary tract symptoms (luts)     BPH    Hypertension      Social History     Socioeconomic History    Marital status:      Spouse name: Not on file    Number of children: Not on file    Years of education: Not on file    Highest education level: Not on file   Occupational History    Not on file   Social Needs    Financial resource strain: Not on file    Food insecurity     Worry: Not on file     Inability: Not on file    Transportation needs     Medical: Not on file     Non-medical: Not on file   Tobacco Use "    Smoking status: Never Smoker   Substance and Sexual Activity    Alcohol use: Yes     Alcohol/week: 102.0 standard drinks     Types: 50 Cans of beer, 52 Shots of liquor per week     Comment: drinks a six pk a day & 1/2 pint of crown q Day    Drug use: No    Sexual activity: Never   Lifestyle    Physical activity     Days per week: Not on file     Minutes per session: Not on file    Stress: Not on file   Relationships    Social connections     Talks on phone: Not on file     Gets together: Not on file     Attends Gnosticist service: Not on file     Active member of club or organization: Not on file     Attends meetings of clubs or organizations: Not on file     Relationship status: Not on file   Other Topics Concern    Not on file   Social History Narrative    Not on file     Past Surgical History:   Procedure Laterality Date    APPENDECTOMY      EYE SURGERY       No family history on file.        Review of Systems  General ROS: negative for chills, fever or weight loss  Psychological ROS: negative for hallucination, depression or suicidal ideation  Ophthalmic ROS: negative for blurry vision, photophobia or eye pain  ENT ROS: negative for epistaxis, sore throat or rhinorrhea  Respiratory ROS: no cough, shortness of breath, or wheezing  Cardiovascular ROS: no chest pain or dyspnea on exertion  Gastrointestinal ROS: no abdominal pain, change in bowel habits, or black/ bloody stools  Genito-Urinary ROS: no dysuria, trouble voiding, or hematuria  Musculoskeletal ROS: negative for gait disturbance or muscular weakness  Neurological ROS: no syncope or seizures; no ataxia  Dermatological ROS: negative for pruritis, rash and jaundice      Physical Exam:  BP (!) 192/77   Pulse 74   Ht 6' (1.829 m)   Wt 76.3 kg (168 lb 3.4 oz)   BMI 22.81 kg/m²   General appearance: alert, cooperative, no distress  Constitutional:Oriented to person, place, and time.appears well-developed and well-nourished.   HEENT:  Normocephalic, atraumatic, neck symmetrical, no nasal discharge   Eyes: conjunctivae/corneas clear, PERRL, EOM's intact  Lungs: clear to auscultation bilaterally, no dullness to percussion bilaterally  Heart: regular rate and rhythm without rub; no displacement of the PMI   Abdomen: soft, non-tender; bowel sounds normoactive; no organomegaly  Extremities: extremities symmetric; no clubbing, cyanosis, or edema  Integument: Skin color, texture, turgor normal; no rashes; hair distrubution normal  Neurologic:   Mental status: alert and awake, oriented x 4, comprehension, naming, and repetition intact. No right to left confusion. Performs serial 7's without difficulty .No dysarthria.  CN 2-12: pupils 4 mm bilaterally, reactive to light. Fundi without papilledema. Visual fields full to confrontation. EOM full without nystagmus. Face sensation normal in all distributions. Face symmetric. Hearing grossly intact. Palate elevates well. Tongue midline without atrophy or fasciculations.  Motor: 5/5 all over  Sensory: intact in all modalities.  DTR's: 2+ all over.  Plantars: no tested.  Coordination: finger to nose and heel-knee-shin intact.  Gait: no ataxia or bradykinesia    LABS:    Complete Blood Count  Lab Results   Component Value Date    RBC 3.73 (L) 05/10/2018    RBC 3.73 (L) 05/10/2018    HGB 11.9 (L) 05/10/2018    HGB 11.9 (L) 05/10/2018    HCT 33.7 (L) 05/10/2018    HCT 33.7 (L) 05/10/2018    MCV 90 05/10/2018    MCV 90 05/10/2018    MCH 31.9 (H) 05/10/2018    MCH 31.9 (H) 05/10/2018    MCHC 35.3 05/10/2018    MCHC 35.3 05/10/2018    RDW 12.9 05/10/2018    RDW 12.9 05/10/2018     05/10/2018     05/10/2018    MPV 10.5 05/10/2018    MPV 10.5 05/10/2018    GRAN 5.8 05/10/2018    GRAN 70.0 05/10/2018    GRAN 5.8 05/10/2018    GRAN 70.0 05/10/2018    LYMPH 1.4 05/10/2018    LYMPH 16.2 (L) 05/10/2018    LYMPH 1.4 05/10/2018    LYMPH 16.2 (L) 05/10/2018    MONO 1.1 (H) 05/10/2018    MONO 13.1 05/10/2018    MONO  1.1 (H) 05/10/2018    MONO 13.1 05/10/2018    EOS 0.0 05/10/2018    EOS 0.0 05/10/2018    BASO 0.01 05/10/2018    BASO 0.01 05/10/2018    EOSINOPHIL 0.4 05/10/2018    EOSINOPHIL 0.4 05/10/2018    BASOPHIL 0.1 05/10/2018    BASOPHIL 0.1 05/10/2018    DIFFMETHOD Automated 05/10/2018    DIFFMETHOD Automated 05/10/2018       Comprehensive Metabolic Panel  Lab Results   Component Value Date     (H) 05/10/2018     (H) 05/10/2018    BUN 11 05/10/2018    BUN 11 05/10/2018    CREATININE 1.2 05/10/2018    CREATININE 1.2 05/10/2018     (L) 05/10/2018     (L) 05/10/2018    K 4.3 05/10/2018    K 4.3 05/10/2018     05/10/2018     05/10/2018    PROT 7.8 05/10/2018    PROT 7.8 05/10/2018    ALBUMIN 3.1 (L) 05/10/2018    ALBUMIN 3.1 (L) 05/10/2018    BILITOT 0.7 05/10/2018    BILITOT 0.7 05/10/2018    AST 17 05/10/2018    AST 17 05/10/2018    ALKPHOS 55 05/10/2018    ALKPHOS 55 05/10/2018    CO2 24 05/10/2018    CO2 24 05/10/2018    ALT 12 05/10/2018    ALT 12 05/10/2018    ANIONGAP 8 05/10/2018    ANIONGAP 8 05/10/2018    EGFRNONAA >60 05/10/2018    EGFRNONAA >60 05/10/2018    ESTGFRAFRICA >60 05/10/2018    ESTGFRAFRICA >60 05/10/2018       TSH  Lab Results   Component Value Date    TSH 0.939 05/07/2018         Assessment: 75 y/o with HTN poorly adherent to treatment, CAD, ETOH abuse, enlarged prostate, and history of R BG and CR infarct without residual deficits, presents for post stroke follow. New concerns of trouble swallowing, dragging L leg,  personality changes.   NIHSS 0, Modify [place] 1.        ICD-10-CM ICD-9-CM    1. Other cerebral infarction  I63.89 434.91 MRI Brain Without Contrast     The encounter diagnosis was Other cerebral infarction.      Plan:  1) History of R BG and CR infarct without residual deficits. No need to stay on DAPT at this time, thus recommended monotherapy with ASA. Continue Atorvastatin and stroke risk factors control.  2) Mild dysphagia and L leg draging,  unclear if residual, thus will get MRI brain.  3) HTN, as per PCP, goal BP ,140/90  4) CAD  5) ETOH abuse: had and ample conversation regarding the detrimental effects of ETOH and advised enrolling AA program  6) Enlarged prostate    Orders Placed This Encounter   Procedures    MRI Brain Without Contrast           Joselito Lara MD

## 2020-08-21 ENCOUNTER — HOSPITAL ENCOUNTER (OUTPATIENT)
Dept: RADIOLOGY | Facility: HOSPITAL | Age: 75
Discharge: HOME | End: 2020-08-21
Attending: PSYCHIATRY & NEUROLOGY
Payer: MEDICARE

## 2020-08-21 DIAGNOSIS — I63.89 OTHER CEREBRAL INFARCTION: ICD-10-CM

## 2020-08-21 PROCEDURE — 70551 MRI BRAIN STEM W/O DYE: CPT | Mod: 26,,, | Performed by: RADIOLOGY

## 2020-08-21 PROCEDURE — 70551 MRI BRAIN STEM W/O DYE: CPT | Mod: TC

## 2020-08-21 PROCEDURE — 70551 MRI BRAIN WITHOUT CONTRAST: ICD-10-PCS | Mod: 26,,, | Performed by: RADIOLOGY

## 2020-08-28 NOTE — PLAN OF CARE
Problem: Patient Care Overview  Goal: Plan of Care Review  Outcome: Ongoing (interventions implemented as appropriate)  Plan of care reviewed. Pt verbalized understanding. Neuro checks are same. On tele SR HR 70-90's no red alarms noted. Safety measures maintained. Bed is in the lowest position and locked. Call bell is within reach. Instructed pt to call for assistance. Pt verbalized understanding. Will continue to monitor.          · Currently on vancomycin/cefepime/flagyl  · ID following and managing antibiotics  · Awaiting sensitivities, hopeful de-escalation of antibiotics today  · Repeat blood cultures today

## 2021-06-16 ENCOUNTER — TELEPHONE (OUTPATIENT)
Dept: NEUROLOGY | Facility: CLINIC | Age: 76
End: 2021-06-16

## 2021-08-13 ENCOUNTER — HOSPITAL ENCOUNTER (INPATIENT)
Facility: HOSPITAL | Age: 76
LOS: 4 days | Discharge: HOME | DRG: 683 | End: 2021-08-17
Attending: EMERGENCY MEDICINE | Admitting: INTERNAL MEDICINE
Payer: MEDICARE

## 2021-08-13 DIAGNOSIS — D63.1 ANEMIA DUE TO STAGE 5 CHRONIC KIDNEY DISEASE, NOT ON CHRONIC DIALYSIS: ICD-10-CM

## 2021-08-13 DIAGNOSIS — N17.1 ACUTE RENAL FAILURE WITH ACUTE CORTICAL NECROSIS: ICD-10-CM

## 2021-08-13 DIAGNOSIS — R89.9 ABNORMAL LABORATORY TEST: ICD-10-CM

## 2021-08-13 DIAGNOSIS — I10 UNCONTROLLED HYPERTENSION: Primary | ICD-10-CM

## 2021-08-13 DIAGNOSIS — C90.00 KAPPA LIGHT CHAIN MYELOMA: ICD-10-CM

## 2021-08-13 DIAGNOSIS — N18.5 ANEMIA DUE TO STAGE 5 CHRONIC KIDNEY DISEASE, NOT ON CHRONIC DIALYSIS: ICD-10-CM

## 2021-08-13 DIAGNOSIS — I16.0 HYPERTENSIVE URGENCY: ICD-10-CM

## 2021-08-13 DIAGNOSIS — Z86.73 HISTORY OF CVA (CEREBROVASCULAR ACCIDENT): ICD-10-CM

## 2021-08-13 DIAGNOSIS — N40.0 BENIGN PROSTATIC HYPERPLASIA, UNSPECIFIED WHETHER LOWER URINARY TRACT SYMPTOMS PRESENT: ICD-10-CM

## 2021-08-13 DIAGNOSIS — D64.9 NORMOCYTIC ANEMIA: ICD-10-CM

## 2021-08-13 DIAGNOSIS — N17.9 ARF (ACUTE RENAL FAILURE): ICD-10-CM

## 2021-08-13 DIAGNOSIS — R79.89 ELEVATED TROPONIN: ICD-10-CM

## 2021-08-13 DIAGNOSIS — E87.5 HYPERKALEMIA: ICD-10-CM

## 2021-08-13 LAB
ALBUMIN SERPL BCP-MCNC: 4.2 G/DL (ref 3.5–5.2)
ALP SERPL-CCNC: 58 U/L (ref 55–135)
ALT SERPL W/O P-5'-P-CCNC: 13 U/L (ref 10–44)
ANION GAP SERPL CALC-SCNC: 11 MMOL/L (ref 8–16)
AST SERPL-CCNC: 16 U/L (ref 10–40)
BACTERIA #/AREA URNS HPF: ABNORMAL /HPF
BASOPHILS # BLD AUTO: 0.03 K/UL (ref 0–0.2)
BASOPHILS NFR BLD: 0.4 % (ref 0–1.9)
BILIRUB SERPL-MCNC: 0.3 MG/DL (ref 0.1–1)
BILIRUB UR QL STRIP: NEGATIVE
BUN SERPL-MCNC: 65 MG/DL (ref 8–23)
CALCIUM SERPL-MCNC: 9.6 MG/DL (ref 8.7–10.5)
CHLORIDE SERPL-SCNC: 106 MMOL/L (ref 95–110)
CHOLEST SERPL-MCNC: 112 MG/DL (ref 120–199)
CHOLEST/HDLC SERPL: 2.6 {RATIO} (ref 2–5)
CLARITY UR: ABNORMAL
CO2 SERPL-SCNC: 18 MMOL/L (ref 23–29)
COLOR UR: YELLOW
CREAT SERPL-MCNC: 6.6 MG/DL (ref 0.5–1.4)
CTP QC/QA: YES
DIFFERENTIAL METHOD: ABNORMAL
EOSINOPHIL # BLD AUTO: 0.1 K/UL (ref 0–0.5)
EOSINOPHIL NFR BLD: 1 % (ref 0–8)
ERYTHROCYTE [DISTWIDTH] IN BLOOD BY AUTOMATED COUNT: 12.8 % (ref 11.5–14.5)
EST. GFR  (AFRICAN AMERICAN): 9 ML/MIN/1.73 M^2
EST. GFR  (NON AFRICAN AMERICAN): 8 ML/MIN/1.73 M^2
ESTIMATED AVG GLUCOSE: 114 MG/DL (ref 68–131)
FERRITIN SERPL-MCNC: 895 NG/ML (ref 20–300)
GLUCOSE SERPL-MCNC: 105 MG/DL (ref 70–110)
GLUCOSE UR QL STRIP: NEGATIVE
HBA1C MFR BLD: 5.6 % (ref 4–5.6)
HCT VFR BLD AUTO: 23.8 % (ref 40–54)
HDLC SERPL-MCNC: 43 MG/DL (ref 40–75)
HDLC SERPL: 38.4 % (ref 20–50)
HGB BLD-MCNC: 8.3 G/DL (ref 14–18)
HGB UR QL STRIP: ABNORMAL
HYALINE CASTS #/AREA URNS LPF: 3 /LPF
IMM GRANULOCYTES # BLD AUTO: 0.04 K/UL (ref 0–0.04)
IMM GRANULOCYTES NFR BLD AUTO: 0.6 % (ref 0–0.5)
KETONES UR QL STRIP: NEGATIVE
LDLC SERPL CALC-MCNC: 55.6 MG/DL (ref 63–159)
LEUKOCYTE ESTERASE UR QL STRIP: NEGATIVE
LYMPHOCYTES # BLD AUTO: 1.8 K/UL (ref 1–4.8)
LYMPHOCYTES NFR BLD: 27.4 % (ref 18–48)
MAGNESIUM SERPL-MCNC: 1.8 MG/DL (ref 1.6–2.6)
MCH RBC QN AUTO: 30.1 PG (ref 27–31)
MCHC RBC AUTO-ENTMCNC: 34.9 G/DL (ref 32–36)
MCV RBC AUTO: 86 FL (ref 82–98)
MICROSCOPIC COMMENT: ABNORMAL
MONOCYTES # BLD AUTO: 0.5 K/UL (ref 0.3–1)
MONOCYTES NFR BLD: 7.6 % (ref 4–15)
NEUTROPHILS # BLD AUTO: 4.2 K/UL (ref 1.8–7.7)
NEUTROPHILS NFR BLD: 63 % (ref 38–73)
NITRITE UR QL STRIP: NEGATIVE
NONHDLC SERPL-MCNC: 69 MG/DL
NRBC BLD-RTO: 0 /100 WBC
PH UR STRIP: 6 [PH] (ref 5–8)
PLATELET # BLD AUTO: 212 K/UL (ref 150–450)
PMV BLD AUTO: 10.3 FL (ref 9.2–12.9)
POTASSIUM SERPL-SCNC: 4.5 MMOL/L (ref 3.5–5.1)
PROT SERPL-MCNC: 9 G/DL (ref 6–8.4)
PROT UR QL STRIP: ABNORMAL
RBC # BLD AUTO: 2.76 M/UL (ref 4.6–6.2)
RBC #/AREA URNS HPF: 1 /HPF (ref 0–4)
SARS-COV-2 RDRP RESP QL NAA+PROBE: NEGATIVE
SODIUM SERPL-SCNC: 135 MMOL/L (ref 136–145)
SP GR UR STRIP: 1.01 (ref 1–1.03)
SQUAMOUS #/AREA URNS HPF: 0 /HPF
TRIGL SERPL-MCNC: 67 MG/DL (ref 30–150)
TROPONIN I SERPL DL<=0.01 NG/ML-MCNC: 0.08 NG/ML (ref 0–0.03)
TSH SERPL DL<=0.005 MIU/L-ACNC: 1.96 UIU/ML (ref 0.4–4)
UNIDENT CRYS URNS QL MICRO: 1
URN SPEC COLLECT METH UR: ABNORMAL
UROBILINOGEN UR STRIP-ACNC: NEGATIVE EU/DL
WBC # BLD AUTO: 6.68 K/UL (ref 3.9–12.7)
WBC #/AREA URNS HPF: 3 /HPF (ref 0–5)
YEAST URNS QL MICRO: ABNORMAL

## 2021-08-13 PROCEDURE — 99285 EMERGENCY DEPT VISIT HI MDM: CPT | Mod: 25

## 2021-08-13 PROCEDURE — 84153 ASSAY OF PSA TOTAL: CPT | Performed by: STUDENT IN AN ORGANIZED HEALTH CARE EDUCATION/TRAINING PROGRAM

## 2021-08-13 PROCEDURE — 81000 URINALYSIS NONAUTO W/SCOPE: CPT | Performed by: PHYSICIAN ASSISTANT

## 2021-08-13 PROCEDURE — 96375 TX/PRO/DX INJ NEW DRUG ADDON: CPT

## 2021-08-13 PROCEDURE — 93010 ELECTROCARDIOGRAM REPORT: CPT | Mod: ,,, | Performed by: INTERNAL MEDICINE

## 2021-08-13 PROCEDURE — 25000242 PHARM REV CODE 250 ALT 637 W/ HCPCS: Performed by: EMERGENCY MEDICINE

## 2021-08-13 PROCEDURE — 84484 ASSAY OF TROPONIN QUANT: CPT

## 2021-08-13 PROCEDURE — 63600175 PHARM REV CODE 636 W HCPCS: Performed by: EMERGENCY MEDICINE

## 2021-08-13 PROCEDURE — U0002 COVID-19 LAB TEST NON-CDC: HCPCS | Performed by: EMERGENCY MEDICINE

## 2021-08-13 PROCEDURE — 93010 EKG 12-LEAD: ICD-10-PCS | Mod: ,,, | Performed by: INTERNAL MEDICINE

## 2021-08-13 PROCEDURE — 93005 ELECTROCARDIOGRAM TRACING: CPT

## 2021-08-13 PROCEDURE — 84466 ASSAY OF TRANSFERRIN: CPT | Performed by: STUDENT IN AN ORGANIZED HEALTH CARE EDUCATION/TRAINING PROGRAM

## 2021-08-13 PROCEDURE — 80053 COMPREHEN METABOLIC PANEL: CPT | Performed by: PHYSICIAN ASSISTANT

## 2021-08-13 PROCEDURE — 84154 ASSAY OF PSA FREE: CPT | Performed by: STUDENT IN AN ORGANIZED HEALTH CARE EDUCATION/TRAINING PROGRAM

## 2021-08-13 PROCEDURE — 36415 COLL VENOUS BLD VENIPUNCTURE: CPT

## 2021-08-13 PROCEDURE — 83735 ASSAY OF MAGNESIUM: CPT | Performed by: PHYSICIAN ASSISTANT

## 2021-08-13 PROCEDURE — 84443 ASSAY THYROID STIM HORMONE: CPT | Performed by: STUDENT IN AN ORGANIZED HEALTH CARE EDUCATION/TRAINING PROGRAM

## 2021-08-13 PROCEDURE — 96361 HYDRATE IV INFUSION ADD-ON: CPT

## 2021-08-13 PROCEDURE — 82728 ASSAY OF FERRITIN: CPT | Performed by: STUDENT IN AN ORGANIZED HEALTH CARE EDUCATION/TRAINING PROGRAM

## 2021-08-13 PROCEDURE — 82306 VITAMIN D 25 HYDROXY: CPT | Performed by: STUDENT IN AN ORGANIZED HEALTH CARE EDUCATION/TRAINING PROGRAM

## 2021-08-13 PROCEDURE — 83540 ASSAY OF IRON: CPT | Performed by: STUDENT IN AN ORGANIZED HEALTH CARE EDUCATION/TRAINING PROGRAM

## 2021-08-13 PROCEDURE — 94640 AIRWAY INHALATION TREATMENT: CPT

## 2021-08-13 PROCEDURE — 25000003 PHARM REV CODE 250: Performed by: EMERGENCY MEDICINE

## 2021-08-13 PROCEDURE — 96365 THER/PROPH/DIAG IV INF INIT: CPT

## 2021-08-13 PROCEDURE — 85025 COMPLETE CBC W/AUTO DIFF WBC: CPT | Performed by: PHYSICIAN ASSISTANT

## 2021-08-13 PROCEDURE — 94760 N-INVAS EAR/PLS OXIMETRY 1: CPT

## 2021-08-13 PROCEDURE — 82746 ASSAY OF FOLIC ACID SERUM: CPT | Performed by: STUDENT IN AN ORGANIZED HEALTH CARE EDUCATION/TRAINING PROGRAM

## 2021-08-13 PROCEDURE — 82607 VITAMIN B-12: CPT | Performed by: STUDENT IN AN ORGANIZED HEALTH CARE EDUCATION/TRAINING PROGRAM

## 2021-08-13 PROCEDURE — 83036 HEMOGLOBIN GLYCOSYLATED A1C: CPT | Performed by: STUDENT IN AN ORGANIZED HEALTH CARE EDUCATION/TRAINING PROGRAM

## 2021-08-13 PROCEDURE — 80061 LIPID PANEL: CPT | Performed by: STUDENT IN AN ORGANIZED HEALTH CARE EDUCATION/TRAINING PROGRAM

## 2021-08-13 PROCEDURE — 11000001 HC ACUTE MED/SURG PRIVATE ROOM

## 2021-08-13 RX ORDER — HEPARIN SODIUM 5000 [USP'U]/ML
5000 INJECTION, SOLUTION INTRAVENOUS; SUBCUTANEOUS EVERY 8 HOURS
Status: DISCONTINUED | OUTPATIENT
Start: 2021-08-14 | End: 2021-08-17 | Stop reason: HOSPADM

## 2021-08-13 RX ORDER — GLUCAGON 1 MG
1 KIT INJECTION
Status: DISCONTINUED | OUTPATIENT
Start: 2021-08-13 | End: 2021-08-17 | Stop reason: HOSPADM

## 2021-08-13 RX ORDER — SODIUM CHLORIDE 0.9 % (FLUSH) 0.9 %
10 SYRINGE (ML) INJECTION
Status: DISCONTINUED | OUTPATIENT
Start: 2021-08-13 | End: 2021-08-17 | Stop reason: HOSPADM

## 2021-08-13 RX ORDER — CLOPIDOGREL BISULFATE 75 MG/1
75 TABLET ORAL DAILY
Status: DISCONTINUED | OUTPATIENT
Start: 2021-08-14 | End: 2021-08-15

## 2021-08-13 RX ORDER — THIAMINE HCL 100 MG
100 TABLET ORAL DAILY
Status: DISCONTINUED | OUTPATIENT
Start: 2021-08-14 | End: 2021-08-17 | Stop reason: HOSPADM

## 2021-08-13 RX ORDER — FOLIC ACID 1 MG/1
1 TABLET ORAL DAILY
Status: DISCONTINUED | OUTPATIENT
Start: 2021-08-14 | End: 2021-08-17 | Stop reason: HOSPADM

## 2021-08-13 RX ORDER — ALBUTEROL SULFATE 2.5 MG/.5ML
15 SOLUTION RESPIRATORY (INHALATION)
Status: COMPLETED | OUTPATIENT
Start: 2021-08-13 | End: 2021-08-13

## 2021-08-13 RX ORDER — DOCUSATE SODIUM 100 MG/1
100 CAPSULE, LIQUID FILLED ORAL 2 TIMES DAILY
Status: DISCONTINUED | OUTPATIENT
Start: 2021-08-13 | End: 2021-08-17 | Stop reason: HOSPADM

## 2021-08-13 RX ORDER — IBUPROFEN 200 MG
16 TABLET ORAL
Status: DISCONTINUED | OUTPATIENT
Start: 2021-08-13 | End: 2021-08-17 | Stop reason: HOSPADM

## 2021-08-13 RX ORDER — FINASTERIDE 5 MG/1
5 TABLET, FILM COATED ORAL DAILY
Status: DISCONTINUED | OUTPATIENT
Start: 2021-08-14 | End: 2021-08-17 | Stop reason: HOSPADM

## 2021-08-13 RX ORDER — IBUPROFEN 200 MG
24 TABLET ORAL
Status: DISCONTINUED | OUTPATIENT
Start: 2021-08-13 | End: 2021-08-17 | Stop reason: HOSPADM

## 2021-08-13 RX ORDER — ATORVASTATIN CALCIUM 40 MG/1
80 TABLET, FILM COATED ORAL DAILY
Status: DISCONTINUED | OUTPATIENT
Start: 2021-08-14 | End: 2021-08-17 | Stop reason: HOSPADM

## 2021-08-13 RX ORDER — TALC
6 POWDER (GRAM) TOPICAL NIGHTLY PRN
Status: DISCONTINUED | OUTPATIENT
Start: 2021-08-13 | End: 2021-08-17 | Stop reason: HOSPADM

## 2021-08-13 RX ORDER — TAMSULOSIN HYDROCHLORIDE 0.4 MG/1
0.4 CAPSULE ORAL DAILY
Status: DISCONTINUED | OUTPATIENT
Start: 2021-08-14 | End: 2021-08-17 | Stop reason: HOSPADM

## 2021-08-13 RX ORDER — ENALAPRILAT 1.25 MG/ML
1.25 INJECTION INTRAVENOUS
Status: COMPLETED | OUTPATIENT
Start: 2021-08-13 | End: 2021-08-13

## 2021-08-13 RX ORDER — MAGNESIUM SULFATE HEPTAHYDRATE 40 MG/ML
2 INJECTION, SOLUTION INTRAVENOUS ONCE
Status: COMPLETED | OUTPATIENT
Start: 2021-08-13 | End: 2021-08-14

## 2021-08-13 RX ADMIN — ENALAPRILAT 1.25 MG: 2.5 INJECTION INTRAVENOUS at 06:08

## 2021-08-13 RX ADMIN — SODIUM CHLORIDE, SODIUM LACTATE, POTASSIUM CHLORIDE, AND CALCIUM CHLORIDE 1000 ML: .6; .31; .03; .02 INJECTION, SOLUTION INTRAVENOUS at 06:08

## 2021-08-13 RX ADMIN — CALCIUM GLUCONATE 1 G: 94 INJECTION, SOLUTION INTRAVENOUS at 05:08

## 2021-08-13 RX ADMIN — ALBUTEROL SULFATE 15 MG: 2.5 SOLUTION RESPIRATORY (INHALATION) at 05:08

## 2021-08-14 PROBLEM — Z86.73 HISTORY OF CVA (CEREBROVASCULAR ACCIDENT): Status: ACTIVE | Noted: 2021-08-14

## 2021-08-14 PROBLEM — R31.9 HEMATURIA: Status: ACTIVE | Noted: 2021-08-14

## 2021-08-14 PROBLEM — R79.89 ELEVATED TROPONIN: Status: ACTIVE | Noted: 2021-08-14

## 2021-08-14 PROBLEM — I16.0 HYPERTENSIVE URGENCY: Status: ACTIVE | Noted: 2021-08-14

## 2021-08-14 LAB
25(OH)D3+25(OH)D2 SERPL-MCNC: 45 NG/ML (ref 30–96)
ALBUMIN SERPL BCP-MCNC: 3.7 G/DL (ref 3.5–5.2)
ALBUMIN SERPL BCP-MCNC: 4.1 G/DL (ref 3.5–5.2)
ALP SERPL-CCNC: 55 U/L (ref 55–135)
ALP SERPL-CCNC: 61 U/L (ref 55–135)
ALT SERPL W/O P-5'-P-CCNC: 11 U/L (ref 10–44)
ALT SERPL W/O P-5'-P-CCNC: 11 U/L (ref 10–44)
ANION GAP SERPL CALC-SCNC: 10 MMOL/L (ref 8–16)
ANION GAP SERPL CALC-SCNC: 11 MMOL/L (ref 8–16)
AST SERPL-CCNC: 13 U/L (ref 10–40)
AST SERPL-CCNC: 14 U/L (ref 10–40)
BILIRUB SERPL-MCNC: 0.4 MG/DL (ref 0.1–1)
BILIRUB SERPL-MCNC: 0.4 MG/DL (ref 0.1–1)
BUN SERPL-MCNC: 62 MG/DL (ref 8–23)
BUN SERPL-MCNC: 62 MG/DL (ref 8–23)
C3 SERPL-MCNC: 128 MG/DL (ref 50–180)
C4 SERPL-MCNC: 28 MG/DL (ref 11–44)
CALCIUM SERPL-MCNC: 9.3 MG/DL (ref 8.7–10.5)
CALCIUM SERPL-MCNC: 9.8 MG/DL (ref 8.7–10.5)
CHLORIDE SERPL-SCNC: 108 MMOL/L (ref 95–110)
CHLORIDE SERPL-SCNC: 110 MMOL/L (ref 95–110)
CHLORIDE UR-SCNC: 54 MMOL/L (ref 25–200)
CO2 SERPL-SCNC: 16 MMOL/L (ref 23–29)
CO2 SERPL-SCNC: 17 MMOL/L (ref 23–29)
CREAT SERPL-MCNC: 6.4 MG/DL (ref 0.5–1.4)
CREAT SERPL-MCNC: 6.5 MG/DL (ref 0.5–1.4)
CREAT UR-MCNC: 102.4 MG/DL (ref 23–375)
CREAT UR-MCNC: 102.4 MG/DL (ref 23–375)
CRP SERPL-MCNC: 5.6 MG/L (ref 0–8.2)
EOSINOPHIL URNS QL WRIGHT STN: ABNORMAL
EST. GFR  (AFRICAN AMERICAN): 9 ML/MIN/1.73 M^2
EST. GFR  (AFRICAN AMERICAN): 9 ML/MIN/1.73 M^2
EST. GFR  (NON AFRICAN AMERICAN): 8 ML/MIN/1.73 M^2
EST. GFR  (NON AFRICAN AMERICAN): 8 ML/MIN/1.73 M^2
FOLATE SERPL-MCNC: >40 NG/ML (ref 4–24)
GLUCOSE SERPL-MCNC: 101 MG/DL (ref 70–110)
GLUCOSE SERPL-MCNC: 108 MG/DL (ref 70–110)
IRON SERPL-MCNC: 65 UG/DL (ref 45–160)
MAGNESIUM SERPL-MCNC: 2.1 MG/DL (ref 1.6–2.6)
PHOSPHATE SERPL-MCNC: 4.4 MG/DL (ref 2.7–4.5)
POTASSIUM SERPL-SCNC: 4.5 MMOL/L (ref 3.5–5.1)
POTASSIUM SERPL-SCNC: 4.7 MMOL/L (ref 3.5–5.1)
PROT SERPL-MCNC: 7.8 G/DL (ref 6–8.4)
PROT SERPL-MCNC: 8.6 G/DL (ref 6–8.4)
PROT UR-MCNC: 118 MG/DL (ref 0–15)
PROT/CREAT UR: 1.15 MG/G{CREAT} (ref 0–0.2)
SATURATED IRON: 27 % (ref 20–50)
SODIUM SERPL-SCNC: 136 MMOL/L (ref 136–145)
SODIUM SERPL-SCNC: 136 MMOL/L (ref 136–145)
SODIUM UR-SCNC: 62 MMOL/L (ref 20–250)
TOTAL IRON BINDING CAPACITY: 243 UG/DL (ref 250–450)
TRANSFERRIN SERPL-MCNC: 164 MG/DL (ref 200–375)
TROPONIN I SERPL DL<=0.01 NG/ML-MCNC: 0.11 NG/ML (ref 0–0.03)
TROPONIN I SERPL DL<=0.01 NG/ML-MCNC: 0.13 NG/ML (ref 0–0.03)
TROPONIN I SERPL DL<=0.01 NG/ML-MCNC: 0.13 NG/ML (ref 0–0.03)
VIT B12 SERPL-MCNC: 704 PG/ML (ref 210–950)

## 2021-08-14 PROCEDURE — 83735 ASSAY OF MAGNESIUM: CPT | Performed by: STUDENT IN AN ORGANIZED HEALTH CARE EDUCATION/TRAINING PROGRAM

## 2021-08-14 PROCEDURE — 84100 ASSAY OF PHOSPHORUS: CPT | Performed by: STUDENT IN AN ORGANIZED HEALTH CARE EDUCATION/TRAINING PROGRAM

## 2021-08-14 PROCEDURE — 84156 ASSAY OF PROTEIN URINE: CPT | Performed by: INTERNAL MEDICINE

## 2021-08-14 PROCEDURE — 36415 COLL VENOUS BLD VENIPUNCTURE: CPT | Performed by: STUDENT IN AN ORGANIZED HEALTH CARE EDUCATION/TRAINING PROGRAM

## 2021-08-14 PROCEDURE — 84484 ASSAY OF TROPONIN QUANT: CPT | Performed by: STUDENT IN AN ORGANIZED HEALTH CARE EDUCATION/TRAINING PROGRAM

## 2021-08-14 PROCEDURE — 80053 COMPREHEN METABOLIC PANEL: CPT | Mod: 91

## 2021-08-14 PROCEDURE — 25000003 PHARM REV CODE 250

## 2021-08-14 PROCEDURE — 36415 COLL VENOUS BLD VENIPUNCTURE: CPT

## 2021-08-14 PROCEDURE — 25000003 PHARM REV CODE 250: Performed by: EMERGENCY MEDICINE

## 2021-08-14 PROCEDURE — 93005 ELECTROCARDIOGRAM TRACING: CPT

## 2021-08-14 PROCEDURE — 11000001 HC ACUTE MED/SURG PRIVATE ROOM

## 2021-08-14 PROCEDURE — 84165 PROTEIN E-PHORESIS SERUM: CPT | Performed by: STUDENT IN AN ORGANIZED HEALTH CARE EDUCATION/TRAINING PROGRAM

## 2021-08-14 PROCEDURE — 25000003 PHARM REV CODE 250: Performed by: INTERNAL MEDICINE

## 2021-08-14 PROCEDURE — 86038 ANTINUCLEAR ANTIBODIES: CPT | Performed by: STUDENT IN AN ORGANIZED HEALTH CARE EDUCATION/TRAINING PROGRAM

## 2021-08-14 PROCEDURE — 63600175 PHARM REV CODE 636 W HCPCS

## 2021-08-14 PROCEDURE — 86160 COMPLEMENT ANTIGEN: CPT | Performed by: STUDENT IN AN ORGANIZED HEALTH CARE EDUCATION/TRAINING PROGRAM

## 2021-08-14 PROCEDURE — 84165 PROTEIN E-PHORESIS SERUM: CPT | Mod: 26,,, | Performed by: PATHOLOGY

## 2021-08-14 PROCEDURE — 86140 C-REACTIVE PROTEIN: CPT | Performed by: STUDENT IN AN ORGANIZED HEALTH CARE EDUCATION/TRAINING PROGRAM

## 2021-08-14 PROCEDURE — 93010 EKG 12-LEAD: ICD-10-PCS | Mod: ,,, | Performed by: INTERNAL MEDICINE

## 2021-08-14 PROCEDURE — 84165 PATHOLOGIST INTERPRETATION SPE: ICD-10-PCS | Mod: 26,,, | Performed by: PATHOLOGY

## 2021-08-14 PROCEDURE — 25000003 PHARM REV CODE 250: Performed by: STUDENT IN AN ORGANIZED HEALTH CARE EDUCATION/TRAINING PROGRAM

## 2021-08-14 PROCEDURE — 87205 SMEAR GRAM STAIN: CPT | Performed by: STUDENT IN AN ORGANIZED HEALTH CARE EDUCATION/TRAINING PROGRAM

## 2021-08-14 PROCEDURE — 82436 ASSAY OF URINE CHLORIDE: CPT | Performed by: INTERNAL MEDICINE

## 2021-08-14 PROCEDURE — 86431 RHEUMATOID FACTOR QUANT: CPT | Performed by: STUDENT IN AN ORGANIZED HEALTH CARE EDUCATION/TRAINING PROGRAM

## 2021-08-14 PROCEDURE — 80053 COMPREHEN METABOLIC PANEL: CPT | Performed by: STUDENT IN AN ORGANIZED HEALTH CARE EDUCATION/TRAINING PROGRAM

## 2021-08-14 PROCEDURE — 63600175 PHARM REV CODE 636 W HCPCS: Performed by: STUDENT IN AN ORGANIZED HEALTH CARE EDUCATION/TRAINING PROGRAM

## 2021-08-14 PROCEDURE — 86160 COMPLEMENT ANTIGEN: CPT | Mod: 59 | Performed by: STUDENT IN AN ORGANIZED HEALTH CARE EDUCATION/TRAINING PROGRAM

## 2021-08-14 PROCEDURE — 87389 HIV-1 AG W/HIV-1&-2 AB AG IA: CPT | Performed by: STUDENT IN AN ORGANIZED HEALTH CARE EDUCATION/TRAINING PROGRAM

## 2021-08-14 PROCEDURE — 80074 ACUTE HEPATITIS PANEL: CPT | Performed by: STUDENT IN AN ORGANIZED HEALTH CARE EDUCATION/TRAINING PROGRAM

## 2021-08-14 PROCEDURE — 84300 ASSAY OF URINE SODIUM: CPT | Performed by: STUDENT IN AN ORGANIZED HEALTH CARE EDUCATION/TRAINING PROGRAM

## 2021-08-14 PROCEDURE — 93010 ELECTROCARDIOGRAM REPORT: CPT | Mod: ,,, | Performed by: INTERNAL MEDICINE

## 2021-08-14 PROCEDURE — 83520 IMMUNOASSAY QUANT NOS NONAB: CPT | Performed by: STUDENT IN AN ORGANIZED HEALTH CARE EDUCATION/TRAINING PROGRAM

## 2021-08-14 PROCEDURE — 94761 N-INVAS EAR/PLS OXIMETRY MLT: CPT

## 2021-08-14 RX ORDER — NIFEDIPINE 30 MG/1
60 TABLET, EXTENDED RELEASE ORAL DAILY
Status: DISCONTINUED | OUTPATIENT
Start: 2021-08-15 | End: 2021-08-17 | Stop reason: HOSPADM

## 2021-08-14 RX ORDER — SODIUM BICARBONATE 650 MG/1
650 TABLET ORAL 3 TIMES DAILY
Status: DISCONTINUED | OUTPATIENT
Start: 2021-08-14 | End: 2021-08-15

## 2021-08-14 RX ORDER — NIFEDIPINE 30 MG/1
30 TABLET, EXTENDED RELEASE ORAL DAILY
Status: DISCONTINUED | OUTPATIENT
Start: 2021-08-14 | End: 2021-08-14

## 2021-08-14 RX ORDER — CARVEDILOL 6.25 MG/1
6.25 TABLET ORAL 2 TIMES DAILY
Status: DISCONTINUED | OUTPATIENT
Start: 2021-08-14 | End: 2021-08-17 | Stop reason: HOSPADM

## 2021-08-14 RX ADMIN — FINASTERIDE 5 MG: 5 TABLET, FILM COATED ORAL at 09:08

## 2021-08-14 RX ADMIN — CARVEDILOL 6.25 MG: 6.25 TABLET, FILM COATED ORAL at 09:08

## 2021-08-14 RX ADMIN — CLOPIDOGREL 75 MG: 75 TABLET, FILM COATED ORAL at 09:08

## 2021-08-14 RX ADMIN — THERA TABS 1 TABLET: TAB at 09:08

## 2021-08-14 RX ADMIN — SODIUM BICARBONATE 650 MG TABLET 650 MG: at 06:08

## 2021-08-14 RX ADMIN — DOCUSATE SODIUM 100 MG: 100 CAPSULE, LIQUID FILLED ORAL at 08:08

## 2021-08-14 RX ADMIN — MAGNESIUM SULFATE IN WATER 2 G: 40 INJECTION, SOLUTION INTRAVENOUS at 12:08

## 2021-08-14 RX ADMIN — HEPARIN SODIUM 5000 UNITS: 5000 INJECTION INTRAVENOUS; SUBCUTANEOUS at 06:08

## 2021-08-14 RX ADMIN — SODIUM CHLORIDE, SODIUM LACTATE, POTASSIUM CHLORIDE, AND CALCIUM CHLORIDE 2000 ML: .6; .31; .03; .02 INJECTION, SOLUTION INTRAVENOUS at 06:08

## 2021-08-14 RX ADMIN — ATORVASTATIN CALCIUM 80 MG: 40 TABLET, FILM COATED ORAL at 09:08

## 2021-08-14 RX ADMIN — DOCUSATE SODIUM 100 MG: 100 CAPSULE, LIQUID FILLED ORAL at 09:08

## 2021-08-14 RX ADMIN — FOLIC ACID 1 MG: 1 TABLET ORAL at 09:08

## 2021-08-14 RX ADMIN — HEPARIN SODIUM 5000 UNITS: 5000 INJECTION INTRAVENOUS; SUBCUTANEOUS at 09:08

## 2021-08-14 RX ADMIN — SODIUM BICARBONATE 650 MG TABLET 650 MG: at 09:08

## 2021-08-14 RX ADMIN — DOCUSATE SODIUM 100 MG: 100 CAPSULE, LIQUID FILLED ORAL at 12:08

## 2021-08-14 RX ADMIN — MELATONIN 6 MG: 3 TAB ORAL at 08:08

## 2021-08-14 RX ADMIN — NIFEDIPINE 30 MG: 30 TABLET, FILM COATED, EXTENDED RELEASE ORAL at 01:08

## 2021-08-14 RX ADMIN — HEPARIN SODIUM 5000 UNITS: 5000 INJECTION INTRAVENOUS; SUBCUTANEOUS at 01:08

## 2021-08-14 RX ADMIN — Medication 100 MG: at 09:08

## 2021-08-15 LAB
ALBUMIN SERPL BCP-MCNC: 3.6 G/DL (ref 3.5–5.2)
ALP SERPL-CCNC: 53 U/L (ref 55–135)
ALT SERPL W/O P-5'-P-CCNC: 12 U/L (ref 10–44)
ANION GAP SERPL CALC-SCNC: 10 MMOL/L (ref 8–16)
AORTIC ROOT ANNULUS: 3.11 CM
AST SERPL-CCNC: 17 U/L (ref 10–40)
AV INDEX (PROSTH): 0.61
AV MEAN GRADIENT: 7 MMHG
AV PEAK GRADIENT: 15 MMHG
AV VALVE AREA: 2.19 CM2
AV VELOCITY RATIO: 0.52
BASOPHILS # BLD AUTO: 0.03 K/UL (ref 0–0.2)
BASOPHILS NFR BLD: 0.5 % (ref 0–1.9)
BILIRUB SERPL-MCNC: 0.3 MG/DL (ref 0.1–1)
BSA FOR ECHO PROCEDURE: 1.97 M2
BUN SERPL-MCNC: 65 MG/DL (ref 8–23)
CALCIUM SERPL-MCNC: 9.6 MG/DL (ref 8.7–10.5)
CHLORIDE SERPL-SCNC: 111 MMOL/L (ref 95–110)
CO2 SERPL-SCNC: 16 MMOL/L (ref 23–29)
CREAT SERPL-MCNC: 6.6 MG/DL (ref 0.5–1.4)
CV ECHO LV RWT: 0.5 CM
DIFFERENTIAL METHOD: ABNORMAL
DOP CALC AO PEAK VEL: 1.91 M/S
DOP CALC AO VTI: 34.72 CM
DOP CALC LVOT AREA: 3.6 CM2
DOP CALC LVOT DIAMETER: 2.14 CM
DOP CALC LVOT PEAK VEL: 0.99 M/S
DOP CALC LVOT STROKE VOLUME: 75.93 CM3
DOP CALCLVOT PEAK VEL VTI: 21.12 CM
E/A RATIO: 0.85
E/E' RATIO: 12.92 M/S
ECHO LV POSTERIOR WALL: 1.15 CM (ref 0.6–1.1)
EJECTION FRACTION: 65 %
EOSINOPHIL # BLD AUTO: 0.1 K/UL (ref 0–0.5)
EOSINOPHIL NFR BLD: 1.1 % (ref 0–8)
ERYTHROCYTE [DISTWIDTH] IN BLOOD BY AUTOMATED COUNT: 12.4 % (ref 11.5–14.5)
EST. GFR  (AFRICAN AMERICAN): 9 ML/MIN/1.73 M^2
EST. GFR  (NON AFRICAN AMERICAN): 8 ML/MIN/1.73 M^2
FRACTIONAL SHORTENING: 31 % (ref 28–44)
GLUCOSE SERPL-MCNC: 98 MG/DL (ref 70–110)
HCT VFR BLD AUTO: 21.2 % (ref 40–54)
HGB BLD-MCNC: 7.5 G/DL (ref 14–18)
IMM GRANULOCYTES # BLD AUTO: 0.01 K/UL (ref 0–0.04)
IMM GRANULOCYTES NFR BLD AUTO: 0.2 % (ref 0–0.5)
INTERVENTRICULAR SEPTUM: 1.33 CM (ref 0.6–1.1)
IVRT: 88.49 MSEC
LA MAJOR: 5.67 CM
LA MINOR: 5.72 CM
LA WIDTH: 2.96 CM
LEFT ATRIUM SIZE: 3.4 CM
LEFT ATRIUM VOLUME INDEX: 24.6 ML/M2
LEFT ATRIUM VOLUME: 48.72 CM3
LEFT INTERNAL DIMENSION IN SYSTOLE: 3.14 CM (ref 2.1–4)
LEFT VENTRICLE DIASTOLIC VOLUME INDEX: 48.68 ML/M2
LEFT VENTRICLE DIASTOLIC VOLUME: 96.38 ML
LEFT VENTRICLE MASS INDEX: 108 G/M2
LEFT VENTRICLE SYSTOLIC VOLUME INDEX: 19.8 ML/M2
LEFT VENTRICLE SYSTOLIC VOLUME: 39.25 ML
LEFT VENTRICULAR INTERNAL DIMENSION IN DIASTOLE: 4.58 CM (ref 3.5–6)
LEFT VENTRICULAR MASS: 213.45 G
LV LATERAL E/E' RATIO: 12 M/S
LV SEPTAL E/E' RATIO: 14 M/S
LYMPHOCYTES # BLD AUTO: 1.9 K/UL (ref 1–4.8)
LYMPHOCYTES NFR BLD: 30.3 % (ref 18–48)
MAGNESIUM SERPL-MCNC: 2.2 MG/DL (ref 1.6–2.6)
MCH RBC QN AUTO: 31.3 PG (ref 27–31)
MCHC RBC AUTO-ENTMCNC: 35.4 G/DL (ref 32–36)
MCV RBC AUTO: 88 FL (ref 82–98)
MONOCYTES # BLD AUTO: 0.4 K/UL (ref 0.3–1)
MONOCYTES NFR BLD: 7 % (ref 4–15)
MV A" WAVE DURATION": 13.99 MSEC
MV MEAN GRADIENT: 1 MMHG
MV PEAK A VEL: 0.99 M/S
MV PEAK E VEL: 0.84 M/S
MV PEAK GRADIENT: 4 MMHG
MV STENOSIS PRESSURE HALF TIME: 49.72 MS
MV VALVE AREA P 1/2 METHOD: 4.42 CM2
NEUTROPHILS # BLD AUTO: 3.8 K/UL (ref 1.8–7.7)
NEUTROPHILS NFR BLD: 60.9 % (ref 38–73)
NRBC BLD-RTO: 0 /100 WBC
PHOSPHATE SERPL-MCNC: 4.3 MG/DL (ref 2.7–4.5)
PISA TR MAX VEL: 2.16 M/S
PLATELET # BLD AUTO: 199 K/UL (ref 150–450)
PMV BLD AUTO: 10.7 FL (ref 9.2–12.9)
POTASSIUM SERPL-SCNC: 4.9 MMOL/L (ref 3.5–5.1)
PROSTATE SPECIFIC ANTIGEN, TOTAL: 2.6 NG/ML (ref 0–4)
PROT 24H UR-MRATE: 848 MG/SPEC (ref 0–100)
PROT SERPL-MCNC: 7.6 G/DL (ref 6–8.4)
PROT UR-MCNC: 106 MG/DL (ref 0–15)
PSA FREE MFR SERPL: 36.92 %
PSA FREE SERPL-MCNC: 0.96 NG/ML (ref 0–1.5)
PULM VEIN S/D RATIO: 1.56
PV PEAK D VEL: 0.54 M/S
PV PEAK S VEL: 0.84 M/S
RA MAJOR: 4.71 CM
RA PRESSURE: 3 MMHG
RA WIDTH: 3.22 CM
RBC # BLD AUTO: 2.4 M/UL (ref 4.6–6.2)
RHEUMATOID FACT SERPL-ACNC: 14 IU/ML (ref 0–15)
RV TISSUE DOPPLER FREE WALL SYSTOLIC VELOCITY 1 (APICAL 4 CHAMBER VIEW): 17.07 CM/S
SODIUM SERPL-SCNC: 137 MMOL/L (ref 136–145)
TDI LATERAL: 0.07 M/S
TDI SEPTAL: 0.06 M/S
TDI: 0.07 M/S
TR MAX PG: 19 MMHG
TV REST PULMONARY ARTERY PRESSURE: 22 MMHG
URINE COLLECTION DURATION: 24 HR
URINE VOLUME: 800 ML
WBC # BLD AUTO: 6.27 K/UL (ref 3.9–12.7)

## 2021-08-15 PROCEDURE — 25000003 PHARM REV CODE 250: Performed by: STUDENT IN AN ORGANIZED HEALTH CARE EDUCATION/TRAINING PROGRAM

## 2021-08-15 PROCEDURE — 84100 ASSAY OF PHOSPHORUS: CPT | Performed by: STUDENT IN AN ORGANIZED HEALTH CARE EDUCATION/TRAINING PROGRAM

## 2021-08-15 PROCEDURE — 36415 COLL VENOUS BLD VENIPUNCTURE: CPT | Performed by: STUDENT IN AN ORGANIZED HEALTH CARE EDUCATION/TRAINING PROGRAM

## 2021-08-15 PROCEDURE — 63600175 PHARM REV CODE 636 W HCPCS: Performed by: STUDENT IN AN ORGANIZED HEALTH CARE EDUCATION/TRAINING PROGRAM

## 2021-08-15 PROCEDURE — 84166 PATHOLOGIST INTERPRETATION UPE: ICD-10-PCS | Mod: 26,,, | Performed by: PATHOLOGY

## 2021-08-15 PROCEDURE — 11000001 HC ACUTE MED/SURG PRIVATE ROOM

## 2021-08-15 PROCEDURE — 25000003 PHARM REV CODE 250: Performed by: INTERNAL MEDICINE

## 2021-08-15 PROCEDURE — 80053 COMPREHEN METABOLIC PANEL: CPT | Performed by: STUDENT IN AN ORGANIZED HEALTH CARE EDUCATION/TRAINING PROGRAM

## 2021-08-15 PROCEDURE — 84156 ASSAY OF PROTEIN URINE: CPT | Performed by: STUDENT IN AN ORGANIZED HEALTH CARE EDUCATION/TRAINING PROGRAM

## 2021-08-15 PROCEDURE — 86255 FLUORESCENT ANTIBODY SCREEN: CPT | Mod: 91 | Performed by: STUDENT IN AN ORGANIZED HEALTH CARE EDUCATION/TRAINING PROGRAM

## 2021-08-15 PROCEDURE — 84166 PROTEIN E-PHORESIS/URINE/CSF: CPT | Performed by: STUDENT IN AN ORGANIZED HEALTH CARE EDUCATION/TRAINING PROGRAM

## 2021-08-15 PROCEDURE — 84166 PROTEIN E-PHORESIS/URINE/CSF: CPT | Mod: 26,,, | Performed by: PATHOLOGY

## 2021-08-15 PROCEDURE — 85025 COMPLETE CBC W/AUTO DIFF WBC: CPT | Performed by: STUDENT IN AN ORGANIZED HEALTH CARE EDUCATION/TRAINING PROGRAM

## 2021-08-15 PROCEDURE — 94761 N-INVAS EAR/PLS OXIMETRY MLT: CPT

## 2021-08-15 PROCEDURE — 83735 ASSAY OF MAGNESIUM: CPT | Performed by: STUDENT IN AN ORGANIZED HEALTH CARE EDUCATION/TRAINING PROGRAM

## 2021-08-15 PROCEDURE — 25000003 PHARM REV CODE 250

## 2021-08-15 RX ORDER — SODIUM BICARBONATE 650 MG/1
1300 TABLET ORAL 3 TIMES DAILY
Status: DISCONTINUED | OUTPATIENT
Start: 2021-08-15 | End: 2021-08-17 | Stop reason: HOSPADM

## 2021-08-15 RX ADMIN — ATORVASTATIN CALCIUM 80 MG: 40 TABLET, FILM COATED ORAL at 08:08

## 2021-08-15 RX ADMIN — THERA TABS 1 TABLET: TAB at 08:08

## 2021-08-15 RX ADMIN — CARVEDILOL 6.25 MG: 6.25 TABLET, FILM COATED ORAL at 10:08

## 2021-08-15 RX ADMIN — FINASTERIDE 5 MG: 5 TABLET, FILM COATED ORAL at 08:08

## 2021-08-15 RX ADMIN — SODIUM BICARBONATE 650 MG TABLET 650 MG: at 08:08

## 2021-08-15 RX ADMIN — FOLIC ACID 1 MG: 1 TABLET ORAL at 08:08

## 2021-08-15 RX ADMIN — Medication 100 MG: at 08:08

## 2021-08-15 RX ADMIN — TAMSULOSIN HYDROCHLORIDE 0.4 MG: 0.4 CAPSULE ORAL at 08:08

## 2021-08-15 RX ADMIN — CLOPIDOGREL 75 MG: 75 TABLET, FILM COATED ORAL at 08:08

## 2021-08-15 RX ADMIN — NIFEDIPINE 60 MG: 30 TABLET, FILM COATED, EXTENDED RELEASE ORAL at 09:08

## 2021-08-15 RX ADMIN — HEPARIN SODIUM 5000 UNITS: 5000 INJECTION INTRAVENOUS; SUBCUTANEOUS at 05:08

## 2021-08-15 RX ADMIN — DOCUSATE SODIUM 100 MG: 100 CAPSULE, LIQUID FILLED ORAL at 08:08

## 2021-08-15 RX ADMIN — HEPARIN SODIUM 5000 UNITS: 5000 INJECTION INTRAVENOUS; SUBCUTANEOUS at 02:08

## 2021-08-15 RX ADMIN — CARVEDILOL 6.25 MG: 6.25 TABLET, FILM COATED ORAL at 08:08

## 2021-08-15 RX ADMIN — SODIUM BICARBONATE 650 MG TABLET 1300 MG: at 10:08

## 2021-08-15 RX ADMIN — HEPARIN SODIUM 5000 UNITS: 5000 INJECTION INTRAVENOUS; SUBCUTANEOUS at 10:08

## 2021-08-16 ENCOUNTER — TELEPHONE (OUTPATIENT)
Dept: NEUROLOGY | Facility: CLINIC | Age: 76
End: 2021-08-16

## 2021-08-16 ENCOUNTER — SPECIALTY PHARMACY (OUTPATIENT)
Dept: PHARMACY | Facility: CLINIC | Age: 76
End: 2021-08-16

## 2021-08-16 PROBLEM — C90.00 KAPPA LIGHT CHAIN MYELOMA: Status: ACTIVE | Noted: 2021-08-16

## 2021-08-16 LAB
ALBUMIN SERPL BCP-MCNC: 3.7 G/DL (ref 3.5–5.2)
ALBUMIN SERPL BCP-MCNC: 4 G/DL (ref 3.5–5.2)
ALBUMIN SERPL BCP-MCNC: 4.2 G/DL (ref 3.5–5.2)
ALBUMIN SERPL ELPH-MCNC: 4.14 G/DL (ref 3.35–5.55)
ALP SERPL-CCNC: 51 U/L (ref 55–135)
ALP SERPL-CCNC: 58 U/L (ref 55–135)
ALP SERPL-CCNC: 61 U/L (ref 55–135)
ALPHA1 GLOB SERPL ELPH-MCNC: 0.32 G/DL (ref 0.17–0.41)
ALPHA2 GLOB SERPL ELPH-MCNC: 0.82 G/DL (ref 0.43–0.99)
ALT SERPL W/O P-5'-P-CCNC: 13 U/L (ref 10–44)
ALT SERPL W/O P-5'-P-CCNC: 15 U/L (ref 10–44)
ALT SERPL W/O P-5'-P-CCNC: 9 U/L (ref 10–44)
ANA SER QL IF: NORMAL
ANION GAP SERPL CALC-SCNC: 13 MMOL/L (ref 8–16)
ANION GAP SERPL CALC-SCNC: 13 MMOL/L (ref 8–16)
ANION GAP SERPL CALC-SCNC: 9 MMOL/L (ref 8–16)
AST SERPL-CCNC: 13 U/L (ref 10–40)
AST SERPL-CCNC: 15 U/L (ref 10–40)
AST SERPL-CCNC: 16 U/L (ref 10–40)
B-GLOBULIN SERPL ELPH-MCNC: 0.65 G/DL (ref 0.5–1.1)
B2 MICROGLOB SERPL-MCNC: 8.3 UG/ML (ref 0–2.5)
BASOPHILS # BLD AUTO: 0.02 K/UL (ref 0–0.2)
BASOPHILS NFR BLD: 0.4 % (ref 0–1.9)
BILIRUB SERPL-MCNC: 0.3 MG/DL (ref 0.1–1)
BILIRUB SERPL-MCNC: 0.3 MG/DL (ref 0.1–1)
BILIRUB SERPL-MCNC: 0.5 MG/DL (ref 0.1–1)
BUN SERPL-MCNC: 65 MG/DL (ref 8–23)
BUN SERPL-MCNC: 66 MG/DL (ref 8–23)
BUN SERPL-MCNC: 67 MG/DL (ref 8–23)
CALCIUM SERPL-MCNC: 10.4 MG/DL (ref 8.7–10.5)
CALCIUM SERPL-MCNC: 9.5 MG/DL (ref 8.7–10.5)
CALCIUM SERPL-MCNC: 9.8 MG/DL (ref 8.7–10.5)
CHLORIDE SERPL-SCNC: 104 MMOL/L (ref 95–110)
CHLORIDE SERPL-SCNC: 109 MMOL/L (ref 95–110)
CHLORIDE SERPL-SCNC: 110 MMOL/L (ref 95–110)
CO2 SERPL-SCNC: 15 MMOL/L (ref 23–29)
CO2 SERPL-SCNC: 17 MMOL/L (ref 23–29)
CO2 SERPL-SCNC: 19 MMOL/L (ref 23–29)
CREAT SERPL-MCNC: 6.6 MG/DL (ref 0.5–1.4)
CREAT SERPL-MCNC: 6.9 MG/DL (ref 0.5–1.4)
CREAT SERPL-MCNC: 6.9 MG/DL (ref 0.5–1.4)
DIFFERENTIAL METHOD: ABNORMAL
EOSINOPHIL # BLD AUTO: 0.1 K/UL (ref 0–0.5)
EOSINOPHIL NFR BLD: 1.6 % (ref 0–8)
ERYTHROCYTE [DISTWIDTH] IN BLOOD BY AUTOMATED COUNT: 12.8 % (ref 11.5–14.5)
EST. GFR  (AFRICAN AMERICAN): 8 ML/MIN/1.73 M^2
EST. GFR  (AFRICAN AMERICAN): 8 ML/MIN/1.73 M^2
EST. GFR  (AFRICAN AMERICAN): 9 ML/MIN/1.73 M^2
EST. GFR  (NON AFRICAN AMERICAN): 7 ML/MIN/1.73 M^2
EST. GFR  (NON AFRICAN AMERICAN): 7 ML/MIN/1.73 M^2
EST. GFR  (NON AFRICAN AMERICAN): 8 ML/MIN/1.73 M^2
GAMMA GLOB SERPL ELPH-MCNC: 2.07 G/DL (ref 0.67–1.58)
GLUCOSE SERPL-MCNC: 104 MG/DL (ref 70–110)
GLUCOSE SERPL-MCNC: 104 MG/DL (ref 70–110)
GLUCOSE SERPL-MCNC: 96 MG/DL (ref 70–110)
HAV IGM SERPL QL IA: NEGATIVE
HBV CORE IGM SERPL QL IA: NEGATIVE
HBV SURFACE AG SERPL QL IA: NEGATIVE
HCT VFR BLD AUTO: 20.6 % (ref 40–54)
HCV AB SERPL QL IA: NEGATIVE
HGB BLD-MCNC: 7.4 G/DL (ref 14–18)
HIV 1+2 AB+HIV1 P24 AG SERPL QL IA: NEGATIVE
IMM GRANULOCYTES # BLD AUTO: 0.02 K/UL (ref 0–0.04)
IMM GRANULOCYTES NFR BLD AUTO: 0.4 % (ref 0–0.5)
KAPPA LC SER QL IA: 494.85 MG/DL (ref 0.33–1.94)
KAPPA LC/LAMBDA SER IA: 426.6 (ref 0.26–1.65)
LAMBDA LC SER QL IA: 1.16 MG/DL (ref 0.57–2.63)
LDH SERPL L TO P-CCNC: 232 U/L (ref 110–260)
LYMPHOCYTES # BLD AUTO: 1.4 K/UL (ref 1–4.8)
LYMPHOCYTES NFR BLD: 24 % (ref 18–48)
MCH RBC QN AUTO: 31.6 PG (ref 27–31)
MCHC RBC AUTO-ENTMCNC: 35.9 G/DL (ref 32–36)
MCV RBC AUTO: 88 FL (ref 82–98)
MONOCYTES # BLD AUTO: 0.4 K/UL (ref 0.3–1)
MONOCYTES NFR BLD: 6.8 % (ref 4–15)
NEUTROPHILS # BLD AUTO: 3.8 K/UL (ref 1.8–7.7)
NEUTROPHILS NFR BLD: 66.8 % (ref 38–73)
NRBC BLD-RTO: 0 /100 WBC
PLATELET # BLD AUTO: 201 K/UL (ref 150–450)
PMV BLD AUTO: 11.3 FL (ref 9.2–12.9)
POCT GLUCOSE: 115 MG/DL (ref 70–110)
POCT GLUCOSE: 209 MG/DL (ref 70–110)
POCT GLUCOSE: 83 MG/DL (ref 70–110)
POCT GLUCOSE: 94 MG/DL (ref 70–110)
POTASSIUM SERPL-SCNC: 4.9 MMOL/L (ref 3.5–5.1)
POTASSIUM SERPL-SCNC: 5.3 MMOL/L (ref 3.5–5.1)
POTASSIUM SERPL-SCNC: 5.4 MMOL/L (ref 3.5–5.1)
PROT PATTERN UR ELPH-IMP: NORMAL
PROT SERPL-MCNC: 7.7 G/DL (ref 6–8.4)
PROT SERPL-MCNC: 8 G/DL (ref 6–8.4)
PROT SERPL-MCNC: 8.4 G/DL (ref 6–8.4)
PROT SERPL-MCNC: 8.8 G/DL (ref 6–8.4)
RBC # BLD AUTO: 2.34 M/UL (ref 4.6–6.2)
SODIUM SERPL-SCNC: 134 MMOL/L (ref 136–145)
SODIUM SERPL-SCNC: 137 MMOL/L (ref 136–145)
SODIUM SERPL-SCNC: 138 MMOL/L (ref 136–145)
WBC # BLD AUTO: 5.71 K/UL (ref 3.9–12.7)

## 2021-08-16 PROCEDURE — 99223 PR INITIAL HOSPITAL CARE,LEVL III: ICD-10-PCS | Mod: ,,, | Performed by: INTERNAL MEDICINE

## 2021-08-16 PROCEDURE — 25000003 PHARM REV CODE 250

## 2021-08-16 PROCEDURE — 85025 COMPLETE CBC W/AUTO DIFF WBC: CPT

## 2021-08-16 PROCEDURE — 25000242 PHARM REV CODE 250 ALT 637 W/ HCPCS: Performed by: STUDENT IN AN ORGANIZED HEALTH CARE EDUCATION/TRAINING PROGRAM

## 2021-08-16 PROCEDURE — 82232 ASSAY OF BETA-2 PROTEIN: CPT | Performed by: STUDENT IN AN ORGANIZED HEALTH CARE EDUCATION/TRAINING PROGRAM

## 2021-08-16 PROCEDURE — 80053 COMPREHEN METABOLIC PANEL: CPT | Mod: 91 | Performed by: STUDENT IN AN ORGANIZED HEALTH CARE EDUCATION/TRAINING PROGRAM

## 2021-08-16 PROCEDURE — 94640 AIRWAY INHALATION TREATMENT: CPT

## 2021-08-16 PROCEDURE — 25000003 PHARM REV CODE 250: Performed by: INTERNAL MEDICINE

## 2021-08-16 PROCEDURE — 63600175 PHARM REV CODE 636 W HCPCS: Performed by: INTERNAL MEDICINE

## 2021-08-16 PROCEDURE — 63600175 PHARM REV CODE 636 W HCPCS: Performed by: STUDENT IN AN ORGANIZED HEALTH CARE EDUCATION/TRAINING PROGRAM

## 2021-08-16 PROCEDURE — 99223 1ST HOSP IP/OBS HIGH 75: CPT | Mod: ,,, | Performed by: INTERNAL MEDICINE

## 2021-08-16 PROCEDURE — 83615 LACTATE (LD) (LDH) ENZYME: CPT | Performed by: STUDENT IN AN ORGANIZED HEALTH CARE EDUCATION/TRAINING PROGRAM

## 2021-08-16 PROCEDURE — 86334 IMMUNOFIX E-PHORESIS SERUM: CPT | Performed by: STUDENT IN AN ORGANIZED HEALTH CARE EDUCATION/TRAINING PROGRAM

## 2021-08-16 PROCEDURE — 25000003 PHARM REV CODE 250: Performed by: STUDENT IN AN ORGANIZED HEALTH CARE EDUCATION/TRAINING PROGRAM

## 2021-08-16 PROCEDURE — 36415 COLL VENOUS BLD VENIPUNCTURE: CPT | Performed by: STUDENT IN AN ORGANIZED HEALTH CARE EDUCATION/TRAINING PROGRAM

## 2021-08-16 PROCEDURE — 94761 N-INVAS EAR/PLS OXIMETRY MLT: CPT

## 2021-08-16 PROCEDURE — 86334 PATHOLOGIST INTERPRETATION IFE: ICD-10-PCS | Mod: 26,,, | Performed by: PATHOLOGY

## 2021-08-16 PROCEDURE — 80053 COMPREHEN METABOLIC PANEL: CPT | Mod: 91

## 2021-08-16 PROCEDURE — 36415 COLL VENOUS BLD VENIPUNCTURE: CPT

## 2021-08-16 PROCEDURE — 11000001 HC ACUTE MED/SURG PRIVATE ROOM

## 2021-08-16 PROCEDURE — 86334 IMMUNOFIX E-PHORESIS SERUM: CPT | Mod: 26,,, | Performed by: PATHOLOGY

## 2021-08-16 RX ORDER — CLOPIDOGREL BISULFATE 75 MG/1
75 TABLET ORAL DAILY
Qty: 30 TABLET | Refills: 11
Start: 2021-08-16 | End: 2021-08-16 | Stop reason: HOSPADM

## 2021-08-16 RX ORDER — CLOPIDOGREL BISULFATE 75 MG/1
75 TABLET ORAL DAILY
Qty: 30 TABLET | Refills: 11 | Status: SHIPPED | OUTPATIENT
Start: 2021-08-24 | End: 2023-07-05 | Stop reason: SDUPTHER

## 2021-08-16 RX ORDER — CARVEDILOL 6.25 MG/1
6.25 TABLET ORAL 2 TIMES DAILY
Qty: 60 TABLET | Refills: 11 | Status: SHIPPED | OUTPATIENT
Start: 2021-08-16 | End: 2022-10-27 | Stop reason: SDUPTHER

## 2021-08-16 RX ORDER — CLOPIDOGREL BISULFATE 75 MG/1
75 TABLET ORAL DAILY
Status: DISCONTINUED | OUTPATIENT
Start: 2021-08-16 | End: 2021-08-16

## 2021-08-16 RX ORDER — ALBUTEROL SULFATE 2.5 MG/.5ML
10 SOLUTION RESPIRATORY (INHALATION) ONCE
Status: DISCONTINUED | OUTPATIENT
Start: 2021-08-16 | End: 2021-08-16

## 2021-08-16 RX ORDER — ALBUTEROL SULFATE 2.5 MG/.5ML
10 SOLUTION RESPIRATORY (INHALATION) ONCE
Status: COMPLETED | OUTPATIENT
Start: 2021-08-16 | End: 2021-08-16

## 2021-08-16 RX ORDER — CLOPIDOGREL BISULFATE 75 MG/1
75 TABLET ORAL DAILY
Qty: 30 TABLET | Refills: 11 | Status: SHIPPED | OUTPATIENT
Start: 2021-08-16 | End: 2021-08-16

## 2021-08-16 RX ORDER — NIFEDIPINE 90 MG/1
90 TABLET, FILM COATED, EXTENDED RELEASE ORAL DAILY
Qty: 90 TABLET | Refills: 3 | Status: SHIPPED | OUTPATIENT
Start: 2021-08-16 | End: 2022-05-12

## 2021-08-16 RX ORDER — SODIUM BICARBONATE 650 MG/1
1300 TABLET ORAL 3 TIMES DAILY
Qty: 180 TABLET | Refills: 11 | Status: SHIPPED | OUTPATIENT
Start: 2021-08-16 | End: 2021-12-03

## 2021-08-16 RX ORDER — DEXAMETHASONE 4 MG/1
20 TABLET ORAL DAILY
Qty: 20 TABLET | Refills: 0 | Status: SHIPPED | OUTPATIENT
Start: 2021-08-16 | End: 2021-08-20

## 2021-08-16 RX ORDER — FERROUS SULFATE 325(65) MG
325 TABLET ORAL 2 TIMES DAILY
Qty: 180 TABLET | Refills: 3 | Status: ON HOLD | OUTPATIENT
Start: 2021-08-16 | End: 2021-08-23 | Stop reason: HOSPADM

## 2021-08-16 RX ORDER — ALBUTEROL SULFATE 2.5 MG/.5ML
10 SOLUTION RESPIRATORY (INHALATION)
Status: DISPENSED | OUTPATIENT
Start: 2021-08-16 | End: 2021-08-16

## 2021-08-16 RX ADMIN — SODIUM ZIRCONIUM CYCLOSILICATE 5 G: 5 POWDER, FOR SUSPENSION ORAL at 08:08

## 2021-08-16 RX ADMIN — DOCUSATE SODIUM 100 MG: 100 CAPSULE, LIQUID FILLED ORAL at 08:08

## 2021-08-16 RX ADMIN — HEPARIN SODIUM 5000 UNITS: 5000 INJECTION INTRAVENOUS; SUBCUTANEOUS at 06:08

## 2021-08-16 RX ADMIN — THERA TABS 1 TABLET: TAB at 08:08

## 2021-08-16 RX ADMIN — CALCIUM GLUCONATE 1 G: 98 INJECTION, SOLUTION INTRAVENOUS at 12:08

## 2021-08-16 RX ADMIN — DEXTROSE MONOHYDRATE 25 G: 25 INJECTION, SOLUTION INTRAVENOUS at 11:08

## 2021-08-16 RX ADMIN — FINASTERIDE 5 MG: 5 TABLET, FILM COATED ORAL at 08:08

## 2021-08-16 RX ADMIN — CLOPIDOGREL 75 MG: 75 TABLET, FILM COATED ORAL at 03:08

## 2021-08-16 RX ADMIN — ALBUTEROL SULFATE 10 MG: 2.5 SOLUTION RESPIRATORY (INHALATION) at 05:08

## 2021-08-16 RX ADMIN — Medication 100 MG: at 08:08

## 2021-08-16 RX ADMIN — CARVEDILOL 6.25 MG: 6.25 TABLET, FILM COATED ORAL at 08:08

## 2021-08-16 RX ADMIN — DEXTROSE MONOHYDRATE 25 G: 25 INJECTION, SOLUTION INTRAVENOUS at 12:08

## 2021-08-16 RX ADMIN — INSULIN HUMAN 7.54 UNITS: 100 INJECTION, SOLUTION PARENTERAL at 11:08

## 2021-08-16 RX ADMIN — EPOETIN ALFA-EPBX 20000 UNITS: 10000 INJECTION, SOLUTION INTRAVENOUS; SUBCUTANEOUS at 01:08

## 2021-08-16 RX ADMIN — CALCIUM GLUCONATE 1 G: 98 INJECTION, SOLUTION INTRAVENOUS at 11:08

## 2021-08-16 RX ADMIN — ALBUTEROL SULFATE 10 MG: 2.5 SOLUTION RESPIRATORY (INHALATION) at 11:08

## 2021-08-16 RX ADMIN — HEPARIN SODIUM 5000 UNITS: 5000 INJECTION INTRAVENOUS; SUBCUTANEOUS at 03:08

## 2021-08-16 RX ADMIN — HEPARIN SODIUM 5000 UNITS: 5000 INJECTION INTRAVENOUS; SUBCUTANEOUS at 10:08

## 2021-08-16 RX ADMIN — SODIUM ZIRCONIUM CYCLOSILICATE 10 G: 5 POWDER, FOR SUSPENSION ORAL at 01:08

## 2021-08-16 RX ADMIN — SODIUM BICARBONATE 650 MG TABLET 1300 MG: at 08:08

## 2021-08-16 RX ADMIN — ATORVASTATIN CALCIUM 80 MG: 40 TABLET, FILM COATED ORAL at 08:08

## 2021-08-16 RX ADMIN — NIFEDIPINE 60 MG: 30 TABLET, FILM COATED, EXTENDED RELEASE ORAL at 08:08

## 2021-08-16 RX ADMIN — FOLIC ACID 1 MG: 1 TABLET ORAL at 08:08

## 2021-08-16 RX ADMIN — INSULIN HUMAN 7.54 UNITS: 100 INJECTION, SOLUTION PARENTERAL at 12:08

## 2021-08-16 RX ADMIN — TAMSULOSIN HYDROCHLORIDE 0.4 MG: 0.4 CAPSULE ORAL at 08:08

## 2021-08-17 VITALS
TEMPERATURE: 98 F | WEIGHT: 161.63 LBS | SYSTOLIC BLOOD PRESSURE: 165 MMHG | OXYGEN SATURATION: 100 % | RESPIRATION RATE: 18 BRPM | HEIGHT: 72 IN | DIASTOLIC BLOOD PRESSURE: 76 MMHG | HEART RATE: 69 BPM | BODY MASS INDEX: 21.89 KG/M2

## 2021-08-17 LAB
ABO + RH BLD: NORMAL
ALBUMIN SERPL BCP-MCNC: 3.6 G/DL (ref 3.5–5.2)
ALP SERPL-CCNC: 47 U/L (ref 55–135)
ALT SERPL W/O P-5'-P-CCNC: 8 U/L (ref 10–44)
ANION GAP SERPL CALC-SCNC: 10 MMOL/L (ref 8–16)
AST SERPL-CCNC: 13 U/L (ref 10–40)
BASOPHILS # BLD AUTO: 0.03 K/UL (ref 0–0.2)
BASOPHILS NFR BLD: 0.5 % (ref 0–1.9)
BILIRUB SERPL-MCNC: 0.3 MG/DL (ref 0.1–1)
BLD GP AB SCN CELLS X3 SERPL QL: NORMAL
BLD PROD TYP BPU: NORMAL
BLOOD UNIT EXPIRATION DATE: NORMAL
BLOOD UNIT TYPE CODE: 6200
BLOOD UNIT TYPE: NORMAL
BUN SERPL-MCNC: 65 MG/DL (ref 8–23)
CALCIUM SERPL-MCNC: 9.6 MG/DL (ref 8.7–10.5)
CHLORIDE SERPL-SCNC: 106 MMOL/L (ref 95–110)
CO2 SERPL-SCNC: 17 MMOL/L (ref 23–29)
CODING SYSTEM: NORMAL
CREAT SERPL-MCNC: 6.6 MG/DL (ref 0.5–1.4)
DIFFERENTIAL METHOD: ABNORMAL
DISPENSE STATUS: NORMAL
EOSINOPHIL # BLD AUTO: 0.1 K/UL (ref 0–0.5)
EOSINOPHIL NFR BLD: 1.1 % (ref 0–8)
ERYTHROCYTE [DISTWIDTH] IN BLOOD BY AUTOMATED COUNT: 12.7 % (ref 11.5–14.5)
EST. GFR  (AFRICAN AMERICAN): 9 ML/MIN/1.73 M^2
EST. GFR  (NON AFRICAN AMERICAN): 8 ML/MIN/1.73 M^2
GLUCOSE SERPL-MCNC: 122 MG/DL (ref 70–110)
HCT VFR BLD AUTO: 19.2 % (ref 40–54)
HGB BLD-MCNC: 7 G/DL (ref 14–18)
IMM GRANULOCYTES # BLD AUTO: 0.03 K/UL (ref 0–0.04)
IMM GRANULOCYTES NFR BLD AUTO: 0.5 % (ref 0–0.5)
INTERPRETATION SERPL IFE-IMP: NORMAL
LYMPHOCYTES # BLD AUTO: 1.6 K/UL (ref 1–4.8)
LYMPHOCYTES NFR BLD: 24.8 % (ref 18–48)
MAGNESIUM SERPL-MCNC: 1.8 MG/DL (ref 1.6–2.6)
MCH RBC QN AUTO: 31.3 PG (ref 27–31)
MCHC RBC AUTO-ENTMCNC: 36.5 G/DL (ref 32–36)
MCV RBC AUTO: 86 FL (ref 82–98)
MONOCYTES # BLD AUTO: 0.5 K/UL (ref 0.3–1)
MONOCYTES NFR BLD: 8.3 % (ref 4–15)
NEUTROPHILS # BLD AUTO: 4.1 K/UL (ref 1.8–7.7)
NEUTROPHILS NFR BLD: 64.8 % (ref 38–73)
NRBC BLD-RTO: 0 /100 WBC
NUM UNITS TRANS PACKED RBC: NORMAL
PATHOLOGIST INTERPRETATION SPE: NORMAL
PATHOLOGIST INTERPRETATION UPE: NORMAL
PHOSPHATE SERPL-MCNC: 4.8 MG/DL (ref 2.7–4.5)
PLATELET # BLD AUTO: 188 K/UL (ref 150–450)
PMV BLD AUTO: 11 FL (ref 9.2–12.9)
POCT GLUCOSE: 108 MG/DL (ref 70–110)
POCT GLUCOSE: 78 MG/DL (ref 70–110)
POTASSIUM SERPL-SCNC: 4.5 MMOL/L (ref 3.5–5.1)
PROT SERPL-MCNC: 7.3 G/DL (ref 6–8.4)
RBC # BLD AUTO: 2.24 M/UL (ref 4.6–6.2)
SODIUM SERPL-SCNC: 133 MMOL/L (ref 136–145)
WBC # BLD AUTO: 6.3 K/UL (ref 3.9–12.7)

## 2021-08-17 PROCEDURE — 83735 ASSAY OF MAGNESIUM: CPT | Performed by: STUDENT IN AN ORGANIZED HEALTH CARE EDUCATION/TRAINING PROGRAM

## 2021-08-17 PROCEDURE — 99900035 HC TECH TIME PER 15 MIN (STAT)

## 2021-08-17 PROCEDURE — P9040 RBC LEUKOREDUCED IRRADIATED: HCPCS | Performed by: STUDENT IN AN ORGANIZED HEALTH CARE EDUCATION/TRAINING PROGRAM

## 2021-08-17 PROCEDURE — 85025 COMPLETE CBC W/AUTO DIFF WBC: CPT | Performed by: STUDENT IN AN ORGANIZED HEALTH CARE EDUCATION/TRAINING PROGRAM

## 2021-08-17 PROCEDURE — 86900 BLOOD TYPING SEROLOGIC ABO: CPT | Performed by: STUDENT IN AN ORGANIZED HEALTH CARE EDUCATION/TRAINING PROGRAM

## 2021-08-17 PROCEDURE — 63600175 PHARM REV CODE 636 W HCPCS: Performed by: STUDENT IN AN ORGANIZED HEALTH CARE EDUCATION/TRAINING PROGRAM

## 2021-08-17 PROCEDURE — 36415 COLL VENOUS BLD VENIPUNCTURE: CPT | Performed by: STUDENT IN AN ORGANIZED HEALTH CARE EDUCATION/TRAINING PROGRAM

## 2021-08-17 PROCEDURE — G0009 ADMIN PNEUMOCOCCAL VACCINE: HCPCS | Performed by: STUDENT IN AN ORGANIZED HEALTH CARE EDUCATION/TRAINING PROGRAM

## 2021-08-17 PROCEDURE — 36430 TRANSFUSION BLD/BLD COMPNT: CPT

## 2021-08-17 PROCEDURE — 90670 PCV13 VACCINE IM: CPT | Performed by: STUDENT IN AN ORGANIZED HEALTH CARE EDUCATION/TRAINING PROGRAM

## 2021-08-17 PROCEDURE — 25000003 PHARM REV CODE 250

## 2021-08-17 PROCEDURE — 90471 IMMUNIZATION ADMIN: CPT | Performed by: STUDENT IN AN ORGANIZED HEALTH CARE EDUCATION/TRAINING PROGRAM

## 2021-08-17 PROCEDURE — 86920 COMPATIBILITY TEST SPIN: CPT | Performed by: STUDENT IN AN ORGANIZED HEALTH CARE EDUCATION/TRAINING PROGRAM

## 2021-08-17 PROCEDURE — 80053 COMPREHEN METABOLIC PANEL: CPT | Performed by: STUDENT IN AN ORGANIZED HEALTH CARE EDUCATION/TRAINING PROGRAM

## 2021-08-17 PROCEDURE — 25000003 PHARM REV CODE 250: Performed by: STUDENT IN AN ORGANIZED HEALTH CARE EDUCATION/TRAINING PROGRAM

## 2021-08-17 PROCEDURE — 94761 N-INVAS EAR/PLS OXIMETRY MLT: CPT

## 2021-08-17 PROCEDURE — 84100 ASSAY OF PHOSPHORUS: CPT | Performed by: STUDENT IN AN ORGANIZED HEALTH CARE EDUCATION/TRAINING PROGRAM

## 2021-08-17 RX ORDER — CLOPIDOGREL BISULFATE 75 MG/1
75 TABLET ORAL DAILY
Status: DISCONTINUED | OUTPATIENT
Start: 2021-08-17 | End: 2021-08-17

## 2021-08-17 RX ORDER — HYDROCODONE BITARTRATE AND ACETAMINOPHEN 500; 5 MG/1; MG/1
TABLET ORAL
Status: DISCONTINUED | OUTPATIENT
Start: 2021-08-17 | End: 2021-08-17 | Stop reason: HOSPADM

## 2021-08-17 RX ADMIN — FINASTERIDE 5 MG: 5 TABLET, FILM COATED ORAL at 08:08

## 2021-08-17 RX ADMIN — NIFEDIPINE 60 MG: 30 TABLET, FILM COATED, EXTENDED RELEASE ORAL at 08:08

## 2021-08-17 RX ADMIN — Medication 100 MG: at 08:08

## 2021-08-17 RX ADMIN — PNEUMOCOCCAL 13-VALENT CONJUGATE VACCINE 0.5 ML: 2.2; 2.2; 2.2; 2.2; 2.2; 4.4; 2.2; 2.2; 2.2; 2.2; 2.2; 2.2; 2.2 INJECTION, SUSPENSION INTRAMUSCULAR at 08:08

## 2021-08-17 RX ADMIN — HEPARIN SODIUM 5000 UNITS: 5000 INJECTION INTRAVENOUS; SUBCUTANEOUS at 06:08

## 2021-08-17 RX ADMIN — SODIUM ZIRCONIUM CYCLOSILICATE 10 G: 5 POWDER, FOR SUSPENSION ORAL at 08:08

## 2021-08-17 RX ADMIN — CARVEDILOL 6.25 MG: 6.25 TABLET, FILM COATED ORAL at 08:08

## 2021-08-17 RX ADMIN — DOCUSATE SODIUM 100 MG: 100 CAPSULE, LIQUID FILLED ORAL at 08:08

## 2021-08-17 RX ADMIN — THERA TABS 1 TABLET: TAB at 08:08

## 2021-08-17 RX ADMIN — FOLIC ACID 1 MG: 1 TABLET ORAL at 08:08

## 2021-08-17 RX ADMIN — TAMSULOSIN HYDROCHLORIDE 0.4 MG: 0.4 CAPSULE ORAL at 08:08

## 2021-08-17 RX ADMIN — ATORVASTATIN CALCIUM 80 MG: 40 TABLET, FILM COATED ORAL at 08:08

## 2021-08-18 ENCOUNTER — TELEPHONE (OUTPATIENT)
Dept: HEMATOLOGY/ONCOLOGY | Facility: CLINIC | Age: 76
End: 2021-08-18

## 2021-08-18 ENCOUNTER — PATIENT OUTREACH (OUTPATIENT)
Dept: ADMINISTRATIVE | Facility: CLINIC | Age: 76
End: 2021-08-18

## 2021-08-18 DIAGNOSIS — C90.00 KAPPA LIGHT CHAIN MYELOMA: Primary | ICD-10-CM

## 2021-08-18 LAB
ANCA AB TITR SER IF: NORMAL TITER
P-ANCA TITR SER IF: NORMAL TITER
PATHOLOGIST INTERPRETATION IFE: NORMAL

## 2021-08-18 RX ORDER — SODIUM CHLORIDE 9 MG/ML
INJECTION, SOLUTION INTRAVENOUS CONTINUOUS
Status: CANCELLED | OUTPATIENT
Start: 2021-08-18

## 2021-08-18 RX ORDER — MIDAZOLAM HYDROCHLORIDE 5 MG/ML
5 INJECTION INTRAMUSCULAR; INTRAVENOUS ONCE AS NEEDED
Status: CANCELLED | OUTPATIENT
Start: 2021-08-18 | End: 2033-01-14

## 2021-08-18 RX ORDER — FENTANYL CITRATE 50 UG/ML
100 INJECTION, SOLUTION INTRAMUSCULAR; INTRAVENOUS ONCE AS NEEDED
Status: CANCELLED | OUTPATIENT
Start: 2021-08-18 | End: 2033-01-14

## 2021-08-18 RX ORDER — LIDOCAINE HYDROCHLORIDE 10 MG/ML
1 INJECTION, SOLUTION EPIDURAL; INFILTRATION; INTRACAUDAL; PERINEURAL ONCE
Status: CANCELLED | OUTPATIENT
Start: 2021-08-18 | End: 2021-08-18

## 2021-08-19 ENCOUNTER — TELEPHONE (OUTPATIENT)
Dept: HEMATOLOGY/ONCOLOGY | Facility: CLINIC | Age: 76
End: 2021-08-19

## 2021-08-20 ENCOUNTER — LAB VISIT (OUTPATIENT)
Dept: PRIMARY CARE CLINIC | Facility: OTHER | Age: 76
End: 2021-08-20
Payer: MEDICARE

## 2021-08-20 DIAGNOSIS — C90.00 KAPPA LIGHT CHAIN MYELOMA: ICD-10-CM

## 2021-08-20 PROCEDURE — U0003 INFECTIOUS AGENT DETECTION BY NUCLEIC ACID (DNA OR RNA); SEVERE ACUTE RESPIRATORY SYNDROME CORONAVIRUS 2 (SARS-COV-2) (CORONAVIRUS DISEASE [COVID-19]), AMPLIFIED PROBE TECHNIQUE, MAKING USE OF HIGH THROUGHPUT TECHNOLOGIES AS DESCRIBED BY CMS-2020-01-R: HCPCS | Performed by: INTERNAL MEDICINE

## 2021-08-23 ENCOUNTER — HOSPITAL ENCOUNTER (OUTPATIENT)
Facility: HOSPITAL | Age: 76
Discharge: HOME | End: 2021-08-23
Attending: INTERNAL MEDICINE | Admitting: INTERNAL MEDICINE
Payer: MEDICARE

## 2021-08-23 VITALS
TEMPERATURE: 98 F | WEIGHT: 48.25 LBS | DIASTOLIC BLOOD PRESSURE: 77 MMHG | SYSTOLIC BLOOD PRESSURE: 175 MMHG | HEART RATE: 60 BPM | OXYGEN SATURATION: 100 % | RESPIRATION RATE: 18 BRPM | HEIGHT: 71 IN | BODY MASS INDEX: 6.76 KG/M2

## 2021-08-23 DIAGNOSIS — C90.00 KAPPA LIGHT CHAIN MYELOMA: ICD-10-CM

## 2021-08-23 DIAGNOSIS — C90.00 KAPPA LIGHT CHAIN MYELOMA: Primary | ICD-10-CM

## 2021-08-23 LAB
ALBUMIN SERPL BCP-MCNC: 3.8 G/DL (ref 3.5–5.2)
ALP SERPL-CCNC: 47 U/L (ref 55–135)
ALT SERPL W/O P-5'-P-CCNC: 15 U/L (ref 10–44)
ANION GAP SERPL CALC-SCNC: 12 MMOL/L (ref 8–16)
AST SERPL-CCNC: 13 U/L (ref 10–40)
BASOPHILS # BLD AUTO: 0.02 K/UL (ref 0–0.2)
BASOPHILS NFR BLD: 0.3 % (ref 0–1.9)
BILIRUB SERPL-MCNC: 0.5 MG/DL (ref 0.1–1)
BUN SERPL-MCNC: 68 MG/DL (ref 8–23)
CALCIUM SERPL-MCNC: 8.6 MG/DL (ref 8.7–10.5)
CHLORIDE SERPL-SCNC: 106 MMOL/L (ref 95–110)
CO2 SERPL-SCNC: 24 MMOL/L (ref 23–29)
CREAT SERPL-MCNC: 5.5 MG/DL (ref 0.5–1.4)
DIFFERENTIAL METHOD: ABNORMAL
EOSINOPHIL # BLD AUTO: 0.1 K/UL (ref 0–0.5)
EOSINOPHIL NFR BLD: 0.8 % (ref 0–8)
ERYTHROCYTE [DISTWIDTH] IN BLOOD BY AUTOMATED COUNT: 13.6 % (ref 11.5–14.5)
EST. GFR  (AFRICAN AMERICAN): 11 ML/MIN/1.73 M^2
EST. GFR  (NON AFRICAN AMERICAN): 9 ML/MIN/1.73 M^2
FERRITIN SERPL-MCNC: 988 NG/ML (ref 20–300)
GLUCOSE SERPL-MCNC: 94 MG/DL (ref 70–110)
HCT VFR BLD AUTO: 26.7 % (ref 40–54)
HGB BLD-MCNC: 9.3 G/DL (ref 14–18)
IGA SERPL-MCNC: 57 MG/DL (ref 40–350)
IGG SERPL-MCNC: 1985 MG/DL (ref 650–1600)
IGM SERPL-MCNC: 18 MG/DL (ref 50–300)
IMM GRANULOCYTES # BLD AUTO: 0.04 K/UL (ref 0–0.04)
IMM GRANULOCYTES NFR BLD AUTO: 0.6 % (ref 0–0.5)
IRON SERPL-MCNC: 31 UG/DL (ref 45–160)
LDH SERPL L TO P-CCNC: 191 U/L (ref 110–260)
LYMPHOCYTES # BLD AUTO: 1.5 K/UL (ref 1–4.8)
LYMPHOCYTES NFR BLD: 21.5 % (ref 18–48)
MCH RBC QN AUTO: 31.1 PG (ref 27–31)
MCHC RBC AUTO-ENTMCNC: 34.8 G/DL (ref 32–36)
MCV RBC AUTO: 89 FL (ref 82–98)
MONOCYTES # BLD AUTO: 0.8 K/UL (ref 0.3–1)
MONOCYTES NFR BLD: 11.7 % (ref 4–15)
NEUTROPHILS # BLD AUTO: 4.7 K/UL (ref 1.8–7.7)
NEUTROPHILS NFR BLD: 65.1 % (ref 38–73)
NRBC BLD-RTO: 0 /100 WBC
PLATELET # BLD AUTO: 221 K/UL (ref 150–450)
PMV BLD AUTO: 10.8 FL (ref 9.2–12.9)
POTASSIUM SERPL-SCNC: 3.6 MMOL/L (ref 3.5–5.1)
PROT SERPL-MCNC: 7.7 G/DL (ref 6–8.4)
RBC # BLD AUTO: 2.99 M/UL (ref 4.6–6.2)
RETICS/RBC NFR AUTO: 3.2 % (ref 0.4–2)
SARS-COV-2 RDRP RESP QL NAA+PROBE: NEGATIVE
SARS-COV-2 RNA RESP QL NAA+PROBE: NOT DETECTED
SARS-COV-2- CYCLE NUMBER: -1
SATURATED IRON: 13 % (ref 20–50)
SODIUM SERPL-SCNC: 142 MMOL/L (ref 136–145)
TOTAL IRON BINDING CAPACITY: 235 UG/DL (ref 250–450)
TRANSFERRIN SERPL-MCNC: 159 MG/DL (ref 200–375)
URATE SERPL-MCNC: 9.2 MG/DL (ref 3.4–7)
WBC # BLD AUTO: 7.17 K/UL (ref 3.9–12.7)

## 2021-08-23 PROCEDURE — 83520 IMMUNOASSAY QUANT NOS NONAB: CPT | Mod: 59 | Performed by: INTERNAL MEDICINE

## 2021-08-23 PROCEDURE — 82784 ASSAY IGA/IGD/IGG/IGM EACH: CPT | Performed by: INTERNAL MEDICINE

## 2021-08-23 PROCEDURE — 84466 ASSAY OF TRANSFERRIN: CPT | Performed by: INTERNAL MEDICINE

## 2021-08-23 PROCEDURE — 88341 PR IHC OR ICC EACH ADD'L SINGLE ANTIBODY  STAINPR: ICD-10-PCS | Mod: 26,,, | Performed by: PATHOLOGY

## 2021-08-23 PROCEDURE — 86706 HEP B SURFACE ANTIBODY: CPT | Performed by: INTERNAL MEDICINE

## 2021-08-23 PROCEDURE — 83615 LACTATE (LD) (LDH) ENZYME: CPT | Performed by: INTERNAL MEDICINE

## 2021-08-23 PROCEDURE — 86334 PATHOLOGIST INTERPRETATION IFE: ICD-10-PCS | Mod: 26,,, | Performed by: PATHOLOGY

## 2021-08-23 PROCEDURE — 38221 DX BONE MARROW BIOPSIES: CPT | Performed by: INTERNAL MEDICINE

## 2021-08-23 PROCEDURE — 88185 FLOWCYTOMETRY/TC ADD-ON: CPT | Performed by: PATHOLOGY

## 2021-08-23 PROCEDURE — U0002 COVID-19 LAB TEST NON-CDC: HCPCS | Performed by: INTERNAL MEDICINE

## 2021-08-23 PROCEDURE — 88342 IMHCHEM/IMCYTCHM 1ST ANTB: CPT | Mod: 26,59,, | Performed by: PATHOLOGY

## 2021-08-23 PROCEDURE — 36415 COLL VENOUS BLD VENIPUNCTURE: CPT | Performed by: INTERNAL MEDICINE

## 2021-08-23 PROCEDURE — 88341 IMHCHEM/IMCYTCHM EA ADD ANTB: CPT | Performed by: PATHOLOGY

## 2021-08-23 PROCEDURE — 84165 PROTEIN E-PHORESIS SERUM: CPT | Performed by: INTERNAL MEDICINE

## 2021-08-23 PROCEDURE — 84550 ASSAY OF BLOOD/URIC ACID: CPT | Performed by: INTERNAL MEDICINE

## 2021-08-23 PROCEDURE — 88237 TISSUE CULTURE BONE MARROW: CPT | Performed by: INTERNAL MEDICINE

## 2021-08-23 PROCEDURE — 38222 DX BONE MARROW BX & ASPIR: CPT | Mod: LT,,, | Performed by: INTERNAL MEDICINE

## 2021-08-23 PROCEDURE — 84165 PATHOLOGIST INTERPRETATION SPE: ICD-10-PCS | Mod: 26,,, | Performed by: PATHOLOGY

## 2021-08-23 PROCEDURE — 63600175 PHARM REV CODE 636 W HCPCS: Performed by: INTERNAL MEDICINE

## 2021-08-23 PROCEDURE — 88271 CYTOGENETICS DNA PROBE: CPT | Mod: 59 | Performed by: INTERNAL MEDICINE

## 2021-08-23 PROCEDURE — 88313 SPECIAL STAINS GROUP 2: CPT | Performed by: PATHOLOGY

## 2021-08-23 PROCEDURE — 86704 HEP B CORE ANTIBODY TOTAL: CPT | Performed by: INTERNAL MEDICINE

## 2021-08-23 PROCEDURE — 88299 UNLISTED CYTOGENETIC STUDY: CPT | Performed by: INTERNAL MEDICINE

## 2021-08-23 PROCEDURE — 82668 ASSAY OF ERYTHROPOIETIN: CPT | Performed by: INTERNAL MEDICINE

## 2021-08-23 PROCEDURE — 85097 PR  BONE MARROW,SMEAR INTERPRETATION: ICD-10-PCS | Mod: ,,, | Performed by: PATHOLOGY

## 2021-08-23 PROCEDURE — 88341 IMHCHEM/IMCYTCHM EA ADD ANTB: CPT | Mod: 26,,, | Performed by: PATHOLOGY

## 2021-08-23 PROCEDURE — 86334 IMMUNOFIX E-PHORESIS SERUM: CPT | Mod: 26,,, | Performed by: PATHOLOGY

## 2021-08-23 PROCEDURE — 88274 CYTOGENETICS 25-99: CPT | Performed by: INTERNAL MEDICINE

## 2021-08-23 PROCEDURE — 88311 DECALCIFY TISSUE: CPT | Performed by: PATHOLOGY

## 2021-08-23 PROCEDURE — 88189 FLOWCYTOMETRY/READ 16 & >: CPT | Mod: ,,, | Performed by: PATHOLOGY

## 2021-08-23 PROCEDURE — 88342 IMHCHEM/IMCYTCHM 1ST ANTB: CPT | Performed by: PATHOLOGY

## 2021-08-23 PROCEDURE — 84165 PROTEIN E-PHORESIS SERUM: CPT | Mod: 26,,, | Performed by: PATHOLOGY

## 2021-08-23 PROCEDURE — 88305 TISSUE EXAM BY PATHOLOGIST: CPT | Performed by: PATHOLOGY

## 2021-08-23 PROCEDURE — 38222 DX BONE MARROW BX & ASPIR: CPT | Performed by: INTERNAL MEDICINE

## 2021-08-23 PROCEDURE — 88313 PR  SPECIAL STAINS,GROUP II: ICD-10-PCS | Mod: 26,,, | Performed by: PATHOLOGY

## 2021-08-23 PROCEDURE — 85097 BONE MARROW INTERPRETATION: CPT | Mod: ,,, | Performed by: PATHOLOGY

## 2021-08-23 PROCEDURE — 88311 DECALCIFY TISSUE: CPT | Mod: 26,,, | Performed by: PATHOLOGY

## 2021-08-23 PROCEDURE — 85045 AUTOMATED RETICULOCYTE COUNT: CPT | Performed by: INTERNAL MEDICINE

## 2021-08-23 PROCEDURE — 86803 HEPATITIS C AB TEST: CPT | Performed by: INTERNAL MEDICINE

## 2021-08-23 PROCEDURE — 88311 PR  DECALCIFY TISSUE: ICD-10-PCS | Mod: 26,,, | Performed by: PATHOLOGY

## 2021-08-23 PROCEDURE — 85025 COMPLETE CBC W/AUTO DIFF WBC: CPT | Performed by: INTERNAL MEDICINE

## 2021-08-23 PROCEDURE — 88189 PR  FLOWCYTOMETRY/READ, 16 & > MARKERS: ICD-10-PCS | Mod: ,,, | Performed by: PATHOLOGY

## 2021-08-23 PROCEDURE — 88305 TISSUE EXAM BY PATHOLOGIST: ICD-10-PCS | Mod: 26,,, | Performed by: PATHOLOGY

## 2021-08-23 PROCEDURE — 82728 ASSAY OF FERRITIN: CPT | Performed by: INTERNAL MEDICINE

## 2021-08-23 PROCEDURE — 86334 IMMUNOFIX E-PHORESIS SERUM: CPT | Performed by: INTERNAL MEDICINE

## 2021-08-23 PROCEDURE — 87340 HEPATITIS B SURFACE AG IA: CPT | Performed by: INTERNAL MEDICINE

## 2021-08-23 PROCEDURE — 88342 CHG IMMUNOCYTOCHEMISTRY: ICD-10-PCS | Mod: 26,59,, | Performed by: PATHOLOGY

## 2021-08-23 PROCEDURE — 88305 TISSUE EXAM BY PATHOLOGIST: CPT | Mod: 26,,, | Performed by: PATHOLOGY

## 2021-08-23 PROCEDURE — 80053 COMPREHEN METABOLIC PANEL: CPT | Performed by: INTERNAL MEDICINE

## 2021-08-23 PROCEDURE — 88184 FLOWCYTOMETRY/ TC 1 MARKER: CPT | Performed by: PATHOLOGY

## 2021-08-23 PROCEDURE — 38222 PR BONE MARROW BIOPSY(IES) W/ASPIRATION(S); DIAGNOSTIC: ICD-10-PCS | Mod: LT,,, | Performed by: INTERNAL MEDICINE

## 2021-08-23 PROCEDURE — 88313 SPECIAL STAINS GROUP 2: CPT | Mod: 26,,, | Performed by: PATHOLOGY

## 2021-08-23 RX ORDER — LIDOCAINE HYDROCHLORIDE 10 MG/ML
1 INJECTION, SOLUTION EPIDURAL; INFILTRATION; INTRACAUDAL; PERINEURAL ONCE
Status: DISCONTINUED | OUTPATIENT
Start: 2021-08-23 | End: 2021-08-23 | Stop reason: HOSPADM

## 2021-08-23 RX ORDER — FENTANYL CITRATE 50 UG/ML
100 INJECTION, SOLUTION INTRAMUSCULAR; INTRAVENOUS ONCE AS NEEDED
Status: COMPLETED | OUTPATIENT
Start: 2021-08-23 | End: 2021-08-23

## 2021-08-23 RX ORDER — SODIUM CHLORIDE 9 MG/ML
INJECTION, SOLUTION INTRAVENOUS CONTINUOUS
Status: DISCONTINUED | OUTPATIENT
Start: 2021-08-23 | End: 2021-08-23 | Stop reason: HOSPADM

## 2021-08-23 RX ORDER — MIDAZOLAM HYDROCHLORIDE 5 MG/ML
5 INJECTION INTRAMUSCULAR; INTRAVENOUS ONCE AS NEEDED
Status: COMPLETED | OUTPATIENT
Start: 2021-08-23 | End: 2021-08-23

## 2021-08-23 RX ADMIN — FENTANYL CITRATE 25 MCG: 50 INJECTION INTRAMUSCULAR; INTRAVENOUS at 08:08

## 2021-08-23 RX ADMIN — MIDAZOLAM HYDROCHLORIDE 2 MG: 5 INJECTION, SOLUTION INTRAMUSCULAR; INTRAVENOUS at 08:08

## 2021-08-24 LAB
ALBUMIN SERPL ELPH-MCNC: 3.75 G/DL (ref 3.35–5.55)
ALPHA1 GLOB SERPL ELPH-MCNC: 0.3 G/DL (ref 0.17–0.41)
ALPHA2 GLOB SERPL ELPH-MCNC: 0.8 G/DL (ref 0.43–0.99)
B-GLOBULIN SERPL ELPH-MCNC: 0.58 G/DL (ref 0.5–1.1)
BODY SITE - BONE MARROW: NORMAL
CLINICAL DIAGNOSIS - BONE MARROW: NORMAL
FLOW CYTOMETRY ANTIBODIES ANALYZED - BONE MARROW: NORMAL
FLOW CYTOMETRY COMMENT - BONE MARROW: NORMAL
FLOW CYTOMETRY INTERPRETATION - BONE MARROW: NORMAL
GAMMA GLOB SERPL ELPH-MCNC: 1.77 G/DL (ref 0.67–1.58)
HBV CORE AB SERPL QL IA: NEGATIVE
HBV SURFACE AB SER-ACNC: NEGATIVE M[IU]/ML
HBV SURFACE AG SERPL QL IA: NEGATIVE
HCV AB SERPL QL IA: NEGATIVE
INTERPRETATION SERPL IFE-IMP: NORMAL
KAPPA LC SER QL IA: 183.68 MG/DL (ref 0.33–1.94)
KAPPA LC/LAMBDA SER IA: 180.1 (ref 0.26–1.65)
LAMBDA LC SER QL IA: 1.02 MG/DL (ref 0.57–2.63)
PATHOLOGIST INTERPRETATION IFE: NORMAL
PATHOLOGIST INTERPRETATION SPE: NORMAL
PROT SERPL-MCNC: 7.2 G/DL (ref 6–8.4)

## 2021-08-25 LAB
CHROM BANDING METHOD: NORMAL
CHROMOSOME ANALYSIS BM ADDITIONAL INFORMATION: NORMAL
CHROMOSOME ANALYSIS BM RELEASED BY: NORMAL
CHROMOSOME ANALYSIS BM RESULT SUMMARY: NORMAL
CLINICAL CYTOGENETICIST REVIEW: NORMAL
KARYOTYP MAR: NORMAL
REASON FOR REFERRAL (NARRATIVE): NORMAL
REF LAB TEST METHOD: NORMAL
SPECIMEN SOURCE: NORMAL
SPECIMEN: NORMAL

## 2021-08-26 ENCOUNTER — INFUSION (OUTPATIENT)
Dept: INFUSION THERAPY | Facility: HOSPITAL | Age: 76
End: 2021-08-26
Attending: SURGERY
Payer: MEDICARE

## 2021-08-26 ENCOUNTER — OFFICE VISIT (OUTPATIENT)
Dept: HEMATOLOGY/ONCOLOGY | Facility: CLINIC | Age: 76
End: 2021-08-26
Payer: MEDICARE

## 2021-08-26 VITALS
BODY MASS INDEX: 23.08 KG/M2 | HEIGHT: 71 IN | HEART RATE: 59 BPM | OXYGEN SATURATION: 100 % | WEIGHT: 164.88 LBS | TEMPERATURE: 98 F | SYSTOLIC BLOOD PRESSURE: 180 MMHG | RESPIRATION RATE: 18 BRPM | DIASTOLIC BLOOD PRESSURE: 79 MMHG

## 2021-08-26 VITALS
RESPIRATION RATE: 18 BRPM | OXYGEN SATURATION: 100 % | TEMPERATURE: 98 F | WEIGHT: 164.88 LBS | DIASTOLIC BLOOD PRESSURE: 85 MMHG | SYSTOLIC BLOOD PRESSURE: 206 MMHG | HEIGHT: 71 IN | BODY MASS INDEX: 23.08 KG/M2 | HEART RATE: 60 BPM

## 2021-08-26 DIAGNOSIS — C90.00 KAPPA LIGHT CHAIN MYELOMA: Primary | ICD-10-CM

## 2021-08-26 DIAGNOSIS — N17.1 ACUTE RENAL FAILURE WITH ACUTE CORTICAL NECROSIS: ICD-10-CM

## 2021-08-26 LAB
DNA/RNA EXTRACT AND HOLD RESULT: NORMAL
DNA/RNA EXTRACTION: NORMAL
EPO SERPL-ACNC: 11.4 MIU/ML (ref 2.6–18.5)
EXHR SPECIMEN TYPE: NORMAL

## 2021-08-26 PROCEDURE — 99215 OFFICE O/P EST HI 40 MIN: CPT | Mod: S$GLB,,, | Performed by: INTERNAL MEDICINE

## 2021-08-26 PROCEDURE — 99215 PR OFFICE/OUTPT VISIT, EST, LEVL V, 40-54 MIN: ICD-10-PCS | Mod: S$GLB,,, | Performed by: INTERNAL MEDICINE

## 2021-08-26 PROCEDURE — 3077F SYST BP >= 140 MM HG: CPT | Mod: CPTII,S$GLB,, | Performed by: INTERNAL MEDICINE

## 2021-08-26 PROCEDURE — 1159F PR MEDICATION LIST DOCUMENTED IN MEDICAL RECORD: ICD-10-PCS | Mod: CPTII,S$GLB,, | Performed by: INTERNAL MEDICINE

## 2021-08-26 PROCEDURE — 3008F BODY MASS INDEX DOCD: CPT | Mod: CPTII,S$GLB,, | Performed by: INTERNAL MEDICINE

## 2021-08-26 PROCEDURE — 3044F HG A1C LEVEL LT 7.0%: CPT | Mod: CPTII,S$GLB,, | Performed by: INTERNAL MEDICINE

## 2021-08-26 PROCEDURE — 1160F RVW MEDS BY RX/DR IN RCRD: CPT | Mod: CPTII,S$GLB,, | Performed by: INTERNAL MEDICINE

## 2021-08-26 PROCEDURE — 96401 CHEMO ANTI-NEOPL SQ/IM: CPT

## 2021-08-26 PROCEDURE — 3044F PR MOST RECENT HEMOGLOBIN A1C LEVEL <7.0%: ICD-10-PCS | Mod: CPTII,S$GLB,, | Performed by: INTERNAL MEDICINE

## 2021-08-26 PROCEDURE — 3288F PR FALLS RISK ASSESSMENT DOCUMENTED: ICD-10-PCS | Mod: CPTII,S$GLB,, | Performed by: INTERNAL MEDICINE

## 2021-08-26 PROCEDURE — 3077F PR MOST RECENT SYSTOLIC BLOOD PRESSURE >= 140 MM HG: ICD-10-PCS | Mod: CPTII,S$GLB,, | Performed by: INTERNAL MEDICINE

## 2021-08-26 PROCEDURE — 1159F MED LIST DOCD IN RCRD: CPT | Mod: CPTII,S$GLB,, | Performed by: INTERNAL MEDICINE

## 2021-08-26 PROCEDURE — 25000003 PHARM REV CODE 250: Performed by: INTERNAL MEDICINE

## 2021-08-26 PROCEDURE — 99999 PR PBB SHADOW E&M-EST. PATIENT-LVL IV: ICD-10-PCS | Mod: PBBFAC,,, | Performed by: INTERNAL MEDICINE

## 2021-08-26 PROCEDURE — 3288F FALL RISK ASSESSMENT DOCD: CPT | Mod: CPTII,S$GLB,, | Performed by: INTERNAL MEDICINE

## 2021-08-26 PROCEDURE — 1101F PR PT FALLS ASSESS DOC 0-1 FALLS W/OUT INJ PAST YR: ICD-10-PCS | Mod: CPTII,S$GLB,, | Performed by: INTERNAL MEDICINE

## 2021-08-26 PROCEDURE — 1126F PR PAIN SEVERITY QUANTIFIED, NO PAIN PRESENT: ICD-10-PCS | Mod: CPTII,S$GLB,, | Performed by: INTERNAL MEDICINE

## 2021-08-26 PROCEDURE — 1160F PR REVIEW ALL MEDS BY PRESCRIBER/CLIN PHARMACIST DOCUMENTED: ICD-10-PCS | Mod: CPTII,S$GLB,, | Performed by: INTERNAL MEDICINE

## 2021-08-26 PROCEDURE — 1126F AMNT PAIN NOTED NONE PRSNT: CPT | Mod: CPTII,S$GLB,, | Performed by: INTERNAL MEDICINE

## 2021-08-26 PROCEDURE — 3078F PR MOST RECENT DIASTOLIC BLOOD PRESSURE < 80 MM HG: ICD-10-PCS | Mod: CPTII,S$GLB,, | Performed by: INTERNAL MEDICINE

## 2021-08-26 PROCEDURE — 3078F DIAST BP <80 MM HG: CPT | Mod: CPTII,S$GLB,, | Performed by: INTERNAL MEDICINE

## 2021-08-26 PROCEDURE — 63600175 PHARM REV CODE 636 W HCPCS: Performed by: INTERNAL MEDICINE

## 2021-08-26 PROCEDURE — 1111F DSCHRG MED/CURRENT MED MERGE: CPT | Mod: CPTII,S$GLB,, | Performed by: INTERNAL MEDICINE

## 2021-08-26 PROCEDURE — 1111F PR DISCHARGE MEDS RECONCILED W/ CURRENT OUTPATIENT MED LIST: ICD-10-PCS | Mod: CPTII,S$GLB,, | Performed by: INTERNAL MEDICINE

## 2021-08-26 PROCEDURE — 1101F PT FALLS ASSESS-DOCD LE1/YR: CPT | Mod: CPTII,S$GLB,, | Performed by: INTERNAL MEDICINE

## 2021-08-26 PROCEDURE — 3008F PR BODY MASS INDEX (BMI) DOCUMENTED: ICD-10-PCS | Mod: CPTII,S$GLB,, | Performed by: INTERNAL MEDICINE

## 2021-08-26 PROCEDURE — 99999 PR PBB SHADOW E&M-EST. PATIENT-LVL IV: CPT | Mod: PBBFAC,,, | Performed by: INTERNAL MEDICINE

## 2021-08-26 RX ORDER — HEPARIN 100 UNIT/ML
500 SYRINGE INTRAVENOUS
Status: CANCELLED | OUTPATIENT
Start: 2021-08-26

## 2021-08-26 RX ORDER — BORTEZOMIB 3.5 MG/1
1.3 INJECTION, POWDER, LYOPHILIZED, FOR SOLUTION INTRAVENOUS; SUBCUTANEOUS
Status: CANCELLED | OUTPATIENT
Start: 2021-08-26

## 2021-08-26 RX ORDER — DEXAMETHASONE 4 MG/1
20 TABLET ORAL
Status: COMPLETED | OUTPATIENT
Start: 2021-08-26 | End: 2021-08-26

## 2021-08-26 RX ORDER — DIPHENHYDRAMINE HCL 25 MG
50 CAPSULE ORAL
Status: COMPLETED | OUTPATIENT
Start: 2021-08-26 | End: 2021-08-26

## 2021-08-26 RX ORDER — FAMOTIDINE 20 MG/1
20 TABLET, FILM COATED ORAL
Status: COMPLETED | OUTPATIENT
Start: 2021-08-26 | End: 2021-08-26

## 2021-08-26 RX ORDER — DIPHENHYDRAMINE HCL 25 MG
50 CAPSULE ORAL
Status: CANCELLED
Start: 2021-08-26

## 2021-08-26 RX ORDER — LENALIDOMIDE 10 MG/1
10 CAPSULE ORAL SEE ADMIN INSTRUCTIONS
Qty: 21 EACH | Refills: 5 | OUTPATIENT
Start: 2021-08-26 | End: 2021-09-03

## 2021-08-26 RX ORDER — SODIUM CHLORIDE 0.9 % (FLUSH) 0.9 %
10 SYRINGE (ML) INJECTION
Status: CANCELLED | OUTPATIENT
Start: 2021-08-26

## 2021-08-26 RX ORDER — MONTELUKAST SODIUM 10 MG/1
10 TABLET ORAL
Status: CANCELLED | OUTPATIENT
Start: 2021-08-26

## 2021-08-26 RX ORDER — BORTEZOMIB 3.5 MG/1
1.3 INJECTION, POWDER, LYOPHILIZED, FOR SOLUTION INTRAVENOUS; SUBCUTANEOUS
Status: COMPLETED | OUTPATIENT
Start: 2021-08-26 | End: 2021-08-26

## 2021-08-26 RX ORDER — DEXAMETHASONE 0.5 MG/1
20 TABLET ORAL
Status: CANCELLED
Start: 2021-08-26

## 2021-08-26 RX ORDER — ACETAMINOPHEN 325 MG/1
650 TABLET ORAL
Status: COMPLETED | OUTPATIENT
Start: 2021-08-26 | End: 2021-08-26

## 2021-08-26 RX ORDER — MONTELUKAST SODIUM 10 MG/1
10 TABLET ORAL
Status: COMPLETED | OUTPATIENT
Start: 2021-08-26 | End: 2021-08-26

## 2021-08-26 RX ORDER — ACYCLOVIR 200 MG/1
200 CAPSULE ORAL DAILY
Qty: 90 CAPSULE | Refills: 3 | Status: SHIPPED | OUTPATIENT
Start: 2021-08-26 | End: 2022-09-14 | Stop reason: SDUPTHER

## 2021-08-26 RX ORDER — ACETAMINOPHEN 325 MG/1
650 TABLET ORAL
Status: CANCELLED | OUTPATIENT
Start: 2021-08-26

## 2021-08-26 RX ORDER — FAMOTIDINE 20 MG/1
20 TABLET, FILM COATED ORAL
Status: CANCELLED
Start: 2021-08-26

## 2021-08-26 RX ADMIN — DEXAMETHASONE 20 MG: 4 TABLET ORAL at 12:08

## 2021-08-26 RX ADMIN — FAMOTIDINE 20 MG: 20 TABLET ORAL at 12:08

## 2021-08-26 RX ADMIN — DIPHENHYDRAMINE HYDROCHLORIDE 50 MG: 25 CAPSULE ORAL at 12:08

## 2021-08-26 RX ADMIN — BORTEZOMIB 2.5 MG: 3.5 INJECTION, POWDER, LYOPHILIZED, FOR SOLUTION INTRAVENOUS; SUBCUTANEOUS at 12:08

## 2021-08-26 RX ADMIN — MONTELUKAST 10 MG: 10 TABLET, FILM COATED ORAL at 12:08

## 2021-08-26 RX ADMIN — DARATUMUMAB AND HYALURONIDASE-FIHJ (HUMAN RECOMBINANT) 1800 MG: 1800; 30000 INJECTION SUBCUTANEOUS at 12:08

## 2021-08-26 RX ADMIN — ACETAMINOPHEN 650 MG: 325 TABLET ORAL at 12:08

## 2021-08-27 ENCOUNTER — SPECIALTY PHARMACY (OUTPATIENT)
Dept: PHARMACY | Facility: CLINIC | Age: 76
End: 2021-08-27

## 2021-09-03 DIAGNOSIS — C90.00 KAPPA LIGHT CHAIN MYELOMA: ICD-10-CM

## 2021-09-03 RX ORDER — LENALIDOMIDE 15 MG/1
15 CAPSULE ORAL SEE ADMIN INSTRUCTIONS
Qty: 10 EACH | Refills: 5 | Status: SHIPPED | OUTPATIENT
Start: 2021-09-03 | End: 2021-09-13

## 2021-09-06 ENCOUNTER — TELEPHONE (OUTPATIENT)
Dept: INFUSION THERAPY | Facility: HOSPITAL | Age: 76
End: 2021-09-06

## 2021-09-08 LAB
GENETICIST REVIEW: NORMAL
PLASMA CELL PROLIF RELEASED BY: NORMAL
PLASMA CELL PROLIF RESULT SUMMARY: NORMAL
PLASMA CELL PROLIF RESULT TABLE: NORMAL
REASON FOR REFERRAL, PLASMA CELL PROLIF (PCPD), FISH: NORMAL
REF LAB TEST METHOD: NORMAL
RESULTS, PLASMA CELL PROLIF (PCPD), FISH: NORMAL
SERVICE CMNT-IMP: NORMAL
SERVICE CMNT-IMP: NORMAL
SPECIMEN SOURCE: NORMAL
SPECIMEN, PLASMA CELL PROLIF (PCPD), FISH: NORMAL

## 2021-09-09 ENCOUNTER — TELEPHONE (OUTPATIENT)
Dept: HEMATOLOGY/ONCOLOGY | Facility: CLINIC | Age: 76
End: 2021-09-09

## 2021-09-09 ENCOUNTER — TELEPHONE (OUTPATIENT)
Dept: NEUROLOGY | Facility: CLINIC | Age: 76
End: 2021-09-09

## 2021-09-09 ENCOUNTER — OFFICE VISIT (OUTPATIENT)
Dept: HEMATOLOGY/ONCOLOGY | Facility: CLINIC | Age: 76
End: 2021-09-09
Payer: MEDICARE

## 2021-09-09 DIAGNOSIS — D64.81 ANEMIA DUE TO ANTINEOPLASTIC CHEMOTHERAPY: ICD-10-CM

## 2021-09-09 DIAGNOSIS — N17.1 ACUTE RENAL FAILURE WITH ACUTE CORTICAL NECROSIS: ICD-10-CM

## 2021-09-09 DIAGNOSIS — Z79.899 IMMUNODEFICIENCY DUE TO DRUG THERAPY: ICD-10-CM

## 2021-09-09 DIAGNOSIS — D63.0 ANEMIA IN NEOPLASTIC DISEASE: ICD-10-CM

## 2021-09-09 DIAGNOSIS — C90.00 KAPPA LIGHT CHAIN MYELOMA: Primary | ICD-10-CM

## 2021-09-09 DIAGNOSIS — T45.1X5A ANEMIA DUE TO ANTINEOPLASTIC CHEMOTHERAPY: ICD-10-CM

## 2021-09-09 DIAGNOSIS — D84.821 IMMUNODEFICIENCY DUE TO DRUG THERAPY: ICD-10-CM

## 2021-09-09 LAB
FINAL PATHOLOGIC DIAGNOSIS: NORMAL
GROSS: NORMAL
Lab: NORMAL
MICROSCOPIC EXAM: NORMAL
SUPPLEMENTAL DIAGNOSIS: NORMAL

## 2021-09-09 PROCEDURE — 99215 OFFICE O/P EST HI 40 MIN: CPT | Mod: 95,,, | Performed by: INTERNAL MEDICINE

## 2021-09-09 PROCEDURE — 99215 PR OFFICE/OUTPT VISIT, EST, LEVL V, 40-54 MIN: ICD-10-PCS | Mod: 95,,, | Performed by: INTERNAL MEDICINE

## 2021-09-09 PROCEDURE — 1159F PR MEDICATION LIST DOCUMENTED IN MEDICAL RECORD: ICD-10-PCS | Mod: CPTII,95,, | Performed by: INTERNAL MEDICINE

## 2021-09-09 PROCEDURE — 3044F PR MOST RECENT HEMOGLOBIN A1C LEVEL <7.0%: ICD-10-PCS | Mod: CPTII,95,, | Performed by: INTERNAL MEDICINE

## 2021-09-09 PROCEDURE — 1111F DSCHRG MED/CURRENT MED MERGE: CPT | Mod: CPTII,95,, | Performed by: INTERNAL MEDICINE

## 2021-09-09 PROCEDURE — 1160F RVW MEDS BY RX/DR IN RCRD: CPT | Mod: CPTII,95,, | Performed by: INTERNAL MEDICINE

## 2021-09-09 PROCEDURE — 3044F HG A1C LEVEL LT 7.0%: CPT | Mod: CPTII,95,, | Performed by: INTERNAL MEDICINE

## 2021-09-09 PROCEDURE — 1111F PR DISCHARGE MEDS RECONCILED W/ CURRENT OUTPATIENT MED LIST: ICD-10-PCS | Mod: CPTII,95,, | Performed by: INTERNAL MEDICINE

## 2021-09-09 PROCEDURE — 1160F PR REVIEW ALL MEDS BY PRESCRIBER/CLIN PHARMACIST DOCUMENTED: ICD-10-PCS | Mod: CPTII,95,, | Performed by: INTERNAL MEDICINE

## 2021-09-09 PROCEDURE — 1159F MED LIST DOCD IN RCRD: CPT | Mod: CPTII,95,, | Performed by: INTERNAL MEDICINE

## 2021-09-09 RX ORDER — FAMOTIDINE 20 MG/1
20 TABLET, FILM COATED ORAL
Status: CANCELLED
Start: 2021-09-09

## 2021-09-09 RX ORDER — SODIUM CHLORIDE 0.9 % (FLUSH) 0.9 %
10 SYRINGE (ML) INJECTION
Status: CANCELLED | OUTPATIENT
Start: 2021-09-09

## 2021-09-09 RX ORDER — DIPHENHYDRAMINE HCL 25 MG
50 CAPSULE ORAL
Status: CANCELLED
Start: 2021-09-09

## 2021-09-09 RX ORDER — BORTEZOMIB 3.5 MG/1
1.3 INJECTION, POWDER, LYOPHILIZED, FOR SOLUTION INTRAVENOUS; SUBCUTANEOUS
Status: CANCELLED | OUTPATIENT
Start: 2021-09-09

## 2021-09-09 RX ORDER — DEXAMETHASONE 0.5 MG/1
20 TABLET ORAL
Status: CANCELLED
Start: 2021-09-09

## 2021-09-09 RX ORDER — ACETAMINOPHEN 325 MG/1
650 TABLET ORAL
Status: CANCELLED | OUTPATIENT
Start: 2021-09-09

## 2021-09-09 RX ORDER — MONTELUKAST SODIUM 10 MG/1
10 TABLET ORAL
Status: CANCELLED | OUTPATIENT
Start: 2021-09-09

## 2021-09-09 RX ORDER — HEPARIN 100 UNIT/ML
500 SYRINGE INTRAVENOUS
Status: CANCELLED | OUTPATIENT
Start: 2021-09-09

## 2021-09-10 ENCOUNTER — TELEPHONE (OUTPATIENT)
Dept: HEMATOLOGY/ONCOLOGY | Facility: CLINIC | Age: 76
End: 2021-09-10

## 2021-09-13 ENCOUNTER — TELEPHONE (OUTPATIENT)
Dept: HEMATOLOGY/ONCOLOGY | Facility: CLINIC | Age: 76
End: 2021-09-13

## 2021-09-13 DIAGNOSIS — C90.00 KAPPA LIGHT CHAIN MYELOMA: Primary | ICD-10-CM

## 2021-09-13 RX ORDER — LENALIDOMIDE 10 MG/1
10 CAPSULE ORAL EVERY OTHER DAY
Qty: 15 EACH | Refills: 5 | Status: SHIPPED | OUTPATIENT
Start: 2021-09-13 | End: 2021-10-08 | Stop reason: SDUPTHER

## 2021-09-14 ENCOUNTER — OFFICE VISIT (OUTPATIENT)
Dept: HEMATOLOGY/ONCOLOGY | Facility: CLINIC | Age: 76
End: 2021-09-14
Payer: MEDICARE

## 2021-09-14 ENCOUNTER — LAB VISIT (OUTPATIENT)
Dept: LAB | Facility: HOSPITAL | Age: 76
End: 2021-09-14
Attending: INTERNAL MEDICINE
Payer: MEDICARE

## 2021-09-14 VITALS
TEMPERATURE: 98 F | OXYGEN SATURATION: 100 % | HEIGHT: 71 IN | RESPIRATION RATE: 18 BRPM | SYSTOLIC BLOOD PRESSURE: 126 MMHG | BODY MASS INDEX: 22.35 KG/M2 | HEART RATE: 75 BPM | DIASTOLIC BLOOD PRESSURE: 58 MMHG | WEIGHT: 159.63 LBS

## 2021-09-14 DIAGNOSIS — N17.1 ACUTE RENAL FAILURE WITH ACUTE CORTICAL NECROSIS: ICD-10-CM

## 2021-09-14 DIAGNOSIS — C90.00 KAPPA LIGHT CHAIN MYELOMA: Primary | ICD-10-CM

## 2021-09-14 DIAGNOSIS — C90.00 KAPPA LIGHT CHAIN MYELOMA: ICD-10-CM

## 2021-09-14 LAB
ALBUMIN SERPL BCP-MCNC: 4.1 G/DL (ref 3.5–5.2)
ALP SERPL-CCNC: 85 U/L (ref 55–135)
ALT SERPL W/O P-5'-P-CCNC: 19 U/L (ref 10–44)
ANION GAP SERPL CALC-SCNC: 13 MMOL/L (ref 8–16)
AST SERPL-CCNC: 21 U/L (ref 10–40)
BASOPHILS # BLD AUTO: 0.02 K/UL (ref 0–0.2)
BASOPHILS NFR BLD: 0.3 % (ref 0–1.9)
BILIRUB SERPL-MCNC: 0.5 MG/DL (ref 0.1–1)
BUN SERPL-MCNC: 24 MG/DL (ref 8–23)
CALCIUM SERPL-MCNC: 9.4 MG/DL (ref 8.7–10.5)
CHLORIDE SERPL-SCNC: 103 MMOL/L (ref 95–110)
CO2 SERPL-SCNC: 24 MMOL/L (ref 23–29)
CREAT SERPL-MCNC: 2.5 MG/DL (ref 0.5–1.4)
DIFFERENTIAL METHOD: ABNORMAL
EOSINOPHIL # BLD AUTO: 0 K/UL (ref 0–0.5)
EOSINOPHIL NFR BLD: 0.2 % (ref 0–8)
ERYTHROCYTE [DISTWIDTH] IN BLOOD BY AUTOMATED COUNT: 13.1 % (ref 11.5–14.5)
EST. GFR  (AFRICAN AMERICAN): 28 ML/MIN/1.73 M^2
EST. GFR  (NON AFRICAN AMERICAN): 25 ML/MIN/1.73 M^2
GLUCOSE SERPL-MCNC: 142 MG/DL (ref 70–110)
HCT VFR BLD AUTO: 25.5 % (ref 40–54)
HGB BLD-MCNC: 9 G/DL (ref 14–18)
IMM GRANULOCYTES # BLD AUTO: 0.02 K/UL (ref 0–0.04)
IMM GRANULOCYTES NFR BLD AUTO: 0.3 % (ref 0–0.5)
LYMPHOCYTES # BLD AUTO: 1.2 K/UL (ref 1–4.8)
LYMPHOCYTES NFR BLD: 20 % (ref 18–48)
MCH RBC QN AUTO: 31.3 PG (ref 27–31)
MCHC RBC AUTO-ENTMCNC: 35.3 G/DL (ref 32–36)
MCV RBC AUTO: 89 FL (ref 82–98)
MONOCYTES # BLD AUTO: 0.6 K/UL (ref 0.3–1)
MONOCYTES NFR BLD: 10.6 % (ref 4–15)
NEUTROPHILS # BLD AUTO: 4.1 K/UL (ref 1.8–7.7)
NEUTROPHILS NFR BLD: 68.6 % (ref 38–73)
NRBC BLD-RTO: 0 /100 WBC
PLATELET # BLD AUTO: 246 K/UL (ref 150–450)
PMV BLD AUTO: 10.7 FL (ref 9.2–12.9)
POTASSIUM SERPL-SCNC: 3.7 MMOL/L (ref 3.5–5.1)
PROT SERPL-MCNC: 7.8 G/DL (ref 6–8.4)
RBC # BLD AUTO: 2.88 M/UL (ref 4.6–6.2)
SODIUM SERPL-SCNC: 140 MMOL/L (ref 136–145)
WBC # BLD AUTO: 5.94 K/UL (ref 3.9–12.7)

## 2021-09-14 PROCEDURE — 83520 IMMUNOASSAY QUANT NOS NONAB: CPT | Performed by: INTERNAL MEDICINE

## 2021-09-14 PROCEDURE — 3078F PR MOST RECENT DIASTOLIC BLOOD PRESSURE < 80 MM HG: ICD-10-PCS | Mod: CPTII,S$GLB,, | Performed by: INTERNAL MEDICINE

## 2021-09-14 PROCEDURE — 99999 PR PBB SHADOW E&M-EST. PATIENT-LVL IV: CPT | Mod: PBBFAC,,, | Performed by: INTERNAL MEDICINE

## 2021-09-14 PROCEDURE — 3044F PR MOST RECENT HEMOGLOBIN A1C LEVEL <7.0%: ICD-10-PCS | Mod: CPTII,S$GLB,, | Performed by: INTERNAL MEDICINE

## 2021-09-14 PROCEDURE — 36415 COLL VENOUS BLD VENIPUNCTURE: CPT | Performed by: INTERNAL MEDICINE

## 2021-09-14 PROCEDURE — 1111F DSCHRG MED/CURRENT MED MERGE: CPT | Mod: CPTII,S$GLB,, | Performed by: INTERNAL MEDICINE

## 2021-09-14 PROCEDURE — 85025 COMPLETE CBC W/AUTO DIFF WBC: CPT | Performed by: INTERNAL MEDICINE

## 2021-09-14 PROCEDURE — 84165 PROTEIN E-PHORESIS SERUM: CPT | Mod: 26,,, | Performed by: PATHOLOGY

## 2021-09-14 PROCEDURE — 3078F DIAST BP <80 MM HG: CPT | Mod: CPTII,S$GLB,, | Performed by: INTERNAL MEDICINE

## 2021-09-14 PROCEDURE — 3044F HG A1C LEVEL LT 7.0%: CPT | Mod: CPTII,S$GLB,, | Performed by: INTERNAL MEDICINE

## 2021-09-14 PROCEDURE — 84165 PATHOLOGIST INTERPRETATION SPE: ICD-10-PCS | Mod: 26,,, | Performed by: PATHOLOGY

## 2021-09-14 PROCEDURE — 1159F PR MEDICATION LIST DOCUMENTED IN MEDICAL RECORD: ICD-10-PCS | Mod: CPTII,S$GLB,, | Performed by: INTERNAL MEDICINE

## 2021-09-14 PROCEDURE — 1101F PR PT FALLS ASSESS DOC 0-1 FALLS W/OUT INJ PAST YR: ICD-10-PCS | Mod: CPTII,S$GLB,, | Performed by: INTERNAL MEDICINE

## 2021-09-14 PROCEDURE — 80053 COMPREHEN METABOLIC PANEL: CPT | Performed by: INTERNAL MEDICINE

## 2021-09-14 PROCEDURE — 1111F PR DISCHARGE MEDS RECONCILED W/ CURRENT OUTPATIENT MED LIST: ICD-10-PCS | Mod: CPTII,S$GLB,, | Performed by: INTERNAL MEDICINE

## 2021-09-14 PROCEDURE — 82784 ASSAY IGA/IGD/IGG/IGM EACH: CPT | Performed by: INTERNAL MEDICINE

## 2021-09-14 PROCEDURE — 1126F AMNT PAIN NOTED NONE PRSNT: CPT | Mod: CPTII,S$GLB,, | Performed by: INTERNAL MEDICINE

## 2021-09-14 PROCEDURE — 3074F PR MOST RECENT SYSTOLIC BLOOD PRESSURE < 130 MM HG: ICD-10-PCS | Mod: CPTII,S$GLB,, | Performed by: INTERNAL MEDICINE

## 2021-09-14 PROCEDURE — 84165 PROTEIN E-PHORESIS SERUM: CPT | Performed by: INTERNAL MEDICINE

## 2021-09-14 PROCEDURE — 1160F RVW MEDS BY RX/DR IN RCRD: CPT | Mod: CPTII,S$GLB,, | Performed by: INTERNAL MEDICINE

## 2021-09-14 PROCEDURE — 1159F MED LIST DOCD IN RCRD: CPT | Mod: CPTII,S$GLB,, | Performed by: INTERNAL MEDICINE

## 2021-09-14 PROCEDURE — 99999 PR PBB SHADOW E&M-EST. PATIENT-LVL IV: ICD-10-PCS | Mod: PBBFAC,,, | Performed by: INTERNAL MEDICINE

## 2021-09-14 PROCEDURE — 3008F PR BODY MASS INDEX (BMI) DOCUMENTED: ICD-10-PCS | Mod: CPTII,S$GLB,, | Performed by: INTERNAL MEDICINE

## 2021-09-14 PROCEDURE — 1101F PT FALLS ASSESS-DOCD LE1/YR: CPT | Mod: CPTII,S$GLB,, | Performed by: INTERNAL MEDICINE

## 2021-09-14 PROCEDURE — 1126F PR PAIN SEVERITY QUANTIFIED, NO PAIN PRESENT: ICD-10-PCS | Mod: CPTII,S$GLB,, | Performed by: INTERNAL MEDICINE

## 2021-09-14 PROCEDURE — 99214 OFFICE O/P EST MOD 30 MIN: CPT | Mod: S$GLB,,, | Performed by: INTERNAL MEDICINE

## 2021-09-14 PROCEDURE — 3008F BODY MASS INDEX DOCD: CPT | Mod: CPTII,S$GLB,, | Performed by: INTERNAL MEDICINE

## 2021-09-14 PROCEDURE — 3074F SYST BP LT 130 MM HG: CPT | Mod: CPTII,S$GLB,, | Performed by: INTERNAL MEDICINE

## 2021-09-14 PROCEDURE — 3288F FALL RISK ASSESSMENT DOCD: CPT | Mod: CPTII,S$GLB,, | Performed by: INTERNAL MEDICINE

## 2021-09-14 PROCEDURE — 1160F PR REVIEW ALL MEDS BY PRESCRIBER/CLIN PHARMACIST DOCUMENTED: ICD-10-PCS | Mod: CPTII,S$GLB,, | Performed by: INTERNAL MEDICINE

## 2021-09-14 PROCEDURE — 99214 PR OFFICE/OUTPT VISIT, EST, LEVL IV, 30-39 MIN: ICD-10-PCS | Mod: S$GLB,,, | Performed by: INTERNAL MEDICINE

## 2021-09-14 PROCEDURE — 3288F PR FALLS RISK ASSESSMENT DOCUMENTED: ICD-10-PCS | Mod: CPTII,S$GLB,, | Performed by: INTERNAL MEDICINE

## 2021-09-14 RX ORDER — FAMOTIDINE 20 MG/1
20 TABLET, FILM COATED ORAL
Status: CANCELLED
Start: 2021-09-16

## 2021-09-14 RX ORDER — HEPARIN 100 UNIT/ML
500 SYRINGE INTRAVENOUS
Status: CANCELLED | OUTPATIENT
Start: 2021-09-23

## 2021-09-14 RX ORDER — EPINEPHRINE 0.3 MG/.3ML
0.3 INJECTION SUBCUTANEOUS ONCE AS NEEDED
Status: CANCELLED | OUTPATIENT
Start: 2021-09-23

## 2021-09-14 RX ORDER — DIPHENHYDRAMINE HYDROCHLORIDE 50 MG/ML
50 INJECTION INTRAMUSCULAR; INTRAVENOUS ONCE AS NEEDED
Status: CANCELLED | OUTPATIENT
Start: 2021-09-23

## 2021-09-14 RX ORDER — BORTEZOMIB 3.5 MG/1
1.3 INJECTION, POWDER, LYOPHILIZED, FOR SOLUTION INTRAVENOUS; SUBCUTANEOUS
Status: CANCELLED | OUTPATIENT
Start: 2021-09-23

## 2021-09-14 RX ORDER — FAMOTIDINE 20 MG/1
20 TABLET, FILM COATED ORAL
Status: CANCELLED
Start: 2021-09-23

## 2021-09-14 RX ORDER — SODIUM CHLORIDE 0.9 % (FLUSH) 0.9 %
10 SYRINGE (ML) INJECTION
Status: CANCELLED | OUTPATIENT
Start: 2021-09-16

## 2021-09-14 RX ORDER — DEXAMETHASONE 0.5 MG/1
20 TABLET ORAL
Status: CANCELLED
Start: 2021-09-23

## 2021-09-14 RX ORDER — DIPHENHYDRAMINE HCL 25 MG
50 CAPSULE ORAL
Status: CANCELLED
Start: 2021-09-16

## 2021-09-14 RX ORDER — BORTEZOMIB 3.5 MG/1
1.3 INJECTION, POWDER, LYOPHILIZED, FOR SOLUTION INTRAVENOUS; SUBCUTANEOUS
Status: CANCELLED | OUTPATIENT
Start: 2021-09-16

## 2021-09-14 RX ORDER — DIPHENHYDRAMINE HYDROCHLORIDE 50 MG/ML
50 INJECTION INTRAMUSCULAR; INTRAVENOUS ONCE AS NEEDED
Status: CANCELLED | OUTPATIENT
Start: 2021-09-16

## 2021-09-14 RX ORDER — DIPHENHYDRAMINE HCL 25 MG
50 CAPSULE ORAL
Status: CANCELLED
Start: 2021-09-23

## 2021-09-14 RX ORDER — ACETAMINOPHEN 325 MG/1
650 TABLET ORAL
Status: CANCELLED | OUTPATIENT
Start: 2021-09-16

## 2021-09-14 RX ORDER — SODIUM CHLORIDE 0.9 % (FLUSH) 0.9 %
10 SYRINGE (ML) INJECTION
Status: CANCELLED | OUTPATIENT
Start: 2021-09-23

## 2021-09-14 RX ORDER — EPINEPHRINE 0.3 MG/.3ML
0.3 INJECTION SUBCUTANEOUS ONCE AS NEEDED
Status: CANCELLED | OUTPATIENT
Start: 2021-09-16

## 2021-09-14 RX ORDER — DEXAMETHASONE 0.5 MG/1
20 TABLET ORAL
Status: CANCELLED
Start: 2021-09-16

## 2021-09-14 RX ORDER — HEPARIN 100 UNIT/ML
500 SYRINGE INTRAVENOUS
Status: CANCELLED | OUTPATIENT
Start: 2021-09-16

## 2021-09-14 RX ORDER — MONTELUKAST SODIUM 10 MG/1
10 TABLET ORAL
Status: CANCELLED | OUTPATIENT
Start: 2021-09-16

## 2021-09-14 RX ORDER — ACETAMINOPHEN 325 MG/1
650 TABLET ORAL
Status: CANCELLED | OUTPATIENT
Start: 2021-09-23

## 2021-09-15 LAB
ALBUMIN SERPL ELPH-MCNC: 4.11 G/DL (ref 3.35–5.55)
ALPHA1 GLOB SERPL ELPH-MCNC: 0.33 G/DL (ref 0.17–0.41)
ALPHA2 GLOB SERPL ELPH-MCNC: 0.85 G/DL (ref 0.43–0.99)
B-GLOBULIN SERPL ELPH-MCNC: 0.59 G/DL (ref 0.5–1.1)
GAMMA GLOB SERPL ELPH-MCNC: 1.42 G/DL (ref 0.67–1.58)
IGA SERPL-MCNC: 50 MG/DL (ref 40–350)
IGG SERPL-MCNC: 1580 MG/DL (ref 650–1600)
IGM SERPL-MCNC: 22 MG/DL (ref 50–300)
KAPPA LC SER QL IA: 10.82 MG/DL (ref 0.33–1.94)
KAPPA LC/LAMBDA SER IA: 12.16 (ref 0.26–1.65)
LAMBDA LC SER QL IA: 0.89 MG/DL (ref 0.57–2.63)
PATHOLOGIST INTERPRETATION SPE: NORMAL
PROT SERPL-MCNC: 7.3 G/DL (ref 6–8.4)

## 2021-09-23 ENCOUNTER — INFUSION (OUTPATIENT)
Dept: INFUSION THERAPY | Facility: HOSPITAL | Age: 76
End: 2021-09-23
Attending: SURGERY
Payer: MEDICARE

## 2021-09-23 VITALS
HEIGHT: 71 IN | TEMPERATURE: 98 F | SYSTOLIC BLOOD PRESSURE: 166 MMHG | WEIGHT: 159.63 LBS | OXYGEN SATURATION: 100 % | BODY MASS INDEX: 22.35 KG/M2 | DIASTOLIC BLOOD PRESSURE: 79 MMHG | HEART RATE: 66 BPM | RESPIRATION RATE: 18 BRPM

## 2021-09-23 DIAGNOSIS — C90.00 KAPPA LIGHT CHAIN MYELOMA: Primary | ICD-10-CM

## 2021-09-23 PROCEDURE — 25000003 PHARM REV CODE 250: Performed by: INTERNAL MEDICINE

## 2021-09-23 PROCEDURE — 96401 CHEMO ANTI-NEOPL SQ/IM: CPT

## 2021-09-23 PROCEDURE — 63600175 PHARM REV CODE 636 W HCPCS: Mod: TB | Performed by: INTERNAL MEDICINE

## 2021-09-23 RX ORDER — EPINEPHRINE 0.3 MG/.3ML
0.3 INJECTION SUBCUTANEOUS ONCE AS NEEDED
Status: DISCONTINUED | OUTPATIENT
Start: 2021-09-23 | End: 2021-09-23 | Stop reason: HOSPADM

## 2021-09-23 RX ORDER — BORTEZOMIB 3.5 MG/1
1.3 INJECTION, POWDER, LYOPHILIZED, FOR SOLUTION INTRAVENOUS; SUBCUTANEOUS
Status: COMPLETED | OUTPATIENT
Start: 2021-09-23 | End: 2021-09-23

## 2021-09-23 RX ORDER — HEPARIN 100 UNIT/ML
500 SYRINGE INTRAVENOUS
Status: DISCONTINUED | OUTPATIENT
Start: 2021-09-23 | End: 2021-09-23 | Stop reason: HOSPADM

## 2021-09-23 RX ORDER — DIPHENHYDRAMINE HCL 25 MG
50 CAPSULE ORAL
Status: COMPLETED | OUTPATIENT
Start: 2021-09-23 | End: 2021-09-23

## 2021-09-23 RX ORDER — ACETAMINOPHEN 325 MG/1
650 TABLET ORAL
Status: COMPLETED | OUTPATIENT
Start: 2021-09-23 | End: 2021-09-23

## 2021-09-23 RX ORDER — DEXAMETHASONE 4 MG/1
20 TABLET ORAL
Status: COMPLETED | OUTPATIENT
Start: 2021-09-23 | End: 2021-09-23

## 2021-09-23 RX ORDER — FAMOTIDINE 20 MG/1
20 TABLET, FILM COATED ORAL
Status: COMPLETED | OUTPATIENT
Start: 2021-09-23 | End: 2021-09-23

## 2021-09-23 RX ORDER — DIPHENHYDRAMINE HYDROCHLORIDE 50 MG/ML
50 INJECTION INTRAMUSCULAR; INTRAVENOUS ONCE AS NEEDED
Status: DISCONTINUED | OUTPATIENT
Start: 2021-09-23 | End: 2021-09-23 | Stop reason: HOSPADM

## 2021-09-23 RX ORDER — SODIUM CHLORIDE 0.9 % (FLUSH) 0.9 %
10 SYRINGE (ML) INJECTION
Status: DISCONTINUED | OUTPATIENT
Start: 2021-09-23 | End: 2021-09-23 | Stop reason: HOSPADM

## 2021-09-23 RX ADMIN — DIPHENHYDRAMINE HYDROCHLORIDE 50 MG: 25 CAPSULE ORAL at 01:09

## 2021-09-23 RX ADMIN — DEXAMETHASONE 20 MG: 4 TABLET ORAL at 01:09

## 2021-09-23 RX ADMIN — FAMOTIDINE 20 MG: 20 TABLET ORAL at 01:09

## 2021-09-23 RX ADMIN — DARATUMUMAB AND HYALURONIDASE-FIHJ (HUMAN RECOMBINANT) 1800 MG: 1800; 30000 INJECTION SUBCUTANEOUS at 01:09

## 2021-09-23 RX ADMIN — BORTEZOMIB 2.5 MG: 3.5 INJECTION, POWDER, LYOPHILIZED, FOR SOLUTION INTRAVENOUS; SUBCUTANEOUS at 01:09

## 2021-09-23 RX ADMIN — ACETAMINOPHEN 650 MG: 325 TABLET ORAL at 01:09

## 2021-09-24 ENCOUNTER — OFFICE VISIT (OUTPATIENT)
Dept: NEPHROLOGY | Facility: CLINIC | Age: 76
End: 2021-09-24
Payer: MEDICARE

## 2021-09-24 VITALS
HEART RATE: 78 BPM | DIASTOLIC BLOOD PRESSURE: 60 MMHG | HEIGHT: 71 IN | WEIGHT: 163.81 LBS | SYSTOLIC BLOOD PRESSURE: 160 MMHG | BODY MASS INDEX: 22.93 KG/M2 | OXYGEN SATURATION: 98 %

## 2021-09-24 DIAGNOSIS — N17.1 ACUTE RENAL FAILURE WITH ACUTE CORTICAL NECROSIS: ICD-10-CM

## 2021-09-24 DIAGNOSIS — E87.5 HYPERKALEMIA: ICD-10-CM

## 2021-09-24 DIAGNOSIS — N18.9 ACUTE RENAL FAILURE SUPERIMPOSED ON CHRONIC KIDNEY DISEASE, UNSPECIFIED CKD STAGE, UNSPECIFIED ACUTE RENAL FAILURE TYPE: Primary | ICD-10-CM

## 2021-09-24 DIAGNOSIS — N17.1 ACUTE RENAL FAILURE WITH ACUTE CORTICAL NECROSIS: Primary | ICD-10-CM

## 2021-09-24 DIAGNOSIS — E87.20 METABOLIC ACIDOSIS: ICD-10-CM

## 2021-09-24 DIAGNOSIS — N17.9 ACUTE RENAL FAILURE SUPERIMPOSED ON CHRONIC KIDNEY DISEASE, UNSPECIFIED CKD STAGE, UNSPECIFIED ACUTE RENAL FAILURE TYPE: Primary | ICD-10-CM

## 2021-09-24 PROCEDURE — 3066F NEPHROPATHY DOC TX: CPT | Mod: CPTII,S$GLB,, | Performed by: INTERNAL MEDICINE

## 2021-09-24 PROCEDURE — 1160F PR REVIEW ALL MEDS BY PRESCRIBER/CLIN PHARMACIST DOCUMENTED: ICD-10-PCS | Mod: CPTII,S$GLB,, | Performed by: INTERNAL MEDICINE

## 2021-09-24 PROCEDURE — 3078F DIAST BP <80 MM HG: CPT | Mod: CPTII,S$GLB,, | Performed by: INTERNAL MEDICINE

## 2021-09-24 PROCEDURE — 99999 PR PBB SHADOW E&M-EST. PATIENT-LVL V: CPT | Mod: PBBFAC,,, | Performed by: INTERNAL MEDICINE

## 2021-09-24 PROCEDURE — 3008F PR BODY MASS INDEX (BMI) DOCUMENTED: ICD-10-PCS | Mod: CPTII,S$GLB,, | Performed by: INTERNAL MEDICINE

## 2021-09-24 PROCEDURE — 3066F PR DOCUMENTATION OF TREATMENT FOR NEPHROPATHY: ICD-10-PCS | Mod: CPTII,S$GLB,, | Performed by: INTERNAL MEDICINE

## 2021-09-24 PROCEDURE — 1101F PT FALLS ASSESS-DOCD LE1/YR: CPT | Mod: CPTII,S$GLB,, | Performed by: INTERNAL MEDICINE

## 2021-09-24 PROCEDURE — 1101F PR PT FALLS ASSESS DOC 0-1 FALLS W/OUT INJ PAST YR: ICD-10-PCS | Mod: CPTII,S$GLB,, | Performed by: INTERNAL MEDICINE

## 2021-09-24 PROCEDURE — 3078F PR MOST RECENT DIASTOLIC BLOOD PRESSURE < 80 MM HG: ICD-10-PCS | Mod: CPTII,S$GLB,, | Performed by: INTERNAL MEDICINE

## 2021-09-24 PROCEDURE — 1160F RVW MEDS BY RX/DR IN RCRD: CPT | Mod: CPTII,S$GLB,, | Performed by: INTERNAL MEDICINE

## 2021-09-24 PROCEDURE — 1126F AMNT PAIN NOTED NONE PRSNT: CPT | Mod: CPTII,S$GLB,, | Performed by: INTERNAL MEDICINE

## 2021-09-24 PROCEDURE — 3008F BODY MASS INDEX DOCD: CPT | Mod: CPTII,S$GLB,, | Performed by: INTERNAL MEDICINE

## 2021-09-24 PROCEDURE — 3288F PR FALLS RISK ASSESSMENT DOCUMENTED: ICD-10-PCS | Mod: CPTII,S$GLB,, | Performed by: INTERNAL MEDICINE

## 2021-09-24 PROCEDURE — 3044F HG A1C LEVEL LT 7.0%: CPT | Mod: CPTII,S$GLB,, | Performed by: INTERNAL MEDICINE

## 2021-09-24 PROCEDURE — 1159F MED LIST DOCD IN RCRD: CPT | Mod: CPTII,S$GLB,, | Performed by: INTERNAL MEDICINE

## 2021-09-24 PROCEDURE — 3077F PR MOST RECENT SYSTOLIC BLOOD PRESSURE >= 140 MM HG: ICD-10-PCS | Mod: CPTII,S$GLB,, | Performed by: INTERNAL MEDICINE

## 2021-09-24 PROCEDURE — 3288F FALL RISK ASSESSMENT DOCD: CPT | Mod: CPTII,S$GLB,, | Performed by: INTERNAL MEDICINE

## 2021-09-24 PROCEDURE — 3077F SYST BP >= 140 MM HG: CPT | Mod: CPTII,S$GLB,, | Performed by: INTERNAL MEDICINE

## 2021-09-24 PROCEDURE — 3044F PR MOST RECENT HEMOGLOBIN A1C LEVEL <7.0%: ICD-10-PCS | Mod: CPTII,S$GLB,, | Performed by: INTERNAL MEDICINE

## 2021-09-24 PROCEDURE — 99205 PR OFFICE/OUTPT VISIT, NEW, LEVL V, 60-74 MIN: ICD-10-PCS | Mod: S$GLB,,, | Performed by: INTERNAL MEDICINE

## 2021-09-24 PROCEDURE — 1159F PR MEDICATION LIST DOCUMENTED IN MEDICAL RECORD: ICD-10-PCS | Mod: CPTII,S$GLB,, | Performed by: INTERNAL MEDICINE

## 2021-09-24 PROCEDURE — 99999 PR PBB SHADOW E&M-EST. PATIENT-LVL V: ICD-10-PCS | Mod: PBBFAC,,, | Performed by: INTERNAL MEDICINE

## 2021-09-24 PROCEDURE — 99205 OFFICE O/P NEW HI 60 MIN: CPT | Mod: S$GLB,,, | Performed by: INTERNAL MEDICINE

## 2021-09-24 PROCEDURE — 1126F PR PAIN SEVERITY QUANTIFIED, NO PAIN PRESENT: ICD-10-PCS | Mod: CPTII,S$GLB,, | Performed by: INTERNAL MEDICINE

## 2021-09-30 ENCOUNTER — OFFICE VISIT (OUTPATIENT)
Dept: HEMATOLOGY/ONCOLOGY | Facility: CLINIC | Age: 76
End: 2021-09-30
Payer: MEDICARE

## 2021-09-30 ENCOUNTER — INFUSION (OUTPATIENT)
Dept: INFUSION THERAPY | Facility: HOSPITAL | Age: 76
End: 2021-09-30
Attending: SURGERY
Payer: MEDICARE

## 2021-09-30 VITALS
HEART RATE: 72 BPM | DIASTOLIC BLOOD PRESSURE: 68 MMHG | TEMPERATURE: 97 F | BODY MASS INDEX: 22.99 KG/M2 | OXYGEN SATURATION: 99 % | SYSTOLIC BLOOD PRESSURE: 145 MMHG | WEIGHT: 164.25 LBS | RESPIRATION RATE: 19 BRPM | HEIGHT: 71 IN

## 2021-09-30 VITALS
OXYGEN SATURATION: 99 % | RESPIRATION RATE: 18 BRPM | BODY MASS INDEX: 22.99 KG/M2 | DIASTOLIC BLOOD PRESSURE: 68 MMHG | SYSTOLIC BLOOD PRESSURE: 145 MMHG | WEIGHT: 164.25 LBS | HEIGHT: 71 IN | HEART RATE: 72 BPM | TEMPERATURE: 97 F

## 2021-09-30 DIAGNOSIS — R60.0 PERIPHERAL EDEMA: ICD-10-CM

## 2021-09-30 DIAGNOSIS — C90.00 KAPPA LIGHT CHAIN MYELOMA: Primary | ICD-10-CM

## 2021-09-30 DIAGNOSIS — N18.32 STAGE 3B CHRONIC KIDNEY DISEASE: ICD-10-CM

## 2021-09-30 PROCEDURE — 3044F PR MOST RECENT HEMOGLOBIN A1C LEVEL <7.0%: ICD-10-PCS | Mod: CPTII,S$GLB,, | Performed by: INTERNAL MEDICINE

## 2021-09-30 PROCEDURE — 99999 PR PBB SHADOW E&M-EST. PATIENT-LVL IV: CPT | Mod: PBBFAC,,, | Performed by: INTERNAL MEDICINE

## 2021-09-30 PROCEDURE — 1159F MED LIST DOCD IN RCRD: CPT | Mod: CPTII,S$GLB,, | Performed by: INTERNAL MEDICINE

## 2021-09-30 PROCEDURE — 1126F PR PAIN SEVERITY QUANTIFIED, NO PAIN PRESENT: ICD-10-PCS | Mod: CPTII,S$GLB,, | Performed by: INTERNAL MEDICINE

## 2021-09-30 PROCEDURE — 3008F BODY MASS INDEX DOCD: CPT | Mod: CPTII,S$GLB,, | Performed by: INTERNAL MEDICINE

## 2021-09-30 PROCEDURE — 99999 PR PBB SHADOW E&M-EST. PATIENT-LVL IV: ICD-10-PCS | Mod: PBBFAC,,, | Performed by: INTERNAL MEDICINE

## 2021-09-30 PROCEDURE — 1101F PT FALLS ASSESS-DOCD LE1/YR: CPT | Mod: CPTII,S$GLB,, | Performed by: INTERNAL MEDICINE

## 2021-09-30 PROCEDURE — 99215 PR OFFICE/OUTPT VISIT, EST, LEVL V, 40-54 MIN: ICD-10-PCS | Mod: S$GLB,,, | Performed by: INTERNAL MEDICINE

## 2021-09-30 PROCEDURE — 96409 CHEMO IV PUSH SNGL DRUG: CPT

## 2021-09-30 PROCEDURE — 3008F PR BODY MASS INDEX (BMI) DOCUMENTED: ICD-10-PCS | Mod: CPTII,S$GLB,, | Performed by: INTERNAL MEDICINE

## 2021-09-30 PROCEDURE — 3288F PR FALLS RISK ASSESSMENT DOCUMENTED: ICD-10-PCS | Mod: CPTII,S$GLB,, | Performed by: INTERNAL MEDICINE

## 2021-09-30 PROCEDURE — 1160F RVW MEDS BY RX/DR IN RCRD: CPT | Mod: CPTII,S$GLB,, | Performed by: INTERNAL MEDICINE

## 2021-09-30 PROCEDURE — 3044F HG A1C LEVEL LT 7.0%: CPT | Mod: CPTII,S$GLB,, | Performed by: INTERNAL MEDICINE

## 2021-09-30 PROCEDURE — 63600175 PHARM REV CODE 636 W HCPCS: Mod: JG | Performed by: INTERNAL MEDICINE

## 2021-09-30 PROCEDURE — 3288F FALL RISK ASSESSMENT DOCD: CPT | Mod: CPTII,S$GLB,, | Performed by: INTERNAL MEDICINE

## 2021-09-30 PROCEDURE — 1160F PR REVIEW ALL MEDS BY PRESCRIBER/CLIN PHARMACIST DOCUMENTED: ICD-10-PCS | Mod: CPTII,S$GLB,, | Performed by: INTERNAL MEDICINE

## 2021-09-30 PROCEDURE — 3078F PR MOST RECENT DIASTOLIC BLOOD PRESSURE < 80 MM HG: ICD-10-PCS | Mod: CPTII,S$GLB,, | Performed by: INTERNAL MEDICINE

## 2021-09-30 PROCEDURE — 1159F PR MEDICATION LIST DOCUMENTED IN MEDICAL RECORD: ICD-10-PCS | Mod: CPTII,S$GLB,, | Performed by: INTERNAL MEDICINE

## 2021-09-30 PROCEDURE — 99215 OFFICE O/P EST HI 40 MIN: CPT | Mod: S$GLB,,, | Performed by: INTERNAL MEDICINE

## 2021-09-30 PROCEDURE — 25000003 PHARM REV CODE 250: Performed by: INTERNAL MEDICINE

## 2021-09-30 PROCEDURE — 3066F PR DOCUMENTATION OF TREATMENT FOR NEPHROPATHY: ICD-10-PCS | Mod: CPTII,S$GLB,, | Performed by: INTERNAL MEDICINE

## 2021-09-30 PROCEDURE — 1126F AMNT PAIN NOTED NONE PRSNT: CPT | Mod: CPTII,S$GLB,, | Performed by: INTERNAL MEDICINE

## 2021-09-30 PROCEDURE — 96411 CHEMO IV PUSH ADDL DRUG: CPT

## 2021-09-30 PROCEDURE — 1101F PR PT FALLS ASSESS DOC 0-1 FALLS W/OUT INJ PAST YR: ICD-10-PCS | Mod: CPTII,S$GLB,, | Performed by: INTERNAL MEDICINE

## 2021-09-30 PROCEDURE — 3077F PR MOST RECENT SYSTOLIC BLOOD PRESSURE >= 140 MM HG: ICD-10-PCS | Mod: CPTII,S$GLB,, | Performed by: INTERNAL MEDICINE

## 2021-09-30 PROCEDURE — 3078F DIAST BP <80 MM HG: CPT | Mod: CPTII,S$GLB,, | Performed by: INTERNAL MEDICINE

## 2021-09-30 PROCEDURE — 3077F SYST BP >= 140 MM HG: CPT | Mod: CPTII,S$GLB,, | Performed by: INTERNAL MEDICINE

## 2021-09-30 PROCEDURE — 3066F NEPHROPATHY DOC TX: CPT | Mod: CPTII,S$GLB,, | Performed by: INTERNAL MEDICINE

## 2021-09-30 RX ORDER — ACETAMINOPHEN 325 MG/1
650 TABLET ORAL
Status: CANCELLED | OUTPATIENT
Start: 2021-09-30

## 2021-09-30 RX ORDER — FAMOTIDINE 20 MG/1
20 TABLET, FILM COATED ORAL
Status: COMPLETED | OUTPATIENT
Start: 2021-09-30 | End: 2021-09-30

## 2021-09-30 RX ORDER — HEPARIN 100 UNIT/ML
500 SYRINGE INTRAVENOUS
Status: CANCELLED | OUTPATIENT
Start: 2021-09-30

## 2021-09-30 RX ORDER — DIPHENHYDRAMINE HCL 25 MG
50 CAPSULE ORAL
Status: CANCELLED
Start: 2021-09-30

## 2021-09-30 RX ORDER — DEXAMETHASONE 0.5 MG/1
8 TABLET ORAL
Status: CANCELLED
Start: 2021-09-30

## 2021-09-30 RX ORDER — BORTEZOMIB 3.5 MG/1
1.3 INJECTION, POWDER, LYOPHILIZED, FOR SOLUTION INTRAVENOUS; SUBCUTANEOUS
Status: COMPLETED | OUTPATIENT
Start: 2021-09-30 | End: 2021-09-30

## 2021-09-30 RX ORDER — BORTEZOMIB 3.5 MG/1
1.3 INJECTION, POWDER, LYOPHILIZED, FOR SOLUTION INTRAVENOUS; SUBCUTANEOUS
Status: CANCELLED | OUTPATIENT
Start: 2021-09-30

## 2021-09-30 RX ORDER — DEXAMETHASONE 4 MG/1
8 TABLET ORAL
Status: COMPLETED | OUTPATIENT
Start: 2021-09-30 | End: 2021-09-30

## 2021-09-30 RX ORDER — DIPHENHYDRAMINE HCL 25 MG
50 CAPSULE ORAL
Status: COMPLETED | OUTPATIENT
Start: 2021-09-30 | End: 2021-09-30

## 2021-09-30 RX ORDER — ACETAMINOPHEN 325 MG/1
650 TABLET ORAL
Status: COMPLETED | OUTPATIENT
Start: 2021-09-30 | End: 2021-09-30

## 2021-09-30 RX ORDER — FAMOTIDINE 20 MG/1
20 TABLET, FILM COATED ORAL
Status: CANCELLED
Start: 2021-09-30

## 2021-09-30 RX ORDER — SODIUM CHLORIDE 0.9 % (FLUSH) 0.9 %
10 SYRINGE (ML) INJECTION
Status: CANCELLED | OUTPATIENT
Start: 2021-09-30

## 2021-09-30 RX ADMIN — BORTEZOMIB 2.5 MG: 3.5 INJECTION, POWDER, LYOPHILIZED, FOR SOLUTION INTRAVENOUS; SUBCUTANEOUS at 03:09

## 2021-09-30 RX ADMIN — DARATUMUMAB AND HYALURONIDASE-FIHJ (HUMAN RECOMBINANT) 1800 MG: 1800; 30000 INJECTION SUBCUTANEOUS at 03:09

## 2021-09-30 RX ADMIN — DIPHENHYDRAMINE HYDROCHLORIDE 50 MG: 25 CAPSULE ORAL at 02:09

## 2021-09-30 RX ADMIN — ACETAMINOPHEN 650 MG: 325 TABLET ORAL at 03:09

## 2021-09-30 RX ADMIN — DEXAMETHASONE 8 MG: 4 TABLET ORAL at 03:09

## 2021-09-30 RX ADMIN — FAMOTIDINE 20 MG: 20 TABLET ORAL at 02:09

## 2021-10-08 DIAGNOSIS — C90.00 KAPPA LIGHT CHAIN MYELOMA: ICD-10-CM

## 2021-10-08 RX ORDER — LENALIDOMIDE 10 MG/1
10 CAPSULE ORAL EVERY OTHER DAY
Qty: 15 EACH | Refills: 5 | Status: SHIPPED | OUTPATIENT
Start: 2021-10-08 | End: 2021-11-15 | Stop reason: SDUPTHER

## 2021-10-11 ENCOUNTER — TELEPHONE (OUTPATIENT)
Dept: HEMATOLOGY/ONCOLOGY | Facility: CLINIC | Age: 76
End: 2021-10-11

## 2021-10-13 ENCOUNTER — OFFICE VISIT (OUTPATIENT)
Dept: UROLOGY | Facility: CLINIC | Age: 76
End: 2021-10-13
Payer: MEDICARE

## 2021-10-13 VITALS
HEIGHT: 71 IN | DIASTOLIC BLOOD PRESSURE: 64 MMHG | WEIGHT: 155.44 LBS | SYSTOLIC BLOOD PRESSURE: 170 MMHG | BODY MASS INDEX: 21.76 KG/M2 | HEART RATE: 62 BPM

## 2021-10-13 DIAGNOSIS — N17.9 ACUTE RENAL FAILURE SUPERIMPOSED ON CHRONIC KIDNEY DISEASE, UNSPECIFIED CKD STAGE, UNSPECIFIED ACUTE RENAL FAILURE TYPE: ICD-10-CM

## 2021-10-13 DIAGNOSIS — N18.9 ACUTE RENAL FAILURE SUPERIMPOSED ON CHRONIC KIDNEY DISEASE, UNSPECIFIED CKD STAGE, UNSPECIFIED ACUTE RENAL FAILURE TYPE: ICD-10-CM

## 2021-10-13 DIAGNOSIS — N13.8 BPH WITH URINARY OBSTRUCTION: Primary | ICD-10-CM

## 2021-10-13 DIAGNOSIS — N40.1 BPH WITH URINARY OBSTRUCTION: Primary | ICD-10-CM

## 2021-10-13 PROCEDURE — 3008F PR BODY MASS INDEX (BMI) DOCUMENTED: ICD-10-PCS | Mod: CPTII,S$GLB,, | Performed by: UROLOGY

## 2021-10-13 PROCEDURE — 1126F PR PAIN SEVERITY QUANTIFIED, NO PAIN PRESENT: ICD-10-PCS | Mod: CPTII,S$GLB,, | Performed by: UROLOGY

## 2021-10-13 PROCEDURE — 1159F PR MEDICATION LIST DOCUMENTED IN MEDICAL RECORD: ICD-10-PCS | Mod: CPTII,S$GLB,, | Performed by: UROLOGY

## 2021-10-13 PROCEDURE — 99999 PR PBB SHADOW E&M-EST. PATIENT-LVL IV: ICD-10-PCS | Mod: PBBFAC,,, | Performed by: UROLOGY

## 2021-10-13 PROCEDURE — 3078F DIAST BP <80 MM HG: CPT | Mod: CPTII,S$GLB,, | Performed by: UROLOGY

## 2021-10-13 PROCEDURE — 99204 OFFICE O/P NEW MOD 45 MIN: CPT | Mod: S$GLB,,, | Performed by: UROLOGY

## 2021-10-13 PROCEDURE — 1159F MED LIST DOCD IN RCRD: CPT | Mod: CPTII,S$GLB,, | Performed by: UROLOGY

## 2021-10-13 PROCEDURE — 99204 PR OFFICE/OUTPT VISIT, NEW, LEVL IV, 45-59 MIN: ICD-10-PCS | Mod: S$GLB,,, | Performed by: UROLOGY

## 2021-10-13 PROCEDURE — 3077F SYST BP >= 140 MM HG: CPT | Mod: CPTII,S$GLB,, | Performed by: UROLOGY

## 2021-10-13 PROCEDURE — 3288F PR FALLS RISK ASSESSMENT DOCUMENTED: ICD-10-PCS | Mod: CPTII,S$GLB,, | Performed by: UROLOGY

## 2021-10-13 PROCEDURE — 3008F BODY MASS INDEX DOCD: CPT | Mod: CPTII,S$GLB,, | Performed by: UROLOGY

## 2021-10-13 PROCEDURE — 3044F HG A1C LEVEL LT 7.0%: CPT | Mod: CPTII,S$GLB,, | Performed by: UROLOGY

## 2021-10-13 PROCEDURE — 3044F PR MOST RECENT HEMOGLOBIN A1C LEVEL <7.0%: ICD-10-PCS | Mod: CPTII,S$GLB,, | Performed by: UROLOGY

## 2021-10-13 PROCEDURE — 3288F FALL RISK ASSESSMENT DOCD: CPT | Mod: CPTII,S$GLB,, | Performed by: UROLOGY

## 2021-10-13 PROCEDURE — 3078F PR MOST RECENT DIASTOLIC BLOOD PRESSURE < 80 MM HG: ICD-10-PCS | Mod: CPTII,S$GLB,, | Performed by: UROLOGY

## 2021-10-13 PROCEDURE — 99999 PR PBB SHADOW E&M-EST. PATIENT-LVL IV: CPT | Mod: PBBFAC,,, | Performed by: UROLOGY

## 2021-10-13 PROCEDURE — 3066F NEPHROPATHY DOC TX: CPT | Mod: CPTII,S$GLB,, | Performed by: UROLOGY

## 2021-10-13 PROCEDURE — 3077F PR MOST RECENT SYSTOLIC BLOOD PRESSURE >= 140 MM HG: ICD-10-PCS | Mod: CPTII,S$GLB,, | Performed by: UROLOGY

## 2021-10-13 PROCEDURE — 3066F PR DOCUMENTATION OF TREATMENT FOR NEPHROPATHY: ICD-10-PCS | Mod: CPTII,S$GLB,, | Performed by: UROLOGY

## 2021-10-13 PROCEDURE — 1101F PT FALLS ASSESS-DOCD LE1/YR: CPT | Mod: CPTII,S$GLB,, | Performed by: UROLOGY

## 2021-10-13 PROCEDURE — 1101F PR PT FALLS ASSESS DOC 0-1 FALLS W/OUT INJ PAST YR: ICD-10-PCS | Mod: CPTII,S$GLB,, | Performed by: UROLOGY

## 2021-10-13 PROCEDURE — 1126F AMNT PAIN NOTED NONE PRSNT: CPT | Mod: CPTII,S$GLB,, | Performed by: UROLOGY

## 2021-10-14 ENCOUNTER — INFUSION (OUTPATIENT)
Dept: INFUSION THERAPY | Facility: HOSPITAL | Age: 76
End: 2021-10-14
Attending: FAMILY MEDICINE
Payer: MEDICARE

## 2021-10-14 ENCOUNTER — OFFICE VISIT (OUTPATIENT)
Dept: HEMATOLOGY/ONCOLOGY | Facility: CLINIC | Age: 76
End: 2021-10-14
Payer: MEDICARE

## 2021-10-14 VITALS
SYSTOLIC BLOOD PRESSURE: 151 MMHG | HEART RATE: 57 BPM | HEIGHT: 71 IN | OXYGEN SATURATION: 100 % | WEIGHT: 158.5 LBS | DIASTOLIC BLOOD PRESSURE: 66 MMHG | BODY MASS INDEX: 22.19 KG/M2 | OXYGEN SATURATION: 100 % | HEART RATE: 57 BPM | TEMPERATURE: 97 F | RESPIRATION RATE: 18 BRPM | WEIGHT: 158.5 LBS | RESPIRATION RATE: 18 BRPM | DIASTOLIC BLOOD PRESSURE: 66 MMHG | SYSTOLIC BLOOD PRESSURE: 151 MMHG | BODY MASS INDEX: 22.19 KG/M2 | HEIGHT: 71 IN | TEMPERATURE: 97 F

## 2021-10-14 DIAGNOSIS — C90.00 KAPPA LIGHT CHAIN MYELOMA: Primary | ICD-10-CM

## 2021-10-14 DIAGNOSIS — N17.1 ACUTE RENAL FAILURE WITH ACUTE CORTICAL NECROSIS: ICD-10-CM

## 2021-10-14 DIAGNOSIS — N40.1 BENIGN PROSTATIC HYPERPLASIA WITH URINARY RETENTION: ICD-10-CM

## 2021-10-14 DIAGNOSIS — R33.8 BENIGN PROSTATIC HYPERPLASIA WITH URINARY RETENTION: ICD-10-CM

## 2021-10-14 PROCEDURE — 3008F PR BODY MASS INDEX (BMI) DOCUMENTED: ICD-10-PCS | Mod: CPTII,S$GLB,, | Performed by: INTERNAL MEDICINE

## 2021-10-14 PROCEDURE — 1159F MED LIST DOCD IN RCRD: CPT | Mod: CPTII,S$GLB,, | Performed by: INTERNAL MEDICINE

## 2021-10-14 PROCEDURE — 1101F PT FALLS ASSESS-DOCD LE1/YR: CPT | Mod: CPTII,S$GLB,, | Performed by: INTERNAL MEDICINE

## 2021-10-14 PROCEDURE — 3288F PR FALLS RISK ASSESSMENT DOCUMENTED: ICD-10-PCS | Mod: CPTII,S$GLB,, | Performed by: INTERNAL MEDICINE

## 2021-10-14 PROCEDURE — 3077F SYST BP >= 140 MM HG: CPT | Mod: CPTII,S$GLB,, | Performed by: INTERNAL MEDICINE

## 2021-10-14 PROCEDURE — 3008F BODY MASS INDEX DOCD: CPT | Mod: CPTII,S$GLB,, | Performed by: INTERNAL MEDICINE

## 2021-10-14 PROCEDURE — 3066F NEPHROPATHY DOC TX: CPT | Mod: CPTII,S$GLB,, | Performed by: INTERNAL MEDICINE

## 2021-10-14 PROCEDURE — 3066F PR DOCUMENTATION OF TREATMENT FOR NEPHROPATHY: ICD-10-PCS | Mod: CPTII,S$GLB,, | Performed by: INTERNAL MEDICINE

## 2021-10-14 PROCEDURE — 3288F FALL RISK ASSESSMENT DOCD: CPT | Mod: CPTII,S$GLB,, | Performed by: INTERNAL MEDICINE

## 2021-10-14 PROCEDURE — 1160F PR REVIEW ALL MEDS BY PRESCRIBER/CLIN PHARMACIST DOCUMENTED: ICD-10-PCS | Mod: CPTII,S$GLB,, | Performed by: INTERNAL MEDICINE

## 2021-10-14 PROCEDURE — 3078F DIAST BP <80 MM HG: CPT | Mod: CPTII,S$GLB,, | Performed by: INTERNAL MEDICINE

## 2021-10-14 PROCEDURE — 3044F HG A1C LEVEL LT 7.0%: CPT | Mod: CPTII,S$GLB,, | Performed by: INTERNAL MEDICINE

## 2021-10-14 PROCEDURE — 1159F PR MEDICATION LIST DOCUMENTED IN MEDICAL RECORD: ICD-10-PCS | Mod: CPTII,S$GLB,, | Performed by: INTERNAL MEDICINE

## 2021-10-14 PROCEDURE — 1101F PR PT FALLS ASSESS DOC 0-1 FALLS W/OUT INJ PAST YR: ICD-10-PCS | Mod: CPTII,S$GLB,, | Performed by: INTERNAL MEDICINE

## 2021-10-14 PROCEDURE — 99999 PR PBB SHADOW E&M-EST. PATIENT-LVL III: ICD-10-PCS | Mod: PBBFAC,,, | Performed by: INTERNAL MEDICINE

## 2021-10-14 PROCEDURE — 1126F AMNT PAIN NOTED NONE PRSNT: CPT | Mod: CPTII,S$GLB,, | Performed by: INTERNAL MEDICINE

## 2021-10-14 PROCEDURE — 99214 OFFICE O/P EST MOD 30 MIN: CPT | Mod: S$GLB,,, | Performed by: INTERNAL MEDICINE

## 2021-10-14 PROCEDURE — 99214 PR OFFICE/OUTPT VISIT, EST, LEVL IV, 30-39 MIN: ICD-10-PCS | Mod: S$GLB,,, | Performed by: INTERNAL MEDICINE

## 2021-10-14 PROCEDURE — 3077F PR MOST RECENT SYSTOLIC BLOOD PRESSURE >= 140 MM HG: ICD-10-PCS | Mod: CPTII,S$GLB,, | Performed by: INTERNAL MEDICINE

## 2021-10-14 PROCEDURE — 1160F RVW MEDS BY RX/DR IN RCRD: CPT | Mod: CPTII,S$GLB,, | Performed by: INTERNAL MEDICINE

## 2021-10-14 PROCEDURE — 1126F PR PAIN SEVERITY QUANTIFIED, NO PAIN PRESENT: ICD-10-PCS | Mod: CPTII,S$GLB,, | Performed by: INTERNAL MEDICINE

## 2021-10-14 PROCEDURE — 3044F PR MOST RECENT HEMOGLOBIN A1C LEVEL <7.0%: ICD-10-PCS | Mod: CPTII,S$GLB,, | Performed by: INTERNAL MEDICINE

## 2021-10-14 PROCEDURE — 96401 CHEMO ANTI-NEOPL SQ/IM: CPT

## 2021-10-14 PROCEDURE — 3078F PR MOST RECENT DIASTOLIC BLOOD PRESSURE < 80 MM HG: ICD-10-PCS | Mod: CPTII,S$GLB,, | Performed by: INTERNAL MEDICINE

## 2021-10-14 PROCEDURE — 99999 PR PBB SHADOW E&M-EST. PATIENT-LVL III: CPT | Mod: PBBFAC,,, | Performed by: INTERNAL MEDICINE

## 2021-10-14 PROCEDURE — 25000003 PHARM REV CODE 250: Performed by: INTERNAL MEDICINE

## 2021-10-14 PROCEDURE — 63600175 PHARM REV CODE 636 W HCPCS: Mod: TB | Performed by: INTERNAL MEDICINE

## 2021-10-14 RX ORDER — EPINEPHRINE 0.3 MG/.3ML
0.3 INJECTION SUBCUTANEOUS ONCE AS NEEDED
Status: DISCONTINUED | OUTPATIENT
Start: 2021-10-14 | End: 2021-10-14 | Stop reason: HOSPADM

## 2021-10-14 RX ORDER — DIPHENHYDRAMINE HCL 25 MG
50 CAPSULE ORAL
Status: CANCELLED
Start: 2021-10-14

## 2021-10-14 RX ORDER — ACETAMINOPHEN 325 MG/1
650 TABLET ORAL
Status: CANCELLED | OUTPATIENT
Start: 2021-11-11

## 2021-10-14 RX ORDER — DIPHENHYDRAMINE HYDROCHLORIDE 50 MG/ML
50 INJECTION INTRAMUSCULAR; INTRAVENOUS ONCE AS NEEDED
Status: CANCELLED | OUTPATIENT
Start: 2021-10-14

## 2021-10-14 RX ORDER — DIPHENHYDRAMINE HCL 25 MG
50 CAPSULE ORAL
Status: COMPLETED | OUTPATIENT
Start: 2021-10-14 | End: 2021-10-14

## 2021-10-14 RX ORDER — DEXAMETHASONE 0.5 MG/1
8 TABLET ORAL
Status: CANCELLED
Start: 2021-11-11

## 2021-10-14 RX ORDER — EPINEPHRINE 0.3 MG/.3ML
0.3 INJECTION SUBCUTANEOUS ONCE AS NEEDED
Status: CANCELLED | OUTPATIENT
Start: 2021-10-14

## 2021-10-14 RX ORDER — HEPARIN 100 UNIT/ML
500 SYRINGE INTRAVENOUS
Status: CANCELLED | OUTPATIENT
Start: 2021-11-11

## 2021-10-14 RX ORDER — SODIUM CHLORIDE 0.9 % (FLUSH) 0.9 %
10 SYRINGE (ML) INJECTION
Status: CANCELLED | OUTPATIENT
Start: 2021-11-11

## 2021-10-14 RX ORDER — DIPHENHYDRAMINE HYDROCHLORIDE 50 MG/ML
50 INJECTION INTRAMUSCULAR; INTRAVENOUS ONCE AS NEEDED
Status: CANCELLED | OUTPATIENT
Start: 2021-11-11

## 2021-10-14 RX ORDER — FAMOTIDINE 20 MG/1
20 TABLET, FILM COATED ORAL
Status: COMPLETED | OUTPATIENT
Start: 2021-10-14 | End: 2021-10-14

## 2021-10-14 RX ORDER — FAMOTIDINE 20 MG/1
20 TABLET, FILM COATED ORAL
Status: CANCELLED
Start: 2021-11-11

## 2021-10-14 RX ORDER — SODIUM CHLORIDE 0.9 % (FLUSH) 0.9 %
10 SYRINGE (ML) INJECTION
Status: CANCELLED | OUTPATIENT
Start: 2021-10-28

## 2021-10-14 RX ORDER — DEXAMETHASONE 0.5 MG/1
8 TABLET ORAL
Status: CANCELLED
Start: 2021-10-14

## 2021-10-14 RX ORDER — ACETAMINOPHEN 325 MG/1
650 TABLET ORAL
Status: CANCELLED | OUTPATIENT
Start: 2021-10-14

## 2021-10-14 RX ORDER — FAMOTIDINE 20 MG/1
20 TABLET, FILM COATED ORAL
Status: CANCELLED
Start: 2021-10-28

## 2021-10-14 RX ORDER — HEPARIN 100 UNIT/ML
500 SYRINGE INTRAVENOUS
Status: CANCELLED | OUTPATIENT
Start: 2021-10-28

## 2021-10-14 RX ORDER — ACETAMINOPHEN 325 MG/1
650 TABLET ORAL
Status: COMPLETED | OUTPATIENT
Start: 2021-10-14 | End: 2021-10-14

## 2021-10-14 RX ORDER — HEPARIN 100 UNIT/ML
500 SYRINGE INTRAVENOUS
Status: CANCELLED | OUTPATIENT
Start: 2021-10-14

## 2021-10-14 RX ORDER — BORTEZOMIB 3.5 MG/1
1.3 INJECTION, POWDER, LYOPHILIZED, FOR SOLUTION INTRAVENOUS; SUBCUTANEOUS
Status: CANCELLED | OUTPATIENT
Start: 2021-10-14

## 2021-10-14 RX ORDER — BORTEZOMIB 3.5 MG/1
1.3 INJECTION, POWDER, LYOPHILIZED, FOR SOLUTION INTRAVENOUS; SUBCUTANEOUS
Status: CANCELLED | OUTPATIENT
Start: 2021-10-28

## 2021-10-14 RX ORDER — ACETAMINOPHEN 325 MG/1
650 TABLET ORAL
Status: CANCELLED | OUTPATIENT
Start: 2021-10-28

## 2021-10-14 RX ORDER — BORTEZOMIB 3.5 MG/1
1.3 INJECTION, POWDER, LYOPHILIZED, FOR SOLUTION INTRAVENOUS; SUBCUTANEOUS
Status: COMPLETED | OUTPATIENT
Start: 2021-10-14 | End: 2021-10-14

## 2021-10-14 RX ORDER — DIPHENHYDRAMINE HCL 25 MG
50 CAPSULE ORAL
Status: CANCELLED
Start: 2021-11-11

## 2021-10-14 RX ORDER — DEXAMETHASONE 0.5 MG/1
8 TABLET ORAL
Status: CANCELLED
Start: 2021-10-28

## 2021-10-14 RX ORDER — DIPHENHYDRAMINE HYDROCHLORIDE 50 MG/ML
50 INJECTION INTRAMUSCULAR; INTRAVENOUS ONCE AS NEEDED
Status: DISCONTINUED | OUTPATIENT
Start: 2021-10-14 | End: 2021-10-14 | Stop reason: HOSPADM

## 2021-10-14 RX ORDER — BORTEZOMIB 3.5 MG/1
1.3 INJECTION, POWDER, LYOPHILIZED, FOR SOLUTION INTRAVENOUS; SUBCUTANEOUS
Status: CANCELLED | OUTPATIENT
Start: 2021-11-11

## 2021-10-14 RX ORDER — DIPHENHYDRAMINE HCL 25 MG
50 CAPSULE ORAL
Status: CANCELLED
Start: 2021-10-28

## 2021-10-14 RX ORDER — FAMOTIDINE 20 MG/1
20 TABLET, FILM COATED ORAL
Status: CANCELLED
Start: 2021-10-14

## 2021-10-14 RX ORDER — DEXAMETHASONE 0.5 MG/1
4 TABLET ORAL
Status: CANCELLED
Start: 2021-10-14

## 2021-10-14 RX ORDER — SODIUM CHLORIDE 0.9 % (FLUSH) 0.9 %
10 SYRINGE (ML) INJECTION
Status: CANCELLED | OUTPATIENT
Start: 2021-10-14

## 2021-10-14 RX ORDER — EPINEPHRINE 0.3 MG/.3ML
0.3 INJECTION SUBCUTANEOUS ONCE AS NEEDED
Status: CANCELLED | OUTPATIENT
Start: 2021-11-11

## 2021-10-14 RX ORDER — DEXAMETHASONE 4 MG/1
4 TABLET ORAL
Status: COMPLETED | OUTPATIENT
Start: 2021-10-14 | End: 2021-10-14

## 2021-10-14 RX ADMIN — FAMOTIDINE 20 MG: 20 TABLET ORAL at 11:10

## 2021-10-14 RX ADMIN — DIPHENHYDRAMINE HYDROCHLORIDE 50 MG: 25 CAPSULE ORAL at 11:10

## 2021-10-14 RX ADMIN — BORTEZOMIB 2.5 MG: 3.5 INJECTION, POWDER, LYOPHILIZED, FOR SOLUTION INTRAVENOUS; SUBCUTANEOUS at 11:10

## 2021-10-14 RX ADMIN — ACETAMINOPHEN 650 MG: 325 TABLET ORAL at 11:10

## 2021-10-14 RX ADMIN — DEXAMETHASONE 4 MG: 4 TABLET ORAL at 11:10

## 2021-10-14 RX ADMIN — DARATUMUMAB AND HYALURONIDASE-FIHJ (HUMAN RECOMBINANT) 1800 MG: 1800; 30000 INJECTION SUBCUTANEOUS at 12:10

## 2021-10-27 ENCOUNTER — TELEPHONE (OUTPATIENT)
Dept: HEMATOLOGY/ONCOLOGY | Facility: CLINIC | Age: 76
End: 2021-10-27
Payer: MEDICARE

## 2021-10-28 ENCOUNTER — INFUSION (OUTPATIENT)
Dept: INFUSION THERAPY | Facility: HOSPITAL | Age: 76
End: 2021-10-28
Attending: SURGERY
Payer: MEDICARE

## 2021-10-28 ENCOUNTER — OFFICE VISIT (OUTPATIENT)
Dept: HEMATOLOGY/ONCOLOGY | Facility: CLINIC | Age: 76
End: 2021-10-28
Payer: MEDICARE

## 2021-10-28 VITALS
DIASTOLIC BLOOD PRESSURE: 65 MMHG | BODY MASS INDEX: 22.04 KG/M2 | RESPIRATION RATE: 18 BRPM | HEIGHT: 71 IN | HEART RATE: 57 BPM | OXYGEN SATURATION: 100 % | SYSTOLIC BLOOD PRESSURE: 147 MMHG | TEMPERATURE: 98 F | WEIGHT: 157.44 LBS

## 2021-10-28 VITALS
DIASTOLIC BLOOD PRESSURE: 65 MMHG | OXYGEN SATURATION: 100 % | TEMPERATURE: 98 F | BODY MASS INDEX: 22.04 KG/M2 | HEIGHT: 71 IN | HEART RATE: 57 BPM | RESPIRATION RATE: 18 BRPM | SYSTOLIC BLOOD PRESSURE: 147 MMHG | WEIGHT: 157.44 LBS

## 2021-10-28 DIAGNOSIS — N17.1 ACUTE RENAL FAILURE WITH ACUTE CORTICAL NECROSIS: ICD-10-CM

## 2021-10-28 DIAGNOSIS — D64.81 ANEMIA DUE TO ANTINEOPLASTIC CHEMOTHERAPY: ICD-10-CM

## 2021-10-28 DIAGNOSIS — T45.1X5A ANEMIA DUE TO ANTINEOPLASTIC CHEMOTHERAPY: ICD-10-CM

## 2021-10-28 DIAGNOSIS — C90.00 KAPPA LIGHT CHAIN MYELOMA: Primary | ICD-10-CM

## 2021-10-28 PROCEDURE — 3044F HG A1C LEVEL LT 7.0%: CPT | Mod: CPTII,S$GLB,, | Performed by: INTERNAL MEDICINE

## 2021-10-28 PROCEDURE — 3288F PR FALLS RISK ASSESSMENT DOCUMENTED: ICD-10-PCS | Mod: CPTII,S$GLB,, | Performed by: INTERNAL MEDICINE

## 2021-10-28 PROCEDURE — 99999 PR PBB SHADOW E&M-EST. PATIENT-LVL III: CPT | Mod: PBBFAC,,, | Performed by: INTERNAL MEDICINE

## 2021-10-28 PROCEDURE — 99214 PR OFFICE/OUTPT VISIT, EST, LEVL IV, 30-39 MIN: ICD-10-PCS | Mod: S$GLB,,, | Performed by: INTERNAL MEDICINE

## 2021-10-28 PROCEDURE — 3078F DIAST BP <80 MM HG: CPT | Mod: CPTII,S$GLB,, | Performed by: INTERNAL MEDICINE

## 2021-10-28 PROCEDURE — 3008F PR BODY MASS INDEX (BMI) DOCUMENTED: ICD-10-PCS | Mod: CPTII,S$GLB,, | Performed by: INTERNAL MEDICINE

## 2021-10-28 PROCEDURE — 63600175 PHARM REV CODE 636 W HCPCS: Mod: JW,JG | Performed by: INTERNAL MEDICINE

## 2021-10-28 PROCEDURE — 3008F BODY MASS INDEX DOCD: CPT | Mod: CPTII,S$GLB,, | Performed by: INTERNAL MEDICINE

## 2021-10-28 PROCEDURE — 3077F SYST BP >= 140 MM HG: CPT | Mod: CPTII,S$GLB,, | Performed by: INTERNAL MEDICINE

## 2021-10-28 PROCEDURE — 1126F AMNT PAIN NOTED NONE PRSNT: CPT | Mod: CPTII,S$GLB,, | Performed by: INTERNAL MEDICINE

## 2021-10-28 PROCEDURE — 1101F PT FALLS ASSESS-DOCD LE1/YR: CPT | Mod: CPTII,S$GLB,, | Performed by: INTERNAL MEDICINE

## 2021-10-28 PROCEDURE — 1126F PR PAIN SEVERITY QUANTIFIED, NO PAIN PRESENT: ICD-10-PCS | Mod: CPTII,S$GLB,, | Performed by: INTERNAL MEDICINE

## 2021-10-28 PROCEDURE — 1160F RVW MEDS BY RX/DR IN RCRD: CPT | Mod: CPTII,S$GLB,, | Performed by: INTERNAL MEDICINE

## 2021-10-28 PROCEDURE — 1159F MED LIST DOCD IN RCRD: CPT | Mod: CPTII,S$GLB,, | Performed by: INTERNAL MEDICINE

## 2021-10-28 PROCEDURE — 99999 PR PBB SHADOW E&M-EST. PATIENT-LVL III: ICD-10-PCS | Mod: PBBFAC,,, | Performed by: INTERNAL MEDICINE

## 2021-10-28 PROCEDURE — 96401 CHEMO ANTI-NEOPL SQ/IM: CPT

## 2021-10-28 PROCEDURE — 1160F PR REVIEW ALL MEDS BY PRESCRIBER/CLIN PHARMACIST DOCUMENTED: ICD-10-PCS | Mod: CPTII,S$GLB,, | Performed by: INTERNAL MEDICINE

## 2021-10-28 PROCEDURE — 3288F FALL RISK ASSESSMENT DOCD: CPT | Mod: CPTII,S$GLB,, | Performed by: INTERNAL MEDICINE

## 2021-10-28 PROCEDURE — 99214 OFFICE O/P EST MOD 30 MIN: CPT | Mod: S$GLB,,, | Performed by: INTERNAL MEDICINE

## 2021-10-28 PROCEDURE — 3077F PR MOST RECENT SYSTOLIC BLOOD PRESSURE >= 140 MM HG: ICD-10-PCS | Mod: CPTII,S$GLB,, | Performed by: INTERNAL MEDICINE

## 2021-10-28 PROCEDURE — 3066F NEPHROPATHY DOC TX: CPT | Mod: CPTII,S$GLB,, | Performed by: INTERNAL MEDICINE

## 2021-10-28 PROCEDURE — 3044F PR MOST RECENT HEMOGLOBIN A1C LEVEL <7.0%: ICD-10-PCS | Mod: CPTII,S$GLB,, | Performed by: INTERNAL MEDICINE

## 2021-10-28 PROCEDURE — 1101F PR PT FALLS ASSESS DOC 0-1 FALLS W/OUT INJ PAST YR: ICD-10-PCS | Mod: CPTII,S$GLB,, | Performed by: INTERNAL MEDICINE

## 2021-10-28 PROCEDURE — 1159F PR MEDICATION LIST DOCUMENTED IN MEDICAL RECORD: ICD-10-PCS | Mod: CPTII,S$GLB,, | Performed by: INTERNAL MEDICINE

## 2021-10-28 PROCEDURE — 3078F PR MOST RECENT DIASTOLIC BLOOD PRESSURE < 80 MM HG: ICD-10-PCS | Mod: CPTII,S$GLB,, | Performed by: INTERNAL MEDICINE

## 2021-10-28 PROCEDURE — 3066F PR DOCUMENTATION OF TREATMENT FOR NEPHROPATHY: ICD-10-PCS | Mod: CPTII,S$GLB,, | Performed by: INTERNAL MEDICINE

## 2021-10-28 PROCEDURE — 25000003 PHARM REV CODE 250: Performed by: INTERNAL MEDICINE

## 2021-10-28 RX ORDER — DEXAMETHASONE 4 MG/1
4 TABLET ORAL
Status: COMPLETED | OUTPATIENT
Start: 2021-10-28 | End: 2021-10-28

## 2021-10-28 RX ORDER — DEXAMETHASONE 0.5 MG/1
4 TABLET ORAL
Status: CANCELLED
Start: 2021-11-11

## 2021-10-28 RX ORDER — DIPHENHYDRAMINE HCL 25 MG
50 CAPSULE ORAL
Status: COMPLETED | OUTPATIENT
Start: 2021-10-28 | End: 2021-10-28

## 2021-10-28 RX ORDER — FAMOTIDINE 20 MG/1
20 TABLET, FILM COATED ORAL
Status: COMPLETED | OUTPATIENT
Start: 2021-10-28 | End: 2021-10-28

## 2021-10-28 RX ORDER — DEXAMETHASONE 0.5 MG/1
4 TABLET ORAL
Status: CANCELLED
Start: 2021-10-28

## 2021-10-28 RX ORDER — BORTEZOMIB 3.5 MG/1
1.3 INJECTION, POWDER, LYOPHILIZED, FOR SOLUTION INTRAVENOUS; SUBCUTANEOUS
Status: COMPLETED | OUTPATIENT
Start: 2021-10-28 | End: 2021-10-28

## 2021-10-28 RX ORDER — ACETAMINOPHEN 325 MG/1
650 TABLET ORAL
Status: COMPLETED | OUTPATIENT
Start: 2021-10-28 | End: 2021-10-28

## 2021-10-28 RX ADMIN — DEXAMETHASONE 4 MG: 4 TABLET ORAL at 01:10

## 2021-10-28 RX ADMIN — DARATUMUMAB AND HYALURONIDASE-FIHJ (HUMAN RECOMBINANT) 1800 MG: 1800; 30000 INJECTION SUBCUTANEOUS at 01:10

## 2021-10-28 RX ADMIN — BORTEZOMIB 2.5 MG: 3.5 INJECTION, POWDER, LYOPHILIZED, FOR SOLUTION INTRAVENOUS; SUBCUTANEOUS at 01:10

## 2021-10-28 RX ADMIN — DIPHENHYDRAMINE HYDROCHLORIDE 50 MG: 25 CAPSULE ORAL at 01:10

## 2021-10-28 RX ADMIN — ACETAMINOPHEN 650 MG: 325 TABLET ORAL at 01:10

## 2021-10-28 RX ADMIN — FAMOTIDINE 20 MG: 20 TABLET ORAL at 01:10

## 2021-11-11 ENCOUNTER — INFUSION (OUTPATIENT)
Dept: INFUSION THERAPY | Facility: HOSPITAL | Age: 76
End: 2021-11-11
Attending: SURGERY
Payer: MEDICARE

## 2021-11-11 ENCOUNTER — OFFICE VISIT (OUTPATIENT)
Dept: HEMATOLOGY/ONCOLOGY | Facility: CLINIC | Age: 76
End: 2021-11-11
Payer: MEDICARE

## 2021-11-11 VITALS
SYSTOLIC BLOOD PRESSURE: 139 MMHG | HEIGHT: 71 IN | OXYGEN SATURATION: 100 % | RESPIRATION RATE: 18 BRPM | BODY MASS INDEX: 22.07 KG/M2 | DIASTOLIC BLOOD PRESSURE: 53 MMHG | TEMPERATURE: 97 F | WEIGHT: 157.63 LBS | HEART RATE: 59 BPM

## 2021-11-11 VITALS
TEMPERATURE: 97 F | HEIGHT: 71 IN | SYSTOLIC BLOOD PRESSURE: 139 MMHG | WEIGHT: 157.63 LBS | HEART RATE: 59 BPM | DIASTOLIC BLOOD PRESSURE: 53 MMHG | OXYGEN SATURATION: 100 % | RESPIRATION RATE: 18 BRPM | BODY MASS INDEX: 22.07 KG/M2

## 2021-11-11 DIAGNOSIS — N40.1 BENIGN PROSTATIC HYPERPLASIA WITH URINARY RETENTION: ICD-10-CM

## 2021-11-11 DIAGNOSIS — N18.32 STAGE 3B CHRONIC KIDNEY DISEASE: ICD-10-CM

## 2021-11-11 DIAGNOSIS — C90.00 KAPPA LIGHT CHAIN MYELOMA: Primary | ICD-10-CM

## 2021-11-11 DIAGNOSIS — R33.8 BENIGN PROSTATIC HYPERPLASIA WITH URINARY RETENTION: ICD-10-CM

## 2021-11-11 DIAGNOSIS — R60.0 PERIPHERAL EDEMA: ICD-10-CM

## 2021-11-11 PROCEDURE — 1101F PT FALLS ASSESS-DOCD LE1/YR: CPT | Mod: CPTII,S$GLB,, | Performed by: INTERNAL MEDICINE

## 2021-11-11 PROCEDURE — 25000003 PHARM REV CODE 250: Performed by: INTERNAL MEDICINE

## 2021-11-11 PROCEDURE — 1126F AMNT PAIN NOTED NONE PRSNT: CPT | Mod: CPTII,S$GLB,, | Performed by: INTERNAL MEDICINE

## 2021-11-11 PROCEDURE — 3288F FALL RISK ASSESSMENT DOCD: CPT | Mod: CPTII,S$GLB,, | Performed by: INTERNAL MEDICINE

## 2021-11-11 PROCEDURE — 3078F DIAST BP <80 MM HG: CPT | Mod: CPTII,S$GLB,, | Performed by: INTERNAL MEDICINE

## 2021-11-11 PROCEDURE — 3044F PR MOST RECENT HEMOGLOBIN A1C LEVEL <7.0%: ICD-10-PCS | Mod: CPTII,S$GLB,, | Performed by: INTERNAL MEDICINE

## 2021-11-11 PROCEDURE — 3078F PR MOST RECENT DIASTOLIC BLOOD PRESSURE < 80 MM HG: ICD-10-PCS | Mod: CPTII,S$GLB,, | Performed by: INTERNAL MEDICINE

## 2021-11-11 PROCEDURE — 1159F MED LIST DOCD IN RCRD: CPT | Mod: CPTII,S$GLB,, | Performed by: INTERNAL MEDICINE

## 2021-11-11 PROCEDURE — 3066F NEPHROPATHY DOC TX: CPT | Mod: CPTII,S$GLB,, | Performed by: INTERNAL MEDICINE

## 2021-11-11 PROCEDURE — 63600175 PHARM REV CODE 636 W HCPCS: Performed by: INTERNAL MEDICINE

## 2021-11-11 PROCEDURE — 1160F PR REVIEW ALL MEDS BY PRESCRIBER/CLIN PHARMACIST DOCUMENTED: ICD-10-PCS | Mod: CPTII,S$GLB,, | Performed by: INTERNAL MEDICINE

## 2021-11-11 PROCEDURE — 1126F PR PAIN SEVERITY QUANTIFIED, NO PAIN PRESENT: ICD-10-PCS | Mod: CPTII,S$GLB,, | Performed by: INTERNAL MEDICINE

## 2021-11-11 PROCEDURE — 1160F RVW MEDS BY RX/DR IN RCRD: CPT | Mod: CPTII,S$GLB,, | Performed by: INTERNAL MEDICINE

## 2021-11-11 PROCEDURE — 99214 PR OFFICE/OUTPT VISIT, EST, LEVL IV, 30-39 MIN: ICD-10-PCS | Mod: S$GLB,,, | Performed by: INTERNAL MEDICINE

## 2021-11-11 PROCEDURE — 1101F PR PT FALLS ASSESS DOC 0-1 FALLS W/OUT INJ PAST YR: ICD-10-PCS | Mod: CPTII,S$GLB,, | Performed by: INTERNAL MEDICINE

## 2021-11-11 PROCEDURE — 99999 PR PBB SHADOW E&M-EST. PATIENT-LVL III: CPT | Mod: PBBFAC,,, | Performed by: INTERNAL MEDICINE

## 2021-11-11 PROCEDURE — 3008F PR BODY MASS INDEX (BMI) DOCUMENTED: ICD-10-PCS | Mod: CPTII,S$GLB,, | Performed by: INTERNAL MEDICINE

## 2021-11-11 PROCEDURE — 3008F BODY MASS INDEX DOCD: CPT | Mod: CPTII,S$GLB,, | Performed by: INTERNAL MEDICINE

## 2021-11-11 PROCEDURE — 3075F SYST BP GE 130 - 139MM HG: CPT | Mod: CPTII,S$GLB,, | Performed by: INTERNAL MEDICINE

## 2021-11-11 PROCEDURE — 1159F PR MEDICATION LIST DOCUMENTED IN MEDICAL RECORD: ICD-10-PCS | Mod: CPTII,S$GLB,, | Performed by: INTERNAL MEDICINE

## 2021-11-11 PROCEDURE — 3288F PR FALLS RISK ASSESSMENT DOCUMENTED: ICD-10-PCS | Mod: CPTII,S$GLB,, | Performed by: INTERNAL MEDICINE

## 2021-11-11 PROCEDURE — 99214 OFFICE O/P EST MOD 30 MIN: CPT | Mod: S$GLB,,, | Performed by: INTERNAL MEDICINE

## 2021-11-11 PROCEDURE — 99999 PR PBB SHADOW E&M-EST. PATIENT-LVL III: ICD-10-PCS | Mod: PBBFAC,,, | Performed by: INTERNAL MEDICINE

## 2021-11-11 PROCEDURE — 3044F HG A1C LEVEL LT 7.0%: CPT | Mod: CPTII,S$GLB,, | Performed by: INTERNAL MEDICINE

## 2021-11-11 PROCEDURE — 96401 CHEMO ANTI-NEOPL SQ/IM: CPT

## 2021-11-11 PROCEDURE — 3066F PR DOCUMENTATION OF TREATMENT FOR NEPHROPATHY: ICD-10-PCS | Mod: CPTII,S$GLB,, | Performed by: INTERNAL MEDICINE

## 2021-11-11 PROCEDURE — 3075F PR MOST RECENT SYSTOLIC BLOOD PRESS GE 130-139MM HG: ICD-10-PCS | Mod: CPTII,S$GLB,, | Performed by: INTERNAL MEDICINE

## 2021-11-11 RX ORDER — DEXAMETHASONE 0.5 MG/1
4 TABLET ORAL
Status: CANCELLED
Start: 2021-12-09

## 2021-11-11 RX ORDER — EPINEPHRINE 0.3 MG/.3ML
0.3 INJECTION SUBCUTANEOUS ONCE AS NEEDED
Status: CANCELLED | OUTPATIENT
Start: 2021-11-25

## 2021-11-11 RX ORDER — DEXAMETHASONE 0.5 MG/1
4 TABLET ORAL
Status: CANCELLED
Start: 2021-11-25

## 2021-11-11 RX ORDER — DIPHENHYDRAMINE HCL 25 MG
50 CAPSULE ORAL
Status: CANCELLED
Start: 2021-11-25

## 2021-11-11 RX ORDER — SODIUM CHLORIDE 0.9 % (FLUSH) 0.9 %
10 SYRINGE (ML) INJECTION
Status: CANCELLED | OUTPATIENT
Start: 2021-12-09

## 2021-11-11 RX ORDER — BORTEZOMIB 3.5 MG/1
1.3 INJECTION, POWDER, LYOPHILIZED, FOR SOLUTION INTRAVENOUS; SUBCUTANEOUS
Status: CANCELLED | OUTPATIENT
Start: 2021-12-09

## 2021-11-11 RX ORDER — ACETAMINOPHEN 325 MG/1
650 TABLET ORAL
Status: CANCELLED | OUTPATIENT
Start: 2021-11-25

## 2021-11-11 RX ORDER — EPINEPHRINE 0.3 MG/.3ML
0.3 INJECTION SUBCUTANEOUS ONCE AS NEEDED
Status: DISCONTINUED | OUTPATIENT
Start: 2021-11-11 | End: 2021-11-11 | Stop reason: HOSPADM

## 2021-11-11 RX ORDER — BORTEZOMIB 3.5 MG/1
1.3 INJECTION, POWDER, LYOPHILIZED, FOR SOLUTION INTRAVENOUS; SUBCUTANEOUS
Status: COMPLETED | OUTPATIENT
Start: 2021-11-11 | End: 2021-11-11

## 2021-11-11 RX ORDER — DEXAMETHASONE 4 MG/1
4 TABLET ORAL
Status: COMPLETED | OUTPATIENT
Start: 2021-11-11 | End: 2021-11-11

## 2021-11-11 RX ORDER — DIPHENHYDRAMINE HYDROCHLORIDE 50 MG/ML
50 INJECTION INTRAMUSCULAR; INTRAVENOUS ONCE AS NEEDED
Status: CANCELLED | OUTPATIENT
Start: 2021-12-09

## 2021-11-11 RX ORDER — DIPHENHYDRAMINE HYDROCHLORIDE 50 MG/ML
50 INJECTION INTRAMUSCULAR; INTRAVENOUS ONCE AS NEEDED
Status: DISCONTINUED | OUTPATIENT
Start: 2021-11-11 | End: 2021-11-11 | Stop reason: HOSPADM

## 2021-11-11 RX ORDER — DIPHENHYDRAMINE HCL 25 MG
50 CAPSULE ORAL
Status: CANCELLED
Start: 2021-12-09

## 2021-11-11 RX ORDER — ACETAMINOPHEN 325 MG/1
650 TABLET ORAL
Status: CANCELLED | OUTPATIENT
Start: 2021-12-09

## 2021-11-11 RX ORDER — BORTEZOMIB 3.5 MG/1
1.3 INJECTION, POWDER, LYOPHILIZED, FOR SOLUTION INTRAVENOUS; SUBCUTANEOUS
Status: CANCELLED | OUTPATIENT
Start: 2021-11-25

## 2021-11-11 RX ORDER — HEPARIN 100 UNIT/ML
500 SYRINGE INTRAVENOUS
Status: CANCELLED | OUTPATIENT
Start: 2021-11-25

## 2021-11-11 RX ORDER — DIPHENHYDRAMINE HCL 25 MG
50 CAPSULE ORAL
Status: COMPLETED | OUTPATIENT
Start: 2021-11-11 | End: 2021-11-11

## 2021-11-11 RX ORDER — FAMOTIDINE 20 MG/1
20 TABLET, FILM COATED ORAL
Status: CANCELLED
Start: 2021-11-25

## 2021-11-11 RX ORDER — FAMOTIDINE 20 MG/1
20 TABLET, FILM COATED ORAL
Status: COMPLETED | OUTPATIENT
Start: 2021-11-11 | End: 2021-11-11

## 2021-11-11 RX ORDER — ACETAMINOPHEN 325 MG/1
650 TABLET ORAL
Status: COMPLETED | OUTPATIENT
Start: 2021-11-11 | End: 2021-11-11

## 2021-11-11 RX ORDER — DIPHENHYDRAMINE HYDROCHLORIDE 50 MG/ML
50 INJECTION INTRAMUSCULAR; INTRAVENOUS ONCE AS NEEDED
Status: CANCELLED | OUTPATIENT
Start: 2021-11-25

## 2021-11-11 RX ORDER — EPINEPHRINE 0.3 MG/.3ML
0.3 INJECTION SUBCUTANEOUS ONCE AS NEEDED
Status: CANCELLED | OUTPATIENT
Start: 2021-12-09

## 2021-11-11 RX ORDER — SODIUM CHLORIDE 0.9 % (FLUSH) 0.9 %
10 SYRINGE (ML) INJECTION
Status: CANCELLED | OUTPATIENT
Start: 2021-11-25

## 2021-11-11 RX ORDER — HEPARIN 100 UNIT/ML
500 SYRINGE INTRAVENOUS
Status: CANCELLED | OUTPATIENT
Start: 2021-12-09

## 2021-11-11 RX ORDER — FAMOTIDINE 20 MG/1
20 TABLET, FILM COATED ORAL
Status: CANCELLED
Start: 2021-12-09

## 2021-11-11 RX ADMIN — ACETAMINOPHEN 650 MG: 325 TABLET ORAL at 02:11

## 2021-11-11 RX ADMIN — DIPHENHYDRAMINE HYDROCHLORIDE 50 MG: 25 CAPSULE ORAL at 02:11

## 2021-11-11 RX ADMIN — BORTEZOMIB 2.5 MG: 3.5 INJECTION, POWDER, LYOPHILIZED, FOR SOLUTION INTRAVENOUS; SUBCUTANEOUS at 02:11

## 2021-11-11 RX ADMIN — DEXAMETHASONE 4 MG: 4 TABLET ORAL at 02:11

## 2021-11-11 RX ADMIN — DARATUMUMAB AND HYALURONIDASE-FIHJ (HUMAN RECOMBINANT) 1800 MG: 1800; 30000 INJECTION SUBCUTANEOUS at 02:11

## 2021-11-11 RX ADMIN — FAMOTIDINE 20 MG: 20 TABLET ORAL at 02:11

## 2021-11-15 DIAGNOSIS — C90.00 KAPPA LIGHT CHAIN MYELOMA: ICD-10-CM

## 2021-11-15 RX ORDER — LENALIDOMIDE 10 MG/1
10 CAPSULE ORAL EVERY OTHER DAY
Qty: 15 EACH | Refills: 5 | Status: SHIPPED | OUTPATIENT
Start: 2021-11-15 | End: 2021-11-16 | Stop reason: SDUPTHER

## 2021-11-16 RX ORDER — LENALIDOMIDE 10 MG/1
10 CAPSULE ORAL EVERY OTHER DAY
Qty: 15 EACH | Refills: 5 | Status: SHIPPED | OUTPATIENT
Start: 2021-11-16 | End: 2021-12-13 | Stop reason: SDUPTHER

## 2021-11-16 NOTE — TELEPHONE ENCOUNTER
----- Message from Blanca Mckeon sent at 11/15/2021  5:17 PM CST -----  Type: Requesting to speak with nurse         Who Called: Marissa hodges/ [facility]   Regarding: lenalidomide 10 mg Cap- was sent to  [facility]  they cant order it- & copay was like $600 -it was sent to a different pharmacy last month? Maybe resend to them if they filled it? [id number] - [place], [place] - [address] [place] [address]  Would the patient rather a call back or a response via [facility]? Call back  Best Call Back Number: [phone]  Additional Information: n/a

## 2021-11-17 ENCOUNTER — TELEPHONE (OUTPATIENT)
Dept: HEMATOLOGY/ONCOLOGY | Facility: CLINIC | Age: 76
End: 2021-11-17
Payer: MEDICARE

## 2021-11-24 ENCOUNTER — OFFICE VISIT (OUTPATIENT)
Dept: HEMATOLOGY/ONCOLOGY | Facility: CLINIC | Age: 76
End: 2021-11-24
Payer: MEDICARE

## 2021-11-24 ENCOUNTER — INFUSION (OUTPATIENT)
Dept: INFUSION THERAPY | Facility: HOSPITAL | Age: 76
End: 2021-11-24
Attending: SURGERY
Payer: MEDICARE

## 2021-11-24 VITALS
HEART RATE: 62 BPM | SYSTOLIC BLOOD PRESSURE: 163 MMHG | TEMPERATURE: 98 F | HEIGHT: 71 IN | HEIGHT: 71 IN | TEMPERATURE: 98 F | WEIGHT: 156.75 LBS | OXYGEN SATURATION: 99 % | BODY MASS INDEX: 21.94 KG/M2 | RESPIRATION RATE: 19 BRPM | RESPIRATION RATE: 18 BRPM | HEART RATE: 62 BPM | SYSTOLIC BLOOD PRESSURE: 163 MMHG | BODY MASS INDEX: 21.94 KG/M2 | WEIGHT: 156.75 LBS | OXYGEN SATURATION: 99 % | DIASTOLIC BLOOD PRESSURE: 64 MMHG | DIASTOLIC BLOOD PRESSURE: 64 MMHG

## 2021-11-24 DIAGNOSIS — C90.00 KAPPA LIGHT CHAIN MYELOMA: Primary | ICD-10-CM

## 2021-11-24 DIAGNOSIS — D64.81 ANEMIA DUE TO ANTINEOPLASTIC CHEMOTHERAPY: ICD-10-CM

## 2021-11-24 DIAGNOSIS — T45.1X5A ANEMIA DUE TO ANTINEOPLASTIC CHEMOTHERAPY: ICD-10-CM

## 2021-11-24 DIAGNOSIS — Z79.899 IMMUNODEFICIENCY DUE TO DRUG THERAPY: ICD-10-CM

## 2021-11-24 DIAGNOSIS — N18.32 CHRONIC KIDNEY DISEASE, STAGE 3B: ICD-10-CM

## 2021-11-24 DIAGNOSIS — D69.59 CHEMOTHERAPY-INDUCED THROMBOCYTOPENIA: ICD-10-CM

## 2021-11-24 DIAGNOSIS — T45.1X5A CHEMOTHERAPY-INDUCED THROMBOCYTOPENIA: ICD-10-CM

## 2021-11-24 DIAGNOSIS — D84.821 IMMUNODEFICIENCY DUE TO DRUG THERAPY: ICD-10-CM

## 2021-11-24 DIAGNOSIS — D63.0 ANEMIA IN NEOPLASTIC DISEASE: ICD-10-CM

## 2021-11-24 PROCEDURE — 99215 OFFICE O/P EST HI 40 MIN: CPT | Mod: S$GLB,,, | Performed by: INTERNAL MEDICINE

## 2021-11-24 PROCEDURE — 63600175 PHARM REV CODE 636 W HCPCS: Mod: TB | Performed by: INTERNAL MEDICINE

## 2021-11-24 PROCEDURE — 99215 PR OFFICE/OUTPT VISIT, EST, LEVL V, 40-54 MIN: ICD-10-PCS | Mod: S$GLB,,, | Performed by: INTERNAL MEDICINE

## 2021-11-24 PROCEDURE — 3066F PR DOCUMENTATION OF TREATMENT FOR NEPHROPATHY: ICD-10-PCS | Mod: CPTII,S$GLB,, | Performed by: INTERNAL MEDICINE

## 2021-11-24 PROCEDURE — 99999 PR PBB SHADOW E&M-EST. PATIENT-LVL III: CPT | Mod: PBBFAC,,, | Performed by: INTERNAL MEDICINE

## 2021-11-24 PROCEDURE — 96401 CHEMO ANTI-NEOPL SQ/IM: CPT

## 2021-11-24 PROCEDURE — 99499 RISK ADDL DX/OHS AUDIT: ICD-10-PCS | Mod: S$GLB,,, | Performed by: INTERNAL MEDICINE

## 2021-11-24 PROCEDURE — 99499 UNLISTED E&M SERVICE: CPT | Mod: S$GLB,,, | Performed by: INTERNAL MEDICINE

## 2021-11-24 PROCEDURE — 25000003 PHARM REV CODE 250: Performed by: INTERNAL MEDICINE

## 2021-11-24 PROCEDURE — 99999 PR PBB SHADOW E&M-EST. PATIENT-LVL III: ICD-10-PCS | Mod: PBBFAC,,, | Performed by: INTERNAL MEDICINE

## 2021-11-24 PROCEDURE — 3066F NEPHROPATHY DOC TX: CPT | Mod: CPTII,S$GLB,, | Performed by: INTERNAL MEDICINE

## 2021-11-24 RX ORDER — DIPHENHYDRAMINE HCL 25 MG
50 CAPSULE ORAL
Status: COMPLETED | OUTPATIENT
Start: 2021-11-24 | End: 2021-11-24

## 2021-11-24 RX ORDER — DEXAMETHASONE 4 MG/1
4 TABLET ORAL
Status: COMPLETED | OUTPATIENT
Start: 2021-11-24 | End: 2021-11-24

## 2021-11-24 RX ORDER — BORTEZOMIB 3.5 MG/1
1.3 INJECTION, POWDER, LYOPHILIZED, FOR SOLUTION INTRAVENOUS; SUBCUTANEOUS
Status: COMPLETED | OUTPATIENT
Start: 2021-11-24 | End: 2021-11-24

## 2021-11-24 RX ORDER — FAMOTIDINE 20 MG/1
20 TABLET, FILM COATED ORAL
Status: COMPLETED | OUTPATIENT
Start: 2021-11-24 | End: 2021-11-24

## 2021-11-24 RX ORDER — DIPHENHYDRAMINE HYDROCHLORIDE 50 MG/ML
50 INJECTION INTRAMUSCULAR; INTRAVENOUS ONCE AS NEEDED
Status: DISCONTINUED | OUTPATIENT
Start: 2021-11-24 | End: 2021-11-24 | Stop reason: HOSPADM

## 2021-11-24 RX ORDER — EPINEPHRINE 0.3 MG/.3ML
0.3 INJECTION SUBCUTANEOUS ONCE AS NEEDED
Status: DISCONTINUED | OUTPATIENT
Start: 2021-11-24 | End: 2021-11-24 | Stop reason: HOSPADM

## 2021-11-24 RX ORDER — ACETAMINOPHEN 325 MG/1
650 TABLET ORAL
Status: COMPLETED | OUTPATIENT
Start: 2021-11-24 | End: 2021-11-24

## 2021-11-24 RX ADMIN — DIPHENHYDRAMINE HYDROCHLORIDE 50 MG: 25 CAPSULE ORAL at 01:11

## 2021-11-24 RX ADMIN — DARATUMUMAB AND HYALURONIDASE-FIHJ (HUMAN RECOMBINANT) 1800 MG: 1800; 30000 INJECTION SUBCUTANEOUS at 01:11

## 2021-11-24 RX ADMIN — DEXAMETHASONE 4 MG: 4 TABLET ORAL at 01:11

## 2021-11-24 RX ADMIN — FAMOTIDINE 20 MG: 20 TABLET ORAL at 01:11

## 2021-11-24 RX ADMIN — BORTEZOMIB 2.5 MG: 3.5 INJECTION, POWDER, LYOPHILIZED, FOR SOLUTION INTRAVENOUS; SUBCUTANEOUS at 01:11

## 2021-11-24 RX ADMIN — ACETAMINOPHEN 650 MG: 325 TABLET ORAL at 01:11

## 2021-12-02 ENCOUNTER — PATIENT MESSAGE (OUTPATIENT)
Dept: RESEARCH | Facility: HOSPITAL | Age: 76
End: 2021-12-02
Payer: MEDICARE

## 2021-12-03 ENCOUNTER — OFFICE VISIT (OUTPATIENT)
Dept: NEPHROLOGY | Facility: CLINIC | Age: 76
End: 2021-12-03
Payer: MEDICARE

## 2021-12-03 VITALS
OXYGEN SATURATION: 98 % | SYSTOLIC BLOOD PRESSURE: 140 MMHG | WEIGHT: 155.44 LBS | DIASTOLIC BLOOD PRESSURE: 60 MMHG | HEIGHT: 71 IN | HEART RATE: 78 BPM | BODY MASS INDEX: 21.76 KG/M2

## 2021-12-03 DIAGNOSIS — E87.20 METABOLIC ACIDOSIS: ICD-10-CM

## 2021-12-03 DIAGNOSIS — N18.32 CHRONIC RENAL FAILURE, STAGE 3B: Primary | ICD-10-CM

## 2021-12-03 PROCEDURE — 99999 PR PBB SHADOW E&M-EST. PATIENT-LVL IV: CPT | Mod: PBBFAC,,, | Performed by: INTERNAL MEDICINE

## 2021-12-03 PROCEDURE — 99215 PR OFFICE/OUTPT VISIT, EST, LEVL V, 40-54 MIN: ICD-10-PCS | Mod: S$GLB,,, | Performed by: INTERNAL MEDICINE

## 2021-12-03 PROCEDURE — 99215 OFFICE O/P EST HI 40 MIN: CPT | Mod: S$GLB,,, | Performed by: INTERNAL MEDICINE

## 2021-12-03 PROCEDURE — 3066F NEPHROPATHY DOC TX: CPT | Mod: CPTII,S$GLB,, | Performed by: INTERNAL MEDICINE

## 2021-12-03 PROCEDURE — 99999 PR PBB SHADOW E&M-EST. PATIENT-LVL IV: ICD-10-PCS | Mod: PBBFAC,,, | Performed by: INTERNAL MEDICINE

## 2021-12-03 PROCEDURE — 3066F PR DOCUMENTATION OF TREATMENT FOR NEPHROPATHY: ICD-10-PCS | Mod: CPTII,S$GLB,, | Performed by: INTERNAL MEDICINE

## 2021-12-03 RX ORDER — SODIUM BICARBONATE 650 MG/1
650 TABLET ORAL DAILY
Qty: 30 TABLET | Refills: 11
Start: 2021-12-03 | End: 2022-11-07 | Stop reason: SDUPTHER

## 2021-12-07 ENCOUNTER — OFFICE VISIT (OUTPATIENT)
Dept: NEUROLOGY | Facility: CLINIC | Age: 76
End: 2021-12-07
Payer: MEDICARE

## 2021-12-07 VITALS
DIASTOLIC BLOOD PRESSURE: 62 MMHG | BODY MASS INDEX: 21.78 KG/M2 | HEART RATE: 71 BPM | WEIGHT: 155.56 LBS | HEIGHT: 71 IN | SYSTOLIC BLOOD PRESSURE: 177 MMHG

## 2021-12-07 DIAGNOSIS — R41.3 MEMORY LOSS: Primary | ICD-10-CM

## 2021-12-07 PROCEDURE — 99999 PR PBB SHADOW E&M-EST. PATIENT-LVL III: CPT | Mod: PBBFAC,,, | Performed by: PSYCHIATRY & NEUROLOGY

## 2021-12-07 PROCEDURE — 99999 PR PBB SHADOW E&M-EST. PATIENT-LVL III: ICD-10-PCS | Mod: PBBFAC,,, | Performed by: PSYCHIATRY & NEUROLOGY

## 2021-12-07 PROCEDURE — 99214 PR OFFICE/OUTPT VISIT, EST, LEVL IV, 30-39 MIN: ICD-10-PCS | Mod: S$GLB,,, | Performed by: PSYCHIATRY & NEUROLOGY

## 2021-12-07 PROCEDURE — 99214 OFFICE O/P EST MOD 30 MIN: CPT | Mod: S$GLB,,, | Performed by: PSYCHIATRY & NEUROLOGY

## 2021-12-07 PROCEDURE — 3066F PR DOCUMENTATION OF TREATMENT FOR NEPHROPATHY: ICD-10-PCS | Mod: CPTII,S$GLB,, | Performed by: PSYCHIATRY & NEUROLOGY

## 2021-12-07 PROCEDURE — 3066F NEPHROPATHY DOC TX: CPT | Mod: CPTII,S$GLB,, | Performed by: PSYCHIATRY & NEUROLOGY

## 2021-12-09 ENCOUNTER — INFUSION (OUTPATIENT)
Dept: INFUSION THERAPY | Facility: HOSPITAL | Age: 76
End: 2021-12-09
Attending: SURGERY
Payer: MEDICARE

## 2021-12-09 ENCOUNTER — OFFICE VISIT (OUTPATIENT)
Dept: HEMATOLOGY/ONCOLOGY | Facility: CLINIC | Age: 76
End: 2021-12-09
Payer: MEDICARE

## 2021-12-09 VITALS
WEIGHT: 158.06 LBS | TEMPERATURE: 98 F | OXYGEN SATURATION: 100 % | SYSTOLIC BLOOD PRESSURE: 140 MMHG | RESPIRATION RATE: 18 BRPM | BODY MASS INDEX: 22.13 KG/M2 | HEART RATE: 60 BPM | DIASTOLIC BLOOD PRESSURE: 63 MMHG | HEIGHT: 71 IN

## 2021-12-09 VITALS — HEIGHT: 71 IN | WEIGHT: 158.06 LBS | BODY MASS INDEX: 22.13 KG/M2

## 2021-12-09 DIAGNOSIS — I63.9 CEREBROVASCULAR ACCIDENT (CVA), UNSPECIFIED MECHANISM: ICD-10-CM

## 2021-12-09 DIAGNOSIS — N40.0 BENIGN PROSTATIC HYPERPLASIA, UNSPECIFIED WHETHER LOWER URINARY TRACT SYMPTOMS PRESENT: ICD-10-CM

## 2021-12-09 DIAGNOSIS — C90.00 KAPPA LIGHT CHAIN MYELOMA: Primary | ICD-10-CM

## 2021-12-09 DIAGNOSIS — N18.32 STAGE 3B CHRONIC KIDNEY DISEASE: ICD-10-CM

## 2021-12-09 PROBLEM — E87.20 METABOLIC ACIDOSIS: Status: RESOLVED | Noted: 2021-09-24 | Resolved: 2021-12-09

## 2021-12-09 PROBLEM — R79.89 ELEVATED TROPONIN: Status: RESOLVED | Noted: 2021-08-14 | Resolved: 2021-12-09

## 2021-12-09 PROBLEM — M62.9 HAMSTRING TIGHTNESS OF BOTH LOWER EXTREMITIES: Status: RESOLVED | Noted: 2018-06-18 | Resolved: 2021-12-09

## 2021-12-09 PROBLEM — E87.5 HYPERKALEMIA: Status: RESOLVED | Noted: 2021-09-24 | Resolved: 2021-12-09

## 2021-12-09 PROBLEM — I16.0 HYPERTENSIVE URGENCY: Status: RESOLVED | Noted: 2021-08-14 | Resolved: 2021-12-09

## 2021-12-09 PROBLEM — R26.81 UNSTEADY GAIT: Status: RESOLVED | Noted: 2018-06-18 | Resolved: 2021-12-09

## 2021-12-09 PROBLEM — R29.898 WEAKNESS OF LEFT UPPER EXTREMITY: Status: RESOLVED | Noted: 2018-06-06 | Resolved: 2021-12-09

## 2021-12-09 PROBLEM — R26.89 BALANCE PROBLEM: Status: RESOLVED | Noted: 2018-06-18 | Resolved: 2021-12-09

## 2021-12-09 PROBLEM — R29.898 WEAKNESS OF BOTH LOWER EXTREMITIES: Status: RESOLVED | Noted: 2018-06-18 | Resolved: 2021-12-09

## 2021-12-09 PROBLEM — R47.1 DYSARTHRIA: Status: RESOLVED | Noted: 2018-06-05 | Resolved: 2021-12-09

## 2021-12-09 PROBLEM — R31.9 HEMATURIA: Status: RESOLVED | Noted: 2021-08-14 | Resolved: 2021-12-09

## 2021-12-09 PROCEDURE — 99214 PR OFFICE/OUTPT VISIT, EST, LEVL IV, 30-39 MIN: ICD-10-PCS | Mod: S$GLB,,, | Performed by: INTERNAL MEDICINE

## 2021-12-09 PROCEDURE — 99214 OFFICE O/P EST MOD 30 MIN: CPT | Mod: S$GLB,,, | Performed by: INTERNAL MEDICINE

## 2021-12-09 PROCEDURE — 3066F PR DOCUMENTATION OF TREATMENT FOR NEPHROPATHY: ICD-10-PCS | Mod: CPTII,S$GLB,, | Performed by: INTERNAL MEDICINE

## 2021-12-09 PROCEDURE — 3066F NEPHROPATHY DOC TX: CPT | Mod: CPTII,S$GLB,, | Performed by: INTERNAL MEDICINE

## 2021-12-09 PROCEDURE — 63600175 PHARM REV CODE 636 W HCPCS: Performed by: INTERNAL MEDICINE

## 2021-12-09 PROCEDURE — 99999 PR PBB SHADOW E&M-EST. PATIENT-LVL III: CPT | Mod: PBBFAC,,, | Performed by: INTERNAL MEDICINE

## 2021-12-09 PROCEDURE — 25000003 PHARM REV CODE 250: Performed by: INTERNAL MEDICINE

## 2021-12-09 PROCEDURE — 99999 PR PBB SHADOW E&M-EST. PATIENT-LVL III: ICD-10-PCS | Mod: PBBFAC,,, | Performed by: INTERNAL MEDICINE

## 2021-12-09 PROCEDURE — 96401 CHEMO ANTI-NEOPL SQ/IM: CPT

## 2021-12-09 RX ORDER — DIPHENHYDRAMINE HYDROCHLORIDE 50 MG/ML
50 INJECTION INTRAMUSCULAR; INTRAVENOUS ONCE AS NEEDED
Status: DISCONTINUED | OUTPATIENT
Start: 2021-12-09 | End: 2021-12-09 | Stop reason: HOSPADM

## 2021-12-09 RX ORDER — DEXAMETHASONE 0.5 MG/1
4 TABLET ORAL
Status: CANCELLED
Start: 2022-01-06

## 2021-12-09 RX ORDER — FAMOTIDINE 20 MG/1
20 TABLET, FILM COATED ORAL
Status: COMPLETED | OUTPATIENT
Start: 2021-12-09 | End: 2021-12-09

## 2021-12-09 RX ORDER — DEXAMETHASONE 0.5 MG/1
4 TABLET ORAL
Status: CANCELLED
Start: 2021-12-23

## 2021-12-09 RX ORDER — EPINEPHRINE 0.3 MG/.3ML
0.3 INJECTION SUBCUTANEOUS ONCE AS NEEDED
Status: CANCELLED | OUTPATIENT
Start: 2022-01-06

## 2021-12-09 RX ORDER — FAMOTIDINE 20 MG/1
20 TABLET, FILM COATED ORAL
Status: CANCELLED
Start: 2021-12-23

## 2021-12-09 RX ORDER — DEXAMETHASONE 4 MG/1
4 TABLET ORAL
Status: COMPLETED | OUTPATIENT
Start: 2021-12-09 | End: 2021-12-09

## 2021-12-09 RX ORDER — DIPHENHYDRAMINE HYDROCHLORIDE 50 MG/ML
50 INJECTION INTRAMUSCULAR; INTRAVENOUS ONCE AS NEEDED
Status: CANCELLED | OUTPATIENT
Start: 2022-01-06

## 2021-12-09 RX ORDER — BORTEZOMIB 3.5 MG/1
1.3 INJECTION, POWDER, LYOPHILIZED, FOR SOLUTION INTRAVENOUS; SUBCUTANEOUS
Status: CANCELLED | OUTPATIENT
Start: 2021-12-23

## 2021-12-09 RX ORDER — EPINEPHRINE 0.3 MG/.3ML
0.3 INJECTION SUBCUTANEOUS ONCE AS NEEDED
Status: DISCONTINUED | OUTPATIENT
Start: 2021-12-09 | End: 2021-12-09 | Stop reason: HOSPADM

## 2021-12-09 RX ORDER — SODIUM CHLORIDE 0.9 % (FLUSH) 0.9 %
10 SYRINGE (ML) INJECTION
Status: CANCELLED | OUTPATIENT
Start: 2021-12-23

## 2021-12-09 RX ORDER — DIPHENHYDRAMINE HCL 25 MG
50 CAPSULE ORAL
Status: COMPLETED | OUTPATIENT
Start: 2021-12-09 | End: 2021-12-09

## 2021-12-09 RX ORDER — EPINEPHRINE 0.3 MG/.3ML
0.3 INJECTION SUBCUTANEOUS ONCE AS NEEDED
Status: CANCELLED | OUTPATIENT
Start: 2021-12-23

## 2021-12-09 RX ORDER — ACETAMINOPHEN 325 MG/1
650 TABLET ORAL
Status: CANCELLED | OUTPATIENT
Start: 2021-12-23

## 2021-12-09 RX ORDER — ACETAMINOPHEN 325 MG/1
650 TABLET ORAL
Status: COMPLETED | OUTPATIENT
Start: 2021-12-09 | End: 2021-12-09

## 2021-12-09 RX ORDER — DIPHENHYDRAMINE HYDROCHLORIDE 50 MG/ML
50 INJECTION INTRAMUSCULAR; INTRAVENOUS ONCE AS NEEDED
Status: CANCELLED | OUTPATIENT
Start: 2021-12-23

## 2021-12-09 RX ORDER — FAMOTIDINE 20 MG/1
20 TABLET, FILM COATED ORAL
Status: CANCELLED
Start: 2022-01-06

## 2021-12-09 RX ORDER — HEPARIN 100 UNIT/ML
500 SYRINGE INTRAVENOUS
Status: CANCELLED | OUTPATIENT
Start: 2021-12-23

## 2021-12-09 RX ORDER — DIPHENHYDRAMINE HCL 25 MG
50 CAPSULE ORAL
Status: CANCELLED
Start: 2021-12-23

## 2021-12-09 RX ORDER — SODIUM CHLORIDE 0.9 % (FLUSH) 0.9 %
10 SYRINGE (ML) INJECTION
Status: CANCELLED | OUTPATIENT
Start: 2022-01-06

## 2021-12-09 RX ORDER — ACETAMINOPHEN 325 MG/1
650 TABLET ORAL
Status: CANCELLED | OUTPATIENT
Start: 2022-01-06

## 2021-12-09 RX ORDER — HEPARIN 100 UNIT/ML
500 SYRINGE INTRAVENOUS
Status: CANCELLED | OUTPATIENT
Start: 2022-01-06

## 2021-12-09 RX ORDER — BORTEZOMIB 3.5 MG/1
1.3 INJECTION, POWDER, LYOPHILIZED, FOR SOLUTION INTRAVENOUS; SUBCUTANEOUS
Status: COMPLETED | OUTPATIENT
Start: 2021-12-09 | End: 2021-12-09

## 2021-12-09 RX ORDER — DIPHENHYDRAMINE HCL 25 MG
50 CAPSULE ORAL
Status: CANCELLED
Start: 2022-01-06

## 2021-12-09 RX ADMIN — BORTEZOMIB 2.5 MG: 3.5 INJECTION, POWDER, LYOPHILIZED, FOR SOLUTION INTRAVENOUS; SUBCUTANEOUS at 02:12

## 2021-12-09 RX ADMIN — FAMOTIDINE 20 MG: 20 TABLET ORAL at 02:12

## 2021-12-09 RX ADMIN — DEXAMETHASONE 4 MG: 4 TABLET ORAL at 02:12

## 2021-12-09 RX ADMIN — DARATUMUMAB AND HYALURONIDASE-FIHJ (HUMAN RECOMBINANT) 1800 MG: 1800; 30000 INJECTION SUBCUTANEOUS at 02:12

## 2021-12-09 RX ADMIN — DIPHENHYDRAMINE HYDROCHLORIDE 50 MG: 25 CAPSULE ORAL at 02:12

## 2021-12-09 RX ADMIN — ACETAMINOPHEN 650 MG: 325 TABLET ORAL at 02:12

## 2021-12-13 DIAGNOSIS — C90.00 KAPPA LIGHT CHAIN MYELOMA: ICD-10-CM

## 2021-12-13 RX ORDER — LENALIDOMIDE 10 MG/1
10 CAPSULE ORAL EVERY OTHER DAY
Qty: 15 EACH | Refills: 5 | Status: SHIPPED | OUTPATIENT
Start: 2021-12-13 | End: 2022-02-11 | Stop reason: SDUPTHER

## 2021-12-14 ENCOUNTER — TELEPHONE (OUTPATIENT)
Dept: HEMATOLOGY/ONCOLOGY | Facility: CLINIC | Age: 76
End: 2021-12-14
Payer: MEDICARE

## 2021-12-15 ENCOUNTER — TELEPHONE (OUTPATIENT)
Dept: UROLOGY | Facility: CLINIC | Age: 76
End: 2021-12-15
Payer: MEDICARE

## 2021-12-21 ENCOUNTER — HOSPITAL ENCOUNTER (OUTPATIENT)
Dept: RADIOLOGY | Facility: HOSPITAL | Age: 76
Discharge: HOME | End: 2021-12-21
Attending: PSYCHIATRY & NEUROLOGY
Payer: MEDICARE

## 2021-12-21 DIAGNOSIS — R41.3 MEMORY LOSS: ICD-10-CM

## 2021-12-21 PROCEDURE — 70551 MRI BRAIN STEM W/O DYE: CPT | Mod: 26,,, | Performed by: RADIOLOGY

## 2021-12-21 PROCEDURE — 70551 MRI BRAIN STEM W/O DYE: CPT | Mod: TC

## 2021-12-21 PROCEDURE — 70551 MRI BRAIN WITHOUT CONTRAST: ICD-10-PCS | Mod: 26,,, | Performed by: RADIOLOGY

## 2021-12-22 ENCOUNTER — OFFICE VISIT (OUTPATIENT)
Dept: HEMATOLOGY/ONCOLOGY | Facility: CLINIC | Age: 76
End: 2021-12-22
Payer: MEDICARE

## 2021-12-22 ENCOUNTER — INFUSION (OUTPATIENT)
Dept: INFUSION THERAPY | Facility: HOSPITAL | Age: 76
End: 2021-12-22
Attending: SURGERY
Payer: MEDICARE

## 2021-12-22 VITALS
WEIGHT: 155.44 LBS | DIASTOLIC BLOOD PRESSURE: 59 MMHG | OXYGEN SATURATION: 100 % | BODY MASS INDEX: 21.76 KG/M2 | HEIGHT: 71 IN | TEMPERATURE: 98 F | HEART RATE: 58 BPM | SYSTOLIC BLOOD PRESSURE: 148 MMHG | RESPIRATION RATE: 18 BRPM

## 2021-12-22 VITALS
BODY MASS INDEX: 21.76 KG/M2 | HEIGHT: 71 IN | DIASTOLIC BLOOD PRESSURE: 59 MMHG | TEMPERATURE: 98 F | SYSTOLIC BLOOD PRESSURE: 148 MMHG | RESPIRATION RATE: 19 BRPM | WEIGHT: 155.44 LBS | OXYGEN SATURATION: 100 % | HEART RATE: 58 BPM

## 2021-12-22 DIAGNOSIS — N18.32 STAGE 3B CHRONIC KIDNEY DISEASE: ICD-10-CM

## 2021-12-22 DIAGNOSIS — C90.00 KAPPA LIGHT CHAIN MYELOMA: Primary | ICD-10-CM

## 2021-12-22 PROCEDURE — 1126F PR PAIN SEVERITY QUANTIFIED, NO PAIN PRESENT: ICD-10-PCS | Mod: CPTII,S$GLB,, | Performed by: INTERNAL MEDICINE

## 2021-12-22 PROCEDURE — 99214 PR OFFICE/OUTPT VISIT, EST, LEVL IV, 30-39 MIN: ICD-10-PCS | Mod: S$GLB,,, | Performed by: INTERNAL MEDICINE

## 2021-12-22 PROCEDURE — 1159F MED LIST DOCD IN RCRD: CPT | Mod: CPTII,S$GLB,, | Performed by: INTERNAL MEDICINE

## 2021-12-22 PROCEDURE — 99214 OFFICE O/P EST MOD 30 MIN: CPT | Mod: S$GLB,,, | Performed by: INTERNAL MEDICINE

## 2021-12-22 PROCEDURE — 1101F PR PT FALLS ASSESS DOC 0-1 FALLS W/OUT INJ PAST YR: ICD-10-PCS | Mod: CPTII,S$GLB,, | Performed by: INTERNAL MEDICINE

## 2021-12-22 PROCEDURE — 1126F AMNT PAIN NOTED NONE PRSNT: CPT | Mod: CPTII,S$GLB,, | Performed by: INTERNAL MEDICINE

## 2021-12-22 PROCEDURE — 1159F PR MEDICATION LIST DOCUMENTED IN MEDICAL RECORD: ICD-10-PCS | Mod: CPTII,S$GLB,, | Performed by: INTERNAL MEDICINE

## 2021-12-22 PROCEDURE — 3066F PR DOCUMENTATION OF TREATMENT FOR NEPHROPATHY: ICD-10-PCS | Mod: CPTII,S$GLB,, | Performed by: INTERNAL MEDICINE

## 2021-12-22 PROCEDURE — 1160F PR REVIEW ALL MEDS BY PRESCRIBER/CLIN PHARMACIST DOCUMENTED: ICD-10-PCS | Mod: CPTII,S$GLB,, | Performed by: INTERNAL MEDICINE

## 2021-12-22 PROCEDURE — 3044F HG A1C LEVEL LT 7.0%: CPT | Mod: CPTII,S$GLB,, | Performed by: INTERNAL MEDICINE

## 2021-12-22 PROCEDURE — 99999 PR PBB SHADOW E&M-EST. PATIENT-LVL III: CPT | Mod: PBBFAC,,, | Performed by: INTERNAL MEDICINE

## 2021-12-22 PROCEDURE — 3288F PR FALLS RISK ASSESSMENT DOCUMENTED: ICD-10-PCS | Mod: CPTII,S$GLB,, | Performed by: INTERNAL MEDICINE

## 2021-12-22 PROCEDURE — 63600175 PHARM REV CODE 636 W HCPCS: Performed by: INTERNAL MEDICINE

## 2021-12-22 PROCEDURE — 3008F PR BODY MASS INDEX (BMI) DOCUMENTED: ICD-10-PCS | Mod: CPTII,S$GLB,, | Performed by: INTERNAL MEDICINE

## 2021-12-22 PROCEDURE — 3066F NEPHROPATHY DOC TX: CPT | Mod: CPTII,S$GLB,, | Performed by: INTERNAL MEDICINE

## 2021-12-22 PROCEDURE — 3008F BODY MASS INDEX DOCD: CPT | Mod: CPTII,S$GLB,, | Performed by: INTERNAL MEDICINE

## 2021-12-22 PROCEDURE — 99999 PR PBB SHADOW E&M-EST. PATIENT-LVL III: ICD-10-PCS | Mod: PBBFAC,,, | Performed by: INTERNAL MEDICINE

## 2021-12-22 PROCEDURE — 1160F RVW MEDS BY RX/DR IN RCRD: CPT | Mod: CPTII,S$GLB,, | Performed by: INTERNAL MEDICINE

## 2021-12-22 PROCEDURE — 3044F PR MOST RECENT HEMOGLOBIN A1C LEVEL <7.0%: ICD-10-PCS | Mod: CPTII,S$GLB,, | Performed by: INTERNAL MEDICINE

## 2021-12-22 PROCEDURE — 96401 CHEMO ANTI-NEOPL SQ/IM: CPT

## 2021-12-22 PROCEDURE — 3077F SYST BP >= 140 MM HG: CPT | Mod: CPTII,S$GLB,, | Performed by: INTERNAL MEDICINE

## 2021-12-22 PROCEDURE — 3288F FALL RISK ASSESSMENT DOCD: CPT | Mod: CPTII,S$GLB,, | Performed by: INTERNAL MEDICINE

## 2021-12-22 PROCEDURE — 3078F PR MOST RECENT DIASTOLIC BLOOD PRESSURE < 80 MM HG: ICD-10-PCS | Mod: CPTII,S$GLB,, | Performed by: INTERNAL MEDICINE

## 2021-12-22 PROCEDURE — 3077F PR MOST RECENT SYSTOLIC BLOOD PRESSURE >= 140 MM HG: ICD-10-PCS | Mod: CPTII,S$GLB,, | Performed by: INTERNAL MEDICINE

## 2021-12-22 PROCEDURE — 25000003 PHARM REV CODE 250: Performed by: INTERNAL MEDICINE

## 2021-12-22 PROCEDURE — 3078F DIAST BP <80 MM HG: CPT | Mod: CPTII,S$GLB,, | Performed by: INTERNAL MEDICINE

## 2021-12-22 PROCEDURE — 1101F PT FALLS ASSESS-DOCD LE1/YR: CPT | Mod: CPTII,S$GLB,, | Performed by: INTERNAL MEDICINE

## 2021-12-22 RX ORDER — FAMOTIDINE 20 MG/1
20 TABLET, FILM COATED ORAL
Status: COMPLETED | OUTPATIENT
Start: 2021-12-22 | End: 2021-12-22

## 2021-12-22 RX ORDER — DEXAMETHASONE 4 MG/1
4 TABLET ORAL
Status: COMPLETED | OUTPATIENT
Start: 2021-12-22 | End: 2021-12-22

## 2021-12-22 RX ORDER — ACETAMINOPHEN 325 MG/1
650 TABLET ORAL
Status: COMPLETED | OUTPATIENT
Start: 2021-12-22 | End: 2021-12-22

## 2021-12-22 RX ORDER — DIPHENHYDRAMINE HCL 25 MG
50 CAPSULE ORAL
Status: COMPLETED | OUTPATIENT
Start: 2021-12-22 | End: 2021-12-22

## 2021-12-22 RX ADMIN — ACETAMINOPHEN 650 MG: 325 TABLET ORAL at 03:12

## 2021-12-22 RX ADMIN — DARATUMUMAB AND HYALURONIDASE-FIHJ (HUMAN RECOMBINANT) 1800 MG: 1800; 30000 INJECTION SUBCUTANEOUS at 03:12

## 2021-12-22 RX ADMIN — DIPHENHYDRAMINE HYDROCHLORIDE 50 MG: 25 CAPSULE ORAL at 03:12

## 2021-12-22 RX ADMIN — FAMOTIDINE 20 MG: 20 TABLET ORAL at 03:12

## 2021-12-22 RX ADMIN — DEXAMETHASONE 4 MG: 4 TABLET ORAL at 03:12

## 2021-12-28 ENCOUNTER — HOSPITAL ENCOUNTER (OUTPATIENT)
Dept: RADIOLOGY | Facility: HOSPITAL | Age: 76
Discharge: HOME | End: 2021-12-28
Attending: UROLOGY
Payer: MEDICARE

## 2021-12-28 DIAGNOSIS — N40.1 BPH WITH URINARY OBSTRUCTION: ICD-10-CM

## 2021-12-28 DIAGNOSIS — N13.8 BPH WITH URINARY OBSTRUCTION: ICD-10-CM

## 2021-12-28 PROCEDURE — 76770 US EXAM ABDO BACK WALL COMP: CPT | Mod: TC,PO

## 2022-01-06 ENCOUNTER — INFUSION (OUTPATIENT)
Dept: INFUSION THERAPY | Facility: HOSPITAL | Age: 77
End: 2022-01-06
Attending: SURGERY
Payer: MEDICARE

## 2022-01-06 ENCOUNTER — OFFICE VISIT (OUTPATIENT)
Dept: HEMATOLOGY/ONCOLOGY | Facility: CLINIC | Age: 77
End: 2022-01-06
Payer: MEDICARE

## 2022-01-06 VITALS
HEIGHT: 71 IN | TEMPERATURE: 98 F | DIASTOLIC BLOOD PRESSURE: 59 MMHG | OXYGEN SATURATION: 100 % | WEIGHT: 156.06 LBS | RESPIRATION RATE: 18 BRPM | BODY MASS INDEX: 21.85 KG/M2 | SYSTOLIC BLOOD PRESSURE: 137 MMHG | HEART RATE: 60 BPM

## 2022-01-06 VITALS
RESPIRATION RATE: 20 BRPM | WEIGHT: 156.06 LBS | OXYGEN SATURATION: 100 % | HEIGHT: 71 IN | SYSTOLIC BLOOD PRESSURE: 137 MMHG | DIASTOLIC BLOOD PRESSURE: 59 MMHG | BODY MASS INDEX: 21.85 KG/M2 | HEART RATE: 60 BPM | TEMPERATURE: 98 F

## 2022-01-06 DIAGNOSIS — C90.00 KAPPA LIGHT CHAIN MYELOMA: Primary | ICD-10-CM

## 2022-01-06 DIAGNOSIS — N18.32 STAGE 3B CHRONIC KIDNEY DISEASE: ICD-10-CM

## 2022-01-06 DIAGNOSIS — N40.0 BENIGN PROSTATIC HYPERPLASIA, UNSPECIFIED WHETHER LOWER URINARY TRACT SYMPTOMS PRESENT: ICD-10-CM

## 2022-01-06 PROCEDURE — 63600175 PHARM REV CODE 636 W HCPCS: Mod: TB | Performed by: INTERNAL MEDICINE

## 2022-01-06 PROCEDURE — 1159F PR MEDICATION LIST DOCUMENTED IN MEDICAL RECORD: ICD-10-PCS | Mod: CPTII,S$GLB,, | Performed by: INTERNAL MEDICINE

## 2022-01-06 PROCEDURE — 1126F PR PAIN SEVERITY QUANTIFIED, NO PAIN PRESENT: ICD-10-PCS | Mod: CPTII,S$GLB,, | Performed by: INTERNAL MEDICINE

## 2022-01-06 PROCEDURE — 1101F PR PT FALLS ASSESS DOC 0-1 FALLS W/OUT INJ PAST YR: ICD-10-PCS | Mod: CPTII,S$GLB,, | Performed by: INTERNAL MEDICINE

## 2022-01-06 PROCEDURE — 99999 PR PBB SHADOW E&M-EST. PATIENT-LVL III: ICD-10-PCS | Mod: PBBFAC,,, | Performed by: INTERNAL MEDICINE

## 2022-01-06 PROCEDURE — 99999 PR PBB SHADOW E&M-EST. PATIENT-LVL III: CPT | Mod: PBBFAC,,, | Performed by: INTERNAL MEDICINE

## 2022-01-06 PROCEDURE — 3008F BODY MASS INDEX DOCD: CPT | Mod: CPTII,S$GLB,, | Performed by: INTERNAL MEDICINE

## 2022-01-06 PROCEDURE — 96401 CHEMO ANTI-NEOPL SQ/IM: CPT

## 2022-01-06 PROCEDURE — 1126F AMNT PAIN NOTED NONE PRSNT: CPT | Mod: CPTII,S$GLB,, | Performed by: INTERNAL MEDICINE

## 2022-01-06 PROCEDURE — 3288F FALL RISK ASSESSMENT DOCD: CPT | Mod: CPTII,S$GLB,, | Performed by: INTERNAL MEDICINE

## 2022-01-06 PROCEDURE — 3075F PR MOST RECENT SYSTOLIC BLOOD PRESS GE 130-139MM HG: ICD-10-PCS | Mod: CPTII,S$GLB,, | Performed by: INTERNAL MEDICINE

## 2022-01-06 PROCEDURE — 1160F PR REVIEW ALL MEDS BY PRESCRIBER/CLIN PHARMACIST DOCUMENTED: ICD-10-PCS | Mod: CPTII,S$GLB,, | Performed by: INTERNAL MEDICINE

## 2022-01-06 PROCEDURE — 3288F PR FALLS RISK ASSESSMENT DOCUMENTED: ICD-10-PCS | Mod: CPTII,S$GLB,, | Performed by: INTERNAL MEDICINE

## 2022-01-06 PROCEDURE — 1159F MED LIST DOCD IN RCRD: CPT | Mod: CPTII,S$GLB,, | Performed by: INTERNAL MEDICINE

## 2022-01-06 PROCEDURE — 99214 PR OFFICE/OUTPT VISIT, EST, LEVL IV, 30-39 MIN: ICD-10-PCS | Mod: S$GLB,,, | Performed by: INTERNAL MEDICINE

## 2022-01-06 PROCEDURE — 99214 OFFICE O/P EST MOD 30 MIN: CPT | Mod: S$GLB,,, | Performed by: INTERNAL MEDICINE

## 2022-01-06 PROCEDURE — 3075F SYST BP GE 130 - 139MM HG: CPT | Mod: CPTII,S$GLB,, | Performed by: INTERNAL MEDICINE

## 2022-01-06 PROCEDURE — 3008F PR BODY MASS INDEX (BMI) DOCUMENTED: ICD-10-PCS | Mod: CPTII,S$GLB,, | Performed by: INTERNAL MEDICINE

## 2022-01-06 PROCEDURE — 1101F PT FALLS ASSESS-DOCD LE1/YR: CPT | Mod: CPTII,S$GLB,, | Performed by: INTERNAL MEDICINE

## 2022-01-06 PROCEDURE — 3078F PR MOST RECENT DIASTOLIC BLOOD PRESSURE < 80 MM HG: ICD-10-PCS | Mod: CPTII,S$GLB,, | Performed by: INTERNAL MEDICINE

## 2022-01-06 PROCEDURE — 1160F RVW MEDS BY RX/DR IN RCRD: CPT | Mod: CPTII,S$GLB,, | Performed by: INTERNAL MEDICINE

## 2022-01-06 PROCEDURE — 25000003 PHARM REV CODE 250: Performed by: INTERNAL MEDICINE

## 2022-01-06 PROCEDURE — 3078F DIAST BP <80 MM HG: CPT | Mod: CPTII,S$GLB,, | Performed by: INTERNAL MEDICINE

## 2022-01-06 RX ORDER — EPINEPHRINE 0.3 MG/.3ML
0.3 INJECTION SUBCUTANEOUS ONCE AS NEEDED
Status: CANCELLED | OUTPATIENT
Start: 2022-02-03

## 2022-01-06 RX ORDER — DIPHENHYDRAMINE HCL 25 MG
50 CAPSULE ORAL
Status: CANCELLED
Start: 2022-02-03

## 2022-01-06 RX ORDER — DIPHENHYDRAMINE HYDROCHLORIDE 50 MG/ML
50 INJECTION INTRAMUSCULAR; INTRAVENOUS ONCE AS NEEDED
Status: CANCELLED | OUTPATIENT
Start: 2022-02-03

## 2022-01-06 RX ORDER — HEPARIN 100 UNIT/ML
500 SYRINGE INTRAVENOUS
Status: CANCELLED | OUTPATIENT
Start: 2022-01-20

## 2022-01-06 RX ORDER — EPINEPHRINE 0.3 MG/.3ML
0.3 INJECTION SUBCUTANEOUS ONCE AS NEEDED
Status: CANCELLED | OUTPATIENT
Start: 2022-01-20

## 2022-01-06 RX ORDER — ACETAMINOPHEN 325 MG/1
650 TABLET ORAL
Status: CANCELLED | OUTPATIENT
Start: 2022-01-20

## 2022-01-06 RX ORDER — DIPHENHYDRAMINE HYDROCHLORIDE 50 MG/ML
50 INJECTION INTRAMUSCULAR; INTRAVENOUS ONCE AS NEEDED
Status: DISCONTINUED | OUTPATIENT
Start: 2022-01-06 | End: 2022-01-06 | Stop reason: HOSPADM

## 2022-01-06 RX ORDER — SODIUM CHLORIDE 0.9 % (FLUSH) 0.9 %
10 SYRINGE (ML) INJECTION
Status: CANCELLED | OUTPATIENT
Start: 2022-02-03

## 2022-01-06 RX ORDER — DIPHENHYDRAMINE HYDROCHLORIDE 50 MG/ML
50 INJECTION INTRAMUSCULAR; INTRAVENOUS ONCE AS NEEDED
Status: CANCELLED | OUTPATIENT
Start: 2022-01-20

## 2022-01-06 RX ORDER — DEXAMETHASONE 4 MG/1
4 TABLET ORAL
Status: COMPLETED | OUTPATIENT
Start: 2022-01-06 | End: 2022-01-06

## 2022-01-06 RX ORDER — HEPARIN 100 UNIT/ML
500 SYRINGE INTRAVENOUS
Status: CANCELLED | OUTPATIENT
Start: 2022-02-03

## 2022-01-06 RX ORDER — DIPHENHYDRAMINE HCL 25 MG
50 CAPSULE ORAL
Status: CANCELLED
Start: 2022-01-20

## 2022-01-06 RX ORDER — SODIUM CHLORIDE 0.9 % (FLUSH) 0.9 %
10 SYRINGE (ML) INJECTION
Status: CANCELLED | OUTPATIENT
Start: 2022-01-20

## 2022-01-06 RX ORDER — DEXAMETHASONE 0.5 MG/1
4 TABLET ORAL
Status: CANCELLED
Start: 2022-02-03

## 2022-01-06 RX ORDER — FAMOTIDINE 20 MG/1
20 TABLET, FILM COATED ORAL
Status: CANCELLED
Start: 2022-02-03

## 2022-01-06 RX ORDER — EPINEPHRINE 0.3 MG/.3ML
0.3 INJECTION SUBCUTANEOUS ONCE AS NEEDED
Status: DISCONTINUED | OUTPATIENT
Start: 2022-01-06 | End: 2022-01-06 | Stop reason: HOSPADM

## 2022-01-06 RX ORDER — DIPHENHYDRAMINE HCL 25 MG
50 CAPSULE ORAL
Status: COMPLETED | OUTPATIENT
Start: 2022-01-06 | End: 2022-01-06

## 2022-01-06 RX ORDER — FAMOTIDINE 20 MG/1
20 TABLET, FILM COATED ORAL
Status: COMPLETED | OUTPATIENT
Start: 2022-01-06 | End: 2022-01-06

## 2022-01-06 RX ORDER — FAMOTIDINE 20 MG/1
20 TABLET, FILM COATED ORAL
Status: CANCELLED
Start: 2022-01-20

## 2022-01-06 RX ORDER — DEXAMETHASONE 0.5 MG/1
4 TABLET ORAL
Status: CANCELLED
Start: 2022-01-20

## 2022-01-06 RX ORDER — ACETAMINOPHEN 325 MG/1
650 TABLET ORAL
Status: CANCELLED | OUTPATIENT
Start: 2022-02-03

## 2022-01-06 RX ORDER — ACETAMINOPHEN 325 MG/1
650 TABLET ORAL
Status: COMPLETED | OUTPATIENT
Start: 2022-01-06 | End: 2022-01-06

## 2022-01-06 RX ADMIN — DARATUMUMAB AND HYALURONIDASE-FIHJ (HUMAN RECOMBINANT) 1800 MG: 1800; 30000 INJECTION SUBCUTANEOUS at 12:01

## 2022-01-06 RX ADMIN — DIPHENHYDRAMINE HYDROCHLORIDE 50 MG: 25 CAPSULE ORAL at 11:01

## 2022-01-06 RX ADMIN — FAMOTIDINE 20 MG: 20 TABLET ORAL at 11:01

## 2022-01-06 RX ADMIN — ACETAMINOPHEN 650 MG: 325 TABLET ORAL at 11:01

## 2022-01-06 RX ADMIN — DEXAMETHASONE 4 MG: 4 TABLET ORAL at 11:01

## 2022-01-06 NOTE — PROGRESS NOTES
"PATIENT: Haris Hernandez  MRN: [id number]  DATE: 1/6/2022    Chief Complaint: Kappa Light Chain Myeloma    Subjective:     Pertinent Medical History:     He presented to the ED 08/13/2021 with abnormal labs-CMP showed creatinine 6.6.  Prior CMP from May 2018 showed creatinine 1.2.    Further workup included a free light chain assay where a kappa free light chain level was 494    08/23/2021 bone marrow biopsy showed variable cellularity, 10 to 40% positive for plasma cell dyscrasia with plasma cells representing 30 to 40% of total cellularity.  No increase in blasts. FISH for Plasma Cell Proliferative Disorder - NORMAL    Interval History:   -accompanied by daughter  -feels okay, urinating fine  -started Darzalex, Velcade, Decadron 8/26/2021  -On Revlimid 10 mg qod since 9/30/2021  -saw urology,  for BPH causing difficulty in emptying the bladder, he may need cystoscopy and surgery    Past Medical, Surgical, Family, and Social histories reviewed.    Medication and Allergies reviewed.    Review of Systems   Constitutional: Negative for fever and unexpected weight change.       Objective:     Vitals:    01/06/22 1017   BP: (!) 137/59   BP Location: Left arm   Patient Position: Sitting   BP Method: Medium (Automatic)   Pulse: 60   Resp: 20   Temp: 98 °F (36.7 °C)   TempSrc: Oral   SpO2: 100%   Weight: 70.8 kg (156 lb 1.4 oz)   Height: 5' 11" (1.803 m)     BMI: Body mass index is 21.77 kg/m².    Physical Exam  Vitals and nursing note reviewed.   Constitutional:       General: He is not in acute distress.  Pulmonary:      Effort: Pulmonary effort is normal.      Breath sounds: Normal breath sounds.   Neurological:      Mental Status: He is alert. Mental status is at baseline.       Assessment:       1. Kappa light chain myeloma    2. Stage 3b chronic kidney disease    3. Benign prostatic hyperplasia, unspecified whether lower urinary tract symptoms present      Plan:   Kappa light chain myeloma with normal " cytogenetics  -started Darzalex Faspro, subcutaneous Velcade, oral dexamethasone 8/26/2021  -reviewed cbc, chemistries  -proceed with cycle 5, day 15 of treatment with Darzalex and oral Dexamethasone today  -velcade discontinued after 4 cycles  -cont decadron 4 mg with each dose of Darzalex  -on Revlimid 10 mg QOD - start 9/30/2021  -RTC 2 weeks with cbc, cmp, spep, flca, qi  -cont Plavix  -continue acyclovir for shingles prophylaxis    CKD Stage 3b  -acute renal failure now resolved  -creatinine at new baseline    BPH/Urinary retention  -follows [facility]  -may need cystoscopy/surgery    Edema  -lasix per nephrology  -on decreased dose of decadron 4 mg with each dose of chemo    Repeat labs and follow-up in 2 weeks

## 2022-01-06 NOTE — NURSING
Patient tolerated Darzalex FastPro injection well, no complaints at this time. Next appointment schedule.d and pt d/c in no acute distress.

## 2022-01-20 ENCOUNTER — OFFICE VISIT (OUTPATIENT)
Dept: HEMATOLOGY/ONCOLOGY | Facility: CLINIC | Age: 77
End: 2022-01-20
Payer: MEDICARE

## 2022-01-20 ENCOUNTER — INFUSION (OUTPATIENT)
Dept: INFUSION THERAPY | Facility: HOSPITAL | Age: 77
End: 2022-01-20
Attending: INTERNAL MEDICINE
Payer: MEDICARE

## 2022-01-20 ENCOUNTER — TELEPHONE (OUTPATIENT)
Dept: HEMATOLOGY/ONCOLOGY | Facility: CLINIC | Age: 77
End: 2022-01-20

## 2022-01-20 VITALS
BODY MASS INDEX: 21.42 KG/M2 | TEMPERATURE: 97 F | DIASTOLIC BLOOD PRESSURE: 59 MMHG | OXYGEN SATURATION: 100 % | DIASTOLIC BLOOD PRESSURE: 59 MMHG | TEMPERATURE: 97 F | WEIGHT: 153 LBS | HEIGHT: 71 IN | SYSTOLIC BLOOD PRESSURE: 136 MMHG | BODY MASS INDEX: 21.42 KG/M2 | OXYGEN SATURATION: 100 % | WEIGHT: 153 LBS | HEART RATE: 56 BPM | RESPIRATION RATE: 20 BRPM | SYSTOLIC BLOOD PRESSURE: 136 MMHG | HEART RATE: 56 BPM | HEIGHT: 71 IN | RESPIRATION RATE: 20 BRPM

## 2022-01-20 DIAGNOSIS — C90.00 KAPPA LIGHT CHAIN MYELOMA: Primary | ICD-10-CM

## 2022-01-20 DIAGNOSIS — N18.32 STAGE 3B CHRONIC KIDNEY DISEASE: ICD-10-CM

## 2022-01-20 PROCEDURE — 99214 OFFICE O/P EST MOD 30 MIN: CPT | Mod: S$GLB,,, | Performed by: INTERNAL MEDICINE

## 2022-01-20 PROCEDURE — 1159F PR MEDICATION LIST DOCUMENTED IN MEDICAL RECORD: ICD-10-PCS | Mod: CPTII,S$GLB,, | Performed by: INTERNAL MEDICINE

## 2022-01-20 PROCEDURE — 3078F DIAST BP <80 MM HG: CPT | Mod: CPTII,S$GLB,, | Performed by: INTERNAL MEDICINE

## 2022-01-20 PROCEDURE — 99999 PR PBB SHADOW E&M-EST. PATIENT-LVL III: CPT | Mod: PBBFAC,,, | Performed by: INTERNAL MEDICINE

## 2022-01-20 PROCEDURE — 1160F PR REVIEW ALL MEDS BY PRESCRIBER/CLIN PHARMACIST DOCUMENTED: ICD-10-PCS | Mod: CPTII,S$GLB,, | Performed by: INTERNAL MEDICINE

## 2022-01-20 PROCEDURE — 1126F AMNT PAIN NOTED NONE PRSNT: CPT | Mod: CPTII,S$GLB,, | Performed by: INTERNAL MEDICINE

## 2022-01-20 PROCEDURE — 3075F PR MOST RECENT SYSTOLIC BLOOD PRESS GE 130-139MM HG: ICD-10-PCS | Mod: CPTII,S$GLB,, | Performed by: INTERNAL MEDICINE

## 2022-01-20 PROCEDURE — 3008F PR BODY MASS INDEX (BMI) DOCUMENTED: ICD-10-PCS | Mod: CPTII,S$GLB,, | Performed by: INTERNAL MEDICINE

## 2022-01-20 PROCEDURE — 96401 CHEMO ANTI-NEOPL SQ/IM: CPT

## 2022-01-20 PROCEDURE — 1159F MED LIST DOCD IN RCRD: CPT | Mod: CPTII,S$GLB,, | Performed by: INTERNAL MEDICINE

## 2022-01-20 PROCEDURE — 3075F SYST BP GE 130 - 139MM HG: CPT | Mod: CPTII,S$GLB,, | Performed by: INTERNAL MEDICINE

## 2022-01-20 PROCEDURE — 99999 PR PBB SHADOW E&M-EST. PATIENT-LVL III: ICD-10-PCS | Mod: PBBFAC,,, | Performed by: INTERNAL MEDICINE

## 2022-01-20 PROCEDURE — 3288F FALL RISK ASSESSMENT DOCD: CPT | Mod: CPTII,S$GLB,, | Performed by: INTERNAL MEDICINE

## 2022-01-20 PROCEDURE — 1160F RVW MEDS BY RX/DR IN RCRD: CPT | Mod: CPTII,S$GLB,, | Performed by: INTERNAL MEDICINE

## 2022-01-20 PROCEDURE — 1101F PR PT FALLS ASSESS DOC 0-1 FALLS W/OUT INJ PAST YR: ICD-10-PCS | Mod: CPTII,S$GLB,, | Performed by: INTERNAL MEDICINE

## 2022-01-20 PROCEDURE — 63600175 PHARM REV CODE 636 W HCPCS: Performed by: INTERNAL MEDICINE

## 2022-01-20 PROCEDURE — 1101F PT FALLS ASSESS-DOCD LE1/YR: CPT | Mod: CPTII,S$GLB,, | Performed by: INTERNAL MEDICINE

## 2022-01-20 PROCEDURE — 3288F PR FALLS RISK ASSESSMENT DOCUMENTED: ICD-10-PCS | Mod: CPTII,S$GLB,, | Performed by: INTERNAL MEDICINE

## 2022-01-20 PROCEDURE — 25000003 PHARM REV CODE 250: Performed by: INTERNAL MEDICINE

## 2022-01-20 PROCEDURE — 1126F PR PAIN SEVERITY QUANTIFIED, NO PAIN PRESENT: ICD-10-PCS | Mod: CPTII,S$GLB,, | Performed by: INTERNAL MEDICINE

## 2022-01-20 PROCEDURE — 99214 PR OFFICE/OUTPT VISIT, EST, LEVL IV, 30-39 MIN: ICD-10-PCS | Mod: S$GLB,,, | Performed by: INTERNAL MEDICINE

## 2022-01-20 PROCEDURE — 3078F PR MOST RECENT DIASTOLIC BLOOD PRESSURE < 80 MM HG: ICD-10-PCS | Mod: CPTII,S$GLB,, | Performed by: INTERNAL MEDICINE

## 2022-01-20 PROCEDURE — 3008F BODY MASS INDEX DOCD: CPT | Mod: CPTII,S$GLB,, | Performed by: INTERNAL MEDICINE

## 2022-01-20 RX ORDER — DIPHENHYDRAMINE HYDROCHLORIDE 50 MG/ML
50 INJECTION INTRAMUSCULAR; INTRAVENOUS ONCE AS NEEDED
Status: DISCONTINUED | OUTPATIENT
Start: 2022-01-20 | End: 2022-01-20 | Stop reason: HOSPADM

## 2022-01-20 RX ORDER — DEXAMETHASONE 4 MG/1
4 TABLET ORAL
Status: COMPLETED | OUTPATIENT
Start: 2022-01-20 | End: 2022-01-20

## 2022-01-20 RX ORDER — EPINEPHRINE 0.3 MG/.3ML
0.3 INJECTION SUBCUTANEOUS ONCE AS NEEDED
Status: DISCONTINUED | OUTPATIENT
Start: 2022-01-20 | End: 2022-01-20 | Stop reason: HOSPADM

## 2022-01-20 RX ORDER — ACETAMINOPHEN 325 MG/1
650 TABLET ORAL
Status: COMPLETED | OUTPATIENT
Start: 2022-01-20 | End: 2022-01-20

## 2022-01-20 RX ORDER — FAMOTIDINE 20 MG/1
20 TABLET, FILM COATED ORAL
Status: COMPLETED | OUTPATIENT
Start: 2022-01-20 | End: 2022-01-20

## 2022-01-20 RX ORDER — DIPHENHYDRAMINE HCL 25 MG
50 CAPSULE ORAL
Status: COMPLETED | OUTPATIENT
Start: 2022-01-20 | End: 2022-01-20

## 2022-01-20 RX ADMIN — DEXAMETHASONE 4 MG: 4 TABLET ORAL at 12:01

## 2022-01-20 RX ADMIN — FAMOTIDINE 20 MG: 20 TABLET ORAL at 12:01

## 2022-01-20 RX ADMIN — ACETAMINOPHEN 650 MG: 325 TABLET ORAL at 12:01

## 2022-01-20 RX ADMIN — DIPHENHYDRAMINE HYDROCHLORIDE 50 MG: 25 CAPSULE ORAL at 12:01

## 2022-01-20 RX ADMIN — DARATUMUMAB AND HYALURONIDASE-FIHJ (HUMAN RECOMBINANT) 1800 MG: 1800; 30000 INJECTION SUBCUTANEOUS at 12:01

## 2022-01-20 NOTE — NURSING
Patient tolerated Cycle6 Day1 Darzalex FastPro injection well, no complaints at this time. Next appointment scheduled and chemo calendar given to patient. Pt verbalized understanding and d/c in no acute distress.

## 2022-01-20 NOTE — PROGRESS NOTES
"PATIENT: Haris Hernandez  MRN: [id number]  DATE: 1/20/2022    Chief Complaint: Kappa Light Chain Myeloma    Subjective:     Pertinent Medical History:     He presented to the ED 08/13/2021 with abnormal labs-CMP showed creatinine 6.6.  Prior CMP from May 2018 showed creatinine 1.2.    Further workup included a free light chain assay where a kappa free light chain level was 494    08/23/2021 bone marrow biopsy showed variable cellularity, 10 to 40% positive for plasma cell dyscrasia with plasma cells representing 30 to 40% of total cellularity.  No increase in blasts. FISH for Plasma Cell Proliferative Disorder - NORMAL    Interval History:   -accompanied by daughter  -feels okay, urinating fine  -started Darzalex, Velcade, Decadron 8/26/2021  -On Revlimid 10 mg qod since 9/30/2021  -saw urology,  for BPH causing difficulty in emptying the bladder, he may need cystoscopy and surgery    Past Medical, Surgical, Family, and Social histories reviewed.    Medication and Allergies reviewed.    Review of Systems   Constitutional: Negative for fever and unexpected weight change.       Objective:     Vitals:    01/20/22 1125   BP: (!) 136/59   BP Location: Left arm   Patient Position: Sitting   BP Method: Medium (Automatic)   Pulse: (!) 56   Resp: 20   Temp: 97.2 °F (36.2 °C)   TempSrc: Oral   SpO2: 100%   Weight: 69.4 kg (153 lb)   Height: 5' 11" (1.803 m)     BMI: Body mass index is 21.34 kg/m².    Physical Exam  Vitals and nursing note reviewed.   Constitutional:       General: He is not in acute distress.  Pulmonary:      Effort: Pulmonary effort is normal.      Breath sounds: Normal breath sounds.   Neurological:      Mental Status: He is alert. Mental status is at baseline.       Assessment:       1. Kappa light chain myeloma    2. Stage 3b chronic kidney disease      Plan:   Kappa light chain myeloma with normal cytogenetics  -started Darzalex Faspro, subcutaneous Velcade, oral dexamethasone 8/26/2021  -reviewed " cbc, chemistries  -proceed with cycle 6, day 1 of treatment with Darzalex and oral Dexamethasone today  -velcade discontinued after 4 cycles  -cont decadron 4 mg with each dose of Darzalex  -on Revlimid 10 mg QOD - start 9/30/2021  -RTC 2 weeks with cbc, cmp, spep, flca, qi  -cont Plavix  -continue acyclovir for shingles prophylaxis    CKD Stage 3b  -acute renal failure now resolved  -creatinine at new baseline    BPH/Urinary retention  -follows wit   -has appt for cystoscopy/surgery 2/7/2022    Edema  -not on lasix  -on decreased dose of decadron 4 mg with each dose of chemo  -plan to d/c decadron after 6 cycles of darzalex    Repeat labs and follow-up in 2 weeks, after that will see him every 4 weeks

## 2022-02-03 ENCOUNTER — INFUSION (OUTPATIENT)
Dept: INFUSION THERAPY | Facility: HOSPITAL | Age: 77
End: 2022-02-03
Attending: INTERNAL MEDICINE
Payer: MEDICARE

## 2022-02-03 ENCOUNTER — OFFICE VISIT (OUTPATIENT)
Dept: HEMATOLOGY/ONCOLOGY | Facility: CLINIC | Age: 77
End: 2022-02-03
Payer: MEDICARE

## 2022-02-03 VITALS
OXYGEN SATURATION: 100 % | DIASTOLIC BLOOD PRESSURE: 60 MMHG | SYSTOLIC BLOOD PRESSURE: 116 MMHG | TEMPERATURE: 98 F | HEART RATE: 62 BPM | DIASTOLIC BLOOD PRESSURE: 60 MMHG | RESPIRATION RATE: 20 BRPM | WEIGHT: 159.19 LBS | HEIGHT: 71 IN | SYSTOLIC BLOOD PRESSURE: 116 MMHG | HEART RATE: 62 BPM | OXYGEN SATURATION: 100 % | HEIGHT: 71 IN | RESPIRATION RATE: 20 BRPM | BODY MASS INDEX: 22.29 KG/M2 | TEMPERATURE: 98 F | WEIGHT: 159.19 LBS | BODY MASS INDEX: 22.29 KG/M2

## 2022-02-03 DIAGNOSIS — N18.32 STAGE 3B CHRONIC KIDNEY DISEASE: ICD-10-CM

## 2022-02-03 DIAGNOSIS — C90.00 KAPPA LIGHT CHAIN MYELOMA: Primary | ICD-10-CM

## 2022-02-03 DIAGNOSIS — N40.0 BENIGN PROSTATIC HYPERPLASIA, UNSPECIFIED WHETHER LOWER URINARY TRACT SYMPTOMS PRESENT: ICD-10-CM

## 2022-02-03 PROCEDURE — 99999 PR PBB SHADOW E&M-EST. PATIENT-LVL III: ICD-10-PCS | Mod: PBBFAC,,, | Performed by: INTERNAL MEDICINE

## 2022-02-03 PROCEDURE — 1160F PR REVIEW ALL MEDS BY PRESCRIBER/CLIN PHARMACIST DOCUMENTED: ICD-10-PCS | Mod: CPTII,S$GLB,, | Performed by: INTERNAL MEDICINE

## 2022-02-03 PROCEDURE — 1126F PR PAIN SEVERITY QUANTIFIED, NO PAIN PRESENT: ICD-10-PCS | Mod: CPTII,S$GLB,, | Performed by: INTERNAL MEDICINE

## 2022-02-03 PROCEDURE — 3078F PR MOST RECENT DIASTOLIC BLOOD PRESSURE < 80 MM HG: ICD-10-PCS | Mod: CPTII,S$GLB,, | Performed by: INTERNAL MEDICINE

## 2022-02-03 PROCEDURE — 3288F PR FALLS RISK ASSESSMENT DOCUMENTED: ICD-10-PCS | Mod: CPTII,S$GLB,, | Performed by: INTERNAL MEDICINE

## 2022-02-03 PROCEDURE — 3074F SYST BP LT 130 MM HG: CPT | Mod: CPTII,S$GLB,, | Performed by: INTERNAL MEDICINE

## 2022-02-03 PROCEDURE — 1159F MED LIST DOCD IN RCRD: CPT | Mod: CPTII,S$GLB,, | Performed by: INTERNAL MEDICINE

## 2022-02-03 PROCEDURE — 99999 PR PBB SHADOW E&M-EST. PATIENT-LVL III: CPT | Mod: PBBFAC,,, | Performed by: INTERNAL MEDICINE

## 2022-02-03 PROCEDURE — 63600175 PHARM REV CODE 636 W HCPCS: Performed by: INTERNAL MEDICINE

## 2022-02-03 PROCEDURE — 99214 OFFICE O/P EST MOD 30 MIN: CPT | Mod: S$GLB,,, | Performed by: INTERNAL MEDICINE

## 2022-02-03 PROCEDURE — 3074F PR MOST RECENT SYSTOLIC BLOOD PRESSURE < 130 MM HG: ICD-10-PCS | Mod: CPTII,S$GLB,, | Performed by: INTERNAL MEDICINE

## 2022-02-03 PROCEDURE — 25000003 PHARM REV CODE 250: Performed by: INTERNAL MEDICINE

## 2022-02-03 PROCEDURE — 3078F DIAST BP <80 MM HG: CPT | Mod: CPTII,S$GLB,, | Performed by: INTERNAL MEDICINE

## 2022-02-03 PROCEDURE — 1160F RVW MEDS BY RX/DR IN RCRD: CPT | Mod: CPTII,S$GLB,, | Performed by: INTERNAL MEDICINE

## 2022-02-03 PROCEDURE — 1101F PR PT FALLS ASSESS DOC 0-1 FALLS W/OUT INJ PAST YR: ICD-10-PCS | Mod: CPTII,S$GLB,, | Performed by: INTERNAL MEDICINE

## 2022-02-03 PROCEDURE — 1126F AMNT PAIN NOTED NONE PRSNT: CPT | Mod: CPTII,S$GLB,, | Performed by: INTERNAL MEDICINE

## 2022-02-03 PROCEDURE — 3288F FALL RISK ASSESSMENT DOCD: CPT | Mod: CPTII,S$GLB,, | Performed by: INTERNAL MEDICINE

## 2022-02-03 PROCEDURE — 3008F BODY MASS INDEX DOCD: CPT | Mod: CPTII,S$GLB,, | Performed by: INTERNAL MEDICINE

## 2022-02-03 PROCEDURE — 96401 CHEMO ANTI-NEOPL SQ/IM: CPT

## 2022-02-03 PROCEDURE — 3008F PR BODY MASS INDEX (BMI) DOCUMENTED: ICD-10-PCS | Mod: CPTII,S$GLB,, | Performed by: INTERNAL MEDICINE

## 2022-02-03 PROCEDURE — 99214 PR OFFICE/OUTPT VISIT, EST, LEVL IV, 30-39 MIN: ICD-10-PCS | Mod: S$GLB,,, | Performed by: INTERNAL MEDICINE

## 2022-02-03 PROCEDURE — 1101F PT FALLS ASSESS-DOCD LE1/YR: CPT | Mod: CPTII,S$GLB,, | Performed by: INTERNAL MEDICINE

## 2022-02-03 PROCEDURE — 1159F PR MEDICATION LIST DOCUMENTED IN MEDICAL RECORD: ICD-10-PCS | Mod: CPTII,S$GLB,, | Performed by: INTERNAL MEDICINE

## 2022-02-03 RX ORDER — DIPHENHYDRAMINE HCL 25 MG
50 CAPSULE ORAL
Status: COMPLETED | OUTPATIENT
Start: 2022-02-03 | End: 2022-02-03

## 2022-02-03 RX ORDER — SODIUM CHLORIDE 0.9 % (FLUSH) 0.9 %
10 SYRINGE (ML) INJECTION
Status: CANCELLED | OUTPATIENT
Start: 2022-02-17

## 2022-02-03 RX ORDER — DEXAMETHASONE 4 MG/1
4 TABLET ORAL
Status: COMPLETED | OUTPATIENT
Start: 2022-02-03 | End: 2022-02-03

## 2022-02-03 RX ORDER — DIPHENHYDRAMINE HYDROCHLORIDE 50 MG/ML
50 INJECTION INTRAMUSCULAR; INTRAVENOUS ONCE AS NEEDED
Status: DISCONTINUED | OUTPATIENT
Start: 2022-02-03 | End: 2022-02-03 | Stop reason: HOSPADM

## 2022-02-03 RX ORDER — HEPARIN 100 UNIT/ML
500 SYRINGE INTRAVENOUS
Status: CANCELLED | OUTPATIENT
Start: 2022-02-17

## 2022-02-03 RX ORDER — ACETAMINOPHEN 325 MG/1
650 TABLET ORAL
Status: COMPLETED | OUTPATIENT
Start: 2022-02-03 | End: 2022-02-03

## 2022-02-03 RX ORDER — FAMOTIDINE 20 MG/1
20 TABLET, FILM COATED ORAL
Status: COMPLETED | OUTPATIENT
Start: 2022-02-03 | End: 2022-02-03

## 2022-02-03 RX ORDER — EPINEPHRINE 0.3 MG/.3ML
0.3 INJECTION SUBCUTANEOUS ONCE AS NEEDED
Status: DISCONTINUED | OUTPATIENT
Start: 2022-02-03 | End: 2022-02-03 | Stop reason: HOSPADM

## 2022-02-03 RX ORDER — EPINEPHRINE 0.3 MG/.3ML
0.3 INJECTION SUBCUTANEOUS ONCE AS NEEDED
Status: CANCELLED | OUTPATIENT
Start: 2022-02-17

## 2022-02-03 RX ORDER — ACETAMINOPHEN 325 MG/1
650 TABLET ORAL
Status: CANCELLED | OUTPATIENT
Start: 2022-02-17

## 2022-02-03 RX ORDER — FAMOTIDINE 20 MG/1
20 TABLET, FILM COATED ORAL
Status: CANCELLED
Start: 2022-02-17

## 2022-02-03 RX ORDER — DEXAMETHASONE 0.5 MG/1
4 TABLET ORAL
Status: CANCELLED
Start: 2022-02-17

## 2022-02-03 RX ORDER — DIPHENHYDRAMINE HYDROCHLORIDE 50 MG/ML
50 INJECTION INTRAMUSCULAR; INTRAVENOUS ONCE AS NEEDED
Status: CANCELLED | OUTPATIENT
Start: 2022-02-17

## 2022-02-03 RX ORDER — DIPHENHYDRAMINE HCL 25 MG
50 CAPSULE ORAL
Status: CANCELLED
Start: 2022-02-17

## 2022-02-03 RX ADMIN — DARATUMUMAB AND HYALURONIDASE-FIHJ (HUMAN RECOMBINANT) 1800 MG: 1800; 30000 INJECTION SUBCUTANEOUS at 02:02

## 2022-02-03 RX ADMIN — DEXAMETHASONE 4 MG: 4 TABLET ORAL at 01:02

## 2022-02-03 RX ADMIN — ACETAMINOPHEN 650 MG: 325 TABLET ORAL at 01:02

## 2022-02-03 RX ADMIN — DIPHENHYDRAMINE HYDROCHLORIDE 50 MG: 25 CAPSULE ORAL at 01:02

## 2022-02-03 RX ADMIN — FAMOTIDINE 20 MG: 20 TABLET ORAL at 01:02

## 2022-02-03 NOTE — NURSING
Pt tolerated Darzalex injection well, no complaints at this time. No adverse reactions noted. Next appointment scheduled and pt d/c in no acute distress.

## 2022-02-03 NOTE — PROGRESS NOTES
"PATIENT: Haris Hernandez  MRN: [id number]  DATE: 2/3/2022    Chief Complaint: Kappa Light Chain Myeloma    Subjective:     Pertinent Medical History:     He presented to the ED 08/13/2021 with abnormal labs-CMP showed creatinine 6.6.  Prior CMP from May 2018 showed creatinine 1.2.    Further workup included a free light chain assay where a kappa free light chain level was 494    08/23/2021 bone marrow biopsy showed variable cellularity, 10 to 40% positive for plasma cell dyscrasia with plasma cells representing 30 to 40% of total cellularity.  No increase in blasts. FISH for Plasma Cell Proliferative Disorder - NORMAL    Interval History:   -accompanied by daughter  -feels okay, urinating fine  -started Darzalex, Velcade, Decadron 8/26/2021  -On Revlimid 10 mg qod since 9/30/2021  -saw urology,  for BPH causing difficulty in emptying the bladder, he may need cystoscopy and surgery    Past Medical, Surgical, Family, and Social histories reviewed.    Medication and Allergies reviewed.    Review of Systems   Constitutional: Negative for fever and unexpected weight change.       Objective:     Vitals:    02/03/22 1310   BP: 116/60   BP Location: Left arm   Patient Position: Sitting   BP Method: Medium (Automatic)   Pulse: 62   Resp: 20   Temp: 98.1 °F (36.7 °C)   TempSrc: Oral   SpO2: 100%   Weight: 72.2 kg (159 lb 2.8 oz)   Height: 5' 11" (1.803 m)     BMI: Body mass index is 22.2 kg/m².    Physical Exam  Vitals and nursing note reviewed.   Constitutional:       General: He is not in acute distress.  Pulmonary:      Effort: Pulmonary effort is normal.      Breath sounds: Normal breath sounds.   Neurological:      Mental Status: He is alert. Mental status is at baseline.       Assessment:       1. Kappa light chain myeloma    2. Stage 3b chronic kidney disease    3. Benign prostatic hyperplasia, unspecified whether lower urinary tract symptoms present      Plan:   Kappa light chain myeloma with normal " cytogenetics  -started Darzalex Faspro, subcutaneous Velcade, oral dexamethasone 8/26/2021  -reviewed cbc, chemistries  -proceed with cycle 6, day 15 of treatment with Darzalex and oral Dexamethasone today  -velcade discontinued after 4 cycles  -will d/c decadron after todays dose  -on Revlimid 10 mg QOD - start 9/30/2021  -RTC 2 weeks with cbc, cmp, spep, flca, qi  -cont Plavix  -continue acyclovir for shingles prophylaxis    CKD Stage 3b  -acute renal failure now resolved  -creatinine at new baseline    BPH/Urinary retention  -follows wit   -has appt for cystoscopy/surgery 2/7/2022    Edema  -not on lasix  -plan to d/c decadron after todays dose    Repeat labs and follow-up in 2 weeks, after that will see him every 4 weeks

## 2022-02-09 DIAGNOSIS — D84.9 IMMUNOSUPPRESSED STATUS: ICD-10-CM

## 2022-02-11 DIAGNOSIS — C90.00 KAPPA LIGHT CHAIN MYELOMA: ICD-10-CM

## 2022-02-11 RX ORDER — LENALIDOMIDE 10 MG/1
10 CAPSULE ORAL EVERY OTHER DAY
Qty: 15 EACH | Refills: 5 | Status: SHIPPED | OUTPATIENT
Start: 2022-02-11 | End: 2022-03-09 | Stop reason: SDUPTHER

## 2022-02-11 NOTE — TELEPHONE ENCOUNTER
----- Message from Kisha Ellis sent at 2/11/2022  3:23 PM CST -----  Type:  Needs Medical Advice    Who Called:  From [employer]   Symptoms (please be specific):  calling to verify pt information to approve authorization   There has been a restriction on authorization number     Would the patient rather a call back or a response via [facility]?  Call   Best Call Back Number:  [phone]   Additional Information:  ref# [id number]

## 2022-02-17 ENCOUNTER — OFFICE VISIT (OUTPATIENT)
Dept: HEMATOLOGY/ONCOLOGY | Facility: CLINIC | Age: 77
End: 2022-02-17
Payer: MEDICARE

## 2022-02-17 ENCOUNTER — INFUSION (OUTPATIENT)
Dept: INFUSION THERAPY | Facility: HOSPITAL | Age: 77
End: 2022-02-17
Attending: INTERNAL MEDICINE
Payer: MEDICARE

## 2022-02-17 VITALS
HEART RATE: 56 BPM | WEIGHT: 154.13 LBS | DIASTOLIC BLOOD PRESSURE: 60 MMHG | RESPIRATION RATE: 20 BRPM | TEMPERATURE: 98 F | SYSTOLIC BLOOD PRESSURE: 148 MMHG | BODY MASS INDEX: 21.58 KG/M2 | HEIGHT: 71 IN | DIASTOLIC BLOOD PRESSURE: 60 MMHG | OXYGEN SATURATION: 100 % | BODY MASS INDEX: 21.58 KG/M2 | RESPIRATION RATE: 20 BRPM | SYSTOLIC BLOOD PRESSURE: 148 MMHG | HEART RATE: 56 BPM | WEIGHT: 154.13 LBS | OXYGEN SATURATION: 100 % | HEIGHT: 71 IN | TEMPERATURE: 98 F

## 2022-02-17 DIAGNOSIS — N18.32 STAGE 3B CHRONIC KIDNEY DISEASE: ICD-10-CM

## 2022-02-17 DIAGNOSIS — C90.00 KAPPA LIGHT CHAIN MYELOMA: Primary | ICD-10-CM

## 2022-02-17 PROCEDURE — 99214 OFFICE O/P EST MOD 30 MIN: CPT | Mod: S$GLB,,, | Performed by: INTERNAL MEDICINE

## 2022-02-17 PROCEDURE — 99214 PR OFFICE/OUTPT VISIT, EST, LEVL IV, 30-39 MIN: ICD-10-PCS | Mod: S$GLB,,, | Performed by: INTERNAL MEDICINE

## 2022-02-17 PROCEDURE — 99999 PR PBB SHADOW E&M-EST. PATIENT-LVL III: CPT | Mod: PBBFAC,,, | Performed by: INTERNAL MEDICINE

## 2022-02-17 PROCEDURE — 3288F PR FALLS RISK ASSESSMENT DOCUMENTED: ICD-10-PCS | Mod: CPTII,S$GLB,, | Performed by: INTERNAL MEDICINE

## 2022-02-17 PROCEDURE — 1159F PR MEDICATION LIST DOCUMENTED IN MEDICAL RECORD: ICD-10-PCS | Mod: CPTII,S$GLB,, | Performed by: INTERNAL MEDICINE

## 2022-02-17 PROCEDURE — 1160F RVW MEDS BY RX/DR IN RCRD: CPT | Mod: CPTII,S$GLB,, | Performed by: INTERNAL MEDICINE

## 2022-02-17 PROCEDURE — 3008F PR BODY MASS INDEX (BMI) DOCUMENTED: ICD-10-PCS | Mod: CPTII,S$GLB,, | Performed by: INTERNAL MEDICINE

## 2022-02-17 PROCEDURE — 1126F PR PAIN SEVERITY QUANTIFIED, NO PAIN PRESENT: ICD-10-PCS | Mod: CPTII,S$GLB,, | Performed by: INTERNAL MEDICINE

## 2022-02-17 PROCEDURE — 3077F PR MOST RECENT SYSTOLIC BLOOD PRESSURE >= 140 MM HG: ICD-10-PCS | Mod: CPTII,S$GLB,, | Performed by: INTERNAL MEDICINE

## 2022-02-17 PROCEDURE — 1101F PT FALLS ASSESS-DOCD LE1/YR: CPT | Mod: CPTII,S$GLB,, | Performed by: INTERNAL MEDICINE

## 2022-02-17 PROCEDURE — 63600175 PHARM REV CODE 636 W HCPCS: Mod: TB | Performed by: INTERNAL MEDICINE

## 2022-02-17 PROCEDURE — 1126F AMNT PAIN NOTED NONE PRSNT: CPT | Mod: CPTII,S$GLB,, | Performed by: INTERNAL MEDICINE

## 2022-02-17 PROCEDURE — 3008F BODY MASS INDEX DOCD: CPT | Mod: CPTII,S$GLB,, | Performed by: INTERNAL MEDICINE

## 2022-02-17 PROCEDURE — 1160F PR REVIEW ALL MEDS BY PRESCRIBER/CLIN PHARMACIST DOCUMENTED: ICD-10-PCS | Mod: CPTII,S$GLB,, | Performed by: INTERNAL MEDICINE

## 2022-02-17 PROCEDURE — 96401 CHEMO ANTI-NEOPL SQ/IM: CPT

## 2022-02-17 PROCEDURE — 1101F PR PT FALLS ASSESS DOC 0-1 FALLS W/OUT INJ PAST YR: ICD-10-PCS | Mod: CPTII,S$GLB,, | Performed by: INTERNAL MEDICINE

## 2022-02-17 PROCEDURE — 3077F SYST BP >= 140 MM HG: CPT | Mod: CPTII,S$GLB,, | Performed by: INTERNAL MEDICINE

## 2022-02-17 PROCEDURE — 3288F FALL RISK ASSESSMENT DOCD: CPT | Mod: CPTII,S$GLB,, | Performed by: INTERNAL MEDICINE

## 2022-02-17 PROCEDURE — 25000003 PHARM REV CODE 250: Performed by: INTERNAL MEDICINE

## 2022-02-17 PROCEDURE — 3078F PR MOST RECENT DIASTOLIC BLOOD PRESSURE < 80 MM HG: ICD-10-PCS | Mod: CPTII,S$GLB,, | Performed by: INTERNAL MEDICINE

## 2022-02-17 PROCEDURE — 3078F DIAST BP <80 MM HG: CPT | Mod: CPTII,S$GLB,, | Performed by: INTERNAL MEDICINE

## 2022-02-17 PROCEDURE — 99999 PR PBB SHADOW E&M-EST. PATIENT-LVL III: ICD-10-PCS | Mod: PBBFAC,,, | Performed by: INTERNAL MEDICINE

## 2022-02-17 PROCEDURE — 1159F MED LIST DOCD IN RCRD: CPT | Mod: CPTII,S$GLB,, | Performed by: INTERNAL MEDICINE

## 2022-02-17 RX ORDER — EPINEPHRINE 0.3 MG/.3ML
0.3 INJECTION SUBCUTANEOUS ONCE AS NEEDED
Status: DISCONTINUED | OUTPATIENT
Start: 2022-02-17 | End: 2022-02-17 | Stop reason: HOSPADM

## 2022-02-17 RX ORDER — ACETAMINOPHEN 325 MG/1
650 TABLET ORAL
Status: COMPLETED | OUTPATIENT
Start: 2022-02-17 | End: 2022-02-17

## 2022-02-17 RX ORDER — SODIUM CHLORIDE 0.9 % (FLUSH) 0.9 %
10 SYRINGE (ML) INJECTION
Status: DISCONTINUED | OUTPATIENT
Start: 2022-02-17 | End: 2022-02-17 | Stop reason: HOSPADM

## 2022-02-17 RX ORDER — FAMOTIDINE 20 MG/1
20 TABLET, FILM COATED ORAL
Status: COMPLETED | OUTPATIENT
Start: 2022-02-17 | End: 2022-02-17

## 2022-02-17 RX ORDER — HEPARIN 100 UNIT/ML
500 SYRINGE INTRAVENOUS
Status: DISCONTINUED | OUTPATIENT
Start: 2022-02-17 | End: 2022-02-17 | Stop reason: HOSPADM

## 2022-02-17 RX ORDER — DIPHENHYDRAMINE HYDROCHLORIDE 50 MG/ML
50 INJECTION INTRAMUSCULAR; INTRAVENOUS ONCE AS NEEDED
Status: DISCONTINUED | OUTPATIENT
Start: 2022-02-17 | End: 2022-02-17 | Stop reason: HOSPADM

## 2022-02-17 RX ORDER — DIPHENHYDRAMINE HCL 25 MG
50 CAPSULE ORAL
Status: COMPLETED | OUTPATIENT
Start: 2022-02-17 | End: 2022-02-17

## 2022-02-17 RX ORDER — NIFEDIPINE 30 MG/1
30 TABLET, EXTENDED RELEASE ORAL DAILY
COMMUNITY
Start: 2022-01-19 | End: 2023-10-25 | Stop reason: SDUPTHER

## 2022-02-17 RX ADMIN — DARATUMUMAB AND HYALURONIDASE-FIHJ (HUMAN RECOMBINANT) 1800 MG: 1800; 30000 INJECTION SUBCUTANEOUS at 03:02

## 2022-02-17 RX ADMIN — ACETAMINOPHEN 650 MG: 325 TABLET ORAL at 03:02

## 2022-02-17 RX ADMIN — FAMOTIDINE 20 MG: 20 TABLET ORAL at 03:02

## 2022-02-17 RX ADMIN — DIPHENHYDRAMINE HYDROCHLORIDE 50 MG: 25 CAPSULE ORAL at 03:02

## 2022-02-17 NOTE — PROGRESS NOTES
"PATIENT: Haris Hernandez  MRN: [id number]  DATE: 2/17/2022    Chief Complaint: Kappa Light Chain Myeloma    Subjective:     Pertinent Medical History:     He presented to the ED 08/13/2021 with abnormal labs-CMP showed creatinine 6.6.  Prior CMP from May 2018 showed creatinine 1.2.    Further workup included a free light chain assay where a kappa free light chain level was 494    08/23/2021 bone marrow biopsy showed variable cellularity, 10 to 40% positive for plasma cell dyscrasia with plasma cells representing 30 to 40% of total cellularity.  No increase in blasts. FISH for Plasma Cell Proliferative Disorder - NORMAL    Interval History:   -accompanied by daughter  -feels okay, urinating fine  -started Darzalex, Velcade, Decadron 8/26/2021  -On Revlimid 10 mg qod since 9/30/2021  -saw urology,  for BPH causing difficulty in emptying the bladder, he may need cystoscopy and surgery    Past Medical, Surgical, Family, and Social histories reviewed.    Medication and Allergies reviewed.    Review of Systems   Constitutional: Negative for fever and unexpected weight change.       Objective:     Vitals:    02/17/22 1420   BP: (!) 148/60   BP Location: Left arm   Patient Position: Sitting   BP Method: Medium (Automatic)   Pulse: (!) 56   Resp: 20   Temp: 98.2 °F (36.8 °C)   TempSrc: Oral   SpO2: 100%   Weight: 69.9 kg (154 lb 1.6 oz)   Height: 5' 11" (1.803 m)     BMI: Body mass index is 21.49 kg/m².    Physical Exam  Vitals and nursing note reviewed.   Constitutional:       General: He is not in acute distress.  Pulmonary:      Effort: Pulmonary effort is normal.      Breath sounds: Normal breath sounds.   Neurological:      Mental Status: He is alert. Mental status is at baseline.       Assessment:       1. Kappa light chain myeloma    2. Stage 3b chronic kidney disease      Plan:   Kappa light chain myeloma with normal cytogenetics  -started Darzalex Faspro, subcutaneous Velcade, oral dexamethasone " 8/26/2021  -reviewed cbc, chemistries  -proceed with cycle 7 of Darzalex   -velcade discontinued after 4 cycles  -last decadron dose was 2/3/22  -on Revlimid 10 mg QOD - start 9/30/2021, will continue   -RTC 4 weeks with cbc, cmp, spep, flca, qi  -cont Plavix  -continue acyclovir for shingles prophylaxis    CKD Stage 3b  -creatinine stable    BPH/Urinary retention  -follows [facility]  -had appt for cystoscopy/surgery 2/7/2022 - was cancelled    Edema  -not on lasix  -off decadron    Repeat labs and follow-up in 4 weeks

## 2022-03-09 DIAGNOSIS — C90.00 KAPPA LIGHT CHAIN MYELOMA: ICD-10-CM

## 2022-03-09 RX ORDER — LENALIDOMIDE 10 MG/1
10 CAPSULE ORAL EVERY OTHER DAY
Qty: 15 EACH | Refills: 5 | Status: SHIPPED | OUTPATIENT
Start: 2022-03-09 | End: 2022-04-07 | Stop reason: SDUPTHER

## 2022-03-16 ENCOUNTER — OFFICE VISIT (OUTPATIENT)
Dept: HEMATOLOGY/ONCOLOGY | Facility: CLINIC | Age: 77
End: 2022-03-16
Payer: MEDICARE

## 2022-03-16 ENCOUNTER — INFUSION (OUTPATIENT)
Dept: INFUSION THERAPY | Facility: HOSPITAL | Age: 77
End: 2022-03-16
Attending: SURGERY
Payer: MEDICARE

## 2022-03-16 VITALS
BODY MASS INDEX: 22.1 KG/M2 | WEIGHT: 157.88 LBS | TEMPERATURE: 98 F | BODY MASS INDEX: 22.02 KG/M2 | HEART RATE: 58 BPM | OXYGEN SATURATION: 100 % | SYSTOLIC BLOOD PRESSURE: 154 MMHG | TEMPERATURE: 98 F | DIASTOLIC BLOOD PRESSURE: 66 MMHG | SYSTOLIC BLOOD PRESSURE: 154 MMHG | HEIGHT: 71 IN | WEIGHT: 157.88 LBS | RESPIRATION RATE: 20 BRPM | OXYGEN SATURATION: 100 % | HEART RATE: 58 BPM | DIASTOLIC BLOOD PRESSURE: 66 MMHG

## 2022-03-16 DIAGNOSIS — N40.0 BENIGN PROSTATIC HYPERPLASIA, UNSPECIFIED WHETHER LOWER URINARY TRACT SYMPTOMS PRESENT: ICD-10-CM

## 2022-03-16 DIAGNOSIS — N18.32 STAGE 3B CHRONIC KIDNEY DISEASE: ICD-10-CM

## 2022-03-16 DIAGNOSIS — Z71.89 ADVANCED CARE PLANNING/COUNSELING DISCUSSION: ICD-10-CM

## 2022-03-16 DIAGNOSIS — C90.00 KAPPA LIGHT CHAIN MYELOMA: Primary | ICD-10-CM

## 2022-03-16 PROCEDURE — 99999 PR PBB SHADOW E&M-EST. PATIENT-LVL III: ICD-10-PCS | Mod: PBBFAC,,, | Performed by: INTERNAL MEDICINE

## 2022-03-16 PROCEDURE — 25000003 PHARM REV CODE 250: Performed by: INTERNAL MEDICINE

## 2022-03-16 PROCEDURE — 1159F MED LIST DOCD IN RCRD: CPT | Mod: CPTII,S$GLB,, | Performed by: INTERNAL MEDICINE

## 2022-03-16 PROCEDURE — 3078F DIAST BP <80 MM HG: CPT | Mod: CPTII,S$GLB,, | Performed by: INTERNAL MEDICINE

## 2022-03-16 PROCEDURE — 3077F SYST BP >= 140 MM HG: CPT | Mod: CPTII,S$GLB,, | Performed by: INTERNAL MEDICINE

## 2022-03-16 PROCEDURE — 99497 PR ADVNCD CARE PLAN 30 MIN: ICD-10-PCS | Mod: S$GLB,,, | Performed by: INTERNAL MEDICINE

## 2022-03-16 PROCEDURE — 63600175 PHARM REV CODE 636 W HCPCS: Mod: TB | Performed by: INTERNAL MEDICINE

## 2022-03-16 PROCEDURE — 3008F PR BODY MASS INDEX (BMI) DOCUMENTED: ICD-10-PCS | Mod: CPTII,S$GLB,, | Performed by: INTERNAL MEDICINE

## 2022-03-16 PROCEDURE — 99999 PR PBB SHADOW E&M-EST. PATIENT-LVL III: CPT | Mod: PBBFAC,,, | Performed by: INTERNAL MEDICINE

## 2022-03-16 PROCEDURE — 1160F RVW MEDS BY RX/DR IN RCRD: CPT | Mod: CPTII,S$GLB,, | Performed by: INTERNAL MEDICINE

## 2022-03-16 PROCEDURE — 1160F PR REVIEW ALL MEDS BY PRESCRIBER/CLIN PHARMACIST DOCUMENTED: ICD-10-PCS | Mod: CPTII,S$GLB,, | Performed by: INTERNAL MEDICINE

## 2022-03-16 PROCEDURE — 1101F PT FALLS ASSESS-DOCD LE1/YR: CPT | Mod: CPTII,S$GLB,, | Performed by: INTERNAL MEDICINE

## 2022-03-16 PROCEDURE — 1159F PR MEDICATION LIST DOCUMENTED IN MEDICAL RECORD: ICD-10-PCS | Mod: CPTII,S$GLB,, | Performed by: INTERNAL MEDICINE

## 2022-03-16 PROCEDURE — 3288F PR FALLS RISK ASSESSMENT DOCUMENTED: ICD-10-PCS | Mod: CPTII,S$GLB,, | Performed by: INTERNAL MEDICINE

## 2022-03-16 PROCEDURE — 3288F FALL RISK ASSESSMENT DOCD: CPT | Mod: CPTII,S$GLB,, | Performed by: INTERNAL MEDICINE

## 2022-03-16 PROCEDURE — 3078F PR MOST RECENT DIASTOLIC BLOOD PRESSURE < 80 MM HG: ICD-10-PCS | Mod: CPTII,S$GLB,, | Performed by: INTERNAL MEDICINE

## 2022-03-16 PROCEDURE — 1126F PR PAIN SEVERITY QUANTIFIED, NO PAIN PRESENT: ICD-10-PCS | Mod: CPTII,S$GLB,, | Performed by: INTERNAL MEDICINE

## 2022-03-16 PROCEDURE — 3008F BODY MASS INDEX DOCD: CPT | Mod: CPTII,S$GLB,, | Performed by: INTERNAL MEDICINE

## 2022-03-16 PROCEDURE — 99214 OFFICE O/P EST MOD 30 MIN: CPT | Mod: S$GLB,,, | Performed by: INTERNAL MEDICINE

## 2022-03-16 PROCEDURE — 1101F PR PT FALLS ASSESS DOC 0-1 FALLS W/OUT INJ PAST YR: ICD-10-PCS | Mod: CPTII,S$GLB,, | Performed by: INTERNAL MEDICINE

## 2022-03-16 PROCEDURE — 99214 PR OFFICE/OUTPT VISIT, EST, LEVL IV, 30-39 MIN: ICD-10-PCS | Mod: S$GLB,,, | Performed by: INTERNAL MEDICINE

## 2022-03-16 PROCEDURE — 1126F AMNT PAIN NOTED NONE PRSNT: CPT | Mod: CPTII,S$GLB,, | Performed by: INTERNAL MEDICINE

## 2022-03-16 PROCEDURE — 3077F PR MOST RECENT SYSTOLIC BLOOD PRESSURE >= 140 MM HG: ICD-10-PCS | Mod: CPTII,S$GLB,, | Performed by: INTERNAL MEDICINE

## 2022-03-16 PROCEDURE — 96401 CHEMO ANTI-NEOPL SQ/IM: CPT

## 2022-03-16 PROCEDURE — 99497 ADVNCD CARE PLAN 30 MIN: CPT | Mod: S$GLB,,, | Performed by: INTERNAL MEDICINE

## 2022-03-16 RX ORDER — DIPHENHYDRAMINE HCL 25 MG
50 CAPSULE ORAL
Status: CANCELLED
Start: 2022-03-16

## 2022-03-16 RX ORDER — HEPARIN 100 UNIT/ML
500 SYRINGE INTRAVENOUS
Status: CANCELLED | OUTPATIENT
Start: 2022-03-16

## 2022-03-16 RX ORDER — SODIUM CHLORIDE 0.9 % (FLUSH) 0.9 %
10 SYRINGE (ML) INJECTION
Status: CANCELLED | OUTPATIENT
Start: 2022-03-16

## 2022-03-16 RX ORDER — ACETAMINOPHEN 325 MG/1
650 TABLET ORAL
Status: COMPLETED | OUTPATIENT
Start: 2022-03-16 | End: 2022-03-16

## 2022-03-16 RX ORDER — FAMOTIDINE 20 MG/1
20 TABLET, FILM COATED ORAL
Status: COMPLETED | OUTPATIENT
Start: 2022-03-16 | End: 2022-03-16

## 2022-03-16 RX ORDER — DIPHENHYDRAMINE HCL 25 MG
50 CAPSULE ORAL
Status: COMPLETED | OUTPATIENT
Start: 2022-03-16 | End: 2022-03-16

## 2022-03-16 RX ORDER — DIPHENHYDRAMINE HYDROCHLORIDE 50 MG/ML
50 INJECTION INTRAMUSCULAR; INTRAVENOUS ONCE AS NEEDED
Status: CANCELLED | OUTPATIENT
Start: 2022-03-16

## 2022-03-16 RX ORDER — EPINEPHRINE 0.3 MG/.3ML
0.3 INJECTION SUBCUTANEOUS ONCE AS NEEDED
Status: DISCONTINUED | OUTPATIENT
Start: 2022-03-16 | End: 2022-03-16 | Stop reason: HOSPADM

## 2022-03-16 RX ORDER — ACETAMINOPHEN 325 MG/1
650 TABLET ORAL
Status: CANCELLED | OUTPATIENT
Start: 2022-03-16

## 2022-03-16 RX ORDER — EPINEPHRINE 0.3 MG/.3ML
0.3 INJECTION SUBCUTANEOUS ONCE AS NEEDED
Status: CANCELLED | OUTPATIENT
Start: 2022-03-16

## 2022-03-16 RX ORDER — FAMOTIDINE 20 MG/1
20 TABLET, FILM COATED ORAL
Status: CANCELLED
Start: 2022-03-16

## 2022-03-16 RX ORDER — DIPHENHYDRAMINE HYDROCHLORIDE 50 MG/ML
50 INJECTION INTRAMUSCULAR; INTRAVENOUS ONCE AS NEEDED
Status: DISCONTINUED | OUTPATIENT
Start: 2022-03-16 | End: 2022-03-16 | Stop reason: HOSPADM

## 2022-03-16 RX ADMIN — DARATUMUMAB AND HYALURONIDASE-FIHJ (HUMAN RECOMBINANT) 1800 MG: 1800; 30000 INJECTION SUBCUTANEOUS at 02:03

## 2022-03-16 RX ADMIN — ACETAMINOPHEN 650 MG: 325 TABLET ORAL at 01:03

## 2022-03-16 RX ADMIN — FAMOTIDINE 20 MG: 20 TABLET ORAL at 01:03

## 2022-03-16 RX ADMIN — DIPHENHYDRAMINE HYDROCHLORIDE 50 MG: 25 CAPSULE ORAL at 01:03

## 2022-03-16 NOTE — PROGRESS NOTES
PATIENT: Haris Hernandez  MRN: [id number]  DATE: 3/16/2022    Chief Complaint: Kappa Light Chain Myeloma    Subjective:     Pertinent Medical History:     He presented to the ED 08/13/2021 with abnormal labs-CMP showed creatinine 6.6.  Prior CMP from May 2018 showed creatinine 1.2.    Further workup included a free light chain assay where a kappa free light chain level was 494    08/23/2021 bone marrow biopsy showed variable cellularity, 10 to 40% positive for plasma cell dyscrasia with plasma cells representing 30 to 40% of total cellularity.  No increase in blasts. FISH for Plasma Cell Proliferative Disorder - NORMAL    Interval History:   -accompanied by daughter  -feels okay, urinating fine  -started Darzalex, Velcade, Decadron 8/26/2021  -On Revlimid 10 mg qod since 9/30/2021  -saw urology,  for BPH causing difficulty in emptying the bladder, he may need cystoscopy and surgery    Past Medical, Surgical, Family, and Social histories reviewed.    Medication and Allergies reviewed.    Review of Systems   Constitutional: Negative for fever and unexpected weight change.       Objective:     Vitals:    03/16/22 1311   BP: (!) 154/66   Pulse: (!) 58   Temp: 98.2 °F (36.8 °C)   SpO2: 100%   Weight: 71.6 kg (157 lb 13.6 oz)     BMI: Body mass index is 22.02 kg/m².    Physical Exam  Vitals and nursing note reviewed.   Constitutional:       General: He is not in acute distress.  Pulmonary:      Effort: Pulmonary effort is normal.      Breath sounds: Normal breath sounds.   Neurological:      Mental Status: He is alert. Mental status is at baseline.       Assessment:       1. Kappa light chain myeloma    2. Stage 3b chronic kidney disease    3. Benign prostatic hyperplasia, unspecified whether lower urinary tract symptoms present      Plan:   Kappa light chain myeloma with normal cytogenetics  -started Darzalex Faspro, subcutaneous Velcade, oral dexamethasone 8/26/2021  -reviewed cbc, chemistries  -proceed with cycle  7 of Darzalex   -velcade discontinued after 4 cycles  -last decadron dose was 2/3/22  -on Revlimid 10 mg QOD - start 9/30/2021, will continue   -RTC 4 weeks with cbc, cmp, spep, flca, qi  -cont Plavix  -continue acyclovir for shingles prophylaxis    CKD Stage 3b  -creatinine stable    BPH/Urinary retention  -follows [facility]  -appt for cystoscopy/surgery 2/7/2022 - was cancelled    Edema  -not on lasix  -off decadron  -will hold off on a trial of lasix as he goes to the bathroom frequently now already    Repeat labs and follow-up in 4 weeks

## 2022-03-16 NOTE — NURSING
Patient tolerated Cycle 8 Darzalex FastPro injection well, no adverse reaction noted. No complaints at this time. Next appointment scheduled and pt d/c in no acute distress.

## 2022-04-07 DIAGNOSIS — C90.00 KAPPA LIGHT CHAIN MYELOMA: ICD-10-CM

## 2022-04-07 RX ORDER — LENALIDOMIDE 10 MG/1
10 CAPSULE ORAL EVERY OTHER DAY
Qty: 15 EACH | Refills: 5 | Status: SHIPPED | OUTPATIENT
Start: 2022-04-07 | End: 2022-05-05 | Stop reason: SDUPTHER

## 2022-04-11 ENCOUNTER — TELEPHONE (OUTPATIENT)
Dept: HEMATOLOGY/ONCOLOGY | Facility: CLINIC | Age: 77
End: 2022-04-11
Payer: MEDICARE

## 2022-04-11 NOTE — TELEPHONE ENCOUNTER
The patient's daughter is requesting a letter of treatment with diagnosis and start date for [employer]. Letter will be ready for  at next office visit.

## 2022-04-11 NOTE — TELEPHONE ENCOUNTER
----- Message from Sharmila Weber sent at 4/11/2022 11:57 AM CDT -----  Regarding: Info Needed  Type:  Needs Medical Advice    Who Called: pt daughter  Would the patient rather a call back or a response via [facility]? call  Best Call Back Number: [phone]  Additional Information: needs written statement of treatments given to pt

## 2022-04-14 ENCOUNTER — OFFICE VISIT (OUTPATIENT)
Dept: HEMATOLOGY/ONCOLOGY | Facility: CLINIC | Age: 77
End: 2022-04-14
Payer: MEDICARE

## 2022-04-14 ENCOUNTER — INFUSION (OUTPATIENT)
Dept: INFUSION THERAPY | Facility: HOSPITAL | Age: 77
End: 2022-04-14
Attending: SURGERY
Payer: MEDICARE

## 2022-04-14 VITALS
HEART RATE: 51 BPM | HEIGHT: 71 IN | DIASTOLIC BLOOD PRESSURE: 69 MMHG | RESPIRATION RATE: 20 BRPM | WEIGHT: 163.13 LBS | TEMPERATURE: 98 F | HEART RATE: 51 BPM | OXYGEN SATURATION: 100 % | BODY MASS INDEX: 22.84 KG/M2 | BODY MASS INDEX: 22.84 KG/M2 | TEMPERATURE: 98 F | OXYGEN SATURATION: 100 % | RESPIRATION RATE: 20 BRPM | SYSTOLIC BLOOD PRESSURE: 154 MMHG | SYSTOLIC BLOOD PRESSURE: 154 MMHG | HEIGHT: 71 IN | WEIGHT: 163.13 LBS | DIASTOLIC BLOOD PRESSURE: 69 MMHG

## 2022-04-14 DIAGNOSIS — C90.00 KAPPA LIGHT CHAIN MYELOMA: Primary | ICD-10-CM

## 2022-04-14 DIAGNOSIS — N18.32 STAGE 3B CHRONIC KIDNEY DISEASE: ICD-10-CM

## 2022-04-14 DIAGNOSIS — N40.0 BENIGN PROSTATIC HYPERPLASIA, UNSPECIFIED WHETHER LOWER URINARY TRACT SYMPTOMS PRESENT: ICD-10-CM

## 2022-04-14 PROCEDURE — 3008F BODY MASS INDEX DOCD: CPT | Mod: CPTII,S$GLB,, | Performed by: INTERNAL MEDICINE

## 2022-04-14 PROCEDURE — 3288F FALL RISK ASSESSMENT DOCD: CPT | Mod: CPTII,S$GLB,, | Performed by: INTERNAL MEDICINE

## 2022-04-14 PROCEDURE — 1126F PR PAIN SEVERITY QUANTIFIED, NO PAIN PRESENT: ICD-10-PCS | Mod: CPTII,S$GLB,, | Performed by: INTERNAL MEDICINE

## 2022-04-14 PROCEDURE — 25000003 PHARM REV CODE 250: Performed by: INTERNAL MEDICINE

## 2022-04-14 PROCEDURE — 3008F PR BODY MASS INDEX (BMI) DOCUMENTED: ICD-10-PCS | Mod: CPTII,S$GLB,, | Performed by: INTERNAL MEDICINE

## 2022-04-14 PROCEDURE — 99214 OFFICE O/P EST MOD 30 MIN: CPT | Mod: S$GLB,,, | Performed by: INTERNAL MEDICINE

## 2022-04-14 PROCEDURE — 1126F AMNT PAIN NOTED NONE PRSNT: CPT | Mod: CPTII,S$GLB,, | Performed by: INTERNAL MEDICINE

## 2022-04-14 PROCEDURE — 1101F PR PT FALLS ASSESS DOC 0-1 FALLS W/OUT INJ PAST YR: ICD-10-PCS | Mod: CPTII,S$GLB,, | Performed by: INTERNAL MEDICINE

## 2022-04-14 PROCEDURE — 3078F PR MOST RECENT DIASTOLIC BLOOD PRESSURE < 80 MM HG: ICD-10-PCS | Mod: CPTII,S$GLB,, | Performed by: INTERNAL MEDICINE

## 2022-04-14 PROCEDURE — 3077F SYST BP >= 140 MM HG: CPT | Mod: CPTII,S$GLB,, | Performed by: INTERNAL MEDICINE

## 2022-04-14 PROCEDURE — 99999 PR PBB SHADOW E&M-EST. PATIENT-LVL III: ICD-10-PCS | Mod: PBBFAC,,, | Performed by: INTERNAL MEDICINE

## 2022-04-14 PROCEDURE — 1159F PR MEDICATION LIST DOCUMENTED IN MEDICAL RECORD: ICD-10-PCS | Mod: CPTII,S$GLB,, | Performed by: INTERNAL MEDICINE

## 2022-04-14 PROCEDURE — 63600175 PHARM REV CODE 636 W HCPCS: Mod: TB | Performed by: INTERNAL MEDICINE

## 2022-04-14 PROCEDURE — 1101F PT FALLS ASSESS-DOCD LE1/YR: CPT | Mod: CPTII,S$GLB,, | Performed by: INTERNAL MEDICINE

## 2022-04-14 PROCEDURE — 99999 PR PBB SHADOW E&M-EST. PATIENT-LVL III: CPT | Mod: PBBFAC,,, | Performed by: INTERNAL MEDICINE

## 2022-04-14 PROCEDURE — 3078F DIAST BP <80 MM HG: CPT | Mod: CPTII,S$GLB,, | Performed by: INTERNAL MEDICINE

## 2022-04-14 PROCEDURE — 1159F MED LIST DOCD IN RCRD: CPT | Mod: CPTII,S$GLB,, | Performed by: INTERNAL MEDICINE

## 2022-04-14 PROCEDURE — 1160F PR REVIEW ALL MEDS BY PRESCRIBER/CLIN PHARMACIST DOCUMENTED: ICD-10-PCS | Mod: CPTII,S$GLB,, | Performed by: INTERNAL MEDICINE

## 2022-04-14 PROCEDURE — 3288F PR FALLS RISK ASSESSMENT DOCUMENTED: ICD-10-PCS | Mod: CPTII,S$GLB,, | Performed by: INTERNAL MEDICINE

## 2022-04-14 PROCEDURE — 3077F PR MOST RECENT SYSTOLIC BLOOD PRESSURE >= 140 MM HG: ICD-10-PCS | Mod: CPTII,S$GLB,, | Performed by: INTERNAL MEDICINE

## 2022-04-14 PROCEDURE — 96401 CHEMO ANTI-NEOPL SQ/IM: CPT

## 2022-04-14 PROCEDURE — 99214 PR OFFICE/OUTPT VISIT, EST, LEVL IV, 30-39 MIN: ICD-10-PCS | Mod: S$GLB,,, | Performed by: INTERNAL MEDICINE

## 2022-04-14 PROCEDURE — 1160F RVW MEDS BY RX/DR IN RCRD: CPT | Mod: CPTII,S$GLB,, | Performed by: INTERNAL MEDICINE

## 2022-04-14 RX ORDER — FAMOTIDINE 20 MG/1
20 TABLET, FILM COATED ORAL
Status: CANCELLED
Start: 2022-05-12

## 2022-04-14 RX ORDER — FAMOTIDINE 20 MG/1
20 TABLET, FILM COATED ORAL
Status: CANCELLED
Start: 2022-04-14

## 2022-04-14 RX ORDER — ACETAMINOPHEN 325 MG/1
650 TABLET ORAL
Status: COMPLETED | OUTPATIENT
Start: 2022-04-14 | End: 2022-04-14

## 2022-04-14 RX ORDER — DIPHENHYDRAMINE HCL 25 MG
50 CAPSULE ORAL
Status: CANCELLED
Start: 2022-04-14

## 2022-04-14 RX ORDER — DIPHENHYDRAMINE HYDROCHLORIDE 50 MG/ML
50 INJECTION INTRAMUSCULAR; INTRAVENOUS ONCE AS NEEDED
Status: CANCELLED | OUTPATIENT
Start: 2022-05-12

## 2022-04-14 RX ORDER — ACETAMINOPHEN 325 MG/1
650 TABLET ORAL
Status: CANCELLED | OUTPATIENT
Start: 2022-04-14

## 2022-04-14 RX ORDER — SODIUM CHLORIDE 0.9 % (FLUSH) 0.9 %
10 SYRINGE (ML) INJECTION
Status: CANCELLED | OUTPATIENT
Start: 2022-04-14

## 2022-04-14 RX ORDER — HEPARIN 100 UNIT/ML
500 SYRINGE INTRAVENOUS
Status: CANCELLED | OUTPATIENT
Start: 2022-05-12

## 2022-04-14 RX ORDER — HEPARIN 100 UNIT/ML
500 SYRINGE INTRAVENOUS
Status: CANCELLED | OUTPATIENT
Start: 2022-04-14

## 2022-04-14 RX ORDER — DIPHENHYDRAMINE HYDROCHLORIDE 50 MG/ML
50 INJECTION INTRAMUSCULAR; INTRAVENOUS ONCE AS NEEDED
Status: DISCONTINUED | OUTPATIENT
Start: 2022-04-14 | End: 2022-04-14 | Stop reason: HOSPADM

## 2022-04-14 RX ORDER — EPINEPHRINE 0.3 MG/.3ML
0.3 INJECTION SUBCUTANEOUS ONCE AS NEEDED
Status: CANCELLED | OUTPATIENT
Start: 2022-05-12

## 2022-04-14 RX ORDER — FAMOTIDINE 20 MG/1
20 TABLET, FILM COATED ORAL
Status: COMPLETED | OUTPATIENT
Start: 2022-04-14 | End: 2022-04-14

## 2022-04-14 RX ORDER — DIPHENHYDRAMINE HCL 25 MG
50 CAPSULE ORAL
Status: CANCELLED
Start: 2022-05-12

## 2022-04-14 RX ORDER — ACETAMINOPHEN 325 MG/1
650 TABLET ORAL
Status: CANCELLED | OUTPATIENT
Start: 2022-05-12

## 2022-04-14 RX ORDER — DIPHENHYDRAMINE HCL 25 MG
50 CAPSULE ORAL
Status: COMPLETED | OUTPATIENT
Start: 2022-04-14 | End: 2022-04-14

## 2022-04-14 RX ORDER — EPINEPHRINE 0.3 MG/.3ML
0.3 INJECTION SUBCUTANEOUS ONCE AS NEEDED
Status: DISCONTINUED | OUTPATIENT
Start: 2022-04-14 | End: 2022-04-14 | Stop reason: HOSPADM

## 2022-04-14 RX ORDER — DIPHENHYDRAMINE HYDROCHLORIDE 50 MG/ML
50 INJECTION INTRAMUSCULAR; INTRAVENOUS ONCE AS NEEDED
Status: CANCELLED | OUTPATIENT
Start: 2022-04-14

## 2022-04-14 RX ORDER — EPINEPHRINE 0.3 MG/.3ML
0.3 INJECTION SUBCUTANEOUS ONCE AS NEEDED
Status: CANCELLED | OUTPATIENT
Start: 2022-04-14

## 2022-04-14 RX ORDER — SODIUM CHLORIDE 0.9 % (FLUSH) 0.9 %
10 SYRINGE (ML) INJECTION
Status: CANCELLED | OUTPATIENT
Start: 2022-05-12

## 2022-04-14 RX ADMIN — DARATUMUMAB AND HYALURONIDASE-FIHJ (HUMAN RECOMBINANT) 1800 MG: 1800; 30000 INJECTION SUBCUTANEOUS at 02:04

## 2022-04-14 RX ADMIN — DIPHENHYDRAMINE HYDROCHLORIDE 50 MG: 25 CAPSULE ORAL at 01:04

## 2022-04-14 RX ADMIN — ACETAMINOPHEN 650 MG: 325 TABLET ORAL at 01:04

## 2022-04-14 RX ADMIN — FAMOTIDINE 20 MG: 20 TABLET ORAL at 01:04

## 2022-04-14 NOTE — NURSING
Patient tolerated Cycle 9 Darzalex FastPro injection well, no adverse reaction noted. No complaints at this time. Next appointment scheduled and pt d/c in no acute distress.

## 2022-04-14 NOTE — PROGRESS NOTES
"PATIENT: Haris Hernandez  MRN: [id number]  DATE: 4/14/2022    Chief Complaint: Kappa Light Chain Myeloma    Subjective:     Pertinent Medical History:     He presented to the ED 08/13/2021 with abnormal labs-CMP showed creatinine 6.6.  Prior CMP from May 2018 showed creatinine 1.2.    Further workup included a free light chain assay where a kappa free light chain level was 494    08/23/2021 bone marrow biopsy showed variable cellularity, 10 to 40% positive for plasma cell dyscrasia with plasma cells representing 30 to 40% of total cellularity.  No increase in blasts. FISH for Plasma Cell Proliferative Disorder - NORMAL    Interval History:   -accompanied by daughter  -feels okay, urinating fine  -started Darzalex, Velcade, Decadron 8/26/2021  -On Revlimid 10 mg qod since 9/30/2021  -saw urology,  for BPH causing difficulty in emptying the bladder, he may need cystoscopy and surgery    Past Medical, Surgical, Family, and Social histories reviewed.    Medication and Allergies reviewed.    Review of Systems   Constitutional: Negative for fever and unexpected weight change.       Objective:     Vitals:    04/14/22 1319   BP: (!) 154/69   BP Location: Left arm   Patient Position: Sitting   BP Method: Medium (Automatic)   Pulse: (!) 51   Resp: 20   Temp: 97.6 °F (36.4 °C)   TempSrc: Oral   SpO2: 100%   Weight: 74 kg (163 lb 2.3 oz)   Height: 5' 11" (1.803 m)     BMI: Body mass index is 22.75 kg/m².    Physical Exam  Vitals and nursing note reviewed.   Constitutional:       General: He is not in acute distress.  Pulmonary:      Effort: Pulmonary effort is normal.      Breath sounds: Normal breath sounds.   Neurological:      Mental Status: He is alert. Mental status is at baseline.       Assessment:       1. Kappa light chain myeloma    2. Stage 3b chronic kidney disease    3. Benign prostatic hyperplasia, unspecified whether lower urinary tract symptoms present      Plan:   Kappa light chain myeloma with normal " cytogenetics  -started Darzalex Faspro, subcutaneous Velcade, oral dexamethasone 8/26/2021  -reviewed cbc, chemistries  -proceed with cycle 9 of Darzalex   -velcade discontinued after 4 cycles  -last decadron dose was 2/3/22  -on Revlimid 10 mg QOD - start 9/30/2021, will continue   -RTC 4 weeks with cbc, cmp, spep, flca, qi  -cont Plavix  -continue acyclovir for shingles prophylaxis    CKD Stage 3b  -creatinine stable    BPH/Urinary retention  -follows [facility]  -appt for cystoscopy/surgery 2/7/2022 - was cancelled      Repeat labs and follow-up in 4 weeks

## 2022-05-05 DIAGNOSIS — C90.00 KAPPA LIGHT CHAIN MYELOMA: ICD-10-CM

## 2022-05-05 RX ORDER — LENALIDOMIDE 10 MG/1
10 CAPSULE ORAL EVERY OTHER DAY
Qty: 15 EACH | Refills: 5 | Status: SHIPPED | OUTPATIENT
Start: 2022-05-05 | End: 2022-06-02 | Stop reason: SDUPTHER

## 2022-05-11 NOTE — PROGRESS NOTES
PATIENT: Haris Hernandez  MRN: [id number]  DATE: 5/12/2022    Chief Complaint: Kappa Light Chain Myeloma    Subjective:     Pertinent Medical History:     He presented to the ED 08/13/2021 with abnormal labs-CMP showed creatinine 6.6.  Prior CMP from May 2018 showed creatinine 1.2.    Further workup included a free light chain assay where a kappa free light chain level was 494    08/23/2021 bone marrow biopsy showed variable cellularity, 10 to 40% positive for plasma cell dyscrasia with plasma cells representing 30 to 40% of total cellularity.  No increase in blasts. FISH for Plasma Cell Proliferative Disorder - NORMAL    -started Darzalex, Velcade, Decadron 8/26/2021  -On Revlimid 10 mg qod since 9/30/2021  -saw urology,  for BPH causing difficulty in emptying the bladder, he may need cystoscopy and surgery    Interval History:   - he presents for a follow-up appointment for his multiple myeloma.  - he is scheduled for darzalex faspro today, 5/12/22.  - today, he is doing well. He denies shortness of breath, chest pain, nausea, vomiting, diarrhea, constipation. Urination is stable.    Past Medical, Surgical, Family, and Social histories reviewed.    Medication and Allergies reviewed.    Review of Systems   Constitutional: Positive for malaise/fatigue. Negative for fever.   HENT: Negative for sore throat.    Respiratory: Negative for shortness of breath.    Cardiovascular: Negative for chest pain and leg swelling.   Gastrointestinal: Negative for abdominal pain, nausea and vomiting.   Genitourinary: Negative for dysuria.   Musculoskeletal: Negative for back pain.   Skin: Negative for rash.   Neurological: Negative for weakness.   Psychiatric/Behavioral: The patient is not nervous/anxious.        Objective:     Vitals:    05/12/22 1504   BP: (!) 187/78   BP Location: Right arm   Patient Position: Sitting   BP Method: Medium (Automatic)   Pulse: 62   Resp: 18   Temp: 98.6 °F (37 °C)   TempSrc: Oral   SpO2: 97%    Weight: 71.6 kg (157 lb 13.6 oz)     BMI: Body mass index is 22.02 kg/m².    Physical Exam  Vitals and nursing note reviewed.   Constitutional:       Appearance: He is well-developed.   HENT:      Head: Normocephalic and atraumatic.   Eyes:      Pupils: Pupils are equal, round, and reactive to light.   Cardiovascular:      Rate and Rhythm: Normal rate and regular rhythm.   Pulmonary:      Effort: Pulmonary effort is normal.      Breath sounds: Normal breath sounds.   Abdominal:      General: Bowel sounds are normal.      Palpations: Abdomen is soft.   Musculoskeletal:         General: Normal range of motion.      Cervical back: Normal range of motion and neck supple.   Skin:     General: Skin is warm and dry.   Neurological:      Mental Status: He is alert and oriented to person, place, and time.   Psychiatric:         Behavior: Behavior normal.         Thought Content: Thought content normal.         Judgment: Judgment normal.       Labs have been reviewed.    Lab Results   Component Value Date    WBC 3.23 (L) 05/09/2022    HGB 8.5 (L) 05/09/2022    HCT 24.2 (L) 05/09/2022    MCV 86 05/09/2022     (L) 05/09/2022     Serum protein electrophoresis (5/9/22):  Normal total protein. Normal gamma globulins are decreased.   Two paraprotein peaks are identified:   1) Near-gamma = 0.20 g/dL (previously 0.18 g/dL) and   2) Mid-gamma = 0.19 g/dL (previously 0.17 g/dL).             Assessment:       1. 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 Stage 3b chronic kidney disease    4. Chronic neoplasm-related pain    5. Chemotherapy-induced peripheral neuropathy    6. Chemotherapy-induced thrombocytopenia    7. Anemia due to antineoplastic chemotherapy    8. Chemotherapy-induced neutropenia    9. Benign prostatic hyperplasia, unspecified whether lower urinary tract symptoms present      Plan:   Kappa light chain myeloma with normal cytogenetics  -started Darzalex Faspro, subcutaneous  Velcade, oral dexamethasone 8/26/2021  -velcade discontinued after 4 cycles  -last decadron dose was 2/3/22  -on Revlimid 10 mg QOD - start 9/30/2021, will continue   -cont Plavix  -continue acyclovir for shingles prophylaxis  - Labs have been reviewed. Absolute neutrophil count is 1.6 k/uL. Hemoglobin is 8.5 g/dL. Platelet count is 118 k/uL. Paraproteins are stable.  - okay to proceed with cycle #10 of Darzalex   - return to clinic in 4 weeks in preparation for next cycle of darzalex fastpro/dexamethasone/revlimid.    CKD Stage 3b  - Labs have been reviewed. Creatinine is 1.9 mg/dL  - encouraged increasing fluid intake.    BPH/Urinary retention  -follows [facility]  -appt for cystoscopy/surgery 2/7/2022 - was cancelled    - return to clinic in 4 weeks in preparation for next cycle of darzalex fastpro/dexamethasone.    Andres Ríos M.D.  Hematology/Oncology  [facility] - [address], [address]  [place] [address]  Phone: [phone]  Fax: [phone]

## 2022-05-12 ENCOUNTER — OFFICE VISIT (OUTPATIENT)
Dept: HEMATOLOGY/ONCOLOGY | Facility: CLINIC | Age: 77
End: 2022-05-12
Payer: MEDICARE

## 2022-05-12 ENCOUNTER — INFUSION (OUTPATIENT)
Dept: INFUSION THERAPY | Facility: HOSPITAL | Age: 77
End: 2022-05-12
Attending: SURGERY
Payer: MEDICARE

## 2022-05-12 VITALS
SYSTOLIC BLOOD PRESSURE: 187 MMHG | TEMPERATURE: 99 F | WEIGHT: 157.88 LBS | OXYGEN SATURATION: 97 % | HEART RATE: 62 BPM | WEIGHT: 157.88 LBS | DIASTOLIC BLOOD PRESSURE: 78 MMHG | RESPIRATION RATE: 18 BRPM | TEMPERATURE: 99 F | BODY MASS INDEX: 22.1 KG/M2 | HEART RATE: 62 BPM | OXYGEN SATURATION: 97 % | HEIGHT: 71 IN | BODY MASS INDEX: 22.02 KG/M2 | RESPIRATION RATE: 18 BRPM

## 2022-05-12 DIAGNOSIS — D70.1 CHEMOTHERAPY-INDUCED NEUTROPENIA: ICD-10-CM

## 2022-05-12 DIAGNOSIS — Z79.899 IMMUNODEFICIENCY DUE TO DRUG THERAPY: ICD-10-CM

## 2022-05-12 DIAGNOSIS — C90.00 KAPPA LIGHT CHAIN MYELOMA: Primary | ICD-10-CM

## 2022-05-12 DIAGNOSIS — T45.1X5A CHEMOTHERAPY-INDUCED THROMBOCYTOPENIA: ICD-10-CM

## 2022-05-12 DIAGNOSIS — T45.1X5A ANEMIA DUE TO ANTINEOPLASTIC CHEMOTHERAPY: ICD-10-CM

## 2022-05-12 DIAGNOSIS — G89.3 CHRONIC NEOPLASM-RELATED PAIN: ICD-10-CM

## 2022-05-12 DIAGNOSIS — D69.59 CHEMOTHERAPY-INDUCED THROMBOCYTOPENIA: ICD-10-CM

## 2022-05-12 DIAGNOSIS — D84.821 IMMUNODEFICIENCY DUE TO DRUG THERAPY: ICD-10-CM

## 2022-05-12 DIAGNOSIS — N40.0 BENIGN PROSTATIC HYPERPLASIA, UNSPECIFIED WHETHER LOWER URINARY TRACT SYMPTOMS PRESENT: ICD-10-CM

## 2022-05-12 DIAGNOSIS — D64.81 ANEMIA DUE TO ANTINEOPLASTIC CHEMOTHERAPY: ICD-10-CM

## 2022-05-12 DIAGNOSIS — T45.1X5A CHEMOTHERAPY-INDUCED NEUTROPENIA: ICD-10-CM

## 2022-05-12 DIAGNOSIS — G62.0 CHEMOTHERAPY-INDUCED PERIPHERAL NEUROPATHY: ICD-10-CM

## 2022-05-12 DIAGNOSIS — T45.1X5A CHEMOTHERAPY-INDUCED PERIPHERAL NEUROPATHY: ICD-10-CM

## 2022-05-12 DIAGNOSIS — N18.32 STAGE 3B CHRONIC KIDNEY DISEASE: ICD-10-CM

## 2022-05-12 PROCEDURE — 99215 OFFICE O/P EST HI 40 MIN: CPT | Mod: S$GLB,,, | Performed by: INTERNAL MEDICINE

## 2022-05-12 PROCEDURE — 3288F PR FALLS RISK ASSESSMENT DOCUMENTED: ICD-10-PCS | Mod: CPTII,S$GLB,, | Performed by: INTERNAL MEDICINE

## 2022-05-12 PROCEDURE — 1159F MED LIST DOCD IN RCRD: CPT | Mod: CPTII,S$GLB,, | Performed by: INTERNAL MEDICINE

## 2022-05-12 PROCEDURE — 3288F FALL RISK ASSESSMENT DOCD: CPT | Mod: CPTII,S$GLB,, | Performed by: INTERNAL MEDICINE

## 2022-05-12 PROCEDURE — 99999 PR PBB SHADOW E&M-EST. PATIENT-LVL III: ICD-10-PCS | Mod: PBBFAC,,, | Performed by: INTERNAL MEDICINE

## 2022-05-12 PROCEDURE — 63600175 PHARM REV CODE 636 W HCPCS: Mod: TB | Performed by: INTERNAL MEDICINE

## 2022-05-12 PROCEDURE — 1101F PT FALLS ASSESS-DOCD LE1/YR: CPT | Mod: CPTII,S$GLB,, | Performed by: INTERNAL MEDICINE

## 2022-05-12 PROCEDURE — 99215 PR OFFICE/OUTPT VISIT, EST, LEVL V, 40-54 MIN: ICD-10-PCS | Mod: S$GLB,,, | Performed by: INTERNAL MEDICINE

## 2022-05-12 PROCEDURE — 3078F DIAST BP <80 MM HG: CPT | Mod: CPTII,S$GLB,, | Performed by: INTERNAL MEDICINE

## 2022-05-12 PROCEDURE — 3077F PR MOST RECENT SYSTOLIC BLOOD PRESSURE >= 140 MM HG: ICD-10-PCS | Mod: CPTII,S$GLB,, | Performed by: INTERNAL MEDICINE

## 2022-05-12 PROCEDURE — 25000003 PHARM REV CODE 250: Performed by: INTERNAL MEDICINE

## 2022-05-12 PROCEDURE — 3008F BODY MASS INDEX DOCD: CPT | Mod: CPTII,S$GLB,, | Performed by: INTERNAL MEDICINE

## 2022-05-12 PROCEDURE — 1159F PR MEDICATION LIST DOCUMENTED IN MEDICAL RECORD: ICD-10-PCS | Mod: CPTII,S$GLB,, | Performed by: INTERNAL MEDICINE

## 2022-05-12 PROCEDURE — 1101F PR PT FALLS ASSESS DOC 0-1 FALLS W/OUT INJ PAST YR: ICD-10-PCS | Mod: CPTII,S$GLB,, | Performed by: INTERNAL MEDICINE

## 2022-05-12 PROCEDURE — 1126F PR PAIN SEVERITY QUANTIFIED, NO PAIN PRESENT: ICD-10-PCS | Mod: CPTII,S$GLB,, | Performed by: INTERNAL MEDICINE

## 2022-05-12 PROCEDURE — 96401 CHEMO ANTI-NEOPL SQ/IM: CPT

## 2022-05-12 PROCEDURE — 1126F AMNT PAIN NOTED NONE PRSNT: CPT | Mod: CPTII,S$GLB,, | Performed by: INTERNAL MEDICINE

## 2022-05-12 PROCEDURE — 3008F PR BODY MASS INDEX (BMI) DOCUMENTED: ICD-10-PCS | Mod: CPTII,S$GLB,, | Performed by: INTERNAL MEDICINE

## 2022-05-12 PROCEDURE — 99999 PR PBB SHADOW E&M-EST. PATIENT-LVL III: CPT | Mod: PBBFAC,,, | Performed by: INTERNAL MEDICINE

## 2022-05-12 PROCEDURE — 3078F PR MOST RECENT DIASTOLIC BLOOD PRESSURE < 80 MM HG: ICD-10-PCS | Mod: CPTII,S$GLB,, | Performed by: INTERNAL MEDICINE

## 2022-05-12 PROCEDURE — 3077F SYST BP >= 140 MM HG: CPT | Mod: CPTII,S$GLB,, | Performed by: INTERNAL MEDICINE

## 2022-05-12 RX ORDER — DIPHENHYDRAMINE HCL 25 MG
50 CAPSULE ORAL
Status: COMPLETED | OUTPATIENT
Start: 2022-05-12 | End: 2022-05-12

## 2022-05-12 RX ORDER — DIPHENHYDRAMINE HYDROCHLORIDE 50 MG/ML
50 INJECTION INTRAMUSCULAR; INTRAVENOUS ONCE AS NEEDED
Status: DISCONTINUED | OUTPATIENT
Start: 2022-05-12 | End: 2022-05-12 | Stop reason: HOSPADM

## 2022-05-12 RX ORDER — ACETAMINOPHEN 325 MG/1
650 TABLET ORAL
Status: COMPLETED | OUTPATIENT
Start: 2022-05-12 | End: 2022-05-12

## 2022-05-12 RX ORDER — FAMOTIDINE 20 MG/1
20 TABLET, FILM COATED ORAL
Status: COMPLETED | OUTPATIENT
Start: 2022-05-12 | End: 2022-05-12

## 2022-05-12 RX ORDER — EPINEPHRINE 0.3 MG/.3ML
0.3 INJECTION SUBCUTANEOUS ONCE AS NEEDED
Status: DISCONTINUED | OUTPATIENT
Start: 2022-05-12 | End: 2022-05-12 | Stop reason: HOSPADM

## 2022-05-12 RX ADMIN — FAMOTIDINE 20 MG: 20 TABLET ORAL at 03:05

## 2022-05-12 RX ADMIN — ACETAMINOPHEN 650 MG: 325 TABLET ORAL at 03:05

## 2022-05-12 RX ADMIN — DIPHENHYDRAMINE HYDROCHLORIDE 50 MG: 25 CAPSULE ORAL at 03:05

## 2022-05-12 RX ADMIN — DARATUMUMAB AND HYALURONIDASE-FIHJ (HUMAN RECOMBINANT) 1800 MG: 1800; 30000 INJECTION SUBCUTANEOUS at 03:05

## 2022-05-12 NOTE — PROGRESS NOTES
Patient, Haris Hernandez (MRN #[id number]), presented with a recent Platelet count less than 150 K/uL consistent with the definition of thrombocytopenia (ICD10 - D69.6).    Platelets   Date Value Ref Range Status   05/09/2022 118 (L) 150 - 450 K/uL Final     The patient's thrombocytopenia was monitored, evaluated, addressed and/or treated. This addendum to the medical record is made on 05/12/2022.

## 2022-05-12 NOTE — NURSING
Patient tolerated Cycle 10 Darzalex FastPro injection well, no adverse reaction noted. No complaints at this time. Next appointment scheduled and chemo calendar printed and reviewed. Pt d/c in no acute distress.

## 2022-06-02 DIAGNOSIS — C90.00 KAPPA LIGHT CHAIN MYELOMA: ICD-10-CM

## 2022-06-02 RX ORDER — LENALIDOMIDE 10 MG/1
10 CAPSULE ORAL EVERY OTHER DAY
Qty: 15 EACH | Refills: 5 | Status: SHIPPED | OUTPATIENT
Start: 2022-06-02 | End: 2022-06-28 | Stop reason: SDUPTHER

## 2022-06-09 ENCOUNTER — INFUSION (OUTPATIENT)
Dept: INFUSION THERAPY | Facility: HOSPITAL | Age: 77
End: 2022-06-09
Attending: INTERNAL MEDICINE
Payer: MEDICARE

## 2022-06-09 ENCOUNTER — OFFICE VISIT (OUTPATIENT)
Dept: HEMATOLOGY/ONCOLOGY | Facility: CLINIC | Age: 77
End: 2022-06-09
Payer: MEDICARE

## 2022-06-09 VITALS
HEART RATE: 65 BPM | OXYGEN SATURATION: 100 % | RESPIRATION RATE: 19 BRPM | TEMPERATURE: 98 F | BODY MASS INDEX: 21.6 KG/M2 | DIASTOLIC BLOOD PRESSURE: 64 MMHG | HEIGHT: 71 IN | SYSTOLIC BLOOD PRESSURE: 139 MMHG | WEIGHT: 154.31 LBS

## 2022-06-09 VITALS
TEMPERATURE: 98 F | DIASTOLIC BLOOD PRESSURE: 67 MMHG | SYSTOLIC BLOOD PRESSURE: 146 MMHG | WEIGHT: 154.31 LBS | HEIGHT: 71 IN | OXYGEN SATURATION: 100 % | HEART RATE: 65 BPM | RESPIRATION RATE: 19 BRPM | BODY MASS INDEX: 21.6 KG/M2

## 2022-06-09 DIAGNOSIS — C90.00 KAPPA LIGHT CHAIN MYELOMA: Primary | ICD-10-CM

## 2022-06-09 DIAGNOSIS — N18.32 STAGE 3B CHRONIC KIDNEY DISEASE: ICD-10-CM

## 2022-06-09 DIAGNOSIS — N40.0 BENIGN PROSTATIC HYPERPLASIA, UNSPECIFIED WHETHER LOWER URINARY TRACT SYMPTOMS PRESENT: ICD-10-CM

## 2022-06-09 PROCEDURE — 3288F FALL RISK ASSESSMENT DOCD: CPT | Mod: CPTII,S$GLB,, | Performed by: INTERNAL MEDICINE

## 2022-06-09 PROCEDURE — 3077F PR MOST RECENT SYSTOLIC BLOOD PRESSURE >= 140 MM HG: ICD-10-PCS | Mod: CPTII,S$GLB,, | Performed by: INTERNAL MEDICINE

## 2022-06-09 PROCEDURE — 1126F PR PAIN SEVERITY QUANTIFIED, NO PAIN PRESENT: ICD-10-PCS | Mod: CPTII,S$GLB,, | Performed by: INTERNAL MEDICINE

## 2022-06-09 PROCEDURE — 99999 PR PBB SHADOW E&M-EST. PATIENT-LVL III: CPT | Mod: PBBFAC,,, | Performed by: INTERNAL MEDICINE

## 2022-06-09 PROCEDURE — 25000003 PHARM REV CODE 250: Performed by: INTERNAL MEDICINE

## 2022-06-09 PROCEDURE — 3288F PR FALLS RISK ASSESSMENT DOCUMENTED: ICD-10-PCS | Mod: CPTII,S$GLB,, | Performed by: INTERNAL MEDICINE

## 2022-06-09 PROCEDURE — 1101F PT FALLS ASSESS-DOCD LE1/YR: CPT | Mod: CPTII,S$GLB,, | Performed by: INTERNAL MEDICINE

## 2022-06-09 PROCEDURE — 1101F PR PT FALLS ASSESS DOC 0-1 FALLS W/OUT INJ PAST YR: ICD-10-PCS | Mod: CPTII,S$GLB,, | Performed by: INTERNAL MEDICINE

## 2022-06-09 PROCEDURE — 99999 PR PBB SHADOW E&M-EST. PATIENT-LVL III: ICD-10-PCS | Mod: PBBFAC,,, | Performed by: INTERNAL MEDICINE

## 2022-06-09 PROCEDURE — 99214 OFFICE O/P EST MOD 30 MIN: CPT | Mod: S$GLB,,, | Performed by: INTERNAL MEDICINE

## 2022-06-09 PROCEDURE — 1160F PR REVIEW ALL MEDS BY PRESCRIBER/CLIN PHARMACIST DOCUMENTED: ICD-10-PCS | Mod: CPTII,S$GLB,, | Performed by: INTERNAL MEDICINE

## 2022-06-09 PROCEDURE — 3078F PR MOST RECENT DIASTOLIC BLOOD PRESSURE < 80 MM HG: ICD-10-PCS | Mod: CPTII,S$GLB,, | Performed by: INTERNAL MEDICINE

## 2022-06-09 PROCEDURE — 63600175 PHARM REV CODE 636 W HCPCS: Mod: TB | Performed by: INTERNAL MEDICINE

## 2022-06-09 PROCEDURE — 96401 CHEMO ANTI-NEOPL SQ/IM: CPT

## 2022-06-09 PROCEDURE — 1159F MED LIST DOCD IN RCRD: CPT | Mod: CPTII,S$GLB,, | Performed by: INTERNAL MEDICINE

## 2022-06-09 PROCEDURE — 3077F SYST BP >= 140 MM HG: CPT | Mod: CPTII,S$GLB,, | Performed by: INTERNAL MEDICINE

## 2022-06-09 PROCEDURE — 3078F DIAST BP <80 MM HG: CPT | Mod: CPTII,S$GLB,, | Performed by: INTERNAL MEDICINE

## 2022-06-09 PROCEDURE — 1159F PR MEDICATION LIST DOCUMENTED IN MEDICAL RECORD: ICD-10-PCS | Mod: CPTII,S$GLB,, | Performed by: INTERNAL MEDICINE

## 2022-06-09 PROCEDURE — 3008F PR BODY MASS INDEX (BMI) DOCUMENTED: ICD-10-PCS | Mod: CPTII,S$GLB,, | Performed by: INTERNAL MEDICINE

## 2022-06-09 PROCEDURE — 1160F RVW MEDS BY RX/DR IN RCRD: CPT | Mod: CPTII,S$GLB,, | Performed by: INTERNAL MEDICINE

## 2022-06-09 PROCEDURE — 3008F BODY MASS INDEX DOCD: CPT | Mod: CPTII,S$GLB,, | Performed by: INTERNAL MEDICINE

## 2022-06-09 PROCEDURE — 99214 PR OFFICE/OUTPT VISIT, EST, LEVL IV, 30-39 MIN: ICD-10-PCS | Mod: S$GLB,,, | Performed by: INTERNAL MEDICINE

## 2022-06-09 PROCEDURE — 1126F AMNT PAIN NOTED NONE PRSNT: CPT | Mod: CPTII,S$GLB,, | Performed by: INTERNAL MEDICINE

## 2022-06-09 RX ORDER — HEPARIN 100 UNIT/ML
500 SYRINGE INTRAVENOUS
Status: CANCELLED | OUTPATIENT
Start: 2022-06-09

## 2022-06-09 RX ORDER — DIPHENHYDRAMINE HCL 25 MG
50 CAPSULE ORAL
Status: CANCELLED
Start: 2022-06-09

## 2022-06-09 RX ORDER — ACETAMINOPHEN 325 MG/1
650 TABLET ORAL
Status: COMPLETED | OUTPATIENT
Start: 2022-06-09 | End: 2022-06-09

## 2022-06-09 RX ORDER — DIPHENHYDRAMINE HCL 25 MG
50 CAPSULE ORAL
Status: COMPLETED | OUTPATIENT
Start: 2022-06-09 | End: 2022-06-09

## 2022-06-09 RX ORDER — EPINEPHRINE 0.3 MG/.3ML
0.3 INJECTION SUBCUTANEOUS ONCE AS NEEDED
Status: DISCONTINUED | OUTPATIENT
Start: 2022-06-09 | End: 2022-06-09 | Stop reason: HOSPADM

## 2022-06-09 RX ORDER — EPINEPHRINE 0.3 MG/.3ML
0.3 INJECTION SUBCUTANEOUS ONCE AS NEEDED
Status: CANCELLED | OUTPATIENT
Start: 2022-06-09

## 2022-06-09 RX ORDER — DIPHENHYDRAMINE HYDROCHLORIDE 50 MG/ML
50 INJECTION INTRAMUSCULAR; INTRAVENOUS ONCE AS NEEDED
Status: CANCELLED | OUTPATIENT
Start: 2022-06-09

## 2022-06-09 RX ORDER — ACETAMINOPHEN 325 MG/1
650 TABLET ORAL
Status: CANCELLED | OUTPATIENT
Start: 2022-06-09

## 2022-06-09 RX ORDER — SODIUM CHLORIDE 0.9 % (FLUSH) 0.9 %
10 SYRINGE (ML) INJECTION
Status: CANCELLED | OUTPATIENT
Start: 2022-06-09

## 2022-06-09 RX ORDER — FAMOTIDINE 20 MG/1
20 TABLET, FILM COATED ORAL
Status: COMPLETED | OUTPATIENT
Start: 2022-06-09 | End: 2022-06-09

## 2022-06-09 RX ORDER — DIPHENHYDRAMINE HYDROCHLORIDE 50 MG/ML
50 INJECTION INTRAMUSCULAR; INTRAVENOUS ONCE AS NEEDED
Status: DISCONTINUED | OUTPATIENT
Start: 2022-06-09 | End: 2022-06-09 | Stop reason: HOSPADM

## 2022-06-09 RX ORDER — FAMOTIDINE 20 MG/1
20 TABLET, FILM COATED ORAL
Status: CANCELLED
Start: 2022-06-09

## 2022-06-09 RX ADMIN — DIPHENHYDRAMINE HYDROCHLORIDE 50 MG: 25 CAPSULE ORAL at 03:06

## 2022-06-09 RX ADMIN — ACETAMINOPHEN 650 MG: 325 TABLET ORAL at 03:06

## 2022-06-09 RX ADMIN — DARATUMUMAB AND HYALURONIDASE-FIHJ (HUMAN RECOMBINANT) 1800 MG: 1800; 30000 INJECTION SUBCUTANEOUS at 03:06

## 2022-06-09 RX ADMIN — FAMOTIDINE 20 MG: 20 TABLET ORAL at 03:06

## 2022-06-09 NOTE — PROGRESS NOTES
"PATIENT: Haris Hernandez  MRN: [id number]  DATE: 6/9/2022    Chief Complaint: Kappa Light Chain Myeloma    Subjective:     Pertinent Medical History:     He presented to the ED 08/13/2021 with abnormal labs-CMP showed creatinine 6.6.  Prior CMP from May 2018 showed creatinine 1.2.    Further workup included a free light chain assay where a kappa free light chain level was 494    08/23/2021 bone marrow biopsy showed variable cellularity, 10 to 40% positive for plasma cell dyscrasia with plasma cells representing 30 to 40% of total cellularity.  No increase in blasts. FISH for Plasma Cell Proliferative Disorder - NORMAL    Interval History:   -accompanied by daughter  -feels okay, urinating fine  -started Darzalex, Velcade, Decadron 8/26/2021  -On Revlimid 10 mg qod since 9/30/2021    Past Medical, Surgical, Family, and Social histories reviewed.    Medication and Allergies reviewed.    Review of Systems   Constitutional: Negative for fever and unexpected weight change.       Objective:     Vitals:    06/09/22 1419   BP: (!) 146/67   BP Location: Left arm   Patient Position: Sitting   BP Method: Medium (Automatic)   Pulse: 65   Resp: 19   Temp: 97.7 °F (36.5 °C)   TempSrc: Oral   SpO2: 100%   Weight: 70 kg (154 lb 5.2 oz)   Height: 5' 11" (1.803 m)     BMI: Body mass index is 21.52 kg/m².    Physical Exam  Vitals and nursing note reviewed.   Constitutional:       General: He is not in acute distress.  Pulmonary:      Effort: Pulmonary effort is normal.      Breath sounds: Normal breath sounds.   Neurological:      Mental Status: He is alert. Mental status is at baseline.       Assessment:       1. Kappa light chain myeloma    2. Stage 3b chronic kidney disease    3. Benign prostatic hyperplasia, unspecified whether lower urinary tract symptoms present      Plan:   Kappa light chain myeloma with normal cytogenetics  -started Darzalex Faspro, subcutaneous Velcade, oral dexamethasone 8/26/2021  -reviewed cbc, " chemistries  -proceed with cycle 11 of Darzalex   -velcade discontinued after 4 cycles  -last decadron dose was 2/3/22  -on Revlimid 10 mg QOD - start 9/30/2021, will continue   -RTC 4 weeks with cbc, cmp, spep, flca, qi  -cont Plavix  -continue acyclovir for shingles prophylaxis    CKD Stage 3b  -creatinine stable    BPH/Urinary retention  -follows [facility]  -appt for cystoscopy/surgery 2/7/2022 - was cancelled      Repeat labs and follow-up in 4 weeks

## 2022-06-28 DIAGNOSIS — C90.00 KAPPA LIGHT CHAIN MYELOMA: ICD-10-CM

## 2022-06-29 RX ORDER — LENALIDOMIDE 10 MG/1
10 CAPSULE ORAL EVERY OTHER DAY
Qty: 15 EACH | Refills: 5 | Status: SHIPPED | OUTPATIENT
Start: 2022-06-29 | End: 2022-07-29 | Stop reason: SDUPTHER

## 2022-07-07 ENCOUNTER — INFUSION (OUTPATIENT)
Dept: INFUSION THERAPY | Facility: HOSPITAL | Age: 77
End: 2022-07-07
Attending: SURGERY
Payer: MEDICARE

## 2022-07-07 ENCOUNTER — OFFICE VISIT (OUTPATIENT)
Dept: HEMATOLOGY/ONCOLOGY | Facility: CLINIC | Age: 77
End: 2022-07-07
Payer: MEDICARE

## 2022-07-07 VITALS
HEIGHT: 71 IN | RESPIRATION RATE: 19 BRPM | WEIGHT: 154.31 LBS | DIASTOLIC BLOOD PRESSURE: 62 MMHG | HEART RATE: 63 BPM | TEMPERATURE: 99 F | SYSTOLIC BLOOD PRESSURE: 156 MMHG | OXYGEN SATURATION: 96 % | BODY MASS INDEX: 21.6 KG/M2

## 2022-07-07 VITALS
TEMPERATURE: 99 F | DIASTOLIC BLOOD PRESSURE: 62 MMHG | WEIGHT: 154.31 LBS | RESPIRATION RATE: 19 BRPM | BODY MASS INDEX: 21.6 KG/M2 | HEIGHT: 71 IN | HEART RATE: 63 BPM | OXYGEN SATURATION: 96 % | SYSTOLIC BLOOD PRESSURE: 156 MMHG

## 2022-07-07 DIAGNOSIS — T45.1X5A CHEMOTHERAPY-INDUCED PERIPHERAL NEUROPATHY: ICD-10-CM

## 2022-07-07 DIAGNOSIS — D70.1 CHEMOTHERAPY-INDUCED NEUTROPENIA: ICD-10-CM

## 2022-07-07 DIAGNOSIS — Z79.899 IMMUNODEFICIENCY DUE TO DRUG THERAPY: ICD-10-CM

## 2022-07-07 DIAGNOSIS — T45.1X5A CHEMOTHERAPY-INDUCED NEUTROPENIA: ICD-10-CM

## 2022-07-07 DIAGNOSIS — G62.0 CHEMOTHERAPY-INDUCED PERIPHERAL NEUROPATHY: ICD-10-CM

## 2022-07-07 DIAGNOSIS — T45.1X5A ANEMIA DUE TO ANTINEOPLASTIC CHEMOTHERAPY: ICD-10-CM

## 2022-07-07 DIAGNOSIS — R33.8 BENIGN PROSTATIC HYPERPLASIA WITH URINARY RETENTION: ICD-10-CM

## 2022-07-07 DIAGNOSIS — N40.1 BENIGN PROSTATIC HYPERPLASIA WITH URINARY RETENTION: ICD-10-CM

## 2022-07-07 DIAGNOSIS — N18.32 STAGE 3B CHRONIC KIDNEY DISEASE: ICD-10-CM

## 2022-07-07 DIAGNOSIS — D84.821 IMMUNODEFICIENCY DUE TO DRUG THERAPY: ICD-10-CM

## 2022-07-07 DIAGNOSIS — C90.00 KAPPA LIGHT CHAIN MYELOMA: Primary | ICD-10-CM

## 2022-07-07 DIAGNOSIS — D64.81 ANEMIA DUE TO ANTINEOPLASTIC CHEMOTHERAPY: ICD-10-CM

## 2022-07-07 DIAGNOSIS — G89.3 CHRONIC NEOPLASM-RELATED PAIN: ICD-10-CM

## 2022-07-07 PROCEDURE — 25000003 PHARM REV CODE 250: Performed by: INTERNAL MEDICINE

## 2022-07-07 PROCEDURE — 99999 PR PBB SHADOW E&M-EST. PATIENT-LVL III: CPT | Mod: PBBFAC,,, | Performed by: INTERNAL MEDICINE

## 2022-07-07 PROCEDURE — 3077F SYST BP >= 140 MM HG: CPT | Mod: CPTII,S$GLB,, | Performed by: INTERNAL MEDICINE

## 2022-07-07 PROCEDURE — 1101F PR PT FALLS ASSESS DOC 0-1 FALLS W/OUT INJ PAST YR: ICD-10-PCS | Mod: CPTII,S$GLB,, | Performed by: INTERNAL MEDICINE

## 2022-07-07 PROCEDURE — 99215 OFFICE O/P EST HI 40 MIN: CPT | Mod: S$GLB,,, | Performed by: INTERNAL MEDICINE

## 2022-07-07 PROCEDURE — 96401 CHEMO ANTI-NEOPL SQ/IM: CPT

## 2022-07-07 PROCEDURE — 3008F PR BODY MASS INDEX (BMI) DOCUMENTED: ICD-10-PCS | Mod: CPTII,S$GLB,, | Performed by: INTERNAL MEDICINE

## 2022-07-07 PROCEDURE — 3288F FALL RISK ASSESSMENT DOCD: CPT | Mod: CPTII,S$GLB,, | Performed by: INTERNAL MEDICINE

## 2022-07-07 PROCEDURE — 3008F BODY MASS INDEX DOCD: CPT | Mod: CPTII,S$GLB,, | Performed by: INTERNAL MEDICINE

## 2022-07-07 PROCEDURE — 1101F PT FALLS ASSESS-DOCD LE1/YR: CPT | Mod: CPTII,S$GLB,, | Performed by: INTERNAL MEDICINE

## 2022-07-07 PROCEDURE — 63600175 PHARM REV CODE 636 W HCPCS: Mod: TB | Performed by: INTERNAL MEDICINE

## 2022-07-07 PROCEDURE — 3288F PR FALLS RISK ASSESSMENT DOCUMENTED: ICD-10-PCS | Mod: CPTII,S$GLB,, | Performed by: INTERNAL MEDICINE

## 2022-07-07 PROCEDURE — 3078F DIAST BP <80 MM HG: CPT | Mod: CPTII,S$GLB,, | Performed by: INTERNAL MEDICINE

## 2022-07-07 PROCEDURE — 1160F PR REVIEW ALL MEDS BY PRESCRIBER/CLIN PHARMACIST DOCUMENTED: ICD-10-PCS | Mod: CPTII,S$GLB,, | Performed by: INTERNAL MEDICINE

## 2022-07-07 PROCEDURE — 1126F PR PAIN SEVERITY QUANTIFIED, NO PAIN PRESENT: ICD-10-PCS | Mod: CPTII,S$GLB,, | Performed by: INTERNAL MEDICINE

## 2022-07-07 PROCEDURE — 1126F AMNT PAIN NOTED NONE PRSNT: CPT | Mod: CPTII,S$GLB,, | Performed by: INTERNAL MEDICINE

## 2022-07-07 PROCEDURE — 3077F PR MOST RECENT SYSTOLIC BLOOD PRESSURE >= 140 MM HG: ICD-10-PCS | Mod: CPTII,S$GLB,, | Performed by: INTERNAL MEDICINE

## 2022-07-07 PROCEDURE — 3078F PR MOST RECENT DIASTOLIC BLOOD PRESSURE < 80 MM HG: ICD-10-PCS | Mod: CPTII,S$GLB,, | Performed by: INTERNAL MEDICINE

## 2022-07-07 PROCEDURE — 1159F PR MEDICATION LIST DOCUMENTED IN MEDICAL RECORD: ICD-10-PCS | Mod: CPTII,S$GLB,, | Performed by: INTERNAL MEDICINE

## 2022-07-07 PROCEDURE — 1160F RVW MEDS BY RX/DR IN RCRD: CPT | Mod: CPTII,S$GLB,, | Performed by: INTERNAL MEDICINE

## 2022-07-07 PROCEDURE — 1159F MED LIST DOCD IN RCRD: CPT | Mod: CPTII,S$GLB,, | Performed by: INTERNAL MEDICINE

## 2022-07-07 PROCEDURE — 99215 PR OFFICE/OUTPT VISIT, EST, LEVL V, 40-54 MIN: ICD-10-PCS | Mod: S$GLB,,, | Performed by: INTERNAL MEDICINE

## 2022-07-07 PROCEDURE — 99999 PR PBB SHADOW E&M-EST. PATIENT-LVL III: ICD-10-PCS | Mod: PBBFAC,,, | Performed by: INTERNAL MEDICINE

## 2022-07-07 RX ORDER — DIPHENHYDRAMINE HCL 25 MG
50 CAPSULE ORAL
Status: CANCELLED
Start: 2022-07-07

## 2022-07-07 RX ORDER — EPINEPHRINE 0.3 MG/.3ML
0.3 INJECTION SUBCUTANEOUS ONCE AS NEEDED
Status: DISCONTINUED | OUTPATIENT
Start: 2022-07-07 | End: 2022-07-07 | Stop reason: HOSPADM

## 2022-07-07 RX ORDER — DIPHENHYDRAMINE HYDROCHLORIDE 50 MG/ML
50 INJECTION INTRAMUSCULAR; INTRAVENOUS ONCE AS NEEDED
Status: DISCONTINUED | OUTPATIENT
Start: 2022-07-07 | End: 2022-07-07 | Stop reason: HOSPADM

## 2022-07-07 RX ORDER — FAMOTIDINE 20 MG/1
20 TABLET, FILM COATED ORAL
Status: COMPLETED | OUTPATIENT
Start: 2022-07-07 | End: 2022-07-07

## 2022-07-07 RX ORDER — EPINEPHRINE 0.3 MG/.3ML
0.3 INJECTION SUBCUTANEOUS ONCE AS NEEDED
Status: CANCELLED | OUTPATIENT
Start: 2022-07-07

## 2022-07-07 RX ORDER — ACETAMINOPHEN 325 MG/1
650 TABLET ORAL
Status: CANCELLED | OUTPATIENT
Start: 2022-07-07

## 2022-07-07 RX ORDER — DIPHENHYDRAMINE HYDROCHLORIDE 50 MG/ML
50 INJECTION INTRAMUSCULAR; INTRAVENOUS ONCE AS NEEDED
Status: CANCELLED | OUTPATIENT
Start: 2022-07-07

## 2022-07-07 RX ORDER — HEPARIN 100 UNIT/ML
500 SYRINGE INTRAVENOUS
Status: CANCELLED | OUTPATIENT
Start: 2022-07-07

## 2022-07-07 RX ORDER — ACETAMINOPHEN 325 MG/1
650 TABLET ORAL
Status: COMPLETED | OUTPATIENT
Start: 2022-07-07 | End: 2022-07-07

## 2022-07-07 RX ORDER — SODIUM CHLORIDE 0.9 % (FLUSH) 0.9 %
10 SYRINGE (ML) INJECTION
Status: CANCELLED | OUTPATIENT
Start: 2022-07-07

## 2022-07-07 RX ORDER — DIPHENHYDRAMINE HCL 25 MG
50 CAPSULE ORAL
Status: COMPLETED | OUTPATIENT
Start: 2022-07-07 | End: 2022-07-07

## 2022-07-07 RX ORDER — FAMOTIDINE 20 MG/1
20 TABLET, FILM COATED ORAL
Status: CANCELLED
Start: 2022-07-07

## 2022-07-07 RX ADMIN — DARATUMUMAB AND HYALURONIDASE-FIHJ (HUMAN RECOMBINANT) 1800 MG: 1800; 30000 INJECTION SUBCUTANEOUS at 02:07

## 2022-07-07 RX ADMIN — ACETAMINOPHEN 650 MG: 325 TABLET ORAL at 02:07

## 2022-07-07 RX ADMIN — FAMOTIDINE 20 MG: 20 TABLET ORAL at 02:07

## 2022-07-07 RX ADMIN — DIPHENHYDRAMINE HYDROCHLORIDE 50 MG: 25 CAPSULE ORAL at 02:07

## 2022-07-07 NOTE — PROGRESS NOTES
PATIENT: Haris Hernandez  MRN: [id number]  DATE: 7/7/2022    Chief Complaint: Kappa Light Chain Myeloma    Subjective:     Pertinent Medical History:     He presented to the ED 08/13/2021 with abnormal labs-CMP showed creatinine 6.6.  Prior CMP from May 2018 showed creatinine 1.2.    Further workup included a free light chain assay where a kappa free light chain level was 494    08/23/2021 bone marrow biopsy showed variable cellularity, 10 to 40% positive for plasma cell dyscrasia with plasma cells representing 30 to 40% of total cellularity.  No increase in blasts. FISH for Plasma Cell Proliferative Disorder - NORMAL    -started Darzalex, Velcade, Decadron 8/26/2021  -On Revlimid 10 mg qod since 9/30/2021  -saw urology,  for BPH causing difficulty in emptying the bladder, he may need cystoscopy and surgery    Interval History:   - he presents for a follow-up appointment for his multiple myeloma.  - he is scheduled for darzalex faspro today, 7/7/22  - today, he is doing well. He denies shortness of breath, chest pain, nausea, vomiting, diarrhea, constipation. Urination is stable.    Past Medical, Surgical, Family, and Social histories reviewed.    Medication and Allergies reviewed.    Review of Systems   Constitutional: Positive for malaise/fatigue. Negative for fever.   HENT: Negative for sore throat.    Respiratory: Negative for shortness of breath.    Cardiovascular: Negative for chest pain and leg swelling.   Gastrointestinal: Negative for abdominal pain, nausea and vomiting.   Genitourinary: Negative for dysuria.   Musculoskeletal: Negative for back pain.   Skin: Negative for rash.   Neurological: Negative for weakness.   Psychiatric/Behavioral: The patient is not nervous/anxious.        Objective:     Vitals:    07/07/22 1346   BP: (!) 156/62   BP Location: Right arm   Patient Position: Sitting   BP Method: Medium (Automatic)   Pulse: 63   Resp: 19   Temp: 98.8 °F (37.1 °C)   TempSrc: Oral   SpO2: 96%  "  Weight: 70 kg (154 lb 5.2 oz)   Height: 5' 11" (1.803 m)     BMI: Body mass index is 21.52 kg/m².    Physical Exam  Vitals and nursing note reviewed.   Constitutional:       Appearance: He is well-developed.   HENT:      Head: Normocephalic and atraumatic.   Eyes:      Pupils: Pupils are equal, round, and reactive to light.   Cardiovascular:      Rate and Rhythm: Normal rate and regular rhythm.   Pulmonary:      Effort: Pulmonary effort is normal.      Breath sounds: Normal breath sounds.   Abdominal:      General: Bowel sounds are normal.      Palpations: Abdomen is soft.   Musculoskeletal:         General: Normal range of motion.      Cervical back: Normal range of motion and neck supple.   Skin:     General: Skin is warm and dry.   Neurological:      Mental Status: He is alert and oriented to person, place, and time.   Psychiatric:         Behavior: Behavior normal.         Thought Content: Thought content normal.         Judgment: Judgment normal.       Labs have been reviewed.    Lab Results   Component Value Date    WBC 3.26 (L) 07/01/2022    HGB 8.1 (L) 07/01/2022    HCT 23.0 (L) 07/01/2022    MCV 89 07/01/2022     07/01/2022     Serum protein electrophoresis (7/1/22):  Normal total protein. Normal gamma globulins are decreased. Two   paraprotein peaks are identified:   1) Near-gamma = 0.22 g/dL (previously 0.2 g/dL)   2) Mid-gamma = 0.21 g/dL (previously 0.16 g/dL).             Assessment:       1. 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 Stage 3b chronic kidney disease    4. Chronic neoplasm-related pain    5. Chemotherapy-induced peripheral neuropathy    6. Anemia due to antineoplastic chemotherapy    7. Chemotherapy-induced neutropenia    8. Benign prostatic hyperplasia with urinary retention      Plan:   Kappa light chain myeloma with normal cytogenetics  -started Darzalex Faspro, subcutaneous Velcade, oral dexamethasone 8/26/2021  -velcade discontinued after 4 " cycles  -last decadron dose was 2/3/22  -on Revlimid 10 mg QOD - start 9/30/2021, will continue   -cont Plavix  -continue acyclovir for shingles prophylaxis  - Labs have been reviewed. Absolute neutrophil count is 1.4 k/uL. Hemoglobin is 8.1 g/dL. Platelet count is 162 k/uL. Paraproteins are stable.  - okay to proceed with cycle #10 of Darzalex   - return to clinic in 4 weeks in preparation for next cycle of darzalex fastpro/dexamethasone/revlimid.    CKD Stage 3b  - Labs have been reviewed. Creatinine is 1.8 mg/dL  - encouraged increasing fluid intake.    BPH/Urinary retention  -follows [facility]  -appt for cystoscopy/surgery 2/7/2022 - was cancelled    - return to clinic in 4 weeks in preparation for next cycle of darzalex fastpro/dexamethasone.    Andres Ríos M.D.  Hematology/Oncology  [facility] - [address], [address]  [place] [address]  Phone: [phone]  Fax: [phone]

## 2022-07-07 NOTE — NURSING
Pt tolerated Darzalex Fast Pro injection well, no complaints at this time. No adverse reactions noted. Next appointment scheduled and pt d/c in no acute distress.

## 2022-07-29 DIAGNOSIS — C90.00 KAPPA LIGHT CHAIN MYELOMA: ICD-10-CM

## 2022-07-29 RX ORDER — LENALIDOMIDE 10 MG/1
10 CAPSULE ORAL EVERY OTHER DAY
Qty: 15 EACH | Refills: 5 | Status: SHIPPED | OUTPATIENT
Start: 2022-07-29 | End: 2022-08-24 | Stop reason: SDUPTHER

## 2022-08-04 ENCOUNTER — OFFICE VISIT (OUTPATIENT)
Dept: HEMATOLOGY/ONCOLOGY | Facility: CLINIC | Age: 77
End: 2022-08-04
Payer: MEDICARE

## 2022-08-04 ENCOUNTER — INFUSION (OUTPATIENT)
Dept: INFUSION THERAPY | Facility: HOSPITAL | Age: 77
End: 2022-08-04
Attending: INTERNAL MEDICINE
Payer: MEDICARE

## 2022-08-04 VITALS
BODY MASS INDEX: 21.91 KG/M2 | SYSTOLIC BLOOD PRESSURE: 157 MMHG | RESPIRATION RATE: 16 BRPM | HEIGHT: 71 IN | TEMPERATURE: 97 F | HEART RATE: 56 BPM | OXYGEN SATURATION: 100 % | DIASTOLIC BLOOD PRESSURE: 73 MMHG | WEIGHT: 156.5 LBS

## 2022-08-04 VITALS
OXYGEN SATURATION: 100 % | DIASTOLIC BLOOD PRESSURE: 77 MMHG | SYSTOLIC BLOOD PRESSURE: 174 MMHG | TEMPERATURE: 97 F | WEIGHT: 156.5 LBS | HEIGHT: 71 IN | HEART RATE: 56 BPM | BODY MASS INDEX: 21.91 KG/M2 | RESPIRATION RATE: 18 BRPM

## 2022-08-04 DIAGNOSIS — C90.00 KAPPA LIGHT CHAIN MYELOMA: Primary | ICD-10-CM

## 2022-08-04 DIAGNOSIS — R33.8 BENIGN PROSTATIC HYPERPLASIA WITH URINARY RETENTION: ICD-10-CM

## 2022-08-04 DIAGNOSIS — N18.32 STAGE 3B CHRONIC KIDNEY DISEASE: ICD-10-CM

## 2022-08-04 DIAGNOSIS — N40.1 BENIGN PROSTATIC HYPERPLASIA WITH URINARY RETENTION: ICD-10-CM

## 2022-08-04 PROCEDURE — 3008F BODY MASS INDEX DOCD: CPT | Mod: CPTII,S$GLB,, | Performed by: INTERNAL MEDICINE

## 2022-08-04 PROCEDURE — 3077F PR MOST RECENT SYSTOLIC BLOOD PRESSURE >= 140 MM HG: ICD-10-PCS | Mod: CPTII,S$GLB,, | Performed by: INTERNAL MEDICINE

## 2022-08-04 PROCEDURE — 1126F AMNT PAIN NOTED NONE PRSNT: CPT | Mod: CPTII,S$GLB,, | Performed by: INTERNAL MEDICINE

## 2022-08-04 PROCEDURE — 3077F SYST BP >= 140 MM HG: CPT | Mod: CPTII,S$GLB,, | Performed by: INTERNAL MEDICINE

## 2022-08-04 PROCEDURE — 1160F RVW MEDS BY RX/DR IN RCRD: CPT | Mod: CPTII,S$GLB,, | Performed by: INTERNAL MEDICINE

## 2022-08-04 PROCEDURE — 3288F FALL RISK ASSESSMENT DOCD: CPT | Mod: CPTII,S$GLB,, | Performed by: INTERNAL MEDICINE

## 2022-08-04 PROCEDURE — 96401 CHEMO ANTI-NEOPL SQ/IM: CPT

## 2022-08-04 PROCEDURE — 1159F PR MEDICATION LIST DOCUMENTED IN MEDICAL RECORD: ICD-10-PCS | Mod: CPTII,S$GLB,, | Performed by: INTERNAL MEDICINE

## 2022-08-04 PROCEDURE — 1101F PR PT FALLS ASSESS DOC 0-1 FALLS W/OUT INJ PAST YR: ICD-10-PCS | Mod: CPTII,S$GLB,, | Performed by: INTERNAL MEDICINE

## 2022-08-04 PROCEDURE — 3078F PR MOST RECENT DIASTOLIC BLOOD PRESSURE < 80 MM HG: ICD-10-PCS | Mod: CPTII,S$GLB,, | Performed by: INTERNAL MEDICINE

## 2022-08-04 PROCEDURE — 25000003 PHARM REV CODE 250: Performed by: INTERNAL MEDICINE

## 2022-08-04 PROCEDURE — 1101F PT FALLS ASSESS-DOCD LE1/YR: CPT | Mod: CPTII,S$GLB,, | Performed by: INTERNAL MEDICINE

## 2022-08-04 PROCEDURE — 99214 OFFICE O/P EST MOD 30 MIN: CPT | Mod: S$GLB,,, | Performed by: INTERNAL MEDICINE

## 2022-08-04 PROCEDURE — 1126F PR PAIN SEVERITY QUANTIFIED, NO PAIN PRESENT: ICD-10-PCS | Mod: CPTII,S$GLB,, | Performed by: INTERNAL MEDICINE

## 2022-08-04 PROCEDURE — 63600175 PHARM REV CODE 636 W HCPCS: Mod: TB | Performed by: INTERNAL MEDICINE

## 2022-08-04 PROCEDURE — 3008F PR BODY MASS INDEX (BMI) DOCUMENTED: ICD-10-PCS | Mod: CPTII,S$GLB,, | Performed by: INTERNAL MEDICINE

## 2022-08-04 PROCEDURE — 99214 PR OFFICE/OUTPT VISIT, EST, LEVL IV, 30-39 MIN: ICD-10-PCS | Mod: S$GLB,,, | Performed by: INTERNAL MEDICINE

## 2022-08-04 PROCEDURE — 3078F DIAST BP <80 MM HG: CPT | Mod: CPTII,S$GLB,, | Performed by: INTERNAL MEDICINE

## 2022-08-04 PROCEDURE — 1160F PR REVIEW ALL MEDS BY PRESCRIBER/CLIN PHARMACIST DOCUMENTED: ICD-10-PCS | Mod: CPTII,S$GLB,, | Performed by: INTERNAL MEDICINE

## 2022-08-04 PROCEDURE — 1159F MED LIST DOCD IN RCRD: CPT | Mod: CPTII,S$GLB,, | Performed by: INTERNAL MEDICINE

## 2022-08-04 PROCEDURE — 3288F PR FALLS RISK ASSESSMENT DOCUMENTED: ICD-10-PCS | Mod: CPTII,S$GLB,, | Performed by: INTERNAL MEDICINE

## 2022-08-04 RX ORDER — SODIUM CHLORIDE 0.9 % (FLUSH) 0.9 %
10 SYRINGE (ML) INJECTION
Status: CANCELLED | OUTPATIENT
Start: 2022-08-04

## 2022-08-04 RX ORDER — EPINEPHRINE 0.3 MG/.3ML
0.3 INJECTION SUBCUTANEOUS ONCE AS NEEDED
Status: DISCONTINUED | OUTPATIENT
Start: 2022-08-04 | End: 2022-08-04 | Stop reason: HOSPADM

## 2022-08-04 RX ORDER — HEPARIN 100 UNIT/ML
500 SYRINGE INTRAVENOUS
Status: CANCELLED | OUTPATIENT
Start: 2022-08-04

## 2022-08-04 RX ORDER — DIPHENHYDRAMINE HYDROCHLORIDE 50 MG/ML
50 INJECTION INTRAMUSCULAR; INTRAVENOUS ONCE AS NEEDED
Status: DISCONTINUED | OUTPATIENT
Start: 2022-08-04 | End: 2022-08-04 | Stop reason: HOSPADM

## 2022-08-04 RX ORDER — ACETAMINOPHEN 325 MG/1
650 TABLET ORAL
Status: CANCELLED | OUTPATIENT
Start: 2022-08-04

## 2022-08-04 RX ORDER — DIPHENHYDRAMINE HCL 25 MG
50 CAPSULE ORAL
Status: CANCELLED
Start: 2022-08-04

## 2022-08-04 RX ORDER — FAMOTIDINE 20 MG/1
20 TABLET, FILM COATED ORAL
Status: COMPLETED | OUTPATIENT
Start: 2022-08-04 | End: 2022-08-04

## 2022-08-04 RX ORDER — FAMOTIDINE 20 MG/1
20 TABLET, FILM COATED ORAL
Status: CANCELLED
Start: 2022-08-04

## 2022-08-04 RX ORDER — ACETAMINOPHEN 325 MG/1
650 TABLET ORAL
Status: COMPLETED | OUTPATIENT
Start: 2022-08-04 | End: 2022-08-04

## 2022-08-04 RX ORDER — DIPHENHYDRAMINE HCL 25 MG
50 CAPSULE ORAL
Status: COMPLETED | OUTPATIENT
Start: 2022-08-04 | End: 2022-08-04

## 2022-08-04 RX ORDER — EPINEPHRINE 0.3 MG/.3ML
0.3 INJECTION SUBCUTANEOUS ONCE AS NEEDED
Status: CANCELLED | OUTPATIENT
Start: 2022-08-04

## 2022-08-04 RX ORDER — DIPHENHYDRAMINE HYDROCHLORIDE 50 MG/ML
50 INJECTION INTRAMUSCULAR; INTRAVENOUS ONCE AS NEEDED
Status: CANCELLED | OUTPATIENT
Start: 2022-08-04

## 2022-08-04 RX ADMIN — ACETAMINOPHEN 650 MG: 325 TABLET ORAL at 02:08

## 2022-08-04 RX ADMIN — DARATUMUMAB AND HYALURONIDASE-FIHJ (HUMAN RECOMBINANT) 1800 MG: 1800; 30000 INJECTION SUBCUTANEOUS at 02:08

## 2022-08-04 RX ADMIN — FAMOTIDINE 20 MG: 20 TABLET ORAL at 02:08

## 2022-08-04 RX ADMIN — DIPHENHYDRAMINE HYDROCHLORIDE 50 MG: 25 CAPSULE ORAL at 02:08

## 2022-08-04 NOTE — PROGRESS NOTES
"PATIENT: Haris Hernandez  MRN: [id number]  DATE: 8/4/2022    Chief Complaint: Kappa Light Chain Myeloma    Subjective:     Pertinent Medical History:     He presented to the ED 08/13/2021 with abnormal labs-CMP showed creatinine 6.6.  Prior CMP from May 2018 showed creatinine 1.2.    Further workup included a free light chain assay where a kappa free light chain level was 494    08/23/2021 bone marrow biopsy showed variable cellularity, 10 to 40% positive for plasma cell dyscrasia with plasma cells representing 30 to 40% of total cellularity.  No increase in blasts. FISH for Plasma Cell Proliferative Disorder - NORMAL    Interval History:   -accompanied by daughter  -feels okay, urinating fine  -started Darzalex, Velcade, Decadron 8/26/2021  -On Revlimid 10 mg qod since 9/30/2021    Past Medical, Surgical, Family, and Social histories reviewed.    Medication and Allergies reviewed.    Review of Systems   Constitutional: Negative for fever and unexpected weight change.       Objective:     Vitals:    08/04/22 1324   BP: (!) 174/77   BP Location: Right arm   Patient Position: Sitting   BP Method: Medium (Automatic)   Pulse: (!) 56   Resp: 18   Temp: 97.4 °F (36.3 °C)   TempSrc: Oral   SpO2: 100%   Weight: 71 kg (156 lb 8.4 oz)   Height: 5' 11" (1.803 m)     BMI: Body mass index is 21.83 kg/m².    Physical Exam  Vitals and nursing note reviewed.   Constitutional:       General: He is not in acute distress.  Pulmonary:      Effort: Pulmonary effort is normal.      Breath sounds: Normal breath sounds.   Neurological:      Mental Status: He is alert. Mental status is at baseline.       Assessment:       1. Kappa light chain myeloma    2. Stage 3b chronic kidney disease    3. Benign prostatic hyperplasia with urinary retention      Plan:   Kappa light chain myeloma with normal cytogenetics  -started Darzalex Faspro, subcutaneous Velcade, oral dexamethasone 8/26/2021  -reviewed cbc, chemistries  -proceed with cycle 11 of " Darzalex   -velcade discontinued after 4 cycles  -last decadron dose was 2/3/22  -on Revlimid 10 mg QOD - start 9/30/2021, will continue   -RTC 4 weeks with cbc, cmp, spep, flca, qi  -cont Plavix  -continue acyclovir for shingles prophylaxis    CKD Stage 3b  -creatinine stable    BPH/Urinary retention  -follows [facility]  -appt for cystoscopy/surgery 2/7/2022 - was cancelled      Repeat labs and follow-up in 4 weeks

## 2022-08-24 DIAGNOSIS — C90.00 KAPPA LIGHT CHAIN MYELOMA: ICD-10-CM

## 2022-08-24 RX ORDER — LENALIDOMIDE 10 MG/1
10 CAPSULE ORAL EVERY OTHER DAY
Qty: 15 EACH | Refills: 5 | Status: SHIPPED | OUTPATIENT
Start: 2022-08-24 | End: 2022-09-22 | Stop reason: SDUPTHER

## 2022-08-25 ENCOUNTER — PATIENT MESSAGE (OUTPATIENT)
Dept: HEMATOLOGY/ONCOLOGY | Facility: CLINIC | Age: 77
End: 2022-08-25
Payer: MEDICARE

## 2022-08-25 DIAGNOSIS — C90.00 KAPPA LIGHT CHAIN MYELOMA: Primary | ICD-10-CM

## 2022-08-31 NOTE — PROGRESS NOTES
PATIENT: Haris Hernandez  MRN: [id number]  DATE: 9/1/2022    Chief Complaint: Kappa Light Chain Myeloma    Subjective:     Pertinent Medical History:     He presented to the ED 08/13/2021 with abnormal labs-CMP showed creatinine 6.6.  Prior CMP from May 2018 showed creatinine 1.2.    Further workup included a free light chain assay where a kappa free light chain level was 494    08/23/2021 bone marrow biopsy showed variable cellularity, 10 to 40% positive for plasma cell dyscrasia with plasma cells representing 30 to 40% of total cellularity.  No increase in blasts. FISH for Plasma Cell Proliferative Disorder - NORMAL    -started Darzalex, Velcade, Decadron 8/26/2021  -On Revlimid 10 mg qod since 9/30/2021  -saw urology,  for BPH causing difficulty in emptying the bladder, he may need cystoscopy and surgery    Interval History:   - he presents for a follow-up appointment for his multiple myeloma.  - he is scheduled for darzalex faspro today, 9/1/22  - today, he is doing well. He denies shortness of breath, chest pain, nausea, vomiting, diarrhea, constipation.     Past Medical, Surgical, Family, and Social histories reviewed.    Medication and Allergies reviewed.    Review of Systems   Constitutional:  Positive for malaise/fatigue. Negative for fever.   HENT:  Negative for sore throat.    Respiratory:  Negative for shortness of breath.    Cardiovascular:  Negative for chest pain and leg swelling.   Gastrointestinal:  Negative for abdominal pain, nausea and vomiting.   Genitourinary:  Negative for dysuria.   Musculoskeletal:  Negative for back pain.   Skin:  Negative for rash.   Neurological:  Negative for weakness.   Psychiatric/Behavioral:  The patient is not nervous/anxious.      Objective:     Vitals:    09/01/22 1352   BP: (!) 188/75   BP Location: Right arm   Patient Position: Sitting   BP Method: Medium (Automatic)   Pulse: 60   Resp: 20   Temp: 97.2 °F (36.2 °C)   TempSrc: Oral   SpO2: 100%   Weight: 71  "kg (156 lb 8.4 oz)   Height: 5' 11" (1.803 m)     BMI: Body mass index is 21.83 kg/m².    Physical Exam  Vitals and nursing note reviewed.   Constitutional:       Appearance: He is well-developed.   HENT:      Head: Normocephalic and atraumatic.   Eyes:      Pupils: Pupils are equal, round, and reactive to light.   Cardiovascular:      Rate and Rhythm: Normal rate and regular rhythm.   Pulmonary:      Effort: Pulmonary effort is normal.      Breath sounds: Normal breath sounds.   Abdominal:      General: Bowel sounds are normal.      Palpations: Abdomen is soft.   Musculoskeletal:         General: Normal range of motion.      Cervical back: Normal range of motion and neck supple.   Skin:     General: Skin is warm and dry.   Neurological:      Mental Status: He is alert and oriented to person, place, and time.   Psychiatric:         Behavior: Behavior normal.         Thought Content: Thought content normal.         Judgment: Judgment normal.     Labs have been reviewed.    Lab Results   Component Value Date    WBC 2.73 (L) 08/30/2022    HGB 7.3 (L) 08/30/2022    HCT 20.4 (L) 08/30/2022    MCV 89 08/30/2022     (L) 08/30/2022     Serum protein electrophoresis (8/30/22):  Decreased total protein. Normal gamma globulins are decreased. Two   paraprotein bands are identified: 1) Near-gamma = 0.18 g/dL   (previously 0.21 g/dL) 2) Mid-gamma = 0.18 g/dL (previously 0.18   g/dL).             Assessment:       1. 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 Stage 3b chronic kidney disease    4. Chronic neoplasm-related pain    5. Chemotherapy-induced peripheral neuropathy    6. Anemia due to antineoplastic chemotherapy    7. Chemotherapy-induced neutropenia    8. Benign prostatic hyperplasia with urinary retention      Plan:   Kappa light chain myeloma with normal cytogenetics  -started Darzalex Faspro, subcutaneous Velcade, oral dexamethasone 8/26/2021  -velcade discontinued after 4 " cycles  -last decadron dose was 2/3/22  -on Revlimid 10 mg QOD - start 9/30/2021, will continue   -cont Plavix  -continue acyclovir for shingles prophylaxis  - Labs have been reviewed. Absolute neutrophil count is 1.2 k/uL. Hemoglobin is 7.3 g/dL. Platelet count is 107 k/uL. Paraproteins are stable.  - okay to proceed with cycle #12 of Darzalex . Hold revlimid for this cycle due to worsened cytopenias.  - return to clinic in 4 weeks in preparation for next cycle of darzalex fastpro/dexamethasone/revlimid.    CKD Stage 3b  - Labs have been reviewed. Creatinine is 2.04 mg/dL  - encouraged increasing fluid intake.    BPH/Urinary retention  -follows wit   -appt for cystoscopy/surgery 2/7/2022 - was cancelled    - return to clinic in 4 weeks in preparation for next cycle of darzalex fastpro/dexamethasone.    Andres Ríos M.D.  Hematology/Oncology  [facility] - [address], [address]  [place] [address]  Phone: [phone]  Fax: [phone]

## 2022-09-01 ENCOUNTER — INFUSION (OUTPATIENT)
Dept: INFUSION THERAPY | Facility: HOSPITAL | Age: 77
End: 2022-09-01
Attending: NURSE PRACTITIONER
Payer: MEDICARE

## 2022-09-01 ENCOUNTER — OFFICE VISIT (OUTPATIENT)
Dept: HEMATOLOGY/ONCOLOGY | Facility: CLINIC | Age: 77
End: 2022-09-01
Payer: MEDICARE

## 2022-09-01 VITALS
WEIGHT: 156.5 LBS | HEART RATE: 60 BPM | OXYGEN SATURATION: 100 % | TEMPERATURE: 97 F | DIASTOLIC BLOOD PRESSURE: 75 MMHG | HEIGHT: 71 IN | RESPIRATION RATE: 20 BRPM | BODY MASS INDEX: 21.91 KG/M2 | SYSTOLIC BLOOD PRESSURE: 188 MMHG

## 2022-09-01 VITALS
HEIGHT: 71 IN | HEART RATE: 60 BPM | OXYGEN SATURATION: 100 % | DIASTOLIC BLOOD PRESSURE: 66 MMHG | TEMPERATURE: 97 F | RESPIRATION RATE: 20 BRPM | SYSTOLIC BLOOD PRESSURE: 151 MMHG | WEIGHT: 156.5 LBS | BODY MASS INDEX: 21.91 KG/M2

## 2022-09-01 DIAGNOSIS — T45.1X5A ANEMIA DUE TO ANTINEOPLASTIC CHEMOTHERAPY: ICD-10-CM

## 2022-09-01 DIAGNOSIS — T45.1X5A CHEMOTHERAPY-INDUCED PERIPHERAL NEUROPATHY: ICD-10-CM

## 2022-09-01 DIAGNOSIS — T45.1X5A CHEMOTHERAPY-INDUCED NEUTROPENIA: ICD-10-CM

## 2022-09-01 DIAGNOSIS — C90.00 KAPPA LIGHT CHAIN MYELOMA: Primary | ICD-10-CM

## 2022-09-01 DIAGNOSIS — G62.0 CHEMOTHERAPY-INDUCED PERIPHERAL NEUROPATHY: ICD-10-CM

## 2022-09-01 DIAGNOSIS — N40.1 BENIGN PROSTATIC HYPERPLASIA WITH URINARY RETENTION: ICD-10-CM

## 2022-09-01 DIAGNOSIS — Z79.899 IMMUNODEFICIENCY DUE TO DRUG THERAPY: ICD-10-CM

## 2022-09-01 DIAGNOSIS — N18.32 STAGE 3B CHRONIC KIDNEY DISEASE: ICD-10-CM

## 2022-09-01 DIAGNOSIS — D84.821 IMMUNODEFICIENCY DUE TO DRUG THERAPY: ICD-10-CM

## 2022-09-01 DIAGNOSIS — D64.81 ANEMIA DUE TO ANTINEOPLASTIC CHEMOTHERAPY: ICD-10-CM

## 2022-09-01 DIAGNOSIS — R33.8 BENIGN PROSTATIC HYPERPLASIA WITH URINARY RETENTION: ICD-10-CM

## 2022-09-01 DIAGNOSIS — D70.1 CHEMOTHERAPY-INDUCED NEUTROPENIA: ICD-10-CM

## 2022-09-01 DIAGNOSIS — G89.3 CHRONIC NEOPLASM-RELATED PAIN: ICD-10-CM

## 2022-09-01 PROCEDURE — 1160F PR REVIEW ALL MEDS BY PRESCRIBER/CLIN PHARMACIST DOCUMENTED: ICD-10-PCS | Mod: CPTII,S$GLB,, | Performed by: INTERNAL MEDICINE

## 2022-09-01 PROCEDURE — 99999 PR PBB SHADOW E&M-EST. PATIENT-LVL III: CPT | Mod: PBBFAC,,, | Performed by: INTERNAL MEDICINE

## 2022-09-01 PROCEDURE — 1126F PR PAIN SEVERITY QUANTIFIED, NO PAIN PRESENT: ICD-10-PCS | Mod: CPTII,S$GLB,, | Performed by: INTERNAL MEDICINE

## 2022-09-01 PROCEDURE — 1101F PT FALLS ASSESS-DOCD LE1/YR: CPT | Mod: CPTII,S$GLB,, | Performed by: INTERNAL MEDICINE

## 2022-09-01 PROCEDURE — 3077F PR MOST RECENT SYSTOLIC BLOOD PRESSURE >= 140 MM HG: ICD-10-PCS | Mod: CPTII,S$GLB,, | Performed by: INTERNAL MEDICINE

## 2022-09-01 PROCEDURE — 3288F FALL RISK ASSESSMENT DOCD: CPT | Mod: CPTII,S$GLB,, | Performed by: INTERNAL MEDICINE

## 2022-09-01 PROCEDURE — 3078F DIAST BP <80 MM HG: CPT | Mod: CPTII,S$GLB,, | Performed by: INTERNAL MEDICINE

## 2022-09-01 PROCEDURE — 1160F RVW MEDS BY RX/DR IN RCRD: CPT | Mod: CPTII,S$GLB,, | Performed by: INTERNAL MEDICINE

## 2022-09-01 PROCEDURE — 1159F PR MEDICATION LIST DOCUMENTED IN MEDICAL RECORD: ICD-10-PCS | Mod: CPTII,S$GLB,, | Performed by: INTERNAL MEDICINE

## 2022-09-01 PROCEDURE — 3078F PR MOST RECENT DIASTOLIC BLOOD PRESSURE < 80 MM HG: ICD-10-PCS | Mod: CPTII,S$GLB,, | Performed by: INTERNAL MEDICINE

## 2022-09-01 PROCEDURE — 1159F MED LIST DOCD IN RCRD: CPT | Mod: CPTII,S$GLB,, | Performed by: INTERNAL MEDICINE

## 2022-09-01 PROCEDURE — 1126F AMNT PAIN NOTED NONE PRSNT: CPT | Mod: CPTII,S$GLB,, | Performed by: INTERNAL MEDICINE

## 2022-09-01 PROCEDURE — 3288F PR FALLS RISK ASSESSMENT DOCUMENTED: ICD-10-PCS | Mod: CPTII,S$GLB,, | Performed by: INTERNAL MEDICINE

## 2022-09-01 PROCEDURE — 96401 CHEMO ANTI-NEOPL SQ/IM: CPT

## 2022-09-01 PROCEDURE — 3077F SYST BP >= 140 MM HG: CPT | Mod: CPTII,S$GLB,, | Performed by: INTERNAL MEDICINE

## 2022-09-01 PROCEDURE — 99215 OFFICE O/P EST HI 40 MIN: CPT | Mod: S$GLB,,, | Performed by: INTERNAL MEDICINE

## 2022-09-01 PROCEDURE — 63600175 PHARM REV CODE 636 W HCPCS: Mod: TB | Performed by: INTERNAL MEDICINE

## 2022-09-01 PROCEDURE — 3008F BODY MASS INDEX DOCD: CPT | Mod: CPTII,S$GLB,, | Performed by: INTERNAL MEDICINE

## 2022-09-01 PROCEDURE — 1101F PR PT FALLS ASSESS DOC 0-1 FALLS W/OUT INJ PAST YR: ICD-10-PCS | Mod: CPTII,S$GLB,, | Performed by: INTERNAL MEDICINE

## 2022-09-01 PROCEDURE — 99215 PR OFFICE/OUTPT VISIT, EST, LEVL V, 40-54 MIN: ICD-10-PCS | Mod: S$GLB,,, | Performed by: INTERNAL MEDICINE

## 2022-09-01 PROCEDURE — 99999 PR PBB SHADOW E&M-EST. PATIENT-LVL III: ICD-10-PCS | Mod: PBBFAC,,, | Performed by: INTERNAL MEDICINE

## 2022-09-01 PROCEDURE — 3008F PR BODY MASS INDEX (BMI) DOCUMENTED: ICD-10-PCS | Mod: CPTII,S$GLB,, | Performed by: INTERNAL MEDICINE

## 2022-09-01 PROCEDURE — 25000003 PHARM REV CODE 250: Performed by: INTERNAL MEDICINE

## 2022-09-01 RX ORDER — FAMOTIDINE 20 MG/1
20 TABLET, FILM COATED ORAL
Status: COMPLETED | OUTPATIENT
Start: 2022-09-01 | End: 2022-09-01

## 2022-09-01 RX ORDER — ACETAMINOPHEN 325 MG/1
650 TABLET ORAL
Status: CANCELLED | OUTPATIENT
Start: 2022-09-01

## 2022-09-01 RX ORDER — ACETAMINOPHEN 325 MG/1
650 TABLET ORAL
Status: COMPLETED | OUTPATIENT
Start: 2022-09-01 | End: 2022-09-01

## 2022-09-01 RX ORDER — EPINEPHRINE 0.3 MG/.3ML
0.3 INJECTION SUBCUTANEOUS ONCE AS NEEDED
Status: DISCONTINUED | OUTPATIENT
Start: 2022-09-01 | End: 2022-09-01 | Stop reason: HOSPADM

## 2022-09-01 RX ORDER — DIPHENHYDRAMINE HYDROCHLORIDE 50 MG/ML
50 INJECTION INTRAMUSCULAR; INTRAVENOUS ONCE AS NEEDED
Status: DISCONTINUED | OUTPATIENT
Start: 2022-09-01 | End: 2022-09-01 | Stop reason: HOSPADM

## 2022-09-01 RX ORDER — FAMOTIDINE 20 MG/1
20 TABLET, FILM COATED ORAL
Status: CANCELLED
Start: 2022-09-01

## 2022-09-01 RX ORDER — SODIUM CHLORIDE 0.9 % (FLUSH) 0.9 %
10 SYRINGE (ML) INJECTION
Status: CANCELLED | OUTPATIENT
Start: 2022-09-01

## 2022-09-01 RX ORDER — DIPHENHYDRAMINE HYDROCHLORIDE 50 MG/ML
50 INJECTION INTRAMUSCULAR; INTRAVENOUS ONCE AS NEEDED
Status: CANCELLED | OUTPATIENT
Start: 2022-09-01

## 2022-09-01 RX ORDER — DIPHENHYDRAMINE HCL 25 MG
50 CAPSULE ORAL
Status: COMPLETED | OUTPATIENT
Start: 2022-09-01 | End: 2022-09-01

## 2022-09-01 RX ORDER — EPINEPHRINE 0.3 MG/.3ML
0.3 INJECTION SUBCUTANEOUS ONCE AS NEEDED
Status: CANCELLED | OUTPATIENT
Start: 2022-09-01

## 2022-09-01 RX ORDER — HEPARIN 100 UNIT/ML
500 SYRINGE INTRAVENOUS
Status: CANCELLED | OUTPATIENT
Start: 2022-09-01

## 2022-09-01 RX ORDER — DIPHENHYDRAMINE HCL 25 MG
50 CAPSULE ORAL
Status: CANCELLED
Start: 2022-09-01

## 2022-09-01 RX ADMIN — FAMOTIDINE 20 MG: 20 TABLET ORAL at 02:09

## 2022-09-01 RX ADMIN — ACETAMINOPHEN 650 MG: 325 TABLET ORAL at 02:09

## 2022-09-01 RX ADMIN — DARATUMUMAB AND HYALURONIDASE-FIHJ (HUMAN RECOMBINANT) 1800 MG: 1800; 30000 INJECTION SUBCUTANEOUS at 03:09

## 2022-09-01 RX ADMIN — DIPHENHYDRAMINE HYDROCHLORIDE 50 MG: 25 CAPSULE ORAL at 02:09

## 2022-09-01 NOTE — NURSING
Pt tolerated Darzalex FP injection well, no complaints at this time. No adverse reactions noted. Next appointment scheduled and pt d/c in no acute distress.

## 2022-09-08 ENCOUNTER — TELEPHONE (OUTPATIENT)
Dept: NEUROLOGY | Facility: CLINIC | Age: 77
End: 2022-09-08
Payer: MEDICARE

## 2022-09-08 NOTE — TELEPHONE ENCOUNTER
----- Message from Gopal Marroquin sent at 9/8/2022  9:47 AM CDT -----  Regarding: reminder letter to sched appt      The Pt's daughter states that she would like to get the Pt sched as they rcvd the reminder letter to sched    Ph# [phone] (the caller is available all day to receive your call)

## 2022-09-09 NOTE — TELEPHONE ENCOUNTER
Pts daughter states a My Chart message was received to call and schedule pts appointment with Dr. Lara by September 16, 2022. The first available appointment with Dr. Lara is 9/14/22 at 10:20 am. Pts daughter confirmed this appointment.

## 2022-09-14 ENCOUNTER — OFFICE VISIT (OUTPATIENT)
Dept: NEUROLOGY | Facility: CLINIC | Age: 77
End: 2022-09-14
Payer: MEDICARE

## 2022-09-14 VITALS
HEIGHT: 71 IN | BODY MASS INDEX: 21.91 KG/M2 | DIASTOLIC BLOOD PRESSURE: 69 MMHG | WEIGHT: 156.5 LBS | HEART RATE: 60 BPM | SYSTOLIC BLOOD PRESSURE: 163 MMHG

## 2022-09-14 DIAGNOSIS — I63.313 CEREBROVASCULAR ACCIDENT (CVA) DUE TO BILATERAL THROMBOSIS OF MIDDLE CEREBRAL ARTERIES: Primary | ICD-10-CM

## 2022-09-14 PROCEDURE — 3078F DIAST BP <80 MM HG: CPT | Mod: CPTII,S$GLB,, | Performed by: PSYCHIATRY & NEUROLOGY

## 2022-09-14 PROCEDURE — 3008F PR BODY MASS INDEX (BMI) DOCUMENTED: ICD-10-PCS | Mod: CPTII,S$GLB,, | Performed by: PSYCHIATRY & NEUROLOGY

## 2022-09-14 PROCEDURE — 3077F PR MOST RECENT SYSTOLIC BLOOD PRESSURE >= 140 MM HG: ICD-10-PCS | Mod: CPTII,S$GLB,, | Performed by: PSYCHIATRY & NEUROLOGY

## 2022-09-14 PROCEDURE — 3288F PR FALLS RISK ASSESSMENT DOCUMENTED: ICD-10-PCS | Mod: CPTII,S$GLB,, | Performed by: PSYCHIATRY & NEUROLOGY

## 2022-09-14 PROCEDURE — 99999 PR PBB SHADOW E&M-EST. PATIENT-LVL III: CPT | Mod: PBBFAC,,, | Performed by: PSYCHIATRY & NEUROLOGY

## 2022-09-14 PROCEDURE — 1101F PT FALLS ASSESS-DOCD LE1/YR: CPT | Mod: CPTII,S$GLB,, | Performed by: PSYCHIATRY & NEUROLOGY

## 2022-09-14 PROCEDURE — 3008F BODY MASS INDEX DOCD: CPT | Mod: CPTII,S$GLB,, | Performed by: PSYCHIATRY & NEUROLOGY

## 2022-09-14 PROCEDURE — 3077F SYST BP >= 140 MM HG: CPT | Mod: CPTII,S$GLB,, | Performed by: PSYCHIATRY & NEUROLOGY

## 2022-09-14 PROCEDURE — 1159F MED LIST DOCD IN RCRD: CPT | Mod: CPTII,S$GLB,, | Performed by: PSYCHIATRY & NEUROLOGY

## 2022-09-14 PROCEDURE — 1126F AMNT PAIN NOTED NONE PRSNT: CPT | Mod: CPTII,S$GLB,, | Performed by: PSYCHIATRY & NEUROLOGY

## 2022-09-14 PROCEDURE — 99214 OFFICE O/P EST MOD 30 MIN: CPT | Mod: S$GLB,,, | Performed by: PSYCHIATRY & NEUROLOGY

## 2022-09-14 PROCEDURE — 99999 PR PBB SHADOW E&M-EST. PATIENT-LVL III: ICD-10-PCS | Mod: PBBFAC,,, | Performed by: PSYCHIATRY & NEUROLOGY

## 2022-09-14 PROCEDURE — 1101F PR PT FALLS ASSESS DOC 0-1 FALLS W/OUT INJ PAST YR: ICD-10-PCS | Mod: CPTII,S$GLB,, | Performed by: PSYCHIATRY & NEUROLOGY

## 2022-09-14 PROCEDURE — 1126F PR PAIN SEVERITY QUANTIFIED, NO PAIN PRESENT: ICD-10-PCS | Mod: CPTII,S$GLB,, | Performed by: PSYCHIATRY & NEUROLOGY

## 2022-09-14 PROCEDURE — 3288F FALL RISK ASSESSMENT DOCD: CPT | Mod: CPTII,S$GLB,, | Performed by: PSYCHIATRY & NEUROLOGY

## 2022-09-14 PROCEDURE — 1159F PR MEDICATION LIST DOCUMENTED IN MEDICAL RECORD: ICD-10-PCS | Mod: CPTII,S$GLB,, | Performed by: PSYCHIATRY & NEUROLOGY

## 2022-09-14 PROCEDURE — 99214 PR OFFICE/OUTPT VISIT, EST, LEVL IV, 30-39 MIN: ICD-10-PCS | Mod: S$GLB,,, | Performed by: PSYCHIATRY & NEUROLOGY

## 2022-09-14 PROCEDURE — 3078F PR MOST RECENT DIASTOLIC BLOOD PRESSURE < 80 MM HG: ICD-10-PCS | Mod: CPTII,S$GLB,, | Performed by: PSYCHIATRY & NEUROLOGY

## 2022-09-14 NOTE — PROGRESS NOTES
Haris Hernandez is a 76 y.o. year old male that  presents for stroke follow up. He is accompanied by his daughter.    HPI:  Mr Hernandez returns to the office for follow up. He was last seen 12/8/2021.  He is a 77 y/o with HTN, CAD, prior ETOH abuse, enlarged prostate, history of R BG and CR infarct without residual deficits, and Kappa light chain multiple myeloma.   They report no interval neurological developments and as a matter of fact his daughter expressed that the short term memory is improved.  Denies recurrence of stroke like symptoms and said that he is able to perform all his activities of daily living without assistace. No driving or drinking alcohol anymore.  No recent falls but still draggs his left leg when walking.  Reports no HA, vertigo, double vision, increasing L sided weakness or stiffness, slurred speech, language or vision impairemnt.  Has residual dysphagia to liquis and solids (daughter said ponce the did not complete his speech therapy or follow initial recommendations in the hospital for PEG tube placement).  Complaint with Plavix.  Past Medical History:   Diagnosis Date    Coronary artery disease     Enlarged prostate without lower urinary tract symptoms (luts)     BPH    Enlarged prostate without lower urinary tract symptoms (luts)     BPH    Hypertension      Social History     Socioeconomic History    Marital status:    Tobacco Use    Smoking status: Former    Smokeless tobacco: Never   Substance and Sexual Activity    Alcohol use: Yes     Alcohol/week: 102.0 standard drinks     Types: 50 Cans of beer, 52 Shots of liquor per week     Comment: drinks a six pk a day & 1/2 pint of crown q Day    Drug use: No    Sexual activity: Never     Past Surgical History:   Procedure Laterality Date    APPENDECTOMY      BONE MARROW BIOPSY Right 8/23/2021    Procedure: BIOPSY-BONE MARROW;  Surgeon: Jl Zamora MD;  Location: [facility] OR;  Service: Oncology;  Laterality: Right;    EYE SURGERY    "    No family history on file.        Review of Systems  General ROS: negative for chills, fever or weight loss  Psychological ROS: negative for hallucination, depression or suicidal ideation  Ophthalmic ROS: negative for blurry vision, photophobia or eye pain  ENT ROS: negative for epistaxis, sore throat or rhinorrhea  Respiratory ROS: no cough, shortness of breath, or wheezing  Cardiovascular ROS: no chest pain or dyspnea on exertion  Gastrointestinal ROS: no abdominal pain, change in bowel habits, or black/ bloody stools  Genito-Urinary ROS: no dysuria, trouble voiding, or hematuria  Musculoskeletal ROS: negative for gait disturbance or muscular weakness  Neurological ROS: no syncope or seizures; no ataxia  Dermatological ROS: negative for pruritis, rash and jaundice      Physical Exam:  BP (!) 163/69   Pulse 60   Ht 5' 11" (1.803 m)   Wt 71 kg (156 lb 8.4 oz)   BMI 21.83 kg/m²   General appearance: alert, cooperative, no distress  Constitutional:Oriented to person, place, and time.appears well-developed and well-nourished.   HEENT: Normocephalic, atraumatic, neck symmetrical, no nasal discharge   Eyes: conjunctivae/corneas clear, PERRL, EOM's intact  Lungs: clear to auscultation bilaterally, no dullness to percussion bilaterally  Heart: regular rate and rhythm without rub; no displacement of the PMI   Abdomen: soft, non-tender; bowel sounds normoactive; no organomegaly  Extremities: extremities symmetric; no clubbing, cyanosis, or edema  Integument: Skin color, texture, turgor normal; no rashes; hair distrubution normal  Neurologic:   Mental status: alert and awake, oriented x 4, comprehension, naming, and repetition intact. No right to left confusion. Performs serial 7's without difficulty .No dysarthria.  CN 2-12: pupils 4 mm bilaterally, reactive to light. Fundi without papilledema. Visual fields full to confrontation. EOM full without nystagmus. Face sensation normal in all distributions. Face symmetric. " Hearing grossly intact. Palate elevates well. Tongue midline without atrophy or fasciculations.  Motor: 5/5 all over  Sensory: intact in all modalities.  DTR's: 2+ all over.  Plantars: no tested.  Coordination: finger to nose and heel-knee-shin intact.  Gait: no ataxia or bradykinesia but L corticospinal gait noted.    LABS:    Complete Blood Count  Lab Results   Component Value Date    RBC 2.30 (L) 08/30/2022    HGB 7.3 (L) 08/30/2022    HCT 20.4 (L) 08/30/2022    MCV 89 08/30/2022    MCH 31.7 (H) 08/30/2022    MCHC 35.8 08/30/2022    RDW 14.8 (H) 08/30/2022     (L) 08/30/2022    MPV 12.1 08/30/2022    GRAN 1.2 (L) 08/30/2022    GRAN 45.0 08/30/2022    LYMPH 1.1 08/30/2022    LYMPH 38.8 08/30/2022    MONO 0.3 08/30/2022    MONO 11.0 08/30/2022    EOS 0.1 08/30/2022    BASO 0.04 08/30/2022    EOSINOPHIL 3.3 08/30/2022    BASOPHIL 1.5 08/30/2022    DIFFMETHOD Automated 08/30/2022       Comprehensive Metabolic Panel  Lab Results   Component Value Date    GLU 97 08/30/2022    BUN 20 08/30/2022    CREATININE 2.04 (H) 08/30/2022     08/30/2022    K 4.0 08/30/2022     08/30/2022    PROT 6.4 08/30/2022    ALBUMIN 4.0 08/30/2022    BILITOT 0.5 08/30/2022    AST 27 08/30/2022    ALKPHOS 54 08/30/2022    CO2 25 08/30/2022    ALT 18 08/30/2022    ANIONGAP 12 08/30/2022    EGFRNONAA 36.3 (A) 07/01/2022    ESTGFRAFRICA 41.9 (A) 07/01/2022       TSH  Lab Results   Component Value Date    TSH 0.660 12/09/2021         Assessment: pleasant 77 y/o with HTN, CAD, prior ETOH abuse, enlarged prostate, history of R BG and CR infarct without residual deficits, and Kappa light chain multiple myeloma, presents for routine yearly stroke follow up.  Doing better, with initial cognitive concerns without progression and quite stable at this moment.       No diagnosis found.  There were no encounter diagnoses.      Plan:  1) Bi cerebral infarcts, on Plavix.  2) HTN  3) CAD  5) Bi cerebral infarcts, on Plavix.  5) Prior ETOH  abuse.  6) Prostate enlargement  7) Kappa light chain MM, follow by hem oncology  8) Post stroke dysphagia          No orders of the defined types were placed in this encounter.          Joselito Lraa MD

## 2022-09-22 DIAGNOSIS — C90.00 KAPPA LIGHT CHAIN MYELOMA: ICD-10-CM

## 2022-09-22 RX ORDER — LENALIDOMIDE 10 MG/1
10 CAPSULE ORAL EVERY OTHER DAY
Qty: 15 EACH | Refills: 5 | Status: SHIPPED | OUTPATIENT
Start: 2022-09-22 | End: 2022-10-24 | Stop reason: SDUPTHER

## 2022-09-28 NOTE — PROGRESS NOTES
PATIENT: Haris Hernandez  MRN: [id number]  DATE: 9/29/2022    Chief Complaint: Kappa Light Chain Myeloma    Subjective:     Pertinent Medical History:     He presented to the ED 08/13/2021 with abnormal labs-CMP showed creatinine 6.6.  Prior CMP from May 2018 showed creatinine 1.2.    Further workup included a free light chain assay where a kappa free light chain level was 494    08/23/2021 bone marrow biopsy showed variable cellularity, 10 to 40% positive for plasma cell dyscrasia with plasma cells representing 30 to 40% of total cellularity.  No increase in blasts. FISH for Plasma Cell Proliferative Disorder - NORMAL    -started Darzalex, Velcade, Decadron 8/26/2021  -On Revlimid 10 mg qod since 9/30/2021  -saw urology,  for BPH causing difficulty in emptying the bladder, he may need cystoscopy and surgery    Interval History:   - he presents for a follow-up appointment for his multiple myeloma.  - he is scheduled for darzalex faspro today, 9/29/22  - today, he is doing well. He denies shortness of breath, chest pain, nausea, vomiting, diarrhea, constipation.     Past Medical, Surgical, Family, and Social histories reviewed.    Medication and Allergies reviewed.    Review of Systems   Constitutional:  Negative for fever and malaise/fatigue.   HENT:  Negative for sore throat.    Respiratory:  Negative for shortness of breath.    Cardiovascular:  Negative for chest pain and leg swelling.   Gastrointestinal:  Negative for abdominal pain, nausea and vomiting.   Genitourinary:  Negative for dysuria.   Musculoskeletal:  Negative for back pain.   Skin:  Negative for rash.   Neurological:  Negative for weakness.   Psychiatric/Behavioral:  The patient is not nervous/anxious.      Objective:     Vitals:    09/29/22 1331   BP: (!) 167/60   BP Location: Right arm   Patient Position: Sitting   BP Method: Medium (Automatic)   Pulse: 64   Resp: 20   Temp: 97.4 °F (36.3 °C)   TempSrc: Oral   SpO2: 100%   Weight: 70.4 kg  "(155 lb 3.3 oz)   Height: 5' 11" (1.803 m)     BMI: Body mass index is 21.65 kg/m².    Physical Exam  Vitals and nursing note reviewed.   Constitutional:       Appearance: He is well-developed.   HENT:      Head: Normocephalic and atraumatic.   Eyes:      Pupils: Pupils are equal, round, and reactive to light.   Cardiovascular:      Rate and Rhythm: Normal rate and regular rhythm.   Pulmonary:      Effort: Pulmonary effort is normal.      Breath sounds: Normal breath sounds.   Abdominal:      General: Bowel sounds are normal.      Palpations: Abdomen is soft.   Musculoskeletal:         General: Normal range of motion.      Cervical back: Normal range of motion and neck supple.   Skin:     General: Skin is warm and dry.   Neurological:      Mental Status: He is alert and oriented to person, place, and time.   Psychiatric:         Behavior: Behavior normal.         Thought Content: Thought content normal.         Judgment: Judgment normal.     Labs have been reviewed.    Lab Results   Component Value Date    WBC 3.21 (L) 09/27/2022    HGB 8.2 (L) 09/27/2022    HCT 23.0 (L) 09/27/2022    MCV 88 09/27/2022     (L) 09/27/2022         Serum protein electrophoresis (9/27/22):  Normal total protein. Normal gamma globulins are decreased.   Two paraprotein bands are identified: 1) Near-gamma = 0.2 g/dL   (previously 0.18 g/dL) 2) Mid-gamma = 0.17 g/dL (previously 0.18   g/dL).     Serum protein electrophoresis (8/30/22):  Decreased total protein. Normal gamma globulins are decreased. Two   paraprotein bands are identified: 1) Near-gamma = 0.18 g/dL   (previously 0.21 g/dL) 2) Mid-gamma = 0.18 g/dL (previously 0.18   g/dL).             Assessment:       1. 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 Stage 3b chronic kidney disease    4. Chronic neoplasm-related pain    5. Chemotherapy-induced peripheral neuropathy    6. Anemia due to antineoplastic chemotherapy    7. Chemotherapy-induced " neutropenia    8. Benign prostatic hyperplasia with urinary retention      Plan:   Kappa light chain myeloma with normal cytogenetics  -started Darzalex Faspro, subcutaneous Velcade, oral dexamethasone 8/26/2021  -velcade discontinued after 4 cycles  -last decadron dose was 2/3/22  -on Revlimid 10 mg QOD - start 9/30/2021, will continue   -cont Plavix  -continue acyclovir for shingles prophylaxis  - Labs have been reviewed. Absolute neutrophil count is 1.5 k/uL. Hemoglobin is 8.2 g/dL. Platelet count is 121 k/uL. Paraproteins are stable.  - okay to proceed with cycle #13 of Darzalex . Hold revlimid for this cycle due to worsened renal function.  - return to clinic in 4 weeks in preparation for next cycle of darzalex fastpro/dexamethasone/revlimid.    CKD Stage 3b  - Labs have been reviewed. Creatinine is 2.14 mg/dL. I encouraged him to increase his fluid intake.  - if his creatinine continues to increase, we will refer to nephrology.    BPH/Urinary retention  -follows wit   -appt for cystoscopy/surgery 2/7/2022 - was cancelled    - return to clinic in 4 weeks in preparation for next cycle of darzalex fastpro/dexamethasone.    Andres Ríos M.D.  Hematology/Oncology  [facility] - [address], [address]  [place] [address]  Phone: [phone]  Fax: [phone]

## 2022-09-29 ENCOUNTER — OFFICE VISIT (OUTPATIENT)
Dept: HEMATOLOGY/ONCOLOGY | Facility: CLINIC | Age: 77
End: 2022-09-29
Payer: MEDICARE

## 2022-09-29 ENCOUNTER — INFUSION (OUTPATIENT)
Dept: INFUSION THERAPY | Facility: HOSPITAL | Age: 77
End: 2022-09-29
Attending: INTERNAL MEDICINE
Payer: MEDICARE

## 2022-09-29 VITALS
RESPIRATION RATE: 20 BRPM | TEMPERATURE: 97 F | DIASTOLIC BLOOD PRESSURE: 66 MMHG | OXYGEN SATURATION: 100 % | SYSTOLIC BLOOD PRESSURE: 143 MMHG | BODY MASS INDEX: 21.73 KG/M2 | HEIGHT: 71 IN | WEIGHT: 155.19 LBS | HEART RATE: 59 BPM

## 2022-09-29 VITALS
SYSTOLIC BLOOD PRESSURE: 167 MMHG | OXYGEN SATURATION: 100 % | RESPIRATION RATE: 20 BRPM | HEIGHT: 71 IN | WEIGHT: 155.19 LBS | HEART RATE: 64 BPM | DIASTOLIC BLOOD PRESSURE: 60 MMHG | TEMPERATURE: 97 F | BODY MASS INDEX: 21.73 KG/M2

## 2022-09-29 DIAGNOSIS — G62.0 CHEMOTHERAPY-INDUCED PERIPHERAL NEUROPATHY: ICD-10-CM

## 2022-09-29 DIAGNOSIS — N18.32 STAGE 3B CHRONIC KIDNEY DISEASE: ICD-10-CM

## 2022-09-29 DIAGNOSIS — N40.1 BENIGN PROSTATIC HYPERPLASIA WITH URINARY RETENTION: ICD-10-CM

## 2022-09-29 DIAGNOSIS — D70.1 CHEMOTHERAPY-INDUCED NEUTROPENIA: ICD-10-CM

## 2022-09-29 DIAGNOSIS — Z79.899 IMMUNODEFICIENCY DUE TO DRUG THERAPY: ICD-10-CM

## 2022-09-29 DIAGNOSIS — C90.00 KAPPA LIGHT CHAIN MYELOMA: Primary | ICD-10-CM

## 2022-09-29 DIAGNOSIS — D84.821 IMMUNODEFICIENCY DUE TO DRUG THERAPY: ICD-10-CM

## 2022-09-29 DIAGNOSIS — T45.1X5A ANEMIA DUE TO ANTINEOPLASTIC CHEMOTHERAPY: ICD-10-CM

## 2022-09-29 DIAGNOSIS — T45.1X5A CHEMOTHERAPY-INDUCED PERIPHERAL NEUROPATHY: ICD-10-CM

## 2022-09-29 DIAGNOSIS — G89.3 CHRONIC NEOPLASM-RELATED PAIN: ICD-10-CM

## 2022-09-29 DIAGNOSIS — T45.1X5A CHEMOTHERAPY-INDUCED NEUTROPENIA: ICD-10-CM

## 2022-09-29 DIAGNOSIS — D64.81 ANEMIA DUE TO ANTINEOPLASTIC CHEMOTHERAPY: ICD-10-CM

## 2022-09-29 DIAGNOSIS — R33.8 BENIGN PROSTATIC HYPERPLASIA WITH URINARY RETENTION: ICD-10-CM

## 2022-09-29 PROCEDURE — 25000003 PHARM REV CODE 250: Performed by: INTERNAL MEDICINE

## 2022-09-29 PROCEDURE — 3077F SYST BP >= 140 MM HG: CPT | Mod: CPTII,S$GLB,, | Performed by: INTERNAL MEDICINE

## 2022-09-29 PROCEDURE — 1159F PR MEDICATION LIST DOCUMENTED IN MEDICAL RECORD: ICD-10-PCS | Mod: CPTII,S$GLB,, | Performed by: INTERNAL MEDICINE

## 2022-09-29 PROCEDURE — 63600175 PHARM REV CODE 636 W HCPCS: Mod: TB | Performed by: INTERNAL MEDICINE

## 2022-09-29 PROCEDURE — 3008F PR BODY MASS INDEX (BMI) DOCUMENTED: ICD-10-PCS | Mod: CPTII,S$GLB,, | Performed by: INTERNAL MEDICINE

## 2022-09-29 PROCEDURE — 3077F PR MOST RECENT SYSTOLIC BLOOD PRESSURE >= 140 MM HG: ICD-10-PCS | Mod: CPTII,S$GLB,, | Performed by: INTERNAL MEDICINE

## 2022-09-29 PROCEDURE — 3288F PR FALLS RISK ASSESSMENT DOCUMENTED: ICD-10-PCS | Mod: CPTII,S$GLB,, | Performed by: INTERNAL MEDICINE

## 2022-09-29 PROCEDURE — 1160F PR REVIEW ALL MEDS BY PRESCRIBER/CLIN PHARMACIST DOCUMENTED: ICD-10-PCS | Mod: CPTII,S$GLB,, | Performed by: INTERNAL MEDICINE

## 2022-09-29 PROCEDURE — 1101F PR PT FALLS ASSESS DOC 0-1 FALLS W/OUT INJ PAST YR: ICD-10-PCS | Mod: CPTII,S$GLB,, | Performed by: INTERNAL MEDICINE

## 2022-09-29 PROCEDURE — 3288F FALL RISK ASSESSMENT DOCD: CPT | Mod: CPTII,S$GLB,, | Performed by: INTERNAL MEDICINE

## 2022-09-29 PROCEDURE — 1126F PR PAIN SEVERITY QUANTIFIED, NO PAIN PRESENT: ICD-10-PCS | Mod: CPTII,S$GLB,, | Performed by: INTERNAL MEDICINE

## 2022-09-29 PROCEDURE — 3078F DIAST BP <80 MM HG: CPT | Mod: CPTII,S$GLB,, | Performed by: INTERNAL MEDICINE

## 2022-09-29 PROCEDURE — 99215 OFFICE O/P EST HI 40 MIN: CPT | Mod: S$GLB,,, | Performed by: INTERNAL MEDICINE

## 2022-09-29 PROCEDURE — 99999 PR PBB SHADOW E&M-EST. PATIENT-LVL III: CPT | Mod: PBBFAC,,, | Performed by: INTERNAL MEDICINE

## 2022-09-29 PROCEDURE — 1160F RVW MEDS BY RX/DR IN RCRD: CPT | Mod: CPTII,S$GLB,, | Performed by: INTERNAL MEDICINE

## 2022-09-29 PROCEDURE — 3078F PR MOST RECENT DIASTOLIC BLOOD PRESSURE < 80 MM HG: ICD-10-PCS | Mod: CPTII,S$GLB,, | Performed by: INTERNAL MEDICINE

## 2022-09-29 PROCEDURE — 1101F PT FALLS ASSESS-DOCD LE1/YR: CPT | Mod: CPTII,S$GLB,, | Performed by: INTERNAL MEDICINE

## 2022-09-29 PROCEDURE — 1159F MED LIST DOCD IN RCRD: CPT | Mod: CPTII,S$GLB,, | Performed by: INTERNAL MEDICINE

## 2022-09-29 PROCEDURE — 99215 PR OFFICE/OUTPT VISIT, EST, LEVL V, 40-54 MIN: ICD-10-PCS | Mod: S$GLB,,, | Performed by: INTERNAL MEDICINE

## 2022-09-29 PROCEDURE — 3008F BODY MASS INDEX DOCD: CPT | Mod: CPTII,S$GLB,, | Performed by: INTERNAL MEDICINE

## 2022-09-29 PROCEDURE — 99999 PR PBB SHADOW E&M-EST. PATIENT-LVL III: ICD-10-PCS | Mod: PBBFAC,,, | Performed by: INTERNAL MEDICINE

## 2022-09-29 PROCEDURE — 96401 CHEMO ANTI-NEOPL SQ/IM: CPT

## 2022-09-29 PROCEDURE — 1126F AMNT PAIN NOTED NONE PRSNT: CPT | Mod: CPTII,S$GLB,, | Performed by: INTERNAL MEDICINE

## 2022-09-29 RX ORDER — HEPARIN 100 UNIT/ML
500 SYRINGE INTRAVENOUS
Status: CANCELLED | OUTPATIENT
Start: 2022-09-29

## 2022-09-29 RX ORDER — EPINEPHRINE 0.3 MG/.3ML
0.3 INJECTION SUBCUTANEOUS ONCE AS NEEDED
Status: DISCONTINUED | OUTPATIENT
Start: 2022-09-29 | End: 2022-09-29 | Stop reason: HOSPADM

## 2022-09-29 RX ORDER — FAMOTIDINE 20 MG/1
20 TABLET, FILM COATED ORAL
Status: COMPLETED | OUTPATIENT
Start: 2022-09-29 | End: 2022-09-29

## 2022-09-29 RX ORDER — DIPHENHYDRAMINE HCL 25 MG
50 CAPSULE ORAL
Status: CANCELLED
Start: 2022-09-29

## 2022-09-29 RX ORDER — ACETAMINOPHEN 325 MG/1
650 TABLET ORAL
Status: COMPLETED | OUTPATIENT
Start: 2022-09-29 | End: 2022-09-29

## 2022-09-29 RX ORDER — ACETAMINOPHEN 325 MG/1
650 TABLET ORAL
Status: CANCELLED | OUTPATIENT
Start: 2022-09-29

## 2022-09-29 RX ORDER — DIPHENHYDRAMINE HCL 25 MG
50 CAPSULE ORAL
Status: COMPLETED | OUTPATIENT
Start: 2022-09-29 | End: 2022-09-29

## 2022-09-29 RX ORDER — DIPHENHYDRAMINE HYDROCHLORIDE 50 MG/ML
50 INJECTION INTRAMUSCULAR; INTRAVENOUS ONCE AS NEEDED
Status: DISCONTINUED | OUTPATIENT
Start: 2022-09-29 | End: 2022-09-29 | Stop reason: HOSPADM

## 2022-09-29 RX ORDER — FAMOTIDINE 20 MG/1
20 TABLET, FILM COATED ORAL
Status: CANCELLED
Start: 2022-09-29

## 2022-09-29 RX ORDER — SODIUM CHLORIDE 0.9 % (FLUSH) 0.9 %
10 SYRINGE (ML) INJECTION
Status: CANCELLED | OUTPATIENT
Start: 2022-09-29

## 2022-09-29 RX ORDER — DIPHENHYDRAMINE HYDROCHLORIDE 50 MG/ML
50 INJECTION INTRAMUSCULAR; INTRAVENOUS ONCE AS NEEDED
Status: CANCELLED | OUTPATIENT
Start: 2022-09-29

## 2022-09-29 RX ORDER — EPINEPHRINE 0.3 MG/.3ML
0.3 INJECTION SUBCUTANEOUS ONCE AS NEEDED
Status: CANCELLED | OUTPATIENT
Start: 2022-09-29

## 2022-09-29 RX ADMIN — DARATUMUMAB AND HYALURONIDASE-FIHJ (HUMAN RECOMBINANT) 1800 MG: 1800; 30000 INJECTION SUBCUTANEOUS at 03:09

## 2022-09-29 RX ADMIN — DIPHENHYDRAMINE HYDROCHLORIDE 50 MG: 25 CAPSULE ORAL at 02:09

## 2022-09-29 RX ADMIN — FAMOTIDINE 20 MG: 20 TABLET ORAL at 02:09

## 2022-09-29 RX ADMIN — ACETAMINOPHEN 650 MG: 325 TABLET ORAL at 02:09

## 2022-10-24 DIAGNOSIS — C90.00 KAPPA LIGHT CHAIN MYELOMA: ICD-10-CM

## 2022-10-24 RX ORDER — LENALIDOMIDE 10 MG/1
10 CAPSULE ORAL EVERY OTHER DAY
Qty: 15 EACH | Refills: 5 | Status: SHIPPED | OUTPATIENT
Start: 2022-10-24 | End: 2022-11-29 | Stop reason: SDUPTHER

## 2022-10-27 ENCOUNTER — INFUSION (OUTPATIENT)
Dept: INFUSION THERAPY | Facility: HOSPITAL | Age: 77
End: 2022-10-27
Attending: INTERNAL MEDICINE
Payer: MEDICARE

## 2022-10-27 ENCOUNTER — OFFICE VISIT (OUTPATIENT)
Dept: HEMATOLOGY/ONCOLOGY | Facility: CLINIC | Age: 77
End: 2022-10-27
Payer: MEDICARE

## 2022-10-27 VITALS
HEART RATE: 82 BPM | RESPIRATION RATE: 18 BRPM | DIASTOLIC BLOOD PRESSURE: 69 MMHG | HEIGHT: 71 IN | OXYGEN SATURATION: 100 % | BODY MASS INDEX: 21.85 KG/M2 | WEIGHT: 156.06 LBS | TEMPERATURE: 98 F | SYSTOLIC BLOOD PRESSURE: 155 MMHG

## 2022-10-27 VITALS
OXYGEN SATURATION: 100 % | BODY MASS INDEX: 21.77 KG/M2 | SYSTOLIC BLOOD PRESSURE: 155 MMHG | DIASTOLIC BLOOD PRESSURE: 69 MMHG | HEART RATE: 82 BPM | WEIGHT: 156.06 LBS | RESPIRATION RATE: 18 BRPM

## 2022-10-27 DIAGNOSIS — D69.59 CHEMOTHERAPY-INDUCED THROMBOCYTOPENIA: ICD-10-CM

## 2022-10-27 DIAGNOSIS — Z79.899 IMMUNODEFICIENCY DUE TO DRUG THERAPY: ICD-10-CM

## 2022-10-27 DIAGNOSIS — C90.00 KAPPA LIGHT CHAIN MYELOMA: Primary | ICD-10-CM

## 2022-10-27 DIAGNOSIS — N18.32 STAGE 3B CHRONIC KIDNEY DISEASE: ICD-10-CM

## 2022-10-27 DIAGNOSIS — D69.6 THROMBOCYTOPENIA, UNSPECIFIED: ICD-10-CM

## 2022-10-27 DIAGNOSIS — G89.3 CHRONIC NEOPLASM-RELATED PAIN: ICD-10-CM

## 2022-10-27 DIAGNOSIS — R33.8 BENIGN PROSTATIC HYPERPLASIA WITH URINARY RETENTION: ICD-10-CM

## 2022-10-27 DIAGNOSIS — T45.1X5A CHEMOTHERAPY-INDUCED PERIPHERAL NEUROPATHY: ICD-10-CM

## 2022-10-27 DIAGNOSIS — D64.81 ANEMIA DUE TO ANTINEOPLASTIC CHEMOTHERAPY: ICD-10-CM

## 2022-10-27 DIAGNOSIS — T45.1X5A CHEMOTHERAPY-INDUCED THROMBOCYTOPENIA: ICD-10-CM

## 2022-10-27 DIAGNOSIS — N40.1 BENIGN PROSTATIC HYPERPLASIA WITH URINARY RETENTION: ICD-10-CM

## 2022-10-27 DIAGNOSIS — D84.821 IMMUNODEFICIENCY DUE TO DRUG THERAPY: ICD-10-CM

## 2022-10-27 DIAGNOSIS — T45.1X5A ANEMIA DUE TO ANTINEOPLASTIC CHEMOTHERAPY: ICD-10-CM

## 2022-10-27 DIAGNOSIS — G62.0 CHEMOTHERAPY-INDUCED PERIPHERAL NEUROPATHY: ICD-10-CM

## 2022-10-27 DIAGNOSIS — I10 HYPERTENSION, UNSPECIFIED TYPE: Primary | ICD-10-CM

## 2022-10-27 PROCEDURE — 99215 PR OFFICE/OUTPT VISIT, EST, LEVL V, 40-54 MIN: ICD-10-PCS | Mod: S$GLB,,, | Performed by: NURSE PRACTITIONER

## 2022-10-27 PROCEDURE — 1126F PR PAIN SEVERITY QUANTIFIED, NO PAIN PRESENT: ICD-10-PCS | Mod: CPTII,S$GLB,, | Performed by: NURSE PRACTITIONER

## 2022-10-27 PROCEDURE — 99215 OFFICE O/P EST HI 40 MIN: CPT | Mod: S$GLB,,, | Performed by: NURSE PRACTITIONER

## 2022-10-27 PROCEDURE — 3288F FALL RISK ASSESSMENT DOCD: CPT | Mod: CPTII,S$GLB,, | Performed by: NURSE PRACTITIONER

## 2022-10-27 PROCEDURE — 99999 PR PBB SHADOW E&M-EST. PATIENT-LVL IV: ICD-10-PCS | Mod: PBBFAC,,, | Performed by: NURSE PRACTITIONER

## 2022-10-27 PROCEDURE — 1126F AMNT PAIN NOTED NONE PRSNT: CPT | Mod: CPTII,S$GLB,, | Performed by: NURSE PRACTITIONER

## 2022-10-27 PROCEDURE — 3078F PR MOST RECENT DIASTOLIC BLOOD PRESSURE < 80 MM HG: ICD-10-PCS | Mod: CPTII,S$GLB,, | Performed by: NURSE PRACTITIONER

## 2022-10-27 PROCEDURE — 96401 CHEMO ANTI-NEOPL SQ/IM: CPT

## 2022-10-27 PROCEDURE — 3078F DIAST BP <80 MM HG: CPT | Mod: CPTII,S$GLB,, | Performed by: NURSE PRACTITIONER

## 2022-10-27 PROCEDURE — 3077F PR MOST RECENT SYSTOLIC BLOOD PRESSURE >= 140 MM HG: ICD-10-PCS | Mod: CPTII,S$GLB,, | Performed by: NURSE PRACTITIONER

## 2022-10-27 PROCEDURE — 63600175 PHARM REV CODE 636 W HCPCS: Mod: TB | Performed by: INTERNAL MEDICINE

## 2022-10-27 PROCEDURE — 3077F SYST BP >= 140 MM HG: CPT | Mod: CPTII,S$GLB,, | Performed by: NURSE PRACTITIONER

## 2022-10-27 PROCEDURE — 99999 PR PBB SHADOW E&M-EST. PATIENT-LVL IV: CPT | Mod: PBBFAC,,, | Performed by: NURSE PRACTITIONER

## 2022-10-27 PROCEDURE — 1101F PR PT FALLS ASSESS DOC 0-1 FALLS W/OUT INJ PAST YR: ICD-10-PCS | Mod: CPTII,S$GLB,, | Performed by: NURSE PRACTITIONER

## 2022-10-27 PROCEDURE — 3288F PR FALLS RISK ASSESSMENT DOCUMENTED: ICD-10-PCS | Mod: CPTII,S$GLB,, | Performed by: NURSE PRACTITIONER

## 2022-10-27 PROCEDURE — 25000003 PHARM REV CODE 250: Performed by: INTERNAL MEDICINE

## 2022-10-27 PROCEDURE — 1101F PT FALLS ASSESS-DOCD LE1/YR: CPT | Mod: CPTII,S$GLB,, | Performed by: NURSE PRACTITIONER

## 2022-10-27 RX ORDER — EPINEPHRINE 0.3 MG/.3ML
0.3 INJECTION SUBCUTANEOUS ONCE AS NEEDED
Status: CANCELLED | OUTPATIENT
Start: 2022-10-27

## 2022-10-27 RX ORDER — ACETAMINOPHEN 325 MG/1
650 TABLET ORAL
Status: CANCELLED | OUTPATIENT
Start: 2022-10-27

## 2022-10-27 RX ORDER — CARVEDILOL 6.25 MG/1
6.25 TABLET ORAL 2 TIMES DAILY
Qty: 60 TABLET | Refills: 11 | Status: SHIPPED | OUTPATIENT
Start: 2022-10-27 | End: 2023-10-25 | Stop reason: SDUPTHER

## 2022-10-27 RX ORDER — DIPHENHYDRAMINE HYDROCHLORIDE 50 MG/ML
50 INJECTION INTRAMUSCULAR; INTRAVENOUS ONCE AS NEEDED
Status: DISCONTINUED | OUTPATIENT
Start: 2022-10-27 | End: 2022-10-27 | Stop reason: HOSPADM

## 2022-10-27 RX ORDER — DIPHENHYDRAMINE HCL 25 MG
50 CAPSULE ORAL
Status: CANCELLED
Start: 2022-10-27

## 2022-10-27 RX ORDER — DIPHENHYDRAMINE HCL 25 MG
50 CAPSULE ORAL
Status: COMPLETED | OUTPATIENT
Start: 2022-10-27 | End: 2022-10-27

## 2022-10-27 RX ORDER — SODIUM CHLORIDE 0.9 % (FLUSH) 0.9 %
10 SYRINGE (ML) INJECTION
Status: CANCELLED | OUTPATIENT
Start: 2022-10-27

## 2022-10-27 RX ORDER — DIPHENHYDRAMINE HYDROCHLORIDE 50 MG/ML
50 INJECTION INTRAMUSCULAR; INTRAVENOUS ONCE AS NEEDED
Status: CANCELLED | OUTPATIENT
Start: 2022-10-27

## 2022-10-27 RX ORDER — HEPARIN 100 UNIT/ML
500 SYRINGE INTRAVENOUS
Status: CANCELLED | OUTPATIENT
Start: 2022-10-27

## 2022-10-27 RX ORDER — FAMOTIDINE 20 MG/1
20 TABLET, FILM COATED ORAL
Status: COMPLETED | OUTPATIENT
Start: 2022-10-27 | End: 2022-10-27

## 2022-10-27 RX ORDER — FAMOTIDINE 20 MG/1
20 TABLET, FILM COATED ORAL
Status: CANCELLED
Start: 2022-10-27

## 2022-10-27 RX ORDER — EPINEPHRINE 0.3 MG/.3ML
0.3 INJECTION SUBCUTANEOUS ONCE AS NEEDED
Status: DISCONTINUED | OUTPATIENT
Start: 2022-10-27 | End: 2022-10-27 | Stop reason: HOSPADM

## 2022-10-27 RX ORDER — ACETAMINOPHEN 325 MG/1
650 TABLET ORAL
Status: COMPLETED | OUTPATIENT
Start: 2022-10-27 | End: 2022-10-27

## 2022-10-27 RX ADMIN — DIPHENHYDRAMINE HYDROCHLORIDE 50 MG: 25 CAPSULE ORAL at 02:10

## 2022-10-27 RX ADMIN — FAMOTIDINE 20 MG: 20 TABLET ORAL at 02:10

## 2022-10-27 RX ADMIN — DARATUMUMAB AND HYALURONIDASE-FIHJ (HUMAN RECOMBINANT) 1800 MG: 1800; 30000 INJECTION SUBCUTANEOUS at 02:10

## 2022-10-27 RX ADMIN — ACETAMINOPHEN 650 MG: 325 TABLET ORAL at 02:10

## 2022-10-27 NOTE — PROGRESS NOTES
PATIENT: Haris Hernandez  MRN: [id number]  DATE: 10/27/2022    Chief Complaint: Kappa Light Chain Myeloma    Subjective:     Pertinent Medical History:   HPI as per Dr JONATHAN Ríos 9/29/22 office visit, verified with pt.    He presented to the ED 08/13/2021 with abnormal labs-CMP showed creatinine 6.6.  Prior CMP from May 2018 showed creatinine 1.2.    Further workup included a free light chain assay where a kappa free light chain level was 494    08/23/2021 bone marrow biopsy showed variable cellularity, 10 to 40% positive for plasma cell dyscrasia with plasma cells representing 30 to 40% of total cellularity.  No increase in blasts. FISH for Plasma Cell Proliferative Disorder - NORMAL    -started Darzalex, Velcade, Decadron 8/26/2021  -On Revlimid 10 mg qod since 9/30/2021  -saw urology,  for BPH causing difficulty in emptying the bladder, he may need cystoscopy and surgery    Interval History:   - he presents for a follow-up appointment for his multiple myeloma.  - he is scheduled for darzalex faspro today, 10/27/22  - today, he is doing well. He denies shortness of breath, chest pain, nausea, vomiting, diarrhea, constipation.     Past Medical, Surgical, Family, and Social histories reviewed.    Medication and Allergies reviewed.    Review of Systems   Constitutional:  Negative for fever and malaise/fatigue.   HENT:  Negative for sore throat.    Respiratory:  Negative for shortness of breath.    Cardiovascular:  Negative for chest pain and leg swelling.   Gastrointestinal:  Negative for abdominal pain, nausea and vomiting.   Genitourinary:  Negative for dysuria.   Musculoskeletal:  Negative for back pain.   Skin:  Negative for rash.   Neurological:  Negative for weakness.   Psychiatric/Behavioral:  The patient is not nervous/anxious.      Objective:     Vitals:    10/27/22 1307   BP: (!) 155/69   BP Location: Left arm   Patient Position: Sitting   BP Method: Medium (Automatic)   Pulse: 82   Resp: 18   SpO2:  100%   Weight: 70.8 kg (156 lb 1.4 oz)     BMI: Body mass index is 21.77 kg/m².    Physical Exam  Vitals and nursing note reviewed.   Constitutional:       Appearance: He is well-developed.   HENT:      Head: Normocephalic and atraumatic.   Eyes:      Pupils: Pupils are equal, round, and reactive to light.   Cardiovascular:      Rate and Rhythm: Normal rate and regular rhythm.   Pulmonary:      Effort: Pulmonary effort is normal.      Breath sounds: Normal breath sounds.   Abdominal:      General: Bowel sounds are normal.      Palpations: Abdomen is soft.   Musculoskeletal:         General: Normal range of motion.      Cervical back: Normal range of motion and neck supple.   Skin:     General: Skin is warm and dry.   Neurological:      Mental Status: He is alert and oriented to person, place, and time.   Psychiatric:         Behavior: Behavior normal.         Thought Content: Thought content normal.         Judgment: Judgment normal.     Labs have been reviewed.    Lab Results   Component Value Date    WBC 3.87 (L) 10/25/2022    HGB 7.6 (L) 10/25/2022    HCT 22.2 (L) 10/25/2022    MCV 87 10/25/2022     (L) 10/25/2022         Serum protein electrophoresis (9/27/22):  Normal total protein. Normal gamma globulins are decreased.   Two paraprotein bands are identified: 1) Near-gamma = 0.2 g/dL   (previously 0.18 g/dL) 2) Mid-gamma = 0.17 g/dL (previously 0.18   g/dL).     Serum protein electrophoresis (8/30/22):  Decreased total protein. Normal gamma globulins are decreased. Two   paraprotein bands are identified: 1) Near-gamma = 0.18 g/dL   (previously 0.21 g/dL) 2) Mid-gamma = 0.18 g/dL (previously 0.18   g/dL).             Assessment:       1. 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 Stage 3b chronic kidney disease    4. Chronic neoplasm-related pain    5. Chemotherapy-induced peripheral neuropathy    6. Anemia due to antineoplastic chemotherapy    7. Benign prostatic  hyperplasia with urinary retention    8. Chemotherapy-induced thrombocytopenia    9. Thrombocytopenia, unspecified        Plan:   Kappa light chain myeloma with normal cytogenetics  -started Darzalex Faspro, subcutaneous Velcade, oral dexamethasone 8/26/2021  -velcade discontinued after 4 cycles  -last decadron dose was 2/3/22  -on Revlimid 10 mg QOD - start 9/30/2021, will continue   -cont Plavix  -continue acyclovir for shingles prophylaxis  - Labs have been reviewed. Absolute neutrophil count is 1.5 k/uL. Hemoglobin is 8.2 g/dL. Platelet count is 121 k/uL. Paraproteins are stable.  - okay to proceed with cycle #16 of Darzalex as reviewed labs of 10/25/22 with Dr Ríos, Dr Ríos will sign orders . Hold revlimid for this cycle due to  renal function.  - return to clinic in 4 weeks in preparation for next cycle of darzalex fastpro/dexamethasone/revlimid.    Thrombocytopenia  -platelets 129 K/uL 10/25/22  -continue to monitor    Anemia due to antineoplastic therapy  -H/H is stable, anemia  -continue to monitor    CKD Stage 3b  - Labs have been reviewed. Creatinine is improved to 1.87 mg/dL. I encouraged him to increase his fluid intake.  - if his creatinine continues to increase, we will refer to nephrology.    BPH/Urinary retention  -follows [facility]  -appt for cystoscopy/surgery 2/7/2022 - was cancelled    - return to clinic in 4 weeks in preparation for next cycle of darzalex fastpro/dexamethasone.      Milagros Myers, NP-C  [facility]  Hematology/Oncology  [address]  [place]  [phone] [phone]

## 2022-10-27 NOTE — NURSING
Patient tolerated Darzalex FastPro injection well, no complaints at this time. Reviewed s/s to monitor and when to report. Net appointment scheduled, chemo calendar printed and reviewed. Pt d/c in no acute distress.

## 2022-11-18 RX ORDER — FAMOTIDINE 20 MG/1
20 TABLET, FILM COATED ORAL
Status: CANCELLED
Start: 2022-11-25

## 2022-11-18 RX ORDER — HEPARIN 100 UNIT/ML
500 SYRINGE INTRAVENOUS
Status: CANCELLED | OUTPATIENT
Start: 2022-11-25

## 2022-11-18 RX ORDER — DIPHENHYDRAMINE HYDROCHLORIDE 50 MG/ML
50 INJECTION INTRAMUSCULAR; INTRAVENOUS ONCE AS NEEDED
Status: CANCELLED | OUTPATIENT
Start: 2022-11-25

## 2022-11-18 RX ORDER — DIPHENHYDRAMINE HCL 25 MG
50 CAPSULE ORAL
Status: CANCELLED
Start: 2022-11-25

## 2022-11-18 RX ORDER — ACETAMINOPHEN 325 MG/1
650 TABLET ORAL
Status: CANCELLED | OUTPATIENT
Start: 2022-11-25

## 2022-11-18 RX ORDER — EPINEPHRINE 0.3 MG/.3ML
0.3 INJECTION SUBCUTANEOUS ONCE AS NEEDED
Status: CANCELLED | OUTPATIENT
Start: 2022-11-25

## 2022-11-18 RX ORDER — SODIUM CHLORIDE 0.9 % (FLUSH) 0.9 %
10 SYRINGE (ML) INJECTION
Status: CANCELLED | OUTPATIENT
Start: 2022-11-25

## 2022-11-25 ENCOUNTER — INFUSION (OUTPATIENT)
Dept: INFUSION THERAPY | Facility: HOSPITAL | Age: 77
End: 2022-11-25
Attending: INTERNAL MEDICINE
Payer: MEDICARE

## 2022-11-25 ENCOUNTER — OFFICE VISIT (OUTPATIENT)
Dept: HEMATOLOGY/ONCOLOGY | Facility: CLINIC | Age: 77
End: 2022-11-25
Payer: MEDICARE

## 2022-11-25 VITALS
BODY MASS INDEX: 21.64 KG/M2 | HEART RATE: 57 BPM | TEMPERATURE: 97 F | HEIGHT: 71 IN | OXYGEN SATURATION: 100 % | RESPIRATION RATE: 20 BRPM | WEIGHT: 154.56 LBS | DIASTOLIC BLOOD PRESSURE: 64 MMHG | SYSTOLIC BLOOD PRESSURE: 155 MMHG

## 2022-11-25 VITALS
WEIGHT: 154.56 LBS | HEIGHT: 71 IN | HEART RATE: 57 BPM | TEMPERATURE: 97 F | RESPIRATION RATE: 20 BRPM | OXYGEN SATURATION: 100 % | BODY MASS INDEX: 21.64 KG/M2 | SYSTOLIC BLOOD PRESSURE: 155 MMHG | DIASTOLIC BLOOD PRESSURE: 64 MMHG

## 2022-11-25 DIAGNOSIS — T45.1X5A CHEMOTHERAPY-INDUCED PERIPHERAL NEUROPATHY: ICD-10-CM

## 2022-11-25 DIAGNOSIS — T45.1X5A CHEMOTHERAPY-INDUCED THROMBOCYTOPENIA: ICD-10-CM

## 2022-11-25 DIAGNOSIS — N40.1 BENIGN PROSTATIC HYPERPLASIA WITH URINARY RETENTION: ICD-10-CM

## 2022-11-25 DIAGNOSIS — Z79.899 IMMUNODEFICIENCY DUE TO DRUG THERAPY: ICD-10-CM

## 2022-11-25 DIAGNOSIS — C90.00 KAPPA LIGHT CHAIN MYELOMA: Primary | ICD-10-CM

## 2022-11-25 DIAGNOSIS — D69.59 CHEMOTHERAPY-INDUCED THROMBOCYTOPENIA: ICD-10-CM

## 2022-11-25 DIAGNOSIS — G89.3 CHRONIC NEOPLASM-RELATED PAIN: ICD-10-CM

## 2022-11-25 DIAGNOSIS — N18.32 STAGE 3B CHRONIC KIDNEY DISEASE: ICD-10-CM

## 2022-11-25 DIAGNOSIS — D64.81 ANEMIA DUE TO ANTINEOPLASTIC CHEMOTHERAPY: ICD-10-CM

## 2022-11-25 DIAGNOSIS — D84.821 IMMUNODEFICIENCY DUE TO DRUG THERAPY: ICD-10-CM

## 2022-11-25 DIAGNOSIS — D69.6 THROMBOCYTOPENIA, UNSPECIFIED: ICD-10-CM

## 2022-11-25 DIAGNOSIS — G62.0 CHEMOTHERAPY-INDUCED PERIPHERAL NEUROPATHY: ICD-10-CM

## 2022-11-25 DIAGNOSIS — T45.1X5A ANEMIA DUE TO ANTINEOPLASTIC CHEMOTHERAPY: ICD-10-CM

## 2022-11-25 DIAGNOSIS — R33.8 BENIGN PROSTATIC HYPERPLASIA WITH URINARY RETENTION: ICD-10-CM

## 2022-11-25 PROCEDURE — 3078F DIAST BP <80 MM HG: CPT | Mod: CPTII,S$GLB,, | Performed by: NURSE PRACTITIONER

## 2022-11-25 PROCEDURE — 1159F MED LIST DOCD IN RCRD: CPT | Mod: CPTII,S$GLB,, | Performed by: NURSE PRACTITIONER

## 2022-11-25 PROCEDURE — 99215 OFFICE O/P EST HI 40 MIN: CPT | Mod: S$GLB,,, | Performed by: NURSE PRACTITIONER

## 2022-11-25 PROCEDURE — 3077F PR MOST RECENT SYSTOLIC BLOOD PRESSURE >= 140 MM HG: ICD-10-PCS | Mod: CPTII,S$GLB,, | Performed by: NURSE PRACTITIONER

## 2022-11-25 PROCEDURE — 25000003 PHARM REV CODE 250: Performed by: INTERNAL MEDICINE

## 2022-11-25 PROCEDURE — 1101F PT FALLS ASSESS-DOCD LE1/YR: CPT | Mod: CPTII,S$GLB,, | Performed by: NURSE PRACTITIONER

## 2022-11-25 PROCEDURE — 96401 CHEMO ANTI-NEOPL SQ/IM: CPT

## 2022-11-25 PROCEDURE — 63600175 PHARM REV CODE 636 W HCPCS: Mod: TB | Performed by: INTERNAL MEDICINE

## 2022-11-25 PROCEDURE — 1159F PR MEDICATION LIST DOCUMENTED IN MEDICAL RECORD: ICD-10-PCS | Mod: CPTII,S$GLB,, | Performed by: NURSE PRACTITIONER

## 2022-11-25 PROCEDURE — 99215 PR OFFICE/OUTPT VISIT, EST, LEVL V, 40-54 MIN: ICD-10-PCS | Mod: S$GLB,,, | Performed by: NURSE PRACTITIONER

## 2022-11-25 PROCEDURE — 1126F AMNT PAIN NOTED NONE PRSNT: CPT | Mod: CPTII,S$GLB,, | Performed by: NURSE PRACTITIONER

## 2022-11-25 PROCEDURE — 1126F PR PAIN SEVERITY QUANTIFIED, NO PAIN PRESENT: ICD-10-PCS | Mod: CPTII,S$GLB,, | Performed by: NURSE PRACTITIONER

## 2022-11-25 PROCEDURE — 99999 PR PBB SHADOW E&M-EST. PATIENT-LVL IV: CPT | Mod: PBBFAC,,, | Performed by: NURSE PRACTITIONER

## 2022-11-25 PROCEDURE — 1160F PR REVIEW ALL MEDS BY PRESCRIBER/CLIN PHARMACIST DOCUMENTED: ICD-10-PCS | Mod: CPTII,S$GLB,, | Performed by: NURSE PRACTITIONER

## 2022-11-25 PROCEDURE — 3288F FALL RISK ASSESSMENT DOCD: CPT | Mod: CPTII,S$GLB,, | Performed by: NURSE PRACTITIONER

## 2022-11-25 PROCEDURE — 3288F PR FALLS RISK ASSESSMENT DOCUMENTED: ICD-10-PCS | Mod: CPTII,S$GLB,, | Performed by: NURSE PRACTITIONER

## 2022-11-25 PROCEDURE — 1160F RVW MEDS BY RX/DR IN RCRD: CPT | Mod: CPTII,S$GLB,, | Performed by: NURSE PRACTITIONER

## 2022-11-25 PROCEDURE — 99999 PR PBB SHADOW E&M-EST. PATIENT-LVL IV: ICD-10-PCS | Mod: PBBFAC,,, | Performed by: NURSE PRACTITIONER

## 2022-11-25 PROCEDURE — 3077F SYST BP >= 140 MM HG: CPT | Mod: CPTII,S$GLB,, | Performed by: NURSE PRACTITIONER

## 2022-11-25 PROCEDURE — 3078F PR MOST RECENT DIASTOLIC BLOOD PRESSURE < 80 MM HG: ICD-10-PCS | Mod: CPTII,S$GLB,, | Performed by: NURSE PRACTITIONER

## 2022-11-25 PROCEDURE — 1101F PR PT FALLS ASSESS DOC 0-1 FALLS W/OUT INJ PAST YR: ICD-10-PCS | Mod: CPTII,S$GLB,, | Performed by: NURSE PRACTITIONER

## 2022-11-25 RX ORDER — DIPHENHYDRAMINE HCL 25 MG
50 CAPSULE ORAL
Status: COMPLETED | OUTPATIENT
Start: 2022-11-25 | End: 2022-11-25

## 2022-11-25 RX ORDER — FAMOTIDINE 20 MG/1
20 TABLET, FILM COATED ORAL
Status: COMPLETED | OUTPATIENT
Start: 2022-11-25 | End: 2022-11-25

## 2022-11-25 RX ORDER — DIPHENHYDRAMINE HYDROCHLORIDE 50 MG/ML
50 INJECTION INTRAMUSCULAR; INTRAVENOUS ONCE AS NEEDED
Status: DISCONTINUED | OUTPATIENT
Start: 2022-11-25 | End: 2022-11-25 | Stop reason: HOSPADM

## 2022-11-25 RX ORDER — ACETAMINOPHEN 325 MG/1
650 TABLET ORAL
Status: COMPLETED | OUTPATIENT
Start: 2022-11-25 | End: 2022-11-25

## 2022-11-25 RX ORDER — EPINEPHRINE 0.3 MG/.3ML
0.3 INJECTION SUBCUTANEOUS ONCE AS NEEDED
Status: DISCONTINUED | OUTPATIENT
Start: 2022-11-25 | End: 2022-11-25 | Stop reason: HOSPADM

## 2022-11-25 RX ADMIN — DIPHENHYDRAMINE HYDROCHLORIDE 50 MG: 25 CAPSULE ORAL at 02:11

## 2022-11-25 RX ADMIN — DARATUMUMAB AND HYALURONIDASE-FIHJ (HUMAN RECOMBINANT) 1800 MG: 1800; 30000 INJECTION SUBCUTANEOUS at 02:11

## 2022-11-25 RX ADMIN — ACETAMINOPHEN 650 MG: 325 TABLET ORAL at 02:11

## 2022-11-25 RX ADMIN — FAMOTIDINE 20 MG: 20 TABLET ORAL at 02:11

## 2022-11-25 NOTE — Clinical Note
Cbc, cmp, ldh, uric acid, prot electrophoresis/spep, free light chains, quantitative immumoglobulins at [facility] on Tues 12/20/22 and appt with Rgrffin on 12/22/22 prior infusion

## 2022-11-25 NOTE — PROGRESS NOTES
PATIENT: Haris Hernandez  MRN: [id number]  DATE: 11/25/2022    Chief Complaint: Kappa Light Chain Myeloma    Subjective:     Pertinent Medical History:   HPI as per Dr JONATHAN Ríos 9/29/22 office visit, verified with pt.    He presented to the ED 08/13/2021 with abnormal labs-CMP showed creatinine 6.6.  Prior CMP from May 2018 showed creatinine 1.2.    Further workup included a free light chain assay where a kappa free light chain level was 494    08/23/2021 bone marrow biopsy showed variable cellularity, 10 to 40% positive for plasma cell dyscrasia with plasma cells representing 30 to 40% of total cellularity.  No increase in blasts. FISH for Plasma Cell Proliferative Disorder - NORMAL    -started Darzalex, Velcade, Decadron 8/26/2021  -On Revlimid 10 mg qod since 9/30/2021  -saw urology,  for BPH causing difficulty in emptying the bladder, he may need cystoscopy and surgery    Interval History:   - he presents for a follow-up appointment for his multiple myeloma.  - he is scheduled for darzalex faspro today, 11/25/22  - today, he is doing well, denies pains. He denies shortness of breath, chest pain, nausea, vomiting, diarrhea, constipation. Had diarrhea, mild, x2 days but resolved without intervention.  - daughter is concerned with pt sitting and eating a whole pack of cookies at a time versus eating healthier options, snacks in moderation    Past Medical, Surgical, Family, and Social histories reviewed.    Medication and Allergies reviewed.    Review of Systems   Constitutional:  Negative for fever and malaise/fatigue.   HENT:  Negative for sore throat.    Respiratory:  Negative for shortness of breath.    Cardiovascular:  Negative for chest pain and leg swelling.   Gastrointestinal:  Negative for abdominal pain, nausea and vomiting.   Genitourinary:  Negative for dysuria.   Musculoskeletal:  Negative for back pain.   Skin:  Negative for rash.   Neurological:  Negative for weakness.   Psychiatric/Behavioral:   "The patient is not nervous/anxious.      Objective:     Vitals:    11/25/22 1312   BP: (!) 155/64   BP Location: Left arm   Patient Position: Sitting   BP Method: Medium (Automatic)   Pulse: (!) 57   Resp: 20   Temp: 97 °F (36.1 °C)   TempSrc: Oral   SpO2: 100%   Weight: 70.1 kg (154 lb 8.7 oz)   Height: 5' 11" (1.803 m)     BMI: Body mass index is 21.55 kg/m².    Physical Exam  Vitals and nursing note reviewed.   Constitutional:       Appearance: He is well-developed.   HENT:      Head: Normocephalic and atraumatic.   Eyes:      Pupils: Pupils are equal, round, and reactive to light.   Cardiovascular:      Rate and Rhythm: Normal rate and regular rhythm.   Pulmonary:      Effort: Pulmonary effort is normal.      Breath sounds: Normal breath sounds.   Abdominal:      General: Bowel sounds are normal.      Palpations: Abdomen is soft.   Musculoskeletal:         General: Normal range of motion.      Cervical back: Normal range of motion and neck supple.   Skin:     General: Skin is warm and dry.   Neurological:      Mental Status: He is alert and oriented to person, place, and time.   Psychiatric:         Behavior: Behavior normal.         Thought Content: Thought content normal.         Judgment: Judgment normal.     Labs have been reviewed.    Lab Results   Component Value Date    WBC 3.23 (L) 11/22/2022    HGB 7.6 (L) 11/22/2022    HCT 21.6 (L) 11/22/2022    MCV 87 11/22/2022     (L) 11/22/2022         Serum protein electrophoresis (9/27/22):  Normal total protein. Normal gamma globulins are decreased.   Two paraprotein bands are identified: 1) Near-gamma = 0.2 g/dL   (previously 0.18 g/dL) 2) Mid-gamma = 0.17 g/dL (previously 0.18   g/dL).     Serum protein electrophoresis (8/30/22):  Decreased total protein. Normal gamma globulins are decreased. Two   paraprotein bands are identified: 1) Near-gamma = 0.18 g/dL   (previously 0.21 g/dL) 2) Mid-gamma = 0.18 g/dL (previously 0.18   g/dL). "             Assessment:       1. 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 Stage 3b chronic kidney disease    4. Chronic neoplasm-related pain    5. Chemotherapy-induced peripheral neuropathy    6. Anemia due to antineoplastic chemotherapy    7. Thrombocytopenia, unspecified    8. Chemotherapy-induced thrombocytopenia    9. Benign prostatic hyperplasia with urinary retention          Plan:   Kappa light chain myeloma with normal cytogenetics  -started Darzalex Faspro, subcutaneous Velcade, oral dexamethasone 8/26/2021  -velcade discontinued after 4 cycles  -last decadron dose was 2/3/22  -on Revlimid 10 mg QOD - start 9/30/2021, will continue   -cont Plavix  -continue acyclovir for shingles prophylaxis  - Labs have been reviewed. Absolute neutrophil count is 1.5 k/uL. Hemoglobin is 8.2 g/dL. Platelet count is 121 k/uL. Paraproteins are stable.  - okay to proceed with cycle #17 of Darzalex as reviewed labs of 11/22/22 with Dr Ríos, Dr Ríos will sign orders . Hold revlimid for this cycle due to  renal function.  - return to clinic in 4 weeks in preparation for next cycle of darzalex fastpro/dexamethasone/revlimid.     Immunodeficiency due to drug therapy  - No fevers, no s/s of acute illness  - ANC 1.4 K/uL  - No known exposure to covid or other illness  - Instructed on good handwashing, avoiding known ill individuals, limiting crowd exposure  - Continue to monitor      Thrombocytopenia  -platelets 122 K/uL 11/22/22, stable  -continue to monitor    Anemia due to antineoplastic therapy  -H/H is stable, anemia  -continue to monitor    CKD Stage 3b  - Labs have been reviewed. Creatinine is improved to 1.79 mg/dL (11/22/22). I encouraged him to increase his fluid intake.  - if his creatinine continues to increase, we will refer to nephrology.    BPH/Urinary retention  -follows [facility]  -appt for cystoscopy/surgery 2/7/2022 - was cancelled    - return to clinic in 4 weeks in  preparation for next cycle of darzalex fastpro/dexamethasone.      JIN GibbsC  [facility]  Hematology/Oncology  [address] [place] [address]  [place]  [id number]  [phone]

## 2022-11-29 ENCOUNTER — PATIENT MESSAGE (OUTPATIENT)
Dept: HEMATOLOGY/ONCOLOGY | Facility: CLINIC | Age: 77
End: 2022-11-29
Payer: MEDICARE

## 2022-11-29 DIAGNOSIS — C90.00 KAPPA LIGHT CHAIN MYELOMA: ICD-10-CM

## 2022-11-29 RX ORDER — LENALIDOMIDE 10 MG/1
10 CAPSULE ORAL EVERY OTHER DAY
Qty: 15 EACH | Refills: 5 | Status: SHIPPED | OUTPATIENT
Start: 2022-11-29 | End: 2022-11-29 | Stop reason: SDUPTHER

## 2022-11-29 RX ORDER — LENALIDOMIDE 10 MG/1
10 CAPSULE ORAL EVERY OTHER DAY
Qty: 15 EACH | Refills: 5 | Status: CANCELLED | OUTPATIENT
Start: 2022-11-29

## 2022-11-29 RX ORDER — LENALIDOMIDE 10 MG/1
10 CAPSULE ORAL EVERY OTHER DAY
Qty: 15 EACH | Refills: 5 | Status: SHIPPED | OUTPATIENT
Start: 2022-11-29 | End: 2022-12-28 | Stop reason: SDUPTHER

## 2022-11-30 ENCOUNTER — TELEPHONE (OUTPATIENT)
Dept: HEMATOLOGY/ONCOLOGY | Facility: CLINIC | Age: 77
End: 2022-11-30
Payer: MEDICARE

## 2022-11-30 ENCOUNTER — PATIENT MESSAGE (OUTPATIENT)
Dept: HEMATOLOGY/ONCOLOGY | Facility: CLINIC | Age: 77
End: 2022-11-30
Payer: MEDICARE

## 2022-11-30 NOTE — TELEPHONE ENCOUNTER
Spoke with the pharmacist Roberto about changing the pt Revlimind to the brand name. Dr. Ríos gave me the ok .   No motor or sensory deficits.

## 2022-11-30 NOTE — TELEPHONE ENCOUNTER
----- Message from Andres Ríos MD sent at 11/30/2022  4:19 PM CST -----  Regarding: RE: MRN [id number]  Absolutely! Please let them know.    andres  ----- Message -----  From: Mirlande Tai MA  Sent: 11/30/2022   3:51 PM CST  To: Andres Ríos MD  Subject: MRN [id number]                                     Hey, pharmacy wants to know if they can use the brand name Revlimid for this patient?   ----- Message -----  From: Felisa Hart  Sent: 11/30/2022   9:08 AM CST  To: [facility] Staff    Needs advice from nurse:      Who Called:[facility]  Regarding:insurance will only cover brand name only for Revoimid  Would the patient rather a call back or VIA [facility]?  Best Call Back number: ext [phone]  Additional Info:

## 2022-12-21 NOTE — PROGRESS NOTES
PATIENT: Haris Hernandez  MRN: [id number]  DATE: 12/22/2022    Chief Complaint: Kappa Light Chain Myeloma    Subjective:     Pertinent Medical History:     He presented to the ED 08/13/2021 with abnormal labs-CMP showed creatinine 6.6.  Prior CMP from May 2018 showed creatinine 1.2.    Further workup included a free light chain assay where a kappa free light chain level was 494    08/23/2021 bone marrow biopsy showed variable cellularity, 10 to 40% positive for plasma cell dyscrasia with plasma cells representing 30 to 40% of total cellularity.  No increase in blasts. FISH for Plasma Cell Proliferative Disorder - NORMAL    -started Darzalex, Velcade, Decadron 8/26/2021  -On Revlimid 10 mg qod since 9/30/2021  -saw urology,  for BPH causing difficulty in emptying the bladder, he may need cystoscopy and surgery    Interval History:   - he presents for a follow-up appointment for his multiple myeloma.  - he is scheduled for darzalex faspro today, 12/22/22  - today, he is doing well. He denies shortness of breath, chest pain, nausea, vomiting, diarrhea, constipation. He notes fatigue, intermittent hand neuropathy.    Past Medical, Surgical, Family, and Social histories reviewed.    Medication and Allergies reviewed.    Review of Systems   Constitutional:  Positive for malaise/fatigue. Negative for fever.   HENT:  Negative for sore throat.    Respiratory:  Negative for shortness of breath.    Cardiovascular:  Negative for chest pain and leg swelling.   Gastrointestinal:  Negative for abdominal pain, nausea and vomiting.   Genitourinary:  Negative for dysuria.   Musculoskeletal:  Negative for back pain.   Skin:  Negative for rash.   Neurological:  Negative for weakness.   Psychiatric/Behavioral:  The patient is not nervous/anxious.      Objective:     Vitals:    12/22/22 1115   BP: (!) 172/60   BP Location: Left arm   Patient Position: Sitting   BP Method: Medium (Automatic)   Pulse: (!) 52   Resp: 20   Temp: 97.9  "°F (36.6 °C)   TempSrc: Oral   SpO2: 100%   Weight: 68.2 kg (150 lb 5.7 oz)   Height: 5' 11" (1.803 m)       BMI: Body mass index is 20.97 kg/m².    Physical Exam  Vitals and nursing note reviewed.   Constitutional:       Appearance: He is well-developed.   HENT:      Head: Normocephalic and atraumatic.   Eyes:      Pupils: Pupils are equal, round, and reactive to light.   Cardiovascular:      Rate and Rhythm: Normal rate and regular rhythm.   Pulmonary:      Effort: Pulmonary effort is normal.      Breath sounds: Normal breath sounds.   Abdominal:      General: Bowel sounds are normal.      Palpations: Abdomen is soft.   Musculoskeletal:         General: Normal range of motion.      Cervical back: Normal range of motion and neck supple.   Skin:     General: Skin is warm and dry.   Neurological:      Mental Status: He is alert and oriented to person, place, and time.   Psychiatric:         Behavior: Behavior normal.         Thought Content: Thought content normal.         Judgment: Judgment normal.     Labs have been reviewed.    Lab Results   Component Value Date    WBC 3.27 (L) 12/19/2022    HGB 8.1 (L) 12/19/2022    HCT 22.7 (L) 12/19/2022    MCV 87 12/19/2022     (L) 12/19/2022         Serum protein electrophoresis (12/19/22):  Normal total protein. Normal gamma globulins are decreased. Two   paraprotein bands are present in near-gamma = 0.20 g/dL (previously   0.16 g/dL) and mid-gamma = 0.23 g/dL (previously 0.15 g/dL).     Serum protein electrophoresis (9/27/22):  Normal total protein. Normal gamma globulins are decreased.   Two paraprotein bands are identified: 1) Near-gamma = 0.2 g/dL   (previously 0.18 g/dL) 2) Mid-gamma = 0.17 g/dL (previously 0.18   g/dL).     Serum protein electrophoresis (8/30/22):  Decreased total protein. Normal gamma globulins are decreased. Two   paraprotein bands are identified: 1) Near-gamma = 0.18 g/dL   (previously 0.21 g/dL) 2) Mid-gamma = 0.18 g/dL (previously 0.18 "   g/dL).             Assessment:       1. 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 Stage 3b chronic kidney disease    4. Chronic neoplasm-related pain    5. Chemotherapy-induced peripheral neuropathy    6. Anemia due to antineoplastic chemotherapy    7. Chemotherapy-induced neutropenia      Plan:   Kappa light chain myeloma with normal cytogenetics  -started Darzalex Faspro, subcutaneous Velcade, oral dexamethasone 8/26/2021  -velcade discontinued after 4 cycles  -last decadron dose was 2/3/22  -on Revlimid 10 mg QOD - start 9/30/2021, will continue   -cont Plavix  -continue acyclovir for shingles prophylaxis  - Labs have been reviewed. Absolute neutrophil count is 1.5 k/uL. Hemoglobin is 8.1 g/dL. Platelet count is 117 k/uL. Paraproteins increased slightly but are relatively stable.  - okay to proceed with Darzalex today. Continue revlimid.  - return to clinic in 4 weeks in preparation for next cycle of darzalex faspro/dexamethasone/revlimid.    CKD Stage 3b  - Labs have been reviewed. Creatinine is 1.83mg/dL.   - continue to monitor    Chronic neoplasm-related pain  - no issues.  - continue to monitor      - return to clinic in 4 weeks in preparation for next cycle of darzalex faspro/dexamethasone/revlimid.    Andres Ríos M.D.  Hematology/Oncology  [facility] - [address], [address]  [place] [address]  Phone: [phone]  Fax: [phone]

## 2022-12-22 ENCOUNTER — OFFICE VISIT (OUTPATIENT)
Dept: HEMATOLOGY/ONCOLOGY | Facility: CLINIC | Age: 77
End: 2022-12-22
Payer: MEDICARE

## 2022-12-22 ENCOUNTER — INFUSION (OUTPATIENT)
Dept: INFUSION THERAPY | Facility: HOSPITAL | Age: 77
End: 2022-12-22
Attending: INTERNAL MEDICINE
Payer: MEDICARE

## 2022-12-22 ENCOUNTER — TELEPHONE (OUTPATIENT)
Dept: HEMATOLOGY/ONCOLOGY | Facility: CLINIC | Age: 77
End: 2022-12-22

## 2022-12-22 VITALS
OXYGEN SATURATION: 100 % | BODY MASS INDEX: 21.05 KG/M2 | BODY MASS INDEX: 21.05 KG/M2 | RESPIRATION RATE: 18 BRPM | RESPIRATION RATE: 20 BRPM | SYSTOLIC BLOOD PRESSURE: 172 MMHG | WEIGHT: 150.38 LBS | WEIGHT: 150.38 LBS | HEART RATE: 56 BPM | DIASTOLIC BLOOD PRESSURE: 67 MMHG | TEMPERATURE: 98 F | HEIGHT: 71 IN | TEMPERATURE: 98 F | SYSTOLIC BLOOD PRESSURE: 148 MMHG | DIASTOLIC BLOOD PRESSURE: 60 MMHG | OXYGEN SATURATION: 100 % | HEART RATE: 52 BPM | HEIGHT: 71 IN

## 2022-12-22 DIAGNOSIS — E53.8 FOLATE DEFICIENCY: ICD-10-CM

## 2022-12-22 DIAGNOSIS — C90.00 KAPPA LIGHT CHAIN MYELOMA: Primary | ICD-10-CM

## 2022-12-22 DIAGNOSIS — D64.81 ANEMIA DUE TO ANTINEOPLASTIC CHEMOTHERAPY: ICD-10-CM

## 2022-12-22 DIAGNOSIS — T45.1X5A ANEMIA DUE TO ANTINEOPLASTIC CHEMOTHERAPY: ICD-10-CM

## 2022-12-22 DIAGNOSIS — D84.821 IMMUNODEFICIENCY DUE TO DRUG THERAPY: ICD-10-CM

## 2022-12-22 DIAGNOSIS — G89.3 CHRONIC NEOPLASM-RELATED PAIN: ICD-10-CM

## 2022-12-22 DIAGNOSIS — T45.1X5A CHEMOTHERAPY-INDUCED NEUTROPENIA: ICD-10-CM

## 2022-12-22 DIAGNOSIS — N18.32 STAGE 3B CHRONIC KIDNEY DISEASE: ICD-10-CM

## 2022-12-22 DIAGNOSIS — Z79.899 IMMUNODEFICIENCY DUE TO DRUG THERAPY: ICD-10-CM

## 2022-12-22 DIAGNOSIS — T45.1X5A CHEMOTHERAPY-INDUCED PERIPHERAL NEUROPATHY: ICD-10-CM

## 2022-12-22 DIAGNOSIS — D70.1 CHEMOTHERAPY-INDUCED NEUTROPENIA: ICD-10-CM

## 2022-12-22 DIAGNOSIS — G62.0 CHEMOTHERAPY-INDUCED PERIPHERAL NEUROPATHY: ICD-10-CM

## 2022-12-22 PROCEDURE — 1159F MED LIST DOCD IN RCRD: CPT | Mod: CPTII,S$GLB,, | Performed by: INTERNAL MEDICINE

## 2022-12-22 PROCEDURE — 99215 OFFICE O/P EST HI 40 MIN: CPT | Mod: S$GLB,,, | Performed by: INTERNAL MEDICINE

## 2022-12-22 PROCEDURE — 3288F PR FALLS RISK ASSESSMENT DOCUMENTED: ICD-10-PCS | Mod: CPTII,S$GLB,, | Performed by: INTERNAL MEDICINE

## 2022-12-22 PROCEDURE — 3288F FALL RISK ASSESSMENT DOCD: CPT | Mod: CPTII,S$GLB,, | Performed by: INTERNAL MEDICINE

## 2022-12-22 PROCEDURE — 3078F DIAST BP <80 MM HG: CPT | Mod: CPTII,S$GLB,, | Performed by: INTERNAL MEDICINE

## 2022-12-22 PROCEDURE — 1101F PT FALLS ASSESS-DOCD LE1/YR: CPT | Mod: CPTII,S$GLB,, | Performed by: INTERNAL MEDICINE

## 2022-12-22 PROCEDURE — 25000003 PHARM REV CODE 250: Performed by: INTERNAL MEDICINE

## 2022-12-22 PROCEDURE — 1101F PR PT FALLS ASSESS DOC 0-1 FALLS W/OUT INJ PAST YR: ICD-10-PCS | Mod: CPTII,S$GLB,, | Performed by: INTERNAL MEDICINE

## 2022-12-22 PROCEDURE — 1160F RVW MEDS BY RX/DR IN RCRD: CPT | Mod: CPTII,S$GLB,, | Performed by: INTERNAL MEDICINE

## 2022-12-22 PROCEDURE — 63600175 PHARM REV CODE 636 W HCPCS: Mod: TB | Performed by: INTERNAL MEDICINE

## 2022-12-22 PROCEDURE — 1126F AMNT PAIN NOTED NONE PRSNT: CPT | Mod: CPTII,S$GLB,, | Performed by: INTERNAL MEDICINE

## 2022-12-22 PROCEDURE — 1160F PR REVIEW ALL MEDS BY PRESCRIBER/CLIN PHARMACIST DOCUMENTED: ICD-10-PCS | Mod: CPTII,S$GLB,, | Performed by: INTERNAL MEDICINE

## 2022-12-22 PROCEDURE — 3078F PR MOST RECENT DIASTOLIC BLOOD PRESSURE < 80 MM HG: ICD-10-PCS | Mod: CPTII,S$GLB,, | Performed by: INTERNAL MEDICINE

## 2022-12-22 PROCEDURE — 1126F PR PAIN SEVERITY QUANTIFIED, NO PAIN PRESENT: ICD-10-PCS | Mod: CPTII,S$GLB,, | Performed by: INTERNAL MEDICINE

## 2022-12-22 PROCEDURE — 3077F SYST BP >= 140 MM HG: CPT | Mod: CPTII,S$GLB,, | Performed by: INTERNAL MEDICINE

## 2022-12-22 PROCEDURE — 3077F PR MOST RECENT SYSTOLIC BLOOD PRESSURE >= 140 MM HG: ICD-10-PCS | Mod: CPTII,S$GLB,, | Performed by: INTERNAL MEDICINE

## 2022-12-22 PROCEDURE — 99999 PR PBB SHADOW E&M-EST. PATIENT-LVL III: CPT | Mod: PBBFAC,,, | Performed by: INTERNAL MEDICINE

## 2022-12-22 PROCEDURE — 99999 PR PBB SHADOW E&M-EST. PATIENT-LVL III: ICD-10-PCS | Mod: PBBFAC,,, | Performed by: INTERNAL MEDICINE

## 2022-12-22 PROCEDURE — 1159F PR MEDICATION LIST DOCUMENTED IN MEDICAL RECORD: ICD-10-PCS | Mod: CPTII,S$GLB,, | Performed by: INTERNAL MEDICINE

## 2022-12-22 PROCEDURE — 96401 CHEMO ANTI-NEOPL SQ/IM: CPT

## 2022-12-22 PROCEDURE — 99215 PR OFFICE/OUTPT VISIT, EST, LEVL V, 40-54 MIN: ICD-10-PCS | Mod: S$GLB,,, | Performed by: INTERNAL MEDICINE

## 2022-12-22 RX ORDER — SODIUM CHLORIDE 0.9 % (FLUSH) 0.9 %
10 SYRINGE (ML) INJECTION
Status: CANCELLED | OUTPATIENT
Start: 2022-12-22

## 2022-12-22 RX ORDER — DIPHENHYDRAMINE HCL 25 MG
50 CAPSULE ORAL
Status: COMPLETED | OUTPATIENT
Start: 2022-12-22 | End: 2022-12-22

## 2022-12-22 RX ORDER — FAMOTIDINE 20 MG/1
20 TABLET, FILM COATED ORAL
Status: CANCELLED
Start: 2022-12-22

## 2022-12-22 RX ORDER — ACETAMINOPHEN 325 MG/1
650 TABLET ORAL
Status: COMPLETED | OUTPATIENT
Start: 2022-12-22 | End: 2022-12-22

## 2022-12-22 RX ORDER — FOLIC ACID 1 MG/1
1 TABLET ORAL DAILY
Qty: 90 TABLET | Refills: 3 | Status: SHIPPED | OUTPATIENT
Start: 2022-12-22 | End: 2024-02-21

## 2022-12-22 RX ORDER — EPINEPHRINE 0.3 MG/.3ML
0.3 INJECTION SUBCUTANEOUS ONCE AS NEEDED
Status: DISCONTINUED | OUTPATIENT
Start: 2022-12-22 | End: 2022-12-22 | Stop reason: HOSPADM

## 2022-12-22 RX ORDER — EPINEPHRINE 0.3 MG/.3ML
0.3 INJECTION SUBCUTANEOUS ONCE AS NEEDED
Status: CANCELLED | OUTPATIENT
Start: 2022-12-22

## 2022-12-22 RX ORDER — FLUTICASONE PROPIONATE 50 MCG
SPRAY, SUSPENSION (ML) NASAL
COMMUNITY
Start: 2022-12-01

## 2022-12-22 RX ORDER — DIPHENHYDRAMINE HYDROCHLORIDE 50 MG/ML
50 INJECTION INTRAMUSCULAR; INTRAVENOUS ONCE AS NEEDED
Status: DISCONTINUED | OUTPATIENT
Start: 2022-12-22 | End: 2022-12-22 | Stop reason: HOSPADM

## 2022-12-22 RX ORDER — DIPHENHYDRAMINE HYDROCHLORIDE 50 MG/ML
50 INJECTION INTRAMUSCULAR; INTRAVENOUS ONCE AS NEEDED
Status: CANCELLED | OUTPATIENT
Start: 2022-12-22

## 2022-12-22 RX ORDER — FAMOTIDINE 20 MG/1
20 TABLET, FILM COATED ORAL
Status: COMPLETED | OUTPATIENT
Start: 2022-12-22 | End: 2022-12-22

## 2022-12-22 RX ORDER — HEPARIN 100 UNIT/ML
500 SYRINGE INTRAVENOUS
Status: CANCELLED | OUTPATIENT
Start: 2022-12-22

## 2022-12-22 RX ORDER — ACETAMINOPHEN 325 MG/1
650 TABLET ORAL
Status: CANCELLED | OUTPATIENT
Start: 2022-12-22

## 2022-12-22 RX ORDER — DIPHENHYDRAMINE HCL 25 MG
50 CAPSULE ORAL
Status: CANCELLED
Start: 2022-12-22

## 2022-12-22 RX ORDER — PROMETHAZINE HYDROCHLORIDE AND DEXTROMETHORPHAN HYDROBROMIDE 6.25; 15 MG/5ML; MG/5ML
SYRUP ORAL
COMMUNITY
Start: 2022-12-01

## 2022-12-22 RX ADMIN — DARATUMUMAB AND HYALURONIDASE-FIHJ (HUMAN RECOMBINANT) 1800 MG: 1800; 30000 INJECTION SUBCUTANEOUS at 12:12

## 2022-12-22 RX ADMIN — FAMOTIDINE 20 MG: 20 TABLET ORAL at 12:12

## 2022-12-22 RX ADMIN — ACETAMINOPHEN 650 MG: 325 TABLET ORAL at 12:12

## 2022-12-22 RX ADMIN — DIPHENHYDRAMINE HYDROCHLORIDE 50 MG: 25 CAPSULE ORAL at 12:12

## 2022-12-28 DIAGNOSIS — C90.00 KAPPA LIGHT CHAIN MYELOMA: ICD-10-CM

## 2022-12-28 RX ORDER — LENALIDOMIDE 10 MG/1
10 CAPSULE ORAL EVERY OTHER DAY
Qty: 15 EACH | Refills: 5 | Status: SHIPPED | OUTPATIENT
Start: 2022-12-28 | End: 2023-01-19 | Stop reason: SDUPTHER

## 2022-12-28 NOTE — TELEPHONE ENCOUNTER
----- Message from Lila Duong sent at 12/27/2022  3:34 PM CST -----  Type:  RX Refill Request    Who Called[facility]  Refill or New Rx:refill  RX Name and Strength:lenalidomide 10 mg Cap  How is the patient currently taking it? (ex. 1XDay):Take 10 mg by mouth every other day. - Oral  Is this a 30 day or 90 day RX:15 each  Preferred Pharmacy with phone number:[facility] [address], [address] [address]  Phone: [phone] Fax: [phone]      Local or Mail Order:local  Ordering Provider:Dr Ríos  Would the patient rather a call back or a response via [facility]? call  Best Call Back Number:[phone]  Additional Information:

## 2023-01-18 NOTE — PROGRESS NOTES
"PATIENT: Haris Hernandez  MRN: [id number]  DATE: 1/19/2023    Chief Complaint: Kappa Light Chain Myeloma    Subjective:     Pertinent Medical History:     He presented to the ED 08/13/2021 with abnormal labs-CMP showed creatinine 6.6.  Prior CMP from May 2018 showed creatinine 1.2.    Further workup included a free light chain assay where a kappa free light chain level was 494    08/23/2021 bone marrow biopsy showed variable cellularity, 10 to 40% positive for plasma cell dyscrasia with plasma cells representing 30 to 40% of total cellularity.  No increase in blasts. FISH for Plasma Cell Proliferative Disorder - NORMAL    -started Darzalex, Velcade, Decadron 8/26/2021  -On Revlimid 10 mg qod since 9/30/2021  -saw urology,  for BPH causing difficulty in emptying the bladder, he may need cystoscopy and surgery    Interval History:   - he presents for a follow-up appointment for his multiple myeloma.  - he is scheduled for darzalex faspro today, 1/19/23  - today, he is doing well. He denies shortness of breath, chest pain, nausea, vomiting, diarrhea, constipation. He notes fatigue, intermittent hand neuropathy.    Past Medical, Surgical, Family, and Social histories reviewed.    Medication and Allergies reviewed.    Review of Systems   Constitutional:  Positive for malaise/fatigue. Negative for fever.   HENT:  Negative for sore throat.    Respiratory:  Negative for shortness of breath.    Cardiovascular:  Negative for chest pain and leg swelling.   Gastrointestinal:  Negative for abdominal pain, nausea and vomiting.   Genitourinary:  Negative for dysuria.   Musculoskeletal:  Negative for back pain.   Skin:  Negative for rash.   Neurological:  Negative for weakness.   Psychiatric/Behavioral:  The patient is not nervous/anxious.      Objective:     Vitals:    01/19/23 1307   BP: (!) 177/79   Pulse: 66   Temp: 98.2 °F (36.8 °C)   SpO2: 100%   Weight: 68.4 kg (150 lb 12.7 oz)   Height: 5' 11" (1.803 m)     BMI: Body " mass index is 21.03 kg/m².    Physical Exam  Vitals and nursing note reviewed.   Constitutional:       Appearance: He is well-developed.   HENT:      Head: Normocephalic and atraumatic.   Eyes:      Pupils: Pupils are equal, round, and reactive to light.   Cardiovascular:      Rate and Rhythm: Normal rate and regular rhythm.   Pulmonary:      Effort: Pulmonary effort is normal.      Breath sounds: Normal breath sounds.   Abdominal:      General: Bowel sounds are normal.      Palpations: Abdomen is soft.   Musculoskeletal:         General: Normal range of motion.      Cervical back: Normal range of motion and neck supple.   Skin:     General: Skin is warm and dry.   Neurological:      Mental Status: He is alert and oriented to person, place, and time.   Psychiatric:         Behavior: Behavior normal.         Thought Content: Thought content normal.         Judgment: Judgment normal.     Labs have been reviewed.    Lab Results   Component Value Date    WBC 3.11 (L) 01/17/2023    HGB 8.1 (L) 01/17/2023    HCT 23.2 (L) 01/17/2023    MCV 89 01/17/2023     (L) 01/17/2023             Serum protein electrophoresis (12/19/22):  Normal total protein. Normal gamma globulins are decreased. Two   paraprotein bands are present in near-gamma = 0.20 g/dL (previously   0.16 g/dL) and mid-gamma = 0.23 g/dL (previously 0.15 g/dL).     Serum protein electrophoresis (9/27/22):  Normal total protein. Normal gamma globulins are decreased.   Two paraprotein bands are identified: 1) Near-gamma = 0.2 g/dL   (previously 0.18 g/dL) 2) Mid-gamma = 0.17 g/dL (previously 0.18   g/dL).     Serum protein electrophoresis (8/30/22):  Decreased total protein. Normal gamma globulins are decreased. Two   paraprotein bands are identified: 1) Near-gamma = 0.18 g/dL   (previously 0.21 g/dL) 2) Mid-gamma = 0.18 g/dL (previously 0.18   g/dL).     Plasma Cell FISH:  The result is within normal limits for 1q duplication, TP53   deletion and IGH  rearrangement.         Assessment:       1. 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 Stage 3b chronic kidney disease    4. Chronic neoplasm-related pain    5. Chemotherapy-induced peripheral neuropathy    6. Anemia due to antineoplastic chemotherapy    7. Chemotherapy-induced neutropenia    8. Folate deficiency      Plan:   Kappa light chain myeloma with normal cytogenetics / anemia/neutropenia due to chemotherapy  -started Darzalex Faspro, subcutaneous Velcade, oral dexamethasone 8/26/2021  -velcade discontinued after 4 cycles  -last decadron dose was 2/3/22  -on Revlimid 10 mg QOD - start 9/30/2021, will continue   -cont Plavix  -continue acyclovir for shingles prophylaxis  - Labs have been reviewed. Absolute neutrophil count is 1.4 k/uL. Hemoglobin is 8.1 g/dL. Platelet count is 117 k/uL. Follow up protein electrophoresis.  - okay to proceed with Darzalex today. Continue revlimid.  - return to clinic in 4 weeks in preparation for next cycle of darzalex faspro/dexamethasone/revlimid.    CKD Stage 3b  - Labs have been reviewed. Creatinine is 1.84 mg/dL.   - continue to monitor    Chronic neoplasm-related pain  - no issues.  - continue to monitor    - return to clinic in 4 weeks in preparation for next cycle of darzalex faspro/dexamethasone/revlimid.    Andres Ríos M.D.  Hematology/Oncology  [facility] - [address], [address]  [place] [address]  Phone: [phone]  Fax: [phone]

## 2023-01-19 ENCOUNTER — INFUSION (OUTPATIENT)
Dept: INFUSION THERAPY | Facility: HOSPITAL | Age: 78
End: 2023-01-19
Attending: INTERNAL MEDICINE
Payer: MEDICARE

## 2023-01-19 ENCOUNTER — OFFICE VISIT (OUTPATIENT)
Dept: HEMATOLOGY/ONCOLOGY | Facility: CLINIC | Age: 78
End: 2023-01-19
Payer: MEDICARE

## 2023-01-19 VITALS
SYSTOLIC BLOOD PRESSURE: 152 MMHG | OXYGEN SATURATION: 100 % | TEMPERATURE: 98 F | DIASTOLIC BLOOD PRESSURE: 67 MMHG | HEIGHT: 71 IN | HEIGHT: 71 IN | HEART RATE: 59 BPM | DIASTOLIC BLOOD PRESSURE: 79 MMHG | SYSTOLIC BLOOD PRESSURE: 177 MMHG | BODY MASS INDEX: 21.11 KG/M2 | BODY MASS INDEX: 21.11 KG/M2 | RESPIRATION RATE: 18 BRPM | WEIGHT: 150.81 LBS | TEMPERATURE: 98 F | HEART RATE: 66 BPM | OXYGEN SATURATION: 100 % | WEIGHT: 150.81 LBS

## 2023-01-19 DIAGNOSIS — T45.1X5A CHEMOTHERAPY-INDUCED PERIPHERAL NEUROPATHY: ICD-10-CM

## 2023-01-19 DIAGNOSIS — Z79.899 IMMUNODEFICIENCY DUE TO DRUG THERAPY: ICD-10-CM

## 2023-01-19 DIAGNOSIS — G89.3 CHRONIC NEOPLASM-RELATED PAIN: ICD-10-CM

## 2023-01-19 DIAGNOSIS — C90.00 KAPPA LIGHT CHAIN MYELOMA: Primary | ICD-10-CM

## 2023-01-19 DIAGNOSIS — D84.821 IMMUNODEFICIENCY DUE TO DRUG THERAPY: ICD-10-CM

## 2023-01-19 DIAGNOSIS — C90.00 KAPPA LIGHT CHAIN MYELOMA: ICD-10-CM

## 2023-01-19 DIAGNOSIS — N18.32 STAGE 3B CHRONIC KIDNEY DISEASE: ICD-10-CM

## 2023-01-19 DIAGNOSIS — G62.0 CHEMOTHERAPY-INDUCED PERIPHERAL NEUROPATHY: ICD-10-CM

## 2023-01-19 DIAGNOSIS — E53.8 FOLATE DEFICIENCY: ICD-10-CM

## 2023-01-19 DIAGNOSIS — D70.1 CHEMOTHERAPY-INDUCED NEUTROPENIA: ICD-10-CM

## 2023-01-19 DIAGNOSIS — T45.1X5A CHEMOTHERAPY-INDUCED NEUTROPENIA: ICD-10-CM

## 2023-01-19 DIAGNOSIS — T45.1X5A ANEMIA DUE TO ANTINEOPLASTIC CHEMOTHERAPY: ICD-10-CM

## 2023-01-19 DIAGNOSIS — D69.6 THROMBOCYTOPENIA, UNSPECIFIED: ICD-10-CM

## 2023-01-19 DIAGNOSIS — D64.81 ANEMIA DUE TO ANTINEOPLASTIC CHEMOTHERAPY: ICD-10-CM

## 2023-01-19 PROCEDURE — 3077F PR MOST RECENT SYSTOLIC BLOOD PRESSURE >= 140 MM HG: ICD-10-PCS | Mod: CPTII,S$GLB,, | Performed by: INTERNAL MEDICINE

## 2023-01-19 PROCEDURE — 96401 CHEMO ANTI-NEOPL SQ/IM: CPT

## 2023-01-19 PROCEDURE — 3078F DIAST BP <80 MM HG: CPT | Mod: CPTII,S$GLB,, | Performed by: INTERNAL MEDICINE

## 2023-01-19 PROCEDURE — 99215 OFFICE O/P EST HI 40 MIN: CPT | Mod: S$GLB,,, | Performed by: INTERNAL MEDICINE

## 2023-01-19 PROCEDURE — 1160F RVW MEDS BY RX/DR IN RCRD: CPT | Mod: CPTII,S$GLB,, | Performed by: INTERNAL MEDICINE

## 2023-01-19 PROCEDURE — 3078F PR MOST RECENT DIASTOLIC BLOOD PRESSURE < 80 MM HG: ICD-10-PCS | Mod: CPTII,S$GLB,, | Performed by: INTERNAL MEDICINE

## 2023-01-19 PROCEDURE — 99999 PR PBB SHADOW E&M-EST. PATIENT-LVL III: ICD-10-PCS | Mod: PBBFAC,,, | Performed by: INTERNAL MEDICINE

## 2023-01-19 PROCEDURE — 1159F PR MEDICATION LIST DOCUMENTED IN MEDICAL RECORD: ICD-10-PCS | Mod: CPTII,S$GLB,, | Performed by: INTERNAL MEDICINE

## 2023-01-19 PROCEDURE — 99215 PR OFFICE/OUTPT VISIT, EST, LEVL V, 40-54 MIN: ICD-10-PCS | Mod: S$GLB,,, | Performed by: INTERNAL MEDICINE

## 2023-01-19 PROCEDURE — 1160F PR REVIEW ALL MEDS BY PRESCRIBER/CLIN PHARMACIST DOCUMENTED: ICD-10-PCS | Mod: CPTII,S$GLB,, | Performed by: INTERNAL MEDICINE

## 2023-01-19 PROCEDURE — 99999 PR PBB SHADOW E&M-EST. PATIENT-LVL III: CPT | Mod: PBBFAC,,, | Performed by: INTERNAL MEDICINE

## 2023-01-19 PROCEDURE — 1159F MED LIST DOCD IN RCRD: CPT | Mod: CPTII,S$GLB,, | Performed by: INTERNAL MEDICINE

## 2023-01-19 PROCEDURE — 63600175 PHARM REV CODE 636 W HCPCS: Mod: TB | Performed by: INTERNAL MEDICINE

## 2023-01-19 PROCEDURE — 1126F AMNT PAIN NOTED NONE PRSNT: CPT | Mod: CPTII,S$GLB,, | Performed by: INTERNAL MEDICINE

## 2023-01-19 PROCEDURE — 3077F SYST BP >= 140 MM HG: CPT | Mod: CPTII,S$GLB,, | Performed by: INTERNAL MEDICINE

## 2023-01-19 PROCEDURE — 25000003 PHARM REV CODE 250: Performed by: INTERNAL MEDICINE

## 2023-01-19 PROCEDURE — 1126F PR PAIN SEVERITY QUANTIFIED, NO PAIN PRESENT: ICD-10-PCS | Mod: CPTII,S$GLB,, | Performed by: INTERNAL MEDICINE

## 2023-01-19 RX ORDER — DIPHENHYDRAMINE HCL 25 MG
50 CAPSULE ORAL
Status: COMPLETED | OUTPATIENT
Start: 2023-01-19 | End: 2023-01-19

## 2023-01-19 RX ORDER — FAMOTIDINE 20 MG/1
20 TABLET, FILM COATED ORAL
Status: COMPLETED | OUTPATIENT
Start: 2023-01-19 | End: 2023-01-19

## 2023-01-19 RX ORDER — SODIUM CHLORIDE 0.9 % (FLUSH) 0.9 %
10 SYRINGE (ML) INJECTION
Status: DISCONTINUED | OUTPATIENT
Start: 2023-01-19 | End: 2023-01-19 | Stop reason: HOSPADM

## 2023-01-19 RX ORDER — ACETAMINOPHEN 325 MG/1
650 TABLET ORAL
Status: COMPLETED | OUTPATIENT
Start: 2023-01-19 | End: 2023-01-19

## 2023-01-19 RX ORDER — EPINEPHRINE 0.3 MG/.3ML
0.3 INJECTION SUBCUTANEOUS ONCE AS NEEDED
Status: DISCONTINUED | OUTPATIENT
Start: 2023-01-19 | End: 2023-01-19 | Stop reason: HOSPADM

## 2023-01-19 RX ORDER — SODIUM CHLORIDE 0.9 % (FLUSH) 0.9 %
10 SYRINGE (ML) INJECTION
Status: CANCELLED | OUTPATIENT
Start: 2023-01-19

## 2023-01-19 RX ORDER — DIPHENHYDRAMINE HCL 25 MG
50 CAPSULE ORAL
Status: CANCELLED
Start: 2023-01-19

## 2023-01-19 RX ORDER — DIPHENHYDRAMINE HYDROCHLORIDE 50 MG/ML
50 INJECTION INTRAMUSCULAR; INTRAVENOUS ONCE AS NEEDED
Status: CANCELLED | OUTPATIENT
Start: 2023-01-19

## 2023-01-19 RX ORDER — EPINEPHRINE 0.3 MG/.3ML
0.3 INJECTION SUBCUTANEOUS ONCE AS NEEDED
Status: CANCELLED | OUTPATIENT
Start: 2023-01-19

## 2023-01-19 RX ORDER — LENALIDOMIDE 10 MG/1
10 CAPSULE ORAL EVERY OTHER DAY
Qty: 15 EACH | Refills: 5 | Status: SHIPPED | OUTPATIENT
Start: 2023-01-19 | End: 2023-02-17 | Stop reason: SDUPTHER

## 2023-01-19 RX ORDER — FAMOTIDINE 20 MG/1
20 TABLET, FILM COATED ORAL
Status: CANCELLED
Start: 2023-01-19

## 2023-01-19 RX ORDER — HEPARIN 100 UNIT/ML
500 SYRINGE INTRAVENOUS
Status: CANCELLED | OUTPATIENT
Start: 2023-01-19

## 2023-01-19 RX ORDER — ACETAMINOPHEN 325 MG/1
650 TABLET ORAL
Status: CANCELLED | OUTPATIENT
Start: 2023-01-19

## 2023-01-19 RX ORDER — DIPHENHYDRAMINE HYDROCHLORIDE 50 MG/ML
50 INJECTION INTRAMUSCULAR; INTRAVENOUS ONCE AS NEEDED
Status: DISCONTINUED | OUTPATIENT
Start: 2023-01-19 | End: 2023-01-19 | Stop reason: HOSPADM

## 2023-01-19 RX ADMIN — ACETAMINOPHEN 650 MG: 325 TABLET ORAL at 01:01

## 2023-01-19 RX ADMIN — DARATUMUMAB AND HYALURONIDASE-FIHJ (HUMAN RECOMBINANT) 1800 MG: 1800; 30000 INJECTION SUBCUTANEOUS at 02:01

## 2023-01-19 RX ADMIN — DIPHENHYDRAMINE HYDROCHLORIDE 50 MG: 25 CAPSULE ORAL at 01:01

## 2023-01-19 RX ADMIN — FAMOTIDINE 20 MG: 20 TABLET ORAL at 01:01

## 2023-02-16 ENCOUNTER — INFUSION (OUTPATIENT)
Dept: INFUSION THERAPY | Facility: HOSPITAL | Age: 78
End: 2023-02-16
Attending: INTERNAL MEDICINE
Payer: MEDICARE

## 2023-02-16 ENCOUNTER — OFFICE VISIT (OUTPATIENT)
Dept: HEMATOLOGY/ONCOLOGY | Facility: CLINIC | Age: 78
End: 2023-02-16
Payer: MEDICARE

## 2023-02-16 VITALS
OXYGEN SATURATION: 100 % | HEIGHT: 71 IN | RESPIRATION RATE: 18 BRPM | OXYGEN SATURATION: 98 % | TEMPERATURE: 98 F | BODY MASS INDEX: 21.08 KG/M2 | HEART RATE: 60 BPM | SYSTOLIC BLOOD PRESSURE: 174 MMHG | TEMPERATURE: 98 F | HEART RATE: 61 BPM | WEIGHT: 150.56 LBS | SYSTOLIC BLOOD PRESSURE: 150 MMHG | BODY MASS INDEX: 21 KG/M2 | DIASTOLIC BLOOD PRESSURE: 76 MMHG | WEIGHT: 150.56 LBS | DIASTOLIC BLOOD PRESSURE: 65 MMHG

## 2023-02-16 DIAGNOSIS — G62.0 CHEMOTHERAPY-INDUCED PERIPHERAL NEUROPATHY: ICD-10-CM

## 2023-02-16 DIAGNOSIS — C90.00 KAPPA LIGHT CHAIN MYELOMA: Primary | ICD-10-CM

## 2023-02-16 DIAGNOSIS — T45.1X5A CHEMOTHERAPY-INDUCED NEUTROPENIA: ICD-10-CM

## 2023-02-16 DIAGNOSIS — D64.81 ANEMIA DUE TO ANTINEOPLASTIC CHEMOTHERAPY: ICD-10-CM

## 2023-02-16 DIAGNOSIS — N18.32 STAGE 3B CHRONIC KIDNEY DISEASE: ICD-10-CM

## 2023-02-16 DIAGNOSIS — T45.1X5A ANEMIA DUE TO ANTINEOPLASTIC CHEMOTHERAPY: ICD-10-CM

## 2023-02-16 DIAGNOSIS — T45.1X5A CHEMOTHERAPY-INDUCED PERIPHERAL NEUROPATHY: ICD-10-CM

## 2023-02-16 DIAGNOSIS — D70.1 CHEMOTHERAPY-INDUCED NEUTROPENIA: ICD-10-CM

## 2023-02-16 DIAGNOSIS — D84.821 IMMUNODEFICIENCY DUE TO DRUG THERAPY: ICD-10-CM

## 2023-02-16 DIAGNOSIS — E53.8 FOLATE DEFICIENCY: ICD-10-CM

## 2023-02-16 DIAGNOSIS — G89.3 CHRONIC NEOPLASM-RELATED PAIN: ICD-10-CM

## 2023-02-16 DIAGNOSIS — Z79.899 IMMUNODEFICIENCY DUE TO DRUG THERAPY: ICD-10-CM

## 2023-02-16 PROCEDURE — 1101F PR PT FALLS ASSESS DOC 0-1 FALLS W/OUT INJ PAST YR: ICD-10-PCS | Mod: CPTII,S$GLB,, | Performed by: INTERNAL MEDICINE

## 2023-02-16 PROCEDURE — 99999 PR PBB SHADOW E&M-EST. PATIENT-LVL III: ICD-10-PCS | Mod: PBBFAC,,, | Performed by: INTERNAL MEDICINE

## 2023-02-16 PROCEDURE — 99215 PR OFFICE/OUTPT VISIT, EST, LEVL V, 40-54 MIN: ICD-10-PCS | Mod: S$GLB,,, | Performed by: INTERNAL MEDICINE

## 2023-02-16 PROCEDURE — 1160F PR REVIEW ALL MEDS BY PRESCRIBER/CLIN PHARMACIST DOCUMENTED: ICD-10-PCS | Mod: CPTII,S$GLB,, | Performed by: INTERNAL MEDICINE

## 2023-02-16 PROCEDURE — 1101F PT FALLS ASSESS-DOCD LE1/YR: CPT | Mod: CPTII,S$GLB,, | Performed by: INTERNAL MEDICINE

## 2023-02-16 PROCEDURE — 96401 CHEMO ANTI-NEOPL SQ/IM: CPT

## 2023-02-16 PROCEDURE — 99215 OFFICE O/P EST HI 40 MIN: CPT | Mod: S$GLB,,, | Performed by: INTERNAL MEDICINE

## 2023-02-16 PROCEDURE — 3288F FALL RISK ASSESSMENT DOCD: CPT | Mod: CPTII,S$GLB,, | Performed by: INTERNAL MEDICINE

## 2023-02-16 PROCEDURE — 1160F RVW MEDS BY RX/DR IN RCRD: CPT | Mod: CPTII,S$GLB,, | Performed by: INTERNAL MEDICINE

## 2023-02-16 PROCEDURE — 1159F MED LIST DOCD IN RCRD: CPT | Mod: CPTII,S$GLB,, | Performed by: INTERNAL MEDICINE

## 2023-02-16 PROCEDURE — 1159F PR MEDICATION LIST DOCUMENTED IN MEDICAL RECORD: ICD-10-PCS | Mod: CPTII,S$GLB,, | Performed by: INTERNAL MEDICINE

## 2023-02-16 PROCEDURE — 63600175 PHARM REV CODE 636 W HCPCS: Mod: TB | Performed by: INTERNAL MEDICINE

## 2023-02-16 PROCEDURE — 3288F PR FALLS RISK ASSESSMENT DOCUMENTED: ICD-10-PCS | Mod: CPTII,S$GLB,, | Performed by: INTERNAL MEDICINE

## 2023-02-16 PROCEDURE — 99999 PR PBB SHADOW E&M-EST. PATIENT-LVL III: CPT | Mod: PBBFAC,,, | Performed by: INTERNAL MEDICINE

## 2023-02-16 PROCEDURE — 25000003 PHARM REV CODE 250: Performed by: INTERNAL MEDICINE

## 2023-02-16 PROCEDURE — 1126F AMNT PAIN NOTED NONE PRSNT: CPT | Mod: CPTII,S$GLB,, | Performed by: INTERNAL MEDICINE

## 2023-02-16 PROCEDURE — 1126F PR PAIN SEVERITY QUANTIFIED, NO PAIN PRESENT: ICD-10-PCS | Mod: CPTII,S$GLB,, | Performed by: INTERNAL MEDICINE

## 2023-02-16 PROCEDURE — 3077F PR MOST RECENT SYSTOLIC BLOOD PRESSURE >= 140 MM HG: ICD-10-PCS | Mod: CPTII,S$GLB,, | Performed by: INTERNAL MEDICINE

## 2023-02-16 PROCEDURE — 3077F SYST BP >= 140 MM HG: CPT | Mod: CPTII,S$GLB,, | Performed by: INTERNAL MEDICINE

## 2023-02-16 PROCEDURE — 3078F DIAST BP <80 MM HG: CPT | Mod: CPTII,S$GLB,, | Performed by: INTERNAL MEDICINE

## 2023-02-16 PROCEDURE — 3078F PR MOST RECENT DIASTOLIC BLOOD PRESSURE < 80 MM HG: ICD-10-PCS | Mod: CPTII,S$GLB,, | Performed by: INTERNAL MEDICINE

## 2023-02-16 RX ORDER — DIPHENHYDRAMINE HYDROCHLORIDE 50 MG/ML
50 INJECTION INTRAMUSCULAR; INTRAVENOUS ONCE AS NEEDED
Status: CANCELLED | OUTPATIENT
Start: 2023-02-16

## 2023-02-16 RX ORDER — EPINEPHRINE 0.3 MG/.3ML
0.3 INJECTION SUBCUTANEOUS ONCE AS NEEDED
Status: CANCELLED | OUTPATIENT
Start: 2023-02-16

## 2023-02-16 RX ORDER — ACETAMINOPHEN 325 MG/1
650 TABLET ORAL
Status: COMPLETED | OUTPATIENT
Start: 2023-02-16 | End: 2023-02-16

## 2023-02-16 RX ORDER — DIPHENHYDRAMINE HCL 25 MG
50 CAPSULE ORAL
Status: COMPLETED | OUTPATIENT
Start: 2023-02-16 | End: 2023-02-16

## 2023-02-16 RX ORDER — FAMOTIDINE 20 MG/1
20 TABLET, FILM COATED ORAL
Status: CANCELLED
Start: 2023-02-16

## 2023-02-16 RX ORDER — HEPARIN 100 UNIT/ML
500 SYRINGE INTRAVENOUS
Status: CANCELLED | OUTPATIENT
Start: 2023-02-16

## 2023-02-16 RX ORDER — DIPHENHYDRAMINE HCL 25 MG
50 CAPSULE ORAL
Status: CANCELLED
Start: 2023-02-16

## 2023-02-16 RX ORDER — EPINEPHRINE 0.3 MG/.3ML
0.3 INJECTION SUBCUTANEOUS ONCE AS NEEDED
Status: DISCONTINUED | OUTPATIENT
Start: 2023-02-16 | End: 2023-02-16 | Stop reason: HOSPADM

## 2023-02-16 RX ORDER — DIPHENHYDRAMINE HYDROCHLORIDE 50 MG/ML
50 INJECTION INTRAMUSCULAR; INTRAVENOUS ONCE AS NEEDED
Status: DISCONTINUED | OUTPATIENT
Start: 2023-02-16 | End: 2023-02-16 | Stop reason: HOSPADM

## 2023-02-16 RX ORDER — ACETAMINOPHEN 325 MG/1
650 TABLET ORAL
Status: CANCELLED | OUTPATIENT
Start: 2023-02-16

## 2023-02-16 RX ORDER — SODIUM CHLORIDE 0.9 % (FLUSH) 0.9 %
10 SYRINGE (ML) INJECTION
Status: CANCELLED | OUTPATIENT
Start: 2023-02-16

## 2023-02-16 RX ORDER — FAMOTIDINE 20 MG/1
20 TABLET, FILM COATED ORAL
Status: COMPLETED | OUTPATIENT
Start: 2023-02-16 | End: 2023-02-16

## 2023-02-16 RX ADMIN — ACETAMINOPHEN 650 MG: 325 TABLET ORAL at 01:02

## 2023-02-16 RX ADMIN — DARATUMUMAB AND HYALURONIDASE-FIHJ (HUMAN RECOMBINANT) 1800 MG: 1800; 30000 INJECTION SUBCUTANEOUS at 02:02

## 2023-02-16 RX ADMIN — DIPHENHYDRAMINE HYDROCHLORIDE 50 MG: 25 CAPSULE ORAL at 01:02

## 2023-02-16 RX ADMIN — FAMOTIDINE 20 MG: 20 TABLET ORAL at 01:02

## 2023-02-16 NOTE — NURSING
Pt tolerated *Darzalex FP injection well, no complaints at this time. No adverse reactions noted. Next appointment scheduled and pt d/c in no acute distress.

## 2023-02-17 DIAGNOSIS — C90.00 KAPPA LIGHT CHAIN MYELOMA: ICD-10-CM

## 2023-02-17 RX ORDER — LENALIDOMIDE 10 MG/1
10 CAPSULE ORAL EVERY OTHER DAY
Qty: 15 EACH | Refills: 5 | Status: SHIPPED | OUTPATIENT
Start: 2023-02-17 | End: 2023-03-16 | Stop reason: SDUPTHER

## 2023-03-15 NOTE — PROGRESS NOTES
PATIENT: Haris Hernandez  MRN: [id number]  DATE: 3/16/2023    Chief Complaint: Kappa Light Chain Myeloma    Subjective:     Pertinent Medical History:     He presented to the ED 08/13/2021 with abnormal labs-CMP showed creatinine 6.6.  Prior CMP from May 2018 showed creatinine 1.2.    Further workup included a free light chain assay where a kappa free light chain level was 494    08/23/2021 bone marrow biopsy showed variable cellularity, 10 to 40% positive for plasma cell dyscrasia with plasma cells representing 30 to 40% of total cellularity.  No increase in blasts. FISH for Plasma Cell Proliferative Disorder - NORMAL    -started Darzalex, Velcade, Decadron 8/26/2021  -On Revlimid 10 mg qod since 9/30/2021  -saw urology,  for BPH causing difficulty in emptying the bladder, he may need cystoscopy and surgery    Interval History:   - he presents for a follow-up appointment for his multiple myeloma.  - he is scheduled for darzalex faspro today, 3/16/23  - today, he endorses fatigue, neck pain, diarrhea, right leg swelling.      Past Medical, Surgical, Family, and Social histories reviewed.    Medication and Allergies reviewed.    Review of Systems   Constitutional:  Positive for malaise/fatigue. Negative for fever.   HENT:  Negative for sore throat.    Respiratory:  Negative for shortness of breath.    Cardiovascular:  Positive for leg swelling. Negative for chest pain.   Gastrointestinal:  Positive for diarrhea. Negative for abdominal pain, nausea and vomiting.   Genitourinary:  Negative for dysuria.   Musculoskeletal:  Positive for neck pain. Negative for back pain.   Skin:  Negative for rash.   Neurological:  Negative for weakness.   Psychiatric/Behavioral:  The patient is not nervous/anxious.      Objective:     Vitals:    03/16/23 1320   BP: (!) 163/72   BP Location: Right arm   Patient Position: Sitting   BP Method: Medium (Automatic)   Pulse: 66   Resp: 16   Temp: 98.4 °F (36.9 °C)   TempSrc: Oral   SpO2:  100%   Weight: 70.2 kg (154 lb 12.2 oz)     BMI: Body mass index is 21.59 kg/m².    ECOG 1  Physical Exam  Vitals and nursing note reviewed.   Constitutional:       Appearance: He is well-developed.   HENT:      Head: Normocephalic and atraumatic.   Eyes:      Pupils: Pupils are equal, round, and reactive to light.   Cardiovascular:      Rate and Rhythm: Normal rate and regular rhythm.   Pulmonary:      Effort: Pulmonary effort is normal.      Breath sounds: Normal breath sounds.   Abdominal:      General: Bowel sounds are normal.      Palpations: Abdomen is soft.   Musculoskeletal:         General: Normal range of motion.      Cervical back: Normal range of motion and neck supple.   Skin:     General: Skin is warm and dry.   Neurological:      Mental Status: He is alert and oriented to person, place, and time.   Psychiatric:         Behavior: Behavior normal.         Thought Content: Thought content normal.         Judgment: Judgment normal.     Labs have been reviewed.    Lab Results   Component Value Date    WBC 2.69 (L) 03/13/2023    HGB 7.8 (L) 03/13/2023    HCT 21.8 (L) 03/13/2023    MCV 87 03/13/2023     (L) 03/13/2023       Serum protein electrophoresis (3/13/23):  Decreased total protein. Normal gamma globulins are decreased.   Two closely adjacent paraprotein peaks in near-gamma = 0.15 g/dL   (previously 0.14 g/dL) and mid-gamma = 0.15 g/dL (previously, 0.13   g/dL).       Serum protein electrophoresis (2/13/23):  Decreased total protein. Normal gamma globulins are decreased. Two   paraprotein bands are present in near-gamma = 0.14 g/dL (previously   0.15 g/dL) and mid-gamma = 0.13 g/dL (previously 0.19 g/dL).       Serum protein electrophoresis (12/19/22):  Normal total protein. Normal gamma globulins are decreased. Two   paraprotein bands are present in near-gamma = 0.20 g/dL (previously   0.16 g/dL) and mid-gamma = 0.23 g/dL (previously 0.15 g/dL).     Serum protein electrophoresis  (9/27/22):  Normal total protein. Normal gamma globulins are decreased.   Two paraprotein bands are identified: 1) Near-gamma = 0.2 g/dL   (previously 0.18 g/dL) 2) Mid-gamma = 0.17 g/dL (previously 0.18   g/dL).     Serum protein electrophoresis (8/30/22):  Decreased total protein. Normal gamma globulins are decreased. Two   paraprotein bands are identified: 1) Near-gamma = 0.18 g/dL   (previously 0.21 g/dL) 2) Mid-gamma = 0.18 g/dL (previously 0.18   g/dL).     Plasma Cell FISH:  The result is within normal limits for 1q duplication, TP53   deletion and IGH rearrangement.         Assessment:       1. 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 Stage 3b chronic kidney disease    4. Chronic neoplasm-related pain    5. Chemotherapy-induced peripheral neuropathy    6. Anemia due to antineoplastic chemotherapy    7. Chemotherapy-induced neutropenia    8. Folate deficiency      Plan:   Kappa light chain myeloma with normal cytogenetics / anemia/neutropenia due to chemotherapy  -started Darzalex Faspro, subcutaneous Velcade, oral dexamethasone 8/26/2021  -velcade discontinued after 4 cycles  -last decadron dose was 2/3/22  -on Revlimid 10 mg QOD - start 9/30/2021, will continue   -cont Plavix  -continue acyclovir for shingles prophylaxis  - Labs have been reviewed. Absolute neutrophil count is 1.3 k/uL. Hemoglobin is 7.8 g/dL. Platelet count is 113 k/uL. Protein electrophoresis reveals a stable paraprotein level.  - okay to proceed with Darzalex today. Continue revlimid.  - return to clinic in 4 weeks in preparation for next cycle of darzalex faspro/dexamethasone/revlimid.     CKD Stage 3b  - Labs have been reviewed. Creatinine is 1.77 mg/dL.   - continue to monitor    Chronic neoplasm-related pain  - no issues.  - continue to monitor    Muscle spasm  - I sent flexeril in.    - return to clinic in 4 weeks in preparation for next cycle of darzalex faspro/dexamethasone/revlimid. Plan to move  chemo to [facility] in April 2023.    Andres Ríos M.D.  Hematology/Oncology  [facility] - [address], [address]  [place] [address]  Phone: [phone]  Fax: [phone]

## 2023-03-16 ENCOUNTER — OFFICE VISIT (OUTPATIENT)
Dept: HEMATOLOGY/ONCOLOGY | Facility: CLINIC | Age: 78
End: 2023-03-16
Payer: MEDICARE

## 2023-03-16 ENCOUNTER — INFUSION (OUTPATIENT)
Dept: INFUSION THERAPY | Facility: HOSPITAL | Age: 78
End: 2023-03-16
Attending: INTERNAL MEDICINE
Payer: MEDICARE

## 2023-03-16 VITALS
RESPIRATION RATE: 17 BRPM | WEIGHT: 154.75 LBS | BODY MASS INDEX: 21.66 KG/M2 | SYSTOLIC BLOOD PRESSURE: 154 MMHG | TEMPERATURE: 98 F | OXYGEN SATURATION: 100 % | DIASTOLIC BLOOD PRESSURE: 72 MMHG | HEIGHT: 71 IN | HEART RATE: 65 BPM | DIASTOLIC BLOOD PRESSURE: 70 MMHG | RESPIRATION RATE: 16 BRPM | BODY MASS INDEX: 21.59 KG/M2 | TEMPERATURE: 98 F | SYSTOLIC BLOOD PRESSURE: 163 MMHG | OXYGEN SATURATION: 100 % | WEIGHT: 154.75 LBS | HEART RATE: 66 BPM

## 2023-03-16 DIAGNOSIS — D64.81 ANEMIA DUE TO ANTINEOPLASTIC CHEMOTHERAPY: ICD-10-CM

## 2023-03-16 DIAGNOSIS — G62.0 CHEMOTHERAPY-INDUCED PERIPHERAL NEUROPATHY: ICD-10-CM

## 2023-03-16 DIAGNOSIS — T45.1X5A CHEMOTHERAPY-INDUCED PERIPHERAL NEUROPATHY: ICD-10-CM

## 2023-03-16 DIAGNOSIS — C90.00 KAPPA LIGHT CHAIN MYELOMA: Primary | ICD-10-CM

## 2023-03-16 DIAGNOSIS — T45.1X5A ANEMIA DUE TO ANTINEOPLASTIC CHEMOTHERAPY: ICD-10-CM

## 2023-03-16 DIAGNOSIS — C90.00 KAPPA LIGHT CHAIN MYELOMA: ICD-10-CM

## 2023-03-16 DIAGNOSIS — N18.32 STAGE 3B CHRONIC KIDNEY DISEASE: ICD-10-CM

## 2023-03-16 DIAGNOSIS — Z79.899 IMMUNODEFICIENCY DUE TO DRUG THERAPY: ICD-10-CM

## 2023-03-16 DIAGNOSIS — D70.1 CHEMOTHERAPY-INDUCED NEUTROPENIA: ICD-10-CM

## 2023-03-16 DIAGNOSIS — T45.1X5A CHEMOTHERAPY-INDUCED NEUTROPENIA: ICD-10-CM

## 2023-03-16 DIAGNOSIS — E53.8 FOLATE DEFICIENCY: ICD-10-CM

## 2023-03-16 DIAGNOSIS — M62.838 MUSCLE SPASM: ICD-10-CM

## 2023-03-16 DIAGNOSIS — D84.821 IMMUNODEFICIENCY DUE TO DRUG THERAPY: ICD-10-CM

## 2023-03-16 DIAGNOSIS — G89.3 CHRONIC NEOPLASM-RELATED PAIN: ICD-10-CM

## 2023-03-16 PROCEDURE — 3288F FALL RISK ASSESSMENT DOCD: CPT | Mod: CPTII,S$GLB,, | Performed by: INTERNAL MEDICINE

## 2023-03-16 PROCEDURE — 96401 CHEMO ANTI-NEOPL SQ/IM: CPT

## 2023-03-16 PROCEDURE — 1101F PR PT FALLS ASSESS DOC 0-1 FALLS W/OUT INJ PAST YR: ICD-10-PCS | Mod: CPTII,S$GLB,, | Performed by: INTERNAL MEDICINE

## 2023-03-16 PROCEDURE — 99999 PR PBB SHADOW E&M-EST. PATIENT-LVL IV: ICD-10-PCS | Mod: PBBFAC,,, | Performed by: INTERNAL MEDICINE

## 2023-03-16 PROCEDURE — 1125F AMNT PAIN NOTED PAIN PRSNT: CPT | Mod: CPTII,S$GLB,, | Performed by: INTERNAL MEDICINE

## 2023-03-16 PROCEDURE — 99999 PR PBB SHADOW E&M-EST. PATIENT-LVL IV: CPT | Mod: PBBFAC,,, | Performed by: INTERNAL MEDICINE

## 2023-03-16 PROCEDURE — 1101F PT FALLS ASSESS-DOCD LE1/YR: CPT | Mod: CPTII,S$GLB,, | Performed by: INTERNAL MEDICINE

## 2023-03-16 PROCEDURE — 3078F PR MOST RECENT DIASTOLIC BLOOD PRESSURE < 80 MM HG: ICD-10-PCS | Mod: CPTII,S$GLB,, | Performed by: INTERNAL MEDICINE

## 2023-03-16 PROCEDURE — 3288F PR FALLS RISK ASSESSMENT DOCUMENTED: ICD-10-PCS | Mod: CPTII,S$GLB,, | Performed by: INTERNAL MEDICINE

## 2023-03-16 PROCEDURE — 63600175 PHARM REV CODE 636 W HCPCS: Mod: JZ,TB | Performed by: INTERNAL MEDICINE

## 2023-03-16 PROCEDURE — 3078F DIAST BP <80 MM HG: CPT | Mod: CPTII,S$GLB,, | Performed by: INTERNAL MEDICINE

## 2023-03-16 PROCEDURE — 99215 OFFICE O/P EST HI 40 MIN: CPT | Mod: S$GLB,,, | Performed by: INTERNAL MEDICINE

## 2023-03-16 PROCEDURE — 99215 PR OFFICE/OUTPT VISIT, EST, LEVL V, 40-54 MIN: ICD-10-PCS | Mod: S$GLB,,, | Performed by: INTERNAL MEDICINE

## 2023-03-16 PROCEDURE — 25000003 PHARM REV CODE 250: Performed by: INTERNAL MEDICINE

## 2023-03-16 PROCEDURE — 3077F SYST BP >= 140 MM HG: CPT | Mod: CPTII,S$GLB,, | Performed by: INTERNAL MEDICINE

## 2023-03-16 PROCEDURE — 3077F PR MOST RECENT SYSTOLIC BLOOD PRESSURE >= 140 MM HG: ICD-10-PCS | Mod: CPTII,S$GLB,, | Performed by: INTERNAL MEDICINE

## 2023-03-16 PROCEDURE — 1125F PR PAIN SEVERITY QUANTIFIED, PAIN PRESENT: ICD-10-PCS | Mod: CPTII,S$GLB,, | Performed by: INTERNAL MEDICINE

## 2023-03-16 RX ORDER — FAMOTIDINE 20 MG/1
20 TABLET, FILM COATED ORAL
Status: CANCELLED
Start: 2023-03-16

## 2023-03-16 RX ORDER — DIPHENHYDRAMINE HYDROCHLORIDE 50 MG/ML
50 INJECTION INTRAMUSCULAR; INTRAVENOUS ONCE AS NEEDED
Status: DISCONTINUED | OUTPATIENT
Start: 2023-03-16 | End: 2023-03-16 | Stop reason: HOSPADM

## 2023-03-16 RX ORDER — DIPHENHYDRAMINE HCL 25 MG
50 CAPSULE ORAL
Status: CANCELLED
Start: 2023-03-16

## 2023-03-16 RX ORDER — SODIUM CHLORIDE 0.9 % (FLUSH) 0.9 %
10 SYRINGE (ML) INJECTION
Status: CANCELLED | OUTPATIENT
Start: 2023-03-16

## 2023-03-16 RX ORDER — EPINEPHRINE 0.3 MG/.3ML
0.3 INJECTION SUBCUTANEOUS ONCE AS NEEDED
Status: DISCONTINUED | OUTPATIENT
Start: 2023-03-16 | End: 2023-03-16 | Stop reason: HOSPADM

## 2023-03-16 RX ORDER — CYCLOBENZAPRINE HCL 5 MG
5 TABLET ORAL 3 TIMES DAILY PRN
Qty: 40 TABLET | Refills: 0 | Status: SHIPPED | OUTPATIENT
Start: 2023-03-16 | End: 2023-03-26

## 2023-03-16 RX ORDER — ACETAMINOPHEN 325 MG/1
650 TABLET ORAL
Status: COMPLETED | OUTPATIENT
Start: 2023-03-16 | End: 2023-03-16

## 2023-03-16 RX ORDER — DIPHENHYDRAMINE HYDROCHLORIDE 50 MG/ML
50 INJECTION INTRAMUSCULAR; INTRAVENOUS ONCE AS NEEDED
Status: CANCELLED | OUTPATIENT
Start: 2023-03-16

## 2023-03-16 RX ORDER — HEPARIN 100 UNIT/ML
500 SYRINGE INTRAVENOUS
Status: CANCELLED | OUTPATIENT
Start: 2023-03-16

## 2023-03-16 RX ORDER — LENALIDOMIDE 10 MG/1
10 CAPSULE ORAL EVERY OTHER DAY
Qty: 15 EACH | Refills: 5 | Status: SHIPPED | OUTPATIENT
Start: 2023-03-16 | End: 2023-04-18 | Stop reason: SDUPTHER

## 2023-03-16 RX ORDER — DIPHENHYDRAMINE HCL 25 MG
50 CAPSULE ORAL
Status: COMPLETED | OUTPATIENT
Start: 2023-03-16 | End: 2023-03-16

## 2023-03-16 RX ORDER — ACETAMINOPHEN 325 MG/1
650 TABLET ORAL
Status: CANCELLED | OUTPATIENT
Start: 2023-03-16

## 2023-03-16 RX ORDER — FAMOTIDINE 20 MG/1
20 TABLET, FILM COATED ORAL
Status: COMPLETED | OUTPATIENT
Start: 2023-03-16 | End: 2023-03-16

## 2023-03-16 RX ORDER — EPINEPHRINE 0.3 MG/.3ML
0.3 INJECTION SUBCUTANEOUS ONCE AS NEEDED
Status: CANCELLED | OUTPATIENT
Start: 2023-03-16

## 2023-03-16 RX ADMIN — DIPHENHYDRAMINE HYDROCHLORIDE 50 MG: 25 CAPSULE ORAL at 01:03

## 2023-03-16 RX ADMIN — ACETAMINOPHEN 650 MG: 325 TABLET ORAL at 01:03

## 2023-03-16 RX ADMIN — FAMOTIDINE 20 MG: 20 TABLET, FILM COATED ORAL at 01:03

## 2023-03-16 RX ADMIN — DARATUMUMAB AND HYALURONIDASE-FIHJ (HUMAN RECOMBINANT) 1800 MG: 1800; 30000 INJECTION SUBCUTANEOUS at 02:03

## 2023-04-03 ENCOUNTER — TELEPHONE (OUTPATIENT)
Dept: HEMATOLOGY/ONCOLOGY | Facility: CLINIC | Age: 78
End: 2023-04-03
Payer: MEDICARE

## 2023-04-03 NOTE — TELEPHONE ENCOUNTER
----- Message from Kisha Ellis sent at 4/3/2023  4:02 PM CDT -----  Type:  Needs Medical Advice    Who Called:  pt daughter   Symptoms (please be specific):  would like to get a call back to change virtual  appt  to in person   And also pt still scheduled to do infusion in [place] and it should be changed to [place]     Would the patient rather a call back or a response via [facility]?  Call   Best Call Back Number:  [phone]  Additional Information:

## 2023-04-04 ENCOUNTER — TELEPHONE (OUTPATIENT)
Dept: HEMATOLOGY/ONCOLOGY | Facility: CLINIC | Age: 78
End: 2023-04-04
Payer: MEDICARE

## 2023-04-12 ENCOUNTER — OFFICE VISIT (OUTPATIENT)
Dept: HEMATOLOGY/ONCOLOGY | Facility: CLINIC | Age: 78
End: 2023-04-12
Payer: MEDICARE

## 2023-04-12 VITALS
SYSTOLIC BLOOD PRESSURE: 110 MMHG | BODY MASS INDEX: 20.79 KG/M2 | OXYGEN SATURATION: 97 % | HEART RATE: 57 BPM | WEIGHT: 149.06 LBS | DIASTOLIC BLOOD PRESSURE: 70 MMHG

## 2023-04-12 DIAGNOSIS — C90.00 KAPPA LIGHT CHAIN MYELOMA: Primary | ICD-10-CM

## 2023-04-12 DIAGNOSIS — M62.838 MUSCLE SPASM: ICD-10-CM

## 2023-04-12 DIAGNOSIS — T45.1X5A ANEMIA DUE TO ANTINEOPLASTIC CHEMOTHERAPY: ICD-10-CM

## 2023-04-12 DIAGNOSIS — D84.821 IMMUNODEFICIENCY DUE TO DRUG THERAPY: ICD-10-CM

## 2023-04-12 DIAGNOSIS — G89.3 CHRONIC NEOPLASM-RELATED PAIN: ICD-10-CM

## 2023-04-12 DIAGNOSIS — N18.32 STAGE 3B CHRONIC KIDNEY DISEASE: ICD-10-CM

## 2023-04-12 DIAGNOSIS — T45.1X5A CHEMOTHERAPY-INDUCED THROMBOCYTOPENIA: ICD-10-CM

## 2023-04-12 DIAGNOSIS — D64.81 ANEMIA DUE TO ANTINEOPLASTIC CHEMOTHERAPY: ICD-10-CM

## 2023-04-12 DIAGNOSIS — D69.59 CHEMOTHERAPY-INDUCED THROMBOCYTOPENIA: ICD-10-CM

## 2023-04-12 DIAGNOSIS — Z79.899 IMMUNODEFICIENCY DUE TO DRUG THERAPY: ICD-10-CM

## 2023-04-12 PROCEDURE — 1101F PR PT FALLS ASSESS DOC 0-1 FALLS W/OUT INJ PAST YR: ICD-10-PCS | Mod: CPTII,S$GLB,, | Performed by: NURSE PRACTITIONER

## 2023-04-12 PROCEDURE — 99215 OFFICE O/P EST HI 40 MIN: CPT | Mod: S$GLB,,, | Performed by: NURSE PRACTITIONER

## 2023-04-12 PROCEDURE — 3074F SYST BP LT 130 MM HG: CPT | Mod: CPTII,S$GLB,, | Performed by: NURSE PRACTITIONER

## 2023-04-12 PROCEDURE — 3078F PR MOST RECENT DIASTOLIC BLOOD PRESSURE < 80 MM HG: ICD-10-PCS | Mod: CPTII,S$GLB,, | Performed by: NURSE PRACTITIONER

## 2023-04-12 PROCEDURE — 1101F PT FALLS ASSESS-DOCD LE1/YR: CPT | Mod: CPTII,S$GLB,, | Performed by: NURSE PRACTITIONER

## 2023-04-12 PROCEDURE — 3078F DIAST BP <80 MM HG: CPT | Mod: CPTII,S$GLB,, | Performed by: NURSE PRACTITIONER

## 2023-04-12 PROCEDURE — 3288F FALL RISK ASSESSMENT DOCD: CPT | Mod: CPTII,S$GLB,, | Performed by: NURSE PRACTITIONER

## 2023-04-12 PROCEDURE — 3288F PR FALLS RISK ASSESSMENT DOCUMENTED: ICD-10-PCS | Mod: CPTII,S$GLB,, | Performed by: NURSE PRACTITIONER

## 2023-04-12 PROCEDURE — 99999 PR PBB SHADOW E&M-EST. PATIENT-LVL III: CPT | Mod: PBBFAC,,, | Performed by: NURSE PRACTITIONER

## 2023-04-12 PROCEDURE — 99215 PR OFFICE/OUTPT VISIT, EST, LEVL V, 40-54 MIN: ICD-10-PCS | Mod: S$GLB,,, | Performed by: NURSE PRACTITIONER

## 2023-04-12 PROCEDURE — 99999 PR PBB SHADOW E&M-EST. PATIENT-LVL III: ICD-10-PCS | Mod: PBBFAC,,, | Performed by: NURSE PRACTITIONER

## 2023-04-12 PROCEDURE — 3074F PR MOST RECENT SYSTOLIC BLOOD PRESSURE < 130 MM HG: ICD-10-PCS | Mod: CPTII,S$GLB,, | Performed by: NURSE PRACTITIONER

## 2023-04-12 RX ORDER — FAMOTIDINE 20 MG/1
20 TABLET, FILM COATED ORAL
Status: CANCELLED
Start: 2023-04-13

## 2023-04-12 RX ORDER — EPINEPHRINE 0.3 MG/.3ML
0.3 INJECTION SUBCUTANEOUS ONCE AS NEEDED
Status: CANCELLED | OUTPATIENT
Start: 2023-04-13

## 2023-04-12 RX ORDER — ACETAMINOPHEN 325 MG/1
650 TABLET ORAL
Status: CANCELLED | OUTPATIENT
Start: 2023-04-13

## 2023-04-12 RX ORDER — DIPHENHYDRAMINE HCL 25 MG
50 CAPSULE ORAL
Status: CANCELLED
Start: 2023-04-13

## 2023-04-12 RX ORDER — HEPARIN 100 UNIT/ML
500 SYRINGE INTRAVENOUS
Status: CANCELLED | OUTPATIENT
Start: 2023-04-13

## 2023-04-12 RX ORDER — SODIUM CHLORIDE 0.9 % (FLUSH) 0.9 %
10 SYRINGE (ML) INJECTION
Status: CANCELLED | OUTPATIENT
Start: 2023-04-13

## 2023-04-12 RX ORDER — DIPHENHYDRAMINE HYDROCHLORIDE 50 MG/ML
50 INJECTION INTRAMUSCULAR; INTRAVENOUS ONCE AS NEEDED
Status: CANCELLED | OUTPATIENT
Start: 2023-04-13

## 2023-04-12 NOTE — PROGRESS NOTES
PATIENT: Haris Hernandez  MRN: [id number]  DATE: 4/12/2023    Chief Complaint: Kappa Light Chain Myeloma    Subjective:     Pertinent Medical History:     He presented to the ED 08/13/2021 with abnormal labs-CMP showed creatinine 6.6.  Prior CMP from May 2018 showed creatinine 1.2.    Further workup included a free light chain assay where a kappa free light chain level was 494    08/23/2021 bone marrow biopsy showed variable cellularity, 10 to 40% positive for plasma cell dyscrasia with plasma cells representing 30 to 40% of total cellularity.  No increase in blasts. FISH for Plasma Cell Proliferative Disorder - NORMAL    -started Darzalex, Velcade, Decadron 8/26/2021  -On Revlimid 10 mg qod since 9/30/2021  -saw urology,  for BPH causing difficulty in emptying the bladder, he may need cystoscopy and surgery    Interval History:   - he presents for a follow-up appointment for his multiple myeloma.  - he is scheduled for darzalex faspro today, 4/12/23  - today, he endorses fatigue, neck pain, diarrhea, right leg swelling.      Past Medical, Surgical, Family, and Social histories reviewed.    Medication and Allergies reviewed.    Review of Systems   Constitutional:  Positive for malaise/fatigue. Negative for fever.   HENT:  Negative for sore throat.    Respiratory:  Negative for shortness of breath.    Cardiovascular:  Positive for leg swelling. Negative for chest pain.   Gastrointestinal:  Positive for diarrhea. Negative for abdominal pain, nausea and vomiting.   Genitourinary:  Negative for dysuria.   Musculoskeletal:  Positive for neck pain. Negative for back pain.   Skin:  Negative for rash.   Neurological:  Negative for weakness.   Psychiatric/Behavioral:  The patient is not nervous/anxious.      Objective:     Vitals:    04/12/23 0822   BP: 110/70   Pulse: (!) 57   SpO2: 97%   Weight: 67.6 kg (149 lb 0.5 oz)       BMI: Body mass index is 20.79 kg/m².    ECOG 1  Physical Exam  Vitals and nursing note  reviewed.   Constitutional:       Appearance: He is well-developed.   HENT:      Head: Normocephalic and atraumatic.   Eyes:      General: No scleral icterus.     Pupils: Pupils are equal, round, and reactive to light.   Cardiovascular:      Rate and Rhythm: Normal rate and regular rhythm.      Pulses: Normal pulses.      Heart sounds: Normal heart sounds. No murmur heard.  Pulmonary:      Effort: Pulmonary effort is normal. No respiratory distress.      Breath sounds: Normal breath sounds.   Abdominal:      General: Bowel sounds are normal.      Palpations: Abdomen is soft.   Musculoskeletal:         General: Normal range of motion.      Cervical back: Normal range of motion and neck supple. No rigidity.      Right lower leg: No edema.      Left lower leg: No edema.   Skin:     General: Skin is warm and dry.   Neurological:      Mental Status: He is alert and oriented to person, place, and time.   Psychiatric:         Mood and Affect: Mood normal.         Behavior: Behavior normal.         Thought Content: Thought content normal.         Judgment: Judgment normal.     Labs have been reviewed, discussed with pt and wife.    Lab Results   Component Value Date    WBC 3.02 (L) 04/11/2023    HGB 7.9 (L) 04/11/2023    HCT 22.5 (L) 04/11/2023    MCV 88 04/11/2023     (L) 04/11/2023       Serum protein electrophoresis (3/13/23):  Decreased total protein. Normal gamma globulins are decreased.   Two closely adjacent paraprotein peaks in near-gamma = 0.15 g/dL   (previously 0.14 g/dL) and mid-gamma = 0.15 g/dL (previously, 0.13   g/dL).       Serum protein electrophoresis (2/13/23):  Decreased total protein. Normal gamma globulins are decreased. Two   paraprotein bands are present in near-gamma = 0.14 g/dL (previously   0.15 g/dL) and mid-gamma = 0.13 g/dL (previously 0.19 g/dL).       Serum protein electrophoresis (12/19/22):  Normal total protein. Normal gamma globulins are decreased. Two   paraprotein bands are  present in near-gamma = 0.20 g/dL (previously   0.16 g/dL) and mid-gamma = 0.23 g/dL (previously 0.15 g/dL).     Serum protein electrophoresis (9/27/22):  Normal total protein. Normal gamma globulins are decreased.   Two paraprotein bands are identified: 1) Near-gamma = 0.2 g/dL   (previously 0.18 g/dL) 2) Mid-gamma = 0.17 g/dL (previously 0.18   g/dL).     Serum protein electrophoresis (8/30/22):  Decreased total protein. Normal gamma globulins are decreased. Two   paraprotein bands are identified: 1) Near-gamma = 0.18 g/dL   (previously 0.21 g/dL) 2) Mid-gamma = 0.18 g/dL (previously 0.18   g/dL).     Plasma Cell FISH:  The result is within normal limits for 1q duplication, TP53   deletion and IGH rearrangement.         Assessment:       1. Kappa light chain myeloma    2. Anemia due to antineoplastic chemotherapy    3. Chemotherapy-induced thrombocytopenia    4. Immunodeficiency due to drug therapy    5. Stage 3b chronic kidney disease    6. Chronic neoplasm-related pain    7. Muscle spasm        Plan:   Kappa light chain myeloma with normal cytogenetics / anemia/neutropenia due to chemotherapy  -started Darzalex Faspro, subcutaneous Velcade, oral dexamethasone 8/26/2021  -velcade discontinued after 4 cycles  -last decadron dose was 2/3/22  -on Revlimid 10 mg QOD - start 9/30/2021, will continue   -cont Plavix  -continue acyclovir for shingles prophylaxis  - Labs have been reviewed. Absolute neutrophil count is 1.6 k/uL. Hemoglobin is 7.9 g/dL. Platelet count is 111 k/uL. Protein electrophoresis pending, prelim with stable serum protein.  - okay to proceed with Darzalex today. Continue revlimid.  - return to clinic in 4 weeks in preparation for next cycle of darzalex faspro/dexamethasone/revlimid.     CKD Stage 3b  - Labs have been reviewed. Creatinine is 1.64 mg/dL.   - continue to monitor    Chronic neoplasm-related pain  - no issues.  - continue to monitor    Muscle spasm  - completed flexeril  - spasm  resolved    - return to clinic in 4 weeks in preparation for next cycle of darzalex faspro/dexamethasone/revlimid. Plan to move chemo to [facility] in May 2023. Labs 5/9/23, appt and chemo 5/10/23      Milagros Myers, NP-C  [facility]  Hematology/Oncology  [address]  [place]  [id number]  [phone]

## 2023-04-18 DIAGNOSIS — C90.00 KAPPA LIGHT CHAIN MYELOMA: ICD-10-CM

## 2023-04-18 RX ORDER — LENALIDOMIDE 10 MG/1
10 CAPSULE ORAL EVERY OTHER DAY
Qty: 15 EACH | Refills: 5 | Status: SHIPPED | OUTPATIENT
Start: 2023-04-18 | End: 2023-05-19 | Stop reason: SDUPTHER

## 2023-05-02 NOTE — PROGRESS NOTES
PATIENT: Haris Hernandez  MRN: [id number]  DATE: 2/16/2023    Chief Complaint: Kappa Light Chain Myeloma    Subjective:     Pertinent Medical History:     He presented to the ED 08/13/2021 with abnormal labs-CMP showed creatinine 6.6.  Prior CMP from May 2018 showed creatinine 1.2.    Further workup included a free light chain assay where a kappa free light chain level was 494    08/23/2021 bone marrow biopsy showed variable cellularity, 10 to 40% positive for plasma cell dyscrasia with plasma cells representing 30 to 40% of total cellularity.  No increase in blasts. FISH for Plasma Cell Proliferative Disorder - NORMAL    -started Darzalex, Velcade, Decadron 8/26/2021  -On Revlimid 10 mg qod since 9/30/2021  -saw urology,  for BPH causing difficulty in emptying the bladder, he may need cystoscopy and surgery    Interval History:   - he presents for a follow-up appointment for his multiple myeloma.  - he is scheduled for darzalex faspro today, 2/16/23  - today, he is doing well. He denies shortness of breath, chest pain, nausea, vomiting, diarrhea, constipation. He notes fatigue, intermittent hand neuropathy. There have been no changes clinically since last visit.    Past Medical, Surgical, Family, and Social histories reviewed.    Medication and Allergies reviewed.    Review of Systems   Constitutional:  Positive for malaise/fatigue. Negative for fever.   HENT:  Negative for sore throat.    Respiratory:  Negative for shortness of breath.    Cardiovascular:  Negative for chest pain and leg swelling.   Gastrointestinal:  Negative for abdominal pain, nausea and vomiting.   Genitourinary:  Negative for dysuria.   Musculoskeletal:  Negative for back pain.   Skin:  Negative for rash.   Neurological:  Negative for weakness.   Psychiatric/Behavioral:  The patient is not nervous/anxious.      Objective:     Vitals:    02/16/23 1312   BP: (!) 174/76   BP Location: Right arm   Patient Position: Sitting   BP Method:  Medium (Automatic)   Pulse: 60   Temp: 98 °F (36.7 °C)   TempSrc: Oral   SpO2: 98%   Weight: 68.3 kg (150 lb 9.2 oz)     BMI: Body mass index is 21 kg/m².    Physical Exam  Vitals and nursing note reviewed.   Constitutional:       Appearance: He is well-developed.   HENT:      Head: Normocephalic and atraumatic.   Eyes:      Pupils: Pupils are equal, round, and reactive to light.   Cardiovascular:      Rate and Rhythm: Normal rate and regular rhythm.   Pulmonary:      Effort: Pulmonary effort is normal.      Breath sounds: Normal breath sounds.   Abdominal:      General: Bowel sounds are normal.      Palpations: Abdomen is soft.   Musculoskeletal:         General: Normal range of motion.      Cervical back: Normal range of motion and neck supple.   Skin:     General: Skin is warm and dry.   Neurological:      Mental Status: He is alert and oriented to person, place, and time.   Psychiatric:         Behavior: Behavior normal.         Thought Content: Thought content normal.         Judgment: Judgment normal.     Labs have been reviewed.    Lab Results   Component Value Date    WBC 2.92 (L) 02/13/2023    HGB 8.0 (L) 02/13/2023    HCT 22.0 (L) 02/13/2023    MCV 87 02/13/2023     (L) 02/13/2023       Serum protein electrophoresis (2/13/23):  Decreased total protein. Normal gamma globulins are decreased. Two   paraprotein bands are present in near-gamma = 0.14 g/dL (previously   0.15 g/dL) and mid-gamma = 0.13 g/dL (previously 0.19 g/dL).         Serum protein electrophoresis (12/19/22):  Normal total protein. Normal gamma globulins are decreased. Two   paraprotein bands are present in near-gamma = 0.20 g/dL (previously   0.16 g/dL) and mid-gamma = 0.23 g/dL (previously 0.15 g/dL).     Serum protein electrophoresis (9/27/22):  Normal total protein. Normal gamma globulins are decreased.   Two paraprotein bands are identified: 1) Near-gamma = 0.2 g/dL   (previously 0.18 g/dL) 2) Mid-gamma = 0.17 g/dL (previously  0.18   g/dL).     Serum protein electrophoresis (8/30/22):  Decreased total protein. Normal gamma globulins are decreased. Two   paraprotein bands are identified: 1) Near-gamma = 0.18 g/dL   (previously 0.21 g/dL) 2) Mid-gamma = 0.18 g/dL (previously 0.18   g/dL).     Plasma Cell FISH:  The result is within normal limits for 1q duplication, TP53   deletion and IGH rearrangement.         Assessment:       1. 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 Stage 3b chronic kidney disease    4. Chronic neoplasm-related pain    5. Chemotherapy-induced peripheral neuropathy    6. Anemia due to antineoplastic chemotherapy    7. Chemotherapy-induced neutropenia    8. Folate deficiency      Plan:   Kappa light chain myeloma with normal cytogenetics / anemia/neutropenia due to chemotherapy  -started Darzalex Faspro, subcutaneous Velcade, oral dexamethasone 8/26/2021  -velcade discontinued after 4 cycles  -last decadron dose was 2/3/22  -on Revlimid 10 mg QOD - start 9/30/2021, will continue   -cont Plavix  -continue acyclovir for shingles prophylaxis  - Labs have been reviewed. Absolute neutrophil count is 1.4 k/uL. Hemoglobin is 8 g/dL. Platelet count is 104 k/uL. Protein electrophoresis reveals a continued decrease in paraprotein level.  - okay to proceed with Darzalex today. Continue revlimid.  - return to clinic in 4 weeks in preparation for next cycle of darzalex faspro/dexamethasone/revlimid. We will consider transition to revlimid maintenance at that time.    CKD Stage 3b  - Labs have been reviewed. Creatinine is 1.82 mg/dL.   - continue to monitor    Chronic neoplasm-related pain  - no issues.  - continue to monitor    - return to clinic in 4 weeks in preparation for next cycle of darzalex faspro/dexamethasone/revlimid. We will consider transition to revlimid maintenance at that time.    Andres Ríos M.D.  Hematology/Oncology  [facility] - [address],  [address]  [place] [address]  Phone: [phone]  Fax: [phone]     IV discontinued, cath removed intact

## 2023-05-09 NOTE — PROGRESS NOTES
PATIENT: Haris Hernandez  MRN: [id number]  DATE: 5/10/2023    Chief Complaint: Kappa Light Chain Myeloma    Subjective:     Pertinent Medical History:     He presented to the ED 08/13/2021 with abnormal labs-CMP showed creatinine 6.6.  Prior CMP from May 2018 showed creatinine 1.2.    Further workup included a free light chain assay where a kappa free light chain level was 494    08/23/2021 bone marrow biopsy showed variable cellularity, 10 to 40% positive for plasma cell dyscrasia with plasma cells representing 30 to 40% of total cellularity.  No increase in blasts. FISH for Plasma Cell Proliferative Disorder - NORMAL    -started Darzalex, Velcade, Decadron 8/26/2021  -On Revlimid 10 mg qod since 9/30/2021  -saw urology,  for BPH causing difficulty in emptying the bladder, he may need cystoscopy and surgery    Interval History:   - he presents for a follow-up appointment for his multiple myeloma.  - he is scheduled for darzalex faspro today, 5/10/23  - today, he endorses fatigue, neck pain, diarrhea first 2-3 days following treatment, right leg swelling.  Weight stable, appetite good.    Past Medical, Surgical, Family, and Social histories reviewed.    Medication and Allergies reviewed.    Review of Systems   Constitutional:  Positive for malaise/fatigue. Negative for fever.   HENT:  Negative for sore throat.    Respiratory:  Negative for shortness of breath.    Cardiovascular:  Positive for leg swelling. Negative for chest pain.   Gastrointestinal:  Positive for diarrhea. Negative for abdominal pain, nausea and vomiting.   Genitourinary:  Negative for dysuria.   Musculoskeletal:  Positive for neck pain. Negative for back pain.   Skin:  Negative for rash.   Neurological:  Negative for weakness.   Psychiatric/Behavioral:  The patient is not nervous/anxious.      Objective:     Vitals:    05/10/23 0818   BP: 130/70   Pulse: 65   SpO2: (!) 93%   Weight: 67.1 kg (148 lb 0.6 oz)         BMI: Body mass index is  20.65 kg/m².    ECOG 1  Physical Exam  Vitals and nursing note reviewed.   Constitutional:       General: He is not in acute distress.     Appearance: Normal appearance. He is well-developed. He is not ill-appearing or toxic-appearing.   HENT:      Head: Normocephalic and atraumatic.   Eyes:      General: No scleral icterus.     Pupils: Pupils are equal, round, and reactive to light.   Cardiovascular:      Rate and Rhythm: Normal rate and regular rhythm.      Pulses: Normal pulses.      Heart sounds: Normal heart sounds. No murmur heard.  Pulmonary:      Effort: Pulmonary effort is normal. No respiratory distress.      Breath sounds: Normal breath sounds.   Abdominal:      General: Bowel sounds are normal.      Palpations: Abdomen is soft.   Musculoskeletal:         General: Normal range of motion.      Cervical back: Normal range of motion and neck supple. No rigidity.      Right lower leg: No edema.      Left lower leg: No edema.      Comments: BLE symmetrical in size and musculature, no swelling noted today, 2+ posterior tibialis pulses, bilat neg brennan's   Skin:     General: Skin is warm and dry.   Neurological:      Mental Status: He is alert and oriented to person, place, and time.      Gait: Gait normal.   Psychiatric:         Mood and Affect: Mood normal.         Behavior: Behavior normal.         Thought Content: Thought content normal.         Judgment: Judgment normal.     Labs have been reviewed, discussed with pt and wife.    Lab Results   Component Value Date    WBC 3.01 (L) 05/09/2023    HGB 7.2 (L) 05/09/2023    HCT 20.5 (L) 05/09/2023    MCV 89 05/09/2023     (L) 05/09/2023     Serum protein electrophoresis (5/9/23):  pending    Serum protein electrophoresis (4/11/23):  Decreased total protein. Normal gamma globulins are decreased.   Two closely adjacent paraprotein peaks in near-gamma = 0.15 g/dL   (previously 0.15 g/dL) and mid-gamma = 0.18 g/dL (previously 0.15   g/dL).     Serum protein  electrophoresis (3/13/23):  Decreased total protein. Normal gamma globulins are decreased.   Two closely adjacent paraprotein peaks in near-gamma = 0.15 g/dL   (previously 0.14 g/dL) and mid-gamma = 0.15 g/dL (previously, 0.13   g/dL).       Serum protein electrophoresis (2/13/23):  Decreased total protein. Normal gamma globulins are decreased. Two   paraprotein bands are present in near-gamma = 0.14 g/dL (previously   0.15 g/dL) and mid-gamma = 0.13 g/dL (previously 0.19 g/dL).       Serum protein electrophoresis (12/19/22):  Normal total protein. Normal gamma globulins are decreased. Two   paraprotein bands are present in near-gamma = 0.20 g/dL (previously   0.16 g/dL) and mid-gamma = 0.23 g/dL (previously 0.15 g/dL).     Serum protein electrophoresis (9/27/22):  Normal total protein. Normal gamma globulins are decreased.   Two paraprotein bands are identified: 1) Near-gamma = 0.2 g/dL   (previously 0.18 g/dL) 2) Mid-gamma = 0.17 g/dL (previously 0.18   g/dL).     Serum protein electrophoresis (8/30/22):  Decreased total protein. Normal gamma globulins are decreased. Two   paraprotein bands are identified: 1) Near-gamma = 0.18 g/dL   (previously 0.21 g/dL) 2) Mid-gamma = 0.18 g/dL (previously 0.18   g/dL).     Plasma Cell FISH:  The result is within normal limits for 1q duplication, TP53   deletion and IGH rearrangement.         Assessment:       1. Kappa light chain myeloma    2. Anemia due to antineoplastic chemotherapy    3. Chemotherapy-induced thrombocytopenia    4. Immunodeficiency due to drug therapy    5. Stage 3b chronic kidney disease    6. Chronic neoplasm-related pain    7. Muscle spasm          Plan:   Kappa light chain myeloma with normal cytogenetics / anemia/neutropenia due to chemotherapy  -started Darzalex Faspro, subcutaneous Velcade, oral dexamethasone 8/26/2021  -velcade discontinued after 4 cycles  -last decadron dose was 2/3/22  -on Revlimid 10 mg QOD - start 9/30/2021, will continue   -cont  Plavix  -continue acyclovir for shingles prophylaxis  - Labs have been reviewed. Absolute neutrophil count is 1.6 k/uL. Hemoglobin is 7.2 g/dL. Platelet count is 132 k/uL. Protein electrophoresis pending, prelim with stable serum protein.  - okay to proceed with Darzalex today. Continue revlimid.  - return to clinic in 4 weeks in preparation for next cycle of darzalex faspro/dexamethasone/revlimid.     CKD Stage 3b  - Labs have been reviewed. Creatinine is 1.74 mg/dL.   - continue to monitor    Chronic neoplasm-related pain  - no issues.  - continue to monitor    Muscle spasm  - completed flexeril  - spasm resolved    - return to clinic in 4 weeks in preparation for next cycle of darzalex faspro/dexamethasone/revlimid. Chemo moved to [facility] May 2023.       Milagros Myers, NP-C  [facility]  Hematology/Oncology  [address] [place] [address]  [place]  [id number]  [phone]

## 2023-05-10 ENCOUNTER — OFFICE VISIT (OUTPATIENT)
Dept: HEMATOLOGY/ONCOLOGY | Facility: CLINIC | Age: 78
End: 2023-05-10
Payer: MEDICARE

## 2023-05-10 VITALS
HEART RATE: 65 BPM | WEIGHT: 148.06 LBS | BODY MASS INDEX: 20.65 KG/M2 | SYSTOLIC BLOOD PRESSURE: 130 MMHG | OXYGEN SATURATION: 93 % | DIASTOLIC BLOOD PRESSURE: 70 MMHG

## 2023-05-10 DIAGNOSIS — G89.3 CHRONIC NEOPLASM-RELATED PAIN: ICD-10-CM

## 2023-05-10 DIAGNOSIS — Z79.899 IMMUNODEFICIENCY DUE TO DRUG THERAPY: ICD-10-CM

## 2023-05-10 DIAGNOSIS — N18.32 STAGE 3B CHRONIC KIDNEY DISEASE: ICD-10-CM

## 2023-05-10 DIAGNOSIS — D84.821 IMMUNODEFICIENCY DUE TO DRUG THERAPY: ICD-10-CM

## 2023-05-10 DIAGNOSIS — D69.59 CHEMOTHERAPY-INDUCED THROMBOCYTOPENIA: ICD-10-CM

## 2023-05-10 DIAGNOSIS — D64.81 ANEMIA DUE TO ANTINEOPLASTIC CHEMOTHERAPY: ICD-10-CM

## 2023-05-10 DIAGNOSIS — C90.00 KAPPA LIGHT CHAIN MYELOMA: Primary | ICD-10-CM

## 2023-05-10 DIAGNOSIS — T45.1X5A ANEMIA DUE TO ANTINEOPLASTIC CHEMOTHERAPY: ICD-10-CM

## 2023-05-10 DIAGNOSIS — M62.838 MUSCLE SPASM: ICD-10-CM

## 2023-05-10 DIAGNOSIS — T45.1X5A CHEMOTHERAPY-INDUCED THROMBOCYTOPENIA: ICD-10-CM

## 2023-05-10 PROCEDURE — 3078F PR MOST RECENT DIASTOLIC BLOOD PRESSURE < 80 MM HG: ICD-10-PCS | Mod: CPTII,S$GLB,, | Performed by: NURSE PRACTITIONER

## 2023-05-10 PROCEDURE — 99999 PR PBB SHADOW E&M-EST. PATIENT-LVL III: ICD-10-PCS | Mod: PBBFAC,,, | Performed by: NURSE PRACTITIONER

## 2023-05-10 PROCEDURE — 1159F PR MEDICATION LIST DOCUMENTED IN MEDICAL RECORD: ICD-10-PCS | Mod: CPTII,S$GLB,, | Performed by: NURSE PRACTITIONER

## 2023-05-10 PROCEDURE — 1160F RVW MEDS BY RX/DR IN RCRD: CPT | Mod: CPTII,S$GLB,, | Performed by: NURSE PRACTITIONER

## 2023-05-10 PROCEDURE — 3288F FALL RISK ASSESSMENT DOCD: CPT | Mod: CPTII,S$GLB,, | Performed by: NURSE PRACTITIONER

## 2023-05-10 PROCEDURE — 99215 PR OFFICE/OUTPT VISIT, EST, LEVL V, 40-54 MIN: ICD-10-PCS | Mod: S$GLB,,, | Performed by: NURSE PRACTITIONER

## 2023-05-10 PROCEDURE — 3075F SYST BP GE 130 - 139MM HG: CPT | Mod: CPTII,S$GLB,, | Performed by: NURSE PRACTITIONER

## 2023-05-10 PROCEDURE — 3075F PR MOST RECENT SYSTOLIC BLOOD PRESS GE 130-139MM HG: ICD-10-PCS | Mod: CPTII,S$GLB,, | Performed by: NURSE PRACTITIONER

## 2023-05-10 PROCEDURE — 3288F PR FALLS RISK ASSESSMENT DOCUMENTED: ICD-10-PCS | Mod: CPTII,S$GLB,, | Performed by: NURSE PRACTITIONER

## 2023-05-10 PROCEDURE — 1101F PR PT FALLS ASSESS DOC 0-1 FALLS W/OUT INJ PAST YR: ICD-10-PCS | Mod: CPTII,S$GLB,, | Performed by: NURSE PRACTITIONER

## 2023-05-10 PROCEDURE — 99215 OFFICE O/P EST HI 40 MIN: CPT | Mod: S$GLB,,, | Performed by: NURSE PRACTITIONER

## 2023-05-10 PROCEDURE — 99999 PR PBB SHADOW E&M-EST. PATIENT-LVL III: CPT | Mod: PBBFAC,,, | Performed by: NURSE PRACTITIONER

## 2023-05-10 PROCEDURE — 1159F MED LIST DOCD IN RCRD: CPT | Mod: CPTII,S$GLB,, | Performed by: NURSE PRACTITIONER

## 2023-05-10 PROCEDURE — 3078F DIAST BP <80 MM HG: CPT | Mod: CPTII,S$GLB,, | Performed by: NURSE PRACTITIONER

## 2023-05-10 PROCEDURE — 1160F PR REVIEW ALL MEDS BY PRESCRIBER/CLIN PHARMACIST DOCUMENTED: ICD-10-PCS | Mod: CPTII,S$GLB,, | Performed by: NURSE PRACTITIONER

## 2023-05-10 PROCEDURE — 1101F PT FALLS ASSESS-DOCD LE1/YR: CPT | Mod: CPTII,S$GLB,, | Performed by: NURSE PRACTITIONER

## 2023-05-10 RX ORDER — DIPHENHYDRAMINE HCL 25 MG
50 CAPSULE ORAL
Status: CANCELLED
Start: 2023-05-11

## 2023-05-10 RX ORDER — FAMOTIDINE 20 MG/1
20 TABLET, FILM COATED ORAL
Status: CANCELLED
Start: 2023-05-11

## 2023-05-10 RX ORDER — DIPHENHYDRAMINE HYDROCHLORIDE 50 MG/ML
50 INJECTION INTRAMUSCULAR; INTRAVENOUS ONCE AS NEEDED
Status: CANCELLED | OUTPATIENT
Start: 2023-05-11

## 2023-05-10 RX ORDER — SODIUM CHLORIDE 0.9 % (FLUSH) 0.9 %
10 SYRINGE (ML) INJECTION
Status: CANCELLED | OUTPATIENT
Start: 2023-05-11

## 2023-05-10 RX ORDER — HEPARIN 100 UNIT/ML
500 SYRINGE INTRAVENOUS
Status: CANCELLED | OUTPATIENT
Start: 2023-05-11

## 2023-05-10 RX ORDER — EPINEPHRINE 0.3 MG/.3ML
0.3 INJECTION SUBCUTANEOUS ONCE AS NEEDED
Status: CANCELLED | OUTPATIENT
Start: 2023-05-11

## 2023-05-10 RX ORDER — ACETAMINOPHEN 325 MG/1
650 TABLET ORAL
Status: CANCELLED | OUTPATIENT
Start: 2023-05-11

## 2023-05-19 DIAGNOSIS — C90.00 KAPPA LIGHT CHAIN MYELOMA: ICD-10-CM

## 2023-05-19 RX ORDER — LENALIDOMIDE 10 MG/1
10 CAPSULE ORAL EVERY OTHER DAY
Qty: 15 EACH | Refills: 5 | Status: SHIPPED | OUTPATIENT
Start: 2023-05-19 | End: 2023-06-12 | Stop reason: SDUPTHER

## 2023-06-07 ENCOUNTER — OFFICE VISIT (OUTPATIENT)
Dept: HEMATOLOGY/ONCOLOGY | Facility: CLINIC | Age: 78
End: 2023-06-07
Payer: MEDICARE

## 2023-06-07 VITALS
DIASTOLIC BLOOD PRESSURE: 70 MMHG | BODY MASS INDEX: 20.91 KG/M2 | HEART RATE: 66 BPM | SYSTOLIC BLOOD PRESSURE: 110 MMHG | OXYGEN SATURATION: 95 % | WEIGHT: 149.94 LBS

## 2023-06-07 DIAGNOSIS — T45.1X5A ANEMIA DUE TO ANTINEOPLASTIC CHEMOTHERAPY: ICD-10-CM

## 2023-06-07 DIAGNOSIS — C90.00 KAPPA LIGHT CHAIN MYELOMA: Primary | ICD-10-CM

## 2023-06-07 DIAGNOSIS — T45.1X5A CHEMOTHERAPY-INDUCED THROMBOCYTOPENIA: ICD-10-CM

## 2023-06-07 DIAGNOSIS — D69.59 CHEMOTHERAPY-INDUCED THROMBOCYTOPENIA: ICD-10-CM

## 2023-06-07 DIAGNOSIS — D70.1 CHEMOTHERAPY-INDUCED NEUTROPENIA: ICD-10-CM

## 2023-06-07 DIAGNOSIS — E78.5 HYPERLIPIDEMIA, UNSPECIFIED HYPERLIPIDEMIA TYPE: ICD-10-CM

## 2023-06-07 DIAGNOSIS — G89.3 CHRONIC NEOPLASM-RELATED PAIN: ICD-10-CM

## 2023-06-07 DIAGNOSIS — N18.32 STAGE 3B CHRONIC KIDNEY DISEASE: ICD-10-CM

## 2023-06-07 DIAGNOSIS — D64.81 ANEMIA DUE TO ANTINEOPLASTIC CHEMOTHERAPY: ICD-10-CM

## 2023-06-07 DIAGNOSIS — Z01.818 EXAMINATION PRIOR TO CHEMOTHERAPY: ICD-10-CM

## 2023-06-07 DIAGNOSIS — T45.1X5A CHEMOTHERAPY-INDUCED NEUTROPENIA: ICD-10-CM

## 2023-06-07 PROCEDURE — 1101F PR PT FALLS ASSESS DOC 0-1 FALLS W/OUT INJ PAST YR: ICD-10-PCS | Mod: CPTII,S$GLB,, | Performed by: NURSE PRACTITIONER

## 2023-06-07 PROCEDURE — 3074F SYST BP LT 130 MM HG: CPT | Mod: CPTII,S$GLB,, | Performed by: NURSE PRACTITIONER

## 2023-06-07 PROCEDURE — 3078F PR MOST RECENT DIASTOLIC BLOOD PRESSURE < 80 MM HG: ICD-10-PCS | Mod: CPTII,S$GLB,, | Performed by: NURSE PRACTITIONER

## 2023-06-07 PROCEDURE — 3288F PR FALLS RISK ASSESSMENT DOCUMENTED: ICD-10-PCS | Mod: CPTII,S$GLB,, | Performed by: NURSE PRACTITIONER

## 2023-06-07 PROCEDURE — 99999 PR PBB SHADOW E&M-EST. PATIENT-LVL III: ICD-10-PCS | Mod: PBBFAC,,, | Performed by: NURSE PRACTITIONER

## 2023-06-07 PROCEDURE — 3074F PR MOST RECENT SYSTOLIC BLOOD PRESSURE < 130 MM HG: ICD-10-PCS | Mod: CPTII,S$GLB,, | Performed by: NURSE PRACTITIONER

## 2023-06-07 PROCEDURE — 99215 OFFICE O/P EST HI 40 MIN: CPT | Mod: S$GLB,,, | Performed by: NURSE PRACTITIONER

## 2023-06-07 PROCEDURE — 3078F DIAST BP <80 MM HG: CPT | Mod: CPTII,S$GLB,, | Performed by: NURSE PRACTITIONER

## 2023-06-07 PROCEDURE — 99215 PR OFFICE/OUTPT VISIT, EST, LEVL V, 40-54 MIN: ICD-10-PCS | Mod: S$GLB,,, | Performed by: NURSE PRACTITIONER

## 2023-06-07 PROCEDURE — 3288F FALL RISK ASSESSMENT DOCD: CPT | Mod: CPTII,S$GLB,, | Performed by: NURSE PRACTITIONER

## 2023-06-07 PROCEDURE — 1101F PT FALLS ASSESS-DOCD LE1/YR: CPT | Mod: CPTII,S$GLB,, | Performed by: NURSE PRACTITIONER

## 2023-06-07 PROCEDURE — 99999 PR PBB SHADOW E&M-EST. PATIENT-LVL III: CPT | Mod: PBBFAC,,, | Performed by: NURSE PRACTITIONER

## 2023-06-07 RX ORDER — DIPHENHYDRAMINE HCL 25 MG
50 CAPSULE ORAL
Status: CANCELLED
Start: 2023-06-08

## 2023-06-07 RX ORDER — FAMOTIDINE 20 MG/1
20 TABLET, FILM COATED ORAL
Status: CANCELLED
Start: 2023-06-08

## 2023-06-07 RX ORDER — SODIUM CHLORIDE 0.9 % (FLUSH) 0.9 %
10 SYRINGE (ML) INJECTION
Status: CANCELLED | OUTPATIENT
Start: 2023-06-08

## 2023-06-07 RX ORDER — EPINEPHRINE 0.3 MG/.3ML
0.3 INJECTION SUBCUTANEOUS ONCE AS NEEDED
Status: CANCELLED | OUTPATIENT
Start: 2023-06-08

## 2023-06-07 RX ORDER — DIPHENHYDRAMINE HYDROCHLORIDE 50 MG/ML
50 INJECTION INTRAMUSCULAR; INTRAVENOUS ONCE AS NEEDED
Status: CANCELLED | OUTPATIENT
Start: 2023-06-08

## 2023-06-07 RX ORDER — ACETAMINOPHEN 325 MG/1
650 TABLET ORAL
Status: CANCELLED | OUTPATIENT
Start: 2023-06-08

## 2023-06-07 RX ORDER — HEPARIN 100 UNIT/ML
500 SYRINGE INTRAVENOUS
Status: CANCELLED | OUTPATIENT
Start: 2023-06-08

## 2023-06-07 RX ORDER — ATORVASTATIN CALCIUM 80 MG/1
80 TABLET, FILM COATED ORAL DAILY
Qty: 90 TABLET | Refills: 3 | Status: SHIPPED | OUTPATIENT
Start: 2023-06-07 | End: 2024-06-06

## 2023-06-07 NOTE — PROGRESS NOTES
PATIENT: Haris Hernandez  MRN: [id number]  DATE: 6/7/2023    Chief Complaint: Kappa Light Chain Myeloma    Subjective:     Pertinent Medical History:     He presented to the ED 08/13/2021 with abnormal labs-CMP showed creatinine 6.6.  Prior CMP from May 2018 showed creatinine 1.2.    Further workup included a free light chain assay where a kappa free light chain level was 494    08/23/2021 bone marrow biopsy showed variable cellularity, 10 to 40% positive for plasma cell dyscrasia with plasma cells representing 30 to 40% of total cellularity.  No increase in blasts. FISH for Plasma Cell Proliferative Disorder - NORMAL    -started Darzalex, Velcade, Decadron 8/26/2021  -On Revlimid 10 mg qod since 9/30/2021  -saw urology,  for BPH causing difficulty in emptying the bladder, he may need cystoscopy and surgery    Interval History:   - he presents for a follow-up appointment for his multiple myeloma.  - he is scheduled for darzalex faspro today, 6/7/23  - today, he endorses fatigue, neck pain, diarrhea first 2-3 days following treatment, ble swelling.  Weight stable, appetite good.    Past Medical, Surgical, Family, and Social histories reviewed.    Medication and Allergies reviewed.    Review of Systems   Constitutional:  Positive for malaise/fatigue. Negative for fever.   HENT:  Negative for sore throat.    Respiratory:  Negative for shortness of breath.    Cardiovascular:  Positive for leg swelling. Negative for chest pain.   Gastrointestinal:  Positive for diarrhea. Negative for abdominal pain, nausea and vomiting.   Genitourinary:  Negative for dysuria.   Musculoskeletal:  Positive for neck pain. Negative for back pain.   Skin:  Negative for rash.   Neurological:  Negative for weakness.   Psychiatric/Behavioral:  The patient is not nervous/anxious.      Objective:     Vitals:    06/07/23 0910   BP: 110/70   Pulse: 66   SpO2: 95%   Weight: 68 kg (149 lb 14.6 oz)           BMI: Body mass index is 20.91  kg/m².    ECOG 1  Physical Exam  Vitals and nursing note reviewed.   Constitutional:       General: He is not in acute distress.     Appearance: Normal appearance. He is well-developed. He is not ill-appearing or toxic-appearing.   HENT:      Head: Normocephalic and atraumatic.      Right Ear: External ear normal.      Left Ear: External ear normal.   Eyes:      General: No scleral icterus.     Pupils: Pupils are equal, round, and reactive to light.   Cardiovascular:      Rate and Rhythm: Normal rate and regular rhythm.      Pulses: Normal pulses.      Heart sounds: Normal heart sounds. No murmur heard.  Pulmonary:      Effort: Pulmonary effort is normal. No respiratory distress.      Breath sounds: Normal breath sounds.   Abdominal:      General: Bowel sounds are normal.      Palpations: Abdomen is soft.   Musculoskeletal:         General: Normal range of motion.      Cervical back: Normal range of motion and neck supple. No rigidity.      Right lower leg: No edema.      Left lower leg: No edema.      Comments: BLE symmetrical in size and musculature, minimal nonpitting swelling, 2+ posterior tibialis pulses, bilat neg brennan's   Skin:     General: Skin is warm and dry.      Capillary Refill: Capillary refill takes less than 2 seconds.   Neurological:      Mental Status: He is alert and oriented to person, place, and time.      Gait: Gait normal.   Psychiatric:         Mood and Affect: Mood normal.         Behavior: Behavior normal.         Thought Content: Thought content normal.         Judgment: Judgment normal.     Labs have been reviewed, discussed with pt and wife.    Lab Results   Component Value Date    WBC 2.71 (L) 06/05/2023    HGB 7.6 (L) 06/05/2023    HCT 21.9 (L) 06/05/2023    MCV 90 06/05/2023     (L) 06/05/2023       Serum protein electrophoresis (6/6/23):  Decreased total protein. Normal gamma globulins are decreased. Two   closely adjacent paraprotein peaks in near-gamma = 0.18 g/dL    (previously 0.15 g/dL) and mid-gamma = 0.21 g/dL (previously 0.17   g/dL).     Serum protein electrophoresis (5/9/23):  Decreased total protein. Normal gamma globulins are decreased. Two   closely adjacent paraprotein peaks in near-gamma = 0.15 g/dL   (previously 0.15 g/dL) and mid-gamma = 0.17 g/dL (previously 0.18   g/dL).     Serum protein electrophoresis (4/11/23):  Decreased total protein. Normal gamma globulins are decreased.   Two closely adjacent paraprotein peaks in near-gamma = 0.15 g/dL   (previously 0.15 g/dL) and mid-gamma = 0.18 g/dL (previously 0.15   g/dL).     Serum protein electrophoresis (3/13/23):  Decreased total protein. Normal gamma globulins are decreased.   Two closely adjacent paraprotein peaks in near-gamma = 0.15 g/dL   (previously 0.14 g/dL) and mid-gamma = 0.15 g/dL (previously, 0.13   g/dL).       Serum protein electrophoresis (2/13/23):  Decreased total protein. Normal gamma globulins are decreased. Two   paraprotein bands are present in near-gamma = 0.14 g/dL (previously   0.15 g/dL) and mid-gamma = 0.13 g/dL (previously 0.19 g/dL).       Serum protein electrophoresis (12/19/22):  Normal total protein. Normal gamma globulins are decreased. Two   paraprotein bands are present in near-gamma = 0.20 g/dL (previously   0.16 g/dL) and mid-gamma = 0.23 g/dL (previously 0.15 g/dL).     Serum protein electrophoresis (9/27/22):  Normal total protein. Normal gamma globulins are decreased.   Two paraprotein bands are identified: 1) Near-gamma = 0.2 g/dL   (previously 0.18 g/dL) 2) Mid-gamma = 0.17 g/dL (previously 0.18   g/dL).     Serum protein electrophoresis (8/30/22):  Decreased total protein. Normal gamma globulins are decreased. Two   paraprotein bands are identified: 1) Near-gamma = 0.18 g/dL   (previously 0.21 g/dL) 2) Mid-gamma = 0.18 g/dL (previously 0.18   g/dL).     Plasma Cell FISH:  The result is within normal limits for 1q duplication, TP53   deletion and IGH rearrangement.          Assessment:       1. Kappa light chain myeloma    2. Anemia due to antineoplastic chemotherapy    3. Chemotherapy-induced thrombocytopenia    4. Chemotherapy-induced neutropenia    5. Stage 3b chronic kidney disease    6. Chronic neoplasm-related pain    7. Hyperlipidemia, unspecified hyperlipidemia type    8. Examination prior to chemotherapy            Plan:   Kappa light chain myeloma with normal cytogenetics / anemia/neutropenia/thrombocytopenia due to chemotherapy  -started Darzalex Faspro, subcutaneous Velcade, oral dexamethasone 8/26/2021  -velcade discontinued after 4 cycles  -last decadron dose was 2/3/22  -on Revlimid 10 mg QOD - start 9/30/2021, will continue   -cont Plavix  -continue acyclovir for shingles prophylaxis  - Labs have been reviewed. Absolute neutrophil count is 1.2 k/uL. Hemoglobin is 7.6 g/dL. Platelet count is 104 k/uL. SPEP stable.  - okay to proceed with Darzalex today. Continue revlimid.  - return to clinic in 4 weeks in preparation for next cycle of darzalex faspro/dexamethasone/revlimid.     CKD Stage 3b  - Labs have been reviewed. Creatinine is 1.8 mg/dL, stable.   - continue to monitor    Chronic neoplasm-related pain  - no issues.  - continue to monitor    Hyperlipidemia  - lipitor refill request, refill sent  - management deferred to pcp        - return to clinic in 4 weeks in preparation for next cycle of darzalex faspro/dexamethasone/revlimid. Chemo moved to [facility] May 2023.       CORBIN Gibbs  [facility]  Hematology/Oncology  [address] [place] [address]  IVONNE Schmitt  [id number]  [phone]

## 2023-06-12 DIAGNOSIS — C90.00 KAPPA LIGHT CHAIN MYELOMA: ICD-10-CM

## 2023-06-12 RX ORDER — LENALIDOMIDE 10 MG/1
10 CAPSULE ORAL EVERY OTHER DAY
Qty: 15 EACH | Refills: 5 | Status: SHIPPED | OUTPATIENT
Start: 2023-06-12 | End: 2023-07-10 | Stop reason: SDUPTHER

## 2023-07-05 ENCOUNTER — OFFICE VISIT (OUTPATIENT)
Dept: HEMATOLOGY/ONCOLOGY | Facility: CLINIC | Age: 78
End: 2023-07-05
Payer: MEDICARE

## 2023-07-05 VITALS
SYSTOLIC BLOOD PRESSURE: 130 MMHG | HEART RATE: 60 BPM | DIASTOLIC BLOOD PRESSURE: 80 MMHG | OXYGEN SATURATION: 93 % | WEIGHT: 152.56 LBS | BODY MASS INDEX: 21.28 KG/M2

## 2023-07-05 DIAGNOSIS — D70.1 CHEMOTHERAPY-INDUCED NEUTROPENIA: ICD-10-CM

## 2023-07-05 DIAGNOSIS — Z01.818 EXAMINATION PRIOR TO CHEMOTHERAPY: ICD-10-CM

## 2023-07-05 DIAGNOSIS — C90.00 KAPPA LIGHT CHAIN MYELOMA: Primary | ICD-10-CM

## 2023-07-05 DIAGNOSIS — D64.81 ANEMIA DUE TO ANTINEOPLASTIC CHEMOTHERAPY: ICD-10-CM

## 2023-07-05 DIAGNOSIS — N18.32 STAGE 3B CHRONIC KIDNEY DISEASE: ICD-10-CM

## 2023-07-05 DIAGNOSIS — I63.9 CEREBROVASCULAR ACCIDENT (CVA), UNSPECIFIED MECHANISM: ICD-10-CM

## 2023-07-05 DIAGNOSIS — T45.1X5A ANEMIA DUE TO ANTINEOPLASTIC CHEMOTHERAPY: ICD-10-CM

## 2023-07-05 DIAGNOSIS — G89.3 CHRONIC NEOPLASM-RELATED PAIN: ICD-10-CM

## 2023-07-05 DIAGNOSIS — T45.1X5A CHEMOTHERAPY-INDUCED THROMBOCYTOPENIA: ICD-10-CM

## 2023-07-05 DIAGNOSIS — D84.821 IMMUNODEFICIENCY DUE TO DRUG THERAPY: ICD-10-CM

## 2023-07-05 DIAGNOSIS — Z79.899 IMMUNODEFICIENCY DUE TO DRUG THERAPY: ICD-10-CM

## 2023-07-05 DIAGNOSIS — N40.0 BENIGN PROSTATIC HYPERPLASIA, UNSPECIFIED WHETHER LOWER URINARY TRACT SYMPTOMS PRESENT: ICD-10-CM

## 2023-07-05 DIAGNOSIS — D69.59 CHEMOTHERAPY-INDUCED THROMBOCYTOPENIA: ICD-10-CM

## 2023-07-05 DIAGNOSIS — T45.1X5A CHEMOTHERAPY-INDUCED NEUTROPENIA: ICD-10-CM

## 2023-07-05 PROCEDURE — 99215 OFFICE O/P EST HI 40 MIN: CPT | Mod: S$GLB,,, | Performed by: NURSE PRACTITIONER

## 2023-07-05 PROCEDURE — 3079F PR MOST RECENT DIASTOLIC BLOOD PRESSURE 80-89 MM HG: ICD-10-PCS | Mod: CPTII,S$GLB,, | Performed by: NURSE PRACTITIONER

## 2023-07-05 PROCEDURE — 1101F PT FALLS ASSESS-DOCD LE1/YR: CPT | Mod: CPTII,S$GLB,, | Performed by: NURSE PRACTITIONER

## 2023-07-05 PROCEDURE — 1101F PR PT FALLS ASSESS DOC 0-1 FALLS W/OUT INJ PAST YR: ICD-10-PCS | Mod: CPTII,S$GLB,, | Performed by: NURSE PRACTITIONER

## 2023-07-05 PROCEDURE — 1126F PR PAIN SEVERITY QUANTIFIED, NO PAIN PRESENT: ICD-10-PCS | Mod: CPTII,S$GLB,, | Performed by: NURSE PRACTITIONER

## 2023-07-05 PROCEDURE — 99215 PR OFFICE/OUTPT VISIT, EST, LEVL V, 40-54 MIN: ICD-10-PCS | Mod: S$GLB,,, | Performed by: NURSE PRACTITIONER

## 2023-07-05 PROCEDURE — 3079F DIAST BP 80-89 MM HG: CPT | Mod: CPTII,S$GLB,, | Performed by: NURSE PRACTITIONER

## 2023-07-05 PROCEDURE — 1126F AMNT PAIN NOTED NONE PRSNT: CPT | Mod: CPTII,S$GLB,, | Performed by: NURSE PRACTITIONER

## 2023-07-05 PROCEDURE — 3288F FALL RISK ASSESSMENT DOCD: CPT | Mod: CPTII,S$GLB,, | Performed by: NURSE PRACTITIONER

## 2023-07-05 PROCEDURE — 3288F PR FALLS RISK ASSESSMENT DOCUMENTED: ICD-10-PCS | Mod: CPTII,S$GLB,, | Performed by: NURSE PRACTITIONER

## 2023-07-05 PROCEDURE — 3075F SYST BP GE 130 - 139MM HG: CPT | Mod: CPTII,S$GLB,, | Performed by: NURSE PRACTITIONER

## 2023-07-05 PROCEDURE — 3075F PR MOST RECENT SYSTOLIC BLOOD PRESS GE 130-139MM HG: ICD-10-PCS | Mod: CPTII,S$GLB,, | Performed by: NURSE PRACTITIONER

## 2023-07-05 PROCEDURE — 99999 PR PBB SHADOW E&M-EST. PATIENT-LVL III: ICD-10-PCS | Mod: PBBFAC,,, | Performed by: NURSE PRACTITIONER

## 2023-07-05 PROCEDURE — 99999 PR PBB SHADOW E&M-EST. PATIENT-LVL III: CPT | Mod: PBBFAC,,, | Performed by: NURSE PRACTITIONER

## 2023-07-05 RX ORDER — HEPARIN 100 UNIT/ML
500 SYRINGE INTRAVENOUS
Status: CANCELLED | OUTPATIENT
Start: 2023-07-06

## 2023-07-05 RX ORDER — CLOPIDOGREL BISULFATE 75 MG/1
75 TABLET ORAL DAILY
Qty: 90 TABLET | Refills: 3 | Status: SHIPPED | OUTPATIENT
Start: 2023-07-05 | End: 2024-07-04

## 2023-07-05 RX ORDER — SODIUM CHLORIDE 0.9 % (FLUSH) 0.9 %
10 SYRINGE (ML) INJECTION
Status: CANCELLED | OUTPATIENT
Start: 2023-07-06

## 2023-07-05 RX ORDER — ACETAMINOPHEN 325 MG/1
650 TABLET ORAL
Status: CANCELLED | OUTPATIENT
Start: 2023-07-06

## 2023-07-05 RX ORDER — EPINEPHRINE 0.3 MG/.3ML
0.3 INJECTION SUBCUTANEOUS ONCE AS NEEDED
Status: CANCELLED | OUTPATIENT
Start: 2023-07-06

## 2023-07-05 RX ORDER — DIPHENHYDRAMINE HCL 25 MG
50 CAPSULE ORAL
Status: CANCELLED
Start: 2023-07-06

## 2023-07-05 RX ORDER — DIPHENHYDRAMINE HYDROCHLORIDE 50 MG/ML
50 INJECTION INTRAMUSCULAR; INTRAVENOUS ONCE AS NEEDED
Status: CANCELLED | OUTPATIENT
Start: 2023-07-06

## 2023-07-05 RX ORDER — TAMSULOSIN HYDROCHLORIDE 0.4 MG/1
2 CAPSULE ORAL DAILY
Qty: 180 CAPSULE | Refills: 3 | Status: SHIPPED | OUTPATIENT
Start: 2023-07-05 | End: 2024-07-04

## 2023-07-05 RX ORDER — FAMOTIDINE 20 MG/1
20 TABLET, FILM COATED ORAL
Status: CANCELLED
Start: 2023-07-06

## 2023-07-05 NOTE — PROGRESS NOTES
"PATIENT: Haris Hernandez  MRN: [id number]  DATE: 7/5/2023    Chief Complaint: Kappa Light Chain Myeloma    Subjective:     Pertinent Medical History:     He presented to the ED 08/13/2021 with abnormal labs-CMP showed creatinine 6.6.  Prior CMP from May 2018 showed creatinine 1.2.    Further workup included a free light chain assay where a kappa free light chain level was 494    08/23/2021 bone marrow biopsy showed variable cellularity, 10 to 40% positive for plasma cell dyscrasia with plasma cells representing 30 to 40% of total cellularity.  No increase in blasts. FISH for Plasma Cell Proliferative Disorder - NORMAL    -started Darzalex, Velcade, Decadron 8/26/2021  -On Revlimid 10 mg qod since 9/30/2021  -saw urology,  for BPH causing difficulty in emptying the bladder, he may need cystoscopy and surgery    Interval History:   - he presents for a follow-up appointment for his multiple myeloma.  - he is scheduled for darzalex faspro today, 7/5//23  - today, he endorses fatigue "sometimes", diarrhea first 2-3 days following treatment.  Weight stable, appetite good. Denies chest pain, sob, abdominal pain, n/v, constipation, hematemesis, melena, hematuria. He reports continued lower extremity swelling bilat but now worsening, this is chronic and minimal with no acute worsening.     Past Medical, Surgical, Family, and Social histories reviewed.    Medication and Allergies reviewed.    Review of Systems   Constitutional:  Positive for malaise/fatigue. Negative for fever.   HENT:  Negative for sore throat.    Respiratory:  Negative for shortness of breath.    Cardiovascular:  Positive for leg swelling. Negative for chest pain.   Gastrointestinal:  Positive for diarrhea. Negative for abdominal pain, nausea and vomiting.   Genitourinary:  Negative for dysuria.   Musculoskeletal:  Positive for neck pain. Negative for back pain.   Skin:  Negative for rash.   Neurological:  Negative for weakness. "   Psychiatric/Behavioral:  The patient is not nervous/anxious.      Objective:     Vitals:    07/05/23 0909   BP: 130/80   Pulse: 60   SpO2: (!) 93%   Weight: 69.2 kg (152 lb 8.9 oz)       BMI: Body mass index is 21.28 kg/m².    ECOG 1  Physical Exam  Vitals and nursing note reviewed.   Constitutional:       General: He is not in acute distress.     Appearance: Normal appearance. He is well-developed. He is not ill-appearing or toxic-appearing.   HENT:      Head: Normocephalic and atraumatic.      Right Ear: External ear normal.      Left Ear: External ear normal.   Eyes:      General: No scleral icterus.     Pupils: Pupils are equal, round, and reactive to light.   Cardiovascular:      Rate and Rhythm: Normal rate and regular rhythm.      Pulses: Normal pulses.      Heart sounds: Normal heart sounds. No murmur heard.  Pulmonary:      Effort: Pulmonary effort is normal. No respiratory distress.      Breath sounds: Normal breath sounds.   Abdominal:      General: Bowel sounds are normal. There is no distension.      Palpations: Abdomen is soft.      Tenderness: There is no abdominal tenderness. There is no guarding.   Musculoskeletal:         General: Normal range of motion.      Cervical back: Normal range of motion and neck supple. No rigidity.      Right lower leg: No edema.      Left lower leg: No edema.      Comments: BLE symmetrical in size and musculature, minimal nonpitting swelling, 2+ posterior tibialis pulses, bilat neg brennan's   Skin:     General: Skin is warm and dry.      Capillary Refill: Capillary refill takes less than 2 seconds.   Neurological:      Mental Status: He is alert and oriented to person, place, and time.      Gait: Gait normal.   Psychiatric:         Mood and Affect: Mood normal.         Behavior: Behavior normal.         Thought Content: Thought content normal.         Judgment: Judgment normal.     Labs have been reviewed, discussed with pt and wife.    Lab Results   Component Value  Date    WBC 2.41 (L) 07/03/2023    HGB 7.0 (L) 07/03/2023    HCT 20.0 (L) 07/03/2023    MCV 89 07/03/2023     (L) 07/03/2023     SPEP 7/3/23 path interpretation pending at visit time.      Serum protein electrophoresis (6/6/23):  Decreased total protein. Normal gamma globulins are decreased. Two   closely adjacent paraprotein peaks in near-gamma = 0.18 g/dL   (previously 0.15 g/dL) and mid-gamma = 0.21 g/dL (previously 0.17   g/dL).     Serum protein electrophoresis (5/9/23):  Decreased total protein. Normal gamma globulins are decreased. Two   closely adjacent paraprotein peaks in near-gamma = 0.15 g/dL   (previously 0.15 g/dL) and mid-gamma = 0.17 g/dL (previously 0.18   g/dL).     Serum protein electrophoresis (4/11/23):  Decreased total protein. Normal gamma globulins are decreased.   Two closely adjacent paraprotein peaks in near-gamma = 0.15 g/dL   (previously 0.15 g/dL) and mid-gamma = 0.18 g/dL (previously 0.15   g/dL).     Serum protein electrophoresis (3/13/23):  Decreased total protein. Normal gamma globulins are decreased.   Two closely adjacent paraprotein peaks in near-gamma = 0.15 g/dL   (previously 0.14 g/dL) and mid-gamma = 0.15 g/dL (previously, 0.13   g/dL).       Serum protein electrophoresis (2/13/23):  Decreased total protein. Normal gamma globulins are decreased. Two   paraprotein bands are present in near-gamma = 0.14 g/dL (previously   0.15 g/dL) and mid-gamma = 0.13 g/dL (previously 0.19 g/dL).       Serum protein electrophoresis (12/19/22):  Normal total protein. Normal gamma globulins are decreased. Two   paraprotein bands are present in near-gamma = 0.20 g/dL (previously   0.16 g/dL) and mid-gamma = 0.23 g/dL (previously 0.15 g/dL).     Serum protein electrophoresis (9/27/22):  Normal total protein. Normal gamma globulins are decreased.   Two paraprotein bands are identified: 1) Near-gamma = 0.2 g/dL   (previously 0.18 g/dL) 2) Mid-gamma = 0.17 g/dL (previously 0.18   g/dL).      Serum protein electrophoresis (8/30/22):  Decreased total protein. Normal gamma globulins are decreased. Two   paraprotein bands are identified: 1) Near-gamma = 0.18 g/dL   (previously 0.21 g/dL) 2) Mid-gamma = 0.18 g/dL (previously 0.18   g/dL).     Plasma Cell FISH:  The result is within normal limits for 1q duplication, TP53   deletion and IGH rearrangement.         Assessment:       1. Kappa light chain myeloma    2. Anemia due to antineoplastic chemotherapy    3. Chemotherapy-induced thrombocytopenia    4. Chemotherapy-induced neutropenia    5. Examination prior to chemotherapy    6. Stage 3b chronic kidney disease    7. 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 Cerebrovascular accident (CVA), unspecified mechanism    10. Benign prostatic hyperplasia, unspecified whether lower urinary tract symptoms present              Plan:   Kappa light chain myeloma with normal cytogenetics / anemia/neutropenia/thrombocytopenia due to chemotherapy  -started Darzalex Faspro, subcutaneous Velcade, oral dexamethasone 8/26/2021  -velcade discontinued after 4 cycles  -last decadron dose was 2/3/22  -on Revlimid 10 mg QOD - start 9/30/2021, will continue   -cont Plavix  -continue acyclovir for shingles prophylaxis  - Labs have been reviewed. Absolute neutrophil count is 1.2 k/uL, stable. Hemoglobin is 7.0 g/dL. Platelet count is 112 k/uL. SPEP path interpretation pending.  - okay to proceed with Darzalex today. Hold revlimid given hgb of 7 g/dL  - return to clinic in 4 weeks in preparation for next cycle of darzalex faspro/dexamethasone/revlimid.     CKD Stage 3b  - Labs have been reviewed. Creatinine is 1.8 mg/dL, stable.   - continue to monitor    Chronic neoplasm-related pain  - no issues.  - continue to monitor     Immunodeficiency due to drug therapy  - No fevers, no s/s of acute illness  - No known exposure to covid or other illness  - stable anc  - Instructed on good handwashing,  avoiding known ill individuals, limiting crowd exposure  - Continue to monitor    CVA history  - refill request clopidogrel, script sent    BPH  - refill flomax requested, script sent        - return to clinic in 4 weeks in preparation for next cycle of darzalex faspro/dexamethasone/revlimid, hold revlimid current cycle due to decrease of hgb. Chemo moved to [facility] May 2023.       Milagros Myers, JENNY-C  [facility]  Hematology/Oncology  [address] [place] [address]  IVONNE Schmitt  [phone]

## 2023-07-10 DIAGNOSIS — C90.00 KAPPA LIGHT CHAIN MYELOMA: ICD-10-CM

## 2023-07-10 RX ORDER — LENALIDOMIDE 10 MG/1
10 CAPSULE ORAL EVERY OTHER DAY
Qty: 15 EACH | Refills: 5 | Status: SHIPPED | OUTPATIENT
Start: 2023-07-10 | End: 2023-08-10 | Stop reason: SDUPTHER

## 2023-07-11 ENCOUNTER — TELEPHONE (OUTPATIENT)
Dept: HEMATOLOGY/ONCOLOGY | Facility: CLINIC | Age: 78
End: 2023-07-11
Payer: MEDICARE

## 2023-07-11 NOTE — TELEPHONE ENCOUNTER
----- Message -----  From: Nehal Mojica  Sent: 7/11/2023  11:09 AM CDT  To: [facility] Staff    .Type:  Needs Medical Advice    Who Called: Alem Ramsey  Would the patient rather a call back or a response via [facility]? call  Best Call Back Number: [phone]   Additional Information:   Caller requested to know if the pt's medication is put on hold until his upcoming appt on 8/2.

## 2023-07-12 ENCOUNTER — TELEPHONE (OUTPATIENT)
Dept: HEMATOLOGY/ONCOLOGY | Facility: CLINIC | Age: 78
End: 2023-07-12
Payer: MEDICARE

## 2023-07-12 NOTE — TELEPHONE ENCOUNTER
----- Message from Dillon Schwarz sent at 7/12/2023  2:37 PM CDT -----  Type:  Pharmacy Calling to Clarify an RX    Name of Caller:Makeda FERGUSON/ Bhavin   Pharmacy Name:[id number] - [place], [place] - [address] [place] [address]   Prescription Name:lenalidomide 10 mg Cap  What do they need to clarify?:if medication on hold  Best Call Back Number:Phone:  [phone]  Fax:  [phone]  Additional Information:

## 2023-08-01 RX ORDER — ACETAMINOPHEN 325 MG/1
650 TABLET ORAL
Status: CANCELLED | OUTPATIENT
Start: 2023-08-03

## 2023-08-01 RX ORDER — SODIUM CHLORIDE 0.9 % (FLUSH) 0.9 %
10 SYRINGE (ML) INJECTION
Status: CANCELLED | OUTPATIENT
Start: 2023-08-03

## 2023-08-01 RX ORDER — DIPHENHYDRAMINE HYDROCHLORIDE 50 MG/ML
50 INJECTION INTRAMUSCULAR; INTRAVENOUS ONCE AS NEEDED
Status: CANCELLED | OUTPATIENT
Start: 2023-08-03

## 2023-08-01 RX ORDER — EPINEPHRINE 0.3 MG/.3ML
0.3 INJECTION SUBCUTANEOUS ONCE AS NEEDED
Status: CANCELLED | OUTPATIENT
Start: 2023-08-03

## 2023-08-01 RX ORDER — FAMOTIDINE 20 MG/1
20 TABLET, FILM COATED ORAL
Status: CANCELLED
Start: 2023-08-03

## 2023-08-01 RX ORDER — DIPHENHYDRAMINE HCL 25 MG
50 CAPSULE ORAL
Status: CANCELLED
Start: 2023-08-03

## 2023-08-01 RX ORDER — HEPARIN 100 UNIT/ML
500 SYRINGE INTRAVENOUS
Status: CANCELLED | OUTPATIENT
Start: 2023-08-03

## 2023-08-01 NOTE — PROGRESS NOTES
"PATIENT: Haris Hernandez  MRN: [id number]  DATE: 8/2/2023    Chief Complaint: Kappa Light Chain Myeloma    Subjective:     Pertinent Medical History:     He presented to the ED 08/13/2021 with abnormal labs-CMP showed creatinine 6.6.  Prior CMP from May 2018 showed creatinine 1.2.    Further workup included a free light chain assay where a kappa free light chain level was 494    08/23/2021 bone marrow biopsy showed variable cellularity, 10 to 40% positive for plasma cell dyscrasia with plasma cells representing 30 to 40% of total cellularity.  No increase in blasts. FISH for Plasma Cell Proliferative Disorder - NORMAL    -started Darzalex, Velcade, Decadron 8/26/2021  -On Revlimid 10 mg qod since 9/30/2021  -saw urology,  for BPH causing difficulty in emptying the bladder, he may need cystoscopy and surgery    Interval History:   - he presents for a follow-up appointment for his multiple myeloma.  - he is scheduled for darzalex faspro today, 8/2/23  - today, overall he states feels well; he endorses fatigue "sometimes", diarrhea first 2-3 days following treatment.  Weight has decreased with 6 pound loss, has felt like only eating one meal a day with summer heat. Denies chest pain, sob, abdominal pain, n/v, constipation, hematemesis, melena, hematuria. He reports  lower extremity swelling bilat but now improved, this is chronic.     Past Medical, Surgical, Family, and Social histories reviewed.    Medication and Allergies reviewed.    Review of Systems   Constitutional:  Positive for malaise/fatigue and weight loss. Negative for fever.   HENT:  Negative for sore throat.    Respiratory:  Negative for shortness of breath.    Cardiovascular:  Positive for leg swelling. Negative for chest pain.   Gastrointestinal:  Positive for diarrhea. Negative for abdominal pain, nausea and vomiting.   Genitourinary:  Negative for dysuria.   Musculoskeletal:  Positive for neck pain. Negative for back pain.   Skin:  Negative for " rash.   Neurological:  Negative for weakness.   Psychiatric/Behavioral:  The patient is not nervous/anxious.        Objective:     Vitals:    08/02/23 0840   BP: (!) 164/77   Pulse: 63   SpO2: 100%   Weight: 66.6 kg (146 lb 13.2 oz)         BMI: Body mass index is 20.48 kg/m².    ECOG 1  Physical Exam  Vitals and nursing note reviewed.   Constitutional:       General: He is not in acute distress.     Appearance: Normal appearance. He is well-developed. He is not ill-appearing or toxic-appearing.   HENT:      Head: Normocephalic and atraumatic.      Right Ear: External ear normal.      Left Ear: External ear normal.   Eyes:      General: No scleral icterus.     Pupils: Pupils are equal, round, and reactive to light.   Cardiovascular:      Rate and Rhythm: Normal rate and regular rhythm.      Pulses: Normal pulses.      Heart sounds: Normal heart sounds. No murmur heard.  Pulmonary:      Effort: Pulmonary effort is normal. No respiratory distress.      Breath sounds: Normal breath sounds.   Abdominal:      General: Bowel sounds are normal. There is no distension.      Palpations: Abdomen is soft.      Tenderness: There is no abdominal tenderness. There is no guarding.   Musculoskeletal:         General: No swelling or tenderness. Normal range of motion.      Cervical back: Normal range of motion and neck supple. No rigidity.      Right lower leg: No edema.      Left lower leg: No edema.   Skin:     General: Skin is warm and dry.      Capillary Refill: Capillary refill takes less than 2 seconds.   Neurological:      Mental Status: He is alert and oriented to person, place, and time.      Gait: Gait normal.   Psychiatric:         Mood and Affect: Mood normal.         Behavior: Behavior normal.         Thought Content: Thought content normal.         Judgment: Judgment normal.       Labs have been reviewed, discussed with pt and wife.    Lab Results   Component Value Date    WBC 4.22 07/31/2023    HGB 8.3 (L) 07/31/2023     HCT 23.9 (L) 07/31/2023    MCV 88 07/31/2023     (L) 07/31/2023     SPEP 7/31/23  There is a paraprotein band in near-gamma = 0.14 g/dL, previously   0.18 g/dL.     The second previously described band is not seen discretely on the   current study.     [place] free light chain 7/31/23  2.72 mg/dL (494.85 mg/dL on 8/14/21)      SPEP 7/3/23 path interpretation   Decreased total protein. Normal gamma globulins are decreased. Two   closely adjacent paraprotein peaks in near-gamma = 0.18 g/dL   (previously 0.18 g/dL) and mid-gamma = 0.14 g/dL (previously 0.21   g/dL).     Serum protein electrophoresis (6/6/23):  Decreased total protein. Normal gamma globulins are decreased. Two   closely adjacent paraprotein peaks in near-gamma = 0.18 g/dL   (previously 0.15 g/dL) and mid-gamma = 0.21 g/dL (previously 0.17   g/dL).     Serum protein electrophoresis (5/9/23):  Decreased total protein. Normal gamma globulins are decreased. Two   closely adjacent paraprotein peaks in near-gamma = 0.15 g/dL   (previously 0.15 g/dL) and mid-gamma = 0.17 g/dL (previously 0.18   g/dL).     Serum protein electrophoresis (4/11/23):  Decreased total protein. Normal gamma globulins are decreased.   Two closely adjacent paraprotein peaks in near-gamma = 0.15 g/dL   (previously 0.15 g/dL) and mid-gamma = 0.18 g/dL (previously 0.15   g/dL).     Serum protein electrophoresis (3/13/23):  Decreased total protein. Normal gamma globulins are decreased.   Two closely adjacent paraprotein peaks in near-gamma = 0.15 g/dL   (previously 0.14 g/dL) and mid-gamma = 0.15 g/dL (previously, 0.13   g/dL).       Serum protein electrophoresis (2/13/23):  Decreased total protein. Normal gamma globulins are decreased. Two   paraprotein bands are present in near-gamma = 0.14 g/dL (previously   0.15 g/dL) and mid-gamma = 0.13 g/dL (previously 0.19 g/dL).       Serum protein electrophoresis (12/19/22):  Normal total protein. Normal gamma globulins are decreased. Two    paraprotein bands are present in near-gamma = 0.20 g/dL (previously   0.16 g/dL) and mid-gamma = 0.23 g/dL (previously 0.15 g/dL).     Serum protein electrophoresis (9/27/22):  Normal total protein. Normal gamma globulins are decreased.   Two paraprotein bands are identified: 1) Near-gamma = 0.2 g/dL   (previously 0.18 g/dL) 2) Mid-gamma = 0.17 g/dL (previously 0.18   g/dL).     Serum protein electrophoresis (8/30/22):  Decreased total protein. Normal gamma globulins are decreased. Two   paraprotein bands are identified: 1) Near-gamma = 0.18 g/dL   (previously 0.21 g/dL) 2) Mid-gamma = 0.18 g/dL (previously 0.18   g/dL).     Plasma Cell FISH:  The result is within normal limits for 1q duplication, TP53   deletion and IGH rearrangement.         Assessment:       1. Kappa light chain myeloma    2. Anemia due to antineoplastic chemotherapy    3. Chemotherapy-induced thrombocytopenia    4. Chemotherapy-induced neutropenia    5. Examination prior to chemotherapy    6. Stage 3b chronic kidney disease    7. 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 Cerebrovascular accident (CVA), unspecified mechanism    10. Weight loss        Plan:   Kappa light chain myeloma with normal cytogenetics / anemia/neutropenia/thrombocytopenia due to chemotherapy  -started Darzalex Faspro, subcutaneous Velcade, oral dexamethasone 8/26/2021  -velcade discontinued after 4 cycles  -last decadron dose was 2/3/22  -on Revlimid 10 mg QOD - start 9/30/2021, will continue   -cont Plavix  -continue acyclovir for shingles prophylaxis  - Labs have been reviewed. Absolute neutrophil count is 2.4 k/uL, imporoved. Hemoglobin is 8.3 g/dL, improved. Platelet count is 135 k/uL, improved. SPEP 7/31/23 There is a paraprotein band in near-gamma = 0.14 g/dL, previously 0.18 g/dL.  The second previously described band is not seen discretely on the current study. [place] free light chain now 2.72 mg/dL (494.85 mg/dL on  "8/14/21).  - okay to proceed with Darzalex today.  Held revlamid x1 cycle due to decreased hgb, now improving, restart revlamid this cycle.  - return to clinic in 4 weeks in preparation for next cycle of darzalex faspro/dexamethasone/revlimid.     CKD Stage 3b  - Labs have been reviewed. Creatinine is 1.76 mg/dL, stable.   - continue to monitor    Chronic neoplasm-related pain  - no issues.  - continue to monitor     Immunodeficiency due to drug therapy  - No fevers, no s/s of acute illness  - No known exposure to covid or other illness  - stable anc  - Instructed on good handwashing, avoiding known ill individuals, limiting crowd exposure  - Continue to monitor    CVA history  - refill request clopidogrel, script sent    BPH  - refill flomax requested, script sent    Weight loss  - 6# weight loss to 146# this visit  - pt eating one meal a day, "a big one" he states  - encouraged small frequent meals, ensure/nutritional supplemental drink; discussed examples with pt and family member  - periactin script sent  - continue to monitor    High Risk Conditions:    Patient has a condition that poses threat to life and bodily function: kappa light chain myeloma.   Patient is currently on drug therapy requiring intensive monitoring for toxicity: adjuvant chemotherapy:     Addendum: nursing order placed for ok to treat with gfr 39    - return to clinic in 4 weeks in preparation for next cycle of darzalex faspro/dexamethasone/revlimid, resume revlimid current cycle due to improvement of hgb. Chemo moved to [facility] May 2023.       JIN GibbsC  [facility]  Hematology/Oncology  [address] [place] [address]  IVONNE Schmitt  [phone]            "

## 2023-08-02 ENCOUNTER — OFFICE VISIT (OUTPATIENT)
Dept: HEMATOLOGY/ONCOLOGY | Facility: CLINIC | Age: 78
End: 2023-08-02
Payer: MEDICARE

## 2023-08-02 VITALS
BODY MASS INDEX: 20.48 KG/M2 | SYSTOLIC BLOOD PRESSURE: 164 MMHG | OXYGEN SATURATION: 100 % | DIASTOLIC BLOOD PRESSURE: 77 MMHG | WEIGHT: 146.81 LBS | HEART RATE: 63 BPM

## 2023-08-02 DIAGNOSIS — Z01.818 EXAMINATION PRIOR TO CHEMOTHERAPY: ICD-10-CM

## 2023-08-02 DIAGNOSIS — T45.1X5A CHEMOTHERAPY-INDUCED NEUTROPENIA: ICD-10-CM

## 2023-08-02 DIAGNOSIS — D64.81 ANEMIA DUE TO ANTINEOPLASTIC CHEMOTHERAPY: ICD-10-CM

## 2023-08-02 DIAGNOSIS — N18.32 STAGE 3B CHRONIC KIDNEY DISEASE: ICD-10-CM

## 2023-08-02 DIAGNOSIS — D70.1 CHEMOTHERAPY-INDUCED NEUTROPENIA: ICD-10-CM

## 2023-08-02 DIAGNOSIS — G89.3 CHRONIC NEOPLASM-RELATED PAIN: ICD-10-CM

## 2023-08-02 DIAGNOSIS — C90.00 KAPPA LIGHT CHAIN MYELOMA: Primary | ICD-10-CM

## 2023-08-02 DIAGNOSIS — D84.821 IMMUNODEFICIENCY DUE TO DRUG THERAPY: ICD-10-CM

## 2023-08-02 DIAGNOSIS — R63.4 WEIGHT LOSS: ICD-10-CM

## 2023-08-02 DIAGNOSIS — D69.59 CHEMOTHERAPY-INDUCED THROMBOCYTOPENIA: ICD-10-CM

## 2023-08-02 DIAGNOSIS — T45.1X5A ANEMIA DUE TO ANTINEOPLASTIC CHEMOTHERAPY: ICD-10-CM

## 2023-08-02 DIAGNOSIS — T45.1X5A CHEMOTHERAPY-INDUCED THROMBOCYTOPENIA: ICD-10-CM

## 2023-08-02 DIAGNOSIS — I63.9 CEREBROVASCULAR ACCIDENT (CVA), UNSPECIFIED MECHANISM: ICD-10-CM

## 2023-08-02 DIAGNOSIS — Z79.899 IMMUNODEFICIENCY DUE TO DRUG THERAPY: ICD-10-CM

## 2023-08-02 PROCEDURE — 1101F PR PT FALLS ASSESS DOC 0-1 FALLS W/OUT INJ PAST YR: ICD-10-PCS | Mod: CPTII,S$GLB,, | Performed by: NURSE PRACTITIONER

## 2023-08-02 PROCEDURE — 3288F PR FALLS RISK ASSESSMENT DOCUMENTED: ICD-10-PCS | Mod: CPTII,S$GLB,, | Performed by: NURSE PRACTITIONER

## 2023-08-02 PROCEDURE — 1159F MED LIST DOCD IN RCRD: CPT | Mod: CPTII,S$GLB,, | Performed by: NURSE PRACTITIONER

## 2023-08-02 PROCEDURE — 3288F FALL RISK ASSESSMENT DOCD: CPT | Mod: CPTII,S$GLB,, | Performed by: NURSE PRACTITIONER

## 2023-08-02 PROCEDURE — 99999 PR PBB SHADOW E&M-EST. PATIENT-LVL III: CPT | Mod: PBBFAC,,, | Performed by: NURSE PRACTITIONER

## 2023-08-02 PROCEDURE — 1101F PT FALLS ASSESS-DOCD LE1/YR: CPT | Mod: CPTII,S$GLB,, | Performed by: NURSE PRACTITIONER

## 2023-08-02 PROCEDURE — 99999 PR PBB SHADOW E&M-EST. PATIENT-LVL III: ICD-10-PCS | Mod: PBBFAC,,, | Performed by: NURSE PRACTITIONER

## 2023-08-02 PROCEDURE — 99215 OFFICE O/P EST HI 40 MIN: CPT | Mod: S$GLB,,, | Performed by: NURSE PRACTITIONER

## 2023-08-02 PROCEDURE — 3077F SYST BP >= 140 MM HG: CPT | Mod: CPTII,S$GLB,, | Performed by: NURSE PRACTITIONER

## 2023-08-02 PROCEDURE — 3078F DIAST BP <80 MM HG: CPT | Mod: CPTII,S$GLB,, | Performed by: NURSE PRACTITIONER

## 2023-08-02 PROCEDURE — 3077F PR MOST RECENT SYSTOLIC BLOOD PRESSURE >= 140 MM HG: ICD-10-PCS | Mod: CPTII,S$GLB,, | Performed by: NURSE PRACTITIONER

## 2023-08-02 PROCEDURE — 1159F PR MEDICATION LIST DOCUMENTED IN MEDICAL RECORD: ICD-10-PCS | Mod: CPTII,S$GLB,, | Performed by: NURSE PRACTITIONER

## 2023-08-02 PROCEDURE — 3078F PR MOST RECENT DIASTOLIC BLOOD PRESSURE < 80 MM HG: ICD-10-PCS | Mod: CPTII,S$GLB,, | Performed by: NURSE PRACTITIONER

## 2023-08-02 PROCEDURE — 99215 PR OFFICE/OUTPT VISIT, EST, LEVL V, 40-54 MIN: ICD-10-PCS | Mod: S$GLB,,, | Performed by: NURSE PRACTITIONER

## 2023-08-02 RX ORDER — CYPROHEPTADINE HYDROCHLORIDE 4 MG/1
4 TABLET ORAL 2 TIMES DAILY
Qty: 180 TABLET | Refills: 3 | Status: SHIPPED | OUTPATIENT
Start: 2023-08-02 | End: 2024-08-01

## 2023-08-10 DIAGNOSIS — C90.00 KAPPA LIGHT CHAIN MYELOMA: ICD-10-CM

## 2023-08-10 RX ORDER — LENALIDOMIDE 10 MG/1
10 CAPSULE ORAL EVERY OTHER DAY
Qty: 15 EACH | Refills: 5 | Status: SHIPPED | OUTPATIENT
Start: 2023-08-10 | End: 2023-09-01 | Stop reason: SDUPTHER

## 2023-08-10 NOTE — TELEPHONE ENCOUNTER
----- Message from Andres Ríos MD sent at 8/10/2023  3:55 PM CDT -----  He can restart revlimid. Can you let him know?  Thanks!  ----- Message -----  From: Sharmin Jimenes RN  Sent: 8/10/2023   9:28 AM CDT  To: MD Prince Umanzory, what do you advise?  ----- Message -----  From: Mirlande Tai MA  Sent: 8/10/2023   9:27 AM CDT  To: Sharmin Jimenes RN      ----- Message -----  From: Angela Braxton  Sent: 8/10/2023   9:26 AM CDT  To: [facility] Staff    Type:  Needs Medical Advice    Who Called: [employer]  Would the patient rather a call back or a response via [facility]? call  Best Call Back Number: [phone] [email or web address]  Additional Information: revlimid on hold , would like to know if pt can restart

## 2023-08-23 DIAGNOSIS — C90.00 KAPPA LIGHT CHAIN MYELOMA: ICD-10-CM

## 2023-08-23 RX ORDER — ACYCLOVIR 200 MG/1
200 CAPSULE ORAL
Qty: 90 CAPSULE | Refills: 3 | Status: SHIPPED | OUTPATIENT
Start: 2023-08-23

## 2023-08-29 NOTE — PROGRESS NOTES
"KPATIENT: Haris Hernandez  MRN: [id number]  DATE: 8/30/2023    Chief Complaint: Kappa Light Chain Myeloma    Subjective:     Pertinent Medical History:     He presented to the ED 08/13/2021 with abnormal labs-CMP showed creatinine 6.6.  Prior CMP from May 2018 showed creatinine 1.2.    Further workup included a free light chain assay where a kappa free light chain level was 494    08/23/2021 bone marrow biopsy showed variable cellularity, 10 to 40% positive for plasma cell dyscrasia with plasma cells representing 30 to 40% of total cellularity.  No increase in blasts. FISH for Plasma Cell Proliferative Disorder - NORMAL    -started Darzalex, Velcade, Decadron 8/26/2021  -On Revlimid 10 mg qod since 9/30/2021  -saw urology,  for BPH causing difficulty in emptying the bladder, he may need cystoscopy and surgery    Interval History:   - he presents for a follow-up appointment for his multiple myeloma.  - he is scheduled for darzalex faspro today, 8/30/23  - today, overall he states feels well; he endorses fatigue "sometimes", diarrhea first 1-2 days following treatment.  Weight stable this visit, last visit 146#, today 145#, no significant decrease. He is eating better, pushing fluids. Denies chest pain, sob, abdominal pain, n/v, constipation, hematemesis, melena, hematuria. He reports  lower extremity swelling bilat is improved, this is chronic. 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 Wearing gloves at night helps some.   - has restarted revlimid with last visit    Past Medical, Surgical, Family, and Social histories reviewed.    Medication and Allergies reviewed.    Review of Systems   Constitutional:  Positive for malaise/fatigue and weight loss (stable this visit). Negative for fever.   HENT:  Negative for sore throat.    Respiratory:  Negative for shortness of breath.    Cardiovascular:  Positive for leg swelling (resolved at present). Negative for chest pain. "   Gastrointestinal:  Positive for diarrhea (1-2 days following chemo). Negative for abdominal pain, nausea and vomiting.   Genitourinary:  Negative for dysuria.   Musculoskeletal:  Positive for neck pain. Negative for back pain.   Skin:  Negative for rash.   Neurological:  Negative for weakness.   Psychiatric/Behavioral:  The patient is not nervous/anxious.        Objective:     Vitals:    08/30/23 0813   BP: (!) 147/66   Pulse: (!) 54   SpO2: 96%   Weight: 66 kg (145 lb 9.8 oz)           BMI: Body mass index is 20.31 kg/m².    ECOG 1  Physical Exam  Vitals and nursing note reviewed.   Constitutional:       General: He is not in acute distress.     Appearance: Normal appearance. He is well-developed. He is not ill-appearing or toxic-appearing.   HENT:      Head: Normocephalic and atraumatic.      Right Ear: External ear normal.      Left Ear: External ear normal.   Eyes:      General: No scleral icterus.     Pupils: Pupils are equal, round, and reactive to light.   Cardiovascular:      Rate and Rhythm: Normal rate and regular rhythm.      Pulses: Normal pulses.      Heart sounds: Normal heart sounds. No murmur heard.  Pulmonary:      Effort: Pulmonary effort is normal. No respiratory distress.      Breath sounds: Normal breath sounds.   Abdominal:      General: Bowel sounds are normal. There is no distension.      Palpations: Abdomen is soft.      Tenderness: There is no abdominal tenderness. There is no guarding.   Musculoskeletal:         General: No swelling or tenderness. Normal range of motion.      Cervical back: Normal range of motion and neck supple. No rigidity.      Right lower leg: No edema.      Left lower leg: No edema.   Skin:     General: Skin is warm and dry.      Capillary Refill: Capillary refill takes less than 2 seconds.   Neurological:      Mental Status: He is alert and oriented to person, place, and time.      Gait: Gait normal.   Psychiatric:         Mood and Affect: Mood normal.          Behavior: Behavior normal.         Thought Content: Thought content normal.         Judgment: Judgment normal.       Labs have been reviewed, discussed with pt and wife.    Lab Results   Component Value Date    WBC 3.13 (L) 08/28/2023    HGB 8.5 (L) 08/28/2023    HCT 23.9 (L) 08/28/2023    MCV 87 08/28/2023     (L) 08/28/2023     SPEP 8/28/23  Decreased total protein. Normal gamma globulins are decreased. There   is a paraprotein band in near-gamma = 0.12 g/dL, previously 0.14   g/dL.         SPEP 7/31/23  There is a paraprotein band in near-gamma = 0.14 g/dL, previously   0.18 g/dL.     The second previously described band is not seen discretely on the   current study.     [place] free light chain 7/31/23  2.72 mg/dL (494.85 mg/dL on 8/14/21)      SPEP 7/3/23 path interpretation   Decreased total protein. Normal gamma globulins are decreased. Two   closely adjacent paraprotein peaks in near-gamma = 0.18 g/dL   (previously 0.18 g/dL) and mid-gamma = 0.14 g/dL (previously 0.21   g/dL).     Serum protein electrophoresis (6/6/23):  Decreased total protein. Normal gamma globulins are decreased. Two   closely adjacent paraprotein peaks in near-gamma = 0.18 g/dL   (previously 0.15 g/dL) and mid-gamma = 0.21 g/dL (previously 0.17   g/dL).     Serum protein electrophoresis (5/9/23):  Decreased total protein. Normal gamma globulins are decreased. Two   closely adjacent paraprotein peaks in near-gamma = 0.15 g/dL   (previously 0.15 g/dL) and mid-gamma = 0.17 g/dL (previously 0.18   g/dL).     Serum protein electrophoresis (4/11/23):  Decreased total protein. Normal gamma globulins are decreased.   Two closely adjacent paraprotein peaks in near-gamma = 0.15 g/dL   (previously 0.15 g/dL) and mid-gamma = 0.18 g/dL (previously 0.15   g/dL).     Serum protein electrophoresis (3/13/23):  Decreased total protein. Normal gamma globulins are decreased.   Two closely adjacent paraprotein peaks in near-gamma = 0.15 g/dL    (previously 0.14 g/dL) and mid-gamma = 0.15 g/dL (previously, 0.13   g/dL).       Serum protein electrophoresis (2/13/23):  Decreased total protein. Normal gamma globulins are decreased. Two   paraprotein bands are present in near-gamma = 0.14 g/dL (previously   0.15 g/dL) and mid-gamma = 0.13 g/dL (previously 0.19 g/dL).       Serum protein electrophoresis (12/19/22):  Normal total protein. Normal gamma globulins are decreased. Two   paraprotein bands are present in near-gamma = 0.20 g/dL (previously   0.16 g/dL) and mid-gamma = 0.23 g/dL (previously 0.15 g/dL).     Serum protein electrophoresis (9/27/22):  Normal total protein. Normal gamma globulins are decreased.   Two paraprotein bands are identified: 1) Near-gamma = 0.2 g/dL   (previously 0.18 g/dL) 2) Mid-gamma = 0.17 g/dL (previously 0.18   g/dL).     Serum protein electrophoresis (8/30/22):  Decreased total protein. Normal gamma globulins are decreased. Two   paraprotein bands are identified: 1) Near-gamma = 0.18 g/dL   (previously 0.21 g/dL) 2) Mid-gamma = 0.18 g/dL (previously 0.18   g/dL).     Plasma Cell FISH:  The result is within normal limits for 1q duplication, TP53   deletion and IGH rearrangement.         Assessment:       1. Kappa light chain myeloma    2. Anemia due to antineoplastic chemotherapy    3. Chemotherapy-induced thrombocytopenia    4. Chemotherapy-induced neutropenia    5. Examination prior to chemotherapy    6. Stage 3b chronic kidney disease    7. 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 Folate deficiency          Plan:   Kappa light chain myeloma with normal cytogenetics / anemia/neutropenia/thrombocytopenia due to chemotherapy  -started Darzalex Faspro, subcutaneous Velcade, oral dexamethasone 8/26/2021  -velcade discontinued after 4 cycles  -last decadron dose was 2/3/22  -on Revlimid 10 mg QOD - start 9/30/2021, will continue   -cont Plavix  -continue acyclovir for shingles prophylaxis  -  "Labs have been reviewed. Absolute neutrophil count is 1.6 k/uL, imporoved. Hemoglobin is 8.5 g/dL, improved. Platelet count is 123 k/uL, stable. SPEP 8/28/23 There is a paraprotein band in near-gamma = 0.12 g/dL, previously 0.14 g/dL.  The second previously described band is not seen discretely on the current study. [place] free light chain now 3.99 mg/dL (494.85 mg/dL on 8/14/21).  - okay to proceed with Darzalex today, 8/30/23, okay to treat re gfr, creatinine clearance.  Held revlamid x1 cycle due to decreased hgb, now improving, restarted revlamid past cycle.  - return to clinic in 4 weeks in preparation for next cycle of darzalex faspro/dexamethasone/revlimid.     CKD Stage 3b  - Labs have been reviewed. Creatinine is 1.65 mg/dL, improved.   - continue to monitor    Chronic neoplasm-related pain  - no issues.  - continue to monitor     Immunodeficiency due to drug therapy  - No fevers, no s/s of acute illness  - No known exposure to covid or other illness  - 1.6 anc  - Instructed on good handwashing, avoiding known ill individuals, limiting crowd exposure  - Continue to monitor    CVA history  - taking clopidogrel, continue to monitor    BPH  - continue flomax    Weight loss  - 6# weight loss to 146# last visit, today 145#  - pt eating one meal a day, "a big one" he states  - encouraged small frequent meals, ensure/nutritional supplemental drink; discussed examples with pt and family member  - periactin script sent  - continue to monitor    Folate deficiency  - will update labs for B12 and folate next visit    High Risk Conditions:    Patient has a condition that poses threat to life and bodily function: kappa light chain myeloma.   Patient is currently on drug therapy requiring intensive monitoring for toxicity: adjuvant chemotherapy:       - return to clinic in 4 weeks in preparation for next cycle of darzalex faspro/dexamethasone/revlimid, resume revlimid current cycle due to improvement of hgb. Chemo moved to " [facility] May 2023.       Milagros Myers, NP-C  [facility]  Hematology/Oncology  [address] [address]  [place] Schmitt  [phone]  [phone]

## 2023-08-30 ENCOUNTER — OFFICE VISIT (OUTPATIENT)
Dept: HEMATOLOGY/ONCOLOGY | Facility: CLINIC | Age: 78
End: 2023-08-30
Payer: MEDICARE

## 2023-08-30 VITALS
SYSTOLIC BLOOD PRESSURE: 147 MMHG | BODY MASS INDEX: 20.31 KG/M2 | WEIGHT: 145.63 LBS | DIASTOLIC BLOOD PRESSURE: 66 MMHG | OXYGEN SATURATION: 96 % | HEART RATE: 54 BPM

## 2023-08-30 DIAGNOSIS — Z79.899 IMMUNODEFICIENCY DUE TO DRUG THERAPY: ICD-10-CM

## 2023-08-30 DIAGNOSIS — D69.59 CHEMOTHERAPY-INDUCED THROMBOCYTOPENIA: ICD-10-CM

## 2023-08-30 DIAGNOSIS — N18.32 STAGE 3B CHRONIC KIDNEY DISEASE: ICD-10-CM

## 2023-08-30 DIAGNOSIS — G89.3 CHRONIC NEOPLASM-RELATED PAIN: ICD-10-CM

## 2023-08-30 DIAGNOSIS — D70.1 CHEMOTHERAPY-INDUCED NEUTROPENIA: ICD-10-CM

## 2023-08-30 DIAGNOSIS — D64.81 ANEMIA DUE TO ANTINEOPLASTIC CHEMOTHERAPY: ICD-10-CM

## 2023-08-30 DIAGNOSIS — D84.821 IMMUNODEFICIENCY DUE TO DRUG THERAPY: ICD-10-CM

## 2023-08-30 DIAGNOSIS — Z01.818 EXAMINATION PRIOR TO CHEMOTHERAPY: ICD-10-CM

## 2023-08-30 DIAGNOSIS — T45.1X5A CHEMOTHERAPY-INDUCED THROMBOCYTOPENIA: ICD-10-CM

## 2023-08-30 DIAGNOSIS — E53.8 FOLATE DEFICIENCY: ICD-10-CM

## 2023-08-30 DIAGNOSIS — T45.1X5A ANEMIA DUE TO ANTINEOPLASTIC CHEMOTHERAPY: ICD-10-CM

## 2023-08-30 DIAGNOSIS — C90.00 KAPPA LIGHT CHAIN MYELOMA: Primary | ICD-10-CM

## 2023-08-30 DIAGNOSIS — T45.1X5A CHEMOTHERAPY-INDUCED NEUTROPENIA: ICD-10-CM

## 2023-08-30 PROCEDURE — 1101F PR PT FALLS ASSESS DOC 0-1 FALLS W/OUT INJ PAST YR: ICD-10-PCS | Mod: CPTII,S$GLB,, | Performed by: NURSE PRACTITIONER

## 2023-08-30 PROCEDURE — 99215 PR OFFICE/OUTPT VISIT, EST, LEVL V, 40-54 MIN: ICD-10-PCS | Mod: S$GLB,,, | Performed by: NURSE PRACTITIONER

## 2023-08-30 PROCEDURE — 3288F PR FALLS RISK ASSESSMENT DOCUMENTED: ICD-10-PCS | Mod: CPTII,S$GLB,, | Performed by: NURSE PRACTITIONER

## 2023-08-30 PROCEDURE — 99999 PR PBB SHADOW E&M-EST. PATIENT-LVL III: CPT | Mod: PBBFAC,,, | Performed by: NURSE PRACTITIONER

## 2023-08-30 PROCEDURE — 3078F PR MOST RECENT DIASTOLIC BLOOD PRESSURE < 80 MM HG: ICD-10-PCS | Mod: CPTII,S$GLB,, | Performed by: NURSE PRACTITIONER

## 2023-08-30 PROCEDURE — 3078F DIAST BP <80 MM HG: CPT | Mod: CPTII,S$GLB,, | Performed by: NURSE PRACTITIONER

## 2023-08-30 PROCEDURE — 3288F FALL RISK ASSESSMENT DOCD: CPT | Mod: CPTII,S$GLB,, | Performed by: NURSE PRACTITIONER

## 2023-08-30 PROCEDURE — 3077F PR MOST RECENT SYSTOLIC BLOOD PRESSURE >= 140 MM HG: ICD-10-PCS | Mod: CPTII,S$GLB,, | Performed by: NURSE PRACTITIONER

## 2023-08-30 PROCEDURE — 1101F PT FALLS ASSESS-DOCD LE1/YR: CPT | Mod: CPTII,S$GLB,, | Performed by: NURSE PRACTITIONER

## 2023-08-30 PROCEDURE — 99215 OFFICE O/P EST HI 40 MIN: CPT | Mod: S$GLB,,, | Performed by: NURSE PRACTITIONER

## 2023-08-30 PROCEDURE — 99999 PR PBB SHADOW E&M-EST. PATIENT-LVL III: ICD-10-PCS | Mod: PBBFAC,,, | Performed by: NURSE PRACTITIONER

## 2023-08-30 PROCEDURE — 3077F SYST BP >= 140 MM HG: CPT | Mod: CPTII,S$GLB,, | Performed by: NURSE PRACTITIONER

## 2023-08-30 RX ORDER — DIPHENHYDRAMINE HCL 25 MG
50 CAPSULE ORAL
Status: CANCELLED
Start: 2023-08-31

## 2023-08-30 RX ORDER — SODIUM CHLORIDE 0.9 % (FLUSH) 0.9 %
10 SYRINGE (ML) INJECTION
Status: CANCELLED | OUTPATIENT
Start: 2023-08-31

## 2023-08-30 RX ORDER — EPINEPHRINE 0.3 MG/.3ML
0.3 INJECTION SUBCUTANEOUS ONCE AS NEEDED
Status: CANCELLED | OUTPATIENT
Start: 2023-08-31

## 2023-08-30 RX ORDER — ACETAMINOPHEN 325 MG/1
650 TABLET ORAL
Status: CANCELLED | OUTPATIENT
Start: 2023-08-31

## 2023-08-30 RX ORDER — DIPHENHYDRAMINE HYDROCHLORIDE 50 MG/ML
50 INJECTION INTRAMUSCULAR; INTRAVENOUS ONCE AS NEEDED
Status: CANCELLED | OUTPATIENT
Start: 2023-08-31

## 2023-08-30 RX ORDER — HEPARIN 100 UNIT/ML
500 SYRINGE INTRAVENOUS
Status: CANCELLED | OUTPATIENT
Start: 2023-08-31

## 2023-08-30 RX ORDER — FAMOTIDINE 20 MG/1
20 TABLET, FILM COATED ORAL
Status: CANCELLED
Start: 2023-08-31

## 2023-09-01 DIAGNOSIS — C90.00 KAPPA LIGHT CHAIN MYELOMA: ICD-10-CM

## 2023-09-01 RX ORDER — LENALIDOMIDE 10 MG/1
10 CAPSULE ORAL EVERY OTHER DAY
Qty: 15 EACH | Refills: 5 | Status: SHIPPED | OUTPATIENT
Start: 2023-09-01 | End: 2023-10-03 | Stop reason: SDUPTHER

## 2023-09-01 RX ORDER — LENALIDOMIDE 10 MG/1
10 CAPSULE ORAL EVERY OTHER DAY
Qty: 15 EACH | Refills: 5 | Status: SHIPPED | OUTPATIENT
Start: 2023-09-01 | End: 2023-09-01 | Stop reason: SDUPTHER

## 2023-09-26 RX ORDER — SODIUM CHLORIDE 0.9 % (FLUSH) 0.9 %
10 SYRINGE (ML) INJECTION
Status: CANCELLED | OUTPATIENT
Start: 2023-09-28

## 2023-09-26 RX ORDER — DIPHENHYDRAMINE HYDROCHLORIDE 50 MG/ML
50 INJECTION INTRAMUSCULAR; INTRAVENOUS ONCE AS NEEDED
Status: CANCELLED | OUTPATIENT
Start: 2023-09-28

## 2023-09-26 RX ORDER — ACETAMINOPHEN 325 MG/1
650 TABLET ORAL
Status: CANCELLED | OUTPATIENT
Start: 2023-09-28

## 2023-09-26 RX ORDER — FAMOTIDINE 20 MG/1
20 TABLET, FILM COATED ORAL
Status: CANCELLED
Start: 2023-09-28

## 2023-09-26 RX ORDER — EPINEPHRINE 0.3 MG/.3ML
0.3 INJECTION SUBCUTANEOUS ONCE AS NEEDED
Status: CANCELLED | OUTPATIENT
Start: 2023-09-28

## 2023-09-26 RX ORDER — DIPHENHYDRAMINE HCL 25 MG
50 CAPSULE ORAL
Status: CANCELLED
Start: 2023-09-28

## 2023-09-26 RX ORDER — HEPARIN 100 UNIT/ML
500 SYRINGE INTRAVENOUS
Status: CANCELLED | OUTPATIENT
Start: 2023-09-28

## 2023-09-27 ENCOUNTER — OFFICE VISIT (OUTPATIENT)
Dept: HEMATOLOGY/ONCOLOGY | Facility: CLINIC | Age: 78
End: 2023-09-27
Payer: MEDICARE

## 2023-09-27 VITALS
OXYGEN SATURATION: 100 % | RESPIRATION RATE: 20 BRPM | BODY MASS INDEX: 20.67 KG/M2 | SYSTOLIC BLOOD PRESSURE: 159 MMHG | HEART RATE: 58 BPM | WEIGHT: 147.63 LBS | TEMPERATURE: 98 F | HEIGHT: 71 IN | DIASTOLIC BLOOD PRESSURE: 61 MMHG

## 2023-09-27 DIAGNOSIS — Z79.899 IMMUNODEFICIENCY DUE TO DRUG THERAPY: ICD-10-CM

## 2023-09-27 DIAGNOSIS — C90.00 KAPPA LIGHT CHAIN MYELOMA: Primary | ICD-10-CM

## 2023-09-27 DIAGNOSIS — D64.81 ANEMIA DUE TO ANTINEOPLASTIC CHEMOTHERAPY: ICD-10-CM

## 2023-09-27 DIAGNOSIS — I63.9 CEREBROVASCULAR ACCIDENT (CVA), UNSPECIFIED MECHANISM: ICD-10-CM

## 2023-09-27 DIAGNOSIS — Z01.818 EXAMINATION PRIOR TO CHEMOTHERAPY: ICD-10-CM

## 2023-09-27 DIAGNOSIS — R63.4 WEIGHT LOSS: ICD-10-CM

## 2023-09-27 DIAGNOSIS — G89.3 CHRONIC NEOPLASM-RELATED PAIN: ICD-10-CM

## 2023-09-27 DIAGNOSIS — T45.1X5A ANEMIA DUE TO ANTINEOPLASTIC CHEMOTHERAPY: ICD-10-CM

## 2023-09-27 DIAGNOSIS — D84.821 IMMUNODEFICIENCY DUE TO DRUG THERAPY: ICD-10-CM

## 2023-09-27 DIAGNOSIS — E53.8 FOLATE DEFICIENCY: ICD-10-CM

## 2023-09-27 DIAGNOSIS — D69.59 CHEMOTHERAPY-INDUCED THROMBOCYTOPENIA: ICD-10-CM

## 2023-09-27 DIAGNOSIS — T45.1X5A CHEMOTHERAPY-INDUCED THROMBOCYTOPENIA: ICD-10-CM

## 2023-09-27 DIAGNOSIS — D70.1 CHEMOTHERAPY-INDUCED NEUTROPENIA: ICD-10-CM

## 2023-09-27 DIAGNOSIS — N18.32 STAGE 3B CHRONIC KIDNEY DISEASE: ICD-10-CM

## 2023-09-27 DIAGNOSIS — T45.1X5A CHEMOTHERAPY-INDUCED NEUTROPENIA: ICD-10-CM

## 2023-09-27 PROCEDURE — 1159F MED LIST DOCD IN RCRD: CPT | Mod: CPTII,S$GLB,, | Performed by: NURSE PRACTITIONER

## 2023-09-27 PROCEDURE — 3288F PR FALLS RISK ASSESSMENT DOCUMENTED: ICD-10-PCS | Mod: CPTII,S$GLB,, | Performed by: NURSE PRACTITIONER

## 2023-09-27 PROCEDURE — 99999 PR PBB SHADOW E&M-EST. PATIENT-LVL IV: CPT | Mod: PBBFAC,,, | Performed by: NURSE PRACTITIONER

## 2023-09-27 PROCEDURE — 1101F PR PT FALLS ASSESS DOC 0-1 FALLS W/OUT INJ PAST YR: ICD-10-PCS | Mod: CPTII,S$GLB,, | Performed by: NURSE PRACTITIONER

## 2023-09-27 PROCEDURE — 1101F PT FALLS ASSESS-DOCD LE1/YR: CPT | Mod: CPTII,S$GLB,, | Performed by: NURSE PRACTITIONER

## 2023-09-27 PROCEDURE — 99215 PR OFFICE/OUTPT VISIT, EST, LEVL V, 40-54 MIN: ICD-10-PCS | Mod: S$GLB,,, | Performed by: NURSE PRACTITIONER

## 2023-09-27 PROCEDURE — 99215 OFFICE O/P EST HI 40 MIN: CPT | Mod: S$GLB,,, | Performed by: NURSE PRACTITIONER

## 2023-09-27 PROCEDURE — 1160F RVW MEDS BY RX/DR IN RCRD: CPT | Mod: CPTII,S$GLB,, | Performed by: NURSE PRACTITIONER

## 2023-09-27 PROCEDURE — 99999 PR PBB SHADOW E&M-EST. PATIENT-LVL IV: ICD-10-PCS | Mod: PBBFAC,,, | Performed by: NURSE PRACTITIONER

## 2023-09-27 PROCEDURE — 3078F DIAST BP <80 MM HG: CPT | Mod: CPTII,S$GLB,, | Performed by: NURSE PRACTITIONER

## 2023-09-27 PROCEDURE — 3077F PR MOST RECENT SYSTOLIC BLOOD PRESSURE >= 140 MM HG: ICD-10-PCS | Mod: CPTII,S$GLB,, | Performed by: NURSE PRACTITIONER

## 2023-09-27 PROCEDURE — 3077F SYST BP >= 140 MM HG: CPT | Mod: CPTII,S$GLB,, | Performed by: NURSE PRACTITIONER

## 2023-09-27 PROCEDURE — 3288F FALL RISK ASSESSMENT DOCD: CPT | Mod: CPTII,S$GLB,, | Performed by: NURSE PRACTITIONER

## 2023-09-27 PROCEDURE — 1126F PR PAIN SEVERITY QUANTIFIED, NO PAIN PRESENT: ICD-10-PCS | Mod: CPTII,S$GLB,, | Performed by: NURSE PRACTITIONER

## 2023-09-27 PROCEDURE — 1160F PR REVIEW ALL MEDS BY PRESCRIBER/CLIN PHARMACIST DOCUMENTED: ICD-10-PCS | Mod: CPTII,S$GLB,, | Performed by: NURSE PRACTITIONER

## 2023-09-27 PROCEDURE — 3078F PR MOST RECENT DIASTOLIC BLOOD PRESSURE < 80 MM HG: ICD-10-PCS | Mod: CPTII,S$GLB,, | Performed by: NURSE PRACTITIONER

## 2023-09-27 PROCEDURE — 1159F PR MEDICATION LIST DOCUMENTED IN MEDICAL RECORD: ICD-10-PCS | Mod: CPTII,S$GLB,, | Performed by: NURSE PRACTITIONER

## 2023-09-27 PROCEDURE — 1126F AMNT PAIN NOTED NONE PRSNT: CPT | Mod: CPTII,S$GLB,, | Performed by: NURSE PRACTITIONER

## 2023-09-27 NOTE — PROGRESS NOTES
"KPATIENT: Haris Hernandez  MRN: [id number]  DATE: 9/27/2023    Chief Complaint: Kappa Light Chain Myeloma    Subjective:     Pertinent Medical History:     He presented to the ED 08/13/2021 with abnormal labs-CMP showed creatinine 6.6.  Prior CMP from May 2018 showed creatinine 1.2.    Further workup included a free light chain assay where a kappa free light chain level was 494    08/23/2021 bone marrow biopsy showed variable cellularity, 10 to 40% positive for plasma cell dyscrasia with plasma cells representing 30 to 40% of total cellularity.  No increase in blasts. FISH for Plasma Cell Proliferative Disorder - NORMAL    -started Darzalex, Velcade, Decadron 8/26/2021  -On Revlimid 10 mg qod since 9/30/2021  -saw urology,  for BPH causing difficulty in emptying the bladder, he may need cystoscopy and surgery    Interval History:   - he presents for a follow-up appointment for his multiple myeloma.  - he is scheduled for darzalex faspro today, 9/27/23  - today, overall he states feels well; he endorses fatigue "sometimes", diarrhea first 1-2 days following treatment.  Weight stable this visit, last visit 145#, today 147# He is eating better, pushing fluids. Denies chest pain, sob, abdominal pain, n/v, constipation, hematemesis, melena, hematuria. He reports  lower extremity swelling bilat is improved, this is chronic. 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 Wearing gloves at night helps some.   - has restarted revlimid with last visit    Past Medical, Surgical, Family, and Social histories reviewed.    Medication and Allergies reviewed.    Review of Systems   Constitutional:  Positive for malaise/fatigue and weight loss (stable this visit). Negative for fever.   HENT:  Negative for sore throat.    Respiratory:  Negative for shortness of breath.    Cardiovascular:  Positive for leg swelling (resolved at present). Negative for chest pain.   Gastrointestinal:  Positive for diarrhea " "(1-2 days following chemo). Negative for abdominal pain, nausea and vomiting.   Genitourinary:  Negative for dysuria.   Musculoskeletal:  Positive for neck pain. Negative for back pain.   Skin:  Negative for rash.   Neurological:  Negative for weakness.   Psychiatric/Behavioral:  The patient is not nervous/anxious.        Objective:     Vitals:    09/27/23 0839   BP: (!) 159/61   BP Location: Right arm   Patient Position: Sitting   BP Method: Medium (Automatic)   Pulse: (!) 58   Resp: 20   Temp: 97.9 °F (36.6 °C)   TempSrc: Oral   SpO2: 100%   Weight: 67 kg (147 lb 9.6 oz)   Height: 5' 11" (1.803 m)         BMI: Body mass index is 20.59 kg/m².    ECOG 1  Physical Exam  Vitals and nursing note reviewed.   Constitutional:       General: He is not in acute distress.     Appearance: Normal appearance. He is well-developed. He is not ill-appearing or toxic-appearing.   HENT:      Head: Normocephalic and atraumatic.      Right Ear: External ear normal.      Left Ear: External ear normal.   Eyes:      General: No scleral icterus.     Pupils: Pupils are equal, round, and reactive to light.   Cardiovascular:      Rate and Rhythm: Normal rate and regular rhythm.      Pulses: Normal pulses.      Heart sounds: Normal heart sounds. No murmur heard.  Pulmonary:      Effort: Pulmonary effort is normal. No respiratory distress.      Breath sounds: Normal breath sounds.   Abdominal:      General: Bowel sounds are normal. There is no distension.      Palpations: Abdomen is soft.      Tenderness: There is no abdominal tenderness. There is no guarding.   Musculoskeletal:         General: No swelling or tenderness. Normal range of motion.      Cervical back: Normal range of motion and neck supple. No rigidity.      Right lower leg: No edema.      Left lower leg: No edema.   Skin:     General: Skin is warm and dry.      Capillary Refill: Capillary refill takes less than 2 seconds.   Neurological:      Mental Status: He is alert and " oriented to person, place, and time.      Gait: Gait normal.   Psychiatric:         Mood and Affect: Mood normal.         Behavior: Behavior normal.         Thought Content: Thought content normal.         Judgment: Judgment normal.       Labs have been reviewed, discussed with pt and wife.    Lab Results   Component Value Date    WBC 3.30 (L) 09/25/2023    HGB 8.7 (L) 09/25/2023    HCT 24.4 (L) 09/25/2023    MCV 86 09/25/2023     (L) 09/25/2023       SPEP 9/25/23  Decreased total protein. Normal gamma globulins are decreased.   Paraprotein peaks in near-gamma = 0.2 g/dL (previously, 0.12 g/dL)   and mid-gamma = 0.15 g/dL (previously, 0.14 g/dL).       SPEP 8/28/23  Decreased total protein. Normal gamma globulins are decreased. There   is a paraprotein band in near-gamma = 0.12 g/dL, previously 0.14   g/dL.         SPEP 7/31/23  There is a paraprotein band in near-gamma = 0.14 g/dL, previously   0.18 g/dL.     The second previously described band is not seen discretely on the   current study.     [place] free light chain 7/31/23  2.72 mg/dL (494.85 mg/dL on 8/14/21)      SPEP 7/3/23 path interpretation   Decreased total protein. Normal gamma globulins are decreased. Two   closely adjacent paraprotein peaks in near-gamma = 0.18 g/dL   (previously 0.18 g/dL) and mid-gamma = 0.14 g/dL (previously 0.21   g/dL).     Serum protein electrophoresis (6/6/23):  Decreased total protein. Normal gamma globulins are decreased. Two   closely adjacent paraprotein peaks in near-gamma = 0.18 g/dL   (previously 0.15 g/dL) and mid-gamma = 0.21 g/dL (previously 0.17   g/dL).     Serum protein electrophoresis (5/9/23):  Decreased total protein. Normal gamma globulins are decreased. Two   closely adjacent paraprotein peaks in near-gamma = 0.15 g/dL   (previously 0.15 g/dL) and mid-gamma = 0.17 g/dL (previously 0.18   g/dL).     Serum protein electrophoresis (4/11/23):  Decreased total protein. Normal gamma globulins are decreased.    Two closely adjacent paraprotein peaks in near-gamma = 0.15 g/dL   (previously 0.15 g/dL) and mid-gamma = 0.18 g/dL (previously 0.15   g/dL).     Serum protein electrophoresis (3/13/23):  Decreased total protein. Normal gamma globulins are decreased.   Two closely adjacent paraprotein peaks in near-gamma = 0.15 g/dL   (previously 0.14 g/dL) and mid-gamma = 0.15 g/dL (previously, 0.13   g/dL).       Serum protein electrophoresis (2/13/23):  Decreased total protein. Normal gamma globulins are decreased. Two   paraprotein bands are present in near-gamma = 0.14 g/dL (previously   0.15 g/dL) and mid-gamma = 0.13 g/dL (previously 0.19 g/dL).       Serum protein electrophoresis (12/19/22):  Normal total protein. Normal gamma globulins are decreased. Two   paraprotein bands are present in near-gamma = 0.20 g/dL (previously   0.16 g/dL) and mid-gamma = 0.23 g/dL (previously 0.15 g/dL).     Serum protein electrophoresis (9/27/22):  Normal total protein. Normal gamma globulins are decreased.   Two paraprotein bands are identified: 1) Near-gamma = 0.2 g/dL   (previously 0.18 g/dL) 2) Mid-gamma = 0.17 g/dL (previously 0.18   g/dL).     Serum protein electrophoresis (8/30/22):  Decreased total protein. Normal gamma globulins are decreased. Two   paraprotein bands are identified: 1) Near-gamma = 0.18 g/dL   (previously 0.21 g/dL) 2) Mid-gamma = 0.18 g/dL (previously 0.18   g/dL).     Plasma Cell FISH:  The result is within normal limits for 1q duplication, TP53   deletion and IGH rearrangement.         Assessment:       1. Kappa light chain myeloma    2. Anemia due to antineoplastic chemotherapy    3. Chemotherapy-induced thrombocytopenia    4. Chemotherapy-induced neutropenia    5. Examination prior to chemotherapy    6. Stage 3b chronic kidney disease    7. 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 Cerebrovascular accident (CVA), unspecified mechanism    10. Weight loss    11. Folate  "deficiency            Plan:   Kappa light chain myeloma with normal cytogenetics / anemia/neutropenia/thrombocytopenia due to chemotherapy  -started Darzalex Faspro, subcutaneous Velcade, oral dexamethasone 8/26/2021  -velcade discontinued after 4 cycles  -last decadron dose was 2/3/22  -on Revlimid 10 mg QOD - start 9/30/2021, will continue   -cont Plavix  -continue acyclovir for shingles prophylaxis  - Labs have been reviewed. Absolute neutrophil count is 1.6 k/uL, imporoved. Hemoglobin is 8.5 g/dL, improved. Platelet count is 123 k/uL, stable. SPEP 8/28/23 There is a paraprotein band in near-gamma = 0.12 g/dL, previously 0.14 g/dL.  The second previously described band is not seen discretely on the current study. [place] free light chain now 3.99 mg/dL (494.85 mg/dL on 8/14/21).  - okay to proceed with Darzalex today, 9/27/23 and spep stable, okay to treat re gfr, creatinine clearance.  Held revlamid x1 cycle due to decreased hgb, now improving, restarted revlamid 2 cyclesago.  - return to clinic in 4 weeks in preparation for next cycle of darzalex faspro/dexamethasone/revlimid.     CKD Stage 3b  - Labs have been reviewed. Creatinine is 1.64 mg/dL, improved.   - encouraged adequate fluid intake  - continue to monitor    Chronic neoplasm-related pain  - no issues.  - continue to monitor     Immunodeficiency due to drug therapy  - No fevers, no s/s of acute illness  - No known exposure to covid or other illness  - 1.6 anc, stable  - Instructed on good handwashing, avoiding known ill individuals, limiting crowd exposure  - Continue to monitor    CVA history  - taking clopidogrel, continue to monitor    BPH  - continue flomax    Weight loss  - 6# weight loss to 146# last visit, today 145#  - pt eating one meal a day, "a big one" he states  - encouraged small frequent meals, ensure/nutritional supplemental drink; discussed examples with pt and family member  - periactin script sent  - continue to monitor    Folate " deficiency  - folate elevated, B12 low normal (9/25/23)    High Risk Conditions:    Patient has a condition that poses threat to life and bodily function: kappa light chain myeloma.   Patient is currently on drug therapy requiring intensive monitoring for toxicity: adjuvant chemotherapy:       - return to clinic in 4 weeks in preparation for next cycle of darzalex faspro/dexamethasone/revlimid, continue revlimid current cycle due to stable hgb. Chemo moved to [facility] May 2023.       Milagros Myers, NP-C  [facility]  Hematology/Oncology  [address] [place] [address]  IVONNE Schmitt  [id number]  [phone]

## 2023-10-03 DIAGNOSIS — C90.00 KAPPA LIGHT CHAIN MYELOMA: ICD-10-CM

## 2023-10-03 RX ORDER — LENALIDOMIDE 10 MG/1
10 CAPSULE ORAL EVERY OTHER DAY
Qty: 15 EACH | Refills: 5 | Status: SHIPPED | OUTPATIENT
Start: 2023-10-03 | End: 2023-10-27 | Stop reason: SDUPTHER

## 2023-10-25 ENCOUNTER — TELEPHONE (OUTPATIENT)
Dept: HEMATOLOGY/ONCOLOGY | Facility: CLINIC | Age: 78
End: 2023-10-25
Payer: MEDICARE

## 2023-10-25 ENCOUNTER — OFFICE VISIT (OUTPATIENT)
Dept: HEMATOLOGY/ONCOLOGY | Facility: CLINIC | Age: 78
End: 2023-10-25
Payer: MEDICARE

## 2023-10-25 VITALS
WEIGHT: 146.06 LBS | RESPIRATION RATE: 18 BRPM | HEART RATE: 59 BPM | OXYGEN SATURATION: 99 % | TEMPERATURE: 98 F | SYSTOLIC BLOOD PRESSURE: 159 MMHG | HEIGHT: 71 IN | DIASTOLIC BLOOD PRESSURE: 70 MMHG | BODY MASS INDEX: 20.45 KG/M2

## 2023-10-25 DIAGNOSIS — D64.81 ANEMIA DUE TO ANTINEOPLASTIC CHEMOTHERAPY: ICD-10-CM

## 2023-10-25 DIAGNOSIS — T45.1X5A ANEMIA DUE TO ANTINEOPLASTIC CHEMOTHERAPY: ICD-10-CM

## 2023-10-25 DIAGNOSIS — D84.821 IMMUNODEFICIENCY DUE TO DRUG THERAPY: ICD-10-CM

## 2023-10-25 DIAGNOSIS — C90.00 KAPPA LIGHT CHAIN MYELOMA: Primary | ICD-10-CM

## 2023-10-25 DIAGNOSIS — G89.3 CHRONIC NEOPLASM-RELATED PAIN: ICD-10-CM

## 2023-10-25 DIAGNOSIS — I63.9 CEREBROVASCULAR ACCIDENT (CVA), UNSPECIFIED MECHANISM: ICD-10-CM

## 2023-10-25 DIAGNOSIS — D69.59 CHEMOTHERAPY-INDUCED THROMBOCYTOPENIA: ICD-10-CM

## 2023-10-25 DIAGNOSIS — T45.1X5A CHEMOTHERAPY-INDUCED THROMBOCYTOPENIA: ICD-10-CM

## 2023-10-25 DIAGNOSIS — T45.1X5A CHEMOTHERAPY-INDUCED NEUTROPENIA: ICD-10-CM

## 2023-10-25 DIAGNOSIS — I10 HYPERTENSION, UNSPECIFIED TYPE: ICD-10-CM

## 2023-10-25 DIAGNOSIS — E53.8 FOLATE DEFICIENCY: ICD-10-CM

## 2023-10-25 DIAGNOSIS — N40.0 BENIGN PROSTATIC HYPERPLASIA, UNSPECIFIED WHETHER LOWER URINARY TRACT SYMPTOMS PRESENT: ICD-10-CM

## 2023-10-25 DIAGNOSIS — R63.4 WEIGHT LOSS: ICD-10-CM

## 2023-10-25 DIAGNOSIS — Z01.818 EXAMINATION PRIOR TO CHEMOTHERAPY: ICD-10-CM

## 2023-10-25 DIAGNOSIS — D70.1 CHEMOTHERAPY-INDUCED NEUTROPENIA: ICD-10-CM

## 2023-10-25 DIAGNOSIS — Z79.899 IMMUNODEFICIENCY DUE TO DRUG THERAPY: ICD-10-CM

## 2023-10-25 DIAGNOSIS — N18.32 STAGE 3B CHRONIC KIDNEY DISEASE: ICD-10-CM

## 2023-10-25 DIAGNOSIS — N18.32 CHRONIC KIDNEY DISEASE, STAGE 3B: ICD-10-CM

## 2023-10-25 PROCEDURE — 1126F PR PAIN SEVERITY QUANTIFIED, NO PAIN PRESENT: ICD-10-PCS | Mod: CPTII,S$GLB,, | Performed by: NURSE PRACTITIONER

## 2023-10-25 PROCEDURE — 99215 OFFICE O/P EST HI 40 MIN: CPT | Mod: S$GLB,,, | Performed by: NURSE PRACTITIONER

## 2023-10-25 PROCEDURE — 99215 PR OFFICE/OUTPT VISIT, EST, LEVL V, 40-54 MIN: ICD-10-PCS | Mod: S$GLB,,, | Performed by: NURSE PRACTITIONER

## 2023-10-25 PROCEDURE — 3077F SYST BP >= 140 MM HG: CPT | Mod: CPTII,S$GLB,, | Performed by: NURSE PRACTITIONER

## 2023-10-25 PROCEDURE — 1101F PR PT FALLS ASSESS DOC 0-1 FALLS W/OUT INJ PAST YR: ICD-10-PCS | Mod: CPTII,S$GLB,, | Performed by: NURSE PRACTITIONER

## 2023-10-25 PROCEDURE — 1101F PT FALLS ASSESS-DOCD LE1/YR: CPT | Mod: CPTII,S$GLB,, | Performed by: NURSE PRACTITIONER

## 2023-10-25 PROCEDURE — 3077F PR MOST RECENT SYSTOLIC BLOOD PRESSURE >= 140 MM HG: ICD-10-PCS | Mod: CPTII,S$GLB,, | Performed by: NURSE PRACTITIONER

## 2023-10-25 PROCEDURE — 1126F AMNT PAIN NOTED NONE PRSNT: CPT | Mod: CPTII,S$GLB,, | Performed by: NURSE PRACTITIONER

## 2023-10-25 PROCEDURE — 3078F DIAST BP <80 MM HG: CPT | Mod: CPTII,S$GLB,, | Performed by: NURSE PRACTITIONER

## 2023-10-25 PROCEDURE — 3078F PR MOST RECENT DIASTOLIC BLOOD PRESSURE < 80 MM HG: ICD-10-PCS | Mod: CPTII,S$GLB,, | Performed by: NURSE PRACTITIONER

## 2023-10-25 PROCEDURE — 99999 PR PBB SHADOW E&M-EST. PATIENT-LVL IV: ICD-10-PCS | Mod: PBBFAC,,, | Performed by: NURSE PRACTITIONER

## 2023-10-25 PROCEDURE — 3288F FALL RISK ASSESSMENT DOCD: CPT | Mod: CPTII,S$GLB,, | Performed by: NURSE PRACTITIONER

## 2023-10-25 PROCEDURE — 99999 PR PBB SHADOW E&M-EST. PATIENT-LVL IV: CPT | Mod: PBBFAC,,, | Performed by: NURSE PRACTITIONER

## 2023-10-25 PROCEDURE — 3288F PR FALLS RISK ASSESSMENT DOCUMENTED: ICD-10-PCS | Mod: CPTII,S$GLB,, | Performed by: NURSE PRACTITIONER

## 2023-10-25 RX ORDER — HEPARIN 100 UNIT/ML
500 SYRINGE INTRAVENOUS
Status: CANCELLED | OUTPATIENT
Start: 2023-10-26

## 2023-10-25 RX ORDER — ACETAMINOPHEN 325 MG/1
650 TABLET ORAL
Status: CANCELLED | OUTPATIENT
Start: 2023-10-26

## 2023-10-25 RX ORDER — EPINEPHRINE 0.3 MG/.3ML
0.3 INJECTION SUBCUTANEOUS ONCE AS NEEDED
Status: CANCELLED | OUTPATIENT
Start: 2023-10-26

## 2023-10-25 RX ORDER — DIPHENHYDRAMINE HCL 25 MG
50 CAPSULE ORAL
Status: CANCELLED
Start: 2023-10-26

## 2023-10-25 RX ORDER — FAMOTIDINE 20 MG/1
20 TABLET, FILM COATED ORAL
Status: CANCELLED
Start: 2023-10-26

## 2023-10-25 RX ORDER — DIPHENHYDRAMINE HYDROCHLORIDE 50 MG/ML
50 INJECTION INTRAMUSCULAR; INTRAVENOUS ONCE AS NEEDED
Status: CANCELLED | OUTPATIENT
Start: 2023-10-26

## 2023-10-25 RX ORDER — NIFEDIPINE 30 MG/1
30 TABLET, EXTENDED RELEASE ORAL DAILY
Qty: 90 TABLET | Refills: 3 | Status: SHIPPED | OUTPATIENT
Start: 2023-10-25 | End: 2024-10-24

## 2023-10-25 RX ORDER — SODIUM CHLORIDE 0.9 % (FLUSH) 0.9 %
10 SYRINGE (ML) INJECTION
Status: CANCELLED | OUTPATIENT
Start: 2023-10-26

## 2023-10-25 RX ORDER — CARVEDILOL 6.25 MG/1
6.25 TABLET ORAL 2 TIMES DAILY
Qty: 60 TABLET | Refills: 11 | Status: SHIPPED | OUTPATIENT
Start: 2023-10-25 | End: 2024-10-24

## 2023-10-25 NOTE — PROGRESS NOTES
"KPATIENT: Haris Hernandez  MRN: [id number]  DATE: 10/25/2023    Chief Complaint: Kappa Light Chain Myeloma    Subjective:     Pertinent Medical History:     He presented to the ED 08/13/2021 with abnormal labs-CMP showed creatinine 6.6.  Prior CMP from May 2018 showed creatinine 1.2.    Further workup included a free light chain assay where a kappa free light chain level was 494    08/23/2021 bone marrow biopsy showed variable cellularity, 10 to 40% positive for plasma cell dyscrasia with plasma cells representing 30 to 40% of total cellularity.  No increase in blasts. FISH for Plasma Cell Proliferative Disorder - NORMAL    -started Darzalex, Velcade, Decadron 8/26/2021  -On Revlimid 10 mg qod since 9/30/2021  -saw urology,  for BPH causing difficulty in emptying the bladder, he may need cystoscopy and surgery    Interval History:   - he presents for a follow-up appointment for his multiple myeloma.  - he is scheduled for darzalex faspro today, 10/25/23  - today, overall he states feels well; he endorses fatigue "sometimes", diarrhea intermittent.  Weight stable this visit, last visit 147#, today 146# He is eating better, pushing fluids. Denies chest pain, sob, abdominal pain, n/v, constipation, hematemesis, melena, hematuria. He reports  lower extremity swelling bilat is improved, this is chronic. 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 Wearing gloves at night helps some.   - tolerating revlimid     Past Medical, Surgical, Family, and Social histories reviewed.    Medication and Allergies reviewed.    Review of Systems   Constitutional:  Positive for malaise/fatigue and weight loss (stable this visit). Negative for fever.   HENT:  Negative for sore throat.    Respiratory:  Negative for shortness of breath.    Cardiovascular:  Positive for leg swelling (resolved at present). Negative for chest pain.   Gastrointestinal:  Positive for diarrhea (1-2 days following chemo). Negative " "for abdominal pain, nausea and vomiting.   Genitourinary:  Negative for dysuria.   Musculoskeletal:  Positive for neck pain. Negative for back pain.   Skin:  Negative for rash.   Neurological:  Negative for weakness.   Psychiatric/Behavioral:  The patient is not nervous/anxious.        Objective:     Vitals:    10/25/23 0929   BP: (!) 159/70   BP Location: Left arm   Patient Position: Sitting   BP Method: Medium (Automatic)   Pulse: (!) 59   Resp: 18   Temp: 98 °F (36.7 °C)   TempSrc: Oral   SpO2: 99%   Weight: 66.3 kg (146 lb 0.9 oz)   Height: 5' 11" (1.803 m)           BMI: Body mass index is 20.37 kg/m².    ECOG 1  Physical Exam  Vitals and nursing note reviewed.   Constitutional:       General: He is not in acute distress.     Appearance: Normal appearance. He is well-developed. He is not ill-appearing or toxic-appearing.   HENT:      Head: Normocephalic and atraumatic.      Right Ear: External ear normal.      Left Ear: External ear normal.   Eyes:      General: No scleral icterus.     Pupils: Pupils are equal, round, and reactive to light.   Cardiovascular:      Rate and Rhythm: Normal rate and regular rhythm.      Pulses: Normal pulses.      Heart sounds: Normal heart sounds. No murmur heard.  Pulmonary:      Effort: Pulmonary effort is normal. No respiratory distress.      Breath sounds: Normal breath sounds.   Abdominal:      General: Bowel sounds are normal. There is no distension.      Palpations: Abdomen is soft.      Tenderness: There is no abdominal tenderness. There is no guarding.   Musculoskeletal:         General: No swelling or tenderness. Normal range of motion.      Cervical back: Normal range of motion and neck supple. No rigidity.      Right lower leg: No edema.      Left lower leg: No edema.   Skin:     General: Skin is warm and dry.      Capillary Refill: Capillary refill takes less than 2 seconds.   Neurological:      Mental Status: He is alert and oriented to person, place, and time.      " Gait: Gait normal.   Psychiatric:         Mood and Affect: Mood normal.         Behavior: Behavior normal.         Thought Content: Thought content normal.         Judgment: Judgment normal.       Labs have been reviewed, discussed with pt and wife.    Lab Results   Component Value Date    WBC 3.03 (L) 10/23/2023    HGB 8.7 (L) 10/23/2023    HCT 24.5 (L) 10/23/2023    MCV 87 10/23/2023     (L) 10/23/2023     CMP  Sodium   Date Value Ref Range Status   10/23/2023 140 136 - 145 mmol/L Final     Potassium   Date Value Ref Range Status   10/23/2023 4.1 3.5 - 5.1 mmol/L Final     Chloride   Date Value Ref Range Status   10/23/2023 106 95 - 110 mmol/L Final     CO2   Date Value Ref Range Status   10/23/2023 24 23 - 29 mmol/L Final     Glucose   Date Value Ref Range Status   10/23/2023 102 70 - 110 mg/dL Final     BUN   Date Value Ref Range Status   10/23/2023 19 2 - 20 mg/dL Final     Creatinine   Date Value Ref Range Status   10/23/2023 1.78 (H) 0.50 - 1.40 mg/dL Final     Calcium   Date Value Ref Range Status   10/23/2023 7.9 (L) 8.7 - 10.5 mg/dL Final     Total Protein   Date Value Ref Range Status   10/23/2023 6.3 6.0 - 8.4 g/dL Final     Albumin   Date Value Ref Range Status   10/23/2023 3.7 3.5 - 5.2 g/dL Final     Total Bilirubin   Date Value Ref Range Status   10/23/2023 0.7 0.1 - 1.0 mg/dL Final     Comment:     For infants and newborns, interpretation of results should be based  on gestational age, weight and in agreement with clinical  observations.    Premature Infant recommended reference ranges:  Up to 24 hours.............<8.0 mg/dL  Up to 48 hours............<12.0 mg/dL  3-5 days..................<15.0 mg/dL  6-29 days.................<15.0 mg/dL       Alkaline Phosphatase   Date Value Ref Range Status   10/23/2023 57 38 - 126 U/L Final     AST   Date Value Ref Range Status   10/23/2023 25 15 - 46 U/L Final     ALT   Date Value Ref Range Status   10/23/2023 22 10 - 44 U/L Final     Anion Gap   Date  Value Ref Range Status   10/23/2023 10 8 - 16 mmol/L Final     eGFR   Date Value Ref Range Status   10/23/2023 39.3 (A) >60 mL/min/1.73 m^2 Final         SPEP 10/23/23  Pending    [place] [facility] 10/23/23  - 4.19 mg/dL (494.85 mg/dL on 8/14/21)    SPEP 9/25/23  Decreased total protein. Normal gamma globulins are decreased.   Paraprotein peaks in near-gamma = 0.2 g/dL (previously, 0.12 g/dL)   and mid-gamma = 0.15 g/dL (previously, 0.14 g/dL).       SPEP 8/28/23  Decreased total protein. Normal gamma globulins are decreased. There   is a paraprotein band in near-gamma = 0.12 g/dL, previously 0.14   g/dL.         SPEP 7/31/23  There is a paraprotein band in near-gamma = 0.14 g/dL, previously   0.18 g/dL.     The second previously described band is not seen discretely on the   current study.     [place] free light chain 7/31/23  2.72 mg/dL (494.85 mg/dL on 8/14/21)      SPEP 7/3/23 path interpretation   Decreased total protein. Normal gamma globulins are decreased. Two   closely adjacent paraprotein peaks in near-gamma = 0.18 g/dL   (previously 0.18 g/dL) and mid-gamma = 0.14 g/dL (previously 0.21   g/dL).     Serum protein electrophoresis (6/6/23):  Decreased total protein. Normal gamma globulins are decreased. Two   closely adjacent paraprotein peaks in near-gamma = 0.18 g/dL   (previously 0.15 g/dL) and mid-gamma = 0.21 g/dL (previously 0.17   g/dL).     Serum protein electrophoresis (5/9/23):  Decreased total protein. Normal gamma globulins are decreased. Two   closely adjacent paraprotein peaks in near-gamma = 0.15 g/dL   (previously 0.15 g/dL) and mid-gamma = 0.17 g/dL (previously 0.18   g/dL).     Serum protein electrophoresis (4/11/23):  Decreased total protein. Normal gamma globulins are decreased.   Two closely adjacent paraprotein peaks in near-gamma = 0.15 g/dL   (previously 0.15 g/dL) and mid-gamma = 0.18 g/dL (previously 0.15   g/dL).     Serum protein electrophoresis (3/13/23):  Decreased total protein. Normal  gamma globulins are decreased.   Two closely adjacent paraprotein peaks in near-gamma = 0.15 g/dL   (previously 0.14 g/dL) and mid-gamma = 0.15 g/dL (previously, 0.13   g/dL).       Serum protein electrophoresis (2/13/23):  Decreased total protein. Normal gamma globulins are decreased. Two   paraprotein bands are present in near-gamma = 0.14 g/dL (previously   0.15 g/dL) and mid-gamma = 0.13 g/dL (previously 0.19 g/dL).       Serum protein electrophoresis (12/19/22):  Normal total protein. Normal gamma globulins are decreased. Two   paraprotein bands are present in near-gamma = 0.20 g/dL (previously   0.16 g/dL) and mid-gamma = 0.23 g/dL (previously 0.15 g/dL).     Serum protein electrophoresis (9/27/22):  Normal total protein. Normal gamma globulins are decreased.   Two paraprotein bands are identified: 1) Near-gamma = 0.2 g/dL   (previously 0.18 g/dL) 2) Mid-gamma = 0.17 g/dL (previously 0.18   g/dL).     Serum protein electrophoresis (8/30/22):  Decreased total protein. Normal gamma globulins are decreased. Two   paraprotein bands are identified: 1) Near-gamma = 0.18 g/dL   (previously 0.21 g/dL) 2) Mid-gamma = 0.18 g/dL (previously 0.18   g/dL).     Plasma Cell FISH:  The result is within normal limits for 1q duplication, TP53   deletion and IGH rearrangement.         Assessment:       1. Kappa light chain myeloma    2. Hypertension, unspecified type    3. Anemia due to antineoplastic chemotherapy    4. Chemotherapy-induced thrombocytopenia    5. Chemotherapy-induced neutropenia    6. Examination prior to chemotherapy    7. Stage 3b chronic kidney disease    8. 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 Cerebrovascular accident (CVA), unspecified mechanism    11. Benign prostatic hyperplasia, unspecified whether lower urinary tract symptoms present    12. Weight loss    13. Folate deficiency              Plan:   Kappa light chain myeloma with normal cytogenetics /  "anemia/neutropenia/thrombocytopenia due to chemotherapy  -started Darzalex Faspro, subcutaneous Velcade, oral dexamethasone 8/26/2021  -velcade discontinued after 4 cycles  -last decadron dose was 2/3/22  -on Revlimid 10 mg QOD - start 9/30/2021, will continue   -cont Plavix  -continue acyclovir for shingles prophylaxis  - Labs have been reviewed. Absolute neutrophil count is 1.5 k/uL, imporoved. Hemoglobin is 8.7 g/dL, stable. Platelet count is 114 k/uL, stable. SPEP 10/23/23 pending. Creatinine 1.78 mg/dL  - okay to proceed with Darzalex today, 10/25/23 and spep stable, okay to treat re gfr, creatinine clearance, anc.  Held revlamid x1 cycle due to decreased hgb, now improving, restarted revlamid 3 cycles ago.  - return to clinic in 4 weeks in preparation for next cycle of darzalex faspro/dexamethasone/revlimid.     CKD Stage 3b  - Labs have been reviewed. Creatinine is 1.78 mg/dL, slight increase  - encouraged adequate fluid intake  - continue to monitor    Chronic neoplasm-related pain  - no issues.  - continue to monitor     Immunodeficiency due to drug therapy  - No fevers, no s/s of acute illness  - No known exposure to covid or other illness  - 1.5 anc, stable  - Instructed on good handwashing, avoiding known ill individuals, limiting crowd exposure  - Continue to monitor    CVA history  - taking clopidogrel, continue to monitor    BPH  - continue flomax    Weight loss  - weight stable,  today 146#  - pt eating one meal a day, "a big one" he states  - encouraged small frequent meals, ensure/nutritional supplemental drink; discussed examples with pt and family member  - periactin prescribed last visit  - continue to monitor    Folate deficiency  - folate now normal, B12 low normal (10/23/23)    High Risk Conditions:    Patient has a condition that poses threat to life and bodily function: kappa light chain myeloma.   Patient is currently on drug therapy requiring intensive monitoring for toxicity: adjuvant " chemotherapy:       - return to clinic in 4 weeks in preparation for next cycle of darzalex faspro/dexamethasone/revlimid, continue revlimid current cycle due to stable hgb. Chemo moved to [facility] May 2023.       Milagros Myers, NP-C  [facility]  Hematology/Oncology  [address] [place] [id number]  IVONNE Schmitt  [id number]  [phone]

## 2023-10-27 DIAGNOSIS — C90.00 KAPPA LIGHT CHAIN MYELOMA: ICD-10-CM

## 2023-10-27 RX ORDER — LENALIDOMIDE 10 MG/1
10 CAPSULE ORAL EVERY OTHER DAY
Qty: 15 EACH | Refills: 5 | Status: SHIPPED | OUTPATIENT
Start: 2023-10-27 | End: 2023-11-27 | Stop reason: SDUPTHER

## 2023-11-16 RX ORDER — SODIUM CHLORIDE 0.9 % (FLUSH) 0.9 %
10 SYRINGE (ML) INJECTION
Status: CANCELLED | OUTPATIENT
Start: 2023-11-23

## 2023-11-16 RX ORDER — DIPHENHYDRAMINE HYDROCHLORIDE 50 MG/ML
50 INJECTION INTRAMUSCULAR; INTRAVENOUS ONCE AS NEEDED
Status: CANCELLED | OUTPATIENT
Start: 2023-11-23

## 2023-11-16 RX ORDER — HEPARIN 100 UNIT/ML
500 SYRINGE INTRAVENOUS
Status: CANCELLED | OUTPATIENT
Start: 2023-11-23

## 2023-11-16 RX ORDER — FAMOTIDINE 20 MG/1
20 TABLET, FILM COATED ORAL
Status: CANCELLED
Start: 2023-11-23

## 2023-11-16 RX ORDER — DIPHENHYDRAMINE HCL 25 MG
50 CAPSULE ORAL
Status: CANCELLED
Start: 2023-11-23

## 2023-11-16 RX ORDER — ACETAMINOPHEN 325 MG/1
650 TABLET ORAL
Status: CANCELLED | OUTPATIENT
Start: 2023-11-23

## 2023-11-16 RX ORDER — EPINEPHRINE 0.3 MG/.3ML
0.3 INJECTION SUBCUTANEOUS ONCE AS NEEDED
Status: CANCELLED | OUTPATIENT
Start: 2023-11-23

## 2023-11-22 ENCOUNTER — OFFICE VISIT (OUTPATIENT)
Dept: HEMATOLOGY/ONCOLOGY | Facility: CLINIC | Age: 78
End: 2023-11-22
Payer: MEDICARE

## 2023-11-22 VITALS
HEIGHT: 71 IN | DIASTOLIC BLOOD PRESSURE: 64 MMHG | RESPIRATION RATE: 18 BRPM | BODY MASS INDEX: 20.43 KG/M2 | SYSTOLIC BLOOD PRESSURE: 145 MMHG | TEMPERATURE: 98 F | HEART RATE: 58 BPM | OXYGEN SATURATION: 100 % | WEIGHT: 145.94 LBS

## 2023-11-22 DIAGNOSIS — D84.821 IMMUNODEFICIENCY DUE TO DRUG THERAPY: ICD-10-CM

## 2023-11-22 DIAGNOSIS — E53.8 FOLATE DEFICIENCY: ICD-10-CM

## 2023-11-22 DIAGNOSIS — I63.9 CEREBROVASCULAR ACCIDENT (CVA), UNSPECIFIED MECHANISM: ICD-10-CM

## 2023-11-22 DIAGNOSIS — C90.00 KAPPA LIGHT CHAIN MYELOMA: Primary | ICD-10-CM

## 2023-11-22 DIAGNOSIS — I10 HYPERTENSION, UNSPECIFIED TYPE: ICD-10-CM

## 2023-11-22 DIAGNOSIS — N18.32 CHRONIC KIDNEY DISEASE, STAGE 3B: ICD-10-CM

## 2023-11-22 DIAGNOSIS — Z79.899 IMMUNODEFICIENCY DUE TO DRUG THERAPY: ICD-10-CM

## 2023-11-22 DIAGNOSIS — G89.3 CHRONIC NEOPLASM-RELATED PAIN: ICD-10-CM

## 2023-11-22 DIAGNOSIS — T45.1X5A CHEMOTHERAPY-INDUCED THROMBOCYTOPENIA: ICD-10-CM

## 2023-11-22 DIAGNOSIS — Z01.818 EXAMINATION PRIOR TO CHEMOTHERAPY: ICD-10-CM

## 2023-11-22 DIAGNOSIS — E78.5 HYPERLIPIDEMIA, UNSPECIFIED HYPERLIPIDEMIA TYPE: ICD-10-CM

## 2023-11-22 DIAGNOSIS — D69.59 CHEMOTHERAPY-INDUCED THROMBOCYTOPENIA: ICD-10-CM

## 2023-11-22 DIAGNOSIS — N40.0 BENIGN PROSTATIC HYPERPLASIA, UNSPECIFIED WHETHER LOWER URINARY TRACT SYMPTOMS PRESENT: ICD-10-CM

## 2023-11-22 PROCEDURE — 1160F RVW MEDS BY RX/DR IN RCRD: CPT | Mod: CPTII,S$GLB,, | Performed by: NURSE PRACTITIONER

## 2023-11-22 PROCEDURE — 99999 PR PBB SHADOW E&M-EST. PATIENT-LVL V: CPT | Mod: PBBFAC,,, | Performed by: NURSE PRACTITIONER

## 2023-11-22 PROCEDURE — 1160F PR REVIEW ALL MEDS BY PRESCRIBER/CLIN PHARMACIST DOCUMENTED: ICD-10-PCS | Mod: CPTII,S$GLB,, | Performed by: NURSE PRACTITIONER

## 2023-11-22 PROCEDURE — 1126F AMNT PAIN NOTED NONE PRSNT: CPT | Mod: CPTII,S$GLB,, | Performed by: NURSE PRACTITIONER

## 2023-11-22 PROCEDURE — 1101F PR PT FALLS ASSESS DOC 0-1 FALLS W/OUT INJ PAST YR: ICD-10-PCS | Mod: CPTII,S$GLB,, | Performed by: NURSE PRACTITIONER

## 2023-11-22 PROCEDURE — 3077F SYST BP >= 140 MM HG: CPT | Mod: CPTII,S$GLB,, | Performed by: NURSE PRACTITIONER

## 2023-11-22 PROCEDURE — 99999 PR PBB SHADOW E&M-EST. PATIENT-LVL V: ICD-10-PCS | Mod: PBBFAC,,, | Performed by: NURSE PRACTITIONER

## 2023-11-22 PROCEDURE — 1101F PT FALLS ASSESS-DOCD LE1/YR: CPT | Mod: CPTII,S$GLB,, | Performed by: NURSE PRACTITIONER

## 2023-11-22 PROCEDURE — 1159F PR MEDICATION LIST DOCUMENTED IN MEDICAL RECORD: ICD-10-PCS | Mod: CPTII,S$GLB,, | Performed by: NURSE PRACTITIONER

## 2023-11-22 PROCEDURE — 3078F DIAST BP <80 MM HG: CPT | Mod: CPTII,S$GLB,, | Performed by: NURSE PRACTITIONER

## 2023-11-22 PROCEDURE — 99215 PR OFFICE/OUTPT VISIT, EST, LEVL V, 40-54 MIN: ICD-10-PCS | Mod: S$GLB,,, | Performed by: NURSE PRACTITIONER

## 2023-11-22 PROCEDURE — 1159F MED LIST DOCD IN RCRD: CPT | Mod: CPTII,S$GLB,, | Performed by: NURSE PRACTITIONER

## 2023-11-22 PROCEDURE — 1126F PR PAIN SEVERITY QUANTIFIED, NO PAIN PRESENT: ICD-10-PCS | Mod: CPTII,S$GLB,, | Performed by: NURSE PRACTITIONER

## 2023-11-22 PROCEDURE — 3288F PR FALLS RISK ASSESSMENT DOCUMENTED: ICD-10-PCS | Mod: CPTII,S$GLB,, | Performed by: NURSE PRACTITIONER

## 2023-11-22 PROCEDURE — 3078F PR MOST RECENT DIASTOLIC BLOOD PRESSURE < 80 MM HG: ICD-10-PCS | Mod: CPTII,S$GLB,, | Performed by: NURSE PRACTITIONER

## 2023-11-22 PROCEDURE — 3288F FALL RISK ASSESSMENT DOCD: CPT | Mod: CPTII,S$GLB,, | Performed by: NURSE PRACTITIONER

## 2023-11-22 PROCEDURE — 3077F PR MOST RECENT SYSTOLIC BLOOD PRESSURE >= 140 MM HG: ICD-10-PCS | Mod: CPTII,S$GLB,, | Performed by: NURSE PRACTITIONER

## 2023-11-22 PROCEDURE — 99215 OFFICE O/P EST HI 40 MIN: CPT | Mod: S$GLB,,, | Performed by: NURSE PRACTITIONER

## 2023-11-22 NOTE — PROGRESS NOTES
"KPATIENT: Haris Hernandez  MRN: [id number]  DATE: 11/22/2023    Chief Complaint: Kappa Light Chain Myeloma    Subjective:     Pertinent Medical History:     He presented to the ED 08/13/2021 with abnormal labs-CMP showed creatinine 6.6.  Prior CMP from May 2018 showed creatinine 1.2.    Further workup included a free light chain assay where a kappa free light chain level was 494    08/23/2021 bone marrow biopsy showed variable cellularity, 10 to 40% positive for plasma cell dyscrasia with plasma cells representing 30 to 40% of total cellularity.  No increase in blasts. FISH for Plasma Cell Proliferative Disorder - NORMAL    -started Darzalex, Velcade, Decadron 8/26/2021  -On Revlimid 10 mg qod since 9/30/2021  -saw urology,  for BPH causing difficulty in emptying the bladder, he may need cystoscopy and surgery    Interval History:   - he presents for a follow-up appointment for his multiple myeloma, daughter present.  - he is scheduled for darzalex faspro today, 10/25/23  - today, overall he states feels well; he endorses fatigue "sometimes", diarrhea intermittent.  Weight stable this visit, last visit 147#, today 146#.  He is eating better, pushing fluids. Denies chest pain, sob, abdominal pain, n/v, constipation, hematemesis, melena, hematuria. He reports  lower extremity swelling bilat is improved, this is chronic. 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 Wearing gloves at night helps some.   - tolerating revlimid   - daughter states needs new pcp, previous pcp no longer in network and does not manage pts in hospital any longer, would like help [facility] preferably here in [place]    Past Medical, Surgical, Family, and Social histories reviewed.    Medication and Allergies reviewed.    Review of Systems   Constitutional:  Positive for malaise/fatigue and weight loss (stable this visit). Negative for fever.   HENT:  Negative for sore throat.    Respiratory:  " "Negative for shortness of breath.    Cardiovascular:  Positive for leg swelling (resolved at present). Negative for chest pain.   Gastrointestinal:  Positive for diarrhea (1-2 days following chemo). Negative for abdominal pain, nausea and vomiting.   Genitourinary:  Negative for dysuria.   Musculoskeletal:  Positive for neck pain. Negative for back pain.   Skin:  Negative for rash.   Neurological:  Negative for weakness.   Psychiatric/Behavioral:  The patient is not nervous/anxious.        Objective:     Vitals:    11/22/23 1402   BP: (!) 145/64   BP Location: Left arm   Patient Position: Sitting   BP Method: Medium (Automatic)   Pulse: (!) 58   Resp: 18   Temp: 98.2 °F (36.8 °C)   TempSrc: Oral   SpO2: 100%   Weight: 66.2 kg (145 lb 15.1 oz)   Height: 5' 11" (1.803 m)           BMI: Body mass index is 20.36 kg/m².    ECOG 1  Physical Exam  Vitals and nursing note reviewed.   Constitutional:       General: He is not in acute distress.     Appearance: Normal appearance. He is well-developed. He is not ill-appearing or toxic-appearing.   HENT:      Head: Normocephalic and atraumatic.      Right Ear: External ear normal.      Left Ear: External ear normal.   Eyes:      General: No scleral icterus.     Pupils: Pupils are equal, round, and reactive to light.   Cardiovascular:      Rate and Rhythm: Normal rate and regular rhythm.      Pulses: Normal pulses.      Heart sounds: Normal heart sounds. No murmur heard.  Pulmonary:      Effort: Pulmonary effort is normal. No respiratory distress.      Breath sounds: Normal breath sounds.   Abdominal:      General: Bowel sounds are normal. There is no distension.      Palpations: Abdomen is soft.      Tenderness: There is no abdominal tenderness. There is no guarding.   Musculoskeletal:         General: No swelling or tenderness. Normal range of motion.      Cervical back: Normal range of motion and neck supple. No rigidity.      Right lower leg: No edema.      Left lower leg: No " edema.   Skin:     General: Skin is warm and dry.      Capillary Refill: Capillary refill takes less than 2 seconds.   Neurological:      Mental Status: He is alert and oriented to person, place, and time.      Gait: Gait normal.   Psychiatric:         Mood and Affect: Mood normal.         Behavior: Behavior normal.         Thought Content: Thought content normal.         Judgment: Judgment normal.       Labs have been reviewed, discussed with pt and daughter.    Lab Results   Component Value Date    WBC 3.05 (L) 11/20/2023    HGB 8.3 (L) 11/20/2023    HCT 23.5 (L) 11/20/2023    MCV 88 11/20/2023     (L) 11/20/2023     CMP  Sodium   Date Value Ref Range Status   11/20/2023 142 136 - 145 mmol/L Final     Potassium   Date Value Ref Range Status   11/20/2023 4.3 3.5 - 5.1 mmol/L Final     Chloride   Date Value Ref Range Status   11/20/2023 108 95 - 110 mmol/L Final     CO2   Date Value Ref Range Status   11/20/2023 22 (L) 23 - 29 mmol/L Final     Glucose   Date Value Ref Range Status   11/20/2023 97 70 - 110 mg/dL Final     BUN   Date Value Ref Range Status   11/20/2023 18 2 - 20 mg/dL Final     Creatinine   Date Value Ref Range Status   11/20/2023 1.92 (H) 0.50 - 1.40 mg/dL Final     Calcium   Date Value Ref Range Status   11/20/2023 8.8 8.7 - 10.5 mg/dL Final     Total Protein   Date Value Ref Range Status   11/20/2023 6.3 6.0 - 8.4 g/dL Final     Albumin   Date Value Ref Range Status   11/20/2023 3.7 3.5 - 5.2 g/dL Final     Total Bilirubin   Date Value Ref Range Status   11/20/2023 0.6 0.1 - 1.0 mg/dL Final     Comment:     For infants and newborns, interpretation of results should be based  on gestational age, weight and in agreement with clinical  observations.    Premature Infant recommended reference ranges:  Up to 24 hours.............<8.0 mg/dL  Up to 48 hours............<12.0 mg/dL  3-5 days..................<15.0 mg/dL  6-29 days.................<15.0 mg/dL       Alkaline Phosphatase   Date Value Ref  Range Status   11/20/2023 56 38 - 126 U/L Final     AST   Date Value Ref Range Status   11/20/2023 20 15 - 46 U/L Final     ALT   Date Value Ref Range Status   11/20/2023 23 10 - 44 U/L Final     Anion Gap   Date Value Ref Range Status   11/20/2023 12 8 - 16 mmol/L Final     eGFR   Date Value Ref Range Status   11/20/2023 35.7 (A) >60 mL/min/1.73 m^2 Final       SPEP 11/20/23  Decreased total protein. Normal gamma globulins are decreased.   Paraprotein peaks in near-gamma = 0.15 g/dL (previously 0.14 g/dL)   and mid-gamma = 0.14 g/dL (previously 0.18 g/dL).     Kappa FLC 11/20/23  - 4.18 mg/dL (494.85 mg/dL on 8/14/21)    SPEP 10/23/23  Decreased total protein. Normal gamma globulins are decreased.   Paraprotein peaks in near-gamma = 0.14 g/dL (previously, 0.2 g/dL)   and mid-gamma = 0.18 g/dL (previously, 0.15 g/dL).     Kappa FLC 10/23/23  - 4.19 mg/dL (494.85 mg/dL on 8/14/21)    SPEP 9/25/23  Decreased total protein. Normal gamma globulins are decreased.   Paraprotein peaks in near-gamma = 0.2 g/dL (previously, 0.12 g/dL)   and mid-gamma = 0.15 g/dL (previously, 0.14 g/dL).       SPEP 8/28/23  Decreased total protein. Normal gamma globulins are decreased. There   is a paraprotein band in near-gamma = 0.12 g/dL, previously 0.14   g/dL.         SPEP 7/31/23  There is a paraprotein band in near-gamma = 0.14 g/dL, previously   0.18 g/dL.     The second previously described band is not seen discretely on the   current study.     [place] free light chain 7/31/23  2.72 mg/dL (494.85 mg/dL on 8/14/21)      SPEP 7/3/23 path interpretation   Decreased total protein. Normal gamma globulins are decreased. Two   closely adjacent paraprotein peaks in near-gamma = 0.18 g/dL   (previously 0.18 g/dL) and mid-gamma = 0.14 g/dL (previously 0.21   g/dL).     Serum protein electrophoresis (6/6/23):  Decreased total protein. Normal gamma globulins are decreased. Two   closely adjacent paraprotein peaks in near-gamma = 0.18 g/dL    (previously 0.15 g/dL) and mid-gamma = 0.21 g/dL (previously 0.17   g/dL).     Serum protein electrophoresis (5/9/23):  Decreased total protein. Normal gamma globulins are decreased. Two   closely adjacent paraprotein peaks in near-gamma = 0.15 g/dL   (previously 0.15 g/dL) and mid-gamma = 0.17 g/dL (previously 0.18   g/dL).     Serum protein electrophoresis (4/11/23):  Decreased total protein. Normal gamma globulins are decreased.   Two closely adjacent paraprotein peaks in near-gamma = 0.15 g/dL   (previously 0.15 g/dL) and mid-gamma = 0.18 g/dL (previously 0.15   g/dL).     Serum protein electrophoresis (3/13/23):  Decreased total protein. Normal gamma globulins are decreased.   Two closely adjacent paraprotein peaks in near-gamma = 0.15 g/dL   (previously 0.14 g/dL) and mid-gamma = 0.15 g/dL (previously, 0.13   g/dL).       Serum protein electrophoresis (2/13/23):  Decreased total protein. Normal gamma globulins are decreased. Two   paraprotein bands are present in near-gamma = 0.14 g/dL (previously   0.15 g/dL) and mid-gamma = 0.13 g/dL (previously 0.19 g/dL).       Serum protein electrophoresis (12/19/22):  Normal total protein. Normal gamma globulins are decreased. Two   paraprotein bands are present in near-gamma = 0.20 g/dL (previously   0.16 g/dL) and mid-gamma = 0.23 g/dL (previously 0.15 g/dL).     Serum protein electrophoresis (9/27/22):  Normal total protein. Normal gamma globulins are decreased.   Two paraprotein bands are identified: 1) Near-gamma = 0.2 g/dL   (previously 0.18 g/dL) 2) Mid-gamma = 0.17 g/dL (previously 0.18   g/dL).     Serum protein electrophoresis (8/30/22):  Decreased total protein. Normal gamma globulins are decreased. Two   paraprotein bands are identified: 1) Near-gamma = 0.18 g/dL   (previously 0.21 g/dL) 2) Mid-gamma = 0.18 g/dL (previously 0.18   g/dL).     Plasma Cell FISH:  The result is within normal limits for 1q duplication, TP53   deletion and IGH rearrangement.          Assessment:       1. Kappa light chain myeloma    2. Examination prior to chemotherapy    3. Chemotherapy-induced thrombocytopenia    4. Chronic kidney disease, stage 3b    5. Hypertension, unspecified type    6. Hyperlipidemia, unspecified hyperlipidemia type    7. 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 Cerebrovascular accident (CVA), unspecified mechanism    10. Benign prostatic hyperplasia, unspecified whether lower urinary tract symptoms present    11. Folate deficiency                Plan:   Kappa light chain myeloma with normal cytogenetics / anemia/neutropenia/thrombocytopenia due to chemotherapy  -started Darzalex Faspro, subcutaneous Velcade, oral dexamethasone 8/26/2021  -velcade discontinued after 4 cycles  -last decadron dose was 2/3/22  -on Revlimid 10 mg QOD - start 9/30/2021, will continue   -cont Plavix  -continue acyclovir for shingles prophylaxis  - Labs have been reviewed. Absolute neutrophil count is 1.5 k/uL, imporoved. Hemoglobin is 8.7 g/dL, stable. Platelet count is 114 k/uL, stable. SPEP 10/23/23 pending. Creatinine 1.78 mg/dL  - okay to proceed with Darzalex today, 11/22/23 and spep stable, platelet improvement to 141,000, okay to treat re gfr, creatinine clearance, anc.  Held revlamid x1 cycle due to decreased hgb, now improving, restarted revlamid 4 cycles ago.  - return to clinic in 4 weeks in preparation for next cycle of darzalex faspro/dexamethasone/revlimid.     CKD Stage 3b, htn  - Labs have been reviewed. Creatinine is 1.92 mg/dL, slight increase  - 145/64 today, stable, no associated symptoms  - encouraged adequate fluid intake, drinks more sweet tea than water  - continue to monitor, pcp referral as per daughter request    Chronic neoplasm-related pain  - no issues.  - continue to monitor     Immunodeficiency due to drug therapy  - No fevers, no s/s of acute illness  - No known exposure to covid or other illness  - 1.3 anc,  "stable  - Instructed on good handwashing, avoiding known ill individuals, limiting crowd exposure  - Continue to monitor    CVA history  - taking clopidogrel, continue to monitor    BPH  - continue flomax    Weight loss  - weight stable,  today 146#  - pt eating one meal a day, "a big one" he states  - encouraged small frequent meals, ensure/nutritional supplemental drink; discussed examples with pt and family member  - periactin prescribed last visit  - continue to monitor    Folate deficiency  - folate now normal, B12 low normal (10/23/23)    High Risk Conditions:    Patient has a condition that poses threat to life and bodily function: kappa light chain myeloma.   Patient is currently on drug therapy requiring intensive monitoring for toxicity: adjuvant chemotherapy:       - return to clinic in 4 weeks in preparation for next cycle of darzalex faspro/dexamethasone/revlimid, continue revlimid current cycle due to stable hgb. Chemo moved to [facility] May 2023.       Milagros Myers NP-C  [facility]  Hematology/Oncology  [address] [place] [address]  IVONNE Schmitt  [id number]  [phone]            "

## 2023-11-27 DIAGNOSIS — C90.00 KAPPA LIGHT CHAIN MYELOMA: ICD-10-CM

## 2023-11-27 RX ORDER — LENALIDOMIDE 10 MG/1
10 CAPSULE ORAL EVERY OTHER DAY
Qty: 15 EACH | Refills: 5 | Status: SHIPPED | OUTPATIENT
Start: 2023-11-27 | End: 2023-12-26

## 2023-12-19 RX ORDER — FAMOTIDINE 20 MG/1
20 TABLET, FILM COATED ORAL
Status: CANCELLED
Start: 2023-12-21

## 2023-12-19 RX ORDER — HEPARIN 100 UNIT/ML
500 SYRINGE INTRAVENOUS
Status: CANCELLED | OUTPATIENT
Start: 2023-12-21

## 2023-12-19 RX ORDER — DIPHENHYDRAMINE HYDROCHLORIDE 50 MG/ML
50 INJECTION INTRAMUSCULAR; INTRAVENOUS ONCE AS NEEDED
Status: CANCELLED | OUTPATIENT
Start: 2023-12-21

## 2023-12-19 RX ORDER — SODIUM CHLORIDE 0.9 % (FLUSH) 0.9 %
10 SYRINGE (ML) INJECTION
Status: CANCELLED | OUTPATIENT
Start: 2023-12-21

## 2023-12-19 RX ORDER — ACETAMINOPHEN 325 MG/1
650 TABLET ORAL
Status: CANCELLED | OUTPATIENT
Start: 2023-12-21

## 2023-12-19 RX ORDER — EPINEPHRINE 0.3 MG/.3ML
0.3 INJECTION SUBCUTANEOUS ONCE AS NEEDED
Status: CANCELLED | OUTPATIENT
Start: 2023-12-21

## 2023-12-19 RX ORDER — DIPHENHYDRAMINE HCL 25 MG
50 CAPSULE ORAL
Status: CANCELLED
Start: 2023-12-21

## 2023-12-20 ENCOUNTER — OFFICE VISIT (OUTPATIENT)
Dept: HEMATOLOGY/ONCOLOGY | Facility: CLINIC | Age: 78
End: 2023-12-20
Payer: MEDICARE

## 2023-12-20 VITALS
WEIGHT: 148.38 LBS | OXYGEN SATURATION: 100 % | HEART RATE: 66 BPM | TEMPERATURE: 98 F | DIASTOLIC BLOOD PRESSURE: 67 MMHG | RESPIRATION RATE: 20 BRPM | BODY MASS INDEX: 20.77 KG/M2 | SYSTOLIC BLOOD PRESSURE: 134 MMHG | HEIGHT: 71 IN

## 2023-12-20 DIAGNOSIS — N18.32 CHRONIC KIDNEY DISEASE, STAGE 3B: ICD-10-CM

## 2023-12-20 DIAGNOSIS — D69.59 CHEMOTHERAPY-INDUCED THROMBOCYTOPENIA: ICD-10-CM

## 2023-12-20 DIAGNOSIS — R63.4 WEIGHT LOSS: ICD-10-CM

## 2023-12-20 DIAGNOSIS — I10 HYPERTENSION, UNSPECIFIED TYPE: ICD-10-CM

## 2023-12-20 DIAGNOSIS — E53.8 FOLATE DEFICIENCY: ICD-10-CM

## 2023-12-20 DIAGNOSIS — Z79.899 IMMUNODEFICIENCY DUE TO DRUG THERAPY: ICD-10-CM

## 2023-12-20 DIAGNOSIS — N40.0 BENIGN PROSTATIC HYPERPLASIA, UNSPECIFIED WHETHER LOWER URINARY TRACT SYMPTOMS PRESENT: ICD-10-CM

## 2023-12-20 DIAGNOSIS — I63.9 CEREBROVASCULAR ACCIDENT (CVA), UNSPECIFIED MECHANISM: ICD-10-CM

## 2023-12-20 DIAGNOSIS — T45.1X5A CHEMOTHERAPY-INDUCED THROMBOCYTOPENIA: ICD-10-CM

## 2023-12-20 DIAGNOSIS — Z01.818 EXAMINATION PRIOR TO CHEMOTHERAPY: ICD-10-CM

## 2023-12-20 DIAGNOSIS — C90.00 KAPPA LIGHT CHAIN MYELOMA: Primary | ICD-10-CM

## 2023-12-20 DIAGNOSIS — G89.3 CHRONIC NEOPLASM-RELATED PAIN: ICD-10-CM

## 2023-12-20 DIAGNOSIS — D84.821 IMMUNODEFICIENCY DUE TO DRUG THERAPY: ICD-10-CM

## 2023-12-20 PROCEDURE — 1159F PR MEDICATION LIST DOCUMENTED IN MEDICAL RECORD: ICD-10-PCS | Mod: CPTII,S$GLB,, | Performed by: NURSE PRACTITIONER

## 2023-12-20 PROCEDURE — 1160F RVW MEDS BY RX/DR IN RCRD: CPT | Mod: CPTII,S$GLB,, | Performed by: NURSE PRACTITIONER

## 2023-12-20 PROCEDURE — 99215 OFFICE O/P EST HI 40 MIN: CPT | Mod: S$GLB,,, | Performed by: NURSE PRACTITIONER

## 2023-12-20 PROCEDURE — 99999 PR PBB SHADOW E&M-EST. PATIENT-LVL V: CPT | Mod: PBBFAC,,, | Performed by: NURSE PRACTITIONER

## 2023-12-20 PROCEDURE — 3075F SYST BP GE 130 - 139MM HG: CPT | Mod: CPTII,S$GLB,, | Performed by: NURSE PRACTITIONER

## 2023-12-20 PROCEDURE — 3288F PR FALLS RISK ASSESSMENT DOCUMENTED: ICD-10-PCS | Mod: CPTII,S$GLB,, | Performed by: NURSE PRACTITIONER

## 2023-12-20 PROCEDURE — 3078F DIAST BP <80 MM HG: CPT | Mod: CPTII,S$GLB,, | Performed by: NURSE PRACTITIONER

## 2023-12-20 PROCEDURE — 1101F PT FALLS ASSESS-DOCD LE1/YR: CPT | Mod: CPTII,S$GLB,, | Performed by: NURSE PRACTITIONER

## 2023-12-20 PROCEDURE — 99999 PR PBB SHADOW E&M-EST. PATIENT-LVL V: ICD-10-PCS | Mod: PBBFAC,,, | Performed by: NURSE PRACTITIONER

## 2023-12-20 PROCEDURE — 99215 PR OFFICE/OUTPT VISIT, EST, LEVL V, 40-54 MIN: ICD-10-PCS | Mod: S$GLB,,, | Performed by: NURSE PRACTITIONER

## 2023-12-20 PROCEDURE — 1126F PR PAIN SEVERITY QUANTIFIED, NO PAIN PRESENT: ICD-10-PCS | Mod: CPTII,S$GLB,, | Performed by: NURSE PRACTITIONER

## 2023-12-20 PROCEDURE — 3078F PR MOST RECENT DIASTOLIC BLOOD PRESSURE < 80 MM HG: ICD-10-PCS | Mod: CPTII,S$GLB,, | Performed by: NURSE PRACTITIONER

## 2023-12-20 PROCEDURE — 1126F AMNT PAIN NOTED NONE PRSNT: CPT | Mod: CPTII,S$GLB,, | Performed by: NURSE PRACTITIONER

## 2023-12-20 PROCEDURE — 1159F MED LIST DOCD IN RCRD: CPT | Mod: CPTII,S$GLB,, | Performed by: NURSE PRACTITIONER

## 2023-12-20 PROCEDURE — 1101F PR PT FALLS ASSESS DOC 0-1 FALLS W/OUT INJ PAST YR: ICD-10-PCS | Mod: CPTII,S$GLB,, | Performed by: NURSE PRACTITIONER

## 2023-12-20 PROCEDURE — 3075F PR MOST RECENT SYSTOLIC BLOOD PRESS GE 130-139MM HG: ICD-10-PCS | Mod: CPTII,S$GLB,, | Performed by: NURSE PRACTITIONER

## 2023-12-20 PROCEDURE — 3288F FALL RISK ASSESSMENT DOCD: CPT | Mod: CPTII,S$GLB,, | Performed by: NURSE PRACTITIONER

## 2023-12-20 PROCEDURE — 1160F PR REVIEW ALL MEDS BY PRESCRIBER/CLIN PHARMACIST DOCUMENTED: ICD-10-PCS | Mod: CPTII,S$GLB,, | Performed by: NURSE PRACTITIONER

## 2023-12-20 NOTE — PROGRESS NOTES
"KPATIENT: Haris Hernandez  MRN: [id number]  DATE: 12/20/2023    Chief Complaint: Kappa Light Chain Myeloma    Subjective:     Pertinent Medical History:     He presented to the ED 08/13/2021 with abnormal labs-CMP showed creatinine 6.6.  Prior CMP from May 2018 showed creatinine 1.2.    Further workup included a free light chain assay where a kappa free light chain level was 494    08/23/2021 bone marrow biopsy showed variable cellularity, 10 to 40% positive for plasma cell dyscrasia with plasma cells representing 30 to 40% of total cellularity.  No increase in blasts. FISH for Plasma Cell Proliferative Disorder - NORMAL    -started Darzalex, Velcade, Decadron 8/26/2021  -On Revlimid 10 mg qod since 9/30/2021  -saw urology,  for BPH causing difficulty in emptying the bladder, he may need cystoscopy and surgery    Interval History:   - he presents for a follow-up appointment for his multiple myeloma, with son today.  - he is scheduled for darzalex faspro today, 12/20/23  - today, overall he states feels well; he endorses fatigue "sometimes", diarrhea intermittent, "just usually the 2 days after treatment".  Weight improved this visit 2#, today 148#.  He is eating better, pushing fluids. Denies chest pain, sob, abdominal pain, n/v, constipation, hematemesis, melena, hematuria. He reports  lower extremity swelling bilat is improved, this is chronic. 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 Wearing gloves at night helps some.   - has taken revlimid this past cycle with decrease in hgb and anc  - grandchildren sick with flu, has not been around them  - saw PCP Dr Call last week      Past Medical, Surgical, Family, and Social histories reviewed.    Medication and Allergies reviewed.    Review of Systems   Constitutional:  Positive for malaise/fatigue and weight loss (stable this visit). Negative for fever.   HENT:  Negative for sore throat.    Respiratory:  Negative for shortness of " "breath.    Cardiovascular:  Positive for leg swelling (resolved at present). Negative for chest pain.   Gastrointestinal:  Positive for diarrhea (1-2 days following chemo). Negative for abdominal pain, nausea and vomiting.   Genitourinary:  Negative for dysuria.   Musculoskeletal:  Positive for neck pain. Negative for back pain.   Skin:  Negative for rash.   Neurological:  Negative for weakness.   Psychiatric/Behavioral:  The patient is not nervous/anxious.        Objective:     Vitals:    12/20/23 0825   BP: 134/67   BP Location: Left arm   Patient Position: Sitting   BP Method: Medium (Automatic)   Pulse: 66   Resp: 20   Temp: 97.7 °F (36.5 °C)   TempSrc: Oral   SpO2: 100%   Weight: 67.3 kg (148 lb 5.9 oz)   Height: 5' 11" (1.803 m)           BMI: Body mass index is 20.69 kg/m².    ECOG 1  Physical Exam  Vitals and nursing note reviewed.   Constitutional:       General: He is not in acute distress.     Appearance: Normal appearance. He is well-developed. He is not ill-appearing or toxic-appearing.   HENT:      Head: Normocephalic and atraumatic.      Right Ear: External ear normal.      Left Ear: External ear normal.   Eyes:      General: No scleral icterus.     Pupils: Pupils are equal, round, and reactive to light.   Cardiovascular:      Rate and Rhythm: Normal rate and regular rhythm.      Pulses: Normal pulses.      Heart sounds: Normal heart sounds. No murmur heard.  Pulmonary:      Effort: Pulmonary effort is normal. No respiratory distress.      Breath sounds: Normal breath sounds.   Abdominal:      General: Bowel sounds are normal. There is no distension.      Palpations: Abdomen is soft.      Tenderness: There is no abdominal tenderness. There is no guarding.   Musculoskeletal:         General: No swelling or tenderness. Normal range of motion.      Cervical back: Normal range of motion and neck supple. No rigidity.      Right lower leg: No edema.      Left lower leg: No edema.   Skin:     General: Skin " is warm and dry.      Capillary Refill: Capillary refill takes less than 2 seconds.   Neurological:      Mental Status: He is alert and oriented to person, place, and time.      Gait: Gait normal.   Psychiatric:         Mood and Affect: Mood normal.         Behavior: Behavior normal.         Thought Content: Thought content normal.         Judgment: Judgment normal.       Labs have been reviewed, discussed with pt and daughter.    Lab Results   Component Value Date    WBC 2.26 (L) 12/18/2023    HGB 8.0 (L) 12/18/2023    HCT 22.8 (L) 12/18/2023    MCV 89 12/18/2023     (L) 12/18/2023     CMP  Sodium   Date Value Ref Range Status   12/18/2023 142 136 - 145 mmol/L Final     Potassium   Date Value Ref Range Status   12/18/2023 4.6 3.5 - 5.1 mmol/L Final     Chloride   Date Value Ref Range Status   12/18/2023 107 95 - 110 mmol/L Final     CO2   Date Value Ref Range Status   12/18/2023 24 23 - 29 mmol/L Final     Glucose   Date Value Ref Range Status   12/18/2023 100 70 - 110 mg/dL Final     BUN   Date Value Ref Range Status   12/18/2023 20 2 - 20 mg/dL Final     Creatinine   Date Value Ref Range Status   12/18/2023 1.85 (H) 0.50 - 1.40 mg/dL Final     Calcium   Date Value Ref Range Status   12/18/2023 8.4 (L) 8.7 - 10.5 mg/dL Final     Total Protein   Date Value Ref Range Status   12/18/2023 6.3 6.0 - 8.4 g/dL Final     Albumin   Date Value Ref Range Status   12/18/2023 3.7 3.5 - 5.2 g/dL Final     Total Bilirubin   Date Value Ref Range Status   12/18/2023 0.6 0.1 - 1.0 mg/dL Final     Comment:     For infants and newborns, interpretation of results should be based  on gestational age, weight and in agreement with clinical  observations.    Premature Infant recommended reference ranges:  Up to 24 hours.............<8.0 mg/dL  Up to 48 hours............<12.0 mg/dL  3-5 days..................<15.0 mg/dL  6-29 days.................<15.0 mg/dL       Alkaline Phosphatase   Date Value Ref Range Status   12/18/2023 51 38  - 126 U/L Final     AST   Date Value Ref Range Status   12/18/2023 21 15 - 46 U/L Final     ALT   Date Value Ref Range Status   12/18/2023 21 10 - 44 U/L Final     Anion Gap   Date Value Ref Range Status   12/18/2023 11 8 - 16 mmol/L Final     eGFR   Date Value Ref Range Status   12/18/2023 37.3 (A) >60 mL/min/1.73 m^2 Final     SPEP 12/18/23  Decreased total protein. Normal gamma globulins are decreased. Faint   paraprotein peaks in near-gamma = 0.12 g/dL (previously 0.15 g/dL)   and mid-gamma = 0.13 g/dL (previously 0.14 g/dL).     Kappa FLC 12/18/23  - 4.14 mg/dL (494.85 mg/dL on 8/14/21)    SPEP 11/20/23  Decreased total protein. Normal gamma globulins are decreased.   Paraprotein peaks in near-gamma = 0.15 g/dL (previously 0.14 g/dL)   and mid-gamma = 0.14 g/dL (previously 0.18 g/dL).     Kappa FLC 11/20/23  - 4.18 mg/dL (494.85 mg/dL on 8/14/21)    SPEP 10/23/23  Decreased total protein. Normal gamma globulins are decreased.   Paraprotein peaks in near-gamma = 0.14 g/dL (previously, 0.2 g/dL)   and mid-gamma = 0.18 g/dL (previously, 0.15 g/dL).     Kappa FLC 10/23/23  - 4.19 mg/dL (494.85 mg/dL on 8/14/21)    SPEP 9/25/23  Decreased total protein. Normal gamma globulins are decreased.   Paraprotein peaks in near-gamma = 0.2 g/dL (previously, 0.12 g/dL)   and mid-gamma = 0.15 g/dL (previously, 0.14 g/dL).       SPEP 8/28/23  Decreased total protein. Normal gamma globulins are decreased. There   is a paraprotein band in near-gamma = 0.12 g/dL, previously 0.14   g/dL.         SPEP 7/31/23  There is a paraprotein band in near-gamma = 0.14 g/dL, previously   0.18 g/dL.     The second previously described band is not seen discretely on the   current study.     [place] free light chain 7/31/23  2.72 mg/dL (494.85 mg/dL on 8/14/21)      SPEP 7/3/23 path interpretation   Decreased total protein. Normal gamma globulins are decreased. Two   closely adjacent paraprotein peaks in near-gamma = 0.18 g/dL   (previously 0.18  g/dL) and mid-gamma = 0.14 g/dL (previously 0.21   g/dL).     Serum protein electrophoresis (6/6/23):  Decreased total protein. Normal gamma globulins are decreased. Two   closely adjacent paraprotein peaks in near-gamma = 0.18 g/dL   (previously 0.15 g/dL) and mid-gamma = 0.21 g/dL (previously 0.17   g/dL).     Serum protein electrophoresis (5/9/23):  Decreased total protein. Normal gamma globulins are decreased. Two   closely adjacent paraprotein peaks in near-gamma = 0.15 g/dL   (previously 0.15 g/dL) and mid-gamma = 0.17 g/dL (previously 0.18   g/dL).     Serum protein electrophoresis (4/11/23):  Decreased total protein. Normal gamma globulins are decreased.   Two closely adjacent paraprotein peaks in near-gamma = 0.15 g/dL   (previously 0.15 g/dL) and mid-gamma = 0.18 g/dL (previously 0.15   g/dL).     Serum protein electrophoresis (3/13/23):  Decreased total protein. Normal gamma globulins are decreased.   Two closely adjacent paraprotein peaks in near-gamma = 0.15 g/dL   (previously 0.14 g/dL) and mid-gamma = 0.15 g/dL (previously, 0.13   g/dL).       Serum protein electrophoresis (2/13/23):  Decreased total protein. Normal gamma globulins are decreased. Two   paraprotein bands are present in near-gamma = 0.14 g/dL (previously   0.15 g/dL) and mid-gamma = 0.13 g/dL (previously 0.19 g/dL).       Serum protein electrophoresis (12/19/22):  Normal total protein. Normal gamma globulins are decreased. Two   paraprotein bands are present in near-gamma = 0.20 g/dL (previously   0.16 g/dL) and mid-gamma = 0.23 g/dL (previously 0.15 g/dL).     Serum protein electrophoresis (9/27/22):  Normal total protein. Normal gamma globulins are decreased.   Two paraprotein bands are identified: 1) Near-gamma = 0.2 g/dL   (previously 0.18 g/dL) 2) Mid-gamma = 0.17 g/dL (previously 0.18   g/dL).     Serum protein electrophoresis (8/30/22):  Decreased total protein. Normal gamma globulins are decreased. Two   paraprotein bands are  identified: 1) Near-gamma = 0.18 g/dL   (previously 0.21 g/dL) 2) Mid-gamma = 0.18 g/dL (previously 0.18   g/dL).     Plasma Cell FISH:  The result is within normal limits for 1q duplication, TP53   deletion and IGH rearrangement.         Assessment:       1. Kappa light chain myeloma    2. Examination prior to chemotherapy    3. Chemotherapy-induced thrombocytopenia    4. Chronic kidney disease, stage 3b    5. Hypertension, unspecified type    6. 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 Cerebrovascular accident (CVA), unspecified mechanism    9. Benign prostatic hyperplasia, unspecified whether lower urinary tract symptoms present    10. Weight loss    11. Folate deficiency                  Plan:   Kappa light chain myeloma with normal cytogenetics / anemia/neutropenia/thrombocytopenia due to chemotherapy  -started Darzalex Faspro, subcutaneous Velcade, oral dexamethasone 8/26/2021  -velcade discontinued after 4 cycles  -last decadron dose was 2/3/22  -on Revlimid 10 mg QOD - start 9/30/2021, will continue   -cont Plavix  -continue acyclovir for shingles prophylaxis  - Labs have been reviewed. Absolute neutrophil count is 1.5 k/uL, imporoved. Hemoglobin is 8.7 g/dL, stable. Platelet count is 114 k/uL, stable. SPEP 10/23/23 pending. Creatinine 1.78 mg/dL  - okay to proceed with Darzalex today, 12/20/23 and spep stable, platelets 130,000, okay to treat re gfr, creatinine clearance, anc.  Will hold current revlamid cycle.  - return to clinic in 4 weeks in preparation for next cycle of darzalex faspro/dexamethasone/revlimid.     CKD Stage 3b, htn  - Labs have been reviewed. Creatinine is 1.85 mg/dL, slight improvement  - 134/67 today, stable, no associated symptoms  - encouraged adequate fluid intake, drinks more sweet tea than water  - continue to monitor, pcp referral as per daughter request    Chronic neoplasm-related pain  - no issues.  - continue to monitor      "Immunodeficiency due to drug therapy  - No fevers, no s/s of acute illness  - No known exposure to covid or other illness  - 1.0 anc, decreased  - Instructed on good handwashing, avoiding known ill individuals, limiting crowd exposure  - Continue to monitor    CVA history  - taking clopidogrel, continue to monitor    BPH  - continue flomax    Weight loss  - weight improved 2#,  today 148#  - pt eating one meal a day, "a big one" he states, multiple snacks and sweet tea  - encouraged small frequent meals, ensure/nutritional supplemental drink; discussed examples with pt and he will try to switch out one sweet tea for a nutritional drink  - periactin prescribed 8/2024  - continue to monitor    Folate deficiency  - folate now normal, B12 low normal (10/23/23)    High Risk Conditions:    Patient has a condition that poses threat to life and bodily function: kappa light chain myeloma.   Patient is currently on drug therapy requiring intensive monitoring for toxicity: adjuvant chemotherapy:       - return to clinic in 4 weeks in preparation for next cycle of darzalex faspro/dexamethasone/revlimid, continue revlimid current cycle due to stable hgb. Chemo moved to [facility] May 2023.       Milagros Myers, JENNY-C  [facility]  Hematology/Oncology  [address] [address]  [place]  [phone]            "

## 2023-12-26 DIAGNOSIS — C90.00 KAPPA LIGHT CHAIN MYELOMA: ICD-10-CM

## 2023-12-26 RX ORDER — LENALIDOMIDE 10 MG/1
CAPSULE ORAL
Qty: 14 EACH | Refills: 0 | Status: SHIPPED | OUTPATIENT
Start: 2023-12-26 | End: 2024-02-22

## 2024-01-02 ENCOUNTER — TELEPHONE (OUTPATIENT)
Dept: HEMATOLOGY/ONCOLOGY | Facility: CLINIC | Age: 79
End: 2024-01-02
Payer: MEDICARE

## 2024-01-02 NOTE — TELEPHONE ENCOUNTER
----- Message -----  From: Pillo Elder  Sent: 1/2/2024   2:35 PM CST  To: [facility] Staff    .Type:  Pharmacy Calling to Clarify an RX    Name of Caller: nurse [facility]   Pharmacy Name:[facility], [facility] ([place] - [place], [place] - [address] [place] [address]   Phone: [phone]  Fax: [phone]  Prescription Name:lenalidomide (REVLIMID) 10 mg Cap  What do they need to clarify?: Medication on hold  Best Call Back Number:[phone]  Additional Information: [facility] is calling to verify that the pt. is on hold for a month for the medication per the pt./ Family member

## 2024-01-22 ENCOUNTER — TELEPHONE (OUTPATIENT)
Dept: HEMATOLOGY/ONCOLOGY | Facility: CLINIC | Age: 79
End: 2024-01-22
Payer: MEDICARE

## 2024-01-22 NOTE — TELEPHONE ENCOUNTER
----- Message from Jonelle Thornton sent at 1/22/2024  8:55 AM CST -----  Type:  Needs Medical Advice    Who Called: Ora with [facility]   Would the patient rather a call back or a response via [facility]? Call or Fax  Best Call Back Number: [phone] ext [phone] Fax [phone]  Additional Information: please reach out to Pharmacy regarding medication lenalidomide (REVLIMID) 10 mg Cap would like to know when pt will restart medication please call

## 2024-01-24 ENCOUNTER — OFFICE VISIT (OUTPATIENT)
Dept: HEMATOLOGY/ONCOLOGY | Facility: CLINIC | Age: 79
End: 2024-01-24
Payer: MEDICARE

## 2024-01-24 VITALS
SYSTOLIC BLOOD PRESSURE: 174 MMHG | BODY MASS INDEX: 20.74 KG/M2 | HEART RATE: 59 BPM | DIASTOLIC BLOOD PRESSURE: 79 MMHG | WEIGHT: 148.13 LBS | HEIGHT: 71 IN | OXYGEN SATURATION: 99 % | RESPIRATION RATE: 18 BRPM | TEMPERATURE: 98 F

## 2024-01-24 DIAGNOSIS — N40.0 BENIGN PROSTATIC HYPERPLASIA, UNSPECIFIED WHETHER LOWER URINARY TRACT SYMPTOMS PRESENT: ICD-10-CM

## 2024-01-24 DIAGNOSIS — T45.1X5A CHEMOTHERAPY-INDUCED THROMBOCYTOPENIA: ICD-10-CM

## 2024-01-24 DIAGNOSIS — D64.81 ANEMIA DUE TO ANTINEOPLASTIC CHEMOTHERAPY: ICD-10-CM

## 2024-01-24 DIAGNOSIS — Z79.899 IMMUNODEFICIENCY DUE TO DRUG THERAPY: ICD-10-CM

## 2024-01-24 DIAGNOSIS — N18.32 CHRONIC KIDNEY DISEASE, STAGE 3B: ICD-10-CM

## 2024-01-24 DIAGNOSIS — D69.6 THROMBOCYTOPENIA, UNSPECIFIED: ICD-10-CM

## 2024-01-24 DIAGNOSIS — Z01.818 EXAMINATION PRIOR TO CHEMOTHERAPY: ICD-10-CM

## 2024-01-24 DIAGNOSIS — D84.821 IMMUNODEFICIENCY DUE TO DRUG THERAPY: ICD-10-CM

## 2024-01-24 DIAGNOSIS — I63.9 CEREBROVASCULAR ACCIDENT (CVA), UNSPECIFIED MECHANISM: ICD-10-CM

## 2024-01-24 DIAGNOSIS — C90.00 KAPPA LIGHT CHAIN MYELOMA: Primary | ICD-10-CM

## 2024-01-24 DIAGNOSIS — I10 HYPERTENSION, UNSPECIFIED TYPE: ICD-10-CM

## 2024-01-24 DIAGNOSIS — E53.8 FOLATE DEFICIENCY: ICD-10-CM

## 2024-01-24 DIAGNOSIS — G89.3 CHRONIC NEOPLASM-RELATED PAIN: ICD-10-CM

## 2024-01-24 DIAGNOSIS — R63.4 WEIGHT LOSS: ICD-10-CM

## 2024-01-24 DIAGNOSIS — D69.59 CHEMOTHERAPY-INDUCED THROMBOCYTOPENIA: ICD-10-CM

## 2024-01-24 DIAGNOSIS — T45.1X5A ANEMIA DUE TO ANTINEOPLASTIC CHEMOTHERAPY: ICD-10-CM

## 2024-01-24 PROCEDURE — 3288F FALL RISK ASSESSMENT DOCD: CPT | Mod: CPTII,S$GLB,, | Performed by: NURSE PRACTITIONER

## 2024-01-24 PROCEDURE — 99215 OFFICE O/P EST HI 40 MIN: CPT | Mod: S$GLB,,, | Performed by: NURSE PRACTITIONER

## 2024-01-24 PROCEDURE — 1101F PT FALLS ASSESS-DOCD LE1/YR: CPT | Mod: CPTII,S$GLB,, | Performed by: NURSE PRACTITIONER

## 2024-01-24 PROCEDURE — 1160F RVW MEDS BY RX/DR IN RCRD: CPT | Mod: CPTII,S$GLB,, | Performed by: NURSE PRACTITIONER

## 2024-01-24 PROCEDURE — 1126F AMNT PAIN NOTED NONE PRSNT: CPT | Mod: CPTII,S$GLB,, | Performed by: NURSE PRACTITIONER

## 2024-01-24 PROCEDURE — 99999 PR PBB SHADOW E&M-EST. PATIENT-LVL IV: CPT | Mod: PBBFAC,,, | Performed by: NURSE PRACTITIONER

## 2024-01-24 PROCEDURE — 3078F DIAST BP <80 MM HG: CPT | Mod: CPTII,S$GLB,, | Performed by: NURSE PRACTITIONER

## 2024-01-24 PROCEDURE — 1159F MED LIST DOCD IN RCRD: CPT | Mod: CPTII,S$GLB,, | Performed by: NURSE PRACTITIONER

## 2024-01-24 PROCEDURE — 3077F SYST BP >= 140 MM HG: CPT | Mod: CPTII,S$GLB,, | Performed by: NURSE PRACTITIONER

## 2024-01-24 RX ORDER — ACETAMINOPHEN 325 MG/1
650 TABLET ORAL
Status: CANCELLED | OUTPATIENT
Start: 2024-01-24

## 2024-01-24 RX ORDER — DIPHENHYDRAMINE HYDROCHLORIDE 50 MG/ML
50 INJECTION INTRAMUSCULAR; INTRAVENOUS ONCE AS NEEDED
Status: CANCELLED | OUTPATIENT
Start: 2024-01-24

## 2024-01-24 RX ORDER — SODIUM CHLORIDE 0.9 % (FLUSH) 0.9 %
10 SYRINGE (ML) INJECTION
Status: CANCELLED | OUTPATIENT
Start: 2024-01-24

## 2024-01-24 RX ORDER — HEPARIN 100 UNIT/ML
500 SYRINGE INTRAVENOUS
Status: CANCELLED | OUTPATIENT
Start: 2024-01-24

## 2024-01-24 RX ORDER — DIPHENHYDRAMINE HCL 25 MG
50 CAPSULE ORAL
Status: CANCELLED
Start: 2024-01-24

## 2024-01-24 RX ORDER — EPINEPHRINE 0.3 MG/.3ML
0.3 INJECTION SUBCUTANEOUS ONCE AS NEEDED
Status: CANCELLED | OUTPATIENT
Start: 2024-01-24

## 2024-01-24 RX ORDER — FAMOTIDINE 20 MG/1
20 TABLET, FILM COATED ORAL
Status: CANCELLED
Start: 2024-01-24

## 2024-01-24 NOTE — PROGRESS NOTES
"KPATIENT: Haris Hernandez  MRN: [id number]  DATE: 1/24/2024    Chief Complaint: Kappa Light Chain Myeloma    Subjective:     Pertinent Medical History:     He presented to the ED 08/13/2021 with abnormal labs-CMP showed creatinine 6.6.  Prior CMP from May 2018 showed creatinine 1.2.    Further workup included a free light chain assay where a kappa free light chain level was 494    08/23/2021 bone marrow biopsy showed variable cellularity, 10 to 40% positive for plasma cell dyscrasia with plasma cells representing 30 to 40% of total cellularity.  No increase in blasts. FISH for Plasma Cell Proliferative Disorder - NORMAL    -started Darzalex, Velcade, Decadron 8/26/2021  -On Revlimid 10 mg qod since 9/30/2021  -saw urology,  for BPH causing difficulty in emptying the bladder, he may need cystoscopy and surgery    Interval History:   - he presents for a follow-up appointment for his multiple myeloma, with son today.  - he is scheduled for darzalex faspro today, 1/24/24  - today, overall he states feels well; he endorses fatigue "sometimes", diarrhea intermittent, "just usually the 2 days after treatment".  Weight stable this visit, today again 148#.  He is eating better, pushing fluids. Denies chest pain, sob, abdominal pain, n/v, constipation, hematemesis, melena, hematuria. He reports  lower extremity swelling bilat is improved, this is chronic. 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 Wearing gloves at night helps some.   - skipped last cycle of revlimid with reduced ANC, will restart given h/h and ANC improvement, pt and daughter agree        Past Medical, Surgical, Family, and Social histories reviewed.    Medication and Allergies reviewed.    Review of Systems   Constitutional:  Positive for malaise/fatigue and weight loss (stable this visit). Negative for fever.   HENT:  Negative for sore throat.    Respiratory:  Negative for shortness of breath.    Cardiovascular:  Positive " "for leg swelling (resolved at present). Negative for chest pain.   Gastrointestinal:  Positive for diarrhea (1-2 days following chemo). Negative for abdominal pain, nausea and vomiting.   Genitourinary:  Negative for dysuria.   Musculoskeletal:  Positive for neck pain. Negative for back pain.   Skin:  Negative for rash.   Neurological:  Negative for weakness.   Psychiatric/Behavioral:  The patient is not nervous/anxious.        Objective:     Vitals:    01/24/24 0840   BP: (!) 174/79   BP Location: Right arm   Patient Position: Sitting   BP Method: Medium (Automatic)   Pulse: (!) 59   Resp: 18   Temp: 97.9 °F (36.6 °C)   TempSrc: Oral   SpO2: 99%   Weight: 67.2 kg (148 lb 2.4 oz)   Height: 5' 11" (1.803 m)           BMI: Body mass index is 20.66 kg/m².    ECOG 1  Physical Exam  Vitals and nursing note reviewed.   Constitutional:       General: He is not in acute distress.     Appearance: Normal appearance. He is well-developed. He is not ill-appearing or toxic-appearing.   HENT:      Head: Normocephalic and atraumatic.      Right Ear: External ear normal.      Left Ear: External ear normal.   Eyes:      General: No scleral icterus.     Pupils: Pupils are equal, round, and reactive to light.   Cardiovascular:      Rate and Rhythm: Normal rate and regular rhythm.      Pulses: Normal pulses.      Heart sounds: Normal heart sounds. No murmur heard.  Pulmonary:      Effort: Pulmonary effort is normal. No respiratory distress.      Breath sounds: Normal breath sounds.   Abdominal:      General: Bowel sounds are normal. There is no distension.      Palpations: Abdomen is soft.      Tenderness: There is no abdominal tenderness. There is no guarding.   Musculoskeletal:         General: No swelling or tenderness. Normal range of motion.      Cervical back: Normal range of motion and neck supple. No rigidity.      Right lower leg: No edema.      Left lower leg: No edema.   Skin:     General: Skin is warm and dry.      " Capillary Refill: Capillary refill takes less than 2 seconds.   Neurological:      Mental Status: He is alert and oriented to person, place, and time.      Gait: Gait normal.   Psychiatric:         Mood and Affect: Mood normal.         Behavior: Behavior normal.         Thought Content: Thought content normal.         Judgment: Judgment normal.       Labs have been reviewed, discussed with pt and daughter.    Lab Results   Component Value Date    WBC 4.35 01/22/2024    HGB 9.4 (L) 01/22/2024    HCT 25.6 (L) 01/22/2024    MCV 88 01/22/2024     (L) 01/22/2024     CMP  Sodium   Date Value Ref Range Status   01/22/2024 144 136 - 145 mmol/L Final     Potassium   Date Value Ref Range Status   01/22/2024 4.5 3.5 - 5.1 mmol/L Final     Chloride   Date Value Ref Range Status   01/22/2024 114 (H) 95 - 110 mmol/L Final     CO2   Date Value Ref Range Status   01/22/2024 25 23 - 29 mmol/L Final     Glucose   Date Value Ref Range Status   01/22/2024 102 70 - 110 mg/dL Final     BUN   Date Value Ref Range Status   01/22/2024 21 (H) 2 - 20 mg/dL Final     Creatinine   Date Value Ref Range Status   01/22/2024 1.58 (H) 0.50 - 1.40 mg/dL Final     Calcium   Date Value Ref Range Status   01/22/2024 9.2 8.7 - 10.5 mg/dL Final     Total Protein   Date Value Ref Range Status   01/22/2024 6.6 6.0 - 8.4 g/dL Final     Albumin   Date Value Ref Range Status   01/22/2024 4.0 3.5 - 5.2 g/dL Final     Total Bilirubin   Date Value Ref Range Status   01/22/2024 0.6 0.1 - 1.0 mg/dL Final     Comment:     For infants and newborns, interpretation of results should be based  on gestational age, weight and in agreement with clinical  observations.    Premature Infant recommended reference ranges:  Up to 24 hours.............<8.0 mg/dL  Up to 48 hours............<12.0 mg/dL  3-5 days..................<15.0 mg/dL  6-29 days.................<15.0 mg/dL       Alkaline Phosphatase   Date Value Ref Range Status   01/22/2024 69 38 - 126 U/L Final      AST   Date Value Ref Range Status   01/22/2024 19 15 - 46 U/L Final     ALT   Date Value Ref Range Status   01/22/2024 17 10 - 44 U/L Final     Anion Gap   Date Value Ref Range Status   01/22/2024 5 (L) 8 - 16 mmol/L Final     eGFR   Date Value Ref Range Status   01/22/2024 45.1 (A) >60 mL/min/1.73 m^2 Final       SPEP 1/22/24  Decreased total protein. Normal gamma globulins are decreased. Faint   paraprotein peaks in near-gamma = 0.15 g/dL (previously 0.12 g/dL)   and mid-gamma = 0.17 g/dL (previously 0.13 g/dL).     Kappa FLC 1/22/24  -2.42 mg/dL (494.85 mg/dL on 8/14/21)    SPEP 12/18/23  Decreased total protein. Normal gamma globulins are decreased. Faint   paraprotein peaks in near-gamma = 0.12 g/dL (previously 0.15 g/dL)   and mid-gamma = 0.13 g/dL (previously 0.14 g/dL).     Kappa FLC 12/18/23  - 4.14 mg/dL (494.85 mg/dL on 8/14/21)    SPEP 11/20/23  Decreased total protein. Normal gamma globulins are decreased.   Paraprotein peaks in near-gamma = 0.15 g/dL (previously 0.14 g/dL)   and mid-gamma = 0.14 g/dL (previously 0.18 g/dL).     Kappa FLC 11/20/23  - 4.18 mg/dL (494.85 mg/dL on 8/14/21)    SPEP 10/23/23  Decreased total protein. Normal gamma globulins are decreased.   Paraprotein peaks in near-gamma = 0.14 g/dL (previously, 0.2 g/dL)   and mid-gamma = 0.18 g/dL (previously, 0.15 g/dL).     Kappa FLC 10/23/23  - 4.19 mg/dL (494.85 mg/dL on 8/14/21)    SPEP 9/25/23  Decreased total protein. Normal gamma globulins are decreased.   Paraprotein peaks in near-gamma = 0.2 g/dL (previously, 0.12 g/dL)   and mid-gamma = 0.15 g/dL (previously, 0.14 g/dL).       SPEP 8/28/23  Decreased total protein. Normal gamma globulins are decreased. There   is a paraprotein band in near-gamma = 0.12 g/dL, previously 0.14   g/dL.         SPEP 7/31/23  There is a paraprotein band in near-gamma = 0.14 g/dL, previously   0.18 g/dL.     The second previously described band is not seen discretely on the   current study.      [place] free light chain 7/31/23  2.72 mg/dL (494.85 mg/dL on 8/14/21)      SPEP 7/3/23 path interpretation   Decreased total protein. Normal gamma globulins are decreased. Two   closely adjacent paraprotein peaks in near-gamma = 0.18 g/dL   (previously 0.18 g/dL) and mid-gamma = 0.14 g/dL (previously 0.21   g/dL).     Serum protein electrophoresis (6/6/23):  Decreased total protein. Normal gamma globulins are decreased. Two   closely adjacent paraprotein peaks in near-gamma = 0.18 g/dL   (previously 0.15 g/dL) and mid-gamma = 0.21 g/dL (previously 0.17   g/dL).     Serum protein electrophoresis (5/9/23):  Decreased total protein. Normal gamma globulins are decreased. Two   closely adjacent paraprotein peaks in near-gamma = 0.15 g/dL   (previously 0.15 g/dL) and mid-gamma = 0.17 g/dL (previously 0.18   g/dL).     Serum protein electrophoresis (4/11/23):  Decreased total protein. Normal gamma globulins are decreased.   Two closely adjacent paraprotein peaks in near-gamma = 0.15 g/dL   (previously 0.15 g/dL) and mid-gamma = 0.18 g/dL (previously 0.15   g/dL).     Serum protein electrophoresis (3/13/23):  Decreased total protein. Normal gamma globulins are decreased.   Two closely adjacent paraprotein peaks in near-gamma = 0.15 g/dL   (previously 0.14 g/dL) and mid-gamma = 0.15 g/dL (previously, 0.13   g/dL).       Serum protein electrophoresis (2/13/23):  Decreased total protein. Normal gamma globulins are decreased. Two   paraprotein bands are present in near-gamma = 0.14 g/dL (previously   0.15 g/dL) and mid-gamma = 0.13 g/dL (previously 0.19 g/dL).       Serum protein electrophoresis (12/19/22):  Normal total protein. Normal gamma globulins are decreased. Two   paraprotein bands are present in near-gamma = 0.20 g/dL (previously   0.16 g/dL) and mid-gamma = 0.23 g/dL (previously 0.15 g/dL).     Serum protein electrophoresis (9/27/22):  Normal total protein. Normal gamma globulins are decreased.   Two paraprotein  bands are identified: 1) Near-gamma = 0.2 g/dL   (previously 0.18 g/dL) 2) Mid-gamma = 0.17 g/dL (previously 0.18   g/dL).     Serum protein electrophoresis (8/30/22):  Decreased total protein. Normal gamma globulins are decreased. Two   paraprotein bands are identified: 1) Near-gamma = 0.18 g/dL   (previously 0.21 g/dL) 2) Mid-gamma = 0.18 g/dL (previously 0.18   g/dL).     Plasma Cell FISH:  The result is within normal limits for 1q duplication, TP53   deletion and IGH rearrangement.         Assessment:       1. Kappa light chain myeloma    2. Chemotherapy-induced thrombocytopenia    3. Anemia due to antineoplastic chemotherapy    4. Examination prior to chemotherapy    5. Chronic kidney disease, stage 3b    6. Hypertension, unspecified type    7. 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 Cerebrovascular accident (CVA), unspecified mechanism    10. Benign prostatic hyperplasia, unspecified whether lower urinary tract symptoms present    11. Weight loss    12. Folate deficiency    13. Thrombocytopenia, unspecified                    Plan:   Kappa light chain myeloma with normal cytogenetics / anemia/neutropenia/thrombocytopenia due to chemotherapy  -started Darzalex Faspro, subcutaneous Velcade, oral dexamethasone 8/26/2021  -velcade discontinued after 4 cycles  -last decadron dose was 2/3/22  -on Revlimid 10 mg QOD - start 9/30/2021, will continue   -cont Plavix  -continue acyclovir for shingles prophylaxis  - Labs reviewed, okay to proceed with Darzalex today, 1/24/24, spep stable, platelets 140,000, h/h improved, ANC improved to 2.7, okay to treat re gfr, creatinine clearance.  Will restart revlamid this cycle, pt and daughter verbalize understanding.  - return to clinic in 4 weeks in preparation for next cycle of darzalex faspro/dexamethasone/revlimid.     CKD Stage 3b, htn  - Labs have been reviewed. Creatinine is improved to 1.58 mg/dL along with gfr improvement.  - 174/79  "today, elevated, no associated symptoms  - encouraged adequate fluid intake, drinks more sweet tea than water  - continue to monitor    Chronic neoplasm-related pain  - no issues.  - continue to monitor     Immunodeficiency due to drug therapy  - No fevers, no s/s of acute illness  - No known exposure to covid or other illness  - 2.7 anc, improved  - Instructed on good handwashing, avoiding known ill individuals, limiting crowd exposure  - Continue to monitor    CVA history  - taking clopidogrel, continue to monitor    BPH  - continue flomax    Weight loss  - weight stable,148#  - pt eating one meal a day, "a big one" he states, multiple snacks and sweet tea  - encouraged small frequent meals, ensure/nutritional supplemental drink; discussed examples with pt and he will try to switch out one sweet tea for a nutritional drink  - periactin prescribed 8/2024  - continue to monitor    Folate deficiency  - folate now normal, B12 low normal (10/23/23)    High Risk Conditions:    Patient has a condition that poses threat to life and bodily function: kappa light chain myeloma.   Patient is currently on drug therapy requiring intensive monitoring for toxicity: adjuvant chemotherapy:       - return to clinic in 4 weeks in preparation for next cycle of darzalex faspro/dexamethasone/revlimid, continue revlimid current cycle due to stable hgb. Chemo moved to [facility] May 2023.       Milagros Myers, JENNY-C  [facility]  Hematology/Oncology  [address] [address]  [place]  [phone]            "

## 2024-02-21 ENCOUNTER — OFFICE VISIT (OUTPATIENT)
Dept: HEMATOLOGY/ONCOLOGY | Facility: CLINIC | Age: 79
End: 2024-02-21
Payer: MEDICARE

## 2024-02-21 VITALS
SYSTOLIC BLOOD PRESSURE: 162 MMHG | HEIGHT: 71 IN | DIASTOLIC BLOOD PRESSURE: 65 MMHG | HEART RATE: 57 BPM | BODY MASS INDEX: 21.26 KG/M2 | WEIGHT: 151.88 LBS | OXYGEN SATURATION: 100 % | TEMPERATURE: 98 F | RESPIRATION RATE: 18 BRPM

## 2024-02-21 DIAGNOSIS — E53.8 FOLATE DEFICIENCY: ICD-10-CM

## 2024-02-21 DIAGNOSIS — I10 HYPERTENSION, UNSPECIFIED TYPE: ICD-10-CM

## 2024-02-21 DIAGNOSIS — D84.821 IMMUNODEFICIENCY DUE TO DRUG THERAPY: ICD-10-CM

## 2024-02-21 DIAGNOSIS — D64.81 ANEMIA DUE TO ANTINEOPLASTIC CHEMOTHERAPY: ICD-10-CM

## 2024-02-21 DIAGNOSIS — I63.9 CEREBROVASCULAR ACCIDENT (CVA), UNSPECIFIED MECHANISM: ICD-10-CM

## 2024-02-21 DIAGNOSIS — T45.1X5A ANEMIA DUE TO ANTINEOPLASTIC CHEMOTHERAPY: ICD-10-CM

## 2024-02-21 DIAGNOSIS — D69.59 CHEMOTHERAPY-INDUCED THROMBOCYTOPENIA: ICD-10-CM

## 2024-02-21 DIAGNOSIS — G89.3 CHRONIC NEOPLASM-RELATED PAIN: ICD-10-CM

## 2024-02-21 DIAGNOSIS — C90.00 KAPPA LIGHT CHAIN MYELOMA: Primary | ICD-10-CM

## 2024-02-21 DIAGNOSIS — Z01.818 EXAMINATION PRIOR TO CHEMOTHERAPY: ICD-10-CM

## 2024-02-21 DIAGNOSIS — N40.0 BENIGN PROSTATIC HYPERPLASIA, UNSPECIFIED WHETHER LOWER URINARY TRACT SYMPTOMS PRESENT: ICD-10-CM

## 2024-02-21 DIAGNOSIS — Z79.899 IMMUNODEFICIENCY DUE TO DRUG THERAPY: ICD-10-CM

## 2024-02-21 DIAGNOSIS — N18.32 CHRONIC KIDNEY DISEASE, STAGE 3B: ICD-10-CM

## 2024-02-21 DIAGNOSIS — R63.4 WEIGHT LOSS: ICD-10-CM

## 2024-02-21 DIAGNOSIS — T45.1X5A CHEMOTHERAPY-INDUCED THROMBOCYTOPENIA: ICD-10-CM

## 2024-02-21 PROCEDURE — 3288F FALL RISK ASSESSMENT DOCD: CPT | Mod: CPTII,S$GLB,, | Performed by: NURSE PRACTITIONER

## 2024-02-21 PROCEDURE — 99215 OFFICE O/P EST HI 40 MIN: CPT | Mod: S$GLB,,, | Performed by: NURSE PRACTITIONER

## 2024-02-21 PROCEDURE — 99999 PR PBB SHADOW E&M-EST. PATIENT-LVL IV: CPT | Mod: PBBFAC,,, | Performed by: NURSE PRACTITIONER

## 2024-02-21 PROCEDURE — 3077F SYST BP >= 140 MM HG: CPT | Mod: CPTII,S$GLB,, | Performed by: NURSE PRACTITIONER

## 2024-02-21 PROCEDURE — 1126F AMNT PAIN NOTED NONE PRSNT: CPT | Mod: CPTII,S$GLB,, | Performed by: NURSE PRACTITIONER

## 2024-02-21 PROCEDURE — 1101F PT FALLS ASSESS-DOCD LE1/YR: CPT | Mod: CPTII,S$GLB,, | Performed by: NURSE PRACTITIONER

## 2024-02-21 PROCEDURE — 3078F DIAST BP <80 MM HG: CPT | Mod: CPTII,S$GLB,, | Performed by: NURSE PRACTITIONER

## 2024-02-21 RX ORDER — ACETAMINOPHEN 325 MG/1
650 TABLET ORAL
Status: CANCELLED | OUTPATIENT
Start: 2024-02-21

## 2024-02-21 RX ORDER — HEPARIN 100 UNIT/ML
500 SYRINGE INTRAVENOUS
Status: CANCELLED | OUTPATIENT
Start: 2024-02-21

## 2024-02-21 RX ORDER — SODIUM CHLORIDE 0.9 % (FLUSH) 0.9 %
10 SYRINGE (ML) INJECTION
Status: CANCELLED | OUTPATIENT
Start: 2024-02-21

## 2024-02-21 RX ORDER — DIPHENHYDRAMINE HYDROCHLORIDE 50 MG/ML
50 INJECTION INTRAMUSCULAR; INTRAVENOUS ONCE AS NEEDED
Status: CANCELLED | OUTPATIENT
Start: 2024-02-21

## 2024-02-21 RX ORDER — DIPHENHYDRAMINE HCL 25 MG
50 CAPSULE ORAL
Status: CANCELLED
Start: 2024-02-21

## 2024-02-21 RX ORDER — FAMOTIDINE 20 MG/1
20 TABLET, FILM COATED ORAL
Status: CANCELLED
Start: 2024-02-21

## 2024-02-21 RX ORDER — EPINEPHRINE 0.3 MG/.3ML
0.3 INJECTION SUBCUTANEOUS ONCE AS NEEDED
Status: CANCELLED | OUTPATIENT
Start: 2024-02-21

## 2024-02-21 NOTE — PROGRESS NOTES
"KPATIENT: Haris Hernandez  MRN: [id number]  DATE: 2/21/2024    Chief Complaint: Kappa Light Chain Myeloma    Subjective:     Pertinent Medical History:     He presented to the ED 08/13/2021 with abnormal labs-CMP showed creatinine 6.6.  Prior CMP from May 2018 showed creatinine 1.2.    Further workup included a free light chain assay where a kappa free light chain level was 494    08/23/2021 bone marrow biopsy showed variable cellularity, 10 to 40% positive for plasma cell dyscrasia with plasma cells representing 30 to 40% of total cellularity.  No increase in blasts. FISH for Plasma Cell Proliferative Disorder - NORMAL    -started Darzalex, Velcade, Decadron 8/26/2021  -On Revlimid 10 mg qod since 9/30/2021  -saw urology,  for BPH causing difficulty in emptying the bladder, he may need cystoscopy and surgery    Interval History:   - he presents for a follow-up appointment for his multiple myeloma, with mariam today.  - he is scheduled for darzalex faspro today, 2/21/24  - today, overall he states feels well; he endorses fatigue "sometimes", diarrhea intermittent, "just usually the 2 days after treatment".  Weight improved this visit, today gain 3# to now 151#.  He is eating better, pushing fluids. Denies chest pain, sob, abdominal pain, n/v, constipation, hematemesis, melena, hematuria. He reports  lower extremity swelling bilat is improved, this is chronic. 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 Wearing gloves at night helps .   - restarted last cycle of revlimid and tolerated well, , pt and daughter agree        Past Medical, Surgical, Family, and Social histories reviewed.    Medication and Allergies reviewed.    Review of Systems   Constitutional:  Positive for malaise/fatigue. Negative for fever and weight loss (improved this visit).   HENT:  Negative for sore throat.    Respiratory:  Negative for shortness of breath.    Cardiovascular:  Positive " "for leg swelling (resolved at present). Negative for chest pain.   Gastrointestinal:  Positive for diarrhea (1-2 days following chemo). Negative for abdominal pain, nausea and vomiting.   Genitourinary:  Negative for dysuria.   Musculoskeletal:  Positive for neck pain. Negative for back pain.   Skin:  Negative for rash.   Neurological:  Negative for weakness.   Psychiatric/Behavioral:  The patient is not nervous/anxious.        Objective:     Vitals:    02/21/24 1441   BP: (!) 162/65   BP Location: Right arm   Patient Position: Sitting   BP Method: Medium (Automatic)   Pulse: (!) 57   Resp: 18   Temp: 97.6 °F (36.4 °C)   TempSrc: Oral   SpO2: 100%   Weight: 68.9 kg (151 lb 14.4 oz)   Height: 5' 11" (1.803 m)             BMI: Body mass index is 21.19 kg/m².    ECOG 1  Physical Exam  Vitals and nursing note reviewed.   Constitutional:       General: He is not in acute distress.     Appearance: Normal appearance. He is well-developed. He is not ill-appearing or toxic-appearing.   HENT:      Head: Normocephalic and atraumatic.      Right Ear: External ear normal.      Left Ear: External ear normal.   Eyes:      General: No scleral icterus.     Pupils: Pupils are equal, round, and reactive to light.   Cardiovascular:      Rate and Rhythm: Normal rate and regular rhythm.      Pulses: Normal pulses.      Heart sounds: Normal heart sounds. No murmur heard.  Pulmonary:      Effort: Pulmonary effort is normal. No respiratory distress.      Breath sounds: Normal breath sounds.   Abdominal:      General: Bowel sounds are normal. There is no distension.      Palpations: Abdomen is soft.      Tenderness: There is no abdominal tenderness. There is no guarding.   Musculoskeletal:         General: No swelling or tenderness. Normal range of motion.      Cervical back: Normal range of motion and neck supple. No rigidity.      Right lower leg: No edema.      Left lower leg: No edema.   Skin:     General: Skin is warm and dry.      " Capillary Refill: Capillary refill takes less than 2 seconds.   Neurological:      Mental Status: He is alert and oriented to person, place, and time.      Gait: Gait normal.   Psychiatric:         Mood and Affect: Mood normal.         Behavior: Behavior normal.         Thought Content: Thought content normal.         Judgment: Judgment normal.       Labs have been reviewed, discussed with pt and daughter.    Lab Results   Component Value Date    WBC 3.76 (L) 02/19/2024    HGB 8.2 (L) 02/19/2024    HCT 23.3 (L) 02/19/2024    MCV 87 02/19/2024     02/19/2024     CMP  Sodium   Date Value Ref Range Status   02/19/2024 148 (H) 136 - 145 mmol/L Final     Potassium   Date Value Ref Range Status   02/19/2024 3.9 3.5 - 5.1 mmol/L Final     Chloride   Date Value Ref Range Status   02/19/2024 115 (H) 95 - 110 mmol/L Final     CO2   Date Value Ref Range Status   02/19/2024 27 23 - 29 mmol/L Final     Glucose   Date Value Ref Range Status   02/19/2024 101 70 - 110 mg/dL Final     BUN   Date Value Ref Range Status   02/19/2024 19 2 - 20 mg/dL Final     Creatinine   Date Value Ref Range Status   02/19/2024 1.67 (H) 0.50 - 1.40 mg/dL Final     Calcium   Date Value Ref Range Status   02/19/2024 8.4 (L) 8.7 - 10.5 mg/dL Final     Total Protein   Date Value Ref Range Status   02/19/2024 6.5 6.0 - 8.4 g/dL Final     Albumin   Date Value Ref Range Status   02/19/2024 3.6 3.5 - 5.2 g/dL Final     Total Bilirubin   Date Value Ref Range Status   02/19/2024 0.4 0.1 - 1.0 mg/dL Final     Comment:     For infants and newborns, interpretation of results should be based  on gestational age, weight and in agreement with clinical  observations.    Premature Infant recommended reference ranges:  Up to 24 hours.............<8.0 mg/dL  Up to 48 hours............<12.0 mg/dL  3-5 days..................<15.0 mg/dL  6-29 days.................<15.0 mg/dL       Alkaline Phosphatase   Date Value Ref Range Status   02/19/2024 68 38 - 126 U/L Final      AST   Date Value Ref Range Status   02/19/2024 20 15 - 46 U/L Final     ALT   Date Value Ref Range Status   02/19/2024 17 10 - 44 U/L Final     Anion Gap   Date Value Ref Range Status   02/19/2024 6 (L) 8 - 16 mmol/L Final     eGFR   Date Value Ref Range Status   02/19/2024 42.2 (A) >60 mL/min/1.73 m^2 Final       SPEP 1/22/24  Decreased total protein. Normal gamma globulins are decreased. Faint   paraprotein peaks in near-gamma = 0.15 g/dL (previously 0.12 g/dL)   and mid-gamma = 0.17 g/dL (previously 0.13 g/dL).     Kappa FLC 1/22/24  -2.42 mg/dL (494.85 mg/dL on 8/14/21)    SPEP 12/18/23  Decreased total protein. Normal gamma globulins are decreased. Faint   paraprotein peaks in near-gamma = 0.12 g/dL (previously 0.15 g/dL)   and mid-gamma = 0.13 g/dL (previously 0.14 g/dL).     Kappa FLC 12/18/23  - 4.14 mg/dL (494.85 mg/dL on 8/14/21)    SPEP 11/20/23  Decreased total protein. Normal gamma globulins are decreased.   Paraprotein peaks in near-gamma = 0.15 g/dL (previously 0.14 g/dL)   and mid-gamma = 0.14 g/dL (previously 0.18 g/dL).     Kappa FLC 11/20/23  - 4.18 mg/dL (494.85 mg/dL on 8/14/21)    SPEP 10/23/23  Decreased total protein. Normal gamma globulins are decreased.   Paraprotein peaks in near-gamma = 0.14 g/dL (previously, 0.2 g/dL)   and mid-gamma = 0.18 g/dL (previously, 0.15 g/dL).     Kappa FLC 10/23/23  - 4.19 mg/dL (494.85 mg/dL on 8/14/21)    SPEP 9/25/23  Decreased total protein. Normal gamma globulins are decreased.   Paraprotein peaks in near-gamma = 0.2 g/dL (previously, 0.12 g/dL)   and mid-gamma = 0.15 g/dL (previously, 0.14 g/dL).       SPEP 8/28/23  Decreased total protein. Normal gamma globulins are decreased. There   is a paraprotein band in near-gamma = 0.12 g/dL, previously 0.14   g/dL.         SPEP 7/31/23  There is a paraprotein band in near-gamma = 0.14 g/dL, previously   0.18 g/dL.     The second previously described band is not seen discretely on the   current study.      [place] free light chain 7/31/23  2.72 mg/dL (494.85 mg/dL on 8/14/21)      SPEP 7/3/23 path interpretation   Decreased total protein. Normal gamma globulins are decreased. Two   closely adjacent paraprotein peaks in near-gamma = 0.18 g/dL   (previously 0.18 g/dL) and mid-gamma = 0.14 g/dL (previously 0.21   g/dL).     Serum protein electrophoresis (6/6/23):  Decreased total protein. Normal gamma globulins are decreased. Two   closely adjacent paraprotein peaks in near-gamma = 0.18 g/dL   (previously 0.15 g/dL) and mid-gamma = 0.21 g/dL (previously 0.17   g/dL).     Serum protein electrophoresis (5/9/23):  Decreased total protein. Normal gamma globulins are decreased. Two   closely adjacent paraprotein peaks in near-gamma = 0.15 g/dL   (previously 0.15 g/dL) and mid-gamma = 0.17 g/dL (previously 0.18   g/dL).     Serum protein electrophoresis (4/11/23):  Decreased total protein. Normal gamma globulins are decreased.   Two closely adjacent paraprotein peaks in near-gamma = 0.15 g/dL   (previously 0.15 g/dL) and mid-gamma = 0.18 g/dL (previously 0.15   g/dL).     Serum protein electrophoresis (3/13/23):  Decreased total protein. Normal gamma globulins are decreased.   Two closely adjacent paraprotein peaks in near-gamma = 0.15 g/dL   (previously 0.14 g/dL) and mid-gamma = 0.15 g/dL (previously, 0.13   g/dL).       Serum protein electrophoresis (2/13/23):  Decreased total protein. Normal gamma globulins are decreased. Two   paraprotein bands are present in near-gamma = 0.14 g/dL (previously   0.15 g/dL) and mid-gamma = 0.13 g/dL (previously 0.19 g/dL).       Serum protein electrophoresis (12/19/22):  Normal total protein. Normal gamma globulins are decreased. Two   paraprotein bands are present in near-gamma = 0.20 g/dL (previously   0.16 g/dL) and mid-gamma = 0.23 g/dL (previously 0.15 g/dL).     Serum protein electrophoresis (9/27/22):  Normal total protein. Normal gamma globulins are decreased.   Two paraprotein  bands are identified: 1) Near-gamma = 0.2 g/dL   (previously 0.18 g/dL) 2) Mid-gamma = 0.17 g/dL (previously 0.18   g/dL).     Serum protein electrophoresis (8/30/22):  Decreased total protein. Normal gamma globulins are decreased. Two   paraprotein bands are identified: 1) Near-gamma = 0.18 g/dL   (previously 0.21 g/dL) 2) Mid-gamma = 0.18 g/dL (previously 0.18   g/dL).     Plasma Cell FISH:  The result is within normal limits for 1q duplication, TP53   deletion and IGH rearrangement.         Assessment:       1. Kappa light chain myeloma    2. Chemotherapy-induced thrombocytopenia    3. Anemia due to antineoplastic chemotherapy    4. Examination prior to chemotherapy    5. Chronic kidney disease, stage 3b    6. Hypertension, unspecified type    7. 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 Cerebrovascular accident (CVA), unspecified mechanism    10. Benign prostatic hyperplasia, unspecified whether lower urinary tract symptoms present    11. Weight loss    12. Folate deficiency                      Plan:   Kappa light chain myeloma with normal cytogenetics / anemia/neutropenia/thrombocytopenia due to chemotherapy  -started Darzalex Faspro, subcutaneous Velcade, oral dexamethasone 8/26/2021  -velcade discontinued after 4 cycles  -last decadron dose was 2/3/22  -on Revlimid 10 mg QOD - start 9/30/2021, will continue   -cont Plavix  -continue acyclovir for shingles prophylaxis  - Labs reviewed, okay to proceed with Darzalex today, 2/21/24, spep stable, platelets 178,000, h/h slight decrease 8.2/23.3, ANC stable  2.3, okay to treat re gfr, creatinine clearance.  Continue revlamid this cycle, pt and daughter verbalize understanding. If hgb trends to 7 range, will hold revlamid.  - return to clinic in 4 weeks in preparation for next cycle of darzalex faspro/dexamethasone/revlimid.     CKD Stage 3b, htn  - Labs have been reviewed. Creatinine i 1.67 mg/dL along with gfr 42.2  - 174/79  "today, elevated, no associated symptoms  - encouraged adequate fluid intake, drinks more sweet tea than water  - continue to monitor    Chronic neoplasm-related pain  - no issues.  - continue to monitor     Immunodeficiency due to drug therapy  - No fevers, no s/s of acute illness  - No known exposure to covid or other illness  - 2.7 anc, improved  - Instructed on good handwashing, avoiding known ill individuals, limiting crowd exposure  - Continue to monitor    CVA history  - taking clopidogrel, continue to monitor    BPH  - continue flomax    Weight loss  - weight improved,151#  - pt eating one meal a day, "a big one" he states, multiple snacks and sweet tea  - encouraged small frequent meals, ensure/nutritional supplemental drink; discussed examples with pt and he will try to switch out one sweet tea for a nutritional drink  - periactin prescribed 8/2024  - continue to monitor    Folate deficiency  - folate now normal, B12 low normal (10/23/23)    High Risk Conditions:    Patient has a condition that poses threat to life and bodily function: kappa light chain myeloma.   Patient is currently on drug therapy requiring intensive monitoring for toxicity: adjuvant chemotherapy:       - return to clinic in 4 weeks in preparation for next cycle of darzalex faspro/dexamethasone/revlimid, continue revlimid current cycle due to stable hgb. Chemo moved to [facility] May 2023.       Milagros Myers, JENNY-C  [facility]  Hematology/Oncology  [address] [address]  [place]  [phone]            "

## 2024-02-22 DIAGNOSIS — C90.00 KAPPA LIGHT CHAIN MYELOMA: ICD-10-CM

## 2024-02-22 RX ORDER — LENALIDOMIDE 10 MG/1
CAPSULE ORAL
Qty: 14 EACH | Refills: 0 | Status: SHIPPED | OUTPATIENT
Start: 2024-02-22 | End: 2024-03-21

## 2024-03-21 DIAGNOSIS — C90.00 KAPPA LIGHT CHAIN MYELOMA: ICD-10-CM

## 2024-03-21 RX ORDER — LENALIDOMIDE 10 MG/1
CAPSULE ORAL
Qty: 14 EACH | Refills: 0 | Status: SHIPPED | OUTPATIENT
Start: 2024-03-21 | End: 2024-04-19

## 2024-03-27 ENCOUNTER — OFFICE VISIT (OUTPATIENT)
Dept: HEMATOLOGY/ONCOLOGY | Facility: CLINIC | Age: 79
End: 2024-03-27
Payer: MEDICARE

## 2024-03-27 VITALS
BODY MASS INDEX: 20.83 KG/M2 | HEART RATE: 56 BPM | OXYGEN SATURATION: 99 % | DIASTOLIC BLOOD PRESSURE: 62 MMHG | SYSTOLIC BLOOD PRESSURE: 155 MMHG | HEIGHT: 71 IN | RESPIRATION RATE: 20 BRPM | WEIGHT: 148.81 LBS | TEMPERATURE: 98 F

## 2024-03-27 DIAGNOSIS — N40.0 BENIGN PROSTATIC HYPERPLASIA, UNSPECIFIED WHETHER LOWER URINARY TRACT SYMPTOMS PRESENT: ICD-10-CM

## 2024-03-27 DIAGNOSIS — T45.1X5A CHEMOTHERAPY-INDUCED PERIPHERAL NEUROPATHY: ICD-10-CM

## 2024-03-27 DIAGNOSIS — D84.821 IMMUNODEFICIENCY DUE TO DRUG THERAPY: ICD-10-CM

## 2024-03-27 DIAGNOSIS — Z79.899 IMMUNODEFICIENCY DUE TO DRUG THERAPY: ICD-10-CM

## 2024-03-27 DIAGNOSIS — E53.8 FOLATE DEFICIENCY: ICD-10-CM

## 2024-03-27 DIAGNOSIS — N18.32 CHRONIC KIDNEY DISEASE, STAGE 3B: ICD-10-CM

## 2024-03-27 DIAGNOSIS — I63.9 CEREBROVASCULAR ACCIDENT (CVA), UNSPECIFIED MECHANISM: ICD-10-CM

## 2024-03-27 DIAGNOSIS — D69.59 CHEMOTHERAPY-INDUCED THROMBOCYTOPENIA: ICD-10-CM

## 2024-03-27 DIAGNOSIS — T45.1X5A CHEMOTHERAPY-INDUCED THROMBOCYTOPENIA: ICD-10-CM

## 2024-03-27 DIAGNOSIS — D64.81 ANEMIA DUE TO ANTINEOPLASTIC CHEMOTHERAPY: ICD-10-CM

## 2024-03-27 DIAGNOSIS — T45.1X5A ANEMIA DUE TO ANTINEOPLASTIC CHEMOTHERAPY: ICD-10-CM

## 2024-03-27 DIAGNOSIS — C90.00 KAPPA LIGHT CHAIN MYELOMA: Primary | ICD-10-CM

## 2024-03-27 DIAGNOSIS — G89.3 CHRONIC NEOPLASM-RELATED PAIN: ICD-10-CM

## 2024-03-27 DIAGNOSIS — I10 HYPERTENSION, UNSPECIFIED TYPE: ICD-10-CM

## 2024-03-27 DIAGNOSIS — G62.0 CHEMOTHERAPY-INDUCED PERIPHERAL NEUROPATHY: ICD-10-CM

## 2024-03-27 DIAGNOSIS — R63.4 WEIGHT LOSS: ICD-10-CM

## 2024-03-27 DIAGNOSIS — Z01.818 EXAMINATION PRIOR TO CHEMOTHERAPY: ICD-10-CM

## 2024-03-27 PROCEDURE — 3078F DIAST BP <80 MM HG: CPT | Mod: CPTII,S$GLB,, | Performed by: NURSE PRACTITIONER

## 2024-03-27 PROCEDURE — 3288F FALL RISK ASSESSMENT DOCD: CPT | Mod: CPTII,S$GLB,, | Performed by: NURSE PRACTITIONER

## 2024-03-27 PROCEDURE — 3077F SYST BP >= 140 MM HG: CPT | Mod: CPTII,S$GLB,, | Performed by: NURSE PRACTITIONER

## 2024-03-27 PROCEDURE — 99215 OFFICE O/P EST HI 40 MIN: CPT | Mod: S$GLB,,, | Performed by: NURSE PRACTITIONER

## 2024-03-27 PROCEDURE — 99999 PR PBB SHADOW E&M-EST. PATIENT-LVL IV: CPT | Mod: PBBFAC,,, | Performed by: NURSE PRACTITIONER

## 2024-03-27 PROCEDURE — 1126F AMNT PAIN NOTED NONE PRSNT: CPT | Mod: CPTII,S$GLB,, | Performed by: NURSE PRACTITIONER

## 2024-03-27 PROCEDURE — 1101F PT FALLS ASSESS-DOCD LE1/YR: CPT | Mod: CPTII,S$GLB,, | Performed by: NURSE PRACTITIONER

## 2024-03-27 RX ORDER — DIPHENHYDRAMINE HYDROCHLORIDE 50 MG/ML
50 INJECTION INTRAMUSCULAR; INTRAVENOUS ONCE AS NEEDED
Status: CANCELLED | OUTPATIENT
Start: 2024-03-27

## 2024-03-27 RX ORDER — HEPARIN 100 UNIT/ML
500 SYRINGE INTRAVENOUS
Status: CANCELLED | OUTPATIENT
Start: 2024-03-27

## 2024-03-27 RX ORDER — EPINEPHRINE 0.3 MG/.3ML
0.3 INJECTION SUBCUTANEOUS ONCE AS NEEDED
Status: CANCELLED | OUTPATIENT
Start: 2024-03-27

## 2024-03-27 RX ORDER — ACETAMINOPHEN 325 MG/1
650 TABLET ORAL
Status: CANCELLED | OUTPATIENT
Start: 2024-03-27

## 2024-03-27 RX ORDER — FAMOTIDINE 20 MG/1
20 TABLET, FILM COATED ORAL
Status: CANCELLED
Start: 2024-03-27

## 2024-03-27 RX ORDER — DIPHENHYDRAMINE HCL 25 MG
50 CAPSULE ORAL
Status: CANCELLED
Start: 2024-03-27

## 2024-03-27 RX ORDER — SODIUM CHLORIDE 0.9 % (FLUSH) 0.9 %
10 SYRINGE (ML) INJECTION
Status: CANCELLED | OUTPATIENT
Start: 2024-03-27

## 2024-03-27 NOTE — PROGRESS NOTES
"KPATIENT: Haris Hernandez  MRN: [id number]  DATE: 3/28/2024    Chief Complaint: Kappa Light Chain Myeloma    Subjective:     Pertinent Medical History:     He presented to the ED 08/13/2021 with abnormal labs-CMP showed creatinine 6.6.  Prior CMP from May 2018 showed creatinine 1.2.    Further workup included a free light chain assay where a kappa free light chain level was 494    08/23/2021 bone marrow biopsy showed variable cellularity, 10 to 40% positive for plasma cell dyscrasia with plasma cells representing 30 to 40% of total cellularity.  No increase in blasts. FISH for Plasma Cell Proliferative Disorder - NORMAL    -started Darzalex, Velcade, Decadron 8/26/2021  -On Revlimid 10 mg qod since 9/30/2021  -saw urology,  for BPH causing difficulty in emptying the bladder, he may need cystoscopy and surgery    Interval History:   - he presents for a follow-up appointment for his multiple myeloma, with mariam today.  - he is scheduled for darzalex faspro today, 3/27/24  - today, overall he states feels well; he endorses fatigue "sometimes", diarrhea intermittent, "just usually the 2 days after treatment".  Weight decreased back down 3# to 148#, states he feels appetite is good, daughter agrees.  Denies chest pain, sob, abdominal pain, n/v, constipation, hematemesis, melena, hematuria. He reports  lower extremity swelling bilat is improved, this is chronic. 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 Wearing gloves at night helps .   - continues on cycles of revlimid and tolerating well        Past Medical, Surgical, Family, and Social histories reviewed.    Medication and Allergies reviewed.    Review of Systems   Constitutional:  Positive for malaise/fatigue. Negative for fever and weight loss (improved this visit).   HENT:  Negative for sore throat.    Respiratory:  Negative for shortness of breath.    Cardiovascular:  Positive for leg swelling (resolved at " "present). Negative for chest pain.   Gastrointestinal:  Positive for diarrhea (1-2 days following chemo). Negative for abdominal pain, nausea and vomiting.   Genitourinary:  Negative for dysuria.   Musculoskeletal:  Positive for neck pain. Negative for back pain.   Skin:  Negative for rash.   Neurological:  Negative for weakness.   Psychiatric/Behavioral:  The patient is not nervous/anxious.        Objective:     Vitals:    03/27/24 1020   BP: (!) 155/62   BP Location: Left arm   Patient Position: Sitting   BP Method: Medium (Automatic)   Pulse: (!) 56   Resp: 20   Temp: 97.7 °F (36.5 °C)   TempSrc: Oral   SpO2: 99%   Weight: 67.5 kg (148 lb 13 oz)   Height: 5' 11" (1.803 m)             BMI: Body mass index is 20.75 kg/m².    ECOG 1  Physical Exam  Vitals and nursing note reviewed.   Constitutional:       General: He is not in acute distress.     Appearance: Normal appearance. He is well-developed. He is not ill-appearing or toxic-appearing.   HENT:      Head: Normocephalic and atraumatic.      Right Ear: External ear normal.      Left Ear: External ear normal.   Eyes:      General: No scleral icterus.     Pupils: Pupils are equal, round, and reactive to light.   Cardiovascular:      Rate and Rhythm: Normal rate and regular rhythm.      Pulses: Normal pulses.      Heart sounds: Normal heart sounds. No murmur heard.  Pulmonary:      Effort: Pulmonary effort is normal. No respiratory distress.      Breath sounds: Normal breath sounds.   Abdominal:      General: Bowel sounds are normal. There is no distension.      Palpations: Abdomen is soft.      Tenderness: There is no abdominal tenderness. There is no guarding.   Musculoskeletal:         General: No swelling or tenderness. Normal range of motion.      Cervical back: Normal range of motion and neck supple. No rigidity.      Right lower leg: No edema.      Left lower leg: No edema.   Skin:     General: Skin is warm and dry.      Capillary Refill: Capillary refill " takes less than 2 seconds.   Neurological:      Mental Status: He is alert and oriented to person, place, and time.      Gait: Gait normal.   Psychiatric:         Mood and Affect: Mood normal.         Behavior: Behavior normal.         Thought Content: Thought content normal.         Judgment: Judgment normal.       Labs have been reviewed, discussed with pt and daughter.    Lab Results   Component Value Date    WBC 2.95 (L) 03/25/2024    HGB 8.6 (L) 03/25/2024    HCT 24.0 (L) 03/25/2024    MCV 86 03/25/2024     (L) 03/25/2024     CMP  Sodium   Date Value Ref Range Status   03/25/2024 147 (H) 136 - 145 mmol/L Final     Potassium   Date Value Ref Range Status   03/25/2024 4.2 3.5 - 5.1 mmol/L Final     Chloride   Date Value Ref Range Status   03/25/2024 107 95 - 110 mmol/L Final     CO2   Date Value Ref Range Status   03/25/2024 24 23 - 29 mmol/L Final     Glucose   Date Value Ref Range Status   03/25/2024 97 70 - 110 mg/dL Final     BUN   Date Value Ref Range Status   03/25/2024 18 2 - 20 mg/dL Final     Creatinine   Date Value Ref Range Status   03/25/2024 1.65 (H) 0.50 - 1.40 mg/dL Final     Calcium   Date Value Ref Range Status   03/25/2024 8.4 (L) 8.7 - 10.5 mg/dL Final     Total Protein   Date Value Ref Range Status   03/25/2024 6.0 6.0 - 8.4 g/dL Final     Albumin   Date Value Ref Range Status   03/25/2024 3.6 3.5 - 5.2 g/dL Final     Total Bilirubin   Date Value Ref Range Status   03/25/2024 0.6 0.1 - 1.0 mg/dL Final     Comment:     For infants and newborns, interpretation of results should be based  on gestational age, weight and in agreement with clinical  observations.    Premature Infant recommended reference ranges:  Up to 24 hours.............<8.0 mg/dL  Up to 48 hours............<12.0 mg/dL  3-5 days..................<15.0 mg/dL  6-29 days.................<15.0 mg/dL       Alkaline Phosphatase   Date Value Ref Range Status   03/25/2024 59 38 - 126 U/L Final     AST   Date Value Ref Range Status    03/25/2024 21 15 - 46 U/L Final     ALT   Date Value Ref Range Status   03/25/2024 17 10 - 44 U/L Final     Anion Gap   Date Value Ref Range Status   03/25/2024 16 8 - 16 mmol/L Final     eGFR   Date Value Ref Range Status   03/25/2024 42.8 (A) >60 mL/min/1.73 m^2 Final       SPEP 3/25/24  Decreased total protein. Normal gamma globulins are decreased. Faint   paraprotein peaks in near-gamma = 0.1 g/dL (previously 0.17 g/dL) and   mid-gamma = 0.1 g/dL (previously 0.22 g/dL).     SPEP 2/19/24  Decreased total protein. Normal gamma globulins are decreased. Faint   paraprotein peaks in near-gamma = 0.17 g/dL (previously 0.15 g/dL)   and mid-gamma = 0.22 g/dL (previously 0.17 g/dL).       SPEP 1/22/24  Decreased total protein. Normal gamma globulins are decreased. Faint   paraprotein peaks in near-gamma = 0.15 g/dL (previously 0.12 g/dL)   and mid-gamma = 0.17 g/dL (previously 0.13 g/dL).     Kappa FLC 1/22/24  -2.42 mg/dL (494.85 mg/dL on 8/14/21)    SPEP 12/18/23  Decreased total protein. Normal gamma globulins are decreased. Faint   paraprotein peaks in near-gamma = 0.12 g/dL (previously 0.15 g/dL)   and mid-gamma = 0.13 g/dL (previously 0.14 g/dL).     Kappa FLC 12/18/23  - 4.14 mg/dL (494.85 mg/dL on 8/14/21)    SPEP 11/20/23  Decreased total protein. Normal gamma globulins are decreased.   Paraprotein peaks in near-gamma = 0.15 g/dL (previously 0.14 g/dL)   and mid-gamma = 0.14 g/dL (previously 0.18 g/dL).     Kappa FLC 11/20/23  - 4.18 mg/dL (494.85 mg/dL on 8/14/21)    SPEP 10/23/23  Decreased total protein. Normal gamma globulins are decreased.   Paraprotein peaks in near-gamma = 0.14 g/dL (previously, 0.2 g/dL)   and mid-gamma = 0.18 g/dL (previously, 0.15 g/dL).     Kappa FLC 10/23/23  - 4.19 mg/dL (494.85 mg/dL on 8/14/21)    SPEP 9/25/23  Decreased total protein. Normal gamma globulins are decreased.   Paraprotein peaks in near-gamma = 0.2 g/dL (previously, 0.12 g/dL)   and mid-gamma = 0.15 g/dL  (previously, 0.14 g/dL).       SPEP 8/28/23  Decreased total protein. Normal gamma globulins are decreased. There   is a paraprotein band in near-gamma = 0.12 g/dL, previously 0.14   g/dL.         SPEP 7/31/23  There is a paraprotein band in near-gamma = 0.14 g/dL, previously   0.18 g/dL.     The second previously described band is not seen discretely on the   current study.     [facility] free light chain 7/31/23  2.72 mg/dL (494.85 mg/dL on 8/14/21)      SPEP 7/3/23 path interpretation   Decreased total protein. Normal gamma globulins are decreased. Two   closely adjacent paraprotein peaks in near-gamma = 0.18 g/dL   (previously 0.18 g/dL) and mid-gamma = 0.14 g/dL (previously 0.21   g/dL).     Serum protein electrophoresis (6/6/23):  Decreased total protein. Normal gamma globulins are decreased. Two   closely adjacent paraprotein peaks in near-gamma = 0.18 g/dL   (previously 0.15 g/dL) and mid-gamma = 0.21 g/dL (previously 0.17   g/dL).     Serum protein electrophoresis (5/9/23):  Decreased total protein. Normal gamma globulins are decreased. Two   closely adjacent paraprotein peaks in near-gamma = 0.15 g/dL   (previously 0.15 g/dL) and mid-gamma = 0.17 g/dL (previously 0.18   g/dL).     Serum protein electrophoresis (4/11/23):  Decreased total protein. Normal gamma globulins are decreased.   Two closely adjacent paraprotein peaks in near-gamma = 0.15 g/dL   (previously 0.15 g/dL) and mid-gamma = 0.18 g/dL (previously 0.15   g/dL).     Serum protein electrophoresis (3/13/23):  Decreased total protein. Normal gamma globulins are decreased.   Two closely adjacent paraprotein peaks in near-gamma = 0.15 g/dL   (previously 0.14 g/dL) and mid-gamma = 0.15 g/dL (previously, 0.13   g/dL).       Serum protein electrophoresis (2/13/23):  Decreased total protein. Normal gamma globulins are decreased. Two   paraprotein bands are present in near-gamma = 0.14 g/dL (previously   0.15 g/dL) and mid-gamma = 0.13 g/dL (previously 0.19  g/dL).       Serum protein electrophoresis (12/19/22):  Normal total protein. Normal gamma globulins are decreased. Two   paraprotein bands are present in near-gamma = 0.20 g/dL (previously   0.16 g/dL) and mid-gamma = 0.23 g/dL (previously 0.15 g/dL).     Serum protein electrophoresis (9/27/22):  Normal total protein. Normal gamma globulins are decreased.   Two paraprotein bands are identified: 1) Near-gamma = 0.2 g/dL   (previously 0.18 g/dL) 2) Mid-gamma = 0.17 g/dL (previously 0.18   g/dL).     Serum protein electrophoresis (8/30/22):  Decreased total protein. Normal gamma globulins are decreased. Two   paraprotein bands are identified: 1) Near-gamma = 0.18 g/dL   (previously 0.21 g/dL) 2) Mid-gamma = 0.18 g/dL (previously 0.18   g/dL).     Plasma Cell FISH:  The result is within normal limits for 1q duplication, TP53   deletion and IGH rearrangement.         Assessment:       1. Kappa light chain myeloma    2. Chemotherapy-induced thrombocytopenia    3. Anemia due to antineoplastic chemotherapy    4. Chemotherapy-induced peripheral neuropathy    5. Examination prior to chemotherapy    6. Chronic kidney disease, stage 3b    7. Hypertension, unspecified type    8. 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 Cerebrovascular accident (CVA), unspecified mechanism    11. Benign prostatic hyperplasia, unspecified whether lower urinary tract symptoms present    12. Weight loss    13. Folate deficiency        Plan:   Kappa light chain myeloma with normal cytogenetics / anemia/neutropenia/thrombocytopenia due to chemotherapy/chemo induced neuropathy  -started Darzalex Faspro, subcutaneous Velcade, oral dexamethasone 8/26/2021  -velcade discontinued after 4 cycles  -last decadron dose was 2/3/22  -on Revlimid 10 mg QOD - start 9/30/2021, will continue   -cont Plavix  -continue acyclovir for shingles prophylaxis  - Labs reviewed, okay to proceed with Darzalex today, 3/27/24, spep  "improved, platelets 123,000, h/h slight increase 8.6/24, ANC 1.5, okay to treat re gfr, creatinine clearance, stable.  Continue with revlamid this cycle, pt and daughter verbalize understanding. If hgb trends to 7 range, will hold revlamid. Neuropathy is stable, mild, no worsening.  - return to clinic in 4 weeks in preparation for next cycle of darzalex faspro/dexamethasone/revlimid.     CKD Stage 3b, htn  - Labs have been reviewed. Creatinine  1.65 mg/dL along with gfr 42.8  - 155/62 today, elevated, no associated symptoms  - encouraged adequate fluid intake, drinks more sweet tea than water  - continue to monitor    Chronic neoplasm-related pain  - no issues.  - continue to monitor     Immunodeficiency due to drug therapy  - No fevers, no s/s of acute illness  - No known exposure to covid or other illness  - 1.5 anc, decreased   - Instructed on good handwashing, avoiding known ill individuals, limiting crowd exposure  - Continue to monitor    CVA history  - taking clopidogrel, continue to monitor    BPH  - continue flomax    Weight loss  - weight decreased back to 148# from 151#  - pt eating one meal a day, "a big one" he states, multiple snacks and sweet tea  - encouraged small frequent meals, ensure/nutritional supplemental drink; discussed examples with pt and he will try to switch out one sweet tea for a nutritional drink  - periactin prescribed 8/2024  - continue to monitor    Folate deficiency  - folate now normal, B12 low normal (10/23/23)    High Risk Conditions:    Patient has a condition that poses threat to life and bodily function: kappa light chain myeloma.   Patient is currently on drug therapy requiring intensive monitoring for toxicity: adjuvant chemotherapy:       - return to clinic in 4 weeks in preparation for next cycle of darzalex faspro/dexamethasone/revlimid, continue revlimid current cycle due to stable hgb. Chemo moved to [facility] May 2023.       Ann Ledet, NP-C [facility] " [facility]  Hematology/Oncology  [address] [place] [address]  IVONNE Schmitt  [id number]  [phone]

## 2024-04-19 DIAGNOSIS — C90.00 KAPPA LIGHT CHAIN MYELOMA: ICD-10-CM

## 2024-04-19 RX ORDER — LENALIDOMIDE 10 MG/1
CAPSULE ORAL
Qty: 14 EACH | Refills: 0 | Status: SHIPPED | OUTPATIENT
Start: 2024-04-19 | End: 2024-05-17

## 2024-04-23 ENCOUNTER — TELEPHONE (OUTPATIENT)
Dept: HEMATOLOGY/ONCOLOGY | Facility: CLINIC | Age: 79
End: 2024-04-23
Payer: MEDICARE

## 2024-04-23 RX ORDER — ACETAMINOPHEN 325 MG/1
650 TABLET ORAL
Status: CANCELLED | OUTPATIENT
Start: 2024-04-23

## 2024-04-23 RX ORDER — DIPHENHYDRAMINE HYDROCHLORIDE 50 MG/ML
50 INJECTION INTRAMUSCULAR; INTRAVENOUS ONCE AS NEEDED
Status: CANCELLED | OUTPATIENT
Start: 2024-04-23

## 2024-04-23 RX ORDER — FAMOTIDINE 20 MG/1
20 TABLET, FILM COATED ORAL
Status: CANCELLED
Start: 2024-04-23

## 2024-04-23 RX ORDER — HEPARIN 100 UNIT/ML
500 SYRINGE INTRAVENOUS
Status: CANCELLED | OUTPATIENT
Start: 2024-04-23

## 2024-04-23 RX ORDER — DIPHENHYDRAMINE HCL 25 MG
50 CAPSULE ORAL
Status: CANCELLED
Start: 2024-04-23

## 2024-04-23 RX ORDER — EPINEPHRINE 0.3 MG/.3ML
0.3 INJECTION SUBCUTANEOUS ONCE AS NEEDED
Status: CANCELLED | OUTPATIENT
Start: 2024-04-23

## 2024-04-23 RX ORDER — SODIUM CHLORIDE 0.9 % (FLUSH) 0.9 %
10 SYRINGE (ML) INJECTION
Status: CANCELLED | OUTPATIENT
Start: 2024-04-23

## 2024-04-24 ENCOUNTER — OFFICE VISIT (OUTPATIENT)
Dept: HEMATOLOGY/ONCOLOGY | Facility: CLINIC | Age: 79
End: 2024-04-24
Payer: MEDICARE

## 2024-04-24 VITALS
BODY MASS INDEX: 20.99 KG/M2 | DIASTOLIC BLOOD PRESSURE: 58 MMHG | TEMPERATURE: 98 F | HEART RATE: 54 BPM | OXYGEN SATURATION: 96 % | SYSTOLIC BLOOD PRESSURE: 159 MMHG | HEIGHT: 71 IN | WEIGHT: 149.94 LBS | RESPIRATION RATE: 18 BRPM

## 2024-04-24 DIAGNOSIS — D69.59 CHEMOTHERAPY-INDUCED THROMBOCYTOPENIA: ICD-10-CM

## 2024-04-24 DIAGNOSIS — T45.1X5A CHEMOTHERAPY-INDUCED PERIPHERAL NEUROPATHY: ICD-10-CM

## 2024-04-24 DIAGNOSIS — D64.81 ANEMIA DUE TO ANTINEOPLASTIC CHEMOTHERAPY: ICD-10-CM

## 2024-04-24 DIAGNOSIS — C90.00 KAPPA LIGHT CHAIN MYELOMA: Primary | ICD-10-CM

## 2024-04-24 DIAGNOSIS — R63.4 WEIGHT LOSS: ICD-10-CM

## 2024-04-24 DIAGNOSIS — D84.821 IMMUNODEFICIENCY DUE TO DRUG THERAPY: ICD-10-CM

## 2024-04-24 DIAGNOSIS — Z01.818 EXAMINATION PRIOR TO CHEMOTHERAPY: ICD-10-CM

## 2024-04-24 DIAGNOSIS — T45.1X5A CHEMOTHERAPY-INDUCED THROMBOCYTOPENIA: ICD-10-CM

## 2024-04-24 DIAGNOSIS — N40.0 BENIGN PROSTATIC HYPERPLASIA, UNSPECIFIED WHETHER LOWER URINARY TRACT SYMPTOMS PRESENT: ICD-10-CM

## 2024-04-24 DIAGNOSIS — G89.3 CHRONIC NEOPLASM-RELATED PAIN: ICD-10-CM

## 2024-04-24 DIAGNOSIS — G62.0 CHEMOTHERAPY-INDUCED PERIPHERAL NEUROPATHY: ICD-10-CM

## 2024-04-24 DIAGNOSIS — Z79.899 IMMUNODEFICIENCY DUE TO DRUG THERAPY: ICD-10-CM

## 2024-04-24 DIAGNOSIS — I63.9 CEREBROVASCULAR ACCIDENT (CVA), UNSPECIFIED MECHANISM: ICD-10-CM

## 2024-04-24 DIAGNOSIS — T45.1X5A ANEMIA DUE TO ANTINEOPLASTIC CHEMOTHERAPY: ICD-10-CM

## 2024-04-24 DIAGNOSIS — I10 HYPERTENSION, UNSPECIFIED TYPE: ICD-10-CM

## 2024-04-24 DIAGNOSIS — E53.8 FOLATE DEFICIENCY: ICD-10-CM

## 2024-04-24 DIAGNOSIS — E53.8 B12 DEFICIENCY: ICD-10-CM

## 2024-04-24 DIAGNOSIS — N18.32 CHRONIC KIDNEY DISEASE, STAGE 3B: ICD-10-CM

## 2024-04-24 PROCEDURE — 99215 OFFICE O/P EST HI 40 MIN: CPT | Mod: S$GLB,,, | Performed by: NURSE PRACTITIONER

## 2024-04-24 PROCEDURE — 3078F DIAST BP <80 MM HG: CPT | Mod: CPTII,S$GLB,, | Performed by: NURSE PRACTITIONER

## 2024-04-24 PROCEDURE — 1101F PT FALLS ASSESS-DOCD LE1/YR: CPT | Mod: CPTII,S$GLB,, | Performed by: NURSE PRACTITIONER

## 2024-04-24 PROCEDURE — 3288F FALL RISK ASSESSMENT DOCD: CPT | Mod: CPTII,S$GLB,, | Performed by: NURSE PRACTITIONER

## 2024-04-24 PROCEDURE — 3077F SYST BP >= 140 MM HG: CPT | Mod: CPTII,S$GLB,, | Performed by: NURSE PRACTITIONER

## 2024-04-24 PROCEDURE — 99999 PR PBB SHADOW E&M-EST. PATIENT-LVL IV: CPT | Mod: PBBFAC,,, | Performed by: NURSE PRACTITIONER

## 2024-04-24 PROCEDURE — 1126F AMNT PAIN NOTED NONE PRSNT: CPT | Mod: CPTII,S$GLB,, | Performed by: NURSE PRACTITIONER

## 2024-04-24 RX ORDER — UBIDECARENONE 75 MG
500 CAPSULE ORAL DAILY
Qty: 90 TABLET | Refills: 3 | Status: SHIPPED | OUTPATIENT
Start: 2024-04-24 | End: 2025-04-24

## 2024-04-24 RX ORDER — FOLIC ACID 1 MG/1
1 TABLET ORAL DAILY
Qty: 90 TABLET | Refills: 3 | Status: SHIPPED | OUTPATIENT
Start: 2024-04-24 | End: 2025-04-24

## 2024-04-24 NOTE — PROGRESS NOTES
"KPATIENT: Haris Hernandez  MRN: [id number]  DATE: 4/24/2024    Chief Complaint: Kappa Light Chain Myeloma    Subjective:     Pertinent Medical History:     He presented to the ED 08/13/2021 with abnormal labs-CMP showed creatinine 6.6.  Prior CMP from May 2018 showed creatinine 1.2.    Further workup included a free light chain assay where a kappa free light chain level was 494    08/23/2021 bone marrow biopsy showed variable cellularity, 10 to 40% positive for plasma cell dyscrasia with plasma cells representing 30 to 40% of total cellularity.  No increase in blasts. FISH for Plasma Cell Proliferative Disorder - NORMAL    -started Darzalex, Velcade, Decadron 8/26/2021  -On Revlimid 10 mg qod since 9/30/2021  -saw urology,  for BPH causing difficulty in emptying the bladder, he may need cystoscopy and surgery    Interval History:   - he presents for a follow-up appointment for his multiple myeloma, with daughter today.  - he is scheduled for darzalex faspro today, 4/24/24  - today, overall he states feels well; he endorses fatigue "sometimes", diarrhea intermittent, "just usually the 2 days after treatment".  Weight stable 149#, states he feels appetite is good, daughter agrees.  Denies chest pain, sob, abdominal pain, n/v, constipation, hematemesis, melena, hematuria. He reports  lower extremity swelling bilat is improved, this is chronic. 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 Wearing gloves at night helps .   - continues on cycles of revlimid and tolerating well        Past Medical, Surgical, Family, and Social histories reviewed.    Medication and Allergies reviewed.    Review of Systems   Constitutional:  Positive for malaise/fatigue. Negative for fever and weight loss (improved this visit).   HENT:  Negative for sore throat.    Respiratory:  Negative for shortness of breath.    Cardiovascular:  Positive for leg swelling (resolved at present). Negative " "for chest pain.   Gastrointestinal:  Positive for diarrhea (1-2 days following chemo). Negative for abdominal pain, nausea and vomiting.   Genitourinary:  Negative for dysuria.   Musculoskeletal:  Positive for neck pain. Negative for back pain.   Skin:  Negative for rash.   Neurological:  Negative for weakness.   Psychiatric/Behavioral:  The patient is not nervous/anxious.        Objective:     Vitals:    04/24/24 1038   BP: (!) 159/58   BP Location: Left arm   Patient Position: Sitting   BP Method: Medium (Automatic)   Pulse: (!) 54   Resp: 18   Temp: 97.7 °F (36.5 °C)   TempSrc: Oral   SpO2: 96%   Weight: 68 kg (149 lb 14.6 oz)   Height: 5' 11" (1.803 m)             BMI: Body mass index is 20.91 kg/m².    ECOG 1  Physical Exam  Vitals and nursing note reviewed.   Constitutional:       General: He is not in acute distress.     Appearance: Normal appearance. He is well-developed. He is not ill-appearing or toxic-appearing.   HENT:      Head: Normocephalic and atraumatic.      Right Ear: External ear normal.      Left Ear: External ear normal.   Eyes:      General: No scleral icterus.     Pupils: Pupils are equal, round, and reactive to light.   Cardiovascular:      Rate and Rhythm: Normal rate and regular rhythm.      Pulses: Normal pulses.      Heart sounds: Normal heart sounds. No murmur heard.  Pulmonary:      Effort: Pulmonary effort is normal. No respiratory distress.      Breath sounds: Normal breath sounds.   Abdominal:      General: Bowel sounds are normal. There is no distension.      Palpations: Abdomen is soft.      Tenderness: There is no abdominal tenderness. There is no guarding.   Musculoskeletal:         General: No swelling or tenderness. Normal range of motion.      Cervical back: Normal range of motion and neck supple. No rigidity.      Right lower leg: No edema.      Left lower leg: No edema.   Skin:     General: Skin is warm and dry.      Capillary Refill: Capillary refill takes less than 2 " seconds.   Neurological:      Mental Status: He is alert and oriented to person, place, and time.      Gait: Gait normal.   Psychiatric:         Mood and Affect: Mood normal.         Behavior: Behavior normal.         Thought Content: Thought content normal.         Judgment: Judgment normal.       Labs have been reviewed, discussed with pt and daughter.    Lab Results   Component Value Date    WBC 2.74 (L) 04/22/2024    HGB 8.5 (L) 04/22/2024    HCT 23.7 (L) 04/22/2024    MCV 87 04/22/2024     (L) 04/22/2024     CMP  Sodium   Date Value Ref Range Status   04/22/2024 143 136 - 145 mmol/L Final     Potassium   Date Value Ref Range Status   04/22/2024 4.4 3.5 - 5.1 mmol/L Final     Chloride   Date Value Ref Range Status   04/22/2024 112 (H) 95 - 110 mmol/L Final     CO2   Date Value Ref Range Status   04/22/2024 24 23 - 29 mmol/L Final     Glucose   Date Value Ref Range Status   04/22/2024 100 70 - 110 mg/dL Final     BUN   Date Value Ref Range Status   04/22/2024 20 8 - 23 mg/dL Final     Creatinine   Date Value Ref Range Status   04/22/2024 1.7 (H) 0.5 - 1.4 mg/dL Final     Calcium   Date Value Ref Range Status   04/22/2024 8.6 (L) 8.7 - 10.5 mg/dL Final     Total Protein   Date Value Ref Range Status   04/22/2024 6.2 6.0 - 8.4 g/dL Final     Albumin   Date Value Ref Range Status   04/22/2024 3.7 3.5 - 5.2 g/dL Final     Total Bilirubin   Date Value Ref Range Status   04/22/2024 0.6 0.1 - 1.0 mg/dL Final     Comment:     For infants and newborns, interpretation of results should be based  on gestational age, weight and in agreement with clinical  observations.    Premature Infant recommended reference ranges:  Up to 24 hours.............<8.0 mg/dL  Up to 48 hours............<12.0 mg/dL  3-5 days..................<15.0 mg/dL  6-29 days.................<15.0 mg/dL       Alkaline Phosphatase   Date Value Ref Range Status   04/22/2024 61 55 - 135 U/L Final     AST   Date Value Ref Range Status   04/22/2024 14 10  - 40 U/L Final     ALT   Date Value Ref Range Status   04/22/2024 16 10 - 44 U/L Final     Anion Gap   Date Value Ref Range Status   04/22/2024 7 (L) 8 - 16 mmol/L Final     eGFR   Date Value Ref Range Status   04/22/2024 41.3 (A) >60 mL/min/1.73 m^2 Final     SPEP 4/22/24, reviewed, discussed with pt and daughter, stable  Decreased total protein. Normal gamma globulins are decreased.   Persistent linear irregularities in near-gamma = 0.16 g/dL   (previously 0.1 g/dL) and mid-gamma = 0.16 g/dL (previously 0.1   g/dL).             SPEP 3/25/24  Decreased total protein. Normal gamma globulins are decreased. Faint   paraprotein peaks in near-gamma = 0.1 g/dL (previously 0.17 g/dL) and   mid-gamma = 0.1 g/dL (previously 0.22 g/dL).     SPEP 2/19/24  Decreased total protein. Normal gamma globulins are decreased. Faint   paraprotein peaks in near-gamma = 0.17 g/dL (previously 0.15 g/dL)   and mid-gamma = 0.22 g/dL (previously 0.17 g/dL).       SPEP 1/22/24  Decreased total protein. Normal gamma globulins are decreased. Faint   paraprotein peaks in near-gamma = 0.15 g/dL (previously 0.12 g/dL)   and mid-gamma = 0.17 g/dL (previously 0.13 g/dL).     Kappa FLC 1/22/24  -2.42 mg/dL (494.85 mg/dL on 8/14/21)    SPEP 12/18/23  Decreased total protein. Normal gamma globulins are decreased. Faint   paraprotein peaks in near-gamma = 0.12 g/dL (previously 0.15 g/dL)   and mid-gamma = 0.13 g/dL (previously 0.14 g/dL).     Kappa FLC 12/18/23  - 4.14 mg/dL (494.85 mg/dL on 8/14/21)    SPEP 11/20/23  Decreased total protein. Normal gamma globulins are decreased.   Paraprotein peaks in near-gamma = 0.15 g/dL (previously 0.14 g/dL)   and mid-gamma = 0.14 g/dL (previously 0.18 g/dL).     Kappa FLC 11/20/23  - 4.18 mg/dL (494.85 mg/dL on 8/14/21)    SPEP 10/23/23  Decreased total protein. Normal gamma globulins are decreased.   Paraprotein peaks in near-gamma = 0.14 g/dL (previously, 0.2 g/dL)   and mid-gamma = 0.18 g/dL (previously, 0.15  g/dL).     Kappa FLC 10/23/23  - 4.19 mg/dL (494.85 mg/dL on 8/14/21)    SPEP 9/25/23  Decreased total protein. Normal gamma globulins are decreased.   Paraprotein peaks in near-gamma = 0.2 g/dL (previously, 0.12 g/dL)   and mid-gamma = 0.15 g/dL (previously, 0.14 g/dL).       SPEP 8/28/23  Decreased total protein. Normal gamma globulins are decreased. There   is a paraprotein band in near-gamma = 0.12 g/dL, previously 0.14   g/dL.         SPEP 7/31/23  There is a paraprotein band in near-gamma = 0.14 g/dL, previously   0.18 g/dL.     The second previously described band is not seen discretely on the   current study.     [place]glis free light chain 7/31/23  2.72 mg/dL (494.85 mg/dL on 8/14/21)      SPEP 7/3/23 path interpretation   Decreased total protein. Normal gamma globulins are decreased. Two   closely adjacent paraprotein peaks in near-gamma = 0.18 g/dL   (previously 0.18 g/dL) and mid-gamma = 0.14 g/dL (previously 0.21   g/dL).     Serum protein electrophoresis (6/6/23):  Decreased total protein. Normal gamma globulins are decreased. Two   closely adjacent paraprotein peaks in near-gamma = 0.18 g/dL   (previously 0.15 g/dL) and mid-gamma = 0.21 g/dL (previously 0.17   g/dL).     Serum protein electrophoresis (5/9/23):  Decreased total protein. Normal gamma globulins are decreased. Two   closely adjacent paraprotein peaks in near-gamma = 0.15 g/dL   (previously 0.15 g/dL) and mid-gamma = 0.17 g/dL (previously 0.18   g/dL).     Serum protein electrophoresis (4/11/23):  Decreased total protein. Normal gamma globulins are decreased.   Two closely adjacent paraprotein peaks in near-gamma = 0.15 g/dL   (previously 0.15 g/dL) and mid-gamma = 0.18 g/dL (previously 0.15   g/dL).     Serum protein electrophoresis (3/13/23):  Decreased total protein. Normal gamma globulins are decreased.   Two closely adjacent paraprotein peaks in near-gamma = 0.15 g/dL   (previously 0.14 g/dL) and mid-gamma = 0.15 g/dL (previously, 0.13    g/dL).       Serum protein electrophoresis (2/13/23):  Decreased total protein. Normal gamma globulins are decreased. Two   paraprotein bands are present in near-gamma = 0.14 g/dL (previously   0.15 g/dL) and mid-gamma = 0.13 g/dL (previously 0.19 g/dL).       Serum protein electrophoresis (12/19/22):  Normal total protein. Normal gamma globulins are decreased. Two   paraprotein bands are present in near-gamma = 0.20 g/dL (previously   0.16 g/dL) and mid-gamma = 0.23 g/dL (previously 0.15 g/dL).     Serum protein electrophoresis (9/27/22):  Normal total protein. Normal gamma globulins are decreased.   Two paraprotein bands are identified: 1) Near-gamma = 0.2 g/dL   (previously 0.18 g/dL) 2) Mid-gamma = 0.17 g/dL (previously 0.18   g/dL).     Serum protein electrophoresis (8/30/22):  Decreased total protein. Normal gamma globulins are decreased. Two   paraprotein bands are identified: 1) Near-gamma = 0.18 g/dL   (previously 0.21 g/dL) 2) Mid-gamma = 0.18 g/dL (previously 0.18   g/dL).     Plasma Cell FISH:  The result is within normal limits for 1q duplication, TP53   deletion and IGH rearrangement.         Assessment:       1. Kappa light chain myeloma    2. Chemotherapy-induced thrombocytopenia    3. Anemia due to antineoplastic chemotherapy    4. Chemotherapy-induced peripheral neuropathy    5. Examination prior to chemotherapy    6. Chronic kidney disease, stage 3b    7. Hypertension, unspecified type    8. 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 Cerebrovascular accident (CVA), unspecified mechanism    11. Benign prostatic hyperplasia, unspecified whether lower urinary tract symptoms present    12. Weight loss    13. Folate deficiency    14. B12 deficiency          Plan:   Kappa light chain myeloma with normal cytogenetics / anemia/neutropenia/thrombocytopenia due to chemotherapy/chemo induced neuropathy  -started Darzalex Faspro, subcutaneous Velcade, oral dexamethasone  "8/26/2021  -velcade discontinued after 4 cycles  -last decadron dose was 2/3/22  -on Revlimid 10 mg QOD - start 9/30/2021, will continue   -cont Plavix  -continue acyclovir for shingles prophylaxis  - Labs reviewed, okay to proceed with Darzalex today, 4/24/24, spep stable, platelets 125,000, h/h slight increase 8.5/23.7, ANC 1.2, okay to treat re gfr, creatinine clearance, anc, stable.  Continue with revlamid this cycle, pt and daughter verbalize understanding. If hgb trends to 7 range, will hold revlamid. Neuropathy is stable, mild, no worsening. SPEP stable, FLCs trending downward again.  - return to clinic in 4 weeks in preparation for next cycle of darzalex faspro/dexamethasone/revlimid.     CKD Stage 3b, htn  - Labs have been reviewed. Creatinine  1.7 mg/dL along with gfr 41.3, stable  - 159/658 today, elevated, no associated symptoms  - encouraged adequate fluid intake, drinks more sweet tea than water  - continue to monitor    Chronic neoplasm-related pain  - no issues.  - continue to monitor     Immunodeficiency due to drug therapy  - No fevers, no s/s of acute illness  - No known exposure to covid or other illness  - 1.2 anc, decreased   - Instructed on good handwashing, avoiding known ill individuals, limiting crowd exposure  - Continue to monitor    CVA history  - taking clopidogrel, continue to monitor    BPH  - continue flomax    Weight loss  - weight decreased back to 149# , stable  - pt eating one meal a day, "a big one" he states, multiple snacks and sweet tea  - encouraged small frequent meals, ensure/nutritional supplemental drink; discussed examples with pt and he will try to switch out one sweet tea for a nutritional drink  - periactin prescribed 8/2023  - continue to monitor    Folate deficiency, b12 deficiency  - folate now normal, B12 low normal (10/23/23)  - folate elevated, B12 low normal (4/22/24)  - change folic acid to m/w/f, start b12 500mcg daily, this may help anemia as well  - " continue to monitor, recheck July 2024    High Risk Conditions:    Patient has a condition that poses threat to life and bodily function: kappa light chain myeloma.   Patient is currently on drug therapy requiring intensive monitoring for toxicity: adjuvant chemotherapy:       - return to clinic in 4 weeks in preparation for next cycle of darzalex faspro/dexamethasone/revlimid, continue revlimid current cycle due to stable hgb. Chemo moved to [facility] May 2023.       JIN GibbsC  [facility]  Hematology/Oncology  [address] [place] [address]  IVONNE Schmitt  [id number]  [phone]

## 2024-05-17 DIAGNOSIS — C90.00 KAPPA LIGHT CHAIN MYELOMA: ICD-10-CM

## 2024-05-17 RX ORDER — LENALIDOMIDE 10 MG/1
CAPSULE ORAL
Qty: 14 EACH | Refills: 0 | Status: SHIPPED | OUTPATIENT
Start: 2024-05-17 | End: 2024-06-14

## 2024-05-24 RX ORDER — DIPHENHYDRAMINE HCL 25 MG
50 CAPSULE ORAL
Status: CANCELLED
Start: 2024-05-24

## 2024-05-24 RX ORDER — SODIUM CHLORIDE 0.9 % (FLUSH) 0.9 %
10 SYRINGE (ML) INJECTION
Status: CANCELLED | OUTPATIENT
Start: 2024-05-24

## 2024-05-24 RX ORDER — EPINEPHRINE 0.3 MG/.3ML
0.3 INJECTION SUBCUTANEOUS ONCE AS NEEDED
Status: CANCELLED | OUTPATIENT
Start: 2024-05-24

## 2024-05-24 RX ORDER — HEPARIN 100 UNIT/ML
500 SYRINGE INTRAVENOUS
Status: CANCELLED | OUTPATIENT
Start: 2024-05-24

## 2024-05-24 RX ORDER — ACETAMINOPHEN 325 MG/1
650 TABLET ORAL
Status: CANCELLED | OUTPATIENT
Start: 2024-05-24

## 2024-05-24 RX ORDER — DIPHENHYDRAMINE HYDROCHLORIDE 50 MG/ML
50 INJECTION INTRAMUSCULAR; INTRAVENOUS ONCE AS NEEDED
Status: CANCELLED | OUTPATIENT
Start: 2024-05-24

## 2024-05-24 RX ORDER — FAMOTIDINE 20 MG/1
20 TABLET, FILM COATED ORAL
Status: CANCELLED
Start: 2024-05-24

## 2024-05-28 ENCOUNTER — TELEPHONE (OUTPATIENT)
Dept: HEMATOLOGY/ONCOLOGY | Facility: CLINIC | Age: 79
End: 2024-05-28
Payer: MEDICARE

## 2024-05-29 ENCOUNTER — TELEPHONE (OUTPATIENT)
Dept: HEMATOLOGY/ONCOLOGY | Facility: CLINIC | Age: 79
End: 2024-05-29

## 2024-05-29 ENCOUNTER — OFFICE VISIT (OUTPATIENT)
Dept: HEMATOLOGY/ONCOLOGY | Facility: CLINIC | Age: 79
End: 2024-05-29
Payer: MEDICARE

## 2024-05-29 VITALS
OXYGEN SATURATION: 97 % | BODY MASS INDEX: 20.91 KG/M2 | TEMPERATURE: 98 F | RESPIRATION RATE: 18 BRPM | HEIGHT: 71 IN | HEART RATE: 52 BPM | SYSTOLIC BLOOD PRESSURE: 119 MMHG | WEIGHT: 149.38 LBS | DIASTOLIC BLOOD PRESSURE: 61 MMHG

## 2024-05-29 DIAGNOSIS — Z79.899 IMMUNODEFICIENCY DUE TO DRUG THERAPY: ICD-10-CM

## 2024-05-29 DIAGNOSIS — T45.1X5A CHEMOTHERAPY-INDUCED THROMBOCYTOPENIA: ICD-10-CM

## 2024-05-29 DIAGNOSIS — G62.0 CHEMOTHERAPY-INDUCED PERIPHERAL NEUROPATHY: ICD-10-CM

## 2024-05-29 DIAGNOSIS — Z01.818 EXAMINATION PRIOR TO CHEMOTHERAPY: ICD-10-CM

## 2024-05-29 DIAGNOSIS — T45.1X5A ANEMIA DUE TO ANTINEOPLASTIC CHEMOTHERAPY: ICD-10-CM

## 2024-05-29 DIAGNOSIS — E53.8 B12 DEFICIENCY: ICD-10-CM

## 2024-05-29 DIAGNOSIS — G89.3 CHRONIC NEOPLASM-RELATED PAIN: ICD-10-CM

## 2024-05-29 DIAGNOSIS — D64.81 ANEMIA DUE TO ANTINEOPLASTIC CHEMOTHERAPY: ICD-10-CM

## 2024-05-29 DIAGNOSIS — I63.9 CEREBROVASCULAR ACCIDENT (CVA), UNSPECIFIED MECHANISM: ICD-10-CM

## 2024-05-29 DIAGNOSIS — D84.821 IMMUNODEFICIENCY DUE TO DRUG THERAPY: ICD-10-CM

## 2024-05-29 DIAGNOSIS — C90.00 KAPPA LIGHT CHAIN MYELOMA: Primary | ICD-10-CM

## 2024-05-29 DIAGNOSIS — I10 HYPERTENSION, UNSPECIFIED TYPE: ICD-10-CM

## 2024-05-29 DIAGNOSIS — D69.59 CHEMOTHERAPY-INDUCED THROMBOCYTOPENIA: ICD-10-CM

## 2024-05-29 DIAGNOSIS — E53.8 FOLATE DEFICIENCY: ICD-10-CM

## 2024-05-29 DIAGNOSIS — N18.32 CHRONIC KIDNEY DISEASE, STAGE 3B: ICD-10-CM

## 2024-05-29 DIAGNOSIS — R63.4 WEIGHT LOSS: ICD-10-CM

## 2024-05-29 DIAGNOSIS — T45.1X5A CHEMOTHERAPY-INDUCED PERIPHERAL NEUROPATHY: ICD-10-CM

## 2024-05-29 PROCEDURE — 99215 OFFICE O/P EST HI 40 MIN: CPT | Mod: S$GLB,,, | Performed by: NURSE PRACTITIONER

## 2024-05-29 PROCEDURE — 3288F FALL RISK ASSESSMENT DOCD: CPT | Mod: CPTII,S$GLB,, | Performed by: NURSE PRACTITIONER

## 2024-05-29 PROCEDURE — 1101F PT FALLS ASSESS-DOCD LE1/YR: CPT | Mod: CPTII,S$GLB,, | Performed by: NURSE PRACTITIONER

## 2024-05-29 PROCEDURE — 99999 PR PBB SHADOW E&M-EST. PATIENT-LVL IV: CPT | Mod: PBBFAC,,, | Performed by: NURSE PRACTITIONER

## 2024-05-29 PROCEDURE — 1126F AMNT PAIN NOTED NONE PRSNT: CPT | Mod: CPTII,S$GLB,, | Performed by: NURSE PRACTITIONER

## 2024-05-29 PROCEDURE — 3078F DIAST BP <80 MM HG: CPT | Mod: CPTII,S$GLB,, | Performed by: NURSE PRACTITIONER

## 2024-05-29 PROCEDURE — 3074F SYST BP LT 130 MM HG: CPT | Mod: CPTII,S$GLB,, | Performed by: NURSE PRACTITIONER

## 2024-05-29 NOTE — TELEPHONE ENCOUNTER
Called pt's daughter to give updated SPEP results    Decreased total protein. Normal gamma globulins are decreased.   Persistent linear irregularities in near-gamma = 0.12 g/dL   (previously 0.16 g/dL) and mid-gamma = 0.11 g/dL (previously 0.16   g/dL).

## 2024-05-29 NOTE — PROGRESS NOTES
"KPATIENT: Haris Hernandez  MRN: [id number]  DATE: 5/29/2024    Chief Complaint: Kappa Light Chain Myeloma    Subjective:     Pertinent Medical History:     He presented to the ED 08/13/2021 with abnormal labs-CMP showed creatinine 6.6.  Prior CMP from May 2018 showed creatinine 1.2.    Further workup included a free light chain assay where a kappa free light chain level was 494    08/23/2021 bone marrow biopsy showed variable cellularity, 10 to 40% positive for plasma cell dyscrasia with plasma cells representing 30 to 40% of total cellularity.  No increase in blasts. FISH for Plasma Cell Proliferative Disorder - NORMAL    -started Darzalex, Velcade, Decadron 8/26/2021  -On Revlimid 10 mg qod since 9/30/2021  -saw urology,  for BPH causing difficulty in emptying the bladder, he may need cystoscopy and surgery    Interval History:   - he presents for a follow-up appointment for his multiple myeloma, with daughter today 5/29/24  - he is scheduled for darzalex faspro today, 5/29/24  - today, overall he states feels well; he endorses fatigue "sometimes", diarrhea intermittent, "just usually the 2 days after treatment".  Weight stable 149#, states he feels appetite is good, daughter agrees.  Denies chest pain, sob, abdominal pain, n/v, constipation, hematemesis, melena, hematuria. He reports  lower extremity swelling bilat is improved, this is chronic. 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 Wearing gloves at night helps .   - continues on cycles of revlimid and tolerating well        Past Medical, Surgical, Family, and Social histories reviewed.    Medication and Allergies reviewed.    Review of Systems   Constitutional:  Positive for malaise/fatigue. Negative for fever and weight loss (improved this visit).   HENT:  Negative for sore throat.    Respiratory:  Negative for shortness of breath.    Cardiovascular:  Positive for leg swelling (resolved at present). " "Negative for chest pain.   Gastrointestinal:  Positive for diarrhea (1-2 days following chemo). Negative for abdominal pain, nausea and vomiting.   Genitourinary:  Negative for dysuria.   Musculoskeletal:  Positive for neck pain. Negative for back pain.   Skin:  Negative for rash.   Neurological:  Negative for weakness.   Psychiatric/Behavioral:  The patient is not nervous/anxious.        Objective:     Vitals:    05/29/24 0834   BP: 119/61   BP Location: Left arm   Patient Position: Sitting   BP Method: Medium (Automatic)   Pulse: (!) 52   Resp: 18   Temp: 97.7 °F (36.5 °C)   TempSrc: Oral   SpO2: 97%   Weight: 67.8 kg (149 lb 5.8 oz)   Height: 5' 11" (1.803 m)             BMI: Body mass index is 20.83 kg/m².    ECOG 1  Physical Exam  Vitals and nursing note reviewed.   Constitutional:       General: He is not in acute distress.     Appearance: Normal appearance. He is well-developed. He is not ill-appearing or toxic-appearing.   HENT:      Head: Normocephalic and atraumatic.      Right Ear: External ear normal.      Left Ear: External ear normal.   Eyes:      General: No scleral icterus.     Pupils: Pupils are equal, round, and reactive to light.   Cardiovascular:      Rate and Rhythm: Normal rate and regular rhythm.      Pulses: Normal pulses.      Heart sounds: Normal heart sounds. No murmur heard.  Pulmonary:      Effort: Pulmonary effort is normal. No respiratory distress.      Breath sounds: Normal breath sounds.   Abdominal:      General: Bowel sounds are normal. There is no distension.      Palpations: Abdomen is soft.      Tenderness: There is no abdominal tenderness. There is no guarding.   Musculoskeletal:         General: No swelling or tenderness. Normal range of motion.      Cervical back: Normal range of motion and neck supple. No rigidity.      Right lower leg: No edema.      Left lower leg: No edema.   Skin:     General: Skin is warm and dry.      Capillary Refill: Capillary refill takes less than " 2 seconds.   Neurological:      Mental Status: He is alert and oriented to person, place, and time.      Gait: Gait normal.   Psychiatric:         Mood and Affect: Mood normal.         Behavior: Behavior normal.         Thought Content: Thought content normal.         Judgment: Judgment normal.       Labs have been reviewed, discussed with pt and daughter.    Lab Results   Component Value Date    WBC 2.95 (L) 05/28/2024    HGB 8.1 (L) 05/28/2024    HCT 22.6 (L) 05/28/2024    MCV 88 05/28/2024     (L) 05/28/2024     CMP  Sodium   Date Value Ref Range Status   05/28/2024 141 136 - 145 mmol/L Final     Potassium   Date Value Ref Range Status   05/28/2024 3.8 3.5 - 5.1 mmol/L Final     Chloride   Date Value Ref Range Status   05/28/2024 110 95 - 110 mmol/L Final     CO2   Date Value Ref Range Status   05/28/2024 23 23 - 29 mmol/L Final     Glucose   Date Value Ref Range Status   05/28/2024 107 70 - 110 mg/dL Final     BUN   Date Value Ref Range Status   05/28/2024 22 8 - 23 mg/dL Final     Creatinine   Date Value Ref Range Status   05/28/2024 1.8 (H) 0.5 - 1.4 mg/dL Final     Calcium   Date Value Ref Range Status   05/28/2024 8.0 (L) 8.7 - 10.5 mg/dL Final     Total Protein   Date Value Ref Range Status   05/28/2024 5.7 (L) 6.0 - 8.4 g/dL Final     Albumin   Date Value Ref Range Status   05/28/2024 3.4 (L) 3.5 - 5.2 g/dL Final     Total Bilirubin   Date Value Ref Range Status   05/28/2024 0.3 0.1 - 1.0 mg/dL Final     Comment:     For infants and newborns, interpretation of results should be based  on gestational age, weight and in agreement with clinical  observations.    Premature Infant recommended reference ranges:  Up to 24 hours.............<8.0 mg/dL  Up to 48 hours............<12.0 mg/dL  3-5 days..................<15.0 mg/dL  6-29 days.................<15.0 mg/dL       Alkaline Phosphatase   Date Value Ref Range Status   05/28/2024 60 55 - 135 U/L Final     AST   Date Value Ref Range Status   05/28/2024  11 10 - 40 U/L Final     ALT   Date Value Ref Range Status   05/28/2024 13 10 - 44 U/L Final     Anion Gap   Date Value Ref Range Status   05/28/2024 8 8 - 16 mmol/L Final     eGFR   Date Value Ref Range Status   05/28/2024 38.5 (A) >60 mL/min/1.73 m^2 Final     SPEP 4/22/24, reviewed, discussed with pt and daughter, stable  Decreased total protein. Normal gamma globulins are decreased.   Persistent linear irregularities in near-gamma = 0.16 g/dL   (previously 0.1 g/dL) and mid-gamma = 0.16 g/dL (previously 0.1   g/dL).             SPEP 4/24/24  Decreased total protein. Normal gamma globulins are decreased.   Persistent linear irregularities in near-gamma = 0.16 g/dL   (previously 0.1 g/dL) and mid-gamma = 0.16 g/dL (previously 0.1   g/dL).     SPEP 3/25/24  Decreased total protein. Normal gamma globulins are decreased. Faint   paraprotein peaks in near-gamma = 0.1 g/dL (previously 0.17 g/dL) and   mid-gamma = 0.1 g/dL (previously 0.22 g/dL).     SPEP 2/19/24  Decreased total protein. Normal gamma globulins are decreased. Faint   paraprotein peaks in near-gamma = 0.17 g/dL (previously 0.15 g/dL)   and mid-gamma = 0.22 g/dL (previously 0.17 g/dL).       SPEP 1/22/24  Decreased total protein. Normal gamma globulins are decreased. Faint   paraprotein peaks in near-gamma = 0.15 g/dL (previously 0.12 g/dL)   and mid-gamma = 0.17 g/dL (previously 0.13 g/dL).     Kappa FLC 1/22/24  -2.42 mg/dL (494.85 mg/dL on 8/14/21)    SPEP 12/18/23  Decreased total protein. Normal gamma globulins are decreased. Faint   paraprotein peaks in near-gamma = 0.12 g/dL (previously 0.15 g/dL)   and mid-gamma = 0.13 g/dL (previously 0.14 g/dL).     Kappa FLC 12/18/23  - 4.14 mg/dL (494.85 mg/dL on 8/14/21)    SPEP 11/20/23  Decreased total protein. Normal gamma globulins are decreased.   Paraprotein peaks in near-gamma = 0.15 g/dL (previously 0.14 g/dL)   and mid-gamma = 0.14 g/dL (previously 0.18 g/dL).     Kappa FLC 11/20/23  - 4.18 mg/dL  (494.85 mg/dL on 8/14/21)    SPEP 10/23/23  Decreased total protein. Normal gamma globulins are decreased.   Paraprotein peaks in near-gamma = 0.14 g/dL (previously, 0.2 g/dL)   and mid-gamma = 0.18 g/dL (previously, 0.15 g/dL).     Kappa FLC 10/23/23  - 4.19 mg/dL (494.85 mg/dL on 8/14/21)    SPEP 9/25/23  Decreased total protein. Normal gamma globulins are decreased.   Paraprotein peaks in near-gamma = 0.2 g/dL (previously, 0.12 g/dL)   and mid-gamma = 0.15 g/dL (previously, 0.14 g/dL).       SPEP 8/28/23  Decreased total protein. Normal gamma globulins are decreased. There   is a paraprotein band in near-gamma = 0.12 g/dL, previously 0.14   g/dL.         SPEP 7/31/23  There is a paraprotein band in near-gamma = 0.14 g/dL, previously   0.18 g/dL.     The second previously described band is not seen discretely on the   current study.     [place] free light chain 7/31/23  2.72 mg/dL (494.85 mg/dL on 8/14/21)      SPEP 7/3/23 path interpretation   Decreased total protein. Normal gamma globulins are decreased. Two   closely adjacent paraprotein peaks in near-gamma = 0.18 g/dL   (previously 0.18 g/dL) and mid-gamma = 0.14 g/dL (previously 0.21   g/dL).     Serum protein electrophoresis (6/6/23):  Decreased total protein. Normal gamma globulins are decreased. Two   closely adjacent paraprotein peaks in near-gamma = 0.18 g/dL   (previously 0.15 g/dL) and mid-gamma = 0.21 g/dL (previously 0.17   g/dL).     Serum protein electrophoresis (5/9/23):  Decreased total protein. Normal gamma globulins are decreased. Two   closely adjacent paraprotein peaks in near-gamma = 0.15 g/dL   (previously 0.15 g/dL) and mid-gamma = 0.17 g/dL (previously 0.18   g/dL).     Serum protein electrophoresis (4/11/23):  Decreased total protein. Normal gamma globulins are decreased.   Two closely adjacent paraprotein peaks in near-gamma = 0.15 g/dL   (previously 0.15 g/dL) and mid-gamma = 0.18 g/dL (previously 0.15   g/dL).     Serum protein  electrophoresis (3/13/23):  Decreased total protein. Normal gamma globulins are decreased.   Two closely adjacent paraprotein peaks in near-gamma = 0.15 g/dL   (previously 0.14 g/dL) and mid-gamma = 0.15 g/dL (previously, 0.13   g/dL).       Serum protein electrophoresis (2/13/23):  Decreased total protein. Normal gamma globulins are decreased. Two   paraprotein bands are present in near-gamma = 0.14 g/dL (previously   0.15 g/dL) and mid-gamma = 0.13 g/dL (previously 0.19 g/dL).       Serum protein electrophoresis (12/19/22):  Normal total protein. Normal gamma globulins are decreased. Two   paraprotein bands are present in near-gamma = 0.20 g/dL (previously   0.16 g/dL) and mid-gamma = 0.23 g/dL (previously 0.15 g/dL).     Serum protein electrophoresis (9/27/22):  Normal total protein. Normal gamma globulins are decreased.   Two paraprotein bands are identified: 1) Near-gamma = 0.2 g/dL   (previously 0.18 g/dL) 2) Mid-gamma = 0.17 g/dL (previously 0.18   g/dL).     Serum protein electrophoresis (8/30/22):  Decreased total protein. Normal gamma globulins are decreased. Two   paraprotein bands are identified: 1) Near-gamma = 0.18 g/dL   (previously 0.21 g/dL) 2) Mid-gamma = 0.18 g/dL (previously 0.18   g/dL).     Plasma Cell FISH:  The result is within normal limits for 1q duplication, TP53   deletion and IGH rearrangement.         Assessment:       1. Kappa light chain myeloma    2. Chemotherapy-induced thrombocytopenia    3. Anemia due to antineoplastic chemotherapy    4. Chemotherapy-induced peripheral neuropathy    5. Examination prior to chemotherapy    6. Chronic kidney disease, stage 3b    7. Hypertension, unspecified type    8. 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 Cerebrovascular accident (CVA), unspecified mechanism    11. Weight loss    12. Folate deficiency    13. B12 deficiency            Plan:   Kappa light chain myeloma with normal cytogenetics /  "anemia/neutropenia/thrombocytopenia due to chemotherapy/chemo induced neuropathy  -started Darzalex Faspro, subcutaneous Velcade, oral dexamethasone 8/26/2021  -velcade discontinued after 4 cycles  -last decadron dose was 2/3/22  -on Revlimid 10 mg QOD - start 9/30/2021, will continue   -cont Plavix  -continue acyclovir for shingles prophylaxis  - Labs reviewed, okay to proceed with Darzalex today, 5/29/24, spep pending at present, platelets 125,000, h/h slight increase 8.1/22.6, platelets decreased to 105 K/uL, ANC 1.4, okay to treat re gfr, creatinine clearance, anc, stable.  Continue with revlamid this cycle, pt and daughter verbalize understanding. If hgb trends to 7 range, will hold revlamid. Neuropathy is stable, mild, no worsening. SPEP pending, FLCs stable.  - return to clinic in 4 weeks in preparation for next cycle of darzalex faspro/dexamethasone/revlimid.     CKD Stage 3b, htn  - Labs have been reviewed. Creatinine  1.8 mg/dL along with gfr 38.5, stable  - 119/61 today, stable/normal  - encouraged adequate fluid intake, drinks more sweet tea than water  - continue to monitor    Chronic neoplasm-related pain  - no issues.  - continue to monitor     Immunodeficiency due to drug therapy  - No fevers, no s/s of acute illness  - No known exposure to covid or other illness  - 1.4 anc, improved  - Instructed on good handwashing, avoiding known ill individuals, limiting crowd exposure  - Continue to monitor    CVA history  - taking clopidogrel, continue to monitor    BPH  - continue flomax    Weight loss  - weight 149# , stable  - pt eating one meal a day, "a big one" he states, multiple snacks and sweet tea, does not like nutritional drinks  - encouraged small frequent meals, ensure/nutritional supplemental drink; discussed examples with pt and he will try to switch out one sweet tea for a nutritional drink  - periactin prescribed 8/2023  - continue to monitor    Folate deficiency, b12 deficiency  - folate now " normal, B12 low normal (10/23/23)  - folate elevated, B12 low normal (4/22/24)  - folate normal on m/w/f dosing, b12 improved with 3wks supplementation (5/28/24)  - continue folic acid to m/w/f, start b12 500mcg daily, this may help anemia as well  - continue to monitor, recheck July 2024    High Risk Conditions:    Patient has a condition that poses threat to life and bodily function: kappa light chain myeloma.   Patient is currently on drug therapy requiring intensive monitoring for toxicity: adjuvant chemotherapy:       - return to clinic in 4 weeks in preparation for next cycle of darzalex faspro/dexamethasone/revlimid, continue revlimid current cycle due to stable hgb. Chemo moved to [facility] May 2023.       Visit today included increased complexity associated with the care of the episodic problem Multiple myeloma on antineoplastic therapy addressed and managing the longitudinal care of the patient due to the serious and/or complex managed problem(s) acute effects of chemo on anemia, ckd, nutritional status, weight loss.          Milagros Myers, NP-C  [facility]  Hematology/Oncology  [address]  [place]  [id number]  [phone]

## 2024-06-02 ENCOUNTER — HOSPITAL ENCOUNTER (EMERGENCY)
Facility: HOSPITAL | Age: 79
Discharge: HOME | End: 2024-06-02
Attending: FAMILY MEDICINE
Payer: MEDICARE

## 2024-06-02 VITALS
SYSTOLIC BLOOD PRESSURE: 190 MMHG | RESPIRATION RATE: 18 BRPM | OXYGEN SATURATION: 99 % | HEART RATE: 61 BPM | BODY MASS INDEX: 20.78 KG/M2 | TEMPERATURE: 98 F | WEIGHT: 149 LBS | DIASTOLIC BLOOD PRESSURE: 86 MMHG
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
## 2024-06-02 DIAGNOSIS — S22.42XA CLOSED FRACTURE OF MULTIPLE RIBS OF LEFT SIDE, INITIAL ENCOUNTER: Primary | ICD-10-CM

## 2024-06-02 PROCEDURE — 99283 EMERGENCY DEPT VISIT LOW MDM: CPT | Mod: 25,ER

## 2024-06-02 PROCEDURE — 25000003 PHARM REV CODE 250: Mod: ER | Performed by: PHYSICIAN ASSISTANT

## 2024-06-02 PROCEDURE — 99900035 HC TECH TIME PER 15 MIN (STAT): Mod: ER

## 2024-06-02 PROCEDURE — 94799 UNLISTED PULMONARY SVC/PX: CPT | Mod: ER

## 2024-06-02 RX ORDER — ACETAMINOPHEN 500 MG
1000 TABLET ORAL
Status: COMPLETED | OUTPATIENT
Start: 2024-06-02 | End: 2024-06-02

## 2024-06-02 RX ORDER — HYDROCODONE BITARTRATE AND ACETAMINOPHEN 5; 325 MG/1; MG/1
1 TABLET ORAL EVERY 6 HOURS PRN
Qty: 12 TABLET | Refills: 0 | Status: SHIPPED | OUTPATIENT
Start: 2024-06-02

## 2024-06-02 RX ADMIN — ACETAMINOPHEN 1000 MG: 500 TABLET ORAL at 03:06

## 2024-06-02 NOTE — ED PROVIDER NOTES
Encounter Date: 6/2/2024       History     Chief Complaint   Patient presents with    Fall     Pt states that he fell PTA trying to sit on a stool and fell on left side. Pain to left rib cage area. Pt has hx of Plavix use      This is a 78-year-old  male with significant past medical history of CAD, BPH, and hypertension that presents the ED with complaint of left-sided rib pain status post mechanical fall off of a stool roughly 30 minutes prior to arrival.  He denies striking his head or having loss of consciousness.  He rates his pain currently a 9/10 and describes it as a sharp and aching pain.  The pain is located to ribs underneath the left axilla and does not radiate.  He has taken no medications for his pain.  Nothing has helped with the pain.  Deep breathing, movement, and palpation exacerbate the pain.  He denies any shortness a breath or trouble breathing at rest.      Review of patient's allergies indicates:  No Known Allergies  Past Medical History:   Diagnosis Date    Coronary artery disease     Enlarged prostate without lower urinary tract symptoms (luts)     BPH    Enlarged prostate without lower urinary tract symptoms (luts)     BPH    Hypertension      Past Surgical History:   Procedure Laterality Date    APPENDECTOMY      BONE MARROW BIOPSY Right 8/23/2021    Procedure: BIOPSY-BONE MARROW;  Surgeon: Jl Zamora MD;  Location: [facility] OR;  Service: Oncology;  Laterality: Right;    EYE SURGERY       No family history on file.  Social History     Tobacco Use    Smoking status: Former    Smokeless tobacco: Never   Substance Use Topics    Alcohol use: Yes     Alcohol/week: 102.0 standard drinks of alcohol     Types: 50 Cans of beer, 52 Shots of liquor per week     Comment: drinks a six pk a day & 1/2 pint of crown q Day    Drug use: No     Review of Systems   Constitutional:  Negative for fever.   Respiratory:  Negative for cough and shortness of breath.    Cardiovascular:  Negative for  chest pain and palpitations.   Musculoskeletal:  Positive for arthralgias.   Psychiatric/Behavioral:  The patient is nervous/anxious.        Physical Exam     Initial Vitals   BP Pulse Resp Temp SpO2   06/02/24 1450 06/02/24 1450 06/02/24 1450 06/02/24 1530 06/02/24 1450   (!) 190/86 61 18 97.8 °F (36.6 °C) 99 %      MAP       --                Physical Exam    Constitutional: He appears well-developed and well-nourished.   HENT:   Head: Normocephalic and atraumatic.   Cardiovascular:  Normal rate, regular rhythm and normal heart sounds.           Pulmonary/Chest: Breath sounds normal. He has no wheezes. Chest wall is not dull to percussion. He exhibits tenderness and bony tenderness. He exhibits no mass, no laceration, no crepitus, no edema, no deformity, no swelling and no retraction.   There is tenderness to palpation noted at the left mid axillary line from roughly T8-T10.  There is no erythema, ecchymoses, deformity, or other signs of trauma noted.  Motor and sensory intact.     Neurological: He is alert and oriented to person, place, and time.   Psychiatric: He has a normal mood and affect. Thought content normal.         ED Course   Procedures  Labs Reviewed - No data to display       Imaging Results              X-Ray Ribs 2 View Left (Final result)  Result time 06/02/24 16:08:17      Final result by Aydin Mace MD (06/02/24 16:08:17)                   Impression:      Two areas of cortical irregularity along the inferolateral right ribs consistent with minimally displaced fracture.  No pneumothorax.      Electronically signed by: Aydin Mace  Date:    06/02/2024  Time:    16:08               Narrative:    EXAMINATION:  XR RIBS 2 VIEW LEFT    CLINICAL HISTORY:  Unspecified fall, initial encounter    TECHNIQUE:  Two views of the left ribs were performed.    COMPARISON:  None.    FINDINGS:  There is 2 areas of irregular contour of the right inferolateral ribs suggestive minimally displaced fracture.  No  pneumothorax.                                    X-Rays:   Independently Interpreted Readings:   Other Readings:  X-ray of the ribs on the left shows    Medications   acetaminophen tablet 1,000 mg (1,000 mg Oral Given 6/2/24 1511)     Medical Decision Making  This is a 78-year-old  male that presents to ED with complaint of left lateral rib pain status post mechanical fall off of a stool prior to arrival.  On arrival the patient is afebrile and nontoxic-appearing with stable vital signs.  Differential diagnoses include but are not limited to:  Rib fracture, rib contusion, pneumothorax, pneumonia.  Please see physical exam for further details.  X-ray of the ribs on the left was independently interpreted by me and does show likely multiple rib fractures.  This was confirmed by Radiology citing 2 areas of cortical irregularity along the inferolateral right ribs.  The patient's lungs were clear to auscultation bilaterally so I do not suspect pneumothorax or pulmonary injury.  The patient will be treated with Tylenol/Motrin and written a small prescription for Norco 5 mg for breakthrough pain.  He has been instructed to break the Norco 5 mg in half and only take it for breakthrough pain.  He was also given an incentive spirometer and respiratory therapy when over with him how to use it.  He should have routine PCP follow up in the next 2-3 days or he can return to the ED for worsening.  He verbalized understanding and was agreeable to the treatment plan.    Amount and/or Complexity of Data Reviewed  Radiology: ordered.    Risk  OTC drugs.                                      Clinical Impression:  Final diagnoses:  [W19.XXXA] Fall  [S22.42XA] Closed fracture of multiple ribs of left side, initial encounter (Primary)          ED Disposition Condition    Discharge Stable          ED Prescriptions       Medication Sig Dispense Start Date End Date Auth. Provider    HYDROcodone-acetaminophen (NORCO) 5-325 mg  per tablet Take 1 tablet by mouth every 6 (six) hours as needed. 12 tablet 6/2/2024 -- Molina Jefferson PA-C          Follow-up Information       Follow up With Specialties Details Why Contact Info    KIANA Call MD Family Medicine Schedule an appointment as soon as possible for a visit in 2 days  [address]  [facility] [address]  [phone]      [facility] - Emergency Dept Emergency Medicine  If symptoms worsen [address] [facility] [address] [phone]             Molina Jefferson PA-C  06/02/24 6826

## 2024-06-02 NOTE — ED TRIAGE NOTES
Pt reports falling this morning while trying to sit on a stool and fell on left side. Pain to left rib cage area. Pt has hx of Plavix use. Pt aaox4. Pt denies hitting head/loc

## 2024-06-14 DIAGNOSIS — C90.00 KAPPA LIGHT CHAIN MYELOMA: ICD-10-CM

## 2024-06-14 RX ORDER — LENALIDOMIDE 10 MG/1
CAPSULE ORAL
Qty: 14 EACH | Refills: 0 | Status: SHIPPED | OUTPATIENT
Start: 2024-06-14

## 2024-06-25 ENCOUNTER — TELEPHONE (OUTPATIENT)
Dept: HEMATOLOGY/ONCOLOGY | Facility: CLINIC | Age: 79
End: 2024-06-25
Payer: MEDICARE

## 2024-06-25 RX ORDER — HEPARIN 100 UNIT/ML
500 SYRINGE INTRAVENOUS
Status: CANCELLED | OUTPATIENT
Start: 2024-06-25

## 2024-06-25 RX ORDER — SODIUM CHLORIDE 0.9 % (FLUSH) 0.9 %
10 SYRINGE (ML) INJECTION
Status: CANCELLED | OUTPATIENT
Start: 2024-06-25

## 2024-06-25 RX ORDER — FAMOTIDINE 20 MG/1
20 TABLET, FILM COATED ORAL
Status: CANCELLED
Start: 2024-06-25

## 2024-06-25 RX ORDER — ACETAMINOPHEN 325 MG/1
650 TABLET ORAL
Status: CANCELLED | OUTPATIENT
Start: 2024-06-25

## 2024-06-25 RX ORDER — EPINEPHRINE 0.3 MG/.3ML
0.3 INJECTION SUBCUTANEOUS ONCE AS NEEDED
Status: CANCELLED | OUTPATIENT
Start: 2024-06-25

## 2024-06-25 RX ORDER — DIPHENHYDRAMINE HCL 25 MG
50 CAPSULE ORAL
Status: CANCELLED
Start: 2024-06-25

## 2024-06-25 RX ORDER — DIPHENHYDRAMINE HYDROCHLORIDE 50 MG/ML
50 INJECTION INTRAMUSCULAR; INTRAVENOUS ONCE AS NEEDED
Status: CANCELLED | OUTPATIENT
Start: 2024-06-25

## 2024-06-26 ENCOUNTER — OFFICE VISIT (OUTPATIENT)
Dept: HEMATOLOGY/ONCOLOGY | Facility: CLINIC | Age: 79
End: 2024-06-26
Payer: MEDICARE

## 2024-06-26 VITALS
TEMPERATURE: 98 F | BODY MASS INDEX: 20.29 KG/M2 | DIASTOLIC BLOOD PRESSURE: 61 MMHG | SYSTOLIC BLOOD PRESSURE: 159 MMHG | HEIGHT: 71 IN | HEART RATE: 54 BPM | WEIGHT: 144.94 LBS | RESPIRATION RATE: 20 BRPM | OXYGEN SATURATION: 90 %

## 2024-06-26 DIAGNOSIS — N40.1 BENIGN PROSTATIC HYPERPLASIA WITH URINARY RETENTION: ICD-10-CM

## 2024-06-26 DIAGNOSIS — G62.0 CHEMOTHERAPY-INDUCED PERIPHERAL NEUROPATHY: ICD-10-CM

## 2024-06-26 DIAGNOSIS — Z72.820 POOR SLEEP: ICD-10-CM

## 2024-06-26 DIAGNOSIS — D64.81 ANEMIA DUE TO ANTINEOPLASTIC CHEMOTHERAPY: ICD-10-CM

## 2024-06-26 DIAGNOSIS — T45.1X5A ANEMIA DUE TO ANTINEOPLASTIC CHEMOTHERAPY: ICD-10-CM

## 2024-06-26 DIAGNOSIS — E53.8 FOLATE DEFICIENCY: ICD-10-CM

## 2024-06-26 DIAGNOSIS — I63.9 CEREBROVASCULAR ACCIDENT (CVA), UNSPECIFIED MECHANISM: ICD-10-CM

## 2024-06-26 DIAGNOSIS — D84.821 IMMUNODEFICIENCY DUE TO DRUG THERAPY: ICD-10-CM

## 2024-06-26 DIAGNOSIS — T45.1X5A CHEMOTHERAPY-INDUCED PERIPHERAL NEUROPATHY: ICD-10-CM

## 2024-06-26 DIAGNOSIS — N18.32 CHRONIC KIDNEY DISEASE, STAGE 3B: ICD-10-CM

## 2024-06-26 DIAGNOSIS — D69.59 CHEMOTHERAPY-INDUCED THROMBOCYTOPENIA: ICD-10-CM

## 2024-06-26 DIAGNOSIS — Z01.818 EXAMINATION PRIOR TO CHEMOTHERAPY: ICD-10-CM

## 2024-06-26 DIAGNOSIS — S82.92XD: ICD-10-CM

## 2024-06-26 DIAGNOSIS — T45.1X5A CHEMOTHERAPY-INDUCED THROMBOCYTOPENIA: ICD-10-CM

## 2024-06-26 DIAGNOSIS — I10 HYPERTENSION, UNSPECIFIED TYPE: ICD-10-CM

## 2024-06-26 DIAGNOSIS — C90.00 KAPPA LIGHT CHAIN MYELOMA: Primary | ICD-10-CM

## 2024-06-26 DIAGNOSIS — S22.42XD: ICD-10-CM

## 2024-06-26 DIAGNOSIS — R63.4 WEIGHT LOSS: ICD-10-CM

## 2024-06-26 DIAGNOSIS — Z79.899 IMMUNODEFICIENCY DUE TO DRUG THERAPY: ICD-10-CM

## 2024-06-26 DIAGNOSIS — E53.8 B12 DEFICIENCY: ICD-10-CM

## 2024-06-26 DIAGNOSIS — G89.3 CHRONIC NEOPLASM-RELATED PAIN: ICD-10-CM

## 2024-06-26 DIAGNOSIS — R33.8 BENIGN PROSTATIC HYPERPLASIA WITH URINARY RETENTION: ICD-10-CM

## 2024-06-26 PROCEDURE — 1100F PTFALLS ASSESS-DOCD GE2>/YR: CPT | Mod: CPTII,S$GLB,, | Performed by: NURSE PRACTITIONER

## 2024-06-26 PROCEDURE — 3077F SYST BP >= 140 MM HG: CPT | Mod: CPTII,S$GLB,, | Performed by: NURSE PRACTITIONER

## 2024-06-26 PROCEDURE — G2211 COMPLEX E/M VISIT ADD ON: HCPCS | Mod: S$GLB,,, | Performed by: NURSE PRACTITIONER

## 2024-06-26 PROCEDURE — 1126F AMNT PAIN NOTED NONE PRSNT: CPT | Mod: CPTII,S$GLB,, | Performed by: NURSE PRACTITIONER

## 2024-06-26 PROCEDURE — 99999 PR PBB SHADOW E&M-EST. PATIENT-LVL V: CPT | Mod: PBBFAC,,, | Performed by: NURSE PRACTITIONER

## 2024-06-26 PROCEDURE — 1159F MED LIST DOCD IN RCRD: CPT | Mod: CPTII,S$GLB,, | Performed by: NURSE PRACTITIONER

## 2024-06-26 PROCEDURE — 99215 OFFICE O/P EST HI 40 MIN: CPT | Mod: S$GLB,,, | Performed by: NURSE PRACTITIONER

## 2024-06-26 PROCEDURE — 3078F DIAST BP <80 MM HG: CPT | Mod: CPTII,S$GLB,, | Performed by: NURSE PRACTITIONER

## 2024-06-26 PROCEDURE — 1160F RVW MEDS BY RX/DR IN RCRD: CPT | Mod: CPTII,S$GLB,, | Performed by: NURSE PRACTITIONER

## 2024-06-26 PROCEDURE — 3288F FALL RISK ASSESSMENT DOCD: CPT | Mod: CPTII,S$GLB,, | Performed by: NURSE PRACTITIONER

## 2024-06-26 RX ORDER — MIRTAZAPINE 7.5 MG/1
15 TABLET, FILM COATED ORAL NIGHTLY
Qty: 180 TABLET | Refills: 3 | Status: SHIPPED | OUTPATIENT
Start: 2024-06-26 | End: 2025-06-26

## 2024-06-26 RX ORDER — TRAMADOL HYDROCHLORIDE 50 MG/1
50 TABLET ORAL EVERY 12 HOURS PRN
COMMUNITY
Start: 2024-06-06

## 2024-06-26 NOTE — PROGRESS NOTES
"KPATIENT: Haris Hernandez  MRN: [id number]  DATE: 6/26/2024    Chief Complaint: Kappa Light Chain Myeloma    Subjective:     Pertinent Medical History:     He presented to the ED 08/13/2021 with abnormal labs-CMP showed creatinine 6.6.  Prior CMP from May 2018 showed creatinine 1.2.    Further workup included a free light chain assay where a kappa free light chain level was 494    08/23/2021 bone marrow biopsy showed variable cellularity, 10 to 40% positive for plasma cell dyscrasia with plasma cells representing 30 to 40% of total cellularity.  No increase in blasts. FISH for Plasma Cell Proliferative Disorder - NORMAL    -started Darzalex, Velcade, Decadron 8/26/2021  -On Revlimid 10 mg qod since 9/30/2021  -saw urology,  for BPH causing difficulty in emptying the bladder, he may need cystoscopy and surgery    Interval History:   - he presents for a follow-up appointment for his multiple myeloma, with daughter today 5/29/24  - he is scheduled for darzalex faspro today, 5/29/24  - today, overall he states feels well; he endorses fatigue "sometimes", diarrhea intermittent, "just usually the 2 days after treatment".  Weight stable 144#, daughter states pt has been frustrated since fall with broken ribs left sided that occurred a couple days after treatment/occurred 6/2/24.  - pt with left rib pain, it is improving, pain controlled with tramadol, he does his spirometer hourly while awake    Denies chest pain, sob, abdominal pain, n/v, constipation, hematemesis, melena, hematuria. He reports  lower extremity swelling bilat is improved, this is chronic. 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 Wearing gloves at night helps .   - continues on cycles of revlimid and tolerating well        Past Medical, Surgical, Family, and Social histories reviewed.    Medication and Allergies reviewed.    Review of Systems   Constitutional:  Positive for malaise/fatigue. Negative " "for fever and weight loss (improved this visit).   HENT:  Negative for sore throat.    Respiratory:  Negative for shortness of breath.    Cardiovascular:  Positive for leg swelling (resolved at present). Negative for chest pain.   Gastrointestinal:  Positive for diarrhea (1-2 days following chemo). Negative for abdominal pain, nausea and vomiting.   Genitourinary:  Negative for dysuria.   Musculoskeletal:  Positive for neck pain. Negative for back pain.   Skin:  Negative for rash.   Neurological:  Negative for weakness.   Psychiatric/Behavioral:  The patient is not nervous/anxious.        Objective:     Vitals:    06/26/24 1133   BP: (!) 159/61   BP Location: Left arm   Patient Position: Sitting   BP Method: Medium (Automatic)   Pulse: (!) 54   Resp: 20   Temp: 97.7 °F (36.5 °C)   TempSrc: Oral   SpO2: (!) 90%   Weight: 65.8 kg (144 lb 15.2 oz)   Height: 5' 11" (1.803 m)       BMI: Body mass index is 20.22 kg/m².    ECOG 1  Physical Exam  Vitals and nursing note reviewed.   Constitutional:       General: He is not in acute distress.     Appearance: Normal appearance. He is well-developed. He is not ill-appearing or toxic-appearing.   HENT:      Head: Normocephalic and atraumatic.      Right Ear: External ear normal.      Left Ear: External ear normal.   Eyes:      General: No scleral icterus.     Pupils: Pupils are equal, round, and reactive to light.   Cardiovascular:      Rate and Rhythm: Normal rate and regular rhythm.      Pulses: Normal pulses.      Heart sounds: Normal heart sounds. No murmur heard.  Pulmonary:      Effort: Pulmonary effort is normal. No respiratory distress.      Breath sounds: Normal breath sounds.   Abdominal:      General: Bowel sounds are normal. There is no distension.      Palpations: Abdomen is soft.      Tenderness: There is no abdominal tenderness. There is no guarding.   Musculoskeletal:         General: No swelling or tenderness. Normal range of motion.      Cervical back: Normal " range of motion and neck supple. No rigidity.      Right lower leg: No edema.      Left lower leg: No edema.   Skin:     General: Skin is warm and dry.      Capillary Refill: Capillary refill takes less than 2 seconds.   Neurological:      Mental Status: He is alert and oriented to person, place, and time.      Gait: Gait normal.   Psychiatric:         Mood and Affect: Mood normal.         Behavior: Behavior normal.         Thought Content: Thought content normal.         Judgment: Judgment normal.       Labs have been reviewed, discussed with pt and daughter.    Lab Results   Component Value Date    WBC 2.95 (L) 06/24/2024    HGB 7.9 (L) 06/24/2024    HCT 22.8 (L) 06/24/2024    MCV 88 06/24/2024     (L) 06/24/2024     CMP  Sodium   Date Value Ref Range Status   06/24/2024 144 136 - 145 mmol/L Final     Potassium   Date Value Ref Range Status   06/24/2024 4.7 3.5 - 5.1 mmol/L Final     Chloride   Date Value Ref Range Status   06/24/2024 111 (H) 95 - 110 mmol/L Final     CO2   Date Value Ref Range Status   06/24/2024 23 23 - 29 mmol/L Final     Glucose   Date Value Ref Range Status   06/24/2024 92 70 - 110 mg/dL Final     BUN   Date Value Ref Range Status   06/24/2024 20 8 - 23 mg/dL Final     Creatinine   Date Value Ref Range Status   06/24/2024 1.8 (H) 0.5 - 1.4 mg/dL Final     Calcium   Date Value Ref Range Status   06/24/2024 8.5 (L) 8.7 - 10.5 mg/dL Final     Total Protein   Date Value Ref Range Status   06/24/2024 6.1 6.0 - 8.4 g/dL Final     Albumin   Date Value Ref Range Status   06/24/2024 3.4 (L) 3.5 - 5.2 g/dL Final     Total Bilirubin   Date Value Ref Range Status   06/24/2024 0.6 0.1 - 1.0 mg/dL Final     Comment:     For infants and newborns, interpretation of results should be based  on gestational age, weight and in agreement with clinical  observations.    Premature Infant recommended reference ranges:  Up to 24 hours.............<8.0 mg/dL  Up to 48 hours............<12.0 mg/dL  3-5  days..................<15.0 mg/dL  6-29 days.................<15.0 mg/dL       Alkaline Phosphatase   Date Value Ref Range Status   06/24/2024 86 55 - 135 U/L Final     AST   Date Value Ref Range Status   06/24/2024 10 10 - 40 U/L Final     ALT   Date Value Ref Range Status   06/24/2024 11 10 - 44 U/L Final     Anion Gap   Date Value Ref Range Status   06/24/2024 10 8 - 16 mmol/L Final     eGFR   Date Value Ref Range Status   06/24/2024 38.5 (A) >60 mL/min/1.73 m^2 Final     SPEP 4/22/24, reviewed, discussed with pt and daughter, stable  Decreased total protein. Normal gamma globulins are decreased.   Persistent linear irregularities in near-gamma = 0.16 g/dL   (previously 0.1 g/dL) and mid-gamma = 0.16 g/dL (previously 0.1   g/dL).         SPEP 5/28/24  Decreased total protein. Normal gamma globulins are decreased.   Persistent linear irregularities in near-gamma = 0.12 g/dL   (previously 0.16 g/dL) and mid-gamma = 0.11 g/dL (previously 0.16   g/dL).       SPEP 4/24/24  Decreased total protein. Normal gamma globulins are decreased.   Persistent linear irregularities in near-gamma = 0.16 g/dL   (previously 0.1 g/dL) and mid-gamma = 0.16 g/dL (previously 0.1   g/dL).     SPEP 3/25/24  Decreased total protein. Normal gamma globulins are decreased. Faint   paraprotein peaks in near-gamma = 0.1 g/dL (previously 0.17 g/dL) and   mid-gamma = 0.1 g/dL (previously 0.22 g/dL).     SPEP 2/19/24  Decreased total protein. Normal gamma globulins are decreased. Faint   paraprotein peaks in near-gamma = 0.17 g/dL (previously 0.15 g/dL)   and mid-gamma = 0.22 g/dL (previously 0.17 g/dL).       SPEP 1/22/24  Decreased total protein. Normal gamma globulins are decreased. Faint   paraprotein peaks in near-gamma = 0.15 g/dL (previously 0.12 g/dL)   and mid-gamma = 0.17 g/dL (previously 0.13 g/dL).     Kappa FLC 1/22/24  -2.42 mg/dL (494.85 mg/dL on 8/14/21)    SPEP 12/18/23  Decreased total protein. Normal gamma globulins are  decreased. Faint   paraprotein peaks in near-gamma = 0.12 g/dL (previously 0.15 g/dL)   and mid-gamma = 0.13 g/dL (previously 0.14 g/dL).     Kappa FLC 12/18/23  - 4.14 mg/dL (494.85 mg/dL on 8/14/21)    SPEP 11/20/23  Decreased total protein. Normal gamma globulins are decreased.   Paraprotein peaks in near-gamma = 0.15 g/dL (previously 0.14 g/dL)   and mid-gamma = 0.14 g/dL (previously 0.18 g/dL).     Kappa FLC 11/20/23  - 4.18 mg/dL (494.85 mg/dL on 8/14/21)    SPEP 10/23/23  Decreased total protein. Normal gamma globulins are decreased.   Paraprotein peaks in near-gamma = 0.14 g/dL (previously, 0.2 g/dL)   and mid-gamma = 0.18 g/dL (previously, 0.15 g/dL).     Kappa FLC 10/23/23  - 4.19 mg/dL (494.85 mg/dL on 8/14/21)    SPEP 9/25/23  Decreased total protein. Normal gamma globulins are decreased.   Paraprotein peaks in near-gamma = 0.2 g/dL (previously, 0.12 g/dL)   and mid-gamma = 0.15 g/dL (previously, 0.14 g/dL).       SPEP 8/28/23  Decreased total protein. Normal gamma globulins are decreased. There   is a paraprotein band in near-gamma = 0.12 g/dL, previously 0.14   g/dL.         SPEP 7/31/23  There is a paraprotein band in near-gamma = 0.14 g/dL, previously   0.18 g/dL.     The second previously described band is not seen discretely on the   current study.     [place] free light chain 7/31/23  2.72 mg/dL (494.85 mg/dL on 8/14/21)      SPEP 7/3/23 path interpretation   Decreased total protein. Normal gamma globulins are decreased. Two   closely adjacent paraprotein peaks in near-gamma = 0.18 g/dL   (previously 0.18 g/dL) and mid-gamma = 0.14 g/dL (previously 0.21   g/dL).     Serum protein electrophoresis (6/6/23):  Decreased total protein. Normal gamma globulins are decreased. Two   closely adjacent paraprotein peaks in near-gamma = 0.18 g/dL   (previously 0.15 g/dL) and mid-gamma = 0.21 g/dL (previously 0.17   g/dL).     Serum protein electrophoresis (5/9/23):  Decreased total protein. Normal gamma  globulins are decreased. Two   closely adjacent paraprotein peaks in near-gamma = 0.15 g/dL   (previously 0.15 g/dL) and mid-gamma = 0.17 g/dL (previously 0.18   g/dL).     Serum protein electrophoresis (4/11/23):  Decreased total protein. Normal gamma globulins are decreased.   Two closely adjacent paraprotein peaks in near-gamma = 0.15 g/dL   (previously 0.15 g/dL) and mid-gamma = 0.18 g/dL (previously 0.15   g/dL).     Serum protein electrophoresis (3/13/23):  Decreased total protein. Normal gamma globulins are decreased.   Two closely adjacent paraprotein peaks in near-gamma = 0.15 g/dL   (previously 0.14 g/dL) and mid-gamma = 0.15 g/dL (previously, 0.13   g/dL).       Serum protein electrophoresis (2/13/23):  Decreased total protein. Normal gamma globulins are decreased. Two   paraprotein bands are present in near-gamma = 0.14 g/dL (previously   0.15 g/dL) and mid-gamma = 0.13 g/dL (previously 0.19 g/dL).       Serum protein electrophoresis (12/19/22):  Normal total protein. Normal gamma globulins are decreased. Two   paraprotein bands are present in near-gamma = 0.20 g/dL (previously   0.16 g/dL) and mid-gamma = 0.23 g/dL (previously 0.15 g/dL).     Serum protein electrophoresis (9/27/22):  Normal total protein. Normal gamma globulins are decreased.   Two paraprotein bands are identified: 1) Near-gamma = 0.2 g/dL   (previously 0.18 g/dL) 2) Mid-gamma = 0.17 g/dL (previously 0.18   g/dL).     Serum protein electrophoresis (8/30/22):  Decreased total protein. Normal gamma globulins are decreased. Two   paraprotein bands are identified: 1) Near-gamma = 0.18 g/dL   (previously 0.21 g/dL) 2) Mid-gamma = 0.18 g/dL (previously 0.18   g/dL).     Plasma Cell FISH:  The result is within normal limits for 1q duplication, TP53   deletion and IGH rearrangement.         Assessment:       1. Kappa light chain myeloma    2. Chemotherapy-induced thrombocytopenia    3. Anemia due to antineoplastic chemotherapy    4.  Chemotherapy-induced peripheral neuropathy    5. Examination prior to chemotherapy    6. Chronic kidney disease, stage 3b    7. Hypertension, unspecified type    8. 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 Cerebrovascular accident (CVA), unspecified mechanism    11. Benign prostatic hyperplasia with urinary retention    12. Weight loss    13. Folate deficiency    14. B12 deficiency    15. Poor sleep    16. Multiple closed fractures of left lower extremity and ribs with routine healing, subsequent encounter              Plan:   Kappa light chain myeloma with normal cytogenetics / anemia/neutropenia/thrombocytopenia due to chemotherapy/chemo induced neuropathy  -started Darzalex Faspro, subcutaneous Velcade, oral dexamethasone 8/26/2021  -velcade discontinued after 4 cycles  -last decadron dose was 2/3/22  -on Revlimid 10 mg QOD - start 9/30/2021, will continue   -cont Plavix  -continue acyclovir for shingles prophylaxis  - Labs reviewed, okay to proceed with Darzalex today, 6/26/24, spep pending at present, platelets 114,000, h/h slight decrease 7.9/22.8, platelets decreased to 105 K/uL, ANC 1.4, okay to treat re gfr, creatinine clearance, anc, stable.  Continue with revlamid this cycle, pt and daughter verbalize understanding. If hgb trends to 7 range, will hold revlamid. Neuropathy is stable, mild, no worsening. SPEP pending, FLCs stable.  Note 5/28/24 SPEP was improved in both near-gamma and mid-gamma irregularities.  - return to clinic in 4 weeks in preparation for next cycle of darzalex faspro/dexamethasone/revlimid.     CKD Stage 3b, htn  - Labs have been reviewed. Creatinine  1.8 mg/dL along with gfr 38.5, stable  - 159/61 today, stable/normal  - encouraged adequate fluid intake, drinks more sweet tea than water  - continue to monitor    Chronic neoplasm-related pain  - no issues, pain at present related to rib fractures  - continue to monitor     Immunodeficiency due to  "drug therapy  - No fevers, no s/s of acute illness  - No known exposure to covid or other illness  - at risk for pneumonia with rib fractures, spirometer hourly while awake, no current symptoms  - Instructed on good handwashing, avoiding known ill individuals, limiting crowd exposure  - Continue to monitor    CVA history  - taking clopidogrel, continue to monitor    BPH  - continue flomax    Weight loss, poor sleep  - weight 144# , decreased  - pt eating one meal a day, "a big one" he states, multiple snacks and sweet tea, does not like nutritional drinks  - encouraged small frequent meals, he does not want nutritional drinks, discussed examples of increased calories in diet  - periactin prescribed 8/2023, quit taking, he is willing to try something else  - will start remeron 7.5mg once nightly x7-10days then increase to 15mg nightly; advised on getting up slowly, use as antidepressant as well as depression and sleep agent, daughter and pt in agreement to try this  - continue to monitor    Folate deficiency, b12 deficiency  - folate now normal, B12 low normal (10/23/23)  - folate elevated, B12 low normal (4/22/24)  - folate normal on m/w/f dosing, b12 improved with 3wks supplementation (5/28/24)  - continue folic acid to m/w/f, start b12 500mcg daily, this may help anemia as well  - continue to monitor, recheck July 2024    Multiple left rib fractures  - 6/2/24 seen in ED  - spirometer at home while awake, has been doing this, educated on importance of continuing to reduce risk of complications including pneumonia  - controlled pain with tramadol  - no fevers, sob, cough, hemoptysis, wheezing  - management deferred to pcp    High Risk Conditions:    Patient has a condition that poses threat to life and bodily function: kappa light chain myeloma.   Patient is currently on drug therapy requiring intensive monitoring for toxicity: adjuvant chemotherapy:       - return to clinic in 4 weeks in preparation for next cycle " of darzalex faspro/dexamethasone/revlimid, continue revlimid current cycle due to stable hgb. Chemo moved to [facility] May 2023.     Total time of this visit, including time spent face to face with patient and/or via video/audio, and also in preparing for today's visit for MDM and documentation. (Medical Decision Making, including consideration of possible diagnoses, management options, complex medical record review, review of diagnostic tests and information, consideration and discussion of significant complications based on comorbidities, and discussion with providers involved with the care of the patient) 45 minutes. Greater than 50% was spent face to face with the patient counseling and coordinating care.      Visit today included increased complexity associated with the care of the episodic problem Multiple myeloma on antineoplastic therapy  addressed and managing the longitudinal care of the patient due to the serious and/or complex managed problem(s) acute effects of chemo on anemia, ckd, nutritional status, weight loss.          Milagros Myers, JENNY-C  [facility]  Hematology/Oncology  [address] [place] [id number]  IVONNE Schmitt  [id number]  [phone]

## 2024-06-28 ENCOUNTER — TELEPHONE (OUTPATIENT)
Dept: HEMATOLOGY/ONCOLOGY | Facility: CLINIC | Age: 79
End: 2024-06-28
Payer: MEDICARE

## 2024-06-28 NOTE — TELEPHONE ENCOUNTER
----- Message from Milagros Myers NP sent at 6/28/2024 10:13 AM CDT -----  Please call patient's daughter. Testing results reviewed. SPEP stable. No change in treatment plan at this time. Keep follow up labs and clinic appointment as already scheduled.

## 2024-07-05 DIAGNOSIS — N40.0 BENIGN PROSTATIC HYPERPLASIA, UNSPECIFIED WHETHER LOWER URINARY TRACT SYMPTOMS PRESENT: ICD-10-CM

## 2024-07-05 RX ORDER — TAMSULOSIN HYDROCHLORIDE 0.4 MG/1
2 CAPSULE ORAL DAILY
Qty: 180 CAPSULE | Refills: 3 | Status: SHIPPED | OUTPATIENT
Start: 2024-07-05 | End: 2025-07-05

## 2024-07-09 DIAGNOSIS — C90.00 KAPPA LIGHT CHAIN MYELOMA: ICD-10-CM

## 2024-07-09 RX ORDER — LENALIDOMIDE 10 MG/1
CAPSULE ORAL
Qty: 14 EACH | Refills: 0 | Status: SHIPPED | OUTPATIENT
Start: 2024-07-09

## 2024-07-21 RX ORDER — EPINEPHRINE 0.3 MG/.3ML
0.3 INJECTION SUBCUTANEOUS ONCE AS NEEDED
Status: CANCELLED | OUTPATIENT
Start: 2024-07-21

## 2024-07-21 RX ORDER — ACETAMINOPHEN 325 MG/1
650 TABLET ORAL
Status: CANCELLED | OUTPATIENT
Start: 2024-07-21

## 2024-07-21 RX ORDER — SODIUM CHLORIDE 0.9 % (FLUSH) 0.9 %
10 SYRINGE (ML) INJECTION
Status: CANCELLED | OUTPATIENT
Start: 2024-07-21

## 2024-07-21 RX ORDER — FAMOTIDINE 20 MG/1
20 TABLET, FILM COATED ORAL
Status: CANCELLED
Start: 2024-07-21

## 2024-07-21 RX ORDER — DIPHENHYDRAMINE HCL 25 MG
50 CAPSULE ORAL
Status: CANCELLED
Start: 2024-07-21

## 2024-07-21 RX ORDER — DIPHENHYDRAMINE HYDROCHLORIDE 50 MG/ML
50 INJECTION INTRAMUSCULAR; INTRAVENOUS ONCE AS NEEDED
Status: CANCELLED | OUTPATIENT
Start: 2024-07-21

## 2024-07-21 RX ORDER — HEPARIN 100 UNIT/ML
500 SYRINGE INTRAVENOUS
Status: CANCELLED | OUTPATIENT
Start: 2024-07-21

## 2024-07-23 ENCOUNTER — TELEPHONE (OUTPATIENT)
Dept: HEMATOLOGY/ONCOLOGY | Facility: CLINIC | Age: 79
End: 2024-07-23
Payer: MEDICARE

## 2024-07-24 ENCOUNTER — OFFICE VISIT (OUTPATIENT)
Dept: HEMATOLOGY/ONCOLOGY | Facility: CLINIC | Age: 79
End: 2024-07-24
Payer: MEDICARE

## 2024-07-24 VITALS
HEIGHT: 71 IN | RESPIRATION RATE: 20 BRPM | OXYGEN SATURATION: 97 % | DIASTOLIC BLOOD PRESSURE: 64 MMHG | BODY MASS INDEX: 20.97 KG/M2 | WEIGHT: 149.81 LBS | HEART RATE: 56 BPM | TEMPERATURE: 98 F | SYSTOLIC BLOOD PRESSURE: 135 MMHG

## 2024-07-24 DIAGNOSIS — T45.1X5A ANEMIA DUE TO ANTINEOPLASTIC CHEMOTHERAPY: ICD-10-CM

## 2024-07-24 DIAGNOSIS — D69.59 CHEMOTHERAPY-INDUCED THROMBOCYTOPENIA: ICD-10-CM

## 2024-07-24 DIAGNOSIS — G89.3 CHRONIC NEOPLASM-RELATED PAIN: ICD-10-CM

## 2024-07-24 DIAGNOSIS — N40.0 BENIGN PROSTATIC HYPERPLASIA, UNSPECIFIED WHETHER LOWER URINARY TRACT SYMPTOMS PRESENT: ICD-10-CM

## 2024-07-24 DIAGNOSIS — C90.00 KAPPA LIGHT CHAIN MYELOMA: Primary | ICD-10-CM

## 2024-07-24 DIAGNOSIS — G62.0 CHEMOTHERAPY-INDUCED PERIPHERAL NEUROPATHY: ICD-10-CM

## 2024-07-24 DIAGNOSIS — R63.4 WEIGHT LOSS: ICD-10-CM

## 2024-07-24 DIAGNOSIS — T45.1X5A CHEMOTHERAPY-INDUCED THROMBOCYTOPENIA: ICD-10-CM

## 2024-07-24 DIAGNOSIS — Z72.820 POOR SLEEP: ICD-10-CM

## 2024-07-24 DIAGNOSIS — D64.81 ANEMIA DUE TO ANTINEOPLASTIC CHEMOTHERAPY: ICD-10-CM

## 2024-07-24 DIAGNOSIS — N18.32 CHRONIC KIDNEY DISEASE, STAGE 3B: ICD-10-CM

## 2024-07-24 DIAGNOSIS — Z01.818 EXAMINATION PRIOR TO CHEMOTHERAPY: ICD-10-CM

## 2024-07-24 DIAGNOSIS — D84.821 IMMUNODEFICIENCY DUE TO DRUG THERAPY: ICD-10-CM

## 2024-07-24 DIAGNOSIS — I63.9 CEREBROVASCULAR ACCIDENT (CVA), UNSPECIFIED MECHANISM: ICD-10-CM

## 2024-07-24 DIAGNOSIS — D50.8 IRON DEFICIENCY ANEMIA SECONDARY TO INADEQUATE DIETARY IRON INTAKE: Primary | ICD-10-CM

## 2024-07-24 DIAGNOSIS — T45.1X5A CHEMOTHERAPY-INDUCED PERIPHERAL NEUROPATHY: ICD-10-CM

## 2024-07-24 DIAGNOSIS — I10 HYPERTENSION, UNSPECIFIED TYPE: ICD-10-CM

## 2024-07-24 DIAGNOSIS — Z79.899 IMMUNODEFICIENCY DUE TO DRUG THERAPY: ICD-10-CM

## 2024-07-24 PROCEDURE — 1159F MED LIST DOCD IN RCRD: CPT | Mod: CPTII,S$GLB,, | Performed by: NURSE PRACTITIONER

## 2024-07-24 PROCEDURE — 1160F RVW MEDS BY RX/DR IN RCRD: CPT | Mod: CPTII,S$GLB,, | Performed by: NURSE PRACTITIONER

## 2024-07-24 PROCEDURE — 99999 PR PBB SHADOW E&M-EST. PATIENT-LVL V: CPT | Mod: PBBFAC,,, | Performed by: NURSE PRACTITIONER

## 2024-07-24 PROCEDURE — 3075F SYST BP GE 130 - 139MM HG: CPT | Mod: CPTII,S$GLB,, | Performed by: NURSE PRACTITIONER

## 2024-07-24 PROCEDURE — 3288F FALL RISK ASSESSMENT DOCD: CPT | Mod: CPTII,S$GLB,, | Performed by: NURSE PRACTITIONER

## 2024-07-24 PROCEDURE — 1101F PT FALLS ASSESS-DOCD LE1/YR: CPT | Mod: CPTII,S$GLB,, | Performed by: NURSE PRACTITIONER

## 2024-07-24 PROCEDURE — 3078F DIAST BP <80 MM HG: CPT | Mod: CPTII,S$GLB,, | Performed by: NURSE PRACTITIONER

## 2024-07-24 PROCEDURE — 1126F AMNT PAIN NOTED NONE PRSNT: CPT | Mod: CPTII,S$GLB,, | Performed by: NURSE PRACTITIONER

## 2024-07-24 PROCEDURE — 99213 OFFICE O/P EST LOW 20 MIN: CPT | Mod: S$GLB,,, | Performed by: NURSE PRACTITIONER

## 2024-07-24 PROCEDURE — G2211 COMPLEX E/M VISIT ADD ON: HCPCS | Mod: S$GLB,,, | Performed by: NURSE PRACTITIONER

## 2024-07-24 RX ORDER — DIPHENHYDRAMINE HYDROCHLORIDE 50 MG/ML
50 INJECTION INTRAMUSCULAR; INTRAVENOUS ONCE AS NEEDED
OUTPATIENT
Start: 2024-07-31

## 2024-07-24 RX ORDER — HEPARIN 100 UNIT/ML
500 SYRINGE INTRAVENOUS
OUTPATIENT
Start: 2024-07-31

## 2024-07-24 RX ORDER — HEPARIN 100 UNIT/ML
500 SYRINGE INTRAVENOUS
OUTPATIENT
Start: 2024-08-07

## 2024-07-24 RX ORDER — SODIUM CHLORIDE 0.9 % (FLUSH) 0.9 %
10 SYRINGE (ML) INJECTION
OUTPATIENT
Start: 2024-08-07

## 2024-07-24 RX ORDER — EPINEPHRINE 0.3 MG/.3ML
0.3 INJECTION SUBCUTANEOUS ONCE AS NEEDED
OUTPATIENT
Start: 2024-08-14

## 2024-07-24 RX ORDER — DIPHENHYDRAMINE HYDROCHLORIDE 50 MG/ML
50 INJECTION INTRAMUSCULAR; INTRAVENOUS ONCE AS NEEDED
OUTPATIENT
Start: 2024-08-07

## 2024-07-24 RX ORDER — SODIUM CHLORIDE 0.9 % (FLUSH) 0.9 %
10 SYRINGE (ML) INJECTION
OUTPATIENT
Start: 2024-07-31

## 2024-07-24 RX ORDER — SODIUM CHLORIDE 0.9 % (FLUSH) 0.9 %
10 SYRINGE (ML) INJECTION
OUTPATIENT
Start: 2024-08-14

## 2024-07-24 RX ORDER — EPINEPHRINE 0.3 MG/.3ML
0.3 INJECTION SUBCUTANEOUS ONCE AS NEEDED
OUTPATIENT
Start: 2024-08-07

## 2024-07-24 RX ORDER — EPINEPHRINE 0.3 MG/.3ML
0.3 INJECTION SUBCUTANEOUS ONCE AS NEEDED
OUTPATIENT
Start: 2024-07-31

## 2024-07-24 RX ORDER — DIPHENHYDRAMINE HYDROCHLORIDE 50 MG/ML
50 INJECTION INTRAMUSCULAR; INTRAVENOUS ONCE AS NEEDED
OUTPATIENT
Start: 2024-08-14

## 2024-07-24 RX ORDER — HEPARIN 100 UNIT/ML
500 SYRINGE INTRAVENOUS
OUTPATIENT
Start: 2024-08-14

## 2024-07-24 NOTE — PROGRESS NOTES
"KPATIENT: Haris Hernandez  MRN: [id number]  DATE: 7/24/2024    Chief Complaint: Kappa Light Chain Myeloma    Subjective:     Pertinent Medical History:     He presented to the ED 08/13/2021 with abnormal labs-CMP showed creatinine 6.6.  Prior CMP from May 2018 showed creatinine 1.2.    Further workup included a free light chain assay where a kappa free light chain level was 494    08/23/2021 bone marrow biopsy showed variable cellularity, 10 to 40% positive for plasma cell dyscrasia with plasma cells representing 30 to 40% of total cellularity.  No increase in blasts. FISH for Plasma Cell Proliferative Disorder - NORMAL    -started Darzalex, Velcade, Decadron 8/26/2021  -On Revlimid 10 mg qod since 9/30/2021  -saw urology,  for BPH causing difficulty in emptying the bladder, he may need cystoscopy and surgery    Interval History:   - he presents for a follow-up appointment for his multiple myeloma, with daughter today 7/24/24  - he is scheduled for darzalex faspro today, 7/24/24  - today, overall he states feels well; he endorses fatigue "sometimes", diarrhea intermittent, "just usually the 2 days after treatment".  Weight improved to 149# with 5# gain  - pt has been feeling better since fall with broken ribs left sided that occurred 6/2/24, left pain nearly gone and able to sleep on left side again  - reports he does his spirometer hourly while awake  -  Denies chest pain, sob, abdominal pain, n/v, constipation, hematemesis, melena, hematuria. He reports  lower extremity swelling bilat is improved, this is chronic. 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 Wearing gloves at night helps .   - on days no rain, he walks on neighborhood street in am 1/2 mile at least  - continues on cycles of revlimid, we will hold this current cycle given hgb drop and daughter/pt both understands this    Wt Readings from Last 3 Encounters:   07/24/24 67.9 kg (149 lb 11.1 oz) " "  07/24/24 67.9 kg (149 lb 12.8 oz)   06/26/24 65.3 kg (144 lb)           Past Medical, Surgical, Family, and Social histories reviewed.    Medication and Allergies reviewed.    Review of Systems   Constitutional:  Positive for malaise/fatigue. Negative for fever and weight loss (improved this visit).   HENT:  Negative for sore throat.    Respiratory:  Negative for shortness of breath.    Cardiovascular:  Positive for leg swelling (resolved at present). Negative for chest pain.   Gastrointestinal:  Positive for diarrhea (1-2 days following chemo). Negative for abdominal pain, nausea and vomiting.   Genitourinary:  Negative for dysuria.   Musculoskeletal:  Positive for neck pain. Negative for back pain.   Skin:  Negative for rash.   Neurological:  Negative for weakness.   Psychiatric/Behavioral:  The patient is not nervous/anxious.        Objective:     Vitals:    07/24/24 1043   BP: 135/64   BP Location: Left arm   Patient Position: Sitting   BP Method: Medium (Automatic)   Pulse: (!) 56   Resp: 20   Temp: 97.7 °F (36.5 °C)   TempSrc: Oral   SpO2: 97%   Weight: 67.9 kg (149 lb 12.8 oz)   Height: 5' 11" (1.803 m)         BMI: Body mass index is 20.89 kg/m².    ECOG 1  Physical Exam  Vitals and nursing note reviewed.   Constitutional:       General: He is not in acute distress.     Appearance: Normal appearance. He is well-developed. He is not ill-appearing or toxic-appearing.   HENT:      Head: Normocephalic and atraumatic.      Right Ear: External ear normal.      Left Ear: External ear normal.   Eyes:      General: No scleral icterus.     Pupils: Pupils are equal, round, and reactive to light.   Cardiovascular:      Rate and Rhythm: Normal rate and regular rhythm.      Pulses: Normal pulses.      Heart sounds: Normal heart sounds. No murmur heard.  Pulmonary:      Effort: Pulmonary effort is normal. No respiratory distress.      Breath sounds: Normal breath sounds.   Abdominal:      General: Bowel sounds are " normal. There is no distension.      Palpations: Abdomen is soft.      Tenderness: There is no abdominal tenderness. There is no guarding.   Musculoskeletal:         General: No swelling or tenderness. Normal range of motion.      Cervical back: Normal range of motion and neck supple. No rigidity.      Right lower leg: No edema.      Left lower leg: No edema.   Skin:     General: Skin is warm and dry.      Capillary Refill: Capillary refill takes less than 2 seconds.   Neurological:      Mental Status: He is alert and oriented to person, place, and time.      Gait: Gait normal.   Psychiatric:         Mood and Affect: Mood normal.         Behavior: Behavior normal.         Thought Content: Thought content normal.         Judgment: Judgment normal.       Labs have been reviewed, discussed with pt and daughter.    Lab Results   Component Value Date    WBC 2.57 (L) 07/22/2024    HGB 7.3 (L) 07/22/2024    HCT 21.2 (L) 07/22/2024    MCV 88 07/22/2024     (L) 07/22/2024     CMP  Sodium   Date Value Ref Range Status   07/22/2024 144 136 - 145 mmol/L Final     Potassium   Date Value Ref Range Status   07/22/2024 3.7 3.5 - 5.1 mmol/L Final     Chloride   Date Value Ref Range Status   07/22/2024 112 (H) 95 - 110 mmol/L Final     CO2   Date Value Ref Range Status   07/22/2024 21 (L) 23 - 29 mmol/L Final     Glucose   Date Value Ref Range Status   07/22/2024 91 70 - 110 mg/dL Final     BUN   Date Value Ref Range Status   07/22/2024 20 8 - 23 mg/dL Final     Creatinine   Date Value Ref Range Status   07/22/2024 1.9 (H) 0.5 - 1.4 mg/dL Final     Calcium   Date Value Ref Range Status   07/22/2024 7.6 (L) 8.7 - 10.5 mg/dL Final     Total Protein   Date Value Ref Range Status   07/22/2024 5.7 (L) 6.0 - 8.4 g/dL Final     Albumin   Date Value Ref Range Status   07/22/2024 3.2 (L) 3.5 - 5.2 g/dL Final     Total Bilirubin   Date Value Ref Range Status   07/22/2024 0.6 0.1 - 1.0 mg/dL Final     Comment:     For infants and  newborns, interpretation of results should be based  on gestational age, weight and in agreement with clinical  observations.    Premature Infant recommended reference ranges:  Up to 24 hours.............<8.0 mg/dL  Up to 48 hours............<12.0 mg/dL  3-5 days..................<15.0 mg/dL  6-29 days.................<15.0 mg/dL       Alkaline Phosphatase   Date Value Ref Range Status   07/22/2024 70 55 - 135 U/L Final     AST   Date Value Ref Range Status   07/22/2024 10 10 - 40 U/L Final     ALT   Date Value Ref Range Status   07/22/2024 10 10 - 44 U/L Final     Anion Gap   Date Value Ref Range Status   07/22/2024 11 8 - 16 mmol/L Final     eGFR   Date Value Ref Range Status   07/22/2024 36.1 (A) >60 mL/min/1.73 m^2 Final       SPEP 7/22/24   Decreased total protein. Normal gamma globulins are decreased.   Persistent linear irregularities in near-gamma = 0.15 g/dL   (previously 0.13 g/dL) and mid-gamma = 0.16 g/dL (previously 0.17   g/dL).     SPEP 6/24/24   Decreased total protein. Normal gamma globulins are decreased.   Persistent linear irregularities in near-gamma = 0.13 g/dL   (previously, 0.12 g/dL) and mid-gamma = 0.17 g/dL (previously, 0.11   g/dL).     SPEP 5/28/24  Decreased total protein. Normal gamma globulins are decreased.   Persistent linear irregularities in near-gamma = 0.12 g/dL   (previously 0.16 g/dL) and mid-gamma = 0.11 g/dL (previously 0.16   g/dL).     SPEP 4/22/24, reviewed, discussed with pt and daughter, stable  Decreased total protein. Normal gamma globulins are decreased.   Persistent linear irregularities in near-gamma = 0.16 g/dL   (previously 0.1 g/dL) and mid-gamma = 0.16 g/dL (previously 0.1   g/dL).         SPEP 5/28/24  Decreased total protein. Normal gamma globulins are decreased.   Persistent linear irregularities in near-gamma = 0.12 g/dL   (previously 0.16 g/dL) and mid-gamma = 0.11 g/dL (previously 0.16   g/dL).       SPEP 4/24/24  Decreased total protein. Normal gamma  globulins are decreased.   Persistent linear irregularities in near-gamma = 0.16 g/dL   (previously 0.1 g/dL) and mid-gamma = 0.16 g/dL (previously 0.1   g/dL).     SPEP 3/25/24  Decreased total protein. Normal gamma globulins are decreased. Faint   paraprotein peaks in near-gamma = 0.1 g/dL (previously 0.17 g/dL) and   mid-gamma = 0.1 g/dL (previously 0.22 g/dL).     SPEP 2/19/24  Decreased total protein. Normal gamma globulins are decreased. Faint   paraprotein peaks in near-gamma = 0.17 g/dL (previously 0.15 g/dL)   and mid-gamma = 0.22 g/dL (previously 0.17 g/dL).       SPEP 1/22/24  Decreased total protein. Normal gamma globulins are decreased. Faint   paraprotein peaks in near-gamma = 0.15 g/dL (previously 0.12 g/dL)   and mid-gamma = 0.17 g/dL (previously 0.13 g/dL).     Kappa FLC 1/22/24  -2.42 mg/dL (494.85 mg/dL on 8/14/21)    SPEP 12/18/23  Decreased total protein. Normal gamma globulins are decreased. Faint   paraprotein peaks in near-gamma = 0.12 g/dL (previously 0.15 g/dL)   and mid-gamma = 0.13 g/dL (previously 0.14 g/dL).     Kappa FLC 12/18/23  - 4.14 mg/dL (494.85 mg/dL on 8/14/21)    SPEP 11/20/23  Decreased total protein. Normal gamma globulins are decreased.   Paraprotein peaks in near-gamma = 0.15 g/dL (previously 0.14 g/dL)   and mid-gamma = 0.14 g/dL (previously 0.18 g/dL).     Kappa FLC 11/20/23  - 4.18 mg/dL (494.85 mg/dL on 8/14/21)    SPEP 10/23/23  Decreased total protein. Normal gamma globulins are decreased.   Paraprotein peaks in near-gamma = 0.14 g/dL (previously, 0.2 g/dL)   and mid-gamma = 0.18 g/dL (previously, 0.15 g/dL).     Kappa FLC 10/23/23  - 4.19 mg/dL (494.85 mg/dL on 8/14/21)    SPEP 9/25/23  Decreased total protein. Normal gamma globulins are decreased.   Paraprotein peaks in near-gamma = 0.2 g/dL (previously, 0.12 g/dL)   and mid-gamma = 0.15 g/dL (previously, 0.14 g/dL).       SPEP 8/28/23  Decreased total protein. Normal gamma globulins are decreased. There   is a  paraprotein band in near-gamma = 0.12 g/dL, previously 0.14   g/dL.         SPEP 7/31/23  There is a paraprotein band in near-gamma = 0.14 g/dL, previously   0.18 g/dL.     The second previously described band is not seen discretely on the   current study.     [place] free light chain 7/31/23  2.72 mg/dL (494.85 mg/dL on 8/14/21)      SPEP 7/3/23 path interpretation   Decreased total protein. Normal gamma globulins are decreased. Two   closely adjacent paraprotein peaks in near-gamma = 0.18 g/dL   (previously 0.18 g/dL) and mid-gamma = 0.14 g/dL (previously 0.21   g/dL).     Serum protein electrophoresis (6/6/23):  Decreased total protein. Normal gamma globulins are decreased. Two   closely adjacent paraprotein peaks in near-gamma = 0.18 g/dL   (previously 0.15 g/dL) and mid-gamma = 0.21 g/dL (previously 0.17   g/dL).     Serum protein electrophoresis (5/9/23):  Decreased total protein. Normal gamma globulins are decreased. Two   closely adjacent paraprotein peaks in near-gamma = 0.15 g/dL   (previously 0.15 g/dL) and mid-gamma = 0.17 g/dL (previously 0.18   g/dL).     Serum protein electrophoresis (4/11/23):  Decreased total protein. Normal gamma globulins are decreased.   Two closely adjacent paraprotein peaks in near-gamma = 0.15 g/dL   (previously 0.15 g/dL) and mid-gamma = 0.18 g/dL (previously 0.15   g/dL).     Serum protein electrophoresis (3/13/23):  Decreased total protein. Normal gamma globulins are decreased.   Two closely adjacent paraprotein peaks in near-gamma = 0.15 g/dL   (previously 0.14 g/dL) and mid-gamma = 0.15 g/dL (previously, 0.13   g/dL).       Serum protein electrophoresis (2/13/23):  Decreased total protein. Normal gamma globulins are decreased. Two   paraprotein bands are present in near-gamma = 0.14 g/dL (previously   0.15 g/dL) and mid-gamma = 0.13 g/dL (previously 0.19 g/dL).       Serum protein electrophoresis (12/19/22):  Normal total protein. Normal gamma globulins are decreased. Two    paraprotein bands are present in near-gamma = 0.20 g/dL (previously   0.16 g/dL) and mid-gamma = 0.23 g/dL (previously 0.15 g/dL).     Serum protein electrophoresis (9/27/22):  Normal total protein. Normal gamma globulins are decreased.   Two paraprotein bands are identified: 1) Near-gamma = 0.2 g/dL   (previously 0.18 g/dL) 2) Mid-gamma = 0.17 g/dL (previously 0.18   g/dL).     Serum protein electrophoresis (8/30/22):  Decreased total protein. Normal gamma globulins are decreased. Two   paraprotein bands are identified: 1) Near-gamma = 0.18 g/dL   (previously 0.21 g/dL) 2) Mid-gamma = 0.18 g/dL (previously 0.18   g/dL).     Plasma Cell FISH:  The result is within normal limits for 1q duplication, TP53   deletion and IGH rearrangement.         Assessment:       1. Kappa light chain myeloma    2. Anemia due to antineoplastic chemotherapy    3. Chemotherapy-induced thrombocytopenia    4. Chemotherapy-induced peripheral neuropathy    5. Examination prior to chemotherapy    6. Chronic kidney disease, stage 3b    7. Hypertension, unspecified type    8. 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 Cerebrovascular accident (CVA), unspecified mechanism    11. Benign prostatic hyperplasia, unspecified whether lower urinary tract symptoms present    12. Weight loss    13. Poor sleep                Plan:   Kappa light chain myeloma with normal cytogenetics / anemia/neutropenia/thrombocytopenia due to chemotherapy/chemo induced neuropathy  -started Darzalex Faspro, subcutaneous Velcade, oral dexamethasone 8/26/2021  -velcade discontinued after 4 cycles  -last decadron dose was 2/3/22  -on Revlimid 10 mg QOD - start 9/30/2021, will continue   -cont Plavix  -continue acyclovir for shingles prophylaxis  - Labs reviewed, okay to proceed with Darzalex today, 7/24/24, spep pending at present, platelets 101,000, h/h decrease 7.3/21.2, platelets decreased to 105 K/uL, ANC 1.3, okay to treat re gfr,  "creatinine clearance 30.6, anc, stable.  HOLD revlamid this cycle, pt and daughter verbalize understanding. Neuropathy is stable, mild, no worsening. SPEP pending, FLCs stable.  Will do iron studies with ferritin today additionally re anemia status.  - return to clinic in 4 weeks in preparation for next cycle of darzalex faspro/dexamethasone/revlimid.     CKD Stage 3b, htn  - Labs have been reviewed. Creatinine  1.9 mg/dL along with gfr 36.1, stable  - 135/64 today, stable/normal  - encouraged adequate fluid intake, drinks more sweet tea than water  - continue to monitor    Chronic neoplasm-related pain  - no issues, pain at present related to rib fractures  - continue to monitor     Immunodeficiency due to drug therapy  - No fevers, no s/s of acute illness  - No known exposure to covid or other illness  - at risk for pneumonia with rib fractures, spirometer hourly while awake, no current symptoms  - Instructed on good handwashing, avoiding known ill individuals, limiting crowd exposure  - ANC 1.3, okay to treat  - Continue to monitor    CVA history  - taking clopidogrel, continue to monitor    BPH  - continue flomax    Weight loss, poor sleep  - weight 145# with 5# increase, improved  - pt eating one meal a day, "a big one" he states, multiple snacks and sweet tea, does not like nutritional drinks  - encouraged small frequent meals, he does not want nutritional drinks, discussed examples of increased calories in diet  - periactin prescribed 8/2023, quit taking, he is willing to try something else  - will start remeron 7.5mg once nightly x7-10days then increase to 15mg nightly; advised on getting up slowly, use as antidepressant as well as depression and sleep agent, daughter and pt in agreement to try this  - continue to monitor, next visit virtual with Dr Ríos    Folate deficiency, b12 deficiency  - folate now normal, B12 low normal (10/23/23)  - folate elevated, B12 low normal (4/22/24)  - folate normal on " m/w/f dosing, b12 improved with 3wks supplementation (5/28/24)  - continue folic acid to m/w/f, start b12 500mcg daily, this may help anemia as well  - continue to monitor, recheck  aug 2024    Multiple left rib fractures  - 6/2/24 seen in ED  - spirometer at home while awake, has been doing this, educated on importance of continuing to reduce risk of complications including pneumonia  - pain nearly resolved at present  - no fevers, sob, cough, hemoptysis, wheezing  - management deferred to pcp    High Risk Conditions:    Patient has a condition that poses threat to life and bodily function: kappa light chain myeloma.   Patient is currently on drug therapy requiring intensive monitoring for toxicity: adjuvant chemotherapy: with anemia, concern for immunodeficiency and at risk for pneumonia post rib fractures, ckd, weight loss.       - return to clinic in 4 weeks in preparation for next cycle of darzalex faspro/dexamethasone/revlimid, hold revlimid current cycle due to decreased hgb. Chemo moved to [facility] May 2023. Addendum: He will start venofer1 of 4 on 8/20/24. SPEP stable.     Total time of this visit, including time spent face to face with patient and/or via video/audio, and also in preparing for today's visit for MDM and documentation. (Medical Decision Making, including consideration of possible diagnoses, management options, complex medical record review, review of diagnostic tests and information, consideration and discussion of significant complications based on comorbidities, and discussion with providers involved with the care of the patient) 40 minutes. Greater than 50% was spent face to face with the patient counseling and coordinating care.      Visit today included increased complexity associated with the care of the episodic problem Multiple myeloma on antineoplastic therapy  addressed and managing the longitudinal care of the patient due to the serious and/or complex managed  problem(s) acute effects of chemo on anemia, ckd, nutritional status, weight loss.          Milagros Myers, NP-C  [facility]  Hematology/Oncology  [address] [place] [address]  IVONNE Schmitt  [phone]

## 2024-07-25 ENCOUNTER — TELEPHONE (OUTPATIENT)
Dept: HEMATOLOGY/ONCOLOGY | Facility: CLINIC | Age: 79
End: 2024-07-25
Payer: MEDICARE

## 2024-08-12 ENCOUNTER — TELEPHONE (OUTPATIENT)
Dept: HEMATOLOGY/ONCOLOGY | Facility: CLINIC | Age: 79
End: 2024-08-12
Payer: MEDICARE

## 2024-08-12 DIAGNOSIS — C90.00 KAPPA LIGHT CHAIN MYELOMA: ICD-10-CM

## 2024-08-12 RX ORDER — LENALIDOMIDE 10 MG/1
CAPSULE ORAL
Qty: 14 EACH | Refills: 0 | Status: SHIPPED | OUTPATIENT
Start: 2024-08-12

## 2024-08-12 NOTE — TELEPHONE ENCOUNTER
----- Message from Felisa Mar sent at 8/12/2024 11:18 AM CDT -----  Contact: Kimmie  Type:  Needs Medical Advice    Who Called: Human   Symptoms (please be specific): pt care manager is needing to know if you have another phone number on pt please call back    Would the patient rather a call back or a response via [facility]? call  Best Call Back Number: [phone]  Additional Information:

## 2024-08-12 NOTE — TELEPHONE ENCOUNTER
----- Message -----  From: Donato Medina  Sent: 8/12/2024   2:52 PM CDT  To: [place] Campos Staff    Type:  Pharmacy Calling to Clarify an RX    Name of Caller:Michell   Pharmacy Name:[facility]   Prescription Name:lenalidomide 10 mg Cap 14 each   What do they need to clarify?: is patient on a hold and expecting to be off of it?  Best Call Back Number:[phone] ask for nursing department.   Additional Information:

## 2024-08-16 NOTE — PROGRESS NOTES
KPATIENT: Haris Hernandez  MRN: [id number]  DATE: 8/20/2024    Chief Complaint: Kappa Light Chain Myeloma    Telemedicine visit:  The patient location is: home  The chief complaint leading to consultation is: multiple myeloma    Visit type: audiovisual    Face to Face time with patient: 10 minutes  15 minutes of total time spent on the encounter, which includes face to face time and non-face to face time preparing to see the patient (eg, review of tests), Obtaining and/or reviewing separately obtained history, Documenting clinical information in the electronic or other health record, Independently interpreting results (not separately reported) and communicating results to the patient/family/caregiver, or Care coordination (not separately reported).     Each patient to whom he or she provides medical services by telemedicine is:  (1) informed of the relationship between the physician and patient and the respective role of any other health care provider with respect to management of the patient; and (2) notified that he or she may decline to receive medical services by telemedicine and may withdraw from such care at any time.    Notes: see below    Subjective:     Pertinent Medical History:     He presented to the ED 08/13/2021 with abnormal labs-CMP showed creatinine 6.6.  Prior CMP from May 2018 showed creatinine 1.2.    Further workup included a free light chain assay where a kappa free light chain level was 494    08/23/2021 bone marrow biopsy showed variable cellularity, 10 to 40% positive for plasma cell dyscrasia with plasma cells representing 30 to 40% of total cellularity.  No increase in blasts. FISH for Plasma Cell Proliferative Disorder - NORMAL    -started Darzalex, Velcade, Decadron 8/26/2021  -On Revlimid 10 mg qod since 9/30/2021  -saw urology,  for BPH causing difficulty in emptying the bladder, he may need cystoscopy and surgery    Interval History:   - he presents for a follow-up appointment  for his multiple myeloma   - he is scheduled for darzalex faspro tomorrow, 8/21/24  - today, he endorses fatigue, intermittent leg swelling. He denies dyspnea upon exertion.        Past Medical, Surgical, Family, and Social histories reviewed.    Medication and Allergies reviewed.    Review of Systems   Constitutional:  Positive for malaise/fatigue. Negative for fever and weight loss (improved this visit).   HENT:  Negative for sore throat.    Respiratory:  Negative for shortness of breath.    Cardiovascular:  Positive for leg swelling (resolved at present). Negative for chest pain.   Gastrointestinal:  Positive for diarrhea (1-2 days following chemo). Negative for abdominal pain, nausea and vomiting.   Genitourinary:  Negative for dysuria.   Musculoskeletal:  Positive for neck pain. Negative for back pain.   Skin:  Negative for rash.   Neurological:  Negative for weakness.   Psychiatric/Behavioral:  The patient is not nervous/anxious.        Objective:     There were no vitals filed for this visit.    BMI: There is no height or weight on file to calculate BMI.  Deferred due to telemedicine visit.    ECOG 1  Physical Exam  Deferred due to telemedicine visit.    Labs have been reviewed, discussed with pt and daughter.    Lab Results   Component Value Date    WBC 4.00 08/19/2024    HGB 8.4 (L) 08/19/2024    HCT 24.0 (L) 08/19/2024    MCV 89 08/19/2024     (L) 08/19/2024     CMP  Sodium   Date Value Ref Range Status   08/19/2024 144 136 - 145 mmol/L Final     Potassium   Date Value Ref Range Status   08/19/2024 4.3 3.5 - 5.1 mmol/L Final     Chloride   Date Value Ref Range Status   08/19/2024 112 (H) 95 - 110 mmol/L Final     CO2   Date Value Ref Range Status   08/19/2024 24 23 - 29 mmol/L Final     Glucose   Date Value Ref Range Status   08/19/2024 102 70 - 110 mg/dL Final     BUN   Date Value Ref Range Status   08/19/2024 18 8 - 23 mg/dL Final     Creatinine   Date Value Ref Range Status   08/19/2024 1.7 (H)  0.5 - 1.4 mg/dL Final     Calcium   Date Value Ref Range Status   08/19/2024 8.2 (L) 8.7 - 10.5 mg/dL Final     Total Protein   Date Value Ref Range Status   08/19/2024 6.2 6.0 - 8.4 g/dL Final     Albumin   Date Value Ref Range Status   08/19/2024 3.4 (L) 3.5 - 5.2 g/dL Final     Total Bilirubin   Date Value Ref Range Status   08/19/2024 0.5 0.1 - 1.0 mg/dL Final     Comment:     For infants and newborns, interpretation of results should be based  on gestational age, weight and in agreement with clinical  observations.    Premature Infant recommended reference ranges:  Up to 24 hours.............<8.0 mg/dL  Up to 48 hours............<12.0 mg/dL  3-5 days..................<15.0 mg/dL  6-29 days.................<15.0 mg/dL       Alkaline Phosphatase   Date Value Ref Range Status   08/19/2024 78 55 - 135 U/L Final     AST   Date Value Ref Range Status   08/19/2024 11 10 - 40 U/L Final     ALT   Date Value Ref Range Status   08/19/2024 13 10 - 44 U/L Final     Anion Gap   Date Value Ref Range Status   08/19/2024 8 8 - 16 mmol/L Final     eGFR   Date Value Ref Range Status   08/19/2024 41.3 (A) >60 mL/min/1.73 m^2 Final           SPEP 7/22/24   Decreased total protein. Normal gamma globulins are decreased.   Persistent linear irregularities in near-gamma = 0.15 g/dL   (previously 0.13 g/dL) and mid-gamma = 0.16 g/dL (previously 0.17   g/dL).     SPEP 6/24/24   Decreased total protein. Normal gamma globulins are decreased.   Persistent linear irregularities in near-gamma = 0.13 g/dL   (previously, 0.12 g/dL) and mid-gamma = 0.17 g/dL (previously, 0.11   g/dL).     SPEP 5/28/24  Decreased total protein. Normal gamma globulins are decreased.   Persistent linear irregularities in near-gamma = 0.12 g/dL   (previously 0.16 g/dL) and mid-gamma = 0.11 g/dL (previously 0.16   g/dL).     SPEP 4/22/24, reviewed, discussed with pt and daughter, stable  Decreased total protein. Normal gamma globulins are decreased.   Persistent  linear irregularities in near-gamma = 0.16 g/dL   (previously 0.1 g/dL) and mid-gamma = 0.16 g/dL (previously 0.1   g/dL).         SPEP 5/28/24  Decreased total protein. Normal gamma globulins are decreased.   Persistent linear irregularities in near-gamma = 0.12 g/dL   (previously 0.16 g/dL) and mid-gamma = 0.11 g/dL (previously 0.16   g/dL).       SPEP 4/24/24  Decreased total protein. Normal gamma globulins are decreased.   Persistent linear irregularities in near-gamma = 0.16 g/dL   (previously 0.1 g/dL) and mid-gamma = 0.16 g/dL (previously 0.1   g/dL).     SPEP 3/25/24  Decreased total protein. Normal gamma globulins are decreased. Faint   paraprotein peaks in near-gamma = 0.1 g/dL (previously 0.17 g/dL) and   mid-gamma = 0.1 g/dL (previously 0.22 g/dL).     SPEP 2/19/24  Decreased total protein. Normal gamma globulins are decreased. Faint   paraprotein peaks in near-gamma = 0.17 g/dL (previously 0.15 g/dL)   and mid-gamma = 0.22 g/dL (previously 0.17 g/dL).       SPEP 1/22/24  Decreased total protein. Normal gamma globulins are decreased. Faint   paraprotein peaks in near-gamma = 0.15 g/dL (previously 0.12 g/dL)   and mid-gamma = 0.17 g/dL (previously 0.13 g/dL).     Kappa FLC 1/22/24  -2.42 mg/dL (494.85 mg/dL on 8/14/21)    SPEP 12/18/23  Decreased total protein. Normal gamma globulins are decreased. Faint   paraprotein peaks in near-gamma = 0.12 g/dL (previously 0.15 g/dL)   and mid-gamma = 0.13 g/dL (previously 0.14 g/dL).     Kappa FLC 12/18/23  - 4.14 mg/dL (494.85 mg/dL on 8/14/21)    SPEP 11/20/23  Decreased total protein. Normal gamma globulins are decreased.   Paraprotein peaks in near-gamma = 0.15 g/dL (previously 0.14 g/dL)   and mid-gamma = 0.14 g/dL (previously 0.18 g/dL).     Kappa FLC 11/20/23  - 4.18 mg/dL (494.85 mg/dL on 8/14/21)    SPEP 10/23/23  Decreased total protein. Normal gamma globulins are decreased.   Paraprotein peaks in near-gamma = 0.14 g/dL (previously, 0.2 g/dL)   and  mid-gamma = 0.18 g/dL (previously, 0.15 g/dL).     Kappa FLC 10/23/23  - 4.19 mg/dL (494.85 mg/dL on 8/14/21)    SPEP 9/25/23  Decreased total protein. Normal gamma globulins are decreased.   Paraprotein peaks in near-gamma = 0.2 g/dL (previously, 0.12 g/dL)   and mid-gamma = 0.15 g/dL (previously, 0.14 g/dL).       SPEP 8/28/23  Decreased total protein. Normal gamma globulins are decreased. There   is a paraprotein band in near-gamma = 0.12 g/dL, previously 0.14   g/dL.         SPEP 7/31/23  There is a paraprotein band in near-gamma = 0.14 g/dL, previously   0.18 g/dL.     The second previously described band is not seen discretely on the   current study.     [place] free light chain 7/31/23  2.72 mg/dL (494.85 mg/dL on 8/14/21)      SPEP 7/3/23 path interpretation   Decreased total protein. Normal gamma globulins are decreased. Two   closely adjacent paraprotein peaks in near-gamma = 0.18 g/dL   (previously 0.18 g/dL) and mid-gamma = 0.14 g/dL (previously 0.21   g/dL).     Serum protein electrophoresis (6/6/23):  Decreased total protein. Normal gamma globulins are decreased. Two   closely adjacent paraprotein peaks in near-gamma = 0.18 g/dL   (previously 0.15 g/dL) and mid-gamma = 0.21 g/dL (previously 0.17   g/dL).     Serum protein electrophoresis (5/9/23):  Decreased total protein. Normal gamma globulins are decreased. Two   closely adjacent paraprotein peaks in near-gamma = 0.15 g/dL   (previously 0.15 g/dL) and mid-gamma = 0.17 g/dL (previously 0.18   g/dL).     Serum protein electrophoresis (4/11/23):  Decreased total protein. Normal gamma globulins are decreased.   Two closely adjacent paraprotein peaks in near-gamma = 0.15 g/dL   (previously 0.15 g/dL) and mid-gamma = 0.18 g/dL (previously 0.15   g/dL).     Serum protein electrophoresis (3/13/23):  Decreased total protein. Normal gamma globulins are decreased.   Two closely adjacent paraprotein peaks in near-gamma = 0.15 g/dL   (previously 0.14 g/dL) and  mid-gamma = 0.15 g/dL (previously, 0.13   g/dL).       Serum protein electrophoresis (2/13/23):  Decreased total protein. Normal gamma globulins are decreased. Two   paraprotein bands are present in near-gamma = 0.14 g/dL (previously   0.15 g/dL) and mid-gamma = 0.13 g/dL (previously 0.19 g/dL).       Serum protein electrophoresis (12/19/22):  Normal total protein. Normal gamma globulins are decreased. Two   paraprotein bands are present in near-gamma = 0.20 g/dL (previously   0.16 g/dL) and mid-gamma = 0.23 g/dL (previously 0.15 g/dL).     Serum protein electrophoresis (9/27/22):  Normal total protein. Normal gamma globulins are decreased.   Two paraprotein bands are identified: 1) Near-gamma = 0.2 g/dL   (previously 0.18 g/dL) 2) Mid-gamma = 0.17 g/dL (previously 0.18   g/dL).     Serum protein electrophoresis (8/30/22):  Decreased total protein. Normal gamma globulins are decreased. Two   paraprotein bands are identified: 1) Near-gamma = 0.18 g/dL   (previously 0.21 g/dL) 2) Mid-gamma = 0.18 g/dL (previously 0.18   g/dL).     Plasma Cell FISH:  The result is within normal limits for 1q duplication, TP53   deletion and IGH rearrangement.         Assessment:       1. 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 Chemotherapy-induced thrombocytopenia    4. Anemia due to antineoplastic chemotherapy    5. Chemotherapy-induced peripheral neuropathy    6. Chronic kidney disease, stage 3b    7. Chronic neoplasm-related pain                Plan:   Kappa light chain myeloma with normal cytogenetics / anemia/neutropenia/thrombocytopenia due to chemotherapy/chemo induced neuropathy  -started Darzalex Faspro, subcutaneous Velcade, oral dexamethasone 8/26/2021  -velcade discontinued after 4 cycles  -last decadron dose was 2/3/22  -on Revlimid 10 mg QOD - start 9/30/2021, will continue   -cont Plavix  -continue acyclovir for shingles prophylaxis  - Labs have been reviewed. Hemoglobin is 8.4 g/dL.  Platelet count is 138 k/uL. Okay to proceed with darzalex faspro tomorrow, 8/21/24.  - follow up SPEP   - return to clinic in 4 weeks in preparation for next cycle of darzalex faspro/dexamethasone/revlimid.     CKD Stage 3b   - Labs have been reviewed. Creatinine  1.7 mg/dL    - encouraged adequate fluid intake   - continue to monitor    Chronic neoplasm-related pain  - no issues, pain at present related to rib fractures  - continue to monitor     Immunodeficiency due to drug therapy  - No fevers, no s/s of acute illness  - No known exposure to covid or other illness  - at risk for pneumonia with rib fractures, spirometer hourly while awake, no current symptoms  - Instructed on good handwashing, avoiding known ill individuals, limiting crowd exposure  - ANC 2.7, okay to treat  - Continue to monitor    CVA history  - taking clopidogrel, continue to monitor    BPH  - continue flomax            High Risk Conditions:    Patient has a condition that poses threat to life and bodily function: kappa light chain myeloma.   Patient is currently on drug therapy requiring intensive monitoring for toxicity: adjuvant chemotherapy: with anemia, concern for immunodeficiency and at risk for pneumonia post rib fractures, ckd, weight loss.       - return to clinic in 4 weeks in preparation for next cycle of darzalex/lenalidomide    Andres Ríos M.D.  Hematology/Oncology  [facility] - [address], [address]  [place] [address]  Phone: [phone]  Fax: [phone]

## 2024-08-20 ENCOUNTER — OFFICE VISIT (OUTPATIENT)
Dept: HEMATOLOGY/ONCOLOGY | Facility: CLINIC | Age: 79
End: 2024-08-20
Payer: MEDICARE

## 2024-08-20 DIAGNOSIS — G62.0 CHEMOTHERAPY-INDUCED PERIPHERAL NEUROPATHY: ICD-10-CM

## 2024-08-20 DIAGNOSIS — T45.1X5A CHEMOTHERAPY-INDUCED THROMBOCYTOPENIA: ICD-10-CM

## 2024-08-20 DIAGNOSIS — D69.59 CHEMOTHERAPY-INDUCED THROMBOCYTOPENIA: ICD-10-CM

## 2024-08-20 DIAGNOSIS — C90.00 KAPPA LIGHT CHAIN MYELOMA: Primary | ICD-10-CM

## 2024-08-20 DIAGNOSIS — T45.1X5A CHEMOTHERAPY-INDUCED PERIPHERAL NEUROPATHY: ICD-10-CM

## 2024-08-20 DIAGNOSIS — Z79.899 IMMUNODEFICIENCY DUE TO DRUG THERAPY: ICD-10-CM

## 2024-08-20 DIAGNOSIS — N18.32 CHRONIC KIDNEY DISEASE, STAGE 3B: ICD-10-CM

## 2024-08-20 DIAGNOSIS — D64.81 ANEMIA DUE TO ANTINEOPLASTIC CHEMOTHERAPY: ICD-10-CM

## 2024-08-20 DIAGNOSIS — G89.3 CHRONIC NEOPLASM-RELATED PAIN: ICD-10-CM

## 2024-08-20 DIAGNOSIS — D84.821 IMMUNODEFICIENCY DUE TO DRUG THERAPY: ICD-10-CM

## 2024-08-20 DIAGNOSIS — T45.1X5A ANEMIA DUE TO ANTINEOPLASTIC CHEMOTHERAPY: ICD-10-CM

## 2024-08-20 PROCEDURE — 99215 OFFICE O/P EST HI 40 MIN: CPT | Mod: 95,,, | Performed by: INTERNAL MEDICINE

## 2024-08-20 PROCEDURE — 1160F RVW MEDS BY RX/DR IN RCRD: CPT | Mod: CPTII,95,, | Performed by: INTERNAL MEDICINE

## 2024-08-20 PROCEDURE — 1159F MED LIST DOCD IN RCRD: CPT | Mod: CPTII,95,, | Performed by: INTERNAL MEDICINE

## 2024-08-20 RX ORDER — DIPHENHYDRAMINE HCL 25 MG
50 CAPSULE ORAL
Status: CANCELLED
Start: 2024-08-21

## 2024-08-20 RX ORDER — HEPARIN 100 UNIT/ML
500 SYRINGE INTRAVENOUS
Status: CANCELLED | OUTPATIENT
Start: 2024-08-21

## 2024-08-20 RX ORDER — DIPHENHYDRAMINE HYDROCHLORIDE 50 MG/ML
50 INJECTION INTRAMUSCULAR; INTRAVENOUS ONCE AS NEEDED
Status: CANCELLED | OUTPATIENT
Start: 2024-08-21

## 2024-08-20 RX ORDER — EPINEPHRINE 0.3 MG/.3ML
0.3 INJECTION SUBCUTANEOUS ONCE AS NEEDED
Status: CANCELLED | OUTPATIENT
Start: 2024-08-21

## 2024-08-20 RX ORDER — FAMOTIDINE 20 MG/1
20 TABLET, FILM COATED ORAL
Status: CANCELLED
Start: 2024-08-21

## 2024-08-20 RX ORDER — SODIUM CHLORIDE 0.9 % (FLUSH) 0.9 %
10 SYRINGE (ML) INJECTION
Status: CANCELLED | OUTPATIENT
Start: 2024-08-21

## 2024-08-20 RX ORDER — ACETAMINOPHEN 325 MG/1
650 TABLET ORAL
Status: CANCELLED | OUTPATIENT
Start: 2024-08-21

## 2024-08-21 ENCOUNTER — TELEPHONE (OUTPATIENT)
Dept: HEMATOLOGY/ONCOLOGY | Facility: CLINIC | Age: 79
End: 2024-08-21
Payer: MEDICARE

## 2024-08-21 NOTE — TELEPHONE ENCOUNTER
----- Message from Andres Ríos MD sent at 8/21/2024  3:18 PM CDT -----  Let [facility] know it's okay for him to be short 1-2 capsules of lenalidomide during current cycle.  Thanks!  Andres  ----- Message -----  From: Jonelle Thornton  Sent: 8/21/2024   3:00 PM CDT  To: [facility] Staff    Type:  Pharmacy Calling to Clarify an RX    Name of Caller:[facility]  Pharmacy Name:[facility], [facility] ([place])   Prescription Name:lenalidomide 10 mg Cap  What do they need to clarify?:please call and confirm that it is okay for pt to be short (1) cap during current cycle as stated by pt. Daughter Bre Aguirre Call Back Number:[phone]   Just ask for the nursing Dept   Additional Information:

## 2024-08-29 DIAGNOSIS — C90.00 KAPPA LIGHT CHAIN MYELOMA: ICD-10-CM

## 2024-08-29 RX ORDER — ACYCLOVIR 200 MG/1
200 CAPSULE ORAL
Qty: 90 CAPSULE | Refills: 3 | Status: SHIPPED | OUTPATIENT
Start: 2024-08-29

## 2024-09-13 ENCOUNTER — TELEPHONE (OUTPATIENT)
Dept: HEMATOLOGY/ONCOLOGY | Facility: CLINIC | Age: 79
End: 2024-09-13
Payer: MEDICARE

## 2024-09-17 RX ORDER — EPINEPHRINE 0.3 MG/.3ML
0.3 INJECTION SUBCUTANEOUS ONCE AS NEEDED
Status: CANCELLED | OUTPATIENT
Start: 2024-09-18

## 2024-09-17 RX ORDER — FAMOTIDINE 20 MG/1
20 TABLET, FILM COATED ORAL
Status: CANCELLED
Start: 2024-09-18

## 2024-09-17 RX ORDER — HEPARIN 100 UNIT/ML
500 SYRINGE INTRAVENOUS
Status: CANCELLED | OUTPATIENT
Start: 2024-09-18

## 2024-09-17 RX ORDER — SODIUM CHLORIDE 0.9 % (FLUSH) 0.9 %
10 SYRINGE (ML) INJECTION
Status: CANCELLED | OUTPATIENT
Start: 2024-09-18

## 2024-09-17 RX ORDER — DIPHENHYDRAMINE HYDROCHLORIDE 50 MG/ML
50 INJECTION INTRAMUSCULAR; INTRAVENOUS ONCE AS NEEDED
Status: CANCELLED | OUTPATIENT
Start: 2024-09-18

## 2024-09-17 RX ORDER — ACETAMINOPHEN 325 MG/1
650 TABLET ORAL
Status: CANCELLED | OUTPATIENT
Start: 2024-09-18

## 2024-09-17 RX ORDER — DIPHENHYDRAMINE HCL 25 MG
50 CAPSULE ORAL
Status: CANCELLED
Start: 2024-09-18

## 2024-09-18 ENCOUNTER — OFFICE VISIT (OUTPATIENT)
Dept: HEMATOLOGY/ONCOLOGY | Facility: CLINIC | Age: 79
End: 2024-09-18
Payer: MEDICARE

## 2024-09-18 VITALS
RESPIRATION RATE: 18 BRPM | SYSTOLIC BLOOD PRESSURE: 120 MMHG | BODY MASS INDEX: 20.59 KG/M2 | HEIGHT: 71 IN | WEIGHT: 147.06 LBS | OXYGEN SATURATION: 98 % | TEMPERATURE: 98 F | DIASTOLIC BLOOD PRESSURE: 48 MMHG | HEART RATE: 58 BPM

## 2024-09-18 DIAGNOSIS — C90.00 KAPPA LIGHT CHAIN MYELOMA: Primary | ICD-10-CM

## 2024-09-18 DIAGNOSIS — T45.1X5A CHEMOTHERAPY-INDUCED THROMBOCYTOPENIA: ICD-10-CM

## 2024-09-18 DIAGNOSIS — T45.1X5A CHEMOTHERAPY-INDUCED PERIPHERAL NEUROPATHY: ICD-10-CM

## 2024-09-18 DIAGNOSIS — I63.9 CEREBROVASCULAR ACCIDENT (CVA), UNSPECIFIED MECHANISM: ICD-10-CM

## 2024-09-18 DIAGNOSIS — N18.32 CHRONIC KIDNEY DISEASE, STAGE 3B: ICD-10-CM

## 2024-09-18 DIAGNOSIS — Z01.818 EXAMINATION PRIOR TO CHEMOTHERAPY: ICD-10-CM

## 2024-09-18 DIAGNOSIS — G89.3 CHRONIC NEOPLASM-RELATED PAIN: ICD-10-CM

## 2024-09-18 DIAGNOSIS — N40.0 BENIGN PROSTATIC HYPERPLASIA, UNSPECIFIED WHETHER LOWER URINARY TRACT SYMPTOMS PRESENT: ICD-10-CM

## 2024-09-18 DIAGNOSIS — D69.59 CHEMOTHERAPY-INDUCED THROMBOCYTOPENIA: ICD-10-CM

## 2024-09-18 DIAGNOSIS — T45.1X5A ANEMIA DUE TO ANTINEOPLASTIC CHEMOTHERAPY: ICD-10-CM

## 2024-09-18 DIAGNOSIS — G62.0 CHEMOTHERAPY-INDUCED PERIPHERAL NEUROPATHY: ICD-10-CM

## 2024-09-18 DIAGNOSIS — Z79.899 IMMUNODEFICIENCY DUE TO DRUG THERAPY: ICD-10-CM

## 2024-09-18 DIAGNOSIS — D64.81 ANEMIA DUE TO ANTINEOPLASTIC CHEMOTHERAPY: ICD-10-CM

## 2024-09-18 DIAGNOSIS — D84.821 IMMUNODEFICIENCY DUE TO DRUG THERAPY: ICD-10-CM

## 2024-09-18 PROCEDURE — 3078F DIAST BP <80 MM HG: CPT | Mod: CPTII,S$GLB,, | Performed by: NURSE PRACTITIONER

## 2024-09-18 PROCEDURE — 1159F MED LIST DOCD IN RCRD: CPT | Mod: CPTII,S$GLB,, | Performed by: NURSE PRACTITIONER

## 2024-09-18 PROCEDURE — 99999 PR PBB SHADOW E&M-EST. PATIENT-LVL III: CPT | Mod: PBBFAC,,, | Performed by: NURSE PRACTITIONER

## 2024-09-18 PROCEDURE — 1101F PT FALLS ASSESS-DOCD LE1/YR: CPT | Mod: CPTII,S$GLB,, | Performed by: NURSE PRACTITIONER

## 2024-09-18 PROCEDURE — 3074F SYST BP LT 130 MM HG: CPT | Mod: CPTII,S$GLB,, | Performed by: NURSE PRACTITIONER

## 2024-09-18 PROCEDURE — 99215 OFFICE O/P EST HI 40 MIN: CPT | Mod: S$GLB,,, | Performed by: NURSE PRACTITIONER

## 2024-09-18 PROCEDURE — 3288F FALL RISK ASSESSMENT DOCD: CPT | Mod: CPTII,S$GLB,, | Performed by: NURSE PRACTITIONER

## 2024-09-18 PROCEDURE — 1160F RVW MEDS BY RX/DR IN RCRD: CPT | Mod: CPTII,S$GLB,, | Performed by: NURSE PRACTITIONER

## 2024-09-18 PROCEDURE — 1126F AMNT PAIN NOTED NONE PRSNT: CPT | Mod: CPTII,S$GLB,, | Performed by: NURSE PRACTITIONER

## 2024-09-18 PROCEDURE — G2211 COMPLEX E/M VISIT ADD ON: HCPCS | Mod: S$GLB,,, | Performed by: NURSE PRACTITIONER

## 2024-09-18 NOTE — PROGRESS NOTES
KPATIENT: Haris Hernandez  MRN: [id number]  DATE: 9/18/2024    Chief Complaint: Kappa Light Chain Myeloma      Subjective:     Pertinent Medical History:     He presented to the ED 08/13/2021 with abnormal labs-CMP showed creatinine 6.6.  Prior CMP from May 2018 showed creatinine 1.2.    Further workup included a free light chain assay where a kappa free light chain level was 494    08/23/2021 bone marrow biopsy showed variable cellularity, 10 to 40% positive for plasma cell dyscrasia with plasma cells representing 30 to 40% of total cellularity.  No increase in blasts. FISH for Plasma Cell Proliferative Disorder - NORMAL    -started Darzalex, Velcade, Decadron 8/26/2021  -On Revlimid 10 mg qod since 9/30/2021  -saw urology,  for BPH causing difficulty in emptying the bladder, he may need cystoscopy and surgery    Interval History:   - he presents for a follow-up appointment for his multiple myeloma 9/18/24  - he is scheduled for darzalex faspro today  - today, he endorses fatigue, intermittent leg swelling. He denies dyspnea upon exertion. Rib pain has resolved. Appetite stable. Weight stable.     Wt Readings from Last 3 Encounters:   09/18/24 66.7 kg (147 lb)   09/18/24 66.7 kg (147 lb 0.8 oz)   08/21/24 66.3 kg (146 lb 4.4 oz)         Past Medical, Surgical, Family, and Social histories reviewed.    Medication and Allergies reviewed.    Review of Systems   Constitutional:  Positive for malaise/fatigue. Negative for fever and weight loss (improved this visit).   HENT:  Negative for sore throat.    Respiratory:  Negative for shortness of breath.    Cardiovascular:  Positive for leg swelling (resolved at present). Negative for chest pain.   Gastrointestinal:  Positive for diarrhea (1-2 days following chemo). Negative for abdominal pain, nausea and vomiting.   Genitourinary:  Negative for dysuria.   Musculoskeletal:  Positive for neck pain. Negative for back pain.   Skin:  Negative for rash.   Neurological:   "Negative for weakness.   Psychiatric/Behavioral:  The patient is not nervous/anxious.        Objective:     Vitals:    09/18/24 1020   BP: (!) 120/48   BP Location: Left arm   Patient Position: Sitting   BP Method: Medium (Automatic)   Pulse: (!) 58   Resp: 18   Temp: 97.8 °F (36.6 °C)   TempSrc: Oral   SpO2: 98%   Weight: 66.7 kg (147 lb 0.8 oz)   Height: 5' 11" (1.803 m)       BMI: Body mass index is 20.51 kg/m².  Deferred due to telemedicine visit.    ECOG 1  Physical Exam  Deferred due to telemedicine visit.    Labs have been reviewed, discussed with pt and daughter.    Lab Results   Component Value Date    WBC 2.64 (L) 09/16/2024    HGB 7.9 (L) 09/16/2024    HCT 22.2 (L) 09/16/2024    MCV 87 09/16/2024     (L) 09/16/2024     CMP  Sodium   Date Value Ref Range Status   09/16/2024 142 136 - 145 mmol/L Final     Potassium   Date Value Ref Range Status   09/16/2024 4.0 3.5 - 5.1 mmol/L Final     Chloride   Date Value Ref Range Status   09/16/2024 111 (H) 95 - 110 mmol/L Final     CO2   Date Value Ref Range Status   09/16/2024 25 23 - 29 mmol/L Final     Glucose   Date Value Ref Range Status   09/16/2024 101 70 - 110 mg/dL Final     BUN   Date Value Ref Range Status   09/16/2024 19 8 - 23 mg/dL Final     Creatinine   Date Value Ref Range Status   09/16/2024 2.0 (H) 0.5 - 1.4 mg/dL Final     Calcium   Date Value Ref Range Status   09/16/2024 8.1 (L) 8.7 - 10.5 mg/dL Final     Total Protein   Date Value Ref Range Status   09/16/2024 5.9 (L) 6.0 - 8.4 g/dL Final     Albumin   Date Value Ref Range Status   09/16/2024 3.3 (L) 3.5 - 5.2 g/dL Final     Total Bilirubin   Date Value Ref Range Status   09/16/2024 0.3 0.1 - 1.0 mg/dL Final     Comment:     For infants and newborns, interpretation of results should be based  on gestational age, weight and in agreement with clinical  observations.    Premature Infant recommended reference ranges:  Up to 24 hours.............<8.0 mg/dL  Up to 48 hours............<12.0 " mg/dL  3-5 days..................<15.0 mg/dL  6-29 days.................<15.0 mg/dL       Alkaline Phosphatase   Date Value Ref Range Status   09/16/2024 70 55 - 135 U/L Final     AST   Date Value Ref Range Status   09/16/2024 10 10 - 40 U/L Final     ALT   Date Value Ref Range Status   09/16/2024 11 10 - 44 U/L Final     Anion Gap   Date Value Ref Range Status   09/16/2024 6 (L) 8 - 16 mmol/L Final     eGFR   Date Value Ref Range Status   09/16/2024 34.0 (A) >60 mL/min/1.73 m^2 Final           SPEP 9/16/24  Decreased total protein. Normal gamma globulins are decreased.   Persistent linear irregularities in near-gamma = 0.12 g/dL   (previously 0.14 g/dL) and mid-gamma = 0.14 g/dL (previously 0.13   g/dL).     SPEP 7/22/24   Decreased total protein. Normal gamma globulins are decreased.   Persistent linear irregularities in near-gamma = 0.15 g/dL   (previously 0.13 g/dL) and mid-gamma = 0.16 g/dL (previously 0.17   g/dL).     SPEP 6/24/24   Decreased total protein. Normal gamma globulins are decreased.   Persistent linear irregularities in near-gamma = 0.13 g/dL   (previously, 0.12 g/dL) and mid-gamma = 0.17 g/dL (previously, 0.11   g/dL).     SPEP 5/28/24  Decreased total protein. Normal gamma globulins are decreased.   Persistent linear irregularities in near-gamma = 0.12 g/dL   (previously 0.16 g/dL) and mid-gamma = 0.11 g/dL (previously 0.16   g/dL).     SPEP 4/22/24, reviewed, discussed with pt and daughter, stable  Decreased total protein. Normal gamma globulins are decreased.   Persistent linear irregularities in near-gamma = 0.16 g/dL   (previously 0.1 g/dL) and mid-gamma = 0.16 g/dL (previously 0.1   g/dL).         SPEP 5/28/24  Decreased total protein. Normal gamma globulins are decreased.   Persistent linear irregularities in near-gamma = 0.12 g/dL   (previously 0.16 g/dL) and mid-gamma = 0.11 g/dL (previously 0.16   g/dL).       SPEP 4/24/24  Decreased total protein. Normal gamma globulins are decreased.    Persistent linear irregularities in near-gamma = 0.16 g/dL   (previously 0.1 g/dL) and mid-gamma = 0.16 g/dL (previously 0.1   g/dL).     SPEP 3/25/24  Decreased total protein. Normal gamma globulins are decreased. Faint   paraprotein peaks in near-gamma = 0.1 g/dL (previously 0.17 g/dL) and   mid-gamma = 0.1 g/dL (previously 0.22 g/dL).     SPEP 2/19/24  Decreased total protein. Normal gamma globulins are decreased. Faint   paraprotein peaks in near-gamma = 0.17 g/dL (previously 0.15 g/dL)   and mid-gamma = 0.22 g/dL (previously 0.17 g/dL).       SPEP 1/22/24  Decreased total protein. Normal gamma globulins are decreased. Faint   paraprotein peaks in near-gamma = 0.15 g/dL (previously 0.12 g/dL)   and mid-gamma = 0.17 g/dL (previously 0.13 g/dL).     Kappa FLC 1/22/24  -2.42 mg/dL (494.85 mg/dL on 8/14/21)    SPEP 12/18/23  Decreased total protein. Normal gamma globulins are decreased. Faint   paraprotein peaks in near-gamma = 0.12 g/dL (previously 0.15 g/dL)   and mid-gamma = 0.13 g/dL (previously 0.14 g/dL).     Kappa FLC 12/18/23  - 4.14 mg/dL (494.85 mg/dL on 8/14/21)    SPEP 11/20/23  Decreased total protein. Normal gamma globulins are decreased.   Paraprotein peaks in near-gamma = 0.15 g/dL (previously 0.14 g/dL)   and mid-gamma = 0.14 g/dL (previously 0.18 g/dL).     Kappa FLC 11/20/23  - 4.18 mg/dL (494.85 mg/dL on 8/14/21)    SPEP 10/23/23  Decreased total protein. Normal gamma globulins are decreased.   Paraprotein peaks in near-gamma = 0.14 g/dL (previously, 0.2 g/dL)   and mid-gamma = 0.18 g/dL (previously, 0.15 g/dL).     Kappa FLC 10/23/23  - 4.19 mg/dL (494.85 mg/dL on 8/14/21)    SPEP 9/25/23  Decreased total protein. Normal gamma globulins are decreased.   Paraprotein peaks in near-gamma = 0.2 g/dL (previously, 0.12 g/dL)   and mid-gamma = 0.15 g/dL (previously, 0.14 g/dL).       SPEP 8/28/23  Decreased total protein. Normal gamma globulins are decreased. There   is a paraprotein band in  near-gamma = 0.12 g/dL, previously 0.14   g/dL.         SPEP 7/31/23  There is a paraprotein band in near-gamma = 0.14 g/dL, previously   0.18 g/dL.     The second previously described band is not seen discretely on the   current study.     [place] free light chain 7/31/23  2.72 mg/dL (494.85 mg/dL on 8/14/21)      SPEP 7/3/23 path interpretation   Decreased total protein. Normal gamma globulins are decreased. Two   closely adjacent paraprotein peaks in near-gamma = 0.18 g/dL   (previously 0.18 g/dL) and mid-gamma = 0.14 g/dL (previously 0.21   g/dL).     Serum protein electrophoresis (6/6/23):  Decreased total protein. Normal gamma globulins are decreased. Two   closely adjacent paraprotein peaks in near-gamma = 0.18 g/dL   (previously 0.15 g/dL) and mid-gamma = 0.21 g/dL (previously 0.17   g/dL).     Serum protein electrophoresis (5/9/23):  Decreased total protein. Normal gamma globulins are decreased. Two   closely adjacent paraprotein peaks in near-gamma = 0.15 g/dL   (previously 0.15 g/dL) and mid-gamma = 0.17 g/dL (previously 0.18   g/dL).     Serum protein electrophoresis (4/11/23):  Decreased total protein. Normal gamma globulins are decreased.   Two closely adjacent paraprotein peaks in near-gamma = 0.15 g/dL   (previously 0.15 g/dL) and mid-gamma = 0.18 g/dL (previously 0.15   g/dL).     Serum protein electrophoresis (3/13/23):  Decreased total protein. Normal gamma globulins are decreased.   Two closely adjacent paraprotein peaks in near-gamma = 0.15 g/dL   (previously 0.14 g/dL) and mid-gamma = 0.15 g/dL (previously, 0.13   g/dL).       Serum protein electrophoresis (2/13/23):  Decreased total protein. Normal gamma globulins are decreased. Two   paraprotein bands are present in near-gamma = 0.14 g/dL (previously   0.15 g/dL) and mid-gamma = 0.13 g/dL (previously 0.19 g/dL).       Serum protein electrophoresis (12/19/22):  Normal total protein. Normal gamma globulins are decreased. Two   paraprotein bands  are present in near-gamma = 0.20 g/dL (previously   0.16 g/dL) and mid-gamma = 0.23 g/dL (previously 0.15 g/dL).     Serum protein electrophoresis (9/27/22):  Normal total protein. Normal gamma globulins are decreased.   Two paraprotein bands are identified: 1) Near-gamma = 0.2 g/dL   (previously 0.18 g/dL) 2) Mid-gamma = 0.17 g/dL (previously 0.18   g/dL).     Serum protein electrophoresis (8/30/22):  Decreased total protein. Normal gamma globulins are decreased. Two   paraprotein bands are identified: 1) Near-gamma = 0.18 g/dL   (previously 0.21 g/dL) 2) Mid-gamma = 0.18 g/dL (previously 0.18   g/dL).     Plasma Cell FISH:  The result is within normal limits for 1q duplication, TP53   deletion and IGH rearrangement.         Assessment:       1. Kappa light chain myeloma    2. Examination prior to chemotherapy    3. Chemotherapy-induced thrombocytopenia    4. Anemia due to antineoplastic chemotherapy    5. 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 Chronic kidney disease, stage 3b    8. Chronic neoplasm-related pain    9. Cerebrovascular accident (CVA), unspecified mechanism    10. Benign prostatic hyperplasia, unspecified whether lower urinary tract symptoms present        Plan:   Kappa light chain myeloma with normal cytogenetics / anemia/neutropenia/thrombocytopenia due to chemotherapy/chemo induced neuropathy  -started Darzalex Faspro, subcutaneous Velcade, oral dexamethasone 8/26/2021  -velcade discontinued after 4 cycles  -last decadron dose was 2/3/22  -on Revlimid 10 mg QOD - start 9/30/2021, will continue   -cont Plavix  -continue acyclovir for shingles prophylaxis  - Labs have been reviewed. Hemoglobin is 7.9 g/dL. Platelet count is 117 k/uL. SPEP stable. Okay to proceed with darzalex faspro tomorrow, 9/18/24.  - return to clinic in 4 weeks in preparation for next cycle of darzalex faspro/dexamethasone/revlimid.     CKD Stage 3b   - Labs have been reviewed.  Creatinine  2.0 mg/dL, gfr 34.0    - encouraged adequate fluid intake   - continue to monitor    Chronic neoplasm-related pain  - no issues, pain at present related to rib fractures  - continue to monitor     Immunodeficiency due to drug therapy  - No fevers, no s/s of acute illness  - No known exposure to covid or other illness  - Instructed on good handwashing, avoiding known ill individuals, limiting crowd exposure  - ANC 1.2, okay to treat  - Continue to monitor    CVA history  - taking clopidogrel, continue to monitor    BPH  - continue flomax           High Risk Conditions:    Patient has a condition that poses threat to life and bodily function: kappa light chain myeloma.   Patient is currently on drug therapy requiring intensive monitoring for toxicity: adjuvant chemotherapy: with anemia, concern for immunodeficiency, ckd, weight loss.     Visit today included increased complexity associated with the care of the episodic problem Kappa light chain myeloma addressed and managing the longitudinal care of the patient due to the serious and/or complex managed problem(s) adjuvant chemo, anemia, immunodeficiency, ckd, weight loss.      - return to clinic in 4 weeks in preparation for next cycle of darzalex/lenalidomide        CORBIN Gibbs  [facility]  Hematology/Oncology  [address] [place] [address]  IVONNE Schmitt  [phone]

## 2024-09-26 DIAGNOSIS — I10 HYPERTENSION, UNSPECIFIED TYPE: ICD-10-CM

## 2024-09-26 RX ORDER — CARVEDILOL 6.25 MG/1
6.25 TABLET ORAL 2 TIMES DAILY
Qty: 60 TABLET | Refills: 11 | Status: SHIPPED | OUTPATIENT
Start: 2024-09-26 | End: 2025-09-26

## 2024-09-30 DIAGNOSIS — C90.00 KAPPA LIGHT CHAIN MYELOMA: ICD-10-CM

## 2024-09-30 RX ORDER — LENALIDOMIDE 10 MG/1
1 CAPSULE ORAL EVERY OTHER DAY
Qty: 14 EACH | Refills: 0 | Status: SHIPPED | OUTPATIENT
Start: 2024-09-30

## 2024-10-10 NOTE — PROGRESS NOTES
KPATIENT: Haris Hernandez  MRN: [id number]  DATE: 10/15/2024    Chief Complaint: Kappa Light Chain Myeloma    Telemedicine visit:  The patient location is: home  The chief complaint leading to consultation is: multiple myeloma    Visit type: audiovisual    Face to Face time with patient: 10 minutes  15 minutes of total time spent on the encounter, which includes face to face time and non-face to face time preparing to see the patient (eg, review of tests), Obtaining and/or reviewing separately obtained history, Documenting clinical information in the electronic or other health record, Independently interpreting results (not separately reported) and communicating results to the patient/family/caregiver, or Care coordination (not separately reported).     Each patient to whom he or she provides medical services by telemedicine is:  (1) informed of the relationship between the physician and patient and the respective role of any other health care provider with respect to management of the patient; and (2) notified that he or she may decline to receive medical services by telemedicine and may withdraw from such care at any time.    Notes: see below    Subjective:     Pertinent Medical History:     He presented to the ED 08/13/2021 with abnormal labs-CMP showed creatinine 6.6.  Prior CMP from May 2018 showed creatinine 1.2.    Further workup included a free light chain assay where a kappa free light chain level was 494    08/23/2021 bone marrow biopsy showed variable cellularity, 10 to 40% positive for plasma cell dyscrasia with plasma cells representing 30 to 40% of total cellularity.  No increase in blasts. FISH for Plasma Cell Proliferative Disorder - NORMAL    -started Darzalex, Velcade, Decadron 8/26/2021  -On Revlimid 10 mg qod since 9/30/2021  -saw urology,  for BPH causing difficulty in emptying the bladder, he may need cystoscopy and surgery    Interval History:   - he presents for a follow-up appointment  for his multiple myeloma   - he is scheduled for darzalex faspro on 10/17/24.  - today, he endorses fatigue, intermittent muscle cramping. He denies dyspnea upon exertion, chest pain, nausea, vomiting.        Past Medical, Surgical, Family, and Social histories reviewed.    Medication and Allergies reviewed.    Review of Systems   Constitutional:  Positive for malaise/fatigue. Negative for fever and weight loss (improved this visit).   HENT:  Negative for sore throat.    Respiratory:  Negative for shortness of breath.    Cardiovascular:  Negative for chest pain.   Gastrointestinal:  Positive for diarrhea (1-2 days following chemo). Negative for abdominal pain, nausea and vomiting.   Genitourinary:  Negative for dysuria.   Musculoskeletal:  Positive for neck pain. Negative for back pain.   Skin:  Negative for rash.   Neurological:  Negative for weakness.   Psychiatric/Behavioral:  The patient is not nervous/anxious.        Objective:     There were no vitals filed for this visit.    BMI: There is no height or weight on file to calculate BMI.  Deferred due to telemedicine visit.    ECOG 1  Physical Exam  Deferred due to telemedicine visit.    Labs have been reviewed, discussed with pt and daughter.    Lab Results   Component Value Date    WBC 2.54 (L) 10/14/2024    HGB 8.1 (L) 10/14/2024    HCT 22.2 (L) 10/14/2024    MCV 86 10/14/2024    PLT 93 (L) 10/14/2024         SPEP (9/16/24):    Decreased total protein. Normal gamma globulins are decreased.   Persistent linear irregularities in near-gamma = 0.12 g/dL   (previously 0.14 g/dL) and mid-gamma = 0.14 g/dL (previously 0.13   g/dL).         SPEP 7/22/24   Decreased total protein. Normal gamma globulins are decreased.   Persistent linear irregularities in near-gamma = 0.15 g/dL   (previously 0.13 g/dL) and mid-gamma = 0.16 g/dL (previously 0.17   g/dL).     SPEP 6/24/24   Decreased total protein. Normal gamma globulins are decreased.   Persistent linear irregularities  in near-gamma = 0.13 g/dL   (previously, 0.12 g/dL) and mid-gamma = 0.17 g/dL (previously, 0.11   g/dL).     SPEP 5/28/24  Decreased total protein. Normal gamma globulins are decreased.   Persistent linear irregularities in near-gamma = 0.12 g/dL   (previously 0.16 g/dL) and mid-gamma = 0.11 g/dL (previously 0.16   g/dL).     SPEP 4/22/24, reviewed, discussed with pt and daughter, stable  Decreased total protein. Normal gamma globulins are decreased.   Persistent linear irregularities in near-gamma = 0.16 g/dL   (previously 0.1 g/dL) and mid-gamma = 0.16 g/dL (previously 0.1   g/dL).         SPEP 5/28/24  Decreased total protein. Normal gamma globulins are decreased.   Persistent linear irregularities in near-gamma = 0.12 g/dL   (previously 0.16 g/dL) and mid-gamma = 0.11 g/dL (previously 0.16   g/dL).       SPEP 4/24/24  Decreased total protein. Normal gamma globulins are decreased.   Persistent linear irregularities in near-gamma = 0.16 g/dL   (previously 0.1 g/dL) and mid-gamma = 0.16 g/dL (previously 0.1   g/dL).     SPEP 3/25/24  Decreased total protein. Normal gamma globulins are decreased. Faint   paraprotein peaks in near-gamma = 0.1 g/dL (previously 0.17 g/dL) and   mid-gamma = 0.1 g/dL (previously 0.22 g/dL).     SPEP 2/19/24  Decreased total protein. Normal gamma globulins are decreased. Faint   paraprotein peaks in near-gamma = 0.17 g/dL (previously 0.15 g/dL)   and mid-gamma = 0.22 g/dL (previously 0.17 g/dL).       SPEP 1/22/24  Decreased total protein. Normal gamma globulins are decreased. Faint   paraprotein peaks in near-gamma = 0.15 g/dL (previously 0.12 g/dL)   and mid-gamma = 0.17 g/dL (previously 0.13 g/dL).     Kappa FLC 1/22/24  -2.42 mg/dL (494.85 mg/dL on 8/14/21)    SPEP 12/18/23  Decreased total protein. Normal gamma globulins are decreased. Faint   paraprotein peaks in near-gamma = 0.12 g/dL (previously 0.15 g/dL)   and mid-gamma = 0.13 g/dL (previously 0.14 g/dL).     Kappa FLC  12/18/23  - 4.14 mg/dL (494.85 mg/dL on 8/14/21)    SPEP 11/20/23  Decreased total protein. Normal gamma globulins are decreased.   Paraprotein peaks in near-gamma = 0.15 g/dL (previously 0.14 g/dL)   and mid-gamma = 0.14 g/dL (previously 0.18 g/dL).     Kappa FLC 11/20/23  - 4.18 mg/dL (494.85 mg/dL on 8/14/21)    SPEP 10/23/23  Decreased total protein. Normal gamma globulins are decreased.   Paraprotein peaks in near-gamma = 0.14 g/dL (previously, 0.2 g/dL)   and mid-gamma = 0.18 g/dL (previously, 0.15 g/dL).     Kappa FLC 10/23/23  - 4.19 mg/dL (494.85 mg/dL on 8/14/21)    SPEP 9/25/23  Decreased total protein. Normal gamma globulins are decreased.   Paraprotein peaks in near-gamma = 0.2 g/dL (previously, 0.12 g/dL)   and mid-gamma = 0.15 g/dL (previously, 0.14 g/dL).       SPEP 8/28/23  Decreased total protein. Normal gamma globulins are decreased. There   is a paraprotein band in near-gamma = 0.12 g/dL, previously 0.14   g/dL.         SPEP 7/31/23  There is a paraprotein band in near-gamma = 0.14 g/dL, previously   0.18 g/dL.     The second previously described band is not seen discretely on the   current study.     [place] free light chain 7/31/23  2.72 mg/dL (494.85 mg/dL on 8/14/21)      SPEP 7/3/23 path interpretation   Decreased total protein. Normal gamma globulins are decreased. Two   closely adjacent paraprotein peaks in near-gamma = 0.18 g/dL   (previously 0.18 g/dL) and mid-gamma = 0.14 g/dL (previously 0.21   g/dL).     Serum protein electrophoresis (6/6/23):  Decreased total protein. Normal gamma globulins are decreased. Two   closely adjacent paraprotein peaks in near-gamma = 0.18 g/dL   (previously 0.15 g/dL) and mid-gamma = 0.21 g/dL (previously 0.17   g/dL).     Serum protein electrophoresis (5/9/23):  Decreased total protein. Normal gamma globulins are decreased. Two   closely adjacent paraprotein peaks in near-gamma = 0.15 g/dL   (previously 0.15 g/dL) and mid-gamma = 0.17 g/dL (previously 0.18    g/dL).     Serum protein electrophoresis (4/11/23):  Decreased total protein. Normal gamma globulins are decreased.   Two closely adjacent paraprotein peaks in near-gamma = 0.15 g/dL   (previously 0.15 g/dL) and mid-gamma = 0.18 g/dL (previously 0.15   g/dL).     Serum protein electrophoresis (3/13/23):  Decreased total protein. Normal gamma globulins are decreased.   Two closely adjacent paraprotein peaks in near-gamma = 0.15 g/dL   (previously 0.14 g/dL) and mid-gamma = 0.15 g/dL (previously, 0.13   g/dL).       Serum protein electrophoresis (2/13/23):  Decreased total protein. Normal gamma globulins are decreased. Two   paraprotein bands are present in near-gamma = 0.14 g/dL (previously   0.15 g/dL) and mid-gamma = 0.13 g/dL (previously 0.19 g/dL).       Serum protein electrophoresis (12/19/22):  Normal total protein. Normal gamma globulins are decreased. Two   paraprotein bands are present in near-gamma = 0.20 g/dL (previously   0.16 g/dL) and mid-gamma = 0.23 g/dL (previously 0.15 g/dL).     Serum protein electrophoresis (9/27/22):  Normal total protein. Normal gamma globulins are decreased.   Two paraprotein bands are identified: 1) Near-gamma = 0.2 g/dL   (previously 0.18 g/dL) 2) Mid-gamma = 0.17 g/dL (previously 0.18   g/dL).     Serum protein electrophoresis (8/30/22):  Decreased total protein. Normal gamma globulins are decreased. Two   paraprotein bands are identified: 1) Near-gamma = 0.18 g/dL   (previously 0.21 g/dL) 2) Mid-gamma = 0.18 g/dL (previously 0.18   g/dL).     Plasma Cell FISH:  The result is within normal limits for 1q duplication, TP53   deletion and IGH rearrangement.         Assessment:       1. 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 Chemotherapy-induced peripheral neuropathy    4. Chemotherapy-induced thrombocytopenia    5. Chronic neoplasm-related pain    6. Chronic kidney disease, stage 3b    7. Anemia due to antineoplastic chemotherapy    8.  Hypocalcemia    9. Chemotherapy-induced neutropenia                Plan:   Kappa light chain myeloma with normal cytogenetics / anemia/neutropenia/thrombocytopenia due to chemotherapy/chemo induced neuropathy   -started Darzalex Faspro, subcutaneous Velcade, oral dexamethasone 8/26/2021  -velcade discontinued after 4 cycles  -last decadron dose was 2/3/22  -on Revlimid 10 mg QOD - start 9/30/2021, will continue   -cont Plavix  -continue acyclovir for shingles prophylaxis  - Labs have been reviewed. Hemoglobin is 8.1 g/dL. Platelet count is 93 k/uL. Absolute neutrophil count is 1.25 k/uL.Okay to proceed with darzalex faspro on 10/17/24.  - follow up SPEP   - return to clinic in 4 weeks in preparation for next cycle of darzalex faspro/dexamethasone/revlimid.     CKD Stage 3b   - Labs have been reviewed. Creatinine 2.1 mg/dL    - I asked him to increase his fluid intake   - continue to monitor    Chronic neoplasm-related pain  - no issues, pain at present related to rib fractures  - continue to monitor     Immunodeficiency due to drug therapy  - No fevers, no s/s of acute illness  - No known exposure to covid or other illness  - at risk for pneumonia with rib fractures, spirometer hourly while awake, no current symptoms  - Instructed on good handwashing, avoiding known ill individuals, limiting crowd exposure  - ANC 1.5 k/uL, okay to treat  - Continue to monitor    CVA history  - taking clopidogrel, continue to monitor    Hypocalcemia  - corrected calcium is 7.4 mg/dL  - I asked him to take tums daily for a week.  - continue to monitor            High Risk Conditions:    Patient has a condition that poses threat to life and bodily function: kappa light chain myeloma.   Patient is currently on drug therapy requiring intensive monitoring for toxicity: adjuvant chemotherapy: with anemia, concern for immunodeficiency and at risk for pneumonia post rib fractures, ckd, weight loss.       - return to clinic in 4 weeks in  preparation for next cycle of darzalex/lenalidomide    Andres Ríos M.D.  Hematology/Oncology  [facility]  [address], [address]  [place] [address]  Phone: [phone]  Fax: [phone]

## 2024-10-14 ENCOUNTER — DOCUMENTATION ONLY (OUTPATIENT)
Dept: HEMATOLOGY/ONCOLOGY | Facility: CLINIC | Age: 79
End: 2024-10-14
Payer: MEDICARE

## 2024-10-15 ENCOUNTER — TELEPHONE (OUTPATIENT)
Dept: HEMATOLOGY/ONCOLOGY | Facility: CLINIC | Age: 79
End: 2024-10-15
Payer: MEDICARE

## 2024-10-15 ENCOUNTER — OFFICE VISIT (OUTPATIENT)
Dept: HEMATOLOGY/ONCOLOGY | Facility: CLINIC | Age: 79
End: 2024-10-15
Payer: MEDICARE

## 2024-10-15 DIAGNOSIS — D70.1 CHEMOTHERAPY-INDUCED NEUTROPENIA: ICD-10-CM

## 2024-10-15 DIAGNOSIS — T45.1X5A ANEMIA DUE TO ANTINEOPLASTIC CHEMOTHERAPY: ICD-10-CM

## 2024-10-15 DIAGNOSIS — G89.3 CHRONIC NEOPLASM-RELATED PAIN: ICD-10-CM

## 2024-10-15 DIAGNOSIS — G62.0 CHEMOTHERAPY-INDUCED PERIPHERAL NEUROPATHY: ICD-10-CM

## 2024-10-15 DIAGNOSIS — T45.1X5A CHEMOTHERAPY-INDUCED NEUTROPENIA: ICD-10-CM

## 2024-10-15 DIAGNOSIS — T45.1X5A CHEMOTHERAPY-INDUCED THROMBOCYTOPENIA: ICD-10-CM

## 2024-10-15 DIAGNOSIS — C90.00 KAPPA LIGHT CHAIN MYELOMA: Primary | ICD-10-CM

## 2024-10-15 DIAGNOSIS — D69.59 CHEMOTHERAPY-INDUCED THROMBOCYTOPENIA: ICD-10-CM

## 2024-10-15 DIAGNOSIS — E83.51 HYPOCALCEMIA: ICD-10-CM

## 2024-10-15 DIAGNOSIS — D84.821 IMMUNODEFICIENCY DUE TO DRUG THERAPY: ICD-10-CM

## 2024-10-15 DIAGNOSIS — Z79.899 IMMUNODEFICIENCY DUE TO DRUG THERAPY: ICD-10-CM

## 2024-10-15 DIAGNOSIS — T45.1X5A CHEMOTHERAPY-INDUCED PERIPHERAL NEUROPATHY: ICD-10-CM

## 2024-10-15 DIAGNOSIS — D64.81 ANEMIA DUE TO ANTINEOPLASTIC CHEMOTHERAPY: ICD-10-CM

## 2024-10-15 DIAGNOSIS — N18.32 CHRONIC KIDNEY DISEASE, STAGE 3B: ICD-10-CM

## 2024-10-15 PROCEDURE — 99215 OFFICE O/P EST HI 40 MIN: CPT | Mod: 95,,, | Performed by: INTERNAL MEDICINE

## 2024-10-15 PROCEDURE — 1159F MED LIST DOCD IN RCRD: CPT | Mod: CPTII,95,, | Performed by: INTERNAL MEDICINE

## 2024-10-15 PROCEDURE — 1160F RVW MEDS BY RX/DR IN RCRD: CPT | Mod: CPTII,95,, | Performed by: INTERNAL MEDICINE

## 2024-10-15 RX ORDER — ACETAMINOPHEN 325 MG/1
650 TABLET ORAL
Status: CANCELLED | OUTPATIENT
Start: 2024-10-16

## 2024-10-15 RX ORDER — FAMOTIDINE 20 MG/1
20 TABLET, FILM COATED ORAL
Status: CANCELLED
Start: 2024-10-16

## 2024-10-15 RX ORDER — DIPHENHYDRAMINE HCL 25 MG
50 CAPSULE ORAL
Status: CANCELLED
Start: 2024-10-16

## 2024-10-15 RX ORDER — DIPHENHYDRAMINE HYDROCHLORIDE 50 MG/ML
50 INJECTION INTRAMUSCULAR; INTRAVENOUS ONCE AS NEEDED
Status: CANCELLED | OUTPATIENT
Start: 2024-10-16

## 2024-10-15 RX ORDER — SODIUM CHLORIDE 0.9 % (FLUSH) 0.9 %
10 SYRINGE (ML) INJECTION
Status: CANCELLED | OUTPATIENT
Start: 2024-10-16

## 2024-10-15 RX ORDER — EPINEPHRINE 0.3 MG/.3ML
0.3 INJECTION SUBCUTANEOUS ONCE AS NEEDED
Status: CANCELLED | OUTPATIENT
Start: 2024-10-16

## 2024-10-15 RX ORDER — HEPARIN 100 UNIT/ML
500 SYRINGE INTRAVENOUS
Status: CANCELLED | OUTPATIENT
Start: 2024-10-16

## 2024-10-30 ENCOUNTER — PATIENT MESSAGE (OUTPATIENT)
Dept: HEMATOLOGY/ONCOLOGY | Facility: CLINIC | Age: 79
End: 2024-10-30
Payer: MEDICARE

## 2024-10-30 DIAGNOSIS — I10 HYPERTENSION, UNSPECIFIED TYPE: ICD-10-CM

## 2024-10-30 RX ORDER — CARVEDILOL 6.25 MG/1
6.25 TABLET ORAL 2 TIMES DAILY
Qty: 60 TABLET | Refills: 11 | Status: SHIPPED | OUTPATIENT
Start: 2024-10-30 | End: 2025-10-30

## 2024-11-01 DIAGNOSIS — C90.00 KAPPA LIGHT CHAIN MYELOMA: ICD-10-CM

## 2024-11-01 RX ORDER — LENALIDOMIDE 10 MG/1
1 CAPSULE ORAL EVERY OTHER DAY
Qty: 14 EACH | Refills: 0 | Status: SHIPPED | OUTPATIENT
Start: 2024-11-01

## 2024-11-10 NOTE — PROGRESS NOTES
KPATIENT: Haris Hernandez  MRN: [id number]  DATE: 11/13/2024    Chief Complaint: Kappa Light Chain Myeloma    Telemedicine visit:  The patient location is: home  The chief complaint leading to consultation is: multiple myeloma    Visit type: audiovisual    Face to Face time with patient: 10 minutes  15 minutes of total time spent on the encounter, which includes face to face time and non-face to face time preparing to see the patient (eg, review of tests), Obtaining and/or reviewing separately obtained history, Documenting clinical information in the electronic or other health record, Independently interpreting results (not separately reported) and communicating results to the patient/family/caregiver, or Care coordination (not separately reported).     Each patient to whom he or she provides medical services by telemedicine is:  (1) informed of the relationship between the physician and patient and the respective role of any other health care provider with respect to management of the patient; and (2) notified that he or she may decline to receive medical services by telemedicine and may withdraw from such care at any time.    Notes: see below    Subjective:     Pertinent Medical History:     He presented to the ED 08/13/2021 with abnormal labs-CMP showed creatinine 6.6.  Prior CMP from May 2018 showed creatinine 1.2.    Further workup included a free light chain assay where a kappa free light chain level was 494    08/23/2021 bone marrow biopsy showed variable cellularity, 10 to 40% positive for plasma cell dyscrasia with plasma cells representing 30 to 40% of total cellularity.  No increase in blasts. FISH for Plasma Cell Proliferative Disorder - NORMAL    -started Darzalex, Velcade, Decadron 8/26/2021  -On Revlimid 10 mg qod since 9/30/2021  -saw urology,  for BPH causing difficulty in emptying the bladder, he may need cystoscopy and surgery    Interval History:   - he presents for a follow-up appointment  for his multiple myeloma   - he is scheduled for darzalex faspro on 11/13/24  - today, he endorses fatigue, intermittent muscle cramping. He denies dyspnea upon exertion, chest pain, nausea, vomiting.        Past Medical, Surgical, Family, and Social histories reviewed.    Medication and Allergies reviewed.    Review of Systems   Constitutional:  Positive for malaise/fatigue. Negative for fever and weight loss (improved this visit).   HENT:  Negative for sore throat.    Respiratory:  Negative for shortness of breath.    Cardiovascular:  Negative for chest pain.   Gastrointestinal:  Positive for diarrhea (1-2 days following chemo). Negative for abdominal pain, nausea and vomiting.   Genitourinary:  Negative for dysuria.   Musculoskeletal:  Positive for neck pain. Negative for back pain.   Skin:  Negative for rash.   Neurological:  Negative for weakness.   Psychiatric/Behavioral:  The patient is not nervous/anxious.        Objective:     There were no vitals filed for this visit.    BMI: There is no height or weight on file to calculate BMI.  Deferred due to telemedicine visit.    ECOG 1  Physical Exam  Deferred due to telemedicine visit.    Labs have been reviewed, discussed with pt and daughter.    Lab Results   Component Value Date    WBC 2.55 (L) 11/08/2024    HGB 8.5 (L) 11/08/2024    HCT 23.9 (L) 11/08/2024    MCV 88 11/08/2024     (L) 11/08/2024       SPEP (11/8/24):  Normal gamma globulins are decreased. Two paraproteins:   1.  near-gamma = 0.13 g/dL (previously 0.11 g/dL)   2.  mid-gamma = 0.15 g/dL (previously 0.13 g/dL).     SPEP (9/16/24):    Decreased total protein. Normal gamma globulins are decreased.   Persistent linear irregularities in near-gamma = 0.12 g/dL   (previously 0.14 g/dL) and mid-gamma = 0.14 g/dL (previously 0.13   g/dL).         SPEP 7/22/24   Decreased total protein. Normal gamma globulins are decreased.   Persistent linear irregularities in near-gamma = 0.15 g/dL   (previously  0.13 g/dL) and mid-gamma = 0.16 g/dL (previously 0.17   g/dL).     SPEP 6/24/24   Decreased total protein. Normal gamma globulins are decreased.   Persistent linear irregularities in near-gamma = 0.13 g/dL   (previously, 0.12 g/dL) and mid-gamma = 0.17 g/dL (previously, 0.11   g/dL).     SPEP 5/28/24  Decreased total protein. Normal gamma globulins are decreased.   Persistent linear irregularities in near-gamma = 0.12 g/dL   (previously 0.16 g/dL) and mid-gamma = 0.11 g/dL (previously 0.16   g/dL).     SPEP 4/22/24, reviewed, discussed with pt and daughter, stable  Decreased total protein. Normal gamma globulins are decreased.   Persistent linear irregularities in near-gamma = 0.16 g/dL   (previously 0.1 g/dL) and mid-gamma = 0.16 g/dL (previously 0.1   g/dL).         SPEP 5/28/24  Decreased total protein. Normal gamma globulins are decreased.   Persistent linear irregularities in near-gamma = 0.12 g/dL   (previously 0.16 g/dL) and mid-gamma = 0.11 g/dL (previously 0.16   g/dL).       SPEP 4/24/24  Decreased total protein. Normal gamma globulins are decreased.   Persistent linear irregularities in near-gamma = 0.16 g/dL   (previously 0.1 g/dL) and mid-gamma = 0.16 g/dL (previously 0.1   g/dL).     SPEP 3/25/24  Decreased total protein. Normal gamma globulins are decreased. Faint   paraprotein peaks in near-gamma = 0.1 g/dL (previously 0.17 g/dL) and   mid-gamma = 0.1 g/dL (previously 0.22 g/dL).     SPEP 2/19/24  Decreased total protein. Normal gamma globulins are decreased. Faint   paraprotein peaks in near-gamma = 0.17 g/dL (previously 0.15 g/dL)   and mid-gamma = 0.22 g/dL (previously 0.17 g/dL).       SPEP 1/22/24  Decreased total protein. Normal gamma globulins are decreased. Faint   paraprotein peaks in near-gamma = 0.15 g/dL (previously 0.12 g/dL)   and mid-gamma = 0.17 g/dL (previously 0.13 g/dL).     Kappa FLC 1/22/24  -2.42 mg/dL (494.85 mg/dL on 8/14/21)    SPEP 12/18/23  Decreased total protein. Normal  gamma globulins are decreased. Faint   paraprotein peaks in near-gamma = 0.12 g/dL (previously 0.15 g/dL)   and mid-gamma = 0.13 g/dL (previously 0.14 g/dL).     Kappa FLC 12/18/23  - 4.14 mg/dL (494.85 mg/dL on 8/14/21)    SPEP 11/20/23  Decreased total protein. Normal gamma globulins are decreased.   Paraprotein peaks in near-gamma = 0.15 g/dL (previously 0.14 g/dL)   and mid-gamma = 0.14 g/dL (previously 0.18 g/dL).     Kappa FLC 11/20/23  - 4.18 mg/dL (494.85 mg/dL on 8/14/21)    SPEP 10/23/23  Decreased total protein. Normal gamma globulins are decreased.   Paraprotein peaks in near-gamma = 0.14 g/dL (previously, 0.2 g/dL)   and mid-gamma = 0.18 g/dL (previously, 0.15 g/dL).     Kappa FLC 10/23/23  - 4.19 mg/dL (494.85 mg/dL on 8/14/21)    SPEP 9/25/23  Decreased total protein. Normal gamma globulins are decreased.   Paraprotein peaks in near-gamma = 0.2 g/dL (previously, 0.12 g/dL)   and mid-gamma = 0.15 g/dL (previously, 0.14 g/dL).       SPEP 8/28/23  Decreased total protein. Normal gamma globulins are decreased. There   is a paraprotein band in near-gamma = 0.12 g/dL, previously 0.14   g/dL.         SPEP 7/31/23  There is a paraprotein band in near-gamma = 0.14 g/dL, previously   0.18 g/dL.     The second previously described band is not seen discretely on the   current study.     [place] free light chain 7/31/23  2.72 mg/dL (494.85 mg/dL on 8/14/21)      SPEP 7/3/23 path interpretation   Decreased total protein. Normal gamma globulins are decreased. Two   closely adjacent paraprotein peaks in near-gamma = 0.18 g/dL   (previously 0.18 g/dL) and mid-gamma = 0.14 g/dL (previously 0.21   g/dL).     Serum protein electrophoresis (6/6/23):  Decreased total protein. Normal gamma globulins are decreased. Two   closely adjacent paraprotein peaks in near-gamma = 0.18 g/dL   (previously 0.15 g/dL) and mid-gamma = 0.21 g/dL (previously 0.17   g/dL).     Serum protein electrophoresis (5/9/23):  Decreased total protein.  Normal gamma globulins are decreased. Two   closely adjacent paraprotein peaks in near-gamma = 0.15 g/dL   (previously 0.15 g/dL) and mid-gamma = 0.17 g/dL (previously 0.18   g/dL).     Serum protein electrophoresis (4/11/23):  Decreased total protein. Normal gamma globulins are decreased.   Two closely adjacent paraprotein peaks in near-gamma = 0.15 g/dL   (previously 0.15 g/dL) and mid-gamma = 0.18 g/dL (previously 0.15   g/dL).     Serum protein electrophoresis (3/13/23):  Decreased total protein. Normal gamma globulins are decreased.   Two closely adjacent paraprotein peaks in near-gamma = 0.15 g/dL   (previously 0.14 g/dL) and mid-gamma = 0.15 g/dL (previously, 0.13   g/dL).       Serum protein electrophoresis (2/13/23):  Decreased total protein. Normal gamma globulins are decreased. Two   paraprotein bands are present in near-gamma = 0.14 g/dL (previously   0.15 g/dL) and mid-gamma = 0.13 g/dL (previously 0.19 g/dL).       Serum protein electrophoresis (12/19/22):  Normal total protein. Normal gamma globulins are decreased. Two   paraprotein bands are present in near-gamma = 0.20 g/dL (previously   0.16 g/dL) and mid-gamma = 0.23 g/dL (previously 0.15 g/dL).     Serum protein electrophoresis (9/27/22):  Normal total protein. Normal gamma globulins are decreased.   Two paraprotein bands are identified: 1) Near-gamma = 0.2 g/dL   (previously 0.18 g/dL) 2) Mid-gamma = 0.17 g/dL (previously 0.18   g/dL).     Serum protein electrophoresis (8/30/22):  Decreased total protein. Normal gamma globulins are decreased. Two   paraprotein bands are identified: 1) Near-gamma = 0.18 g/dL   (previously 0.21 g/dL) 2) Mid-gamma = 0.18 g/dL (previously 0.18   g/dL).     Plasma Cell FISH:  The result is within normal limits for 1q duplication, TP53   deletion and IGH rearrangement.         Assessment:       1. 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 Chemotherapy-induced peripheral neuropathy     4. Chemotherapy-induced thrombocytopenia    5. Chronic neoplasm-related pain    6. Chronic kidney disease, stage 3b    7. Anemia due to antineoplastic chemotherapy    8. Hypocalcemia    9. Chemotherapy-induced neutropenia                Plan:   Kappa light chain myeloma with normal cytogenetics / anemia/neutropenia/thrombocytopenia due to chemotherapy/chemo induced neuropathy   -started Darzalex Faspro, subcutaneous Velcade, oral dexamethasone 8/26/2021  -velcade discontinued after 4 cycles  -last decadron dose was 2/3/22  -on Revlimid 10 mg QOD - start 9/30/2021, will continue   -cont Plavix  -continue acyclovir for shingles prophylaxis  - Labs have been reviewed. Hemoglobin is 8.5 g/dL. Platelet count is 114 k/uL. Paraprotein is stable. Absolute neutrophil count is 1.33 k/uL.Okay to proceed with darzalex faspro on 11/13/24  - return to clinic in 4 weeks in preparation for next cycle of darzalex faspro/dexamethasone/revlimid.     CKD Stage 3b   - Labs have been reviewed. Creatinine 1.9 mg/dL     - continue to monitor    Chronic neoplasm-related pain  - no issues, pain at present related to rib fractures  - continue to monitor     Immunodeficiency due to drug therapy  - No fevers, no s/s of acute illness  - No known exposure to covid or other illness  - at risk for pneumonia with rib fractures, spirometer hourly while awake, no current symptoms  - Instructed on good handwashing, avoiding known ill individuals, limiting crowd exposure  - ANC 1.33 k/uL, okay to treat  - Continue to monitor    CVA history  - taking clopidogrel, continue to monitor    Hypocalcemia  - corrected calcium is 7.6 mg/dL  - continue daily tums   - continue to monitor            High Risk Conditions:    Patient has a condition that poses threat to life and bodily function: kappa light chain myeloma.   Patient is currently on drug therapy requiring intensive monitoring for toxicity: adjuvant chemotherapy: with anemia, concern for  immunodeficiency and at risk for pneumonia post rib fractures, ckd, weight loss.       - return to clinic in 4 weeks in preparation for next cycle of darzalex/lenalidomide    Andres Ríos M.D.  Hematology/Oncology  [facility] - [address], [address]  [place] [address]  Phone: [phone]  Fax: [phone]

## 2024-11-13 ENCOUNTER — OFFICE VISIT (OUTPATIENT)
Dept: HEMATOLOGY/ONCOLOGY | Facility: CLINIC | Age: 79
End: 2024-11-13
Payer: MEDICARE

## 2024-11-13 DIAGNOSIS — D69.59 CHEMOTHERAPY-INDUCED THROMBOCYTOPENIA: ICD-10-CM

## 2024-11-13 DIAGNOSIS — E83.51 HYPOCALCEMIA: ICD-10-CM

## 2024-11-13 DIAGNOSIS — N18.32 CHRONIC KIDNEY DISEASE, STAGE 3B: ICD-10-CM

## 2024-11-13 DIAGNOSIS — G62.0 CHEMOTHERAPY-INDUCED PERIPHERAL NEUROPATHY: ICD-10-CM

## 2024-11-13 DIAGNOSIS — D64.81 ANEMIA DUE TO ANTINEOPLASTIC CHEMOTHERAPY: ICD-10-CM

## 2024-11-13 DIAGNOSIS — T45.1X5A CHEMOTHERAPY-INDUCED NEUTROPENIA: ICD-10-CM

## 2024-11-13 DIAGNOSIS — T45.1X5A CHEMOTHERAPY-INDUCED THROMBOCYTOPENIA: ICD-10-CM

## 2024-11-13 DIAGNOSIS — Z79.899 IMMUNODEFICIENCY DUE TO DRUG THERAPY: ICD-10-CM

## 2024-11-13 DIAGNOSIS — T45.1X5A ANEMIA DUE TO ANTINEOPLASTIC CHEMOTHERAPY: ICD-10-CM

## 2024-11-13 DIAGNOSIS — G89.3 CHRONIC NEOPLASM-RELATED PAIN: ICD-10-CM

## 2024-11-13 DIAGNOSIS — D84.821 IMMUNODEFICIENCY DUE TO DRUG THERAPY: ICD-10-CM

## 2024-11-13 DIAGNOSIS — C90.00 KAPPA LIGHT CHAIN MYELOMA: Primary | ICD-10-CM

## 2024-11-13 DIAGNOSIS — D70.1 CHEMOTHERAPY-INDUCED NEUTROPENIA: ICD-10-CM

## 2024-11-13 DIAGNOSIS — T45.1X5A CHEMOTHERAPY-INDUCED PERIPHERAL NEUROPATHY: ICD-10-CM

## 2024-11-13 PROCEDURE — 99215 OFFICE O/P EST HI 40 MIN: CPT | Mod: 95,,, | Performed by: INTERNAL MEDICINE

## 2024-11-13 PROCEDURE — 1160F RVW MEDS BY RX/DR IN RCRD: CPT | Mod: CPTII,95,, | Performed by: INTERNAL MEDICINE

## 2024-11-13 PROCEDURE — 1159F MED LIST DOCD IN RCRD: CPT | Mod: CPTII,95,, | Performed by: INTERNAL MEDICINE

## 2024-11-13 RX ORDER — DIPHENHYDRAMINE HYDROCHLORIDE 50 MG/ML
50 INJECTION INTRAMUSCULAR; INTRAVENOUS ONCE AS NEEDED
Status: CANCELLED | OUTPATIENT
Start: 2024-11-13

## 2024-11-13 RX ORDER — HEPARIN 100 UNIT/ML
500 SYRINGE INTRAVENOUS
Status: CANCELLED | OUTPATIENT
Start: 2024-11-13

## 2024-11-13 RX ORDER — ACETAMINOPHEN 325 MG/1
650 TABLET ORAL
Status: CANCELLED | OUTPATIENT
Start: 2024-11-13

## 2024-11-13 RX ORDER — DIPHENHYDRAMINE HCL 25 MG
50 CAPSULE ORAL
Status: CANCELLED
Start: 2024-11-13

## 2024-11-13 RX ORDER — SODIUM CHLORIDE 0.9 % (FLUSH) 0.9 %
10 SYRINGE (ML) INJECTION
Status: CANCELLED | OUTPATIENT
Start: 2024-11-13

## 2024-11-13 RX ORDER — EPINEPHRINE 0.3 MG/.3ML
0.3 INJECTION SUBCUTANEOUS ONCE AS NEEDED
Status: CANCELLED | OUTPATIENT
Start: 2024-11-13

## 2024-11-13 RX ORDER — FAMOTIDINE 20 MG/1
20 TABLET, FILM COATED ORAL
Status: CANCELLED
Start: 2024-11-13

## 2024-11-14 DIAGNOSIS — I10 HYPERTENSION, UNSPECIFIED TYPE: ICD-10-CM

## 2024-11-14 RX ORDER — NIFEDIPINE 30 MG/1
30 TABLET, EXTENDED RELEASE ORAL DAILY
Qty: 90 TABLET | Refills: 3 | Status: SHIPPED | OUTPATIENT
Start: 2024-11-14 | End: 2025-11-14

## 2024-12-05 DIAGNOSIS — C90.00 KAPPA LIGHT CHAIN MYELOMA: ICD-10-CM

## 2024-12-05 RX ORDER — LENALIDOMIDE 10 MG/1
CAPSULE ORAL
Qty: 14 CAPSULE | Refills: 0 | Status: SHIPPED | OUTPATIENT
Start: 2024-12-05

## 2024-12-08 NOTE — PROGRESS NOTES
KPATIENT: Haris Hernandez  MRN: [id number]  DATE: 12/11/2024    Chief Complaint: Kappa Light Chain Myeloma    Telemedicine visit:  The patient location is: home  The chief complaint leading to consultation is: multiple myeloma    Visit type: audiovisual    Face to Face time with patient: 10 minutes  15 minutes of total time spent on the encounter, which includes face to face time and non-face to face time preparing to see the patient (eg, review of tests), Obtaining and/or reviewing separately obtained history, Documenting clinical information in the electronic or other health record, Independently interpreting results (not separately reported) and communicating results to the patient/family/caregiver, or Care coordination (not separately reported).     Each patient to whom he or she provides medical services by telemedicine is:  (1) informed of the relationship between the physician and patient and the respective role of any other health care provider with respect to management of the patient; and (2) notified that he or she may decline to receive medical services by telemedicine and may withdraw from such care at any time.    Notes: see below    Subjective:     Pertinent Medical History:     He presented to the ED 08/13/2021 with abnormal labs-CMP showed creatinine 6.6.  Prior CMP from May 2018 showed creatinine 1.2.    Further workup included a free light chain assay where a kappa free light chain level was 494    08/23/2021 bone marrow biopsy showed variable cellularity, 10 to 40% positive for plasma cell dyscrasia with plasma cells representing 30 to 40% of total cellularity.  No increase in blasts. FISH for Plasma Cell Proliferative Disorder - NORMAL    -started Darzalex, Velcade, Decadron 8/26/2021  -On Revlimid 10 mg qod since 9/30/2021  -saw urology,  for BPH causing difficulty in emptying the bladder, he may need cystoscopy and surgery    Interval History:   - he presents for a follow-up appointment  for his multiple myeloma   - he is scheduled for darzalex faspro on 12/12/24  - today, he endorses fatigue, intermittent muscle cramping. He denies dyspnea upon exertion, chest pain, nausea, vomiting.        Past Medical, Surgical, Family, and Social histories reviewed.    Medication and Allergies reviewed.    Review of Systems   Constitutional:  Positive for malaise/fatigue. Negative for fever and weight loss (improved this visit).   HENT:  Negative for sore throat.    Respiratory:  Negative for shortness of breath.    Cardiovascular:  Negative for chest pain.   Gastrointestinal:  Positive for diarrhea (1-2 days following chemo). Negative for abdominal pain, nausea and vomiting.   Genitourinary:  Negative for dysuria.   Musculoskeletal:  Positive for neck pain. Negative for back pain.   Skin:  Negative for rash.   Neurological:  Negative for weakness.   Psychiatric/Behavioral:  The patient is not nervous/anxious.        Objective:     There were no vitals filed for this visit.    BMI: There is no height or weight on file to calculate BMI.  Deferred due to telemedicine visit.    ECOG 1  Physical Exam  Deferred due to telemedicine visit.    Labs have been reviewed, discussed with pt and daughter.    Lab Results   Component Value Date    WBC 3.05 (L) 12/06/2024    HGB 8.1 (L) 12/06/2024    HCT 22.8 (L) 12/06/2024    MCV 88 12/06/2024    PLT 99 (L) 12/06/2024         SPEP (12/6/24):    Decreased total protein. Normal gamma globulins are decreased.   Two paraproteins: 1.  near-gamma = 0.14 g/dL (previously 0.13 g/dL)   2.  mid-gamma = 0.16 g/dL (previously 0.15 g/dL).     SPEP (11/8/24):  Normal gamma globulins are decreased. Two paraproteins:   1.  near-gamma = 0.13 g/dL (previously 0.11 g/dL)   2.  mid-gamma = 0.15 g/dL (previously 0.13 g/dL).     SPEP (9/16/24):    Decreased total protein. Normal gamma globulins are decreased.   Persistent linear irregularities in near-gamma = 0.12 g/dL   (previously 0.14 g/dL) and  mid-gamma = 0.14 g/dL (previously 0.13   g/dL).         SPEP 7/22/24   Decreased total protein. Normal gamma globulins are decreased.   Persistent linear irregularities in near-gamma = 0.15 g/dL   (previously 0.13 g/dL) and mid-gamma = 0.16 g/dL (previously 0.17   g/dL).     SPEP 6/24/24   Decreased total protein. Normal gamma globulins are decreased.   Persistent linear irregularities in near-gamma = 0.13 g/dL   (previously, 0.12 g/dL) and mid-gamma = 0.17 g/dL (previously, 0.11   g/dL).     SPEP 5/28/24  Decreased total protein. Normal gamma globulins are decreased.   Persistent linear irregularities in near-gamma = 0.12 g/dL   (previously 0.16 g/dL) and mid-gamma = 0.11 g/dL (previously 0.16   g/dL).     SPEP 4/22/24, reviewed, discussed with pt and daughter, stable  Decreased total protein. Normal gamma globulins are decreased.   Persistent linear irregularities in near-gamma = 0.16 g/dL   (previously 0.1 g/dL) and mid-gamma = 0.16 g/dL (previously 0.1   g/dL).         SPEP 5/28/24  Decreased total protein. Normal gamma globulins are decreased.   Persistent linear irregularities in near-gamma = 0.12 g/dL   (previously 0.16 g/dL) and mid-gamma = 0.11 g/dL (previously 0.16   g/dL).       SPEP 4/24/24  Decreased total protein. Normal gamma globulins are decreased.   Persistent linear irregularities in near-gamma = 0.16 g/dL   (previously 0.1 g/dL) and mid-gamma = 0.16 g/dL (previously 0.1   g/dL).     SPEP 3/25/24  Decreased total protein. Normal gamma globulins are decreased. Faint   paraprotein peaks in near-gamma = 0.1 g/dL (previously 0.17 g/dL) and   mid-gamma = 0.1 g/dL (previously 0.22 g/dL).     SPEP 2/19/24  Decreased total protein. Normal gamma globulins are decreased. Faint   paraprotein peaks in near-gamma = 0.17 g/dL (previously 0.15 g/dL)   and mid-gamma = 0.22 g/dL (previously 0.17 g/dL).       SPEP 1/22/24  Decreased total protein. Normal gamma globulins are decreased. Faint   paraprotein peaks in  near-gamma = 0.15 g/dL (previously 0.12 g/dL)   and mid-gamma = 0.17 g/dL (previously 0.13 g/dL).     Kappa FLC 1/22/24  -2.42 mg/dL (494.85 mg/dL on 8/14/21)    SPEP 12/18/23  Decreased total protein. Normal gamma globulins are decreased. Faint   paraprotein peaks in near-gamma = 0.12 g/dL (previously 0.15 g/dL)   and mid-gamma = 0.13 g/dL (previously 0.14 g/dL).     Kappa FLC 12/18/23  - 4.14 mg/dL (494.85 mg/dL on 8/14/21)    SPEP 11/20/23  Decreased total protein. Normal gamma globulins are decreased.   Paraprotein peaks in near-gamma = 0.15 g/dL (previously 0.14 g/dL)   and mid-gamma = 0.14 g/dL (previously 0.18 g/dL).     Kappa FLC 11/20/23  - 4.18 mg/dL (494.85 mg/dL on 8/14/21)    SPEP 10/23/23  Decreased total protein. Normal gamma globulins are decreased.   Paraprotein peaks in near-gamma = 0.14 g/dL (previously, 0.2 g/dL)   and mid-gamma = 0.18 g/dL (previously, 0.15 g/dL).     Kappa FLC 10/23/23  - 4.19 mg/dL (494.85 mg/dL on 8/14/21)    SPEP 9/25/23  Decreased total protein. Normal gamma globulins are decreased.   Paraprotein peaks in near-gamma = 0.2 g/dL (previously, 0.12 g/dL)   and mid-gamma = 0.15 g/dL (previously, 0.14 g/dL).       SPEP 8/28/23  Decreased total protein. Normal gamma globulins are decreased. There   is a paraprotein band in near-gamma = 0.12 g/dL, previously 0.14   g/dL.         SPEP 7/31/23  There is a paraprotein band in near-gamma = 0.14 g/dL, previously   0.18 g/dL.     The second previously described band is not seen discretely on the   current study.     [place] free light chain 7/31/23  2.72 mg/dL (494.85 mg/dL on 8/14/21)      SPEP 7/3/23 path interpretation   Decreased total protein. Normal gamma globulins are decreased. Two   closely adjacent paraprotein peaks in near-gamma = 0.18 g/dL   (previously 0.18 g/dL) and mid-gamma = 0.14 g/dL (previously 0.21   g/dL).     Serum protein electrophoresis (6/6/23):  Decreased total protein. Normal gamma globulins are decreased. Two    closely adjacent paraprotein peaks in near-gamma = 0.18 g/dL   (previously 0.15 g/dL) and mid-gamma = 0.21 g/dL (previously 0.17   g/dL).     Serum protein electrophoresis (5/9/23):  Decreased total protein. Normal gamma globulins are decreased. Two   closely adjacent paraprotein peaks in near-gamma = 0.15 g/dL   (previously 0.15 g/dL) and mid-gamma = 0.17 g/dL (previously 0.18   g/dL).     Serum protein electrophoresis (4/11/23):  Decreased total protein. Normal gamma globulins are decreased.   Two closely adjacent paraprotein peaks in near-gamma = 0.15 g/dL   (previously 0.15 g/dL) and mid-gamma = 0.18 g/dL (previously 0.15   g/dL).     Serum protein electrophoresis (3/13/23):  Decreased total protein. Normal gamma globulins are decreased.   Two closely adjacent paraprotein peaks in near-gamma = 0.15 g/dL   (previously 0.14 g/dL) and mid-gamma = 0.15 g/dL (previously, 0.13   g/dL).       Serum protein electrophoresis (2/13/23):  Decreased total protein. Normal gamma globulins are decreased. Two   paraprotein bands are present in near-gamma = 0.14 g/dL (previously   0.15 g/dL) and mid-gamma = 0.13 g/dL (previously 0.19 g/dL).       Serum protein electrophoresis (12/19/22):  Normal total protein. Normal gamma globulins are decreased. Two   paraprotein bands are present in near-gamma = 0.20 g/dL (previously   0.16 g/dL) and mid-gamma = 0.23 g/dL (previously 0.15 g/dL).     Serum protein electrophoresis (9/27/22):  Normal total protein. Normal gamma globulins are decreased.   Two paraprotein bands are identified: 1) Near-gamma = 0.2 g/dL   (previously 0.18 g/dL) 2) Mid-gamma = 0.17 g/dL (previously 0.18   g/dL).     Serum protein electrophoresis (8/30/22):  Decreased total protein. Normal gamma globulins are decreased. Two   paraprotein bands are identified: 1) Near-gamma = 0.18 g/dL   (previously 0.21 g/dL) 2) Mid-gamma = 0.18 g/dL (previously 0.18   g/dL).     Plasma Cell FISH:  The result is within normal limits  for 1q duplication, TP53   deletion and IGH rearrangement.         Assessment:       1. 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 Chemotherapy-induced peripheral neuropathy    4. Chemotherapy-induced thrombocytopenia    5. Chronic neoplasm-related pain    6. Chronic kidney disease, stage 3b    7. Anemia due to antineoplastic chemotherapy    8. Hypocalcemia    9. Chemotherapy-induced neutropenia                Plan:   Kappa light chain myeloma with normal cytogenetics / anemia/neutropenia/thrombocytopenia due to chemotherapy/chemo induced neuropathy   -started Darzalex Faspro, subcutaneous Velcade, oral dexamethasone 8/26/2021  -velcade discontinued after 4 cycles  -last decadron dose was 2/3/22  -on Revlimid 10 mg QOD - start 9/30/2021, will continue   -cont Plavix  -continue acyclovir for shingles prophylaxis  - Labs have been reviewed. Hemoglobin is 8.1 g/dL. Platelet count is 99 k/uL. Paraprotein is stable. Absolute neutrophil count is 1.4 k/uL.Okay to proceed with darzalex faspro on 12/12/24  - return to clinic in 4 weeks in preparation for next cycle of darzalex faspro/dexamethasone/revlimid.     CKD Stage 3b   - Labs have been reviewed. Creatinine 1.9 mg/dL     - continue to monitor    Chronic neoplasm-related pain  - no issues, pain at present related to rib fractures  - continue to monitor     Immunodeficiency due to drug therapy  - No fevers, no s/s of acute illness  - No known exposure to covid or other illness  - at risk for pneumonia with rib fractures, spirometer hourly while awake, no current symptoms  - Instructed on good handwashing, avoiding known ill individuals, limiting crowd exposure  - ANC 1.4 k/uL, okay to treat  - Continue to monitor    CVA history  - taking clopidogrel, continue to monitor    Hypocalcemia  - corrected calcium is 7.7 mg/dL  - continue daily tums   - continue to monitor            High Risk Conditions:    Patient has a condition that poses  threat to life and bodily function: kappa light chain myeloma.   Patient is currently on drug therapy requiring intensive monitoring for toxicity: adjuvant chemotherapy: with anemia, concern for immunodeficiency and at risk for pneumonia post rib fractures, ckd, weight loss.       - return to clinic in 4 weeks in preparation for next cycle of darzalex/lenalidomide    Andres Ríos M.D.  Hematology/Oncology  [facility] - [address], [address]  [place] [address]  Phone: [phone]  Fax: [phone]

## 2024-12-11 ENCOUNTER — OFFICE VISIT (OUTPATIENT)
Dept: HEMATOLOGY/ONCOLOGY | Facility: CLINIC | Age: 79
End: 2024-12-11
Payer: MEDICARE

## 2024-12-11 DIAGNOSIS — T45.1X5A ANEMIA DUE TO ANTINEOPLASTIC CHEMOTHERAPY: ICD-10-CM

## 2024-12-11 DIAGNOSIS — D84.821 IMMUNODEFICIENCY DUE TO DRUG THERAPY: ICD-10-CM

## 2024-12-11 DIAGNOSIS — D70.1 CHEMOTHERAPY-INDUCED NEUTROPENIA: ICD-10-CM

## 2024-12-11 DIAGNOSIS — T45.1X5A CHEMOTHERAPY-INDUCED NEUTROPENIA: ICD-10-CM

## 2024-12-11 DIAGNOSIS — G62.0 CHEMOTHERAPY-INDUCED PERIPHERAL NEUROPATHY: ICD-10-CM

## 2024-12-11 DIAGNOSIS — T45.1X5A CHEMOTHERAPY-INDUCED THROMBOCYTOPENIA: ICD-10-CM

## 2024-12-11 DIAGNOSIS — G89.3 CHRONIC NEOPLASM-RELATED PAIN: ICD-10-CM

## 2024-12-11 DIAGNOSIS — N18.32 CHRONIC KIDNEY DISEASE, STAGE 3B: ICD-10-CM

## 2024-12-11 DIAGNOSIS — T45.1X5A CHEMOTHERAPY-INDUCED PERIPHERAL NEUROPATHY: ICD-10-CM

## 2024-12-11 DIAGNOSIS — C90.00 KAPPA LIGHT CHAIN MYELOMA: Primary | ICD-10-CM

## 2024-12-11 DIAGNOSIS — D64.81 ANEMIA DUE TO ANTINEOPLASTIC CHEMOTHERAPY: ICD-10-CM

## 2024-12-11 DIAGNOSIS — D69.59 CHEMOTHERAPY-INDUCED THROMBOCYTOPENIA: ICD-10-CM

## 2024-12-11 DIAGNOSIS — Z79.899 IMMUNODEFICIENCY DUE TO DRUG THERAPY: ICD-10-CM

## 2024-12-11 DIAGNOSIS — E83.51 HYPOCALCEMIA: ICD-10-CM

## 2024-12-11 PROCEDURE — 99215 OFFICE O/P EST HI 40 MIN: CPT | Mod: 95,,, | Performed by: INTERNAL MEDICINE

## 2024-12-11 PROCEDURE — 1160F RVW MEDS BY RX/DR IN RCRD: CPT | Mod: CPTII,95,, | Performed by: INTERNAL MEDICINE

## 2024-12-11 PROCEDURE — 1159F MED LIST DOCD IN RCRD: CPT | Mod: CPTII,95,, | Performed by: INTERNAL MEDICINE

## 2024-12-11 RX ORDER — DIPHENHYDRAMINE HCL 25 MG
50 CAPSULE ORAL
Status: CANCELLED
Start: 2024-12-12

## 2024-12-11 RX ORDER — HEPARIN 100 UNIT/ML
500 SYRINGE INTRAVENOUS
Status: CANCELLED | OUTPATIENT
Start: 2024-12-12

## 2024-12-11 RX ORDER — SODIUM CHLORIDE 0.9 % (FLUSH) 0.9 %
10 SYRINGE (ML) INJECTION
Status: CANCELLED | OUTPATIENT
Start: 2024-12-12

## 2024-12-11 RX ORDER — DIPHENHYDRAMINE HYDROCHLORIDE 50 MG/ML
50 INJECTION INTRAMUSCULAR; INTRAVENOUS ONCE AS NEEDED
Status: CANCELLED | OUTPATIENT
Start: 2024-12-12

## 2024-12-11 RX ORDER — ACETAMINOPHEN 325 MG/1
650 TABLET ORAL
Status: CANCELLED | OUTPATIENT
Start: 2024-12-12

## 2024-12-11 RX ORDER — FAMOTIDINE 20 MG/1
20 TABLET, FILM COATED ORAL
Status: CANCELLED
Start: 2024-12-12

## 2024-12-11 RX ORDER — EPINEPHRINE 0.3 MG/.3ML
0.3 INJECTION SUBCUTANEOUS ONCE AS NEEDED
Status: CANCELLED | OUTPATIENT
Start: 2024-12-12

## 2025-01-02 DIAGNOSIS — C90.00 KAPPA LIGHT CHAIN MYELOMA: ICD-10-CM

## 2025-01-02 RX ORDER — LENALIDOMIDE 10 MG/1
CAPSULE ORAL
Qty: 14 CAPSULE | Refills: 0 | Status: SHIPPED | OUTPATIENT
Start: 2025-01-02

## 2025-01-04 NOTE — PROGRESS NOTES
KPATIENT: Haris Hernandez  MRN: [id number]  DATE: 1/8/2025    Chief Complaint: Kappa Light Chain Myeloma    Telemedicine visit:  The patient location is: home  The chief complaint leading to consultation is: multiple myeloma    Visit type: audiovisual    Face to Face time with patient: 10 minutes  15 minutes of total time spent on the encounter, which includes face to face time and non-face to face time preparing to see the patient (eg, review of tests), Obtaining and/or reviewing separately obtained history, Documenting clinical information in the electronic or other health record, Independently interpreting results (not separately reported) and communicating results to the patient/family/caregiver, or Care coordination (not separately reported).     Each patient to whom he or she provides medical services by telemedicine is:  (1) informed of the relationship between the physician and patient and the respective role of any other health care provider with respect to management of the patient; and (2) notified that he or she may decline to receive medical services by telemedicine and may withdraw from such care at any time.    Notes: see below    Subjective:     Pertinent Medical History:     He presented to the ED 08/13/2021 with abnormal labs-CMP showed creatinine 6.6.  Prior CMP from May 2018 showed creatinine 1.2.    Further workup included a free light chain assay where a kappa free light chain level was 494    08/23/2021 bone marrow biopsy showed variable cellularity, 10 to 40% positive for plasma cell dyscrasia with plasma cells representing 30 to 40% of total cellularity.  No increase in blasts. FISH for Plasma Cell Proliferative Disorder - NORMAL    -started Darzalex, Velcade, Decadron 8/26/2021  -On Revlimid 10 mg qod since 9/30/2021  -saw urology,  for BPH causing difficulty in emptying the bladder, he may need cystoscopy and surgery    Interval History:   - he presents for a follow-up appointment  for his multiple myeloma   - he is scheduled for darzalex faspro on 1/8/25  - today, he endorses fatigue. He denies dyspnea upon exertion, chest pain, nausea, vomiting.        Past Medical, Surgical, Family, and Social histories reviewed.    Medication and Allergies reviewed.    Review of Systems   Constitutional:  Positive for malaise/fatigue. Negative for fever and weight loss (improved this visit).   HENT:  Negative for sore throat.    Respiratory:  Negative for shortness of breath.    Cardiovascular:  Negative for chest pain.   Gastrointestinal:  Positive for diarrhea (1-2 days following chemo). Negative for abdominal pain, nausea and vomiting.   Genitourinary:  Negative for dysuria.   Musculoskeletal:  Positive for neck pain. Negative for back pain.   Skin:  Negative for rash.   Neurological:  Negative for weakness.   Psychiatric/Behavioral:  The patient is not nervous/anxious.        Objective:     There were no vitals filed for this visit.    BMI: There is no height or weight on file to calculate BMI.  Deferred due to telemedicine visit.    ECOG 1  Physical Exam  Deferred due to telemedicine visit.    Labs have been reviewed, discussed with pt and daughter.    Lab Results   Component Value Date    WBC 2.58 (L) 01/02/2025    HGB 7.8 (L) 01/02/2025    HCT 21.9 (L) 01/02/2025    MCV 89 01/02/2025    PLT 98 (L) 01/02/2025       SPEP (1/2/25):  Decreased total protein. Normal gamma globulins are decreased. Two   paraproteins:   1.  near-gamma = 0.12 g/dL (previously 0.14 g/dL) 2.  mid-gamma =   0.16 g/dL (previously 0.16 g/dL).       SPEP (12/6/24):    Decreased total protein. Normal gamma globulins are decreased.   Two paraproteins: 1.  near-gamma = 0.14 g/dL (previously 0.13 g/dL)   2.  mid-gamma = 0.16 g/dL (previously 0.15 g/dL).     SPEP (11/8/24):  Normal gamma globulins are decreased. Two paraproteins:   1.  near-gamma = 0.13 g/dL (previously 0.11 g/dL)   2.  mid-gamma = 0.15 g/dL (previously 0.13 g/dL).      SPEP (9/16/24):    Decreased total protein. Normal gamma globulins are decreased.   Persistent linear irregularities in near-gamma = 0.12 g/dL   (previously 0.14 g/dL) and mid-gamma = 0.14 g/dL (previously 0.13   g/dL).         SPEP 7/22/24   Decreased total protein. Normal gamma globulins are decreased.   Persistent linear irregularities in near-gamma = 0.15 g/dL   (previously 0.13 g/dL) and mid-gamma = 0.16 g/dL (previously 0.17   g/dL).     SPEP 6/24/24   Decreased total protein. Normal gamma globulins are decreased.   Persistent linear irregularities in near-gamma = 0.13 g/dL   (previously, 0.12 g/dL) and mid-gamma = 0.17 g/dL (previously, 0.11   g/dL).     SPEP 5/28/24  Decreased total protein. Normal gamma globulins are decreased.   Persistent linear irregularities in near-gamma = 0.12 g/dL   (previously 0.16 g/dL) and mid-gamma = 0.11 g/dL (previously 0.16   g/dL).     SPEP 4/22/24, reviewed, discussed with pt and daughter, stable  Decreased total protein. Normal gamma globulins are decreased.   Persistent linear irregularities in near-gamma = 0.16 g/dL   (previously 0.1 g/dL) and mid-gamma = 0.16 g/dL (previously 0.1   g/dL).         SPEP 5/28/24  Decreased total protein. Normal gamma globulins are decreased.   Persistent linear irregularities in near-gamma = 0.12 g/dL   (previously 0.16 g/dL) and mid-gamma = 0.11 g/dL (previously 0.16   g/dL).       SPEP 4/24/24  Decreased total protein. Normal gamma globulins are decreased.   Persistent linear irregularities in near-gamma = 0.16 g/dL   (previously 0.1 g/dL) and mid-gamma = 0.16 g/dL (previously 0.1   g/dL).     SPEP 3/25/24  Decreased total protein. Normal gamma globulins are decreased. Faint   paraprotein peaks in near-gamma = 0.1 g/dL (previously 0.17 g/dL) and   mid-gamma = 0.1 g/dL (previously 0.22 g/dL).     SPEP 2/19/24  Decreased total protein. Normal gamma globulins are decreased. Faint   paraprotein peaks in near-gamma = 0.17 g/dL (previously  0.15 g/dL)   and mid-gamma = 0.22 g/dL (previously 0.17 g/dL).       SPEP 1/22/24  Decreased total protein. Normal gamma globulins are decreased. Faint   paraprotein peaks in near-gamma = 0.15 g/dL (previously 0.12 g/dL)   and mid-gamma = 0.17 g/dL (previously 0.13 g/dL).     Kappa FLC 1/22/24  -2.42 mg/dL (494.85 mg/dL on 8/14/21)    SPEP 12/18/23  Decreased total protein. Normal gamma globulins are decreased. Faint   paraprotein peaks in near-gamma = 0.12 g/dL (previously 0.15 g/dL)   and mid-gamma = 0.13 g/dL (previously 0.14 g/dL).     Kappa FLC 12/18/23  - 4.14 mg/dL (494.85 mg/dL on 8/14/21)    SPEP 11/20/23  Decreased total protein. Normal gamma globulins are decreased.   Paraprotein peaks in near-gamma = 0.15 g/dL (previously 0.14 g/dL)   and mid-gamma = 0.14 g/dL (previously 0.18 g/dL).     Kappa FLC 11/20/23  - 4.18 mg/dL (494.85 mg/dL on 8/14/21)    SPEP 10/23/23  Decreased total protein. Normal gamma globulins are decreased.   Paraprotein peaks in near-gamma = 0.14 g/dL (previously, 0.2 g/dL)   and mid-gamma = 0.18 g/dL (previously, 0.15 g/dL).     Kappa FLC 10/23/23  - 4.19 mg/dL (494.85 mg/dL on 8/14/21)    SPEP 9/25/23  Decreased total protein. Normal gamma globulins are decreased.   Paraprotein peaks in near-gamma = 0.2 g/dL (previously, 0.12 g/dL)   and mid-gamma = 0.15 g/dL (previously, 0.14 g/dL).       SPEP 8/28/23  Decreased total protein. Normal gamma globulins are decreased. There   is a paraprotein band in near-gamma = 0.12 g/dL, previously 0.14   g/dL.         SPEP 7/31/23  There is a paraprotein band in near-gamma = 0.14 g/dL, previously   0.18 g/dL.     The second previously described band is not seen discretely on the   current study.     [place] free light chain 7/31/23  2.72 mg/dL (494.85 mg/dL on 8/14/21)      SPEP 7/3/23 path interpretation   Decreased total protein. Normal gamma globulins are decreased. Two   closely adjacent paraprotein peaks in near-gamma = 0.18 g/dL   (previously  0.18 g/dL) and mid-gamma = 0.14 g/dL (previously 0.21   g/dL).     Serum protein electrophoresis (6/6/23):  Decreased total protein. Normal gamma globulins are decreased. Two   closely adjacent paraprotein peaks in near-gamma = 0.18 g/dL   (previously 0.15 g/dL) and mid-gamma = 0.21 g/dL (previously 0.17   g/dL).     Serum protein electrophoresis (5/9/23):  Decreased total protein. Normal gamma globulins are decreased. Two   closely adjacent paraprotein peaks in near-gamma = 0.15 g/dL   (previously 0.15 g/dL) and mid-gamma = 0.17 g/dL (previously 0.18   g/dL).     Serum protein electrophoresis (4/11/23):  Decreased total protein. Normal gamma globulins are decreased.   Two closely adjacent paraprotein peaks in near-gamma = 0.15 g/dL   (previously 0.15 g/dL) and mid-gamma = 0.18 g/dL (previously 0.15   g/dL).     Serum protein electrophoresis (3/13/23):  Decreased total protein. Normal gamma globulins are decreased.   Two closely adjacent paraprotein peaks in near-gamma = 0.15 g/dL   (previously 0.14 g/dL) and mid-gamma = 0.15 g/dL (previously, 0.13   g/dL).       Serum protein electrophoresis (2/13/23):  Decreased total protein. Normal gamma globulins are decreased. Two   paraprotein bands are present in near-gamma = 0.14 g/dL (previously   0.15 g/dL) and mid-gamma = 0.13 g/dL (previously 0.19 g/dL).       Serum protein electrophoresis (12/19/22):  Normal total protein. Normal gamma globulins are decreased. Two   paraprotein bands are present in near-gamma = 0.20 g/dL (previously   0.16 g/dL) and mid-gamma = 0.23 g/dL (previously 0.15 g/dL).     Serum protein electrophoresis (9/27/22):  Normal total protein. Normal gamma globulins are decreased.   Two paraprotein bands are identified: 1) Near-gamma = 0.2 g/dL   (previously 0.18 g/dL) 2) Mid-gamma = 0.17 g/dL (previously 0.18   g/dL).     Serum protein electrophoresis (8/30/22):  Decreased total protein. Normal gamma globulins are decreased. Two   paraprotein bands  are identified: 1) Near-gamma = 0.18 g/dL   (previously 0.21 g/dL) 2) Mid-gamma = 0.18 g/dL (previously 0.18   g/dL).     Plasma Cell FISH:  The result is within normal limits for 1q duplication, TP53   deletion and IGH rearrangement.         Assessment:       1. Immunodeficiency due to drug therapy    2. [place] light chain myeloma    3. Chemotherapy-induced peripheral neuropathy    4. Chemotherapy-induced thrombocytopenia    5. Chronic neoplasm-related pain    6. Chronic kidney disease, stage 3b    7. Anemia due to antineoplastic chemotherapy    8. Hypocalcemia    9. Chemotherapy-induced neutropenia                Plan:   Kappa light chain myeloma with normal cytogenetics / anemia/neutropenia/thrombocytopenia due to chemotherapy/chemo induced neuropathy   -started Darzalex Faspro, subcutaneous Velcade, oral dexamethasone 8/26/2021  -velcade discontinued after 4 cycles  -last decadron dose was 2/3/22  -on Revlimid 10 mg QOD - start 9/30/2021, will continue   -cont Plavix  -continue acyclovir for shingles prophylaxis  - Labs have been reviewed. Hemoglobin is 7.8 g/dL. Platelet count is 98 k/uL. Paraprotein is stable. Absolute neutrophil count is 1.4 k/uL.Okay to proceed with darzalex faspro on 1/8/25  - return to clinic in 4 weeks in preparation for next cycle of darzalex faspro/dexamethasone/revlimid.     CKD Stage 3b   - Labs have been reviewed. Creatinine 1.8 mg/dL     - continue to monitor    Chronic neoplasm-related pain  - no issues, pain at present related to rib fractures  - continue to monitor     Immunodeficiency due to drug therapy  - No fevers, no s/s of acute illness  - No known exposure to covid or other illness  - at risk for pneumonia with rib fractures, spirometer hourly while awake, no current symptoms  - Instructed on good handwashing, avoiding known ill individuals, limiting crowd exposure  - ANC 1.4 k/uL, okay to treat  - Continue to monitor    CVA history  - taking clopidogrel, continue to  monitor    Hypocalcemia  - corrected calcium is 7.9 mg/dL  - continue daily tums   - continue to monitor            High Risk Conditions:    Patient has a condition that poses threat to life and bodily function: kappa light chain myeloma.   Patient is currently on drug therapy requiring intensive monitoring for toxicity: adjuvant chemotherapy: with anemia, concern for immunodeficiency and at risk for pneumonia post rib fractures, ckd, weight loss.       - return to clinic in 4 weeks in preparation for next cycle of darzalex/lenalidomide    Andres Ríos M.D.  Hematology/Oncology  [facility] - [address], [address]  [place] [address]  Phone: [phone]  Fax: [phone]

## 2025-01-08 ENCOUNTER — OFFICE VISIT (OUTPATIENT)
Dept: HEMATOLOGY/ONCOLOGY | Facility: CLINIC | Age: 80
End: 2025-01-08
Payer: MEDICARE

## 2025-01-08 DIAGNOSIS — G89.3 CHRONIC NEOPLASM-RELATED PAIN: ICD-10-CM

## 2025-01-08 DIAGNOSIS — T45.1X5A CHEMOTHERAPY-INDUCED PERIPHERAL NEUROPATHY: ICD-10-CM

## 2025-01-08 DIAGNOSIS — C90.00 KAPPA LIGHT CHAIN MYELOMA: ICD-10-CM

## 2025-01-08 DIAGNOSIS — T45.1X5A ANEMIA DUE TO ANTINEOPLASTIC CHEMOTHERAPY: ICD-10-CM

## 2025-01-08 DIAGNOSIS — D84.821 IMMUNODEFICIENCY DUE TO DRUG THERAPY: Primary | ICD-10-CM

## 2025-01-08 DIAGNOSIS — N18.32 CHRONIC KIDNEY DISEASE, STAGE 3B: ICD-10-CM

## 2025-01-08 DIAGNOSIS — E83.51 HYPOCALCEMIA: ICD-10-CM

## 2025-01-08 DIAGNOSIS — D64.81 ANEMIA DUE TO ANTINEOPLASTIC CHEMOTHERAPY: ICD-10-CM

## 2025-01-08 DIAGNOSIS — T45.1X5A CHEMOTHERAPY-INDUCED NEUTROPENIA: ICD-10-CM

## 2025-01-08 DIAGNOSIS — T45.1X5A CHEMOTHERAPY-INDUCED THROMBOCYTOPENIA: ICD-10-CM

## 2025-01-08 DIAGNOSIS — G62.0 CHEMOTHERAPY-INDUCED PERIPHERAL NEUROPATHY: ICD-10-CM

## 2025-01-08 DIAGNOSIS — D69.59 CHEMOTHERAPY-INDUCED THROMBOCYTOPENIA: ICD-10-CM

## 2025-01-08 DIAGNOSIS — D70.1 CHEMOTHERAPY-INDUCED NEUTROPENIA: ICD-10-CM

## 2025-01-08 DIAGNOSIS — Z79.899 IMMUNODEFICIENCY DUE TO DRUG THERAPY: Primary | ICD-10-CM

## 2025-01-08 RX ORDER — DIPHENHYDRAMINE HYDROCHLORIDE 50 MG/ML
50 INJECTION INTRAMUSCULAR; INTRAVENOUS ONCE AS NEEDED
Status: CANCELLED | OUTPATIENT
Start: 2025-01-09

## 2025-01-08 RX ORDER — ACETAMINOPHEN 325 MG/1
650 TABLET ORAL
Status: CANCELLED | OUTPATIENT
Start: 2025-01-09

## 2025-01-08 RX ORDER — FAMOTIDINE 20 MG/1
20 TABLET, FILM COATED ORAL
Status: CANCELLED
Start: 2025-01-09

## 2025-01-08 RX ORDER — SODIUM CHLORIDE 0.9 % (FLUSH) 0.9 %
10 SYRINGE (ML) INJECTION
Status: CANCELLED | OUTPATIENT
Start: 2025-01-09

## 2025-01-08 RX ORDER — HEPARIN 100 UNIT/ML
500 SYRINGE INTRAVENOUS
Status: CANCELLED | OUTPATIENT
Start: 2025-01-09

## 2025-01-08 RX ORDER — DIPHENHYDRAMINE HCL 25 MG
50 CAPSULE ORAL
Status: CANCELLED
Start: 2025-01-09

## 2025-01-08 RX ORDER — EPINEPHRINE 0.3 MG/.3ML
0.3 INJECTION SUBCUTANEOUS ONCE AS NEEDED
Status: CANCELLED | OUTPATIENT
Start: 2025-01-09

## 2025-01-30 DIAGNOSIS — C90.00 KAPPA LIGHT CHAIN MYELOMA: ICD-10-CM

## 2025-01-30 RX ORDER — LENALIDOMIDE 10 MG/1
CAPSULE ORAL
Qty: 14 CAPSULE | Refills: 0 | Status: SHIPPED | OUTPATIENT
Start: 2025-01-30

## 2025-02-01 ENCOUNTER — HOSPITAL ENCOUNTER (EMERGENCY)
Facility: HOSPITAL | Age: 80
Discharge: HOME | End: 2025-02-01
Attending: EMERGENCY MEDICINE
Payer: MEDICARE

## 2025-02-01 VITALS
BODY MASS INDEX: 20.72 KG/M2 | TEMPERATURE: 98 F | WEIGHT: 148 LBS | HEIGHT: 71 IN | HEART RATE: 82 BPM | SYSTOLIC BLOOD PRESSURE: 151 MMHG | OXYGEN SATURATION: 97 % | DIASTOLIC BLOOD PRESSURE: 64 MMHG | RESPIRATION RATE: 22 BRPM

## 2025-02-01 DIAGNOSIS — J10.1 INFLUENZA A: Primary | ICD-10-CM

## 2025-02-01 PROCEDURE — 99283 EMERGENCY DEPT VISIT LOW MDM: CPT | Mod: 25,ER

## 2025-02-01 PROCEDURE — 25000003 PHARM REV CODE 250: Mod: ER | Performed by: EMERGENCY MEDICINE

## 2025-02-01 RX ORDER — FLUTICASONE PROPIONATE 50 MCG
1 SPRAY, SUSPENSION (ML) NASAL 2 TIMES DAILY PRN
Qty: 15 G | Refills: 0 | Status: ON HOLD | OUTPATIENT
Start: 2025-02-01 | End: 2025-02-12 | Stop reason: HOSPADM

## 2025-02-01 RX ORDER — OSELTAMIVIR PHOSPHATE 75 MG/1
75 CAPSULE ORAL
Status: COMPLETED | OUTPATIENT
Start: 2025-02-01 | End: 2025-02-01

## 2025-02-01 RX ORDER — BENZONATATE 100 MG/1
200 CAPSULE ORAL 3 TIMES DAILY PRN
Qty: 20 CAPSULE | Refills: 0 | Status: ON HOLD | OUTPATIENT
Start: 2025-02-01 | End: 2025-02-12 | Stop reason: HOSPADM

## 2025-02-01 RX ADMIN — OSELTAMIVIR PHOSPHATE 75 MG: 75 CAPSULE ORAL at 07:02

## 2025-02-03 NOTE — ED PROVIDER NOTES
ED Provider Note - 2/1/2025    History     Chief Complaint   Patient presents with    Influenza     Pt was dx with flu today at urgent care. Pt complaining of SOB and dry cough. Pt here at families insistence. SPO2 96% room air. Left sided weakness form previous cva.       Patient currently presents with concern regarding viral symptoms.  Onset noted yesterday PM.  Symptoms include congestion, cough, and fatigue.  Patient/family denies active SOB.  Patient/family reports no GI symptoms associated with this process.  Denies nausea, vomiting, and diarrhea  Patient/family is not aware of recent ill contacts with similar symptoms.  Notably the patient was seen at urgent Care here in town earlier today and diagnosed with influenza following a nasal screen.  Patient has been prescribed Tamiflu but has not yet started the prescription.        Review of patient's allergies indicates:  No Known Allergies  Past Medical History:   Diagnosis Date    Coronary artery disease     Enlarged prostate without lower urinary tract symptoms (luts)     BPH    Enlarged prostate without lower urinary tract symptoms (luts)     BPH    Hypertension     Iron deficiency anemia secondary to inadequate dietary iron intake     Kappa light chain myeloma     Normocytic anemia     Oropharyngeal dysphagia     Renal disorder     Stage 3b chronic kidney disease (CKD)     Stroke     Thrombocytopenia, unspecified      Past Surgical History:   Procedure Laterality Date    APPENDECTOMY      BONE MARROW BIOPSY Right 8/23/2021    Procedure: BIOPSY-BONE MARROW;  Surgeon: Jl Zamora MD;  Location: [facility] OR;  Service: Oncology;  Laterality: Right;    EYE SURGERY       No family history on file.  Social History     Tobacco Use    Smoking status: Former    Smokeless tobacco: Never   Substance Use Topics    Alcohol use: Yes     Alcohol/week: 1.0 standard drink of alcohol     Types: 1 Cans of beer per week     Comment: Pt states that he only drinks about once a  "month. Stopped drinking excessively in 2012 when diagnosed with Myeloma    Drug use: No     Review of Systems   Constitutional:  Positive for appetite change, chills and fatigue.   HENT:  Positive for congestion, postnasal drip and rhinorrhea.    Respiratory:  Positive for cough.    Musculoskeletal:  Positive for myalgias.       Physical Exam     Initial Vitals [02/01/25 1853]   BP Pulse Resp Temp SpO2   (!) 137/108 86 (!) 24 98.4 °F (36.9 °C) 96 %      MAP       --         Vitals:    02/01/25 1853 02/01/25 1919 02/01/25 2000   BP: (!) 137/108  (!) 151/64   Pulse: 86  82   Resp: (!) 24  (!) 22   Temp: 98.4 °F (36.9 °C)  98.1 °F (36.7 °C)   TempSrc: Oral     SpO2: 96%  97%   Weight:  67.1 kg (148 lb)    Height: 5' 11" (1.803 m)       Physical Exam    Nursing note and vitals reviewed.  Constitutional: He appears well-developed and well-nourished. He is not diaphoretic. No distress.   HENT:   Head: Normocephalic and atraumatic.   Nose: Nose normal. Mouth/Throat: Oropharynx is clear and moist.   Eyes: Conjunctivae are normal. No scleral icterus.   Neck: Neck supple. No JVD present.   Cardiovascular:  Normal rate, regular rhythm, normal heart sounds and intact distal pulses.           Pulmonary/Chest: Breath sounds normal. No respiratory distress. He has no wheezes. He has no rhonchi. He has no rales.   Abdominal: Abdomen is soft. He exhibits no distension. There is no abdominal tenderness.   Musculoskeletal:         General: No edema. Normal range of motion.      Cervical back: Neck supple.     Neurological: He is alert and oriented to person, place, and time. He has normal strength.   Skin: Skin is warm and dry.         ED Course   Procedures                   MDM  Differential Diagnoses   Based on available history, the working differential diagnoses considered during this evaluation include but are not limited to viral syndrome including influenza and Covid 19, bronchitis, pneumonia, and sinusitis.      LABS     Labs " Reviewed - No data to display             Imaging     Imaging Results              X-Ray Chest AP Portable (Final result)  Result time 02/01/25 19:30:42      Final result by Romel Mcmillan MD (02/01/25 19:30:42)                   Impression:      1.  Mild vascular congestion versus reticular interstitial changes throughout the lungs.  Mild interstitial pulmonary edema versus interstitial infectious process must be considered.    2.  Stable findings as noted above.      Electronically signed by: Romel Mcmillan MD  Date:    02/01/2025  Time:    19:30               Narrative:    EXAMINATION:  XR CHEST AP PORTABLE    CLINICAL HISTORY:  Cough; Influenza;    COMPARISON:  June 2, 2024    FINDINGS:  EKG pads overlie the left chest.  Mild vascular congestion versus reticular interstitial changes throughout the lungs.  The lungs are otherwise clear.  The cardiac silhouette size is on the upper limits of normal.  The trachea is midline and the mediastinal width is normal. Negative for focal infiltrate, effusion or pneumothorax. Pulmonary vasculature is mildly congested.  Negative for osseous abnormalities. Tortuous aorta with calcifications of the aortic knob.  There are degenerative changes of the spine and both shoulder girdles.                                       X-Rays:   Independently Interpreted Readings:   Chest X-Ray: No infiltrates.  Normal heart size.  No acute abnormalities. Chronic interstitial lung changes noted        EKG        ED Management/Discussion     Medications   oseltamivir capsule 75 mg (75 mg Oral Given 2/1/25 1924)                 The patient's list of active medical problems, social history, medications, and allergies as documented per RN staff has been reviewed.               On final assessment, the patient appears suitable for discharge.  I see no indication of acute toxicity or respiratory compromise.  Patient has been provided with an initial dose of Tamiflu pending his ability to start the  prescription home.  I see no indication of an emergent process beyond that addressed during our encounter but have duly counseled the patient/family regarding the need for prompt follow-up as well as the indications that should prompt immediate return to the emergency room.  The patient/family has been provided with language -specific verbal and printed direction regarding our final diagnosis(es) as well as instructions regarding use of OTC and/or Rx medications intended to manage the patient's aforementioned conditions including:  ED Prescriptions       Medication Sig Dispense Start Date End Date Auth. Provider    benzonatate (TESSALON) 100 MG capsule Take 2 capsules (200 mg total) by mouth 3 (three) times daily as needed for Cough. 20 capsule 2/1/2025 2/11/2025 Shahram Brown MD    fluticasone propionate (FLONASE) 50 mcg/actuation nasal spray 1 spray (50 mcg total) by Each Nostril route 2 (two) times daily as needed for Rhinitis. 15 g 2/1/2025 2/11/2025 Shahram Brown MD              Patient has been advised of the following recommended follow-up instructions:  Follow-up Information       Follow up With Specialties Details Why Contact Info    KIANA Call MD Family Medicine Schedule an appointment as soon as possible for a visit  for reassessment [id number] [place] [facility] [address]  [facility] [id number] [phone]      [facility] - Emergency Dept Emergency Medicine Go to  As needed, If symptoms worsen [address] [facility]  Emergency Department  [facility] [address] [phone]          The patient/family communicates understanding of all this information and all remaining questions and concerns were addressed at this time.      Referrals:  No orders of the defined types were placed in this encounter.      CLINICAL IMPRESSION    ICD-10-CM ICD-9-CM   1. Influenza A  J10.1 487.1          ED Disposition Condition    Discharge Stable                 Shahram Brown MD  02/02/25 1951

## 2025-02-08 ENCOUNTER — HOSPITAL ENCOUNTER (INPATIENT)
Facility: HOSPITAL | Age: 80
LOS: 4 days | Discharge: HOME HEALTH | DRG: 809 | End: 2025-02-12
Attending: EMERGENCY MEDICINE | Admitting: FAMILY MEDICINE
Payer: MEDICARE

## 2025-02-08 DIAGNOSIS — C90.00 KAPPA LIGHT CHAIN MYELOMA: Primary | ICD-10-CM

## 2025-02-08 DIAGNOSIS — R79.89 ELEVATED TROPONIN: ICD-10-CM

## 2025-02-08 DIAGNOSIS — E83.51 HYPOCALCEMIA: ICD-10-CM

## 2025-02-08 DIAGNOSIS — R53.83 FATIGUE: ICD-10-CM

## 2025-02-08 DIAGNOSIS — D61.818 PANCYTOPENIA: ICD-10-CM

## 2025-02-08 DIAGNOSIS — R07.9 CHEST PAIN: ICD-10-CM

## 2025-02-08 DIAGNOSIS — E86.0 DEHYDRATION: ICD-10-CM

## 2025-02-08 DIAGNOSIS — R53.1 WEAKNESS: ICD-10-CM

## 2025-02-08 DIAGNOSIS — N17.9 AKI (ACUTE KIDNEY INJURY): ICD-10-CM

## 2025-02-08 LAB
ALBUMIN SERPL BCP-MCNC: 3.1 G/DL (ref 3.5–5.2)
ALP SERPL-CCNC: 54 U/L (ref 38–126)
ALT SERPL W/O P-5'-P-CCNC: 38 U/L (ref 10–44)
ANION GAP SERPL CALC-SCNC: 14 MMOL/L (ref 8–16)
ANISOCYTOSIS BLD QL SMEAR: SLIGHT
AST SERPL-CCNC: 32 U/L (ref 15–46)
BACTERIA #/AREA URNS AUTO: ABNORMAL /HPF
BASOPHILS # BLD AUTO: 0.01 K/UL (ref 0–0.2)
BASOPHILS NFR BLD: 0.5 % (ref 0–1.9)
BILIRUB SERPL-MCNC: 0.9 MG/DL (ref 0.1–1)
BILIRUB UR QL STRIP: NEGATIVE
BURR CELLS BLD QL SMEAR: ABNORMAL
CALCIUM SERPL-MCNC: 5.9 MG/DL (ref 8.7–10.5)
CHLORIDE SERPL-SCNC: 103 MMOL/L (ref 95–110)
CLARITY UR REFRACT.AUTO: CLEAR
CO2 SERPL-SCNC: 22 MMOL/L (ref 23–29)
COLOR UR AUTO: YELLOW
CREAT SERPL-MCNC: 2.59 MG/DL (ref 0.5–1.4)
DACRYOCYTES BLD QL SMEAR: ABNORMAL
DIFFERENTIAL METHOD BLD: ABNORMAL
EOSINOPHIL # BLD AUTO: 0.1 K/UL (ref 0–0.5)
EOSINOPHIL NFR BLD: 2.5 % (ref 0–8)
ERYTHROCYTE [DISTWIDTH] IN BLOOD BY AUTOMATED COUNT: 15.5 % (ref 11.5–14.5)
EST. GFR  (NO RACE VARIABLE): 24.7 ML/MIN/1.73 M^2
GLUCOSE SERPL-MCNC: 126 MG/DL (ref 70–110)
GLUCOSE UR QL STRIP: NEGATIVE
GRAN CASTS UR QL COMP ASSIST: 1 /LPF
HCT VFR BLD AUTO: 19.9 % (ref 40–54)
HGB BLD-MCNC: 7 G/DL (ref 14–18)
HGB UR QL STRIP: ABNORMAL
HYALINE CASTS UR QL AUTO: 0 /LPF
HYPOCHROMIA BLD QL SMEAR: ABNORMAL
IMM GRANULOCYTES # BLD AUTO: 0.01 K/UL (ref 0–0.04)
IMM GRANULOCYTES NFR BLD AUTO: 0.5 % (ref 0–0.5)
KETONES UR QL STRIP: NEGATIVE
LACTATE SERPL-SCNC: 1.1 MMOL/L (ref 0.5–2.2)
LEUKOCYTE ESTERASE UR QL STRIP: NEGATIVE
LYMPHOCYTES # BLD AUTO: 0.4 K/UL (ref 1–4.8)
LYMPHOCYTES NFR BLD: 21.7 % (ref 18–48)
MAGNESIUM SERPL-MCNC: 1 MG/DL (ref 1.6–2.6)
MCH RBC QN AUTO: 30.2 PG (ref 27–31)
MCHC RBC AUTO-ENTMCNC: 35.2 G/DL (ref 32–36)
MCV RBC AUTO: 86 FL (ref 82–98)
MICROSCOPIC COMMENT: ABNORMAL
MONOCYTES # BLD AUTO: 0.2 K/UL (ref 0.3–1)
MONOCYTES NFR BLD: 8.1 % (ref 4–15)
NEUTROPHILS # BLD AUTO: 1.3 K/UL (ref 1.8–7.7)
NEUTROPHILS NFR BLD: 66.7 % (ref 38–73)
NITRITE UR QL STRIP: NEGATIVE
NRBC BLD-RTO: 0 /100 WBC
OVALOCYTES BLD QL SMEAR: ABNORMAL
PH UR STRIP: 6 [PH] (ref 5–8)
PLATELET # BLD AUTO: 93 K/UL (ref 150–450)
PLATELET BLD QL SMEAR: ABNORMAL
PMV BLD AUTO: 12.3 FL (ref 9.2–12.9)
POIKILOCYTOSIS BLD QL SMEAR: SLIGHT
POLYCHROMASIA BLD QL SMEAR: ABNORMAL
POTASSIUM SERPL-SCNC: 3.4 MMOL/L (ref 3.5–5.1)
PROT SERPL-MCNC: 6.3 G/DL (ref 6–8.4)
PROT UR QL STRIP: ABNORMAL
RBC # BLD AUTO: 2.32 M/UL (ref 4.6–6.2)
RBC #/AREA URNS AUTO: 2 /HPF (ref 0–4)
SODIUM SERPL-SCNC: 139 MMOL/L (ref 136–145)
SP GR UR STRIP: 1.02 (ref 1–1.03)
TROPONIN I SERPL-MCNC: 0.95 NG/ML (ref 0.01–0.03)
URN SPEC COLLECT METH UR: ABNORMAL
UROBILINOGEN UR STRIP-ACNC: NEGATIVE EU/DL
UUN UR-MCNC: 34 MG/DL (ref 2–20)
WBC # BLD AUTO: 1.98 K/UL (ref 3.9–12.7)
WBC #/AREA URNS AUTO: 1 /HPF (ref 0–5)

## 2025-02-08 PROCEDURE — 25000003 PHARM REV CODE 250: Mod: ER | Performed by: EMERGENCY MEDICINE

## 2025-02-08 PROCEDURE — 84484 ASSAY OF TROPONIN QUANT: CPT | Mod: ER | Performed by: EMERGENCY MEDICINE

## 2025-02-08 PROCEDURE — 83735 ASSAY OF MAGNESIUM: CPT | Mod: ER | Performed by: EMERGENCY MEDICINE

## 2025-02-08 PROCEDURE — 11000001 HC ACUTE MED/SURG PRIVATE ROOM

## 2025-02-08 PROCEDURE — 80053 COMPREHEN METABOLIC PANEL: CPT | Mod: ER | Performed by: EMERGENCY MEDICINE

## 2025-02-08 PROCEDURE — 63600175 PHARM REV CODE 636 W HCPCS: Mod: ER | Performed by: EMERGENCY MEDICINE

## 2025-02-08 PROCEDURE — 87040 BLOOD CULTURE FOR BACTERIA: CPT | Mod: 59,ER | Performed by: EMERGENCY MEDICINE

## 2025-02-08 PROCEDURE — 85027 COMPLETE CBC AUTOMATED: CPT | Mod: ER | Performed by: EMERGENCY MEDICINE

## 2025-02-08 PROCEDURE — 99900035 HC TECH TIME PER 15 MIN (STAT): Mod: ER

## 2025-02-08 PROCEDURE — 99285 EMERGENCY DEPT VISIT HI MDM: CPT | Mod: 25,ER

## 2025-02-08 PROCEDURE — 83605 ASSAY OF LACTIC ACID: CPT | Mod: ER | Performed by: EMERGENCY MEDICINE

## 2025-02-08 PROCEDURE — 85007 BL SMEAR W/DIFF WBC COUNT: CPT | Mod: ER | Performed by: EMERGENCY MEDICINE

## 2025-02-08 PROCEDURE — 81000 URINALYSIS NONAUTO W/SCOPE: CPT | Mod: ER | Performed by: EMERGENCY MEDICINE

## 2025-02-08 RX ORDER — CLOPIDOGREL BISULFATE 75 MG/1
75 TABLET ORAL DAILY
COMMUNITY

## 2025-02-08 RX ORDER — ATORVASTATIN CALCIUM 80 MG/1
80 TABLET, FILM COATED ORAL DAILY
COMMUNITY

## 2025-02-08 RX ORDER — OSELTAMIVIR PHOSPHATE 75 MG/1
75 CAPSULE ORAL 2 TIMES DAILY
Status: ON HOLD | COMMUNITY
Start: 2025-02-01 | End: 2025-02-12 | Stop reason: HOSPADM

## 2025-02-08 RX ORDER — CALCIUM GLUCONATE 98 MG/ML
1 INJECTION, SOLUTION INTRAVENOUS
Status: COMPLETED | OUTPATIENT
Start: 2025-02-08 | End: 2025-02-08

## 2025-02-08 RX ADMIN — SODIUM CHLORIDE 1000 ML: 9 INJECTION, SOLUTION INTRAVENOUS at 04:02

## 2025-02-08 RX ADMIN — CALCIUM GLUCONATE 1 G: 98 INJECTION, SOLUTION INTRAVENOUS at 05:02

## 2025-02-08 RX ADMIN — CALCIUM GLUCONATE 1 G: 98 INJECTION, SOLUTION INTRAVENOUS at 06:02

## 2025-02-08 NOTE — PHARMACY MED REC
"  [facility]           Pharmacy  Admission Medication History     The home medication history was taken by Brianna Barger.      Medication history obtained from Medications listed below were obtained from: [employer] software- [employer]    Based on information gathered for medication list, you may go to "Admission" then "Reconcile Home Medications" tabs to review and/or act upon those items.     The home medication list has been updated by the Pharmacy department.   Please read ALL comments highlighted in yellow.   Please address this information as you see fit.    Feel free to contact us if you have any questions or require assistance.        No current facility-administered medications on file prior to encounter.     Current Outpatient Medications on File Prior to Encounter   Medication Sig Dispense Refill    acyclovir (ZOVIRAX) 200 MG capsule TAKE ONE CAPSULE BY MOUTH EVERY DAY (Patient taking differently: Take 200 mg by mouth once daily.) 90 capsule 3    atorvastatin (LIPITOR) 80 MG tablet Take 80 mg by mouth once daily.      benzonatate (TESSALON) 100 MG capsule Take 2 capsules (200 mg total) by mouth 3 (three) times daily as needed for Cough. 20 capsule 0    carvediloL (COREG) 6.25 MG tablet Take 1 tablet (6.25 mg total) by mouth 2 (two) times daily. 60 tablet 11    clopidogreL (PLAVIX) 75 mg tablet Take 75 mg by mouth once daily.      cyanocobalamin 500 MCG tablet Take 1 tablet (500 mcg total) by mouth once daily. 90 tablet 3    finasteride (PROSCAR) 5 mg tablet Take 5 mg by mouth once daily.      fluticasone propionate (FLONASE) 50 mcg/actuation nasal spray 1 spray (50 mcg total) by Each Nostril route 2 (two) times daily as needed for Rhinitis. 15 g 0    folic acid (FOLVITE) 1 MG tablet Take 1 tablet (1 mg total) by mouth once daily. 90 tablet 3    lenalidomide 10 mg Cap TAKE ONE CAPSULE (10MG) BY MOUTH ONCE EVERY OTHER DAY FOR 28 DAYS. (Patient taking differently: Take 10 mg by mouth every " other day.) 14 capsule 0    multivitamin capsule Take 1 capsule by mouth once daily.      NIFEdipine (PROCARDIA-XL) 30 MG (OSM) 24 hr tablet Take 1 tablet (30 mg total) by mouth once daily. 90 tablet 3    tamsulosin (FLOMAX) 0.4 mg Cap Take 2 capsules (0.8 mg total) by mouth once daily. 180 capsule 3    mirtazapine (REMERON) 7.5 MG Tab Take 2 tablets (15 mg total) by mouth every evening. Start one tab nightly (7.5mg) x10 days then increase to 2 tabs (15mg) nightly 180 tablet 3       Please address this information as you see fit.  Feel free to contact us if you have any questions or require assistance.    Brianna Barger  [phone]                .

## 2025-02-08 NOTE — ED NOTES
Pt assisted up to the bedside x2 person assist, pt tried to void to the urinal was unsuccessful, pt currently has an adult brief on. Daughter at the bedside.

## 2025-02-08 NOTE — ED PROVIDER NOTES
Encounter Date: 2/8/2025       History     Chief Complaint   Patient presents with    Fatigue     Decreased appetite and generalized weakness since being diagnosed with the flu last Sunday.      Patient is a 79-year-old male who was diagnosed with the influenza one-week ago.  He presents to the ED today with generalized weakness and decreased appetite.  He has an occasional dry cough.  He has had no fever over the past 48 hours.  Patient had diarrhea yesterday but not today.  No vomiting.  No headache or body aches.      Review of patient's allergies indicates:  No Known Allergies  Past Medical History:   Diagnosis Date    Coronary artery disease     Enlarged prostate without lower urinary tract symptoms (luts)     BPH    Enlarged prostate without lower urinary tract symptoms (luts)     BPH    Hypertension     Iron deficiency anemia secondary to inadequate dietary iron intake     Kappa light chain myeloma     Normocytic anemia     Oropharyngeal dysphagia     Renal disorder     Stage 3b chronic kidney disease (CKD)     Stroke     Thrombocytopenia, unspecified      Past Surgical History:   Procedure Laterality Date    APPENDECTOMY      BONE MARROW BIOPSY Right 8/23/2021    Procedure: BIOPSY-BONE MARROW;  Surgeon: Jl Zamora MD;  Location: [facility] OR;  Service: Oncology;  Laterality: Right;    EYE SURGERY       No family history on file.  Social History     Tobacco Use    Smoking status: Former    Smokeless tobacco: Never   Substance Use Topics    Alcohol use: Yes     Alcohol/week: 1.0 standard drink of alcohol     Types: 1 Cans of beer per week     Comment: Pt states that he only drinks about once a month. Stopped drinking excessively in 2012 when diagnosed with Myeloma    Drug use: No     Review of Systems   Constitutional:  Positive for appetite change and fatigue. Negative for fever.   Respiratory:  Negative for cough and shortness of breath.    Gastrointestinal:  Positive for diarrhea. Negative for vomiting.    Neurological:  Positive for weakness.   All other systems reviewed and are negative.      Physical Exam     Initial Vitals [02/08/25 1502]   BP Pulse Resp Temp SpO2   (!) 121/57 72 16 98.9 °F (37.2 °C) 97 %      MAP       --         Physical Exam    Nursing note and vitals reviewed.  Constitutional: No distress.   HENT:   Dry mucous membranes.   Eyes: EOM are normal.   Pale conjunctiva.   Neck: Neck supple.   Cardiovascular:  Normal rate and regular rhythm.           Pulmonary/Chest: Breath sounds normal.   Abdominal: Abdomen is soft. There is no abdominal tenderness.   Musculoskeletal:         General: No edema. Normal range of motion.      Cervical back: Neck supple.     Neurological: He is alert and oriented to person, place, and time.   Skin: Skin is warm and dry.   Poor skin turgor.   Psychiatric: His behavior is normal.         ED Course   Critical Care    Date/Time: 2/8/2025 4:36 PM    Performed by: Justice Marin MD  Authorized by: Justice Marin MD  Direct patient critical care time: 60 minutes  Additional history critical care time: 5 minutes  Ordering / reviewing critical care time: 15 minutes  Documentation critical care time: 15 minutes  Consulting other physicians critical care time: 5 minutes  Total critical care time (exclusive of procedural time) : 100 minutes  Critical care was time spent personally by me on the following activities: discussions with primary provider, examination of patient, obtaining history from patient or surrogate, ordering and performing treatments and interventions, ordering and review of laboratory studies, ordering and review of radiographic studies, pulse oximetry, re-evaluation of patient's condition and review of old charts.        Labs Reviewed   CBC W/ AUTO DIFFERENTIAL - Abnormal       Result Value    WBC 1.98 (*)     RBC 2.32 (*)     Hemoglobin 7.0 (*)     Hematocrit 19.9 (*)     MCV 86      MCH 30.2      MCHC 35.2      RDW 15.5 (*)      Platelets 93 (*)     MPV 12.3      Immature Granulocytes 0.5      Gran # (ANC) 1.3 (*)     Immature Grans (Abs) 0.01      Lymph # 0.4 (*)     Mono # 0.2 (*)     Eos # 0.1      Baso # 0.01      nRBC 0      Gran % 66.7      Lymph % 21.7      Mono % 8.1      Eosinophil % 2.5      Basophil % 0.5      Differential Method Automated      Narrative:      WBC  critical result(s) called and verbal readback obtained from   Dyan Shelton by [facility] 02/08/2025 16:15  WBC and HCT   critical result(s) called and verbal readback obtained   from Blanca Rosa by [facility] 02/08/2025 15:55   COMPREHENSIVE METABOLIC PANEL - Abnormal    Sodium 139      Potassium 3.4 (*)     Chloride 103      CO2 22 (*)     Glucose 126 (*)     BUN 34 (*)     Creatinine 2.59 (*)     Calcium 5.9 (*)     Total Protein 6.3      Albumin 3.1 (*)     Total Bilirubin 0.9      Alkaline Phosphatase 54      AST 32      ALT 38      Anion Gap 14      eGFR 24.7 (*)     Narrative:       Ca+ critical result(s) called and verbal readback obtained from   Dyan Shelton  by [facility] 02/08/2025 16:07   TROPONIN I - Abnormal    Troponin I 0.955 (*)    MAGNESIUM - Abnormal    Magnesium 1.0 (*)    CULTURE, BLOOD   CULTURE, BLOOD   URINALYSIS, REFLEX TO URINE CULTURE   LACTIC ACID, PLASMA     EKG Readings: (Independently Interpreted)   Initial Reading: No STEMI. Rhythm: Normal Sinus Rhythm. Heart Rate: 68. ST Segments: Normal ST Segments. Other Findings: Prolonged QT Interval.       Imaging Results              X-Ray Chest 1 View (Final result)  Result time 02/08/25 16:14:25      Final result by Aydin Mace MD (02/08/25 16:14:25)                   Impression:      Mild central pulmonary vascular crowding.  No significant change      Electronically signed by: Aydin Mace  Date:    02/08/2025  Time:    16:14               Narrative:    EXAMINATION:  XR CHEST 1 VIEW    CLINICAL HISTORY:  Fatigue;    TECHNIQUE:  Single frontal view of the chest was  performed.    COMPARISON:  Prior    FINDINGS:  Mild central pulmonary vascular crowding.  Similar findings to prior examotherwise the lungs are clear, with  no pleural effusion or pneumothorax.    The cardiac silhouette is normal in size. The hilar and mediastinal contours are unremarkable.    Bones are intact.                                       Medications   sodium chloride 0.9% bolus 1,000 mL 1,000 mL (1,000 mLs Intravenous Incomplete 2/8/25 9488)   calcium gluconate 100 mg/mL (10%) injection 1 g (has no administration in time range)     Medical Decision Making  Emergent evaluation of a 79-year-old male with a history of kappa light chain myeloma who was diagnosed with influenza 1 week ago and now presents with increasing fatigue.  CBC shows a pancytopenia with a white blood cell count of 1.98, hemoglobin of 7.0 and platelet count of 93.  He is also noted to be in acute kidney injury as well as having hypocalcemia.  Due to these issues I feel the patient will require admission and I have spoken with the [facility] hospitalist who will accept the patient at [facility].      Amount and/or Complexity of Data Reviewed  Labs: ordered.     Details: CBC with a white blood cell count of 1.98, hemoglobin of 7.0 and hematocrit of 19.9.  Platelet count is 93.  CMP with a BUN of 34, creatinine of 2.5, calcium of 5.9.  Radiology: ordered.     Details: Chest x-ray without acute abnormality.  Discussion of management or test interpretation with external provider(s): I have discussed the patient's presentation as well as pertinent lab values with the [facility] hospitalist.    Risk  Prescription drug management.  Decision regarding hospitalization.                                      Clinical Impression:  Final diagnoses:  [R53.83] Fatigue  [C90.00] Kappa light chain myeloma (Primary)  [D61.818] Pancytopenia  [N17.9] NELLIE (acute kidney injury)  [E83.51] Hypocalcemia  [E86.0] Dehydration          ED Disposition Condition    Admit  Justice Chaparro MD  02/08/25 1155

## 2025-02-09 PROBLEM — E83.51 HYPOCALCEMIA: Status: ACTIVE | Noted: 2025-02-09

## 2025-02-09 PROBLEM — R53.1 WEAKNESS: Status: ACTIVE | Noted: 2025-02-09

## 2025-02-09 PROBLEM — N17.9 AKI (ACUTE KIDNEY INJURY): Status: ACTIVE | Noted: 2025-02-09

## 2025-02-09 PROBLEM — E83.42 HYPOMAGNESEMIA: Status: ACTIVE | Noted: 2025-02-09

## 2025-02-09 PROBLEM — D61.818 PANCYTOPENIA: Status: ACTIVE | Noted: 2025-02-09

## 2025-02-09 LAB
ABO + RH BLD: ABNORMAL
ALBUMIN SERPL BCP-MCNC: 2.2 G/DL (ref 3.5–5.2)
ALP SERPL-CCNC: 43 U/L (ref 40–150)
ALT SERPL W/O P-5'-P-CCNC: 30 U/L (ref 10–44)
ANION GAP SERPL CALC-SCNC: 10 MMOL/L (ref 8–16)
ANION GAP SERPL CALC-SCNC: 16 MMOL/L (ref 8–16)
ANISOCYTOSIS BLD QL SMEAR: SLIGHT
AST SERPL-CCNC: 20 U/L (ref 10–40)
BASOPHILS # BLD AUTO: 0.01 K/UL (ref 0–0.2)
BASOPHILS NFR BLD: 0.7 % (ref 0–1.9)
BILIRUB SERPL-MCNC: 0.6 MG/DL (ref 0.1–1)
BLD GP AB SCN CELLS X3 SERPL QL: ABNORMAL
BLOOD GROUP ANTIBODIES SERPL: NORMAL
BUN SERPL-MCNC: 26 MG/DL (ref 8–23)
BUN SERPL-MCNC: 30 MG/DL (ref 8–23)
BURR CELLS BLD QL SMEAR: ABNORMAL
CA-I BLDV-SCNC: 0.84 MMOL/L (ref 1.06–1.42)
CALCIUM SERPL-MCNC: 6.3 MG/DL (ref 8.7–10.5)
CALCIUM SERPL-MCNC: 7.2 MG/DL (ref 8.7–10.5)
CHLORIDE SERPL-SCNC: 109 MMOL/L (ref 95–110)
CHLORIDE SERPL-SCNC: 111 MMOL/L (ref 95–110)
CO2 SERPL-SCNC: 15 MMOL/L (ref 23–29)
CO2 SERPL-SCNC: 20 MMOL/L (ref 23–29)
CREAT SERPL-MCNC: 2.1 MG/DL (ref 0.5–1.4)
CREAT SERPL-MCNC: 2.2 MG/DL (ref 0.5–1.4)
DACRYOCYTES BLD QL SMEAR: ABNORMAL
DAT IGG-SP REAG RBC-IMP: NORMAL
DIFFERENTIAL METHOD BLD: ABNORMAL
EOSINOPHIL # BLD AUTO: 0.1 K/UL (ref 0–0.5)
EOSINOPHIL NFR BLD: 5.4 % (ref 0–8)
ERYTHROCYTE [DISTWIDTH] IN BLOOD BY AUTOMATED COUNT: 16.3 % (ref 11.5–14.5)
EST. GFR  (NO RACE VARIABLE): 30 ML/MIN/1.73 M^2
EST. GFR  (NO RACE VARIABLE): 32 ML/MIN/1.73 M^2
GLUCOSE SERPL-MCNC: 106 MG/DL (ref 70–110)
GLUCOSE SERPL-MCNC: 123 MG/DL (ref 70–110)
HCT VFR BLD AUTO: 16.5 % (ref 40–54)
HGB BLD-MCNC: 6 G/DL (ref 14–18)
HYPOCHROMIA BLD QL SMEAR: ABNORMAL
IMM GRANULOCYTES # BLD AUTO: 0.01 K/UL (ref 0–0.04)
IMM GRANULOCYTES NFR BLD AUTO: 0.7 % (ref 0–0.5)
LYMPHOCYTES # BLD AUTO: 0.3 K/UL (ref 1–4.8)
LYMPHOCYTES NFR BLD: 21.5 % (ref 18–48)
MAGNESIUM SERPL-MCNC: 1 MG/DL (ref 1.6–2.6)
MAGNESIUM SERPL-MCNC: 1.6 MG/DL (ref 1.6–2.6)
MCH RBC QN AUTO: 31.6 PG (ref 27–31)
MCHC RBC AUTO-ENTMCNC: 36.4 G/DL (ref 32–36)
MCV RBC AUTO: 87 FL (ref 82–98)
MONOCYTES # BLD AUTO: 0.1 K/UL (ref 0.3–1)
MONOCYTES NFR BLD: 7.4 % (ref 4–15)
NEUTROPHILS # BLD AUTO: 1 K/UL (ref 1.8–7.7)
NEUTROPHILS NFR BLD: 64.3 % (ref 38–73)
NRBC BLD-RTO: 0 /100 WBC
OVALOCYTES BLD QL SMEAR: ABNORMAL
PHOSPHATE SERPL-MCNC: 2.9 MG/DL (ref 2.7–4.5)
PLATELET # BLD AUTO: 98 K/UL (ref 150–450)
PLATELET BLD QL SMEAR: ABNORMAL
PMV BLD AUTO: ABNORMAL FL (ref 9.2–12.9)
POCT GLUCOSE: 106 MG/DL (ref 70–110)
POIKILOCYTOSIS BLD QL SMEAR: SLIGHT
POLYCHROMASIA BLD QL SMEAR: ABNORMAL
POTASSIUM SERPL-SCNC: 3 MMOL/L (ref 3.5–5.1)
POTASSIUM SERPL-SCNC: 3.7 MMOL/L (ref 3.5–5.1)
PROT SERPL-MCNC: 5.3 G/DL (ref 6–8.4)
RBC # BLD AUTO: 1.9 M/UL (ref 4.6–6.2)
SODIUM SERPL-SCNC: 140 MMOL/L (ref 136–145)
SODIUM SERPL-SCNC: 141 MMOL/L (ref 136–145)
SPECIMEN OUTDATE: ABNORMAL
TROPONIN I SERPL DL<=0.01 NG/ML-MCNC: 1.26 NG/ML (ref 0–0.03)
TROPONIN I SERPL DL<=0.01 NG/ML-MCNC: 1.49 NG/ML (ref 0–0.03)
WBC # BLD AUTO: 1.49 K/UL (ref 3.9–12.7)

## 2025-02-09 PROCEDURE — 11000001 HC ACUTE MED/SURG PRIVATE ROOM

## 2025-02-09 PROCEDURE — 82330 ASSAY OF CALCIUM: CPT | Performed by: REGISTERED NURSE

## 2025-02-09 PROCEDURE — 84484 ASSAY OF TROPONIN QUANT: CPT | Performed by: REGISTERED NURSE

## 2025-02-09 PROCEDURE — 86901 BLOOD TYPING SEROLOGIC RH(D): CPT | Performed by: REGISTERED NURSE

## 2025-02-09 PROCEDURE — 36415 COLL VENOUS BLD VENIPUNCTURE: CPT | Performed by: REGISTERED NURSE

## 2025-02-09 PROCEDURE — 25000003 PHARM REV CODE 250: Performed by: REGISTERED NURSE

## 2025-02-09 PROCEDURE — 27000207 HC ISOLATION

## 2025-02-09 PROCEDURE — 83735 ASSAY OF MAGNESIUM: CPT | Mod: 91 | Performed by: HOSPITALIST

## 2025-02-09 PROCEDURE — 25000003 PHARM REV CODE 250: Performed by: HOSPITALIST

## 2025-02-09 PROCEDURE — 63600175 PHARM REV CODE 636 W HCPCS: Performed by: HOSPITALIST

## 2025-02-09 PROCEDURE — 25000242 PHARM REV CODE 250 ALT 637 W/ HCPCS: Performed by: REGISTERED NURSE

## 2025-02-09 PROCEDURE — 84100 ASSAY OF PHOSPHORUS: CPT | Performed by: REGISTERED NURSE

## 2025-02-09 PROCEDURE — 80048 BASIC METABOLIC PNL TOTAL CA: CPT | Performed by: HOSPITALIST

## 2025-02-09 PROCEDURE — 86922 COMPATIBILITY TEST ANTIGLOB: CPT | Performed by: REGISTERED NURSE

## 2025-02-09 PROCEDURE — 36415 COLL VENOUS BLD VENIPUNCTURE: CPT | Performed by: HOSPITALIST

## 2025-02-09 PROCEDURE — 83735 ASSAY OF MAGNESIUM: CPT | Performed by: REGISTERED NURSE

## 2025-02-09 PROCEDURE — 80053 COMPREHEN METABOLIC PANEL: CPT | Performed by: REGISTERED NURSE

## 2025-02-09 PROCEDURE — 86880 COOMBS TEST DIRECT: CPT | Performed by: REGISTERED NURSE

## 2025-02-09 PROCEDURE — 86870 RBC ANTIBODY IDENTIFICATION: CPT | Performed by: REGISTERED NURSE

## 2025-02-09 PROCEDURE — 84484 ASSAY OF TROPONIN QUANT: CPT | Mod: 91 | Performed by: HOSPITALIST

## 2025-02-09 PROCEDURE — 85025 COMPLETE CBC W/AUTO DIFF WBC: CPT | Performed by: REGISTERED NURSE

## 2025-02-09 PROCEDURE — 86905 BLOOD TYPING RBC ANTIGENS: CPT | Mod: 91 | Performed by: REGISTERED NURSE

## 2025-02-09 RX ORDER — NALOXONE HCL 0.4 MG/ML
0.02 VIAL (ML) INJECTION
Status: DISCONTINUED | OUTPATIENT
Start: 2025-02-09 | End: 2025-02-12 | Stop reason: HOSPADM

## 2025-02-09 RX ORDER — IBUPROFEN 200 MG
24 TABLET ORAL
Status: DISCONTINUED | OUTPATIENT
Start: 2025-02-09 | End: 2025-02-12 | Stop reason: HOSPADM

## 2025-02-09 RX ORDER — ATORVASTATIN CALCIUM 40 MG/1
80 TABLET, FILM COATED ORAL DAILY
Status: DISCONTINUED | OUTPATIENT
Start: 2025-02-09 | End: 2025-02-12 | Stop reason: HOSPADM

## 2025-02-09 RX ORDER — HYDROCODONE BITARTRATE AND ACETAMINOPHEN 500; 5 MG/1; MG/1
TABLET ORAL
Status: DISCONTINUED | OUTPATIENT
Start: 2025-02-09 | End: 2025-02-12 | Stop reason: HOSPADM

## 2025-02-09 RX ORDER — IBUPROFEN 200 MG
16 TABLET ORAL
Status: DISCONTINUED | OUTPATIENT
Start: 2025-02-09 | End: 2025-02-12 | Stop reason: HOSPADM

## 2025-02-09 RX ORDER — CALCIUM GLUCONATE 20 MG/ML
1 INJECTION, SOLUTION INTRAVENOUS
Status: COMPLETED | OUTPATIENT
Start: 2025-02-09 | End: 2025-02-09

## 2025-02-09 RX ORDER — MAGNESIUM SULFATE 1 G/100ML
1 INJECTION INTRAVENOUS ONCE
Status: COMPLETED | OUTPATIENT
Start: 2025-02-09 | End: 2025-02-09

## 2025-02-09 RX ORDER — ACYCLOVIR 200 MG/1
200 CAPSULE ORAL DAILY
Status: DISCONTINUED | OUTPATIENT
Start: 2025-02-09 | End: 2025-02-12 | Stop reason: HOSPADM

## 2025-02-09 RX ORDER — SODIUM CHLORIDE 9 MG/ML
INJECTION, SOLUTION INTRAVENOUS CONTINUOUS
Status: ACTIVE | OUTPATIENT
Start: 2025-02-09 | End: 2025-02-09

## 2025-02-09 RX ORDER — CALCIUM GLUCONATE 20 MG/ML
1 INJECTION, SOLUTION INTRAVENOUS ONCE
Status: COMPLETED | OUTPATIENT
Start: 2025-02-09 | End: 2025-02-09

## 2025-02-09 RX ORDER — CLOPIDOGREL BISULFATE 75 MG/1
75 TABLET ORAL DAILY
Status: DISCONTINUED | OUTPATIENT
Start: 2025-02-09 | End: 2025-02-12 | Stop reason: HOSPADM

## 2025-02-09 RX ORDER — GLUCAGON 1 MG
1 KIT INJECTION
Status: DISCONTINUED | OUTPATIENT
Start: 2025-02-09 | End: 2025-02-12 | Stop reason: HOSPADM

## 2025-02-09 RX ORDER — FINASTERIDE 5 MG/1
5 TABLET, FILM COATED ORAL DAILY
Status: DISCONTINUED | OUTPATIENT
Start: 2025-02-09 | End: 2025-02-12 | Stop reason: HOSPADM

## 2025-02-09 RX ORDER — CARVEDILOL 6.25 MG/1
6.25 TABLET ORAL 2 TIMES DAILY
Status: DISCONTINUED | OUTPATIENT
Start: 2025-02-09 | End: 2025-02-12 | Stop reason: HOSPADM

## 2025-02-09 RX ORDER — FLUTICASONE PROPIONATE 50 MCG
1 SPRAY, SUSPENSION (ML) NASAL DAILY
Status: DISCONTINUED | OUTPATIENT
Start: 2025-02-09 | End: 2025-02-12 | Stop reason: HOSPADM

## 2025-02-09 RX ORDER — SODIUM CHLORIDE 0.9 % (FLUSH) 0.9 %
10 SYRINGE (ML) INJECTION EVERY 12 HOURS PRN
Status: DISCONTINUED | OUTPATIENT
Start: 2025-02-09 | End: 2025-02-12 | Stop reason: HOSPADM

## 2025-02-09 RX ORDER — POTASSIUM CHLORIDE 20 MEQ/1
40 TABLET, EXTENDED RELEASE ORAL
Status: DISPENSED | OUTPATIENT
Start: 2025-02-09 | End: 2025-02-09

## 2025-02-09 RX ORDER — MAGNESIUM SULFATE HEPTAHYDRATE 40 MG/ML
2 INJECTION, SOLUTION INTRAVENOUS ONCE
Status: COMPLETED | OUTPATIENT
Start: 2025-02-09 | End: 2025-02-09

## 2025-02-09 RX ORDER — BENZONATATE 100 MG/1
200 CAPSULE ORAL 3 TIMES DAILY PRN
Status: DISCONTINUED | OUTPATIENT
Start: 2025-02-09 | End: 2025-02-12 | Stop reason: HOSPADM

## 2025-02-09 RX ORDER — TAMSULOSIN HYDROCHLORIDE 0.4 MG/1
2 CAPSULE ORAL DAILY
Status: DISCONTINUED | OUTPATIENT
Start: 2025-02-09 | End: 2025-02-12 | Stop reason: HOSPADM

## 2025-02-09 RX ORDER — CALCIUM CARBONATE 200(500)MG
1000 TABLET,CHEWABLE ORAL 2 TIMES DAILY
Status: DISCONTINUED | OUTPATIENT
Start: 2025-02-09 | End: 2025-02-12 | Stop reason: HOSPADM

## 2025-02-09 RX ORDER — MIRTAZAPINE 15 MG/1
15 TABLET, FILM COATED ORAL NIGHTLY
Status: DISCONTINUED | OUTPATIENT
Start: 2025-02-09 | End: 2025-02-12 | Stop reason: HOSPADM

## 2025-02-09 RX ADMIN — SODIUM CHLORIDE: 9 INJECTION, SOLUTION INTRAVENOUS at 06:02

## 2025-02-09 RX ADMIN — FLUTICASONE PROPIONATE 50 MCG: 50 SPRAY, METERED NASAL at 08:02

## 2025-02-09 RX ADMIN — CALCIUM GLUCONATE 1 G: 20 INJECTION, SOLUTION INTRAVENOUS at 08:02

## 2025-02-09 RX ADMIN — MAGNESIUM SULFATE IN DEXTROSE 1 G: 10 INJECTION, SOLUTION INTRAVENOUS at 04:02

## 2025-02-09 RX ADMIN — CARVEDILOL 6.25 MG: 6.25 TABLET, FILM COATED ORAL at 08:02

## 2025-02-09 RX ADMIN — CALCIUM GLUCONATE 1 G: 20 INJECTION, SOLUTION INTRAVENOUS at 09:02

## 2025-02-09 RX ADMIN — CALCIUM CARBONATE (ANTACID) CHEW TAB 500 MG 1000 MG: 500 CHEW TAB at 08:02

## 2025-02-09 RX ADMIN — CALCIUM GLUCONATE 1 G: 20 INJECTION, SOLUTION INTRAVENOUS at 05:02

## 2025-02-09 RX ADMIN — MAGNESIUM SULFATE HEPTAHYDRATE 2 G: 40 INJECTION, SOLUTION INTRAVENOUS at 08:02

## 2025-02-09 RX ADMIN — POTASSIUM CHLORIDE 40 MEQ: 1500 TABLET, EXTENDED RELEASE ORAL at 02:02

## 2025-02-09 RX ADMIN — MIRTAZAPINE 15 MG: 15 TABLET, FILM COATED ORAL at 08:02

## 2025-02-09 NOTE — PT/OT/SLP PROGRESS
Occupational Therapy      Patient Name:  Haris Hernandez   MRN:  [id number]    Patient not seen today secondary to Other (Comment) (H&H 6/16 pending blood transfusion). Will follow-up .    2/9/2025

## 2025-02-09 NOTE — ASSESSMENT & PLAN NOTE
Patient's blood pressure range in the last 24 hours was: BP  Min: 113/67  Max: 145/66.The patient's inpatient anti-hypertensive regimen is listed below:  Current Antihypertensives  carvediloL tablet 6.25 mg, 2 times daily, Oral    Plan  - BP is controlled, no changes needed to their regimen  -

## 2025-02-09 NOTE — ASSESSMENT & PLAN NOTE
NELLIE is likely due to pre-renal azotemia due to dehydration. Baseline creatinine is  1.8 . Most recent creatinine and eGFR are listed below.  Recent Labs     02/08/25  1542 02/09/25  0234   CREATININE 2.59* 2.2*   EGFRNORACEVR 24.7* 30*        Plan  - NELLIE is improving  - Avoid nephrotoxins and renally dose meds for GFR listed above  - Monitor urine output, serial BMP, and adjust therapy as needed  -

## 2025-02-09 NOTE — ASSESSMENT & PLAN NOTE
This patient is found to have pancytopenia, the likely etiology is Drug induced (including chemotherapy) related to Darzalex , will monitor CBC Daily. Will transfuse red blood cells if the hemoglobin is <7g/dL (or <8 in the setting of ACS). Will transfuse platelets if platelet count is <10k. Hold DVT prophylaxis if platelets are <50k. The patient's hemoglobin, white blood cell count, and platelet count results have been reviewed and are listed below.  Recent Labs   Lab 02/09/25  0233   HGB 6.0*   WBC 1.49*   PLT 98*     Put in and WBC stable, hemoglobin down to 6.0.  Patient does have antibodies.  We will attempt P 1 unit of transfusion when blood available.  Hemodynamically stable.    - due to immunotherapy  - transfusion held due to antibodies but anemia has improved without therapy; continue to monitor for now

## 2025-02-09 NOTE — NURSING
[place] [facility] [place] be ready around 1200pm or later.

## 2025-02-09 NOTE — ASSESSMENT & PLAN NOTE
Hypocalcemia is likely due to Metastatic related to kappa light chain myeloma and Drug related - . . The most recent calcium and albumin results listed below.  Recent Labs     02/08/25  1542 02/09/25  0234   CALCIUM 5.9* 6.3*   ALBUMIN 3.1* 2.2*   CAION  --  0.84*     Plan  - Will replace calcium and monitor electrolytes closely.  - The patient's hypocalcemia is improving  -

## 2025-02-09 NOTE — PLAN OF CARE
Problem: Adult Inpatient Plan of Care  Goal: Absence of Hospital-Acquired Illness or Injury  Outcome: Progressing     Problem: Diarrhea  Goal: Effective Diarrhea Management  Outcome: Progressing     Problem: Fatigue  Goal: Improved Activity Tolerance  Outcome: Progressing     Problem: Oral Intake Inadequate  Goal: Improved Oral Intake  Outcome: Progressing     Problem: Comorbidity Management  Goal: Blood Pressure in Desired Range  Outcome: Progressing     Problem: Fall Injury Risk  Goal: Absence of Fall and Fall-Related Injury  Outcome: Progressing     Problem: Pain Acute  Goal: Optimal Pain Control and Function  Outcome: Progressing     Problem: Electrolyte Imbalance  Goal: Electrolyte Balance  Outcome: Progressing     Problem: Anemia  Goal: Anemia Symptom Improvement  Outcome: Progressing

## 2025-02-09 NOTE — ASSESSMENT & PLAN NOTE
Hypocalcemia is likely due to Metastatic related to kappa light chain myeloma and Drug related - . . The most recent calcium and albumin results listed below.  Recent Labs     02/08/25  1542 02/09/25  0234   CALCIUM 5.9* 6.3*   ALBUMIN 3.1* 2.2*   CAION  --  0.84*       Plan  - Will replace calcium and monitor electrolytes closely.  - The patient's hypocalcemia is improving  - Resume home Tums, replace with IV calcium  - replace Mg

## 2025-02-09 NOTE — ASSESSMENT & PLAN NOTE
Patient has Abnormal Magnesium: hypomagnesemia. Will continue to monitor electrolytes closely. Will replace the affected electrolytes and repeat labs to be done after interventions completed. The patient's magnesium results have been reviewed and are listed below.  Recent Labs   Lab 02/09/25  0234   MG 1.0*

## 2025-02-09 NOTE — CONSULTS
[facility] Consult Note     Attending Physician: Moris Conklin,*  Cardiology Attending Physician: Ed Cuenca MD  Date of Admit: 2/8/2025  Reason for Consult: Elevated troponin      History of Present Illness:       Haris Hernandez is a pleasant 79 y.o. male with PMHx of CVA, CAD< HTN, MM, CKD presented with fatigue, SOB< cough and weakness. Patient was dx of influenza a week ago. Upon arrival labs-  pancytopenia, calcium low at 5.9.  Magnesium 1.0.  Troponin 0.9, repeat 1.4. ECG NSR with prolonged Qtc 2/2 low mag+hypocalcemia. No chest pain.        History obtained by patient interview and supplemented by nursing documentation and chart review.   Objective   PMHx:  has a past medical history of Coronary artery disease, Enlarged prostate without lower urinary tract symptoms (luts), Enlarged prostate without lower urinary tract symptoms (luts), Hypertension, Iron deficiency anemia secondary to inadequate dietary iron intake, Kappa light chain myeloma, Normocytic anemia, Oropharyngeal dysphagia, Renal disorder, Stage 3b chronic kidney disease (CKD), Stroke, and Thrombocytopenia, unspecified.   SurgHx:  has a past surgical history that includes Appendectomy; Eye surgery; and Bone marrow biopsy (Right, 8/23/2021).   FamHx: family history is not on file.   SocialHx:  reports that he has quit smoking. He has never used smokeless tobacco. He reports current alcohol use of about 1.0 standard drink of alcohol per week. He reports that he does not use drugs.  Home Meds:  Current Outpatient Medications   Medication Instructions    acyclovir (ZOVIRAX) 200 mg, Oral    atorvastatin (LIPITOR) 80 mg, Daily    benzonatate (TESSALON) 200 mg, Oral, 3 times daily PRN    carvediloL (COREG) 6.25 mg, Oral, 2 times daily    clopidogreL (PLAVIX) 75 mg, Daily    cyanocobalamin 500 mcg, Oral, Daily    finasteride (PROSCAR) 5 mg, Daily    fluticasone propionate (FLONASE) 50 mcg, Each Nostril, 2 times daily PRN     "folic acid (FOLVITE) 1 mg, Oral, Daily    lenalidomide 10 mg Cap TAKE ONE CAPSULE (10MG) BY MOUTH ONCE EVERY OTHER DAY FOR 28 DAYS.    mirtazapine (REMERON) 15 mg, Oral, Nightly, Start one tab nightly (7.5mg) x10 days then increase to 2 tabs (15mg) nightly    multivitamin capsule 1 capsule, Daily    NIFEdipine (PROCARDIA-XL) 30 mg, Oral, Daily    oseltamivir (TAMIFLU) 75 mg, 2 times daily    sodium bicarbonate 650 mg, Oral, Daily    tamsulosin (FLOMAX) 0.8 mg, Oral, Daily     Review of Systems: Comprehensive ROS was performed and is negative unless otherwise noted in HPI.     Objective:   Last 24 Hour Vital Signs:  BP  Min: 113/67  Max: 145/66  Temp  Av.4 °F (36.9 °C)  Min: 98 °F (36.7 °C)  Max: 98.9 °F (37.2 °C)  Pulse  Av  Min: 67  Max: 89  Resp  Av.2  Min: 16  Max: 22  SpO2  Av.8 %  Min: 91 %  Max: 97 %  Height  Av' 11" (180.3 cm)  Min: 5' 11" (180.3 cm)  Max: 5' 11" (180.3 cm)  Weight  Av.6 kg (142 lb 5.5 oz)  Min: 62 kg (136 lb 11 oz)  Max: 67.1 kg (148 lb)  I/O last 3 completed shifts:  In: 0   Out: 450 [Urine:450]  Physical Examination:  Constitutional: NAD, Atraumatic   HEENT: MMM  Cardiovascular: RRR, no murmurs noted, no chest tenderness to palpation, 2+ radial pulses b/l  Pulmonary: normal respiratory effort, CTAB, no crackles, wheezes  Abdominal: S/NT/ND  Musculoskeletal: No lower extremity edema noted  Neurological: Alert & oriented to self, location, time and situation. No gross neurological deficits  Skin: W/D/I  Psych: Appropriate affect, normal mood    Laboratory:  Personally reviewed    Other Results:  TTE:  Results for orders placed during the hospital encounter of 21    Echo    Interpretation Summary  · Left ventricular concentric remodeling and normal systolic function.  · The estimated ejection fraction is 65%.  · Normal left ventricular diastolic function.  · Normal right ventricular size with normal right ventricular systolic function.  · Normal central " venous pressure (3 mmHg).  · The estimated PA systolic pressure is 22 mmHg.    Stress Testing:   No results found for this or any previous visit.     Coronary Angiogram:  No results found for this or any previous visit.      -Reviewing Medical records & lab results  -Independently reviewing and interpreting (if not documented by myself) EKGs, echocardiograms, catherizations   -Obtaining a history, performing a relevant exam, counseling/educating the patient/family  -Documenting clinical information in the EMR including ordering of tests  -Care coordination and communicating with other health care providers           Assessment/Plan:     Active Hospital Problems    Diagnosis    Pancytopenia    Hypocalcemia    Hypomagnesemia    NELLIE (acute kidney injury)    Kappa light chain myeloma    Elevated troponin    HTN (hypertension)       Elevated troponin in setting anemia (pancytopenia)   HTN  Plan:  Trend trops  Echo in am  Hold heparin gtt  Continue coreg, plavix (monitor platelets)  Will hold ischemic evaluation for now     Thank you for the opportunity to care for this patient. Please don't hesitate to reach out with any questions/concerns,    Ed Cuenca MD  Interventional Cardiology  [facility]

## 2025-02-09 NOTE — ASSESSMENT & PLAN NOTE
This patient is found to have pancytopenia, the likely etiology is Drug induced (including chemotherapy) related to Darzalex , will monitor CBC Daily. Will transfuse red blood cells if the hemoglobin is <7g/dL (or <8 in the setting of ACS). Will transfuse platelets if platelet count is <10k. Hold DVT prophylaxis if platelets are <50k. The patient's hemoglobin, white blood cell count, and platelet count results have been reviewed and are listed below.  Recent Labs   Lab 02/09/25  0233   HGB 6.0*   WBC 1.49*   PLT 98*   Put in and WBC stable, hemoglobin down to 6.0.  Patient does have antibodies.  We will attempt P 1 unit of transfusion when blood available.  Hemodynamically stable.

## 2025-02-09 NOTE — H&P
[facility] Medicine  History & Physical    Patient Name: Haris Hernandez  MRN: [id number]  Patient Class: IP- Inpatient  Admission Date: 2/8/2025  Attending Physician: Rosemary Baum MD   Primary Care Provider: KIANA Call MD         Patient information was obtained from patient and ER records.     Subjective:     Principal Problem:<principal problem not specified>    Chief Complaint:   Chief Complaint   Patient presents with    Fatigue     Decreased appetite and generalized weakness since being diagnosed with the flu last Sunday.         HPI: Patient is a 79-year-old male with a history of CVA, HTN, presenting to ED with shortness a breath, cough, weakness.  Patient is a to have a troponin fall with a small abrasion on the base of his nose who and he can not tell me entire story of how this happened.  He reports his daughter just told him Im taking you to the hospital in ED labwork remarkable for pancytopenia, calcium low at 5.9.  Magnesium 1.0.  Troponin 0.9, repeat 1.4.  Patient denies any chest pain.  Patient was given IV fluids and calcium gluconate.  Patient will be admitted to Hospital Medicine we will consult Cardiology given his elevated troponin.    Past Medical History:   Diagnosis Date    Coronary artery disease     Enlarged prostate without lower urinary tract symptoms (luts)     BPH    Enlarged prostate without lower urinary tract symptoms (luts)     BPH    Hypertension     Iron deficiency anemia secondary to inadequate dietary iron intake     Kappa light chain myeloma     Normocytic anemia     Oropharyngeal dysphagia     Renal disorder     Stage 3b chronic kidney disease (CKD)     Stroke     Thrombocytopenia, unspecified        Past Surgical History:   Procedure Laterality Date    APPENDECTOMY      BONE MARROW BIOPSY Right 8/23/2021    Procedure: BIOPSY-BONE MARROW;  Surgeon: Jl Zamora MD;  Location: [facility] OR;  Service: Oncology;  Laterality: Right;    EYE SURGERY          Review of patient's allergies indicates:  No Known Allergies    No current facility-administered medications on file prior to encounter.     Current Outpatient Medications on File Prior to Encounter   Medication Sig    acyclovir (ZOVIRAX) 200 MG capsule TAKE ONE CAPSULE BY MOUTH EVERY DAY (Patient taking differently: Take 200 mg by mouth once daily.)    atorvastatin (LIPITOR) 80 MG tablet Take 80 mg by mouth once daily.    benzonatate (TESSALON) 100 MG capsule Take 2 capsules (200 mg total) by mouth 3 (three) times daily as needed for Cough.    carvediloL (COREG) 6.25 MG tablet Take 1 tablet (6.25 mg total) by mouth 2 (two) times daily.    clopidogreL (PLAVIX) 75 mg tablet Take 75 mg by mouth once daily.    cyanocobalamin 500 MCG tablet Take 1 tablet (500 mcg total) by mouth once daily.    finasteride (PROSCAR) 5 mg tablet Take 5 mg by mouth once daily.    fluticasone propionate (FLONASE) 50 mcg/actuation nasal spray 1 spray (50 mcg total) by Each Nostril route 2 (two) times daily as needed for Rhinitis.    folic acid (FOLVITE) 1 MG tablet Take 1 tablet (1 mg total) by mouth once daily.    lenalidomide 10 mg Cap TAKE ONE CAPSULE (10MG) BY MOUTH ONCE EVERY OTHER DAY FOR 28 DAYS. (Patient taking differently: Take 10 mg by mouth every other day.)    multivitamin capsule Take 1 capsule by mouth once daily.    NIFEdipine (PROCARDIA-XL) 30 MG (OSM) 24 hr tablet Take 1 tablet (30 mg total) by mouth once daily.    tamsulosin (FLOMAX) 0.4 mg Cap Take 2 capsules (0.8 mg total) by mouth once daily.    mirtazapine (REMERON) 7.5 MG Tab Take 2 tablets (15 mg total) by mouth every evening. Start one tab nightly (7.5mg) x10 days then increase to 2 tabs (15mg) nightly    oseltamivir (TAMIFLU) 75 MG capsule Take 75 mg by mouth 2 (two) times daily. (Patient not taking: Reported on 2/8/2025)    sodium bicarbonate 650 MG tablet Take 1 tablet (650 mg total) by mouth once daily. (Patient not taking: Reported on 7/24/2024)     Family  History    None       Tobacco Use    Smoking status: Former    Smokeless tobacco: Never   Substance and Sexual Activity    Alcohol use: Yes     Alcohol/week: 1.0 standard drink of alcohol     Types: 1 Cans of beer per week     Comment: Pt states that he only drinks about once a month. Stopped drinking excessively in 2012 when diagnosed with Myeloma    Drug use: No    Sexual activity: Never     Review of Systems   All other systems reviewed and are negative.    Objective:     Vital Signs (Most Recent):  Temp: 98.1 °F (36.7 °C) (02/09/25 0506)  Pulse: 67 (02/09/25 0506)  Resp: 18 (02/09/25 0506)  BP: (!) 125/59 (02/09/25 0506)  SpO2: 95 % (02/09/25 0506) Vital Signs (24h Range):  Temp:  [98 °F (36.7 °C)-98.9 °F (37.2 °C)] 98.1 °F (36.7 °C)  Pulse:  [67-89] 67  Resp:  [16-22] 18  SpO2:  [91 %-97 %] 95 %  BP: (113-145)/(57-67) 125/59     Weight: 62 kg (136 lb 11 oz)  Body mass index is 19.06 kg/m².     Physical Exam  Vitals reviewed.   Constitutional:       Appearance: Normal appearance.   HENT:      Head:        Mouth/Throat:      Mouth: Mucous membranes are dry.      Pharynx: Oropharynx is clear.   Eyes:      Pupils: Pupils are equal, round, and reactive to light.   Cardiovascular:      Rate and Rhythm: Normal rate and regular rhythm.      Pulses: Normal pulses.      Heart sounds: Normal heart sounds.   Pulmonary:      Effort: Pulmonary effort is normal.      Breath sounds: Normal breath sounds.   Abdominal:      General: Abdomen is flat.   Musculoskeletal:         General: Normal range of motion.      Cervical back: Normal range of motion.   Skin:     General: Skin is warm and dry.      Capillary Refill: Capillary refill takes less than 2 seconds.   Neurological:      General: No focal deficit present.      Mental Status: He is alert and oriented to person, place, and time. Mental status is at baseline.   Psychiatric:         Mood and Affect: Mood normal.         Behavior: Behavior normal.              CRANIAL NERVES      CN III, IV, VI   Pupils are equal, round, and reactive to light.       Significant Labs: All pertinent labs within the past 24 hours have been reviewed.  Recent Lab Results  (Last 5 results in the past 24 hours)        02/09/25  0234   02/09/25  0233   02/08/25  2102   02/08/25  1652   02/08/25  1651        Albumin 2.2               ALP 43               ALT 30               Anion Gap 10               Aniso   Slight             Appearance, UA     Clear           AST 20               Bacteria, UA     Few           Baso #   0.01             Basophil %   0.7             Bilirubin (UA)     Negative           BILIRUBIN TOTAL 0.6  Comment: For infants and newborns, interpretation of results should be based  on gestational age, weight and in agreement with clinical  observations.    Premature Infant recommended reference ranges:  Up to 24 hours.............<8.0 mg/dL  Up to 48 hours............<12.0 mg/dL  3-5 days..................<15.0 mg/dL  6-29 days.................<15.0 mg/dL                 Blood Culture, Routine       No Growth to date  [P]                No Growth to date  [P]         BUN 30               [place]/Echinocytes   Occasional             Calcium 6.3  Comment: Calcium critical result(s) called and verbal readback obtained from   Mehdi gonzalez KMShanti 02/09/2025 04:13                 Calcium Level Ionized 0.84               Chloride 111               CO2 20               Color, UA     Yellow           Creatinine 2.2               Differential Method   Automated             Direct Guero (THAO) NEG               eGFR 30               Eos #   0.1             Eos %   5.4             Glucose 106               Glucose, UA     Negative           Gran # (ANC)   1.0             Gran %   64.3             Granular Casts, UA     1           Hematocrit   16.5  Comment: WBC & HCT  critical result(s) called and verbal readback obtained   from ARIC BETH  by ANA 02/09/2025 02:57               Hemoglobin   6.0              Hyaline Casts, UA     0           Hypo   Occasional             Immature Grans (Abs)   0.01  Comment: Mild elevation in immature granulocytes is non specific and   can be seen in a variety of conditions including stress response,   acute inflammation, trauma and pregnancy. Correlation with other   laboratory and clinical findings is essential.               Immature Granulocytes   0.7             Ketones, UA     Negative           Lactic Acid Level         1.1       Leukocyte Esterase, UA     Negative           Lymph #   0.3             Lymph %   21.5             Magnesium  1.0               MCH   31.6             MCHC   36.4             MCV   87             Microscopic Comment     SEE COMMENT  Comment: Other formed elements not mentioned in the report are not   present in the microscopic examination.              Mono #   0.1             Mono %   7.4             MPV   SEE COMMENT  Comment: Result not available.             NITRITE UA     Negative           nRBC   0             Blood, UA     1+           Ovalocytes   Occasional             pH, UA     6.0           Platelet Estimate   Decreased             Platelet Count   98             Poikilocytosis   Slight             Poly   Occasional             Potassium 3.0               PROTEIN TOTAL 5.3               Protein, UA     1+  Comment: Recommend a 24 hour urine protein or a urine   protein/creatinine ratio if globulin induced proteinuria is  clinically suspected.             RBC   1.90             RBC, UA     2           RDW   16.3             Sodium 141               Spec Grav UA     1.020           Specimen Outdate 02/12/2025 23:59               Specimen UA     Urine, Clean Catch           Teardrop Cells   Occasional             Troponin I 1.491  Comment: The reference interval for Troponin I represents the 99th percentile   cutoff   for our facility and is consistent with 3rd generation assay   performance.                 UROBILINOGEN UA     Negative            WBC, UA     1           WBC   1.49                                     [P] - Preliminary Result               Significant Imaging: I have reviewed all pertinent imaging results/findings within the past 24 hours.  I have reviewed and interpreted all pertinent imaging results/findings within the past 24 hours.  Assessment/Plan:     NELLIE (acute kidney injury)  NELLIE is likely due to pre-renal azotemia due to dehydration. Baseline creatinine is  1.8 . Most recent creatinine and eGFR are listed below.  Recent Labs     02/08/25  1542 02/09/25  0234   CREATININE 2.59* 2.2*   EGFRNORACEVR 24.7* 30*      Plan  - NELLIE is improving  - Avoid nephrotoxins and renally dose meds for GFR listed above  - Monitor urine output, serial BMP, and adjust therapy as needed  -     Hypomagnesemia  Patient has Abnormal Magnesium: hypomagnesemia. Will continue to monitor electrolytes closely. Will replace the affected electrolytes and repeat labs to be done after interventions completed. The patient's magnesium results have been reviewed and are listed below.  Recent Labs   Lab 02/09/25  0234   MG 1.0*        Hypocalcemia  Hypocalcemia is likely due to Metastatic related to kappa light chain myeloma and Drug related - . . The most recent calcium and albumin results listed below.  Recent Labs     02/08/25  1542 02/09/25  0234   CALCIUM 5.9* 6.3*   ALBUMIN 3.1* 2.2*   CAION  --  0.84*     Plan  - Will replace calcium and monitor electrolytes closely.  - The patient's hypocalcemia is improving  -         Pancytopenia  This patient is found to have pancytopenia, the likely etiology is Drug induced (including chemotherapy) related to Darzalex , will monitor CBC Daily. Will transfuse red blood cells if the hemoglobin is <7g/dL (or <8 in the setting of ACS). Will transfuse platelets if platelet count is <10k. Hold DVT prophylaxis if platelets are <50k. The patient's hemoglobin, white blood cell count, and platelet count results have been reviewed and  are listed below.  Recent Labs   Lab 02/09/25 0233   HGB 6.0*   WBC 1.49*   PLT 98*   Put in and WBC stable, hemoglobin down to 6.0.  Patient does have antibodies.  We will attempt P 1 unit of transfusion when blood available.  Hemodynamically stable.          Kappa light chain myeloma  Currently followed outpatient by Dr. Ríos  Currently on Darzalex      HTN (hypertension)  Patient's blood pressure range in the last 24 hours was: BP  Min: 113/67  Max: 145/66.The patient's inpatient anti-hypertensive regimen is listed below:  Current Antihypertensives  carvediloL tablet 6.25 mg, 2 times daily, Oral    Plan  - BP is controlled, no changes needed to their regimen  -      VTE Risk Mitigation (From admission, onward)           Ordered     IP VTE HIGH RISK PATIENT  Once         02/09/25 0221     Place sequential compression device  Until discontinued         02/09/25 0221                                    Romie Delgado NP  Department of Hospital Medicine  [facility] S[facility]

## 2025-02-09 NOTE — HPI
Patient is a 79-year-old male with a history of CVA, HTN, presenting to ED with shortness a breath, cough, weakness.  Patient is a to have a troponin fall with a small abrasion on the base of his nose who and he can not tell me entire story of how this happened.  He reports his daughter just told him Im taking you to the hospital in ED labwork remarkable for pancytopenia, calcium low at 5.9.  Magnesium 1.0.  Troponin 0.9, repeat 1.4.  Patient denies any chest pain.  Patient was given IV fluids and calcium gluconate.  Patient will be admitted to Hospital Medicine we will consult Cardiology given his elevated troponin.

## 2025-02-09 NOTE — NURSING
Pt arrived to unit at this time . AAOx4.Daughter at bedside. No distress noted at this time. He reports feeling weak and fatigue. Awaiting orders on patient.

## 2025-02-09 NOTE — SUBJECTIVE & OBJECTIVE
Past Medical History:   Diagnosis Date    Coronary artery disease     Enlarged prostate without lower urinary tract symptoms (luts)     BPH    Enlarged prostate without lower urinary tract symptoms (luts)     BPH    Hypertension     Iron deficiency anemia secondary to inadequate dietary iron intake     Kappa light chain myeloma     Normocytic anemia     Oropharyngeal dysphagia     Renal disorder     Stage 3b chronic kidney disease (CKD)     Stroke     Thrombocytopenia, unspecified        Past Surgical History:   Procedure Laterality Date    APPENDECTOMY      BONE MARROW BIOPSY Right 8/23/2021    Procedure: BIOPSY-BONE MARROW;  Surgeon: Jl Zamora MD;  Location: [facility];  Service: Oncology;  Laterality: Right;    EYE SURGERY         Review of patient's allergies indicates:  No Known Allergies    No current facility-administered medications on file prior to encounter.     Current Outpatient Medications on File Prior to Encounter   Medication Sig    acyclovir (ZOVIRAX) 200 MG capsule TAKE ONE CAPSULE BY MOUTH EVERY DAY (Patient taking differently: Take 200 mg by mouth once daily.)    atorvastatin (LIPITOR) 80 MG tablet Take 80 mg by mouth once daily.    benzonatate (TESSALON) 100 MG capsule Take 2 capsules (200 mg total) by mouth 3 (three) times daily as needed for Cough.    carvediloL (COREG) 6.25 MG tablet Take 1 tablet (6.25 mg total) by mouth 2 (two) times daily.    clopidogreL (PLAVIX) 75 mg tablet Take 75 mg by mouth once daily.    cyanocobalamin 500 MCG tablet Take 1 tablet (500 mcg total) by mouth once daily.    finasteride (PROSCAR) 5 mg tablet Take 5 mg by mouth once daily.    fluticasone propionate (FLONASE) 50 mcg/actuation nasal spray 1 spray (50 mcg total) by Each Nostril route 2 (two) times daily as needed for Rhinitis.    folic acid (FOLVITE) 1 MG tablet Take 1 tablet (1 mg total) by mouth once daily.    lenalidomide 10 mg Cap TAKE ONE CAPSULE (10MG) BY MOUTH ONCE EVERY OTHER DAY FOR 28 DAYS.  (Patient taking differently: Take 10 mg by mouth every other day.)    multivitamin capsule Take 1 capsule by mouth once daily.    NIFEdipine (PROCARDIA-XL) 30 MG (OSM) 24 hr tablet Take 1 tablet (30 mg total) by mouth once daily.    tamsulosin (FLOMAX) 0.4 mg Cap Take 2 capsules (0.8 mg total) by mouth once daily.    mirtazapine (REMERON) 7.5 MG Tab Take 2 tablets (15 mg total) by mouth every evening. Start one tab nightly (7.5mg) x10 days then increase to 2 tabs (15mg) nightly    oseltamivir (TAMIFLU) 75 MG capsule Take 75 mg by mouth 2 (two) times daily. (Patient not taking: Reported on 2/8/2025)    sodium bicarbonate 650 MG tablet Take 1 tablet (650 mg total) by mouth once daily. (Patient not taking: Reported on 7/24/2024)     Family History    None       Tobacco Use    Smoking status: Former    Smokeless tobacco: Never   Substance and Sexual Activity    Alcohol use: Yes     Alcohol/week: 1.0 standard drink of alcohol     Types: 1 Cans of beer per week     Comment: Pt states that he only drinks about once a month. Stopped drinking excessively in 2012 when diagnosed with Myeloma    Drug use: No    Sexual activity: Never     Review of Systems   All other systems reviewed and are negative.    Objective:     Vital Signs (Most Recent):  Temp: 98.1 °F (36.7 °C) (02/09/25 0506)  Pulse: 67 (02/09/25 0506)  Resp: 18 (02/09/25 0506)  BP: (!) 125/59 (02/09/25 0506)  SpO2: 95 % (02/09/25 0506) Vital Signs (24h Range):  Temp:  [98 °F (36.7 °C)-98.9 °F (37.2 °C)] 98.1 °F (36.7 °C)  Pulse:  [67-89] 67  Resp:  [16-22] 18  SpO2:  [91 %-97 %] 95 %  BP: (113-145)/(57-67) 125/59     Weight: 62 kg (136 lb 11 oz)  Body mass index is 19.06 kg/m².     Physical Exam  Vitals reviewed.   Constitutional:       Appearance: Normal appearance.   HENT:      Head:        Mouth/Throat:      Mouth: Mucous membranes are dry.      Pharynx: Oropharynx is clear.   Eyes:      Pupils: Pupils are equal, round, and reactive to light.   Cardiovascular:       Rate and Rhythm: Normal rate and regular rhythm.      Pulses: Normal pulses.      Heart sounds: Normal heart sounds.   Pulmonary:      Effort: Pulmonary effort is normal.      Breath sounds: Normal breath sounds.   Abdominal:      General: Abdomen is flat.   Musculoskeletal:         General: Normal range of motion.      Cervical back: Normal range of motion.   Skin:     General: Skin is warm and dry.      Capillary Refill: Capillary refill takes less than 2 seconds.   Neurological:      General: No focal deficit present.      Mental Status: He is alert and oriented to person, place, and time. Mental status is at baseline.   Psychiatric:         Mood and Affect: Mood normal.         Behavior: Behavior normal.              CRANIAL NERVES     CN III, IV, VI   Pupils are equal, round, and reactive to light.       Significant Labs: All pertinent labs within the past 24 hours have been reviewed.  Recent Lab Results  (Last 5 results in the past 24 hours)        02/09/25  0234   02/09/25  0233   02/08/25  2102   02/08/25  1652   02/08/25  1651        Albumin 2.2               ALP 43               ALT 30               Anion Gap 10               Aniso   Slight             Appearance, UA     Clear           AST 20               Bacteria, UA     Few           Baso #   0.01             Basophil %   0.7             Bilirubin (UA)     Negative           BILIRUBIN TOTAL 0.6  Comment: For infants and newborns, interpretation of results should be based  on gestational age, weight and in agreement with clinical  observations.    Premature Infant recommended reference ranges:  Up to 24 hours.............<8.0 mg/dL  Up to 48 hours............<12.0 mg/dL  3-5 days..................<15.0 mg/dL  6-29 days.................<15.0 mg/dL                 Blood Culture, Routine       No Growth to date  [P]                No Growth to date  [P]         BUN 30               [place]/Echinocytes   Occasional             Calcium 6.3  Comment: Calcium  critical result(s) called and verbal readback obtained from   Mehdi by KM12 02/09/2025 04:13                 Calcium Level Ionized 0.84               Chloride 111               CO2 20               Color, UA     Yellow           Creatinine 2.2               Differential Method   Automated             Direct Guero (THAO) NEG               eGFR 30               Eos #   0.1             Eos %   5.4             Glucose 106               Glucose, UA     Negative           Gran # (ANC)   1.0             Gran %   64.3             Granular Casts, UA     1           Hematocrit   16.5  Comment: WBC & HCT  critical result(s) called and verbal readback obtained   from ARIC BETH  by CGU 02/09/2025 02:57               Hemoglobin   6.0             Hyaline Casts, UA     0           Hypo   Occasional             Immature Grans (Abs)   0.01  Comment: Mild elevation in immature granulocytes is non specific and   can be seen in a variety of conditions including stress response,   acute inflammation, trauma and pregnancy. Correlation with other   laboratory and clinical findings is essential.               Immature Granulocytes   0.7             Ketones, UA     Negative           Lactic Acid Level         1.1       Leukocyte Esterase, UA     Negative           Lymph #   0.3             Lymph %   21.5             Magnesium  1.0               MCH   31.6             MCHC   36.4             MCV   87             Microscopic Comment     SEE COMMENT  Comment: Other formed elements not mentioned in the report are not   present in the microscopic examination.              Mono #   0.1             Mono %   7.4             MPV   SEE COMMENT  Comment: Result not available.             NITRITE UA     Negative           nRBC   0             Blood, UA     1+           Ovalocytes   Occasional             pH, UA     6.0           Platelet Estimate   Decreased             Platelet Count   98             Poikilocytosis   Slight             Poly    Occasional             Potassium 3.0               PROTEIN TOTAL 5.3               Protein, UA     1+  Comment: Recommend a 24 hour urine protein or a urine   protein/creatinine ratio if globulin induced proteinuria is  clinically suspected.             RBC   1.90             RBC, UA     2           RDW   16.3             Sodium 141               Spec Grav UA     1.020           Specimen Outdate 02/12/2025 23:59               Specimen UA     Urine, Clean Catch           Teardrop Cells   Occasional             Troponin I 1.491  Comment: The reference interval for Troponin I represents the 99th percentile   cutoff   for our facility and is consistent with 3rd generation assay   performance.                 UROBILINOGEN UA     Negative           WBC, UA     1           WBC   1.49                                     [P] - Preliminary Result               Significant Imaging: I have reviewed all pertinent imaging results/findings within the past 24 hours.  I have reviewed and interpreted all pertinent imaging results/findings within the past 24 hours.

## 2025-02-09 NOTE — ASSESSMENT & PLAN NOTE
Initial troponin 0.9, repeat 1.4.  Patient denies any chest pain.  Only symptom has been weakness.  Denies any shortness a breath.  EKG without any ST elevations.  Consult Cardiology for eval.  - trend troponins  - TTE pending  - continue ASA, plavix, statin  - likely demand in setting of anemia/electrolyte disturbance

## 2025-02-10 PROBLEM — E87.6 HYPOKALEMIA: Status: ACTIVE | Noted: 2025-02-10

## 2025-02-10 LAB
ALBUMIN SERPL BCP-MCNC: 2.4 G/DL (ref 3.5–5.2)
ALP SERPL-CCNC: 50 U/L (ref 40–150)
ALT SERPL W/O P-5'-P-CCNC: 25 U/L (ref 10–44)
ANION GAP SERPL CALC-SCNC: 12 MMOL/L (ref 8–16)
ANISOCYTOSIS BLD QL SMEAR: SLIGHT
APICAL FOUR CHAMBER EJECTION FRACTION: 52 %
APICAL TWO CHAMBER EJECTION FRACTION: 29 %
ASCENDING AORTA: 2.96 CM
AST SERPL-CCNC: 19 U/L (ref 10–40)
AV INDEX (PROSTH): 0.61
AV MEAN GRADIENT: 5 MMHG
AV PEAK GRADIENT: 10 MMHG
AV VALVE AREA BY VELOCITY RATIO: 1.8 CM²
AV VALVE AREA: 1.9 CM²
AV VELOCITY RATIO: 0.56
BASOPHILS # BLD AUTO: 0.02 K/UL (ref 0–0.2)
BASOPHILS NFR BLD: 1 % (ref 0–1.9)
BILIRUB SERPL-MCNC: 0.6 MG/DL (ref 0.1–1)
BSA FOR ECHO PROCEDURE: 1.76 M2
BUN SERPL-MCNC: 26 MG/DL (ref 8–23)
BURR CELLS BLD QL SMEAR: ABNORMAL
CALCIUM SERPL-MCNC: 7.4 MG/DL (ref 8.7–10.5)
CHLORIDE SERPL-SCNC: 111 MMOL/L (ref 95–110)
CO2 SERPL-SCNC: 16 MMOL/L (ref 23–29)
CREAT SERPL-MCNC: 2.1 MG/DL (ref 0.5–1.4)
CV ECHO LV RWT: 0.37 CM
DIFFERENTIAL METHOD BLD: ABNORMAL
DOP CALC AO PEAK VEL: 1.6 M/S
DOP CALC AO VTI: 28.8 CM
DOP CALC LVOT AREA: 3.1 CM2
DOP CALC LVOT DIAMETER: 2 CM
DOP CALC LVOT PEAK VEL: 0.9 M/S
DOP CALC LVOT STROKE VOLUME: 55 CM3
DOP CALC MV VTI: 25.4 CM
DOP CALCLVOT PEAK VEL VTI: 17.5 CM
E WAVE DECELERATION TIME: 262 MSEC
E/A RATIO: 0.99
E/E' RATIO: 18 M/S
ECHO LV POSTERIOR WALL: 1 CM (ref 0.6–1.1)
EOSINOPHIL # BLD AUTO: 0.1 K/UL (ref 0–0.5)
EOSINOPHIL NFR BLD: 2.9 % (ref 0–8)
ERYTHROCYTE [DISTWIDTH] IN BLOOD BY AUTOMATED COUNT: 16.4 % (ref 11.5–14.5)
EST. GFR  (NO RACE VARIABLE): 32 ML/MIN/1.73 M^2
FRACTIONAL SHORTENING: 18.5 % (ref 28–44)
GLOBAL LONGITUIDAL STRAIN: 11.9 %
GLUCOSE SERPL-MCNC: 112 MG/DL (ref 70–110)
HCT VFR BLD AUTO: 20.9 % (ref 40–54)
HGB BLD-MCNC: 7.3 G/DL (ref 14–18)
HYPOCHROMIA BLD QL SMEAR: ABNORMAL
IMM GRANULOCYTES # BLD AUTO: 0.02 K/UL (ref 0–0.04)
IMM GRANULOCYTES NFR BLD AUTO: 1 % (ref 0–0.5)
INTERVENTRICULAR SEPTUM: 0.9 CM (ref 0.6–1.1)
IVC DIAMETER: 1.51 CM
LEFT ATRIUM AREA SYSTOLIC (APICAL 2 CHAMBER): 19.79 CM2
LEFT ATRIUM AREA SYSTOLIC (APICAL 4 CHAMBER): 18.91 CM2
LEFT ATRIUM VOLUME INDEX MOD: 39 ML/M2
LEFT ATRIUM VOLUME MOD: 70 ML
LEFT INTERNAL DIMENSION IN SYSTOLE: 4.4 CM (ref 2.1–4)
LEFT VENTRICLE DIASTOLIC VOLUME INDEX: 79.47 ML/M2
LEFT VENTRICLE DIASTOLIC VOLUME: 142.25 ML
LEFT VENTRICLE END DIASTOLIC VOLUME APICAL 2 CHAMBER: 94.07 ML
LEFT VENTRICLE END DIASTOLIC VOLUME APICAL 4 CHAMBER: 93.96 ML
LEFT VENTRICLE END SYSTOLIC VOLUME APICAL 2 CHAMBER: 66.37 ML
LEFT VENTRICLE END SYSTOLIC VOLUME APICAL 4 CHAMBER: 65.45 ML
LEFT VENTRICLE MASS INDEX: 108 G/M2
LEFT VENTRICLE SYSTOLIC VOLUME INDEX: 48.1 ML/M2
LEFT VENTRICLE SYSTOLIC VOLUME: 86.12 ML
LEFT VENTRICULAR INTERNAL DIMENSION IN DIASTOLE: 5.4 CM (ref 3.5–6)
LEFT VENTRICULAR MASS: 193.3 G
LV LATERAL E/E' RATIO: 13.9 M/S
LV SEPTAL E/E' RATIO: 24.3 M/S
LVED V (TEICH): 142.25 ML
LVES V (TEICH): 86.12 ML
LVOT MG: 1.35 MMHG
LVOT MV: 0.52 CM/S
LYMPHOCYTES # BLD AUTO: 0.4 K/UL (ref 1–4.8)
LYMPHOCYTES NFR BLD: 18 % (ref 18–48)
MAGNESIUM SERPL-MCNC: 1.6 MG/DL (ref 1.6–2.6)
MCH RBC QN AUTO: 30.2 PG (ref 27–31)
MCHC RBC AUTO-ENTMCNC: 34.9 G/DL (ref 32–36)
MCV RBC AUTO: 86 FL (ref 82–98)
MONOCYTES # BLD AUTO: 0.2 K/UL (ref 0.3–1)
MONOCYTES NFR BLD: 8.3 % (ref 4–15)
MV MEAN GRADIENT: 2 MMHG
MV PEAK A VEL: 0.98 M/S
MV PEAK E VEL: 0.97 M/S
MV PEAK GRADIENT: 5 MMHG
MV STENOSIS PRESSURE HALF TIME: 76.09 MS
MV VALVE AREA BY CONTINUITY EQUATION: 2.16 CM2
MV VALVE AREA P 1/2 METHOD: 2.89 CM2
NEUTROPHILS # BLD AUTO: 1.4 K/UL (ref 1.8–7.7)
NEUTROPHILS NFR BLD: 68.8 % (ref 38–73)
NRBC BLD-RTO: 0 /100 WBC
OHS CV RV/LV RATIO: 0.54 CM
OHS LV EJECTION FRACTION SIMPSONS BIPLANE MOD: 41 %
OVALOCYTES BLD QL SMEAR: ABNORMAL
PISA MRMAX VEL: 3.56 M/S
PISA TR MAX VEL: 2.6 M/S
PLATELET # BLD AUTO: 125 K/UL (ref 150–450)
PLATELET BLD QL SMEAR: ABNORMAL
PMV BLD AUTO: 11.5 FL (ref 9.2–12.9)
POIKILOCYTOSIS BLD QL SMEAR: SLIGHT
POLYCHROMASIA BLD QL SMEAR: ABNORMAL
POTASSIUM SERPL-SCNC: 3.3 MMOL/L (ref 3.5–5.1)
PROT SERPL-MCNC: 5.8 G/DL (ref 6–8.4)
PULM VEIN S/D RATIO: 1.91
PV MV: 0.61 M/S
PV PEAK D VEL: 0.32 M/S
PV PEAK GRADIENT: 3 MMHG
PV PEAK S VEL: 0.61 M/S
PV PEAK VELOCITY: 0.85 M/S
RA PRESSURE ESTIMATED: 3 MMHG
RA VOL SYS: 27.21 ML
RBC # BLD AUTO: 2.42 M/UL (ref 4.6–6.2)
RIGHT ATRIAL AREA: 11.9 CM2
RIGHT ATRIUM VOLUME AREA LENGTH APICAL 4 CHAMBER: 25.62 ML
RIGHT VENTRICLE DIASTOLIC BASEL DIMENSION: 2.9 CM
RV TB RVSP: 6 MMHG
RV TISSUE DOPPLER FREE WALL SYSTOLIC VELOCITY 1 (APICAL 4 CHAMBER VIEW): 17.73 CM/S
SINUS: 2.84 CM
SODIUM SERPL-SCNC: 139 MMOL/L (ref 136–145)
STJ: 3 CM
TDI LATERAL: 0.07 M/S
TDI SEPTAL: 0.04 M/S
TDI: 0.06 M/S
TR MAX PG: 27 MMHG
TRICUSPID ANNULAR PLANE SYSTOLIC EXCURSION: 1.87 CM
TROPONIN I SERPL DL<=0.01 NG/ML-MCNC: 1.16 NG/ML (ref 0–0.03)
TV REST PULMONARY ARTERY PRESSURE: 30 MMHG
WBC # BLD AUTO: 2.05 K/UL (ref 3.9–12.7)
Z-SCORE OF LEFT VENTRICULAR DIMENSION IN END DIASTOLE: 0.87
Z-SCORE OF LEFT VENTRICULAR DIMENSION IN END SYSTOLE: 2.87

## 2025-02-10 PROCEDURE — 21400001 HC TELEMETRY ROOM

## 2025-02-10 PROCEDURE — 83735 ASSAY OF MAGNESIUM: CPT | Performed by: REGISTERED NURSE

## 2025-02-10 PROCEDURE — 36415 COLL VENOUS BLD VENIPUNCTURE: CPT | Performed by: REGISTERED NURSE

## 2025-02-10 PROCEDURE — 25000003 PHARM REV CODE 250: Performed by: HOSPITALIST

## 2025-02-10 PROCEDURE — 27000207 HC ISOLATION

## 2025-02-10 PROCEDURE — 97165 OT EVAL LOW COMPLEX 30 MIN: CPT

## 2025-02-10 PROCEDURE — 63600175 PHARM REV CODE 636 W HCPCS: Performed by: HOSPITALIST

## 2025-02-10 PROCEDURE — 80053 COMPREHEN METABOLIC PANEL: CPT | Performed by: REGISTERED NURSE

## 2025-02-10 PROCEDURE — 84484 ASSAY OF TROPONIN QUANT: CPT | Performed by: HOSPITALIST

## 2025-02-10 PROCEDURE — 86922 COMPATIBILITY TEST ANTIGLOB: CPT | Performed by: REGISTERED NURSE

## 2025-02-10 PROCEDURE — 25000003 PHARM REV CODE 250: Performed by: REGISTERED NURSE

## 2025-02-10 PROCEDURE — 85025 COMPLETE CBC W/AUTO DIFF WBC: CPT | Performed by: REGISTERED NURSE

## 2025-02-10 PROCEDURE — 97161 PT EVAL LOW COMPLEX 20 MIN: CPT

## 2025-02-10 PROCEDURE — 97530 THERAPEUTIC ACTIVITIES: CPT

## 2025-02-10 PROCEDURE — 97116 GAIT TRAINING THERAPY: CPT

## 2025-02-10 RX ORDER — POTASSIUM CHLORIDE 20 MEQ/1
40 TABLET, EXTENDED RELEASE ORAL
Status: COMPLETED | OUTPATIENT
Start: 2025-02-10 | End: 2025-02-10

## 2025-02-10 RX ORDER — LOPERAMIDE HYDROCHLORIDE 2 MG/1
2 CAPSULE ORAL 4 TIMES DAILY PRN
Status: DISCONTINUED | OUTPATIENT
Start: 2025-02-10 | End: 2025-02-12 | Stop reason: HOSPADM

## 2025-02-10 RX ORDER — MAGNESIUM SULFATE HEPTAHYDRATE 40 MG/ML
2 INJECTION, SOLUTION INTRAVENOUS ONCE
Status: COMPLETED | OUTPATIENT
Start: 2025-02-10 | End: 2025-02-10

## 2025-02-10 RX ADMIN — CARVEDILOL 6.25 MG: 6.25 TABLET, FILM COATED ORAL at 10:02

## 2025-02-10 RX ADMIN — CARVEDILOL 6.25 MG: 6.25 TABLET, FILM COATED ORAL at 09:02

## 2025-02-10 RX ADMIN — BENZONATATE 200 MG: 100 CAPSULE ORAL at 01:02

## 2025-02-10 RX ADMIN — CLOPIDOGREL BISULFATE 75 MG: 75 TABLET ORAL at 10:02

## 2025-02-10 RX ADMIN — FINASTERIDE 5 MG: 5 TABLET, FILM COATED ORAL at 10:02

## 2025-02-10 RX ADMIN — LOPERAMIDE HYDROCHLORIDE 2 MG: 2 CAPSULE ORAL at 01:02

## 2025-02-10 RX ADMIN — CALCIUM CARBONATE (ANTACID) CHEW TAB 500 MG 1000 MG: 500 CHEW TAB at 09:02

## 2025-02-10 RX ADMIN — POTASSIUM CHLORIDE 40 MEQ: 1500 TABLET, EXTENDED RELEASE ORAL at 12:02

## 2025-02-10 RX ADMIN — MIRTAZAPINE 15 MG: 15 TABLET, FILM COATED ORAL at 09:02

## 2025-02-10 RX ADMIN — MAGNESIUM SULFATE HEPTAHYDRATE 2 G: 40 INJECTION, SOLUTION INTRAVENOUS at 10:02

## 2025-02-10 RX ADMIN — POTASSIUM CHLORIDE 40 MEQ: 1500 TABLET, EXTENDED RELEASE ORAL at 10:02

## 2025-02-10 RX ADMIN — FLUTICASONE PROPIONATE 50 MCG: 50 SPRAY, METERED NASAL at 10:02

## 2025-02-10 RX ADMIN — ACYCLOVIR 200 MG: 200 CAPSULE ORAL at 10:02

## 2025-02-10 RX ADMIN — ATORVASTATIN CALCIUM 80 MG: 40 TABLET, FILM COATED ORAL at 10:02

## 2025-02-10 RX ADMIN — TAMSULOSIN HYDROCHLORIDE 0.8 MG: 0.4 CAPSULE ORAL at 10:02

## 2025-02-10 NOTE — PT/OT/SLP EVAL
Physical Therapy Evaluation    Patient Name:  Haris Hernandez   MRN:  [id number]    Recommendations:     Discharge Recommendations: Moderate Intensity Therapy (pending progress)   Discharge Equipment Recommendations: walker, rolling   Barriers to discharge:  Pt requiring more assistance with mobility than premorbid status    Assessment:     Haris Hernandez is a 79 y.o. male admitted with a medical diagnosis of Pancytopenia.  He presents with the following impairments/functional limitations: weakness, impaired endurance, impaired sensation, impaired self care skills, impaired functional mobility, gait instability, impaired balance, impaired cardiopulmonary response to activity, decreased lower extremity function, decreased upper extremity function, decreased coordination, decreased ROM, edema, decreased safety awareness Pt cooperative throughout PT/OT evaluation.  Pt demo'd initial stance with RW with min/mod assist due to posterior LOB.  Then perf ambulation ~12' fwd/back and with change in direction with min assist for RW guidance and gait pattern deficits noted.  Recommend continued participation with acute therapy services and recommend ALMA upon discharge with ongoing assessment pending pt progress.  Recommend Rolling Walker for home use.  .    Rehab Prognosis: Good; patient would benefit from acute skilled PT services to address these deficits and reach maximum level of function.    Recent Surgery: * No surgery found *      Plan:     During this hospitalization, patient to be seen 5 x/week to address the identified rehab impairments via gait training, therapeutic activities, therapeutic exercises, neuromuscular re-education and progress toward the following goals:    Plan of Care Expires:  03/10/25    Subjective     Chief Complaint: reports no pain, no dizziness and no nausea throughout session;  no SOB demo'd  Patient/Family Comments/goals: PLOF   independence   Pain/Comfort:  Pain Rating 1: 0/10  Pain Rating  Post-Intervention 1: 0/10    Patients cultural, spiritual, Jew conflicts given the current situation: no    Living Environment:  Pt lives with his daughter in [facility] with no ALLY and threshold entry.  Pt uses tub/shower combo to shower and no DME.  Pt is a household ambulator without AD use.  He reports he does no furniture walk or use walls for balance.  However he has had several falls including a fall last week.     Equipment used at home: none.    Upon discharge, patient will have assistance from daughter as able.    Objective:     Communicated with nsg prior to session.  Patient found supine with bed alarm, telemetry  upon PT entry to room.    General Precautions: Standard, respiratory, other (see comments) (Enhanced respiratory)  Orthopedic Precautions:N/A   Braces: N/A  Respiratory Status: Room air    Exams:  Cognitive:  A & O x 4           follows commands appropriately;  safety awareness deficits during mobility task performance  ROM:  WFL BLE     LLE with tight hs and calf -   L DF to neutral only  MMT:    RLE grossly 4+/5 to 5/5 throughout           LLE hip mm groups grossly 4/5     distal mm groups 4+/5   Mildly edematous R ankle    Functional Mobility:  Bed mobility:  SBA rolling; sup scoot SBA and VC technique  Txfs :  sup>sit CGA and sit>sup with SBA  Static sit EOB balance Good -  provided posture montserrat  Txfs sit<>stand with initial stance min/mod assist 2* leaning back on bed and then posterior LOB req assist to regain balance.    Pre gait and gait:  amb with RW with min assist ~12'  with few steps fwd/back and route with change in direction req VC technique for safety zehra with RW use;        AM-PAC 6 CLICK MOBILITY  Total Score:18       Treatment & Education:  Ed provided to pt for PT POC, safety with mobility, safety with AD use.  Initiated bed mobility, txfs training, seated EOB and static stance balance act, pre gait and gait training.  Limited activity/distance ambulated this date 2* low H/H  [phone] with ECHO planned for today.  Pt asymptomatic during session.    Patient left supine with all lines intact, call button in reach, bed alarm on, and nsg notified.    GOALS:   Multidisciplinary Problems       Physical Therapy Goals          Problem: Physical Therapy    Goal Priority Disciplines Outcome Interventions   Physical Therapy Goal     PT, PT/OT Progressing    Description: Goals to be met by: discharge date     Patient will increase functional independence with mobility by performin. Supine to sit with Modified [place]  2. Sit to supine with Modified [place]  3. Sit to stand transfer with Stand-by Assistance  4. Bed to chair transfer with Stand-by Assistance using Rolling Walker  5. Gait  x 100 feet with Stand-by Assistance using Rolling Walker.                          DME Justifications:   [place]'s mobility limitation cannot be sufficiently resolved by the use of a cane. His functional mobility deficit can be sufficiently resolved with the use of a Rolling Walker. Patient's mobility limitation significantly impairs their ability to participate in one of more activities of daily living.  The use of a RW will significantly improve the patient's ability to participate in MRADLS and the patient will use it on regular basis in the home.    History:     Past Medical History:   Diagnosis Date    Coronary artery disease     Enlarged prostate without lower urinary tract symptoms (luts)     BPH    Enlarged prostate without lower urinary tract symptoms (luts)     BPH    Hypertension     Iron deficiency anemia secondary to inadequate dietary iron intake     Kappa light chain myeloma     Normocytic anemia     Oropharyngeal dysphagia     Renal disorder     Stage 3b chronic kidney disease (CKD)     Stroke     Thrombocytopenia, unspecified        Past Surgical History:   Procedure Laterality Date    APPENDECTOMY      BONE MARROW BIOPSY Right 2021    Procedure: BIOPSY-BONE MARROW;  Surgeon:  Jl Zamora MD;  Location: [facility];  Service: Oncology;  Laterality: Right;    EYE SURGERY         Time Tracking:     PT Received On: 02/10/25  PT Start Time: 0943     PT Stop Time: 1008  PT Total Time (min): 25 min     Billable Minutes: Evaluation 13 and Gait Training 12      02/10/2025

## 2025-02-10 NOTE — ASSESSMENT & PLAN NOTE
Patient's most recent potassium results are listed below.   Recent Labs     02/09/25  0234 02/09/25  1412 02/10/25  0222   K 3.0* 3.7 3.3*     Plan  - Replete potassium per protocol  - Monitor potassium Daily  - Patient's hypokalemia is stable  - replace

## 2025-02-10 NOTE — PLAN OF CARE
Problem: Physical Therapy  Goal: Physical Therapy Goal  Description: Goals to be met by: discharge date     Patient will increase functional independence with mobility by performin. Supine to sit with Modified [place]  2. Sit to supine with Modified [place]  3. Sit to stand transfer with Stand-by Assistance  4. Bed to chair transfer with Stand-by Assistance using Rolling Walker  5. Gait  x 100 feet with Stand-by Assistance using Rolling Walker.     Outcome: Progressing     Pt cooperative throughout PT/OT evaluation.  Pt demo'd initial stance with RW with min/mod assist due to posterior LOB.  Then perf ambulation ~12' fwd/back and with change in direction with min assist for RW guidance and gait pattern deficits noted.  Recommend continued participation with acute therapy services and recommend ALMA upon discharge with ongoing assessment pending pt progress.  Recommend Rolling Walker for home use.

## 2025-02-10 NOTE — PLAN OF CARE
Patient is awake and alert. Cardiac monitoring in place.  Urinal at bedside. Safety maintained. Bed alarm set. Instructed to use call light for assistance.         Problem: Adult Inpatient Plan of Care  Goal: Plan of Care Review  Outcome: Progressing     Problem: Diarrhea  Goal: Effective Diarrhea Management  Outcome: Progressing     Problem: Anemia  Goal: Anemia Symptom Improvement  Outcome: Progressing

## 2025-02-10 NOTE — ASSESSMENT & PLAN NOTE
Patient's blood pressure range in the last 24 hours was: BP  Min: 124/62  Max: 158/71.The patient's inpatient anti-hypertensive regimen is listed below:  Current Antihypertensives  carvediloL tablet 6.25 mg, 2 times daily, Oral    Plan  - BP is controlled, no changes needed to their regimen

## 2025-02-10 NOTE — PT/OT/SLP EVAL
Occupational Therapy   Evaluation    Name: Haris Hernandez  MRN: [id number]  Admitting Diagnosis: Pancytopenia  Recent Surgery: * No surgery found *      Recommendations:     Discharge Recommendations: Moderate Intensity Therapy  Discharge Equipment Recommendations:  walker, rolling  Barriers to discharge:  Decreased caregiver support    Assessment:     Haris Hernandez is a 79 y.o. male with a medical diagnosis of Pancytopenia.  He presents with the following performance deficits affecting function: weakness, impaired endurance, impaired self care skills, impaired functional mobility, gait instability, impaired balance, decreased coordination, decreased lower extremity function, decreased safety awareness, decreased ROM, impaired coordination, impaired skin.      Pt was agreeable to and participated in  OT/PT evaluation.  Pt reports that he was independent with out use of DME at [facility] in the areas of mobility and ADLS.  Pt completed his evaluation and noted to require setup-mod A with ADLs and CGA - min A with mobility.  Goals established to assist pt with returning to PLOF regarding ADLs and func mobility.  Pt will benefit from skilled OT services in order to increase his level of safety and independence with ADLs and mobility.      Rehab Prognosis: Good; patient would benefit from acute skilled OT services to address these deficits and reach maximum level of function.       Plan:     Patient to be seen 3 x/week to address the above listed problems via self-care/home management, therapeutic activities, therapeutic exercises  Plan of Care Expires: 03/12/25  Plan of Care Reviewed with: patient    Subjective     Chief Complaint: none given  Patient/Family Comments/goals: return to PLOF    Occupational Profile:  Living Environment: pt lives with his daughter in a [facility] with THE entrance - t/s combo for bathing  Previous level of function: independent with mobility and ADLs - no DME but has used RW in the past following a  stroke  Equipment Used at Home: none  Assistance upon Discharge: daughter will assist    Pain/Comfort:  Pain Rating Post-Intervention 1: 0/10    Patients cultural, spiritual, Temple conflicts given the current situation: no    Objective:     Communicated with: nurse prior to session.  Patient found HOB elevated with bed alarm, telemetry upon OT entry to room.    General Precautions: Standard, fall (enhanced respiratory)  Orthopedic Precautions: N/A  Braces: N/A  Respiratory Status: Room air    Occupational Performance:    Bed Mobility:    Patient completed Scooting/Bridging with stand by assistance  Patient completed Supine to Sit with contact guard assistance  Patient completed Sit to Supine with stand by assistance    Functional Mobility/Transfers:  Patient completed Sit <> Stand Transfer with min/mod A  with  rolling walker   Functional Mobility: pt able to walk within room using RW with CGA-min A    Activities of Daily Living:  Feeding:    reports does not need A opening containers  Grooming: set up A    Lower Body Dressing: moderate assistance able to doff sock, but needed A to [place]    Cognitive/Visual Perceptual:  Cognitive/Psychosocial Skills:     -       Oriented to: Person, Place, Time, and Situation   -       Follows Commands/attention:Follows one-step commands  -       Communication: clear/fluent and some difficulty with word finding noted (residual from past stroke)  -       Memory: No Deficits noted  -       Safety awareness/insight to disability: impaired   -       Mood/Affect/Coping skills/emotional control: Appropriate to situation    Physical Exam:  Balance: -       sit = good; stand = CGA/min A with RW  Postural examination/scapula alignment:    -       Rounded shoulders  -       Forward head  -       Posterior pelvic tilt  -       Abnormal trunk flexion  Skin integrity: dry feet  Edema:  Mild R foot  Upper Extremity Range of Motion:   BUEs = WFL for ADLS  Upper Extremity Strength:  BUEs = WFL  for ADLS   Strength:  BUEs = WFL for ADLS    AMPAC 6 Click ADL:  AMPAC Total Score: 16    Treatment & Education:  Pt completed ADLs and func mobility activities for tx session as noted above  Pt educated on role of OT and POC      Patient left HOB elevated with call button in reach and bed alarm on    GOALS:   Multidisciplinary Problems       Occupational Therapy Goals          Problem: Occupational Therapy    Goal Priority Disciplines Outcome Interventions   Occupational Therapy Goal     OT, PT/OT Progressing    Description: Goals to be met by: 03/09/25     Patient will increase functional independence with ADLs by performing:    UE Dressing with Modified [place].  LE Dressing with Modified [place].  Grooming while standing at sink with Modified [place].  Toileting from toilet with Modified [place] for hygiene and clothing management.   Toilet transfer to toilet with Modified [place].  Upper extremity exercise program 3x10 reps per handout, with supervision.                         DME Justifications:   [place]'s mobility limitation cannot be sufficiently resolved by the use of a cane. His functional mobility deficit can be sufficiently resolved with the use of a Rolling Walker. Patient's mobility limitation significantly impairs their ability to participate in one of more activities of daily living.  The use of a RW will significantly improve the patient's ability to participate in MRADLS and the patient will use it on regular basis in the home.    History:     Past Medical History:   Diagnosis Date    Coronary artery disease     Enlarged prostate without lower urinary tract symptoms (luts)     BPH    Enlarged prostate without lower urinary tract symptoms (luts)     BPH    Hypertension     Iron deficiency anemia secondary to inadequate dietary iron intake     Kappa light chain myeloma     Normocytic anemia     Oropharyngeal dysphagia     Renal disorder     Stage 3b chronic kidney  disease (CKD)     Stroke     Thrombocytopenia, unspecified          Past Surgical History:   Procedure Laterality Date    APPENDECTOMY      BONE MARROW BIOPSY Right 8/23/2021    Procedure: BIOPSY-BONE MARROW;  Surgeon: Jl Zamora MD;  Location: [facility];  Service: Oncology;  Laterality: Right;    EYE SURGERY         Time Tracking:     OT Date of Treatment: 02/10/25  OT Start Time: 0943  OT Stop Time: 1008  OT Total Time (min): 25 min cotx with PT    Billable Minutes:Evaluation 10  Therapeutic Activity 15    2/10/2025

## 2025-02-10 NOTE — PLAN OF CARE
Problem: Occupational Therapy  Goal: Occupational Therapy Goal  Description: Goals to be met by: 03/09/25     Patient will increase functional independence with ADLs by performing:    UE Dressing with Modified [place].  LE Dressing with Modified [place].  Grooming while standing at sink with Modified [place].  Toileting from toilet with Modified [place] for hygiene and clothing management.   Toilet transfer to toilet with Modified [place].  Upper extremity exercise program 3x10 reps per handout, with supervision.    Outcome: Progressing     Pt was agreeable to and participated in  OT/PT evaluation.  Pt reports that he was independent with out use of DME at [facility] in the areas of mobility and ADLS.  Pt completed his evaluation and noted to require setup-mod A with ADLs and CGA - min A with mobility.  Goals established to assist pt with returning to [facility] regarding ADLs and func mobility.  Pt will benefit from skilled OT services in order to increase his level of safety and independence with ADLs and mobility.      Isis Mcnamara, OT  2/10/2025

## 2025-02-10 NOTE — PROGRESS NOTES
[facility] Medicine  Progress Note    Patient Name: Haris Hernandez  MRN: [id number]  Patient Class: IP- Inpatient   Admission Date: 2/8/2025  Length of Stay: 2 days  Attending Physician: Moris Conklin,*  Primary Care Provider: KIANA Call MD        Subjective     Principal Problem:Pancytopenia        HPI:  Patient is a 79-year-old male with a history of CVA, HTN, presenting to ED with shortness a breath, cough, weakness.  Patient is a to have a troponin fall with a small abrasion on the base of his nose who and he can not tell me entire story of how this happened.  He reports his daughter just told him Im taking you to the hospital in ED labwork remarkable for pancytopenia, calcium low at 5.9.  Magnesium 1.0.  Troponin 0.9, repeat 1.4.  Patient denies any chest pain.  Patient was given IV fluids and calcium gluconate.  Patient will be admitted to Hospital Medicine we will consult Cardiology given his elevated troponin.    Overview/Hospital Course:  No notes on file    Interval History: feeling well. Loose BM today but only once today and yesterday. Working with PT/OT. Not transfused overnight due to concern with antibodies in blood but Hb has improved today without transfusion. Will continue to monitor for now without transfusion.    Review of Systems  Objective:     Vital Signs (Most Recent):  Temp: 97.3 °F (36.3 °C) (02/10/25 1117)  Pulse: 62 (02/10/25 1117)  Resp: 18 (02/10/25 1117)  BP: (!) 129/59 (02/10/25 1117)  SpO2: 95 % (02/10/25 1117) Vital Signs (24h Range):  Temp:  [97.3 °F (36.3 °C)-99.1 °F (37.3 °C)] 97.3 °F (36.3 °C)  Pulse:  [61-83] 62  Resp:  [18-20] 18  SpO2:  [92 %-97 %] 95 %  BP: (124-158)/(59-74) 129/59     Weight: 62 kg (136 lb 11 oz)  Body mass index is 19.06 kg/m².    Intake/Output Summary (Last 24 hours) at 2/10/2025 0167  Last data filed at 2/10/2025 0845  Gross per 24 hour   Intake 120 ml   Output --   Net 120 ml         Physical Exam  Vitals reviewed.    Constitutional:       Appearance: Normal appearance.   HENT:      Head:        Mouth/Throat:      Mouth: Mucous membranes are dry.      Pharynx: Oropharynx is clear.   Eyes:      Pupils: Pupils are equal, round, and reactive to light.   Cardiovascular:      Rate and Rhythm: Normal rate and regular rhythm.      Pulses: Normal pulses.      Heart sounds: Normal heart sounds.   Pulmonary:      Effort: Pulmonary effort is normal.      Breath sounds: Normal breath sounds.   Abdominal:      General: Abdomen is flat.   Musculoskeletal:         General: Normal range of motion.      Cervical back: Normal range of motion.   Skin:     General: Skin is warm and dry.      Capillary Refill: Capillary refill takes less than 2 seconds.   Neurological:      General: No focal deficit present.      Mental Status: He is alert and oriented to person, place, and time. Mental status is at baseline.   Psychiatric:         Mood and Affect: Mood normal.         Behavior: Behavior normal.             Significant Labs: All pertinent labs within the past 24 hours have been reviewed.    Significant Imaging: I have reviewed all pertinent imaging results/findings within the past 24 hours.    Assessment and Plan     * Pancytopenia  This patient is found to have pancytopenia, the likely etiology is Drug induced (including chemotherapy) related to Darzalex , will monitor CBC Daily. Will transfuse red blood cells if the hemoglobin is <7g/dL (or <8 in the setting of ACS). Will transfuse platelets if platelet count is <10k. Hold DVT prophylaxis if platelets are <50k. The patient's hemoglobin, white blood cell count, and platelet count results have been reviewed and are listed below.  Recent Labs   Lab 02/09/25  0233   HGB 6.0*   WBC 1.49*   PLT 98*     Put in and WBC stable, hemoglobin down to 6.0.  Patient does have antibodies.  We will attempt P 1 unit of transfusion when blood available.  Hemodynamically stable.    - due to immunotherapy  -  transfusion held due to antibodies but anemia has improved without therapy; continue to monitor for now    Hypokalemia  Patient's most recent potassium results are listed below.   Recent Labs     02/09/25  0234 02/09/25  1412 02/10/25  0222   K 3.0* 3.7 3.3*     Plan  - Replete potassium per protocol  - Monitor potassium Daily  - Patient's hypokalemia is stable  - replace    Weakness  - PT/OT      NELLIE (acute kidney injury)  NELLIE is likely due to pre-renal azotemia due to dehydration. Baseline creatinine is  1.8 . Most recent creatinine and eGFR are listed below.  Recent Labs     02/08/25  1542 02/09/25  0234   CREATININE 2.59* 2.2*   EGFRNORACEVR 24.7* 30*        Plan  - NELLIE is improving  - Avoid nephrotoxins and renally dose meds for GFR listed above  - Monitor urine output, serial BMP, and adjust therapy as needed  -     Hypomagnesemia  Patient has Abnormal Magnesium: hypomagnesemia. Will continue to monitor electrolytes closely. Will replace the affected electrolytes and repeat labs to be done after interventions completed. The patient's magnesium results have been reviewed and are listed below.  Recent Labs   Lab 02/09/25  0234   MG 1.0*          Hypocalcemia  Hypocalcemia is likely due to Metastatic related to kappa light chain myeloma and Drug related - . . The most recent calcium and albumin results listed below.  Recent Labs     02/08/25  1542 02/09/25  0234   CALCIUM 5.9* 6.3*   ALBUMIN 3.1* 2.2*   CAION  --  0.84*       Plan  - Will replace calcium and monitor electrolytes closely.  - The patient's hypocalcemia is improving  - Resume home Tums, replace with IV calcium  - replace Mg    Kappa light chain myeloma  Currently followed outpatient by Dr. Ríos  Currently on Darzalex      Elevated troponin  Initial troponin 0.9, repeat 1.4.  Patient denies any chest pain.  Only symptom has been weakness.  Denies any shortness a breath.  EKG without any ST elevations.  Consult Cardiology for eval.  - trend  troponins  - TTE pending  - continue ASA, plavix, statin  - likely demand in setting of anemia/electrolyte disturbance    HTN (hypertension)  Patient's blood pressure range in the last 24 hours was: BP  Min: 113/67  Max: 145/66.The patient's inpatient anti-hypertensive regimen is listed below:  Current Antihypertensives  carvediloL tablet 6.25 mg, 2 times daily, Oral    Plan  - BP is controlled, no changes needed to their regimen  -      VTE Risk Mitigation (From admission, onward)           Ordered     IP VTE HIGH RISK PATIENT  Once         02/09/25 0221     Place sequential compression device  Until discontinued         02/09/25 0221                    Discharge Planning   LADI: 2/10/2025     Code Status: Full Code   Medical Readiness for Discharge Date:                    Please place Justification for DME        Moris Conklin MD  Department of Hospital Medicine   [facility]

## 2025-02-10 NOTE — PLAN OF CARE
SAUNDRA met with pt at bedside to discuss dc planning. All information confirmed on chart. Pt resides in home with his daughter. Pt is independent in ADLs. Pt has no dme/hh services at home. At time of dc pts daughter will provide transport home. SAUNDRA discussed PT/OT recs. Pt declined SNF placement. Pt agreeable to HH services. SAUNDRA sent HH referral via epic. SAUNDRA will continue to follow pt throughout his transitions of care and assist with any dc needs.     SAUNDRA requested PCP follow up.     Future Appointments   Date Time Provider Department Center   3/5/2025  9:30 AM Michael Anand MD [facility] HEM [facility]        02/10/25 0826   Discharge Assessment   Assessment Type Discharge Planning Assessment   Confirmed/corrected address, phone number and insurance Yes   Confirmed Demographics Correct on Facesheet   Source of Information patient   Reason For Admission Pancytopenia   People in Home child(fan), adult   Do you expect to return to your current living situation? Yes   Do you have help at home or someone to help you manage your care at home? Yes   Who are your caregiver(s) and their phone number(s)? Bre Hernandez (Daughter)  [phone]   Current cognitive status: Alert/Oriented   Home Layout Able to live on 1st floor   Equipment Currently Used at Home none   Readmission within 30 days? No   Patient currently being followed by outpatient case management? No   Do you currently have service(s) that help you manage your care at home? No   Do you take prescription medications? Yes   Do you have prescription coverage? Yes   Coverage [employer] MANAGED MEDICARE - [employer] MEDICARE HMO   Do you have any problems affording any of your prescribed medications? No   Is the patient taking medications as prescribed? yes   Who is going to help you get home at discharge? Bre Hernandez (Daughter)  [phone]   Are you on dialysis? No   Discharge Plan A Home with family;Home Health   DME Needed Upon Discharge  none   Discharge Plan  discussed with: Patient   Transition of Care Barriers None   Financial Resource Strain   How hard is it for you to pay for the very basics like food, housing, medical care, and heating? Not hard   Housing Stability   In the last 12 months, was there a time when you were not able to pay the mortgage or rent on time? N   At any time in the past 12 months, were you homeless or living in a shelter (including now)? N   Transportation Needs   Has the lack of transportation kept you from medical appointments, meetings, work or from getting things needed for daily living? No   Food Insecurity   Within the past 12 months, you worried that your food would run out before you got the money to buy more. Never true   Within the past 12 months, the food you bought just didn't last and you didn't have money to get more. Never true   Stress   Do you feel stress - tense, restless, nervous, or anxious, or unable to sleep at night because your mind is troubled all the time - these days? Only a littl   Social Isolation   How often do you feel lonely or isolated from those around you?  Never   Alcohol Use   Q1: How often do you have a drink containing alcohol? Never   Q2: How many drinks containing alcohol do you have on a typical day when you are drinking? None   Q3: How often do you have six or more drinks on one occasion? Never   [employer]   In the past 12 months has the electric, gas, oil, or water company threatened to shut off services in your home? No   Health Literacy   How often do you need to have someone help you when you read instructions, pamphlets, or other written material from your doctor or pharmacy? Always  (daughter assist)   OTHER   Name(s) of People in Home HernandezBre welch (Daughter)  [phone]

## 2025-02-10 NOTE — SUBJECTIVE & OBJECTIVE
Interval History: feeling well. Loose BM today but only once today and yesterday. Working with PT/OT. Not transfused overnight due to concern with antibodies in blood but Hb has improved today without transfusion. Will continue to monitor for now without transfusion.    Review of Systems  Objective:     Vital Signs (Most Recent):  Temp: 97.3 °F (36.3 °C) (02/10/25 1117)  Pulse: 62 (02/10/25 1117)  Resp: 18 (02/10/25 1117)  BP: (!) 129/59 (02/10/25 1117)  SpO2: 95 % (02/10/25 1117) Vital Signs (24h Range):  Temp:  [97.3 °F (36.3 °C)-99.1 °F (37.3 °C)] 97.3 °F (36.3 °C)  Pulse:  [61-83] 62  Resp:  [18-20] 18  SpO2:  [92 %-97 %] 95 %  BP: (124-158)/(59-74) 129/59     Weight: 62 kg (136 lb 11 oz)  Body mass index is 19.06 kg/m².    Intake/Output Summary (Last 24 hours) at 2/10/2025 1445  Last data filed at 2/10/2025 0845  Gross per 24 hour   Intake 120 ml   Output --   Net 120 ml         Physical Exam  Vitals reviewed.   Constitutional:       Appearance: Normal appearance.   HENT:      Head:        Mouth/Throat:      Mouth: Mucous membranes are dry.      Pharynx: Oropharynx is clear.   Eyes:      Pupils: Pupils are equal, round, and reactive to light.   Cardiovascular:      Rate and Rhythm: Normal rate and regular rhythm.      Pulses: Normal pulses.      Heart sounds: Normal heart sounds.   Pulmonary:      Effort: Pulmonary effort is normal.      Breath sounds: Normal breath sounds.   Abdominal:      General: Abdomen is flat.   Musculoskeletal:         General: Normal range of motion.      Cervical back: Normal range of motion.   Skin:     General: Skin is warm and dry.      Capillary Refill: Capillary refill takes less than 2 seconds.   Neurological:      General: No focal deficit present.      Mental Status: He is alert and oriented to person, place, and time. Mental status is at baseline.   Psychiatric:         Mood and Affect: Mood normal.         Behavior: Behavior normal.             Significant Labs: All  pertinent labs within the past 24 hours have been reviewed.    Significant Imaging: I have reviewed all pertinent imaging results/findings within the past 24 hours.

## 2025-02-10 NOTE — ASSESSMENT & PLAN NOTE
Troponin mildly elevated and peaked at 1.4 in setting of electrolyte derangements which have been addressed  TTE pending for completeness

## 2025-02-10 NOTE — PROGRESS NOTES
[facility]  Cardiology  Progress Note    Patient Name: Haris Hernandez  MRN: [id number]  Admission Date: 2/8/2025  Hospital Length of Stay: 2 days  Code Status: Full Code   Attending Physician: Moris Conklin,*   Primary Care Physician: KIANA Call MD  Expected Discharge Date: 2/10/2025  Principal Problem:Pancytopenia    Subjective:     Hospital Course:   02/10/2025 Hemodynamically stable. TTE pending. No CP reported.       Review of Systems   Cardiovascular:  Negative for chest pain, near-syncope, orthopnea, palpitations, paroxysmal nocturnal dyspnea and syncope.   Musculoskeletal: Negative.    Neurological: Negative.      Objective:     Vital Signs (Most Recent):  Temp: 97.7 °F (36.5 °C) (02/10/25 0728)  Pulse: 61 (02/10/25 1019)  Resp: 18 (02/10/25 0728)  BP: (!) 158/71 (02/10/25 0728)  SpO2: 96 % (02/10/25 0728) Vital Signs (24h Range):  Temp:  [97.7 °F (36.5 °C)-99.1 °F (37.3 °C)] 97.7 °F (36.5 °C)  Pulse:  [61-83] 61  Resp:  [18-20] 18  SpO2:  [92 %-97 %] 96 %  BP: (124-158)/(62-74) 158/71     Weight: 62 kg (136 lb 11 oz)  Body mass index is 19.06 kg/m².     SpO2: 96 %         Intake/Output Summary (Last 24 hours) at 2/10/2025 1048  Last data filed at 2/10/2025 0845  Gross per 24 hour   Intake 120 ml   Output --   Net 120 ml       Lines/Drains/Airways       Peripheral Intravenous Line  Duration                  Peripheral IV - Single Lumen 02/08/25 1542 20 G Right Antecubital 1 day                       Physical Exam  Constitutional:       General: He is not in acute distress.     Appearance: He is not diaphoretic.   HENT:      Head: Atraumatic.   Eyes:      General:         Right eye: No discharge.         Left eye: No discharge.   Cardiovascular:      Rate and Rhythm: Normal rate and regular rhythm.   Pulmonary:      Effort: Pulmonary effort is normal.      Breath sounds: Normal breath sounds.   Abdominal:      General: Bowel sounds are normal.      Palpations: Abdomen is soft.   Skin:      "General: Skin is warm and dry.   Neurological:      Mental Status: He is alert. Mental status is at baseline.        Significant Labs: BMP:   Recent Labs   Lab 02/09/25  0234 02/09/25  1412 02/10/25  0222    123* 112*    140 139   K 3.0* 3.7 3.3*   * 109 111*   CO2 20* 15* 16*   BUN 30* 26* 26*   CREATININE 2.2* 2.1* 2.1*   CALCIUM 6.3* 7.2* 7.4*   MG 1.0* 1.6 1.6   , CMP   Recent Labs   Lab 02/08/25  1542 02/09/25  0234 02/09/25  1412 02/10/25  0222    141 140 139   K 3.4* 3.0* 3.7 3.3*    111* 109 111*   CO2 22* 20* 15* 16*   * 106 123* 112*   BUN 34* 30* 26* 26*   CREATININE 2.59* 2.2* 2.1* 2.1*   CALCIUM 5.9* 6.3* 7.2* 7.4*   PROT 6.3 5.3*  --  5.8*   ALBUMIN 3.1* 2.2*  --  2.4*   BILITOT 0.9 0.6  --  0.6   ALKPHOS 54 43  --  50   AST 32 20  --  19   ALT 38 30  --  25   ANIONGAP 14 10 16 12   , CBC   Recent Labs   Lab 02/08/25  1542 02/09/25  0233 02/10/25  0222   WBC 1.98* 1.49* 2.05*   HGB 7.0* 6.0* 7.3*   HCT 19.9* 16.5* 20.9*   PLT 93* 98* 125*   , INR No results for input(s): "INR", "PROTIME" in the last 48 hours., Lipid Panel No results for input(s): "CHOL", "HDL", "LDLCALC", "TRIG", "CHOLHDL" in the last 48 hours., Troponin No results for input(s): "TROPONINIHS" in the last 48 hours., and All pertinent lab results from the last 24 hours have been reviewed.    Significant Imaging: Echocardiogram: Transthoracic echo (TTE) complete (Cupid Only):   Results for orders placed or performed during the hospital encounter of 08/13/21   Echo   Result Value Ref Range    BSA 1.97 m2    TDI SEPTAL 0.06 m/s    LV LATERAL E/E' RATIO 12.00 m/s    LV SEPTAL E/E' RATIO 14.00 m/s    LA WIDTH 2.96 cm    TDI LATERAL 0.07 m/s    LVIDd 4.58 3.5 - 6.0 cm    IVS 1.33 (A) 0.6 - 1.1 cm    PW 1.15 (A) 0.6 - 1.1 cm    Ao root annulus 3.11 cm    LVIDs 3.14 2.1 - 4.0 cm    FS 31 28 - 44 %    LA Vol 48.72 cm3    LV mass 213.45 g    LA size 3.40 cm    RV S' 17.07 cm/s    Left Ventricle Relative Wall " "Thickness 0.50 cm    AV mean gradient 7 mmHg    AV valve area 2.19 cm2    AV Velocity Ratio 0.52     AV index (prosthetic) 0.61     MV mean gradient 1 mmHg    MV valve area p 1/2 method 4.42 cm2    E/A ratio 0.85     Mean e' 0.07 m/s    IVRT 88.49 msec    MV "A" wave duration 13.99 msec    Pulm vein S/D ratio 1.56     LVOT diameter 2.14 cm    LVOT area 3.6 cm2    LVOT peak nelson 0.99 m/s    LVOT peak VTI 21.12 cm    Ao peak nelson 1.91 m/s    Ao VTI 34.72 cm    LVOT stroke volume 75.93 cm3    AV peak gradient 15 mmHg    MV peak gradient 4 mmHg    E/E' ratio 12.92 m/s    MV Peak E Nelson 0.84 m/s    TR Max Nelson 2.16 m/s    MV stenosis pressure 1/2 time 49.72 ms    MV Peak A Nelson 0.99 m/s    PV Peak S Nelson 0.84 m/s    PV Peak D Nelson 0.54 m/s    LV Systolic Volume 39.25 mL    LV Systolic Volume Index 19.8 mL/m2    LV Diastolic Volume 96.38 mL    LV Diastolic Volume Index 48.68 mL/m2    RASHID 24.6 mL/m2    LV Mass Index 108 g/m2    RA Major Axis 4.71 cm    Left Atrium Minor Axis 5.72 cm    Left Atrium Major Axis 5.67 cm    Triscuspid Valve Regurgitation Peak Gradient 19 mmHg    RA Width 3.22 cm    Right Atrial Pressure (from IVC) 3 mmHg    EF 65 %    TV resting pulmonary artery pressure 22 mmHg    Narrative    · Left ventricular concentric remodeling and normal systolic function.  · The estimated ejection fraction is 65%.  · Normal left ventricular diastolic function.  · Normal right ventricular size with normal right ventricular systolic   function.  · Normal central venous pressure (3 mmHg).  · The estimated PA systolic pressure is 22 mmHg.        Assessment and Plan:     Brief HPI: Patient seen this morning on rounds. TTE pending. No cardiac complaints reported.     Elevated troponin  Troponin mildly elevated and peaked at 1.4 in setting of electrolyte derangements which have been addressed  TTE pending for completeness      HTN (hypertension)  Patient's blood pressure range in the last 24 hours was: BP  Min: 124/62  Max: " 158/71.The patient's inpatient anti-hypertensive regimen is listed below:  Current Antihypertensives  carvediloL tablet 6.25 mg, 2 times daily, Oral    Plan  - BP is controlled, no changes needed to their regimen          VTE Risk Mitigation (From admission, onward)           Ordered     IP VTE HIGH RISK PATIENT  Once         02/09/25 0221     Place sequential compression device  Until discontinued         02/09/25 0221                    Bandar Novoa NP  Cardiology  [facility]

## 2025-02-10 NOTE — SUBJECTIVE & OBJECTIVE
Review of Systems   Cardiovascular:  Negative for chest pain, near-syncope, orthopnea, palpitations, paroxysmal nocturnal dyspnea and syncope.   Musculoskeletal: Negative.    Neurological: Negative.      Objective:     Vital Signs (Most Recent):  Temp: 97.7 °F (36.5 °C) (02/10/25 0728)  Pulse: 61 (02/10/25 1019)  Resp: 18 (02/10/25 0728)  BP: (!) 158/71 (02/10/25 0728)  SpO2: 96 % (02/10/25 0728) Vital Signs (24h Range):  Temp:  [97.7 °F (36.5 °C)-99.1 °F (37.3 °C)] 97.7 °F (36.5 °C)  Pulse:  [61-83] 61  Resp:  [18-20] 18  SpO2:  [92 %-97 %] 96 %  BP: (124-158)/(62-74) 158/71     Weight: 62 kg (136 lb 11 oz)  Body mass index is 19.06 kg/m².     SpO2: 96 %         Intake/Output Summary (Last 24 hours) at 2/10/2025 1048  Last data filed at 2/10/2025 0845  Gross per 24 hour   Intake 120 ml   Output --   Net 120 ml       Lines/Drains/Airways       Peripheral Intravenous Line  Duration                  Peripheral IV - Single Lumen 02/08/25 1542 20 G Right Antecubital 1 day                       Physical Exam  Constitutional:       General: He is not in acute distress.     Appearance: He is not diaphoretic.   HENT:      Head: Atraumatic.   Eyes:      General:         Right eye: No discharge.         Left eye: No discharge.   Cardiovascular:      Rate and Rhythm: Normal rate and regular rhythm.   Pulmonary:      Effort: Pulmonary effort is normal.      Breath sounds: Normal breath sounds.   Abdominal:      General: Bowel sounds are normal.      Palpations: Abdomen is soft.   Skin:     General: Skin is warm and dry.   Neurological:      Mental Status: He is alert. Mental status is at baseline.        Significant Labs: BMP:   Recent Labs   Lab 02/09/25  0234 02/09/25  1412 02/10/25  0222    123* 112*    140 139   K 3.0* 3.7 3.3*   * 109 111*   CO2 20* 15* 16*   BUN 30* 26* 26*   CREATININE 2.2* 2.1* 2.1*   CALCIUM 6.3* 7.2* 7.4*   MG 1.0* 1.6 1.6   , CMP   Recent Labs   Lab 02/08/25  1542 02/09/25  023  "02/09/25  1412 02/10/25  0222    141 140 139   K 3.4* 3.0* 3.7 3.3*    111* 109 111*   CO2 22* 20* 15* 16*   * 106 123* 112*   BUN 34* 30* 26* 26*   CREATININE 2.59* 2.2* 2.1* 2.1*   CALCIUM 5.9* 6.3* 7.2* 7.4*   PROT 6.3 5.3*  --  5.8*   ALBUMIN 3.1* 2.2*  --  2.4*   BILITOT 0.9 0.6  --  0.6   ALKPHOS 54 43  --  50   AST 32 20  --  19   ALT 38 30  --  25   ANIONGAP 14 10 16 12   , CBC   Recent Labs   Lab 02/08/25  1542 02/09/25  0233 02/10/25  0222   WBC 1.98* 1.49* 2.05*   HGB 7.0* 6.0* 7.3*   HCT 19.9* 16.5* 20.9*   PLT 93* 98* 125*   , INR No results for input(s): "INR", "PROTIME" in the last 48 hours., Lipid Panel No results for input(s): "CHOL", "HDL", "LDLCALC", "TRIG", "CHOLHDL" in the last 48 hours., Troponin No results for input(s): "TROPONINIHS" in the last 48 hours., and All pertinent lab results from the last 24 hours have been reviewed.    Significant Imaging: Echocardiogram: Transthoracic echo (TTE) complete (Cupid Only):   Results for orders placed or performed during the hospital encounter of 08/13/21   Echo   Result Value Ref Range    BSA 1.97 m2    TDI SEPTAL 0.06 m/s    LV LATERAL E/E' RATIO 12.00 m/s    LV SEPTAL E/E' RATIO 14.00 m/s    LA WIDTH 2.96 cm    TDI LATERAL 0.07 m/s    LVIDd 4.58 3.5 - 6.0 cm    IVS 1.33 (A) 0.6 - 1.1 cm    PW 1.15 (A) 0.6 - 1.1 cm    Ao root annulus 3.11 cm    LVIDs 3.14 2.1 - 4.0 cm    FS 31 28 - 44 %    LA Vol 48.72 cm3    LV mass 213.45 g    LA size 3.40 cm    RV S' 17.07 cm/s    Left Ventricle Relative Wall Thickness 0.50 cm    AV mean gradient 7 mmHg    AV valve area 2.19 cm2    AV Velocity Ratio 0.52     AV index (prosthetic) 0.61     MV mean gradient 1 mmHg    MV valve area p 1/2 method 4.42 cm2    E/A ratio 0.85     Mean e' 0.07 m/s    IVRT 88.49 msec    MV "A" wave duration 13.99 msec    Pulm vein S/D ratio 1.56     LVOT diameter 2.14 cm    LVOT area 3.6 cm2    LVOT peak scar 0.99 m/s    LVOT peak VTI 21.12 cm    Ao peak scar 1.91 m/s    Ao " VTI 34.72 cm    LVOT stroke volume 75.93 cm3    AV peak gradient 15 mmHg    MV peak gradient 4 mmHg    E/E' ratio 12.92 m/s    MV Peak E Nelson 0.84 m/s    TR Max Nelson 2.16 m/s    MV stenosis pressure 1/2 time 49.72 ms    MV Peak A Nelson 0.99 m/s    PV Peak S Nelson 0.84 m/s    PV Peak D Nelson 0.54 m/s    LV Systolic Volume 39.25 mL    LV Systolic Volume Index 19.8 mL/m2    LV Diastolic Volume 96.38 mL    LV Diastolic Volume Index 48.68 mL/m2    RASHID 24.6 mL/m2    LV Mass Index 108 g/m2    RA Major Axis 4.71 cm    Left Atrium Minor Axis 5.72 cm    Left Atrium Major Axis 5.67 cm    Triscuspid Valve Regurgitation Peak Gradient 19 mmHg    RA Width 3.22 cm    Right Atrial Pressure (from IVC) 3 mmHg    EF 65 %    TV resting pulmonary artery pressure 22 mmHg    Narrative    · Left ventricular concentric remodeling and normal systolic function.  · The estimated ejection fraction is 65%.  · Normal left ventricular diastolic function.  · Normal right ventricular size with normal right ventricular systolic   function.  · Normal central venous pressure (3 mmHg).  · The estimated PA systolic pressure is 22 mmHg.

## 2025-02-11 LAB
ALBUMIN SERPL BCP-MCNC: 2.1 G/DL (ref 3.5–5.2)
ALP SERPL-CCNC: 44 U/L (ref 40–150)
ALT SERPL W/O P-5'-P-CCNC: 21 U/L (ref 10–44)
ANION GAP SERPL CALC-SCNC: 9 MMOL/L (ref 8–16)
ANISOCYTOSIS BLD QL SMEAR: SLIGHT
AST SERPL-CCNC: 13 U/L (ref 10–40)
BASOPHILS # BLD AUTO: 0.01 K/UL (ref 0–0.2)
BASOPHILS NFR BLD: 0.6 % (ref 0–1.9)
BILIRUB SERPL-MCNC: 0.5 MG/DL (ref 0.1–1)
BUN SERPL-MCNC: 22 MG/DL (ref 8–23)
BURR CELLS BLD QL SMEAR: ABNORMAL
CALCIUM SERPL-MCNC: 7.4 MG/DL (ref 8.7–10.5)
CHLORIDE SERPL-SCNC: 110 MMOL/L (ref 95–110)
CO2 SERPL-SCNC: 17 MMOL/L (ref 23–29)
CREAT SERPL-MCNC: 2 MG/DL (ref 0.5–1.4)
DIFFERENTIAL METHOD BLD: ABNORMAL
EOSINOPHIL # BLD AUTO: 0 K/UL (ref 0–0.5)
EOSINOPHIL NFR BLD: 1.7 % (ref 0–8)
ERYTHROCYTE [DISTWIDTH] IN BLOOD BY AUTOMATED COUNT: 16.5 % (ref 11.5–14.5)
EST. GFR  (NO RACE VARIABLE): 34 ML/MIN/1.73 M^2
GLUCOSE SERPL-MCNC: 108 MG/DL (ref 70–110)
HCT VFR BLD AUTO: 18.9 % (ref 40–54)
HGB BLD-MCNC: 6.7 G/DL (ref 14–18)
HYPOCHROMIA BLD QL SMEAR: ABNORMAL
IMM GRANULOCYTES # BLD AUTO: 0.01 K/UL (ref 0–0.04)
IMM GRANULOCYTES NFR BLD AUTO: 0.6 % (ref 0–0.5)
LYMPHOCYTES # BLD AUTO: 0.4 K/UL (ref 1–4.8)
LYMPHOCYTES NFR BLD: 20.9 % (ref 18–48)
MAGNESIUM SERPL-MCNC: 1.6 MG/DL (ref 1.6–2.6)
MCH RBC QN AUTO: 30.6 PG (ref 27–31)
MCHC RBC AUTO-ENTMCNC: 35.4 G/DL (ref 32–36)
MCV RBC AUTO: 86 FL (ref 82–98)
MONOCYTES # BLD AUTO: 0.2 K/UL (ref 0.3–1)
MONOCYTES NFR BLD: 8.7 % (ref 4–15)
NEUTROPHILS # BLD AUTO: 1.2 K/UL (ref 1.8–7.7)
NEUTROPHILS NFR BLD: 67.5 % (ref 38–73)
NRBC BLD-RTO: 0 /100 WBC
OVALOCYTES BLD QL SMEAR: ABNORMAL
PLATELET # BLD AUTO: 119 K/UL (ref 150–450)
PLATELET BLD QL SMEAR: ABNORMAL
PMV BLD AUTO: 12 FL (ref 9.2–12.9)
POIKILOCYTOSIS BLD QL SMEAR: SLIGHT
POLYCHROMASIA BLD QL SMEAR: ABNORMAL
POTASSIUM SERPL-SCNC: 3.8 MMOL/L (ref 3.5–5.1)
PROT SERPL-MCNC: 5.4 G/DL (ref 6–8.4)
RBC # BLD AUTO: 2.19 M/UL (ref 4.6–6.2)
SODIUM SERPL-SCNC: 136 MMOL/L (ref 136–145)
WBC # BLD AUTO: 1.72 K/UL (ref 3.9–12.7)

## 2025-02-11 PROCEDURE — 85025 COMPLETE CBC W/AUTO DIFF WBC: CPT | Performed by: REGISTERED NURSE

## 2025-02-11 PROCEDURE — 27000207 HC ISOLATION

## 2025-02-11 PROCEDURE — 80053 COMPREHEN METABOLIC PANEL: CPT | Performed by: REGISTERED NURSE

## 2025-02-11 PROCEDURE — 36430 TRANSFUSION BLD/BLD COMPNT: CPT

## 2025-02-11 PROCEDURE — 25000242 PHARM REV CODE 250 ALT 637 W/ HCPCS: Performed by: REGISTERED NURSE

## 2025-02-11 PROCEDURE — 97535 SELF CARE MNGMENT TRAINING: CPT | Mod: CO

## 2025-02-11 PROCEDURE — 83735 ASSAY OF MAGNESIUM: CPT | Performed by: REGISTERED NURSE

## 2025-02-11 PROCEDURE — P9040 RBC LEUKOREDUCED IRRADIATED: HCPCS | Performed by: REGISTERED NURSE

## 2025-02-11 PROCEDURE — 25000003 PHARM REV CODE 250: Performed by: REGISTERED NURSE

## 2025-02-11 PROCEDURE — 25000003 PHARM REV CODE 250: Performed by: HOSPITALIST

## 2025-02-11 PROCEDURE — 21400001 HC TELEMETRY ROOM

## 2025-02-11 PROCEDURE — 36415 COLL VENOUS BLD VENIPUNCTURE: CPT | Performed by: REGISTERED NURSE

## 2025-02-11 PROCEDURE — 97530 THERAPEUTIC ACTIVITIES: CPT | Mod: CO

## 2025-02-11 PROCEDURE — 63600175 PHARM REV CODE 636 W HCPCS: Performed by: HOSPITALIST

## 2025-02-11 RX ORDER — DIPHENHYDRAMINE HCL 25 MG
25 CAPSULE ORAL
Status: DISCONTINUED | OUTPATIENT
Start: 2025-02-11 | End: 2025-02-12 | Stop reason: HOSPADM

## 2025-02-11 RX ORDER — ACETAMINOPHEN 325 MG/1
650 TABLET ORAL
Status: DISCONTINUED | OUTPATIENT
Start: 2025-02-11 | End: 2025-02-12 | Stop reason: HOSPADM

## 2025-02-11 RX ORDER — SODIUM CHLORIDE 0.9 % (FLUSH) 0.9 %
10 SYRINGE (ML) INJECTION
Status: DISCONTINUED | OUTPATIENT
Start: 2025-02-11 | End: 2025-02-12 | Stop reason: HOSPADM

## 2025-02-11 RX ADMIN — HYDROCORTISONE SODIUM SUCCINATE 50 MG: 100 INJECTION, POWDER, FOR SOLUTION INTRAMUSCULAR; INTRAVENOUS at 09:02

## 2025-02-11 RX ADMIN — FINASTERIDE 5 MG: 5 TABLET, FILM COATED ORAL at 09:02

## 2025-02-11 RX ADMIN — CARVEDILOL 6.25 MG: 6.25 TABLET, FILM COATED ORAL at 09:02

## 2025-02-11 RX ADMIN — MIRTAZAPINE 15 MG: 15 TABLET, FILM COATED ORAL at 09:02

## 2025-02-11 RX ADMIN — CALCIUM CARBONATE (ANTACID) CHEW TAB 500 MG 1000 MG: 500 CHEW TAB at 09:02

## 2025-02-11 RX ADMIN — ACYCLOVIR 200 MG: 200 CAPSULE ORAL at 09:02

## 2025-02-11 RX ADMIN — FLUTICASONE PROPIONATE 50 MCG: 50 SPRAY, METERED NASAL at 09:02

## 2025-02-11 RX ADMIN — TAMSULOSIN HYDROCHLORIDE 0.8 MG: 0.4 CAPSULE ORAL at 09:02

## 2025-02-11 RX ADMIN — ATORVASTATIN CALCIUM 80 MG: 40 TABLET, FILM COATED ORAL at 09:02

## 2025-02-11 RX ADMIN — BENZONATATE 200 MG: 100 CAPSULE ORAL at 09:02

## 2025-02-11 RX ADMIN — CLOPIDOGREL BISULFATE 75 MG: 75 TABLET ORAL at 09:02

## 2025-02-11 NOTE — ASSESSMENT & PLAN NOTE
Hypocalcemia is likely due to Metastatic related to kappa light chain myeloma and Drug related - . . The most recent calcium and albumin results listed below.  Recent Labs     02/09/25  0234 02/09/25  1412 02/10/25  0222 02/11/25  0333   CALCIUM 6.3* 7.2* 7.4* 7.4*   ALBUMIN 2.2*  --  2.4* 2.1*   CAION 0.84*  --   --   --        Plan  - Will replace calcium and monitor electrolytes closely.  - The patient's hypocalcemia is improving  - Resume home Tums, replace with IV calcium  - replace Mg

## 2025-02-11 NOTE — ASSESSMENT & PLAN NOTE
Patient has Abnormal Magnesium: hypomagnesemia. Will continue to monitor electrolytes closely. Will replace the affected electrolytes and repeat labs to be done after interventions completed. The patient's magnesium results have been reviewed and are listed below.  Recent Labs   Lab 02/11/25  0333   MG 1.6

## 2025-02-11 NOTE — PT/OT/SLP PROGRESS
Physical Therapy      Patient Name:  Haris Hernandez   MRN:  [id number]    Patient not seen today secondary to Other (Comment) (pt with increased bouts of diarrhea and being bathed by pct([id number])). Will follow-up tomorrow.

## 2025-02-11 NOTE — PLAN OF CARE
SW met with pt at bedside during rounding with MD. Pt declined SNF facility. Pt will return home with his daughter with HH services. Pts daughter will provide transport home. SW will continue to follow pt throughout his transitions of care and assist with any dc needs. No dc today.     HH: [facility]    Pending PCP follow up    Future Appointments   Date Time Provider Department Center   3/5/2025  9:30 AM Michael Anand MD [facility]EM ON[facility] [place] [facility]        02/11/25 0917   Rounds   Attendance Provider;;Assigned nurse;Pharmacist   Discharge Plan A Home with family;Home Health   Why the patient remains in the hospital Requires continued medical care   Transition of Care Barriers None

## 2025-02-11 NOTE — ASSESSMENT & PLAN NOTE
NELLIE is likely due to pre-renal azotemia due to dehydration. Baseline creatinine is  1.8 . Most recent creatinine and eGFR are listed below.  Recent Labs     02/09/25  1412 02/10/25  0222 02/11/25  0333   CREATININE 2.1* 2.1* 2.0*   EGFRNORACEVR 32* 32* 34*        Plan  - NELLIE is improving  - Avoid nephrotoxins and renally dose meds for GFR listed above  - Monitor urine output, serial BMP, and adjust therapy as needed  -    No Repair - Repaired With Adjacent Surgical Defect Text (Leave Blank If You Do Not Want): After the excision the defect was repaired concurrently with another surgical defect which was in close approximation.

## 2025-02-11 NOTE — SUBJECTIVE & OBJECTIVE
Interval History:  No acute complaints at this time.  Blood count has down trended again.  Discussed and consented for blood transfusion with patient and daughter over the phone.  Continue to work with PT/OT; patient goal remains to return home.    Review of Systems  Objective:     Vital Signs (Most Recent):  Temp: 97.8 °F (36.6 °C) (02/11/25 1551)  Pulse: 64 (02/11/25 1551)  Resp: 18 (02/11/25 1551)  BP: (!) 112/58 (02/11/25 1551)  SpO2: 97 % (02/11/25 1551) Vital Signs (24h Range):  Temp:  [97.8 °F (36.6 °C)-99.5 °F (37.5 °C)] 97.8 °F (36.6 °C)  Pulse:  [64-88] 64  Resp:  [16-18] 18  SpO2:  [95 %-98 %] 97 %  BP: (112-145)/(58-71) 112/58     Weight: 61.7 kg (136 lb)  Body mass index is 18.97 kg/m².    Intake/Output Summary (Last 24 hours) at 2/11/2025 1618  Last data filed at 2/11/2025 1445  Gross per 24 hour   Intake 787.08 ml   Output --   Net 787.08 ml         Physical Exam  Vitals reviewed.   Constitutional:       Appearance: Normal appearance.   HENT:      Head:        Mouth/Throat:      Mouth: Mucous membranes are dry.      Pharynx: Oropharynx is clear.   Eyes:      Pupils: Pupils are equal, round, and reactive to light.   Cardiovascular:      Rate and Rhythm: Normal rate and regular rhythm.      Pulses: Normal pulses.      Heart sounds: Normal heart sounds.   Pulmonary:      Effort: Pulmonary effort is normal.      Breath sounds: Normal breath sounds.   Abdominal:      General: Abdomen is flat.   Musculoskeletal:         General: Normal range of motion.      Cervical back: Normal range of motion.   Skin:     General: Skin is warm and dry.      Capillary Refill: Capillary refill takes less than 2 seconds.   Neurological:      General: No focal deficit present.      Mental Status: He is alert and oriented to person, place, and time. Mental status is at baseline.   Psychiatric:         Mood and Affect: Mood normal.         Behavior: Behavior normal.             Significant Labs: All pertinent labs within the  past 24 hours have been reviewed.    Significant Imaging: I have reviewed all pertinent imaging results/findings within the past 24 hours.

## 2025-02-11 NOTE — ASSESSMENT & PLAN NOTE
Patient's most recent potassium results are listed below.   Recent Labs     02/09/25  1412 02/10/25  0222 02/11/25  0333   K 3.7 3.3* 3.8       Plan  - Replete potassium per protocol  - Monitor potassium Daily  - Patient's hypokalemia is stable  - replace

## 2025-02-11 NOTE — PROGRESS NOTES
[facility] Medicine  Progress Note    Patient Name: Haris Hernandez  MRN: [id number]  Patient Class: IP- Inpatient   Admission Date: 2/8/2025  Length of Stay: 3 days  Attending Physician: Moris Conklin,*  Primary Care Provider: KIANA Call MD        Subjective     Principal Problem:Pancytopenia        HPI:  Patient is a 79-year-old male with a history of CVA, HTN, presenting to ED with shortness a breath, cough, weakness.  Patient is a to have a troponin fall with a small abrasion on the base of his nose who and he can not tell me entire story of how this happened.  He reports his daughter just told him Im taking you to the hospital in ED labwork remarkable for pancytopenia, calcium low at 5.9.  Magnesium 1.0.  Troponin 0.9, repeat 1.4.  Patient denies any chest pain.  Patient was given IV fluids and calcium gluconate.  Patient will be admitted to Hospital Medicine we will consult Cardiology given his elevated troponin.    Overview/Hospital Course:  No notes on file    Interval History:  No acute complaints at this time.  Blood count has down trended again.  Discussed and consented for blood transfusion with patient and daughter over the phone.  Continue to work with PT/OT; patient goal remains to return home.    Review of Systems  Objective:     Vital Signs (Most Recent):  Temp: 97.8 °F (36.6 °C) (02/11/25 1551)  Pulse: 64 (02/11/25 1551)  Resp: 18 (02/11/25 1551)  BP: (!) 112/58 (02/11/25 1551)  SpO2: 97 % (02/11/25 1551) Vital Signs (24h Range):  Temp:  [97.8 °F (36.6 °C)-99.5 °F (37.5 °C)] 97.8 °F (36.6 °C)  Pulse:  [64-88] 64  Resp:  [16-18] 18  SpO2:  [95 %-98 %] 97 %  BP: (112-145)/(58-71) 112/58     Weight: 61.7 kg (136 lb)  Body mass index is 18.97 kg/m².    Intake/Output Summary (Last 24 hours) at 2/11/2025 1618  Last data filed at 2/11/2025 1445  Gross per 24 hour   Intake 787.08 ml   Output --   Net 787.08 ml         Physical Exam  Vitals reviewed.   Constitutional:        Appearance: Normal appearance.   HENT:      Head:        Mouth/Throat:      Mouth: Mucous membranes are dry.      Pharynx: Oropharynx is clear.   Eyes:      Pupils: Pupils are equal, round, and reactive to light.   Cardiovascular:      Rate and Rhythm: Normal rate and regular rhythm.      Pulses: Normal pulses.      Heart sounds: Normal heart sounds.   Pulmonary:      Effort: Pulmonary effort is normal.      Breath sounds: Normal breath sounds.   Abdominal:      General: Abdomen is flat.   Musculoskeletal:         General: Normal range of motion.      Cervical back: Normal range of motion.   Skin:     General: Skin is warm and dry.      Capillary Refill: Capillary refill takes less than 2 seconds.   Neurological:      General: No focal deficit present.      Mental Status: He is alert and oriented to person, place, and time. Mental status is at baseline.   Psychiatric:         Mood and Affect: Mood normal.         Behavior: Behavior normal.             Significant Labs: All pertinent labs within the past 24 hours have been reviewed.    Significant Imaging: I have reviewed all pertinent imaging results/findings within the past 24 hours.    Assessment and Plan     * Pancytopenia  This patient is found to have pancytopenia, the likely etiology is Drug induced (including chemotherapy) related to Darzalex , will monitor CBC Daily. Will transfuse red blood cells if the hemoglobin is <7g/dL (or <8 in the setting of ACS). Will transfuse platelets if platelet count is <10k. Hold DVT prophylaxis if platelets are <50k. The patient's hemoglobin, white blood cell count, and platelet count results have been reviewed and are listed below.  Recent Labs   Lab 02/11/25  0333   HGB 6.7*   WBC 1.72*   *     Put in and WBC stable, hemoglobin down to 6.0.  Patient does have antibodies.  We will attempt P 1 unit of transfusion when blood available.  Hemodynamically stable.    - due to immunotherapy  - transfuse 1 unit  PRBC    Hypokalemia  Patient's most recent potassium results are listed below.   Recent Labs     02/09/25  1412 02/10/25  0222 02/11/25  0333   K 3.7 3.3* 3.8       Plan  - Replete potassium per protocol  - Monitor potassium Daily  - Patient's hypokalemia is stable  - replace    Weakness  - PT/OT      NELLIE (acute kidney injury)  NELLIE is likely due to pre-renal azotemia due to dehydration. Baseline creatinine is  1.8 . Most recent creatinine and eGFR are listed below.  Recent Labs     02/09/25  1412 02/10/25  0222 02/11/25  0333   CREATININE 2.1* 2.1* 2.0*   EGFRNORACEVR 32* 32* 34*        Plan  - NELLIE is improving  - Avoid nephrotoxins and renally dose meds for GFR listed above  - Monitor urine output, serial BMP, and adjust therapy as needed  -     Hypomagnesemia  Patient has Abnormal Magnesium: hypomagnesemia. Will continue to monitor electrolytes closely. Will replace the affected electrolytes and repeat labs to be done after interventions completed. The patient's magnesium results have been reviewed and are listed below.  Recent Labs   Lab 02/11/25  0333   MG 1.6          Hypocalcemia  Hypocalcemia is likely due to Metastatic related to kappa light chain myeloma and Drug related - . . The most recent calcium and albumin results listed below.  Recent Labs     02/09/25  0234 02/09/25 1412 02/10/25  0222 02/11/25  0333   CALCIUM 6.3* 7.2* 7.4* 7.4*   ALBUMIN 2.2*  --  2.4* 2.1*   CAION 0.84*  --   --   --        Plan  - Will replace calcium and monitor electrolytes closely.  - The patient's hypocalcemia is improving  - Resume home Tums, replace with IV calcium  - replace Mg    Kappa light chain myeloma  Currently followed outpatient by Dr. Ríos  Currently on Darzalex      Elevated troponin  Initial troponin 0.9, repeat 1.4.  Patient denies any chest pain.  Only symptom has been weakness.  Denies any shortness a breath.  EKG without any ST elevations.  Consult Cardiology for eval.  - trend troponins  - TTE with EF  50-55%  - continue ASA, plavix, statin  - likely demand in setting of anemia/electrolyte disturbance    HTN (hypertension)  Patient's blood pressure range in the last 24 hours was: BP  Min: 112/58  Max: 145/71.The patient's inpatient anti-hypertensive regimen is listed below:  Current Antihypertensives  carvediloL tablet 6.25 mg, 2 times daily, Oral    Plan  - BP is controlled, no changes needed to their regimen  -      VTE Risk Mitigation (From admission, onward)           Ordered     IP VTE HIGH RISK PATIENT  Once         02/09/25 0221     Place sequential compression device  Until discontinued         02/09/25 0221                    Discharge Planning   LADI: 2/12/2025     Code Status: Full Code   Medical Readiness for Discharge Date:   Discharge Plan A: Home with family, Home Health                Please place Justification for DME        Moris Conklin MD  Department of Hospital Medicine   [facility]

## 2025-02-11 NOTE — ASSESSMENT & PLAN NOTE
This patient is found to have pancytopenia, the likely etiology is Drug induced (including chemotherapy) related to Darzalex , will monitor CBC Daily. Will transfuse red blood cells if the hemoglobin is <7g/dL (or <8 in the setting of ACS). Will transfuse platelets if platelet count is <10k. Hold DVT prophylaxis if platelets are <50k. The patient's hemoglobin, white blood cell count, and platelet count results have been reviewed and are listed below.  Recent Labs   Lab 02/11/25  0333   HGB 6.7*   WBC 1.72*   *     Put in and WBC stable, hemoglobin down to 6.0.  Patient does have antibodies.  We will attempt P 1 unit of transfusion when blood available.  Hemodynamically stable.    - due to immunotherapy  - transfuse 1 unit PRBC

## 2025-02-11 NOTE — PT/OT/SLP PROGRESS
Occupational Therapy   Treatment    Name: Haris Hernandez  MRN: [id number]  Admitting Diagnosis:  Pancytopenia       Recommendations:     Discharge Recommendations: Moderate Intensity Therapy  Discharge Equipment Recommendations:  walker, rolling, bedside commode (if D/C home)  Barriers to discharge:  Decreased caregiver support    Assessment:     Haris Hernandez is a 79 y.o. male with a medical diagnosis of Pancytopenia. Performance deficits affecting function are weakness, impaired endurance, impaired self care skills, impaired functional mobility, gait instability, impaired balance, decreased upper extremity function, decreased lower extremity function, decreased coordination, impaired coordination.     Rehab Prognosis:  Good; patient would benefit from acute skilled OT services to address these deficits and reach maximum level of function.       Plan:     Patient to be seen 3 x/week to address the above listed problems via self-care/home management, therapeutic activities, therapeutic exercises  Plan of Care Expires: 03/12/25  Plan of Care Reviewed with: patient    Subjective     Chief Complaint: none  Patient/Family Comments/goals: return to PLOF  Pain/Comfort:  Pain Rating 1: 0/10  Pain Rating Post-Intervention 1: 0/10    Objective:     Communicated with: Naomi villagran prior to session.  Patient found HOB elevated with bed alarm, telemetry upon OT entry to room.    General Precautions: Standard, fall, contact    Orthopedic Precautions:N/A  Braces: N/A  Respiratory Status: Room air    Bed Mobility:    Patient completed Scooting/Bridging with contact guard assistance  Patient completed Supine to Sit with minimum assistance and with side rail     Functional Mobility/Transfers:  Patient completed Sit <> Stand Transfer with minimum assistance and moderate assistance  with  rolling walker   Functional Mobility: ambulated lateral steps and fwd/bwd using RW /c Theresa; short, shuffling steps    Activities of Daily  Living:  Grooming: stand by assistance seated EOB for G/H tasks    [facility] 6 Click ADL: 16    Treatment & Education:  Educated on purpose/role of OT  Requires increased time, effort and VCs for all tasks/transitions  Patient with some difficulty standing from reg bed height  Noted to plop down onto bed and RW almost tumbling over patient, despite max cues for technique (reach back; slow, controlled descent)  Patient elected to remain seated EOB to eat banana    Patient left sitting edge of bed with all lines intact, call button in reach, and bed alarm on    GOALS:   Multidisciplinary Problems       Occupational Therapy Goals          Problem: Occupational Therapy    Goal Priority Disciplines Outcome Interventions   Occupational Therapy Goal     OT, PT/OT Progressing    Description: Goals to be met by: 03/09/25     Patient will increase functional independence with ADLs by performing:    UE Dressing with Modified [place].  LE Dressing with Modified [place].  Grooming while standing at sink with Modified [place].  Toileting from toilet with Modified [place] for hygiene and clothing management.   Toilet transfer to toilet with Modified [place].  Upper extremity exercise program 3x10 reps per handout, with supervision.                         DME Justifications:   Haris requires a commode for home use because he is confined to a single room.   Haris's mobility limitation cannot be sufficiently resolved by the use of a cane. His functional mobility deficit can be sufficiently resolved with the use of a Rolling Walker. Patient's mobility limitation significantly impairs their ability to participate in one of more activities of daily living.  The use of a RW will significantly improve the patient's ability to participate in MRADLS and the patient will use it on regular basis in the home.    Time Tracking:     OT Date of Treatment: 02/11/25  OT Start Time: 1433  OT Stop Time: 1457  OT Total Time  (min): 24 min    Billable Minutes:Self Care/Home Management 10  Therapeutic Activity 14    2/11/2025

## 2025-02-11 NOTE — PROGRESS NOTES
02/11/25 0418   Critical Value Communication   Date Result Received 02/11/25   Time Result Received 0419   Resulting Department of Critical Value lab   Critical Test #1 Hct   Critical Test #1 Result 18.9   Critical Test #2 WBC   Critical Test #2 Result 1.72   Critical Test #3  Hgb   Critical Test #3 Result 6.7   Name of Notified Physician/Designee Danny ALVARADO   Date of 1st Attempt 02/11/25   Time of 1st Attempt 0420   Date of 2nd Attempt 02/11/25   Time of 2nd Attempt 0430   Date of 3rd Attempt 02/11/25   Time of 3rd Attempt 0445       Lab reporting critical labs this AM, listed above. Attempted to call Danny ALVARADO X3 and sent secure chat. Awaiting response.

## 2025-02-11 NOTE — PLAN OF CARE
Problem: Comorbidity Management  Goal: Blood Pressure in Desired Range  Outcome: Progressing     Problem: Pain Acute  Goal: Optimal Pain Control and Function  Outcome: Progressing

## 2025-02-11 NOTE — ASSESSMENT & PLAN NOTE
Initial troponin 0.9, repeat 1.4.  Patient denies any chest pain.  Only symptom has been weakness.  Denies any shortness a breath.  EKG without any ST elevations.  Consult Cardiology for eval.  - trend troponins  - TTE with EF 50-55%  - continue ASA, plavix, statin  - likely demand in setting of anemia/electrolyte disturbance

## 2025-02-11 NOTE — ASSESSMENT & PLAN NOTE
Patient's blood pressure range in the last 24 hours was: BP  Min: 112/58  Max: 145/71.The patient's inpatient anti-hypertensive regimen is listed below:  Current Antihypertensives  carvediloL tablet 6.25 mg, 2 times daily, Oral    Plan  - BP is controlled, no changes needed to their regimen  -

## 2025-02-12 VITALS
HEIGHT: 71 IN | RESPIRATION RATE: 16 BRPM | HEART RATE: 66 BPM | OXYGEN SATURATION: 93 % | WEIGHT: 136 LBS | DIASTOLIC BLOOD PRESSURE: 63 MMHG | SYSTOLIC BLOOD PRESSURE: 138 MMHG | TEMPERATURE: 98 F | BODY MASS INDEX: 19.04 KG/M2

## 2025-02-12 LAB
ANION GAP SERPL CALC-SCNC: 9 MMOL/L (ref 8–16)
ANISOCYTOSIS BLD QL SMEAR: SLIGHT
BASOPHILS # BLD AUTO: 0 K/UL (ref 0–0.2)
BASOPHILS NFR BLD: 0 % (ref 0–1.9)
BUN SERPL-MCNC: 24 MG/DL (ref 8–23)
BURR CELLS BLD QL SMEAR: ABNORMAL
CALCIUM SERPL-MCNC: 7.6 MG/DL (ref 8.7–10.5)
CHLORIDE SERPL-SCNC: 111 MMOL/L (ref 95–110)
CO2 SERPL-SCNC: 18 MMOL/L (ref 23–29)
CREAT SERPL-MCNC: 1.9 MG/DL (ref 0.5–1.4)
DACRYOCYTES BLD QL SMEAR: ABNORMAL
DIFFERENTIAL METHOD BLD: ABNORMAL
EOSINOPHIL # BLD AUTO: 0 K/UL (ref 0–0.5)
EOSINOPHIL NFR BLD: 0 % (ref 0–8)
ERYTHROCYTE [DISTWIDTH] IN BLOOD BY AUTOMATED COUNT: 15.7 % (ref 11.5–14.5)
EST. GFR  (NO RACE VARIABLE): 36 ML/MIN/1.73 M^2
GLUCOSE SERPL-MCNC: 114 MG/DL (ref 70–110)
HCT VFR BLD AUTO: 20.9 % (ref 40–54)
HGB BLD-MCNC: 7.2 G/DL (ref 14–18)
HYPOCHROMIA BLD QL SMEAR: ABNORMAL
IMM GRANULOCYTES # BLD AUTO: 0.01 K/UL (ref 0–0.04)
IMM GRANULOCYTES NFR BLD AUTO: 0.6 % (ref 0–0.5)
LYMPHOCYTES # BLD AUTO: 0.4 K/UL (ref 1–4.8)
LYMPHOCYTES NFR BLD: 21 % (ref 18–48)
MCH RBC QN AUTO: 30.3 PG (ref 27–31)
MCHC RBC AUTO-ENTMCNC: 34.4 G/DL (ref 32–36)
MCV RBC AUTO: 88 FL (ref 82–98)
MONOCYTES # BLD AUTO: 0.2 K/UL (ref 0.3–1)
MONOCYTES NFR BLD: 11 % (ref 4–15)
NEUTROPHILS # BLD AUTO: 1.2 K/UL (ref 1.8–7.7)
NEUTROPHILS NFR BLD: 67.4 % (ref 38–73)
NRBC BLD-RTO: 0 /100 WBC
OVALOCYTES BLD QL SMEAR: ABNORMAL
PLATELET # BLD AUTO: 120 K/UL (ref 150–450)
PLATELET BLD QL SMEAR: ABNORMAL
PMV BLD AUTO: 13.3 FL (ref 9.2–12.9)
POIKILOCYTOSIS BLD QL SMEAR: SLIGHT
POLYCHROMASIA BLD QL SMEAR: ABNORMAL
POTASSIUM SERPL-SCNC: 4.3 MMOL/L (ref 3.5–5.1)
RBC # BLD AUTO: 2.38 M/UL (ref 4.6–6.2)
SODIUM SERPL-SCNC: 138 MMOL/L (ref 136–145)
WBC # BLD AUTO: 1.81 K/UL (ref 3.9–12.7)

## 2025-02-12 PROCEDURE — 36415 COLL VENOUS BLD VENIPUNCTURE: CPT | Performed by: HOSPITALIST

## 2025-02-12 PROCEDURE — 80048 BASIC METABOLIC PNL TOTAL CA: CPT | Performed by: HOSPITALIST

## 2025-02-12 PROCEDURE — 25000003 PHARM REV CODE 250: Performed by: HOSPITALIST

## 2025-02-12 PROCEDURE — 25000003 PHARM REV CODE 250: Performed by: REGISTERED NURSE

## 2025-02-12 PROCEDURE — 85025 COMPLETE CBC W/AUTO DIFF WBC: CPT | Performed by: HOSPITALIST

## 2025-02-12 RX ORDER — LOPERAMIDE HYDROCHLORIDE 2 MG/1
2 CAPSULE ORAL 4 TIMES DAILY PRN
Qty: 15 CAPSULE | Refills: 0 | Status: SHIPPED | OUTPATIENT
Start: 2025-02-12 | End: 2025-02-22

## 2025-02-12 RX ORDER — ACYCLOVIR 200 MG/1
200 CAPSULE ORAL DAILY
Start: 2025-02-12

## 2025-02-12 RX ORDER — CALCIUM CARBONATE 200(500)MG
1000 TABLET,CHEWABLE ORAL 2 TIMES DAILY
Qty: 150 TABLET | Refills: 11 | Status: SHIPPED | OUTPATIENT
Start: 2025-02-12 | End: 2026-02-12

## 2025-02-12 RX ADMIN — TAMSULOSIN HYDROCHLORIDE 0.8 MG: 0.4 CAPSULE ORAL at 07:02

## 2025-02-12 RX ADMIN — CALCIUM CARBONATE (ANTACID) CHEW TAB 500 MG 1000 MG: 500 CHEW TAB at 08:02

## 2025-02-12 RX ADMIN — CLOPIDOGREL BISULFATE 75 MG: 75 TABLET ORAL at 08:02

## 2025-02-12 RX ADMIN — ATORVASTATIN CALCIUM 80 MG: 40 TABLET, FILM COATED ORAL at 07:02

## 2025-02-12 RX ADMIN — FINASTERIDE 5 MG: 5 TABLET, FILM COATED ORAL at 07:02

## 2025-02-12 RX ADMIN — ACYCLOVIR 200 MG: 200 CAPSULE ORAL at 07:02

## 2025-02-12 RX ADMIN — CARVEDILOL 6.25 MG: 6.25 TABLET, FILM COATED ORAL at 08:02

## 2025-02-12 RX ADMIN — FLUTICASONE PROPIONATE 50 MCG: 50 SPRAY, METERED NASAL at 07:02

## 2025-02-12 NOTE — PROGRESS NOTES
Discharge orders noted. Additional clinical references attached. Patient's discharge instructions given by bedside RN. Virtual nurse cued into room and reviewed discharge instructions. Education provided on new medication, diagnosis, and follow-up appointments. Teach back method used. Patient verbalized understanding. All questions answered. Bedside nurse updated on patient status and to request transportation to [facility] when ready.     02/12/25 1417   AVS Confirmation   Discharge instructions and AVS provided to and reviewed with patient and/or significant other. Yes

## 2025-02-12 NOTE — DISCHARGE SUMMARY
"[facility] Medicine  Discharge Summary      Patient Name: Haris Hernandez  MRN: [id number]  FELICIANO: [id number]  Patient Class: IP- Inpatient  Admission Date: 2/8/2025  Hospital Length of Stay: 4 days  Discharge Date and Time:  02/12/2025 12:04 PM  Attending Physician: Moris Conklin.,*   Discharging Provider: Moris Conklin MD  Primary Care Provider: KIANA Call MD    Primary Care Team: [facility] PH[facility]ICIAN TEAM 1    HPI:   Patient is a 79-year-old male with a history of CVA, HTN, presenting to ED with shortness a breath, cough, weakness.  Patient is a to have a troponin fall with a small abrasion on the base of his nose who and he can not tell me entire story of how this happened.  He reports his daughter just told him Im taking you to the hospital in ED labwork remarkable for pancytopenia, calcium low at 5.9.  Magnesium 1.0.  Troponin 0.9, repeat 1.4.  Patient denies any chest pain.  Patient was given IV fluids and calcium gluconate.  Patient will be admitted to Hospital Medicine we will consult Cardiology given his elevated troponin.    * No surgery found *      Hospital Course:   Mr. Hernandez presented with weakness and fatigue in the setting of recent influenza infection. Found to have pancytopenia (likely 2/2 immunotherapy), hypokalemia, hypomagnesemia, and hypocalcemia. Was not taking home Tums calcium supplementation. Electrolytes were replaced and he was transfused 1u PRBC. Evaluated by PT/OT. Plan for HH with walker and bedside commode at discharge.    /63   Pulse 66   Temp 97.9 °F (36.6 °C)   Resp 16   Ht 5' 11" (1.803 m)   Wt 61.7 kg (136 lb)   SpO2 (!) 93%   BMI 18.97 kg/m²   Physical Exam  Vitals reviewed.   Constitutional:       Appearance: Normal appearance.   HENT:      Head:        Mouth/Throat:      Mouth: Mucous membranes are dry.      Pharynx: Oropharynx is clear.   Eyes:      Pupils: Pupils are equal, round, and reactive to light. "   Cardiovascular:      Rate and Rhythm: Normal rate and regular rhythm.      Pulses: Normal pulses.      Heart sounds: Normal heart sounds.   Pulmonary:      Effort: Pulmonary effort is normal.      Breath sounds: Normal breath sounds.   Abdominal:      General: Abdomen is flat.   Musculoskeletal:         General: Normal range of motion.      Cervical back: Normal range of motion.   Skin:     General: Skin is warm and dry.      Capillary Refill: Capillary refill takes less than 2 seconds.   Neurological:      General: No focal deficit present.      Mental Status: He is alert and oriented to person, place, and time. Mental status is at baseline.   Psychiatric:         Mood and Affect: Mood normal.         Behavior: Behavior normal.      Goals of Care Treatment Preferences:  Code Status: Full Code      SDOH Screening:  The patient was screened for utility difficulties, food insecurity, transport difficulties, housing insecurity, and interpersonal safety and there were no concerns identified this admission.     Consults:   Consults (From admission, onward)          Status Ordering Provider     Inpatient consult to Cardiology-[facility]  Once        Provider:  Ed Guerra MD    Completed RICARDA OGLESBY            * Pancytopenia  This patient is found to have pancytopenia, the likely etiology is Drug induced (including chemotherapy) related to Darzalex , will monitor CBC Daily. Will transfuse red blood cells if the hemoglobin is <7g/dL (or <8 in the setting of ACS). Will transfuse platelets if platelet count is <10k. Hold DVT prophylaxis if platelets are <50k. The patient's hemoglobin, white blood cell count, and platelet count results have been reviewed and are listed below.  Recent Labs   Lab 02/11/25  0333   HGB 6.7*   WBC 1.72*   *     Put in and WBC stable, hemoglobin down to 6.0.  Patient does have antibodies.  We will attempt P 1 unit of transfusion when blood available.  Hemodynamically  stable.    - due to immunotherapy  - transfuse 1 unit PRBC    Hypokalemia  Patient's most recent potassium results are listed below.   Recent Labs     02/09/25  1412 02/10/25  0222 02/11/25  0333   K 3.7 3.3* 3.8       Plan  - Replete potassium per protocol  - Monitor potassium Daily  - Patient's hypokalemia is stable  - replace    Weakness  - PT/OT      NELLIE (acute kidney injury)  NELLIE is likely due to pre-renal azotemia due to dehydration. Baseline creatinine is  1.8 . Most recent creatinine and eGFR are listed below.  Recent Labs     02/09/25  1412 02/10/25  0222 02/11/25  0333   CREATININE 2.1* 2.1* 2.0*   EGFRNORACEVR 32* 32* 34*        Plan  - NELLIE is improving  - Avoid nephrotoxins and renally dose meds for GFR listed above  - Monitor urine output, serial BMP, and adjust therapy as needed  -     Hypomagnesemia  Patient has Abnormal Magnesium: hypomagnesemia. Will continue to monitor electrolytes closely. Will replace the affected electrolytes and repeat labs to be done after interventions completed. The patient's magnesium results have been reviewed and are listed below.  Recent Labs   Lab 02/11/25  0333   MG 1.6          Hypocalcemia  Hypocalcemia is likely due to Metastatic related to kappa light chain myeloma and Drug related - . . The most recent calcium and albumin results listed below.  Recent Labs     02/09/25  0234 02/09/25  1412 02/10/25  0222 02/11/25  0333   CALCIUM 6.3* 7.2* 7.4* 7.4*   ALBUMIN 2.2*  --  2.4* 2.1*   CAION 0.84*  --   --   --        Plan  - Will replace calcium and monitor electrolytes closely.  - The patient's hypocalcemia is improving  - Resume home Tums, replace with IV calcium  - replace Mg    Kappa light chain myeloma  Currently followed outpatient by Dr. Ríos  Currently on Darzalex      Elevated troponin  Initial troponin 0.9, repeat 1.4.  Patient denies any chest pain.  Only symptom has been weakness.  Denies any shortness a breath.  EKG without any ST elevations.  Consult  "Cardiology for eval.  - trend troponins  - TTE with EF 50-55%  - continue ASA, plavix, statin  - likely demand in setting of anemia/electrolyte disturbance    HTN (hypertension)  Patient's blood pressure range in the last 24 hours was: BP  Min: 112/58  Max: 145/71.The patient's inpatient anti-hypertensive regimen is listed below:  Current Antihypertensives  carvediloL tablet 6.25 mg, 2 times daily, Oral    Plan  - BP is controlled, no changes needed to their regimen  -      Final Active Diagnoses:    Diagnosis Date Noted POA    PRINCIPAL PROBLEM:  Pancytopenia [D61.818] 02/09/2025 Yes    Hypokalemia [E87.6] 02/10/2025 Yes    Hypocalcemia [E83.51] 02/09/2025 Yes    Hypomagnesemia [E83.42] 02/09/2025 Yes    NELLIE (acute kidney injury) [N17.9] 02/09/2025 Yes    Weakness [R53.1] 02/09/2025 Yes    Kappa light chain myeloma [C90.00] 08/16/2021 Yes    Elevated troponin [R79.89] 08/14/2021 Yes    HTN (hypertension) [I10] 05/08/2018 Yes      Problems Resolved During this Admission:       Discharged Condition: fair    Disposition: Home or Self Care    Follow Up:   Follow-up Information       KIANA Call MD. Go on 2/20/2025.    Specialty: Family Medicine  Why: Patient has PCP follow up with Dr. Arden Call on 2/20/25 at 9:30am.  Contact information:  [address]  [place] [address] [phone]               [facility] Follow up.    Why: Patient will be contacted by Home Health Agency to schedule first appointment time/date.  Contact information:  [address]  [facility] [address] [phone]                         Patient Instructions:      WALKER FOR HOME USE     Order Specific Question Answer Comments   Type of Walker: Adult (5'4"-6'6")    With wheels? Yes    Height: 5' 11" (1.803 m)    Weight: 61.7 kg (136 lb)    Length of need (1-99 months): 99    Does patient have medical equipment at home? none    Please check all that apply: Patient is unable to safely ambulate without " "equipment.      COMMODE FOR HOME USE     Order Specific Question Answer Comments   Type: Standard    Height: 5' 11" (1.803 m)    Weight: 61.7 kg (136 lb)    Does patient have medical equipment at home? none    Length of need (1-99 months): 99      Diet Adult Regular     Notify your health care provider if you experience any of the following:  temperature >100.4     Notify your health care provider if you experience any of the following:  persistent nausea and vomiting or diarrhea     Notify your health care provider if you experience any of the following:  severe uncontrolled pain     Notify your health care provider if you experience any of the following:  difficulty breathing or increased cough     Notify your health care provider if you experience any of the following:  severe persistent headache     Notify your health care provider if you experience any of the following:  persistent dizziness, light-headedness, or visual disturbances     Notify your health care provider if you experience any of the following:  increased confusion or weakness     Activity as tolerated       Significant Diagnostic Studies: N/A    Pending Diagnostic Studies:       None           Medications:  Reconciled Home Medications:      Medication List        START taking these medications      calcium carbonate 200 mg calcium (500 mg) chewable tablet  Commonly known as: TUMS  Take 2 tablets (1,000 mg total) by mouth 2 (two) times daily.     loperamide 2 mg capsule  Commonly known as: IMODIUM  Take 1 capsule (2 mg total) by mouth 4 (four) times daily as needed for Diarrhea.            CHANGE how you take these medications      lenalidomide 10 mg Cap  TAKE ONE CAPSULE (10MG) BY MOUTH ONCE EVERY OTHER DAY FOR 28 DAYS.  What changed: See the new instructions.            CONTINUE taking these medications      acyclovir 200 MG capsule  Commonly known as: ZOVIRAX  Take 1 capsule (200 mg total) by mouth once daily.     atorvastatin 80 MG " tablet  Commonly known as: LIPITOR  Take 80 mg by mouth once daily.     carvediloL 6.25 MG tablet  Commonly known as: COREG  Take 1 tablet (6.25 mg total) by mouth 2 (two) times daily.     clopidogreL 75 mg tablet  Commonly known as: PLAVIX  Take 75 mg by mouth once daily.     cyanocobalamin 500 MCG tablet  Take 1 tablet (500 mcg total) by mouth once daily.     finasteride 5 mg tablet  Commonly known as: PROSCAR  Take 5 mg by mouth once daily.     folic acid 1 MG tablet  Commonly known as: FOLVITE  Take 1 tablet (1 mg total) by mouth once daily.     mirtazapine 7.5 MG Tab  Commonly known as: REMERON  Take 2 tablets (15 mg total) by mouth every evening. Start one tab nightly (7.5mg) x10 days then increase to 2 tabs (15mg) nightly     multivitamin capsule  Take 1 capsule by mouth once daily.     tamsulosin 0.4 mg Cap  Commonly known as: FLOMAX  Take 2 capsules (0.8 mg total) by mouth once daily.            STOP taking these medications      benzonatate 100 MG capsule  Commonly known as: TESSALON     fluticasone propionate 50 mcg/actuation nasal spray  Commonly known as: FLONASE     NIFEdipine 30 MG (OSM) 24 hr tablet  Commonly known as: PROCARDIA-XL     oseltamivir 75 MG capsule  Commonly known as: TAMIFLU     sodium bicarbonate 650 MG tablet              Indwelling Lines/Drains at time of discharge:   Lines/Drains/Airways       None                   Time spent on the discharge of patient: 32 minutes         Moris Conklin MD  Department of Hospital Medicine  [facility]

## 2025-02-12 NOTE — PLAN OF CARE
Pt will dc with  services. Pt will be contacted by  agency to schedule first appointment time/date. Pts daughter will provide transport home. SW will continue to follow pt throughout his transitions of care and assist with any dc needs. Pt agreeable to RW. If dme is approved it will be delivered to pts bedside.     SW communicated with pts daughter and she confirmed that she will provide transport home. SW communicated with pts daughter who declines  services. Pts daughter prefers outpatient PT/OT. Per daughter home is not suitable for therapy to come inside the home. MD made aware to place outpatient PT/OT referral.     RW and BSC orders sent to [facility] with [facility]. If approved it will be delivered to bedside.     SAUNDRA provided clearance from [facility] with [facility] to pull/deliver RW/BSC. Pts daughter not at bedside and nurse Naomi signed for dme. DME delivered to pts bedside.      agency: [facility] (HH cancelled)      Go to KIANA Call MD  Thursday Feb 20, 2025  Patient has PCP follow up with Dr. Arden Call on 2/20/25 at 9:30am. [address]  [place] [address]  [phone]     Cleared from  . Bedside Nurse and VN notified.    Future Appointments   Date Time Provider Department Center   2/26/2025  1:40 PM Ad Pérez DNP [facility] CARDIO [facility]   3/5/2025  9:30 AM Michael Anand MD [facility] HEM ONC [facility]        02/12/25 0824   Final Note   Assessment Type Final Discharge Note   Anticipated Discharge Disposition [facility]   Hospital Resources/Appts/Education Provided Appointments scheduled and added to AVS   Post-Acute Status   Discharge Delays None known at this time

## 2025-02-12 NOTE — HOSPITAL COURSE
"Mr. Hernandez presented with weakness and fatigue in the setting of recent influenza infection. Found to have pancytopenia (likely 2/2 immunotherapy), hypokalemia, hypomagnesemia, and hypocalcemia. Was not taking home Tums calcium supplementation. Electrolytes were replaced and he was transfused 1u PRBC. Evaluated by PT/OT. Plan for HH with walker and bedside commode at discharge.    /63   Pulse 66   Temp 97.9 °F (36.6 °C)   Resp 16   Ht 5' 11" (1.803 m)   Wt 61.7 kg (136 lb)   SpO2 (!) 93%   BMI 18.97 kg/m²   Physical Exam  Vitals reviewed.   Constitutional:       Appearance: Normal appearance.   HENT:      Head:        Mouth/Throat:      Mouth: Mucous membranes are dry.      Pharynx: Oropharynx is clear.   Eyes:      Pupils: Pupils are equal, round, and reactive to light.   Cardiovascular:      Rate and Rhythm: Normal rate and regular rhythm.      Pulses: Normal pulses.      Heart sounds: Normal heart sounds.   Pulmonary:      Effort: Pulmonary effort is normal.      Breath sounds: Normal breath sounds.   Abdominal:      General: Abdomen is flat.   Musculoskeletal:         General: Normal range of motion.      Cervical back: Normal range of motion.   Skin:     General: Skin is warm and dry.      Capillary Refill: Capillary refill takes less than 2 seconds.   Neurological:      General: No focal deficit present.      Mental Status: He is alert and oriented to person, place, and time. Mental status is at baseline.   Psychiatric:         Mood and Affect: Mood normal.         Behavior: Behavior normal.   "

## 2025-02-12 NOTE — PLAN OF CARE
[facility]      HOME HEALTH ORDERS  FACE TO FACE ENCOUNTER    Patient Name: Haris Hernandez  YOB: 1945    PCP: KIANA Call MD   PCP Address: [address] / [place] [id number]  PCP Phone Number: [phone]  PCP Fax: [phone]    Encounter Date: 2/8/25    Admit to Home Health    Diagnoses:  Active Hospital Problems    Diagnosis  POA    *Pancytopenia [D61.818]  Yes    Hypokalemia [E87.6]  Yes    Hypocalcemia [E83.51]  Yes    Hypomagnesemia [E83.42]  Yes    NELLIE (acute kidney injury) [N17.9]  Yes    Weakness [R53.1]  Yes    Kappa light chain myeloma [C90.00]  Yes    Elevated troponin [R79.89]  Yes    HTN (hypertension) [I10]  Yes      Resolved Hospital Problems   No resolved problems to display.       Follow Up Appointments:  Future Appointments   Date Time Provider Department Center   2/26/2025  1:40 PM Ad Pérez DNP [facility] CARDIO [facility]   3/5/2025  9:30 AM Michael Anand MD [facility] HEM ONC [facility]       Allergies:Review of patient's allergies indicates:  No Known Allergies    Medications: Review discharge medications with patient and family and provide education.    Current Facility-Administered Medications   Medication Dose Route Frequency Provider Last Rate Last Admin    0.9%  NaCl infusion (for blood administration)   Intravenous Q24H PRN Romie Delgado NP        acetaminophen tablet 650 mg  650 mg Oral PRN Moris Conklin MD        acyclovir capsule 200 mg  200 mg Oral Daily Romie Delgado NP   200 mg at 02/11/25 0933    atorvastatin tablet 80 mg  80 mg Oral Daily Romie Delgado, NP   80 mg at 02/11/25 0933    benzonatate capsule 200 mg  200 mg Oral TID PRN Romie Delgado NP   200 mg at 02/11/25 0933    calcium carbonate 200 mg calcium (500 mg) chewable tablet 1,000 mg  1,000 mg Oral BID Moris Conklin MD   1,000 mg at 02/11/25 2127    carvediloL tablet 6.25 mg  6.25 mg Oral BID Romie Delgado, NP   6.25 mg at  02/11/25 2127    clopidogreL tablet 75 mg  75 mg Oral Daily Romie Delgado, NP   75 mg at 02/11/25 0933    dextrose 50% injection 12.5 g  12.5 g Intravenous PRN Romie Delgado, NP        dextrose 50% injection 25 g  25 g Intravenous PRN Romie Delgado, NP        diphenhydrAMINE capsule 25 mg  25 mg Oral PRN Moris Conklin MD        finasteride tablet 5 mg  5 mg Oral Daily Romie Delgado, NP   5 mg at 02/11/25 0933    fluticasone propionate 50 mcg/actuation nasal spray 50 mcg  1 spray Each Nostril Daily Romie Delgado, NP   50 mcg at 02/11/25 0937    glucagon (human recombinant) injection 1 mg  1 mg Intramuscular PRN Romie Delgado, NP        glucose chewable tablet 16 g  16 g Oral PRN Romie Delgado, NP        glucose chewable tablet 24 g  24 g Oral PRN Romie Delgado, NP        loperamide capsule 2 mg  2 mg Oral QID PRN Moris Conklin MD   2 mg at 02/10/25 1333    mirtazapine tablet 15 mg  15 mg Oral QHS Romie Delgado, NP   15 mg at 02/11/25 2127    naloxone 0.4 mg/mL injection 0.02 mg  0.02 mg Intravenous PRN Romie Delgado, JENNY        promethazine (PHENERGAN) 12.5 mg in 0.9% NaCl 50 mL IVPB  12.5 mg Intravenous Q6H PRN Romie Delgado, NP        sodium chloride 0.9% flush 10 mL  10 mL Intravenous Q12H PRN Romie Delgado, NP        sodium chloride 0.9% flush 10 mL  10 mL Intravenous PRN Moris Conklin MD        tamsulosin 24 hr capsule 0.8 mg  2 capsule Oral Daily Romie Delgado, NP   0.8 mg at 02/11/25 0933        Medication List        START taking these medications      calcium carbonate 200 mg calcium (500 mg) chewable tablet  Commonly known as: TUMS  Take 2 tablets (1,000 mg total) by mouth 2 (two) times daily.     loperamide 2 mg capsule  Commonly known as: IMODIUM  Take 1 capsule (2 mg total) by mouth 4 (four) times daily as needed for Diarrhea.            CHANGE how you take these  medications      lenalidomide 10 mg Cap  TAKE ONE CAPSULE (10MG) BY MOUTH ONCE EVERY OTHER DAY FOR 28 DAYS.  What changed: See the new instructions.            CONTINUE taking these medications      acyclovir 200 MG capsule  Commonly known as: ZOVIRAX  Take 1 capsule (200 mg total) by mouth once daily.     atorvastatin 80 MG tablet  Commonly known as: LIPITOR  Take 80 mg by mouth once daily.     carvediloL 6.25 MG tablet  Commonly known as: COREG  Take 1 tablet (6.25 mg total) by mouth 2 (two) times daily.     clopidogreL 75 mg tablet  Commonly known as: PLAVIX  Take 75 mg by mouth once daily.     cyanocobalamin 500 MCG tablet  Take 1 tablet (500 mcg total) by mouth once daily.     finasteride 5 mg tablet  Commonly known as: PROSCAR  Take 5 mg by mouth once daily.     folic acid 1 MG tablet  Commonly known as: FOLVITE  Take 1 tablet (1 mg total) by mouth once daily.     mirtazapine 7.5 MG Tab  Commonly known as: REMERON  Take 2 tablets (15 mg total) by mouth every evening. Start one tab nightly (7.5mg) x10 days then increase to 2 tabs (15mg) nightly     multivitamin capsule  Take 1 capsule by mouth once daily.     tamsulosin 0.4 mg Cap  Commonly known as: FLOMAX  Take 2 capsules (0.8 mg total) by mouth once daily.            STOP taking these medications      benzonatate 100 MG capsule  Commonly known as: TESSALON     fluticasone propionate 50 mcg/actuation nasal spray  Commonly known as: FLONASE     NIFEdipine 30 MG (OSM) 24 hr tablet  Commonly known as: PROCARDIA-XL     oseltamivir 75 MG capsule  Commonly known as: TAMIFLU     sodium bicarbonate 650 MG tablet                I have seen and examined this patient within the last 30 days. My clinical findings that support the need for the home health skilled services and home bound status are the following:no   Weakness/numbness causing balance and gait disturbance due to Weakness/Debility and Malignancy/Cancer making it taxing to leave home.     Diet:   regular  diet    Labs:  SN to perform labs:  BMP: Monthly; 3 month(s) and Report Lab results to PCP.    Referrals/ Consults  Physical Therapy to evaluate and treat. Evaluate for home safety and equipment needs; Establish/upgrade home exercise program. Perform / instruct on therapeutic exercises, gait training, transfer training, and Range of Motion.  Occupational Therapy to evaluate and treat. Evaluate home environment for safety and equipment needs. Perform/Instruct on transfers, ADL training, ROM, and therapeutic exercises.   to evaluate for community resources/long-range planning.  Aide to provide assistance with personal care, ADLs, and vital signs.    Activities:   activity as tolerated    Nursing:   Agency to admit patient within 24 hours of hospital discharge unless specified on physician order or at patient request    SN to complete comprehensive assessment including routine vital signs. Instruct on disease process and s/s of complications to report to MD. Review/verify medication list sent home with the patient at time of discharge  and instruct patient/caregiver as needed. Frequency may be adjusted depending on start of care date.     Skilled nurse to perform up to 3 visits PRN for symptoms related to diagnosis    Notify MD if SBP > 160 or < 90; DBP > 90 or < 50; HR > 120 or < 50; Temp > 101; O2 < 88%; Other:       Ok to schedule additional visits based on staff availability and patient request on consecutive days within the home health episode.    When multiple disciplines ordered:    Start of Care occurs on Sunday - Wednesday schedule remaining discipline evaluations as ordered on separate consecutive days following the start of care.    Thursday SOC -schedule subsequent evaluations Friday and Monday the following week.     Friday - Saturday SOC - schedule subsequent discipline evaluations on consecutive days starting Monday of the following week.    For all post-discharge communication and  subsequent orders please contact patient's primary care physician. If unable to reach primary care physician or do not receive response within 30 minutes, please contact Carmenza Martinez for clinical staff order clarification    Miscellaneous   Routine Skin for Bedridden Patients: Instruct patient/caregiver to apply moisture barrier cream to all skin folds and wet areas in perineal area daily and after baths and all bowel movements.    Home Health Aide:  Nursing Three times weekly, Physical Therapy Three times weekly, Occupational Therapy Three times weekly, Medical Social Work Weekly, and Home Health Aide Three times weekly    Wound Care Orders  no    I certify that this patient is confined to his home and needs intermittent skilled nursing care, physical therapy, and occupational therapy.

## 2025-02-12 NOTE — NURSING
AVS packet printed and delivered to patient. Daughter at bedside. VN notified. Patient awaiting delivery of BSC and RW.

## 2025-02-12 NOTE — PT/OT/SLP PROGRESS
[facility] office and organ donation notified. Transport notified  patient to take to [facility].    Physical Therapy  Visit Attempt    Patient Name:  Haris Hernandez   MRN:  [id number]    Patient not seen today secondary to Other (Comment) (Pt declined participation in therapy this date due to reports of wishing to rest prior to d/c home this date). Pt reports no concerns for d/c home and reports that he can perform functional mobility safely at home. Pt has declined any placement or further therapy. Pt is anticipated to d/c home this date.     2/12/2025

## 2025-02-12 NOTE — PLAN OF CARE
Problem: Adult Inpatient Plan of Care  Goal: Optimal Comfort and Wellbeing  Outcome: Progressing     Problem: Adult Inpatient Plan of Care  Goal: Absence of Hospital-Acquired Illness or Injury  Outcome: Progressing     Problem: Fall Injury Risk  Goal: Absence of Fall and Fall-Related Injury  Outcome: Progressing       POC reviewed with pt, following- VSS, NADN, pt resting quietly this shift. Pt reports feeling well. Enhanced respiratory precautions in place. No falls or injuries noted, CB in reach at all times. Instructed to call for needs not met on rounds, v/u.

## 2025-02-12 NOTE — PLAN OF CARE
Haris's mobility limitation cannot be sufficiently resolved by the use of a cane. His functional mobility deficit can be sufficiently resolved with the use of a Rolling Walker. Patient's mobility limitation significantly impairs their ability to participate in one of more activities of daily living.  The use of a RW will significantly improve the patient's ability to participate in MRADLS and the patient will use it on regular basis in the home.     Haris requires a commode for home use because he is confined to a single room.

## 2025-02-12 NOTE — PROGRESS NOTES
02/12/25 0804   Nurse Notification   Charge Nurse Notified? Yes   Name of Charge Nurse JOSE LUIS Barroso RN   Bedside Nurse Notified? Yes   Name of Bedside Nurse TALAT Vazquez RN   Nurse Notfication Method Secure Chat   Nurse Notified Of   (Discharge orders received. AVS prepared and okay to print for discharge review once cleared by CM.)

## 2025-02-13 LAB
BACTERIA BLD CULT: NORMAL
BACTERIA BLD CULT: NORMAL
BLD PROD TYP BPU: NORMAL
BLD PROD TYP BPU: NORMAL
BLOOD UNIT EXPIRATION DATE: NORMAL
BLOOD UNIT EXPIRATION DATE: NORMAL
BLOOD UNIT TYPE CODE: 6200
BLOOD UNIT TYPE CODE: 6200
BLOOD UNIT TYPE: NORMAL
BLOOD UNIT TYPE: NORMAL
CODING SYSTEM: NORMAL
CODING SYSTEM: NORMAL
CROSSMATCH INTERPRETATION: NORMAL
CROSSMATCH INTERPRETATION: NORMAL
DISPENSE STATUS: NORMAL
DISPENSE STATUS: NORMAL
NUM UNITS TRANS PACKED RBC: NORMAL
NUM UNITS TRANS PACKED RBC: NORMAL

## 2025-02-20 NOTE — PROGRESS NOTES
Cardiology Clinic note    Subjective:   Patient ID:  Haris Hernandez is a 79 y.o. male who presents for follow up of HTN, elevated troponin    HPI    2/24/2025: 78 yo M with PMHx of CVA, CAD< HTN, MM, CKD present to clinic for HFU after admission with SOB, fatigue, elevated troponin. Seen in clinic with daughter reports overall doing well. Symptoms resolved since hospital D/C, likely in the setting of e- abnormality/ anemia. Echo as below, new WMA noted. No new CV s/s, denies CP, SOB/BASHIR, orthopnea, PND, syncope, palpitations. Intermittent LE edema. Reports compliance and tolerance with all medications.          CV Hx  ---------------------------------------    Echo 02/2025     Left Ventricle: The left ventricle is normal in size. Normal wall thickness. Regional wall motion abnormalities present. See diagram for wall motion findings. There is low normal systolic function with a visually estimated ejection fraction of 50 - 55%. Global longitudinal strain is -11.9% ( apical sparing pattern,  can be seen with amyloid cardiomyopathy). There is normal diastolic function. Elevated left ventricular filling pressure.    Left Ventricle: The left ventricle is normal in size. Mildly increased wall thickness. There is mildly reduced systolic function with a visually estimated ejection fraction of 45 - 50%. Global longitudinal strain is -11.9%.    Right Ventricle: Normal right ventricular cavity size. Wall thickness is normal. Systolic function is normal.    Left Atrium: Left atrium is mildly dilated.    Aortic Valve: There is mild aortic valve sclerosis.    Mitral Valve: There is mild regurgitation.    Tricuspid Valve: There is mild regurgitation.    Pulmonary Artery: The estimated pulmonary artery systolic pressure is 30 mmHg.    IVC/SVC: Normal venous pressure at 3 mmHg.    Echo 08/2021  Left ventricular concentric remodeling and normal systolic function.  The estimated ejection fraction is 65%.  Normal left ventricular  diastolic function.  Normal right ventricular size with normal right ventricular systolic function.  Normal central venous pressure (3 mmHg).  The estimated PA systolic pressure is 22 mmHg.    Past Medical History:   Diagnosis Date    Coronary artery disease     Enlarged prostate without lower urinary tract symptoms (luts)     BPH    Enlarged prostate without lower urinary tract symptoms (luts)     BPH    Hypertension     Iron deficiency anemia secondary to inadequate dietary iron intake     [place] light chain myeloma     Normocytic anemia     Oropharyngeal dysphagia     Renal disorder     Stage 3b chronic kidney disease (CKD)     Stroke     Thrombocytopenia, unspecified           Patient Active Problem List    Diagnosis Date Noted    Hypokalemia 02/10/2025    Pancytopenia 02/09/2025    Hypocalcemia 02/09/2025    Hypomagnesemia 02/09/2025    NELLIE (acute kidney injury) 02/09/2025    Weakness 02/09/2025    Iron deficiency anemia secondary to inadequate dietary iron intake 07/24/2024    Thrombocytopenia, unspecified 10/27/2022    Advanced care planning/counseling discussion 03/16/2022    [place] light chain myeloma 08/16/2021    Uncontrolled hypertension     Elevated troponin 08/14/2021    History of CVA (cerebrovascular accident) 08/14/2021    Stage 3b chronic kidney disease 08/13/2021    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    ATORVASTATIN (LIPITOR) 80 MG TABLET    Take 80 mg by mouth once daily.    CALCIUM CARBONATE (TUMS) 200 MG CALCIUM (500 MG) CHEWABLE TABLET    Take 2  tablets (1,000 mg total) by mouth 2 (two) times daily.    CARVEDILOL (COREG) 6.25 MG TABLET    Take 1 tablet (6.25 mg total) by mouth 2 (two) times daily.    CLOPIDOGREL (PLAVIX) 75 MG TABLET    Take 75 mg by mouth once daily.    CYANOCOBALAMIN 500 MCG TABLET    Take 1 tablet (500 mcg total) by mouth once daily.    FINASTERIDE (PROSCAR) 5 MG TABLET    Take 5 mg by mouth once daily.    FOLIC ACID (FOLVITE) 1 MG TABLET    Take 1 tablet (1 mg total) by mouth once daily.    LENALIDOMIDE 10 MG CAP    TAKE ONE CAPSULE (10MG) BY MOUTH ONCE EVERY OTHER DAY FOR 28 DAYS.    MIRTAZAPINE (REMERON) 7.5 MG TAB    Take 2 tablets (15 mg total) by mouth every evening. Start one tab nightly (7.5mg) x10 days then increase to 2 tabs (15mg) nightly    MULTIVITAMIN CAPSULE    Take 1 capsule by mouth once daily.    TAMSULOSIN (FLOMAX) 0.4 MG CAP    Take 2 capsules (0.8 mg total) by mouth once daily.   Modified Medications    No medications on file   Discontinued Medications    No medications on file        Review of Systems   Constitutional: Negative for chills, decreased appetite, diaphoresis, malaise/fatigue, weight gain and weight loss.   Cardiovascular:  Positive for leg swelling. Negative for chest pain, claudication, dyspnea on exertion, irregular heartbeat, near-syncope, orthopnea, palpitations, paroxysmal nocturnal dyspnea and syncope.   Respiratory:  Negative for cough, hemoptysis, shortness of breath and snoring.    Gastrointestinal:  Negative for bloating, abdominal pain, nausea and vomiting.   Neurological:  Negative for light-headedness and weakness.       No family history on file.    Social History     Socioeconomic History    Marital status:    Tobacco Use    Smoking status: Former    Smokeless tobacco: Never   Substance and Sexual Activity    Alcohol use: Yes     Alcohol/week: 1.0 standard drink of alcohol     Types: 1 Cans of beer per week     Comment: Pt states that he only drinks about once a month. Stopped  "drinking excessively in 2012 when diagnosed with Myeloma    Drug use: No    Sexual activity: Never     Social Drivers of Health     Financial Resource Strain: Low Risk  (2/23/2025)    Overall Financial Resource Strain (CARDIA)     Difficulty of Paying Living Expenses: Not very hard   Food Insecurity: Patient Declined (2/23/2025)    Hunger Vital Sign     Worried About Running Out of Food in the Last Year: Patient declined     Ran Out of Food in the Last Year: Patient declined   Transportation Needs: No Transportation Needs (2/23/2025)    PRAPARE - Transportation     Lack of Transportation (Medical): No     Lack of Transportation (Non-Medical): No   Physical Activity: Inactive (2/23/2025)    Exercise Vital Sign     Days of Exercise per Week: 0 days     Minutes of Exercise per Session: 0 min   Stress: No Stress Concern Present (2/23/2025)    [employer] [employer] - Occupational Stress Questionnaire     Feeling of Stress : Only a little   Housing Stability: Low Risk  (2/23/2025)    Housing Stability Vital Sign     Unable to Pay for Housing in the Last Year: No     Number of Times Moved in the Last Year: 0     Homeless in the Last Year: No            Objective:   Vitals  Vitals:    02/24/25 0953 02/24/25 0956   BP: (!) 144/64 132/61   Pulse: 72    SpO2: (!) 94%    Weight: 61.2 kg (134 lb 14.7 oz)    Height: 5' 11" (1.803 m)           Physical Exam  Vitals and nursing note reviewed.   Constitutional:       Appearance: Normal appearance.   Cardiovascular:      Rate and Rhythm: Normal rate and regular rhythm.      Heart sounds: No murmur heard.     No gallop.   Pulmonary:      Effort: Pulmonary effort is normal.      Breath sounds: Normal breath sounds. No wheezing or rales.   Abdominal:      General: Bowel sounds are normal. There is no distension.      Palpations: Abdomen is soft.      Tenderness: There is no abdominal tenderness.   Musculoskeletal:      Right lower leg: No edema.      Left lower leg: " No edema.   Skin:     General: Skin is warm and dry.   Neurological:      Mental Status: He is alert and oriented to person, place, and time.           Lab Results    Lab Results   Component Value Date    WBC 1.81 (LL) 02/12/2025    HGB 7.2 (L) 02/12/2025    HCT 20.9 (L) 02/12/2025    MCV 88 02/12/2025       Lab Results   Component Value Date     (L) 02/12/2025    INR 1.0 05/09/2018       Lab Results   Component Value Date    K 4.3 02/12/2025    MG 1.6 02/11/2025    BUN 24 (H) 02/12/2025    CREATININE 1.9 (H) 02/12/2025       Lab Results   Component Value Date     (H) 02/12/2025    HGBA1C 5.6 08/13/2021       Lab Results   Component Value Date    AST 13 02/11/2025    ALT 21 02/11/2025    ALBUMIN 2.1 (L) 02/11/2025    PROT 5.4 (L) 02/11/2025       Lab Results   Component Value Date    CHOL 112 (L) 08/13/2021    HDL 43 08/13/2021    LDLCALC 55.6 (L) 08/13/2021    TRIG 67 08/13/2021       Lab Results   Component Value Date    CRP 5.6 08/14/2021         Assessment:     1. Elevated troponin    2. Hypertension, unspecified type    3. Stage 3b chronic kidney disease        Plan:     Elevated troponin  -likely demand in setting of anemia/electrolyte disturbances  -FRAN with mildly reduced EF, new WMA  -would like to repeat echo to eval EF, WMA - if unimproved will rec stress test to r/o ischemic etiology    2. CAD  -remote history  -asymptomatic from CV standpoint  -continue med management as above, plavix, statin, BB  -repeat Echo as above  -tight lipid control    3. HTN  -goal BP < 130/80  -at goal, continue BB    4. HLD  -target LDLc < 70  -continue statin    5. CKD  -stable, Cr 1.9, eGFR 36  -avoid NSAIDs, nephrotoxic agents       -repeat Echo  -F/U after    The ASCVD Risk score (Pedro DK, et al., 2019) failed to calculate for the following reasons:    Risk score cannot be calculated because patient has a medical history suggesting prior/existing ASCVD    Orders Placed This Encounter   Procedures    Echo      Standing Status:   Future     Expected Date:   5/24/2025     Expiration Date:   2/24/2026     Release to patient:   Immediate       He expressed verbal understanding and agreed with the plan      Pertinent cardiac images and EKG reviewed independently.    Continue with current medical plan and lifestyle changes.  Return sooner for concerns or questions. If symptoms persist go to the ED.  I have reviewed all pertinent data including patient's medical history in detail and updated the computerized patient record.     Counseling included discussion regarding imaging findings, diagnosis, possibilities, treatment options, risks and benefits.    Thank you for the opportunity to care for this patient. Will be available for questions if needed.        Signed:  Ad Pérez DNP  02/24/2025

## 2025-02-24 ENCOUNTER — OFFICE VISIT (OUTPATIENT)
Dept: CARDIOLOGY | Facility: CLINIC | Age: 80
End: 2025-02-24
Payer: MEDICARE

## 2025-02-24 VITALS
HEART RATE: 72 BPM | DIASTOLIC BLOOD PRESSURE: 61 MMHG | HEIGHT: 71 IN | BODY MASS INDEX: 18.89 KG/M2 | SYSTOLIC BLOOD PRESSURE: 132 MMHG | OXYGEN SATURATION: 94 % | WEIGHT: 134.94 LBS

## 2025-02-24 DIAGNOSIS — R79.89 ELEVATED TROPONIN: Primary | ICD-10-CM

## 2025-02-24 DIAGNOSIS — N18.32 STAGE 3B CHRONIC KIDNEY DISEASE: ICD-10-CM

## 2025-02-24 DIAGNOSIS — I25.10 CORONARY ARTERY DISEASE INVOLVING NATIVE CORONARY ARTERY OF NATIVE HEART WITHOUT ANGINA PECTORIS: Chronic | ICD-10-CM

## 2025-02-24 DIAGNOSIS — E78.5 HYPERLIPIDEMIA, UNSPECIFIED HYPERLIPIDEMIA TYPE: Chronic | ICD-10-CM

## 2025-02-24 DIAGNOSIS — I10 HYPERTENSION, UNSPECIFIED TYPE: ICD-10-CM

## 2025-02-24 PROCEDURE — 1100F PTFALLS ASSESS-DOCD GE2>/YR: CPT | Mod: CPTII,S$GLB,,

## 2025-02-24 PROCEDURE — 1126F AMNT PAIN NOTED NONE PRSNT: CPT | Mod: CPTII,S$GLB,,

## 2025-02-24 PROCEDURE — 3288F FALL RISK ASSESSMENT DOCD: CPT | Mod: CPTII,S$GLB,,

## 2025-02-24 PROCEDURE — 1111F DSCHRG MED/CURRENT MED MERGE: CPT | Mod: CPTII,S$GLB,,

## 2025-02-24 PROCEDURE — 3075F SYST BP GE 130 - 139MM HG: CPT | Mod: CPTII,S$GLB,,

## 2025-02-24 PROCEDURE — 99999 PR PBB SHADOW E&M-EST. PATIENT-LVL IV: CPT | Mod: PBBFAC,,,

## 2025-02-24 PROCEDURE — 3078F DIAST BP <80 MM HG: CPT | Mod: CPTII,S$GLB,,

## 2025-02-24 PROCEDURE — 1159F MED LIST DOCD IN RCRD: CPT | Mod: CPTII,S$GLB,,

## 2025-02-24 PROCEDURE — 99214 OFFICE O/P EST MOD 30 MIN: CPT | Mod: S$GLB,,,

## 2025-03-13 DIAGNOSIS — C90.00 KAPPA LIGHT CHAIN MYELOMA: ICD-10-CM

## 2025-03-13 RX ORDER — LENALIDOMIDE 10 MG/1
CAPSULE ORAL
Qty: 14 CAPSULE | Refills: 0 | OUTPATIENT
Start: 2025-03-13

## 2025-03-14 NOTE — TELEPHONE ENCOUNTER
----- Message from Jeniffer sent at 3/14/2025  8:31 AM CDT -----  Type:  Pharmacy Calling to Clarify an RXPharmacy Name:[facility] ([place]) - [place], [place] - [address] [place] [address] [id number] [facility] [address] [facility] [id number]: [phone] Fax: [phone]: Not open 24 hoursPrescription Name:lenalidomide 10 mg CapWhat do they need to clarify?:would like to know why it was denied Best Call Back Number:[phone] EXT:[id number] Information: